# Patient Record
Sex: MALE | Race: WHITE | NOT HISPANIC OR LATINO | Employment: OTHER | URBAN - METROPOLITAN AREA
[De-identification: names, ages, dates, MRNs, and addresses within clinical notes are randomized per-mention and may not be internally consistent; named-entity substitution may affect disease eponyms.]

---

## 2023-08-31 DIAGNOSIS — I25.10 CORONARY ARTERY DISEASE INVOLVING NATIVE CORONARY ARTERY OF NATIVE HEART WITHOUT ANGINA PECTORIS: Primary | ICD-10-CM

## 2023-08-31 PROBLEM — M10.9 GOUT: Status: ACTIVE | Noted: 2023-08-31

## 2023-08-31 PROBLEM — I10 HYPERTENSION: Status: ACTIVE | Noted: 2023-08-31

## 2023-08-31 PROBLEM — E78.00 PURE HYPERCHOLESTEROLEMIA: Status: ACTIVE | Noted: 2023-08-31

## 2023-08-31 RX ORDER — METOPROLOL TARTRATE 50 MG/1
50 TABLET ORAL EVERY 12 HOURS
Qty: 90 TABLET | Refills: 3 | Status: SHIPPED | OUTPATIENT
Start: 2023-08-31

## 2023-08-31 RX ORDER — SIMVASTATIN 20 MG/1
20 TABLET, FILM COATED ORAL DAILY
Qty: 90 TABLET | Refills: 3 | Status: SHIPPED | OUTPATIENT
Start: 2023-08-31

## 2023-08-31 RX ORDER — SIMVASTATIN 20 MG/1
1 TABLET, FILM COATED ORAL DAILY
COMMUNITY
Start: 2014-03-31 | End: 2023-08-31 | Stop reason: SDUPTHER

## 2023-08-31 RX ORDER — METOPROLOL TARTRATE 50 MG/1
1 TABLET ORAL EVERY 12 HOURS
COMMUNITY
Start: 2016-09-07 | End: 2023-08-31 | Stop reason: SDUPTHER

## 2023-08-31 NOTE — TELEPHONE ENCOUNTER
Pt unable to travel to [place] he had appt with you in October, he is going to move to the [facility] where his wife goes, itll be close to him. He is asking for 90 supply to get him until he is seen by the next doctor.

## 2024-01-01 ENCOUNTER — POST MORTEM DOCUMENTATION (OUTPATIENT)
Dept: RADIATION ONCOLOGY | Facility: CLINIC | Age: 74
End: 2024-01-01

## 2024-08-26 ENCOUNTER — APPOINTMENT (OUTPATIENT)
Dept: CARDIOLOGY | Facility: HOSPITAL | Age: 74
End: 2024-08-26
Payer: MEDICARE

## 2024-08-26 ENCOUNTER — APPOINTMENT (OUTPATIENT)
Dept: RADIOLOGY | Facility: HOSPITAL | Age: 74
End: 2024-08-26
Payer: MEDICARE

## 2024-08-26 ENCOUNTER — HOSPITAL ENCOUNTER (EMERGENCY)
Facility: HOSPITAL | Age: 74
Discharge: OTHER INSTITUTION | End: 2024-08-27
Attending: EMERGENCY MEDICINE
Payer: MEDICARE

## 2024-08-26 DIAGNOSIS — I48.91 TRANSIENT ATRIAL FIBRILLATION (MULTI): ICD-10-CM

## 2024-08-26 DIAGNOSIS — R06.02 SHORTNESS OF BREATH: Primary | ICD-10-CM

## 2024-08-26 DIAGNOSIS — R09.02 HYPOXEMIA: ICD-10-CM

## 2024-08-26 DIAGNOSIS — R91.8 MASS OF RIGHT LUNG: ICD-10-CM

## 2024-08-26 DIAGNOSIS — J44.1 COPD EXACERBATION (MULTI): ICD-10-CM

## 2024-08-26 LAB
ALBUMIN SERPL BCP-MCNC: 3.2 G/DL (ref 3.4–5)
ALP SERPL-CCNC: 68 U/L (ref 33–136)
ALT SERPL W P-5'-P-CCNC: 6 U/L (ref 10–52)
ANION GAP BLDA CALCULATED.4IONS-SCNC: 8 MMO/L (ref 10–25)
ANION GAP SERPL CALC-SCNC: 10 MMOL/L (ref 10–20)
APPARATUS: ABNORMAL
ARTERIAL PATENCY WRIST A: POSITIVE
AST SERPL W P-5'-P-CCNC: 13 U/L (ref 9–39)
BASE EXCESS BLDA CALC-SCNC: 6.6 MMOL/L (ref -2–3)
BASOPHILS # BLD AUTO: 0.03 X10*3/UL (ref 0–0.1)
BASOPHILS NFR BLD AUTO: 0.3 %
BILIRUB SERPL-MCNC: 0.4 MG/DL (ref 0–1.2)
BNP SERPL-MCNC: 122 PG/ML (ref 0–99)
BODY TEMPERATURE: ABNORMAL
BUN SERPL-MCNC: 7 MG/DL (ref 6–23)
CA-I BLDA-SCNC: 1.5 MMOL/L (ref 1.1–1.33)
CALCIUM SERPL-MCNC: 11.2 MG/DL (ref 8.6–10.3)
CARDIAC TROPONIN I PNL SERPL HS: 11 NG/L (ref 0–20)
CARDIAC TROPONIN I PNL SERPL HS: 13 NG/L (ref 0–20)
CHLORIDE BLDA-SCNC: 94 MMOL/L (ref 98–107)
CHLORIDE SERPL-SCNC: 92 MMOL/L (ref 98–107)
CO2 SERPL-SCNC: 29 MMOL/L (ref 21–32)
CREAT SERPL-MCNC: 0.65 MG/DL (ref 0.5–1.3)
D DIMER PPP FEU-MCNC: 901 NG/ML FEU
EGFRCR SERPLBLD CKD-EPI 2021: >90 ML/MIN/1.73M*2
EOSINOPHIL # BLD AUTO: 0.1 X10*3/UL (ref 0–0.4)
EOSINOPHIL NFR BLD AUTO: 1.1 %
ERYTHROCYTE [DISTWIDTH] IN BLOOD BY AUTOMATED COUNT: 11.5 % (ref 11.5–14.5)
GLUCOSE BLDA-MCNC: 109 MG/DL (ref 74–99)
GLUCOSE SERPL-MCNC: 101 MG/DL (ref 74–99)
HCO3 BLDA-SCNC: 30.5 MMOL/L (ref 22–26)
HCT VFR BLD AUTO: 37.9 % (ref 41–52)
HCT VFR BLD EST: 42 % (ref 41–52)
HGB BLD-MCNC: 12.8 G/DL (ref 13.5–17.5)
HGB BLDA-MCNC: 14 G/DL (ref 13.5–17.5)
IMM GRANULOCYTES # BLD AUTO: 0.05 X10*3/UL (ref 0–0.5)
IMM GRANULOCYTES NFR BLD AUTO: 0.6 % (ref 0–0.9)
INHALED O2 CONCENTRATION: 28 %
INR PPP: 1 (ref 0.9–1.1)
LACTATE BLDA-SCNC: 1.4 MMOL/L (ref 0.4–2)
LACTATE SERPL-SCNC: 1.6 MMOL/L (ref 0.4–2)
LIPASE SERPL-CCNC: <3 U/L (ref 9–82)
LYMPHOCYTES # BLD AUTO: 1.33 X10*3/UL (ref 0.8–3)
LYMPHOCYTES NFR BLD AUTO: 15.1 %
MAGNESIUM SERPL-MCNC: 1.62 MG/DL (ref 1.6–2.4)
MCH RBC QN AUTO: 31.3 PG (ref 26–34)
MCHC RBC AUTO-ENTMCNC: 33.8 G/DL (ref 32–36)
MCV RBC AUTO: 93 FL (ref 80–100)
MONOCYTES # BLD AUTO: 0.94 X10*3/UL (ref 0.05–0.8)
MONOCYTES NFR BLD AUTO: 10.7 %
NEUTROPHILS # BLD AUTO: 6.34 X10*3/UL (ref 1.6–5.5)
NEUTROPHILS NFR BLD AUTO: 72.2 %
NRBC BLD-RTO: 0 /100 WBCS (ref 0–0)
OXYHGB MFR BLDA: 92.8 % (ref 94–98)
PCO2 BLDA: 40 MM HG (ref 38–42)
PH BLDA: 7.49 PH (ref 7.38–7.42)
PLATELET # BLD AUTO: 224 X10*3/UL (ref 150–450)
PO2 BLDA: 72 MM HG (ref 85–95)
POTASSIUM BLDA-SCNC: 3.4 MMOL/L (ref 3.5–5.3)
POTASSIUM SERPL-SCNC: 3.1 MMOL/L (ref 3.5–5.3)
PROT SERPL-MCNC: 7.7 G/DL (ref 6.4–8.2)
PROTHROMBIN TIME: 11.6 SECONDS (ref 9.8–12.8)
RBC # BLD AUTO: 4.09 X10*6/UL (ref 4.5–5.9)
SAO2 % BLDA: 96 % (ref 94–100)
SARS-COV-2 RNA RESP QL NAA+PROBE: NOT DETECTED
SODIUM BLDA-SCNC: 129 MMOL/L (ref 136–145)
SODIUM SERPL-SCNC: 128 MMOL/L (ref 136–145)
SPECIMEN DRAWN FROM PATIENT: ABNORMAL
TSH SERPL-ACNC: 1.24 MIU/L (ref 0.44–3.98)
WBC # BLD AUTO: 8.8 X10*3/UL (ref 4.4–11.3)

## 2024-08-26 PROCEDURE — 83690 ASSAY OF LIPASE: CPT | Performed by: EMERGENCY MEDICINE

## 2024-08-26 PROCEDURE — 99291 CRITICAL CARE FIRST HOUR: CPT | Performed by: EMERGENCY MEDICINE

## 2024-08-26 PROCEDURE — 2500000005 HC RX 250 GENERAL PHARMACY W/O HCPCS: Performed by: EMERGENCY MEDICINE

## 2024-08-26 PROCEDURE — 83735 ASSAY OF MAGNESIUM: CPT | Performed by: EMERGENCY MEDICINE

## 2024-08-26 PROCEDURE — 71275 CT ANGIOGRAPHY CHEST: CPT | Mod: FOREIGN READ | Performed by: RADIOLOGY

## 2024-08-26 PROCEDURE — 83605 ASSAY OF LACTIC ACID: CPT | Performed by: EMERGENCY MEDICINE

## 2024-08-26 PROCEDURE — 87635 SARS-COV-2 COVID-19 AMP PRB: CPT | Performed by: EMERGENCY MEDICINE

## 2024-08-26 PROCEDURE — 36415 COLL VENOUS BLD VENIPUNCTURE: CPT | Performed by: EMERGENCY MEDICINE

## 2024-08-26 PROCEDURE — 71045 X-RAY EXAM CHEST 1 VIEW: CPT | Mod: FOREIGN READ | Performed by: RADIOLOGY

## 2024-08-26 PROCEDURE — 84484 ASSAY OF TROPONIN QUANT: CPT | Performed by: EMERGENCY MEDICINE

## 2024-08-26 PROCEDURE — 82435 ASSAY OF BLOOD CHLORIDE: CPT | Performed by: EMERGENCY MEDICINE

## 2024-08-26 PROCEDURE — 85025 COMPLETE CBC W/AUTO DIFF WBC: CPT | Performed by: EMERGENCY MEDICINE

## 2024-08-26 PROCEDURE — 93005 ELECTROCARDIOGRAM TRACING: CPT

## 2024-08-26 PROCEDURE — 2550000001 HC RX 255 CONTRASTS: Performed by: EMERGENCY MEDICINE

## 2024-08-26 PROCEDURE — 71045 X-RAY EXAM CHEST 1 VIEW: CPT

## 2024-08-26 PROCEDURE — 83880 ASSAY OF NATRIURETIC PEPTIDE: CPT | Performed by: EMERGENCY MEDICINE

## 2024-08-26 PROCEDURE — 85610 PROTHROMBIN TIME: CPT | Performed by: EMERGENCY MEDICINE

## 2024-08-26 PROCEDURE — 71275 CT ANGIOGRAPHY CHEST: CPT

## 2024-08-26 PROCEDURE — 83036 HEMOGLOBIN GLYCOSYLATED A1C: CPT | Mod: GENLAB

## 2024-08-26 PROCEDURE — 84443 ASSAY THYROID STIM HORMONE: CPT | Performed by: EMERGENCY MEDICINE

## 2024-08-26 PROCEDURE — 94640 AIRWAY INHALATION TREATMENT: CPT

## 2024-08-26 PROCEDURE — 85379 FIBRIN DEGRADATION QUANT: CPT | Performed by: EMERGENCY MEDICINE

## 2024-08-26 PROCEDURE — 2500000002 HC RX 250 W HCPCS SELF ADMINISTERED DRUGS (ALT 637 FOR MEDICARE OP, ALT 636 FOR OP/ED)

## 2024-08-26 PROCEDURE — 82810 BLOOD GASES O2 SAT ONLY: CPT | Mod: CCI | Performed by: EMERGENCY MEDICINE

## 2024-08-26 PROCEDURE — 96374 THER/PROPH/DIAG INJ IV PUSH: CPT | Mod: 59

## 2024-08-26 PROCEDURE — 83930 ASSAY OF BLOOD OSMOLALITY: CPT | Mod: GENLAB

## 2024-08-26 RX ORDER — IPRATROPIUM BROMIDE AND ALBUTEROL SULFATE 2.5; .5 MG/3ML; MG/3ML
SOLUTION RESPIRATORY (INHALATION)
Status: COMPLETED
Start: 2024-08-26 | End: 2024-08-26

## 2024-08-26 RX ORDER — IPRATROPIUM BROMIDE AND ALBUTEROL SULFATE 2.5; .5 MG/3ML; MG/3ML
3 SOLUTION RESPIRATORY (INHALATION)
Status: DISCONTINUED | OUTPATIENT
Start: 2024-08-26 | End: 2024-08-27

## 2024-08-26 RX ORDER — METOPROLOL TARTRATE 1 MG/ML
5 INJECTION, SOLUTION INTRAVENOUS ONCE
Status: COMPLETED | OUTPATIENT
Start: 2024-08-26 | End: 2024-08-26

## 2024-08-26 RX ORDER — POTASSIUM CHLORIDE 20 MEQ/1
40 TABLET, EXTENDED RELEASE ORAL ONCE
Status: COMPLETED | OUTPATIENT
Start: 2024-08-27 | End: 2024-08-27

## 2024-08-26 ASSESSMENT — COLUMBIA-SUICIDE SEVERITY RATING SCALE - C-SSRS
1. IN THE PAST MONTH, HAVE YOU WISHED YOU WERE DEAD OR WISHED YOU COULD GO TO SLEEP AND NOT WAKE UP?: NO
2. HAVE YOU ACTUALLY HAD ANY THOUGHTS OF KILLING YOURSELF?: NO
6. HAVE YOU EVER DONE ANYTHING, STARTED TO DO ANYTHING, OR PREPARED TO DO ANYTHING TO END YOUR LIFE?: NO

## 2024-08-26 ASSESSMENT — PAIN SCALES - GENERAL: PAINLEVEL_OUTOF10: 0 - NO PAIN

## 2024-08-26 ASSESSMENT — PAIN - FUNCTIONAL ASSESSMENT: PAIN_FUNCTIONAL_ASSESSMENT: 0-10

## 2024-08-26 NOTE — ED PROVIDER NOTES
Department of Emergency Medicine   ED  Provider Note  Admit Date/RoomTime: 8/26/2024  4:27 PM  [facility]: [facility]                  History of Present Illness:   Juan Carlos Cr is a 74 y.o. male presenting to the ED for shortness of breath and cough, beginning x 1 month.  The complaint has been persistent, moderate in severity, and worsened by  exertion.  He he admits to cough productive of phlegm. .  He denies any fevers or chills.  He denies any chest pain.  He admits to shortness of breath.  No leg pain swelling or calf tenderness.  No recent travel.  He is a smoker previously smoked about a pack and 1/2 to 2 packs/day says over the last several weeks he is cut back to about 5 cigarettes/day.  He drinks about 4-6 beers a day.  No known ill contacts.  His shortness of breath was getting worse and his son came over and checked his pulse ox which was found to be low on room air so he came to the emergency room by private vehicle.      Review of Systems:   Pertinent positives and review of systems as noted above.  Remaining 10 review of systems is negative or noncontributory to today's episode of care.  Review of Systems   A complete review of systems is otherwise negative except as noted above    --------------------------------------------- PAST HISTORY ---------------------------------------------  Past Medical History:  has a past medical history of Acute myocardial infarction, unspecified (Multi) (05/17/2013), Encounter for screening for malignant neoplasm of prostate (09/02/2015), Hypertension, and Personal history of other diseases of the nervous system and sense organs (09/13/2017).    Past Surgical History:  has a past surgical history that includes Coronary angioplasty with stent (05/17/2013); Cataract extraction (09/14/2018); and Cataract extraction (06/27/2014).    Social History:  reports that he has been smoking cigarettes. He does not have any smokeless tobacco history on file. He reports current  alcohol use.    Family History: family history is not on file. Unless otherwise noted, family history is non contributory    Patient's Medications   New Prescriptions    No medications on file   Previous Medications    METOPROLOL TARTRATE (LOPRESSOR) 50 MG TABLET    Take 1 tablet by mouth every 12 hours.    SIMVASTATIN (ZOCOR) 20 MG TABLET    Take 1 tablet (20 mg) by mouth once daily.   Modified Medications    No medications on file   Discontinued Medications    No medications on file      The patient’s home medications have been reviewed.    Allergies: Patient has no known allergies.    -------------------------------------------------- RESULTS -------------------------------------------------  All laboratory and radiology results have been personally reviewed by myself   LABS:  Labs Reviewed   CBC WITH AUTO DIFFERENTIAL - Abnormal       Result Value    WBC 8.8      nRBC 0.0      RBC 4.09 (*)     Hemoglobin 12.8 (*)     Hematocrit 37.9 (*)     MCV 93      MCH 31.3      MCHC 33.8      RDW 11.5      Platelets 224      Neutrophils % 72.2      Immature Granulocytes %, Automated 0.6      Lymphocytes % 15.1      Monocytes % 10.7      Eosinophils % 1.1      Basophils % 0.3      Neutrophils Absolute 6.34 (*)     Immature Granulocytes Absolute, Automated 0.05      Lymphocytes Absolute 1.33      Monocytes Absolute 0.94 (*)     Eosinophils Absolute 0.10      Basophils Absolute 0.03     COMPREHENSIVE METABOLIC PANEL - Abnormal    Glucose 101 (*)     Sodium 128 (*)     Potassium 3.1 (*)     Chloride 92 (*)     Bicarbonate 29      Anion Gap 10      Urea Nitrogen 7      Creatinine 0.65      eGFR >90      Calcium 11.2 (*)     Albumin 3.2 (*)     Alkaline Phosphatase 68      Total Protein 7.7      AST 13      Bilirubin, Total 0.4      ALT 6 (*)    LIPASE - Abnormal    Lipase <3 (*)     Narrative:     Venipuncture immediately after or during the administration of Metamizole may lead to falsely low results. Testing should be  performed immediately prior to Metamizole dosing.   B-TYPE NATRIURETIC PEPTIDE - Abnormal     (*)     Narrative:        <100 pg/mL - Heart failure unlikely  100-299 pg/mL - Intermediate probability of acute heart                  failure exacerbation. Correlate with clinical                  context and patient history.    >=300 pg/mL - Heart Failure likely. Correlate with clinical                  context and patient history.    BNP testing is performed using different testing methodology at [facility] than at other [facility]. Direct result comparisons should only be made within the same method.      BLOOD GAS ARTERIAL FULL PANEL - Abnormal    POCT pH, Arterial 7.49 (*)     POCT pCO2, Arterial 40      POCT pO2, Arterial 72 (*)     POCT SO2, Arterial 96      POCT Oxy Hemoglobin, Arterial 92.8 (*)     POCT Hematocrit Calculated, Arterial 42.0      POCT Sodium, Arterial 129 (*)     POCT Potassium, Arterial 3.4 (*)     POCT Chloride, Arterial 94 (*)     POCT Ionized Calcium, Arterial 1.50 (*)     POCT Glucose, Arterial 109 (*)     POCT Lactate, Arterial 1.4      POCT Base Excess, Arterial 6.6 (*)     POCT HCO3 Calculated, Arterial 30.5 (*)     POCT Hemoglobin, Arterial 14.0      POCT Anion Gap, Arterial 8 (*)     Patient Temperature        FiO2 28      Apparatus CANNULA      Site of Arterial Puncture Radial Right      Bishnu's Test Positive     D-DIMER, VTE EXCLUSION - Abnormal    D-Dimer, Quantitative VTE Exclusion 901 (*)     Narrative:     The VTE Exclusion D-Dimer assay is reported in ng/mL Fibrinogen Equivalent Units (FEU).    Per 's instructions for use, a value of less than 500 ng/mL (FEU) may help to exclude DVT or PE in outpatients when the assay is used with a clinical pretest probability assessment.([facility] must utilize and document eCalc 'Wells Score Deep Vein Thrombosis Risk' for DVT exclusion only. Emergency Department should utilize  Guidelines for Emergency  Department Use of the VTE Exclusion D-Dimer and Clinical Pretest probability assessment model for DVT or PE exclusion.)   MAGNESIUM - Normal    Magnesium 1.62     LACTATE - Normal    Lactate 1.6      Narrative:     Venipuncture immediately after or during the administration of Metamizole may lead to falsely low results. Testing should be performed immediately  prior to Metamizole dosing.   PROTIME-INR - Normal    Protime 11.6      INR 1.0     SARS-COV-2 PCR - Normal    Coronavirus 2019, PCR Not Detected      Narrative:     This assay has received FDA Emergency Use Authorization (EUA) and is only authorized for the duration of time that circumstances exist to justify the authorization of the emergency use of in vitro diagnostic tests for the detection of SARS-CoV-2 virus and/or diagnosis of COVID-19 infection under section 564(b)(1) of the Act, 21 U.S.C. 360bbb-3(b)(1). This assay is an in vitro diagnostic nucleic acid amplification test for the qualitative detection of SARS-CoV-2 from nasopharyngeal specimens and has been validated for use at [facility]. Negative results do not preclude COVID-19 infections and should not be used as the sole basis for diagnosis, treatment, or other management decisions.     SERIAL TROPONIN-INITIAL - Normal    Troponin I, High Sensitivity 11      Narrative:     Less than 99th percentile of normal range cutoff-  Female and children under 18 years old <14 ng/L; Male <21 ng/L: Negative  Repeat testing should be performed if clinically indicated.     Female and children under 18 years old 14-50 ng/L; Male 21-50 ng/L:  Consistent with possible cardiac damage and possible increased clinical   risk. Serial measurements may help to assess extent of myocardial damage.     >50 ng/L: Consistent with cardiac damage, increased clinical risk and  myocardial infarction. Serial measurements may help assess extent of   myocardial damage.      NOTE: Children less than 1 year old  may have higher baseline troponin   levels and results should be interpreted in conjunction with the overall   clinical context.     NOTE: Troponin I testing is performed using a different   testing methodology at [facility] than at other   [facility]. Direct result comparisons should only   be made within the same method.   SERIAL TROPONIN, 1 HOUR - Normal    Troponin I, High Sensitivity 13      Narrative:     Less than 99th percentile of normal range cutoff-  Female and children under 18 years old <14 ng/L; Male <21 ng/L: Negative  Repeat testing should be performed if clinically indicated.     Female and children under 18 years old 14-50 ng/L; Male 21-50 ng/L:  Consistent with possible cardiac damage and possible increased clinical   risk. Serial measurements may help to assess extent of myocardial damage.     >50 ng/L: Consistent with cardiac damage, increased clinical risk and  myocardial infarction. Serial measurements may help assess extent of   myocardial damage.      NOTE: Children less than 1 year old may have higher baseline troponin   levels and results should be interpreted in conjunction with the overall   clinical context.     NOTE: Troponin I testing is performed using a different   testing methodology at [facility] than at other   [facility]. Direct result comparisons should only   be made within the same method.   TSH WITH REFLEX TO FREE T4 IF ABNORMAL - Normal    Thyroid Stimulating Hormone 1.24      Narrative:     TSH testing is performed using different testing methodology at [facility] than at other [facility]. Direct result comparisons should only be made within the same method.     TROPONIN SERIES- (INITIAL, 1 HR)    Narrative:     The following orders were created for panel order Troponin I Series, High Sensitivity (0, 1 HR).  Procedure                               Abnormality         Status                     ---------             "                   -----------         ------                     Troponin I, High Sensiti...[[id number]]  Normal              Final result               Troponin, High Sensitivi...[[id number]]  Normal              Final result                 Please view results for these tests on the individual orders.         RADIOLOGY:  Interpreted by Radiologist.  CT angio chest for pulmonary embolism   Final Result   6.57 m soft tissue mass encasing the right hilum obstructing portions   of the right main bronchus and segmental bronchi as well as the   anterior right upper lobe pulmonary arteries.  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  Suspected   lymphangitic spread into the right apex.   Changes of background pulmonary fibrosis.   Mild prominence of the collecting systems left greater than right.    Small nonobstructing stone in the central right kidney.  Dedicated   abdominal imaging can be obtained as clinically warranted.   Confluent anterior osteophyte formation in the midthoracic spine.    Findings suggestive of DISH.   Signed by Hamzah Mo MD      XR chest 1 view   Final Result   Mass opacity right upper lobe.  Malignancy is not excluded.  Recommend   CT for further evaluation.  Emphysematous changes.   Signed by Terrence Horne, DO          No results found for this or any previous visit (from the past 4464 hour(s)).  ------------------------- NURSING NOTES AND VITALS REVIEWED ---------------------------   The nursing notes within the ED encounter and vital signs as below have been reviewed.   /51   Pulse 87   Temp 36.6 °C (97.9 °F)   Resp 16   Ht 1.753 m (5' 9\")   Wt 74.7 kg (164 lb 10.9 oz)   SpO2 96%   BMI 24.32 kg/m²   Oxygen Saturation Interpretation: Normal      ---------------------------------------------------PHYSICAL EXAM--------------------------------------  Physical Exam  Vitals and nursing note reviewed.   Constitutional:       " General: He is not in acute distress.     Appearance: He is well-developed. He is not ill-appearing or toxic-appearing.   HENT:      Head: Normocephalic and atraumatic.      Nose: Nose normal.      Mouth/Throat:      Mouth: Mucous membranes are moist.      Pharynx: Oropharynx is clear. No oropharyngeal exudate or posterior oropharyngeal erythema.   Eyes:      General: No scleral icterus.     Extraocular Movements: Extraocular movements intact.      Conjunctiva/sclera: Conjunctivae normal.      Pupils: Pupils are equal, round, and reactive to light.   Cardiovascular:      Rate and Rhythm: Tachycardia present. Rhythm irregular.      Pulses: Normal pulses.      Heart sounds: No murmur heard.     No gallop.   Pulmonary:      Effort: Pulmonary effort is normal. No respiratory distress.      Breath sounds: No stridor. Wheezing, rhonchi and rales present.   Chest:      Chest wall: No tenderness.   Abdominal:      Palpations: Abdomen is soft.      Tenderness: There is no abdominal tenderness. There is no guarding or rebound.   Musculoskeletal:         General: No swelling, tenderness or deformity.      Cervical back: Normal range of motion and neck supple. No rigidity or tenderness.      Right lower leg: No edema.      Left lower leg: No edema.   Lymphadenopathy:      Cervical: No cervical adenopathy.   Skin:     General: Skin is warm and dry.      Capillary Refill: Capillary refill takes less than 2 seconds.      Findings: No rash.   Neurological:      Mental Status: He is alert and oriented to person, place, and time.   Psychiatric:         Mood and Affect: Mood normal.            Critical Care    Performed by: Ted Grimm DO  Authorized by: Ted Grimm DO    Critical care provider statement:     Critical care time (minutes):  50    Critical care time was exclusive of:  Separately billable procedures and treating other patients    Critical care was necessary to treat or prevent imminent or life-threatening  deterioration of the following conditions:  Respiratory failure (atrial fibrillation with rvr)    Critical care was time spent personally by me on the following activities:  Blood draw for specimens, development of treatment plan with patient or surrogate, evaluation of patient's response to treatment, examination of patient, obtaining history from patient or surrogate, ordering and performing treatments and interventions, ordering and review of laboratory studies, ordering and review of radiographic studies, pulse oximetry and re-evaluation of patient's condition  Comments:      Results were discussed with the patient family.  They were informed of need for transfer and further workup and treatment.  The patient is signed out to the oncoming physician Dr. TURNER Ramirez on shift change pending transfer to another hospital.      None  ------------------------------ ED COURSE/MEDICAL DECISION MAKING----------------------    Medical Decision Making:   Patient was seen and examined by me.  An IV is started appropriate labs been ordered.  CT imaging of the chest was ordered.    ED Course as of 08/27/24 0053   Mon Aug 26, 2024   1657 G at 1639 interpreted by me at 1644.  Normal sinus rhythm with frequent PVCs.  Rate 98 bpm.  Axis grossly normal.   ms,  ms,  ms.  No acute ST-T change. [EC]   1658 A second EKG obtained at 1654 interpreted by me at 1657.  Atrial fibrillation with RVR rate 114 bpm.  Axis grossly normal.   ms,  ms.  Nonspecific ST-T change.  No STEMI. [EC]   2050 CBC and Auto Differential(!)  White blood cell count 8.8, hemoglobin 12.8, hematocrit 37.9, platelet count 24,000 [EC]   2051 Comprehensive metabolic panel(!)  Glucose 101, sodium 128, potassium 3.1, chloride 92, bicarb 29    BUN 7, creatinine 0.65, GFR greater than 90  LFTs are normal [EC]   2051 B-Type Natriuretic Peptide(!)    D-dimer is elevated at 901 we will obtain a CT of the chest to rule out PE [EC]   2051  Lactate is normal at 1.6  Magnesium normal 1.62  TSH normal 1.24   [EC]   2052 Chest x-ray  IMPRESSION:  Mass opacity right upper lobe.  Malignancy is not excluded.  Recommend  CT for further evaluation.  Emphysematous changes.  Signed by Terrence Horne DO   [EC]   9727 CT CHEST  IMPRESSION:  6.57 m soft tissue mass encasing the right hilum obstructing portions  of the right main bronchus and segmental bronchi as well as the  anterior right upper lobe pulmonary arteries.  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  Suspected  lymphangitic spread into the right apex.  Changes of background pulmonary fibrosis.  Mild prominence of the collecting systems left greater than right.   Small nonobstructing stone in the central right kidney.  Dedicated  abdominal imaging can be obtained as clinically warranted.  Confluent anterior osteophyte formation in the midthoracic spine.   Findings suggestive of DISH.  Signed by Hamzah Mo MD   [EC]      ED Course User Index  [EC] Ted Grimm DO         Diagnoses as of 08/27/24 0053   Shortness of breath   COPD exacerbation (Multi)   Transient atrial fibrillation (Multi)   Mass of right lung   Hypoxemia      Counseling:   The emergency provider has spoken with the patient and discussed today’s results, in addition to providing specific details for the plan of care and counseling regarding the diagnosis and prognosis.  Questions are answered at this time and they are agreeable with the plan.      --------------------------------- IMPRESSION AND DISPOSITION ---------------------------------        IMPRESSION  1. Shortness of breath    2. COPD exacerbation (Multi)    3. Transient atrial fibrillation (Multi)    4. Mass of right lung    5. Hypoxemia        DISPOSITION  Disposition: patient will require transfer to another facility with more ancillary services            The patient was accepted in transfer to [facility] by   Alexsander Vignes to Telemetry  Patient condition is serious      Billing Provider Critical Care Time: 0 minutes     Ted Grimm,   08/27/24 0002       Ted Grimm,   08/27/24 0053

## 2024-08-27 ENCOUNTER — HOSPITAL ENCOUNTER (INPATIENT)
Facility: HOSPITAL | Age: 74
LOS: 5 days | Discharge: HOME | DRG: 189 | End: 2024-09-01
Attending: INTERNAL MEDICINE | Admitting: INTERNAL MEDICINE
Payer: MEDICARE

## 2024-08-27 ENCOUNTER — HOSPITAL ENCOUNTER (OUTPATIENT)
Dept: CARDIOLOGY | Facility: HOSPITAL | Age: 74
Discharge: HOME | End: 2024-08-27
Payer: MEDICARE

## 2024-08-27 ENCOUNTER — APPOINTMENT (OUTPATIENT)
Dept: CARDIOLOGY | Facility: HOSPITAL | Age: 74
DRG: 189 | End: 2024-08-27
Payer: MEDICARE

## 2024-08-27 VITALS
HEIGHT: 69 IN | OXYGEN SATURATION: 96 % | BODY MASS INDEX: 24.39 KG/M2 | HEART RATE: 89 BPM | TEMPERATURE: 97.9 F | WEIGHT: 164.68 LBS | SYSTOLIC BLOOD PRESSURE: 138 MMHG | DIASTOLIC BLOOD PRESSURE: 74 MMHG | RESPIRATION RATE: 16 BRPM

## 2024-08-27 DIAGNOSIS — R91.8 HILAR MASS: ICD-10-CM

## 2024-08-27 DIAGNOSIS — I25.10 CORONARY ARTERY DISEASE INVOLVING NATIVE HEART WITHOUT ANGINA PECTORIS, UNSPECIFIED VESSEL OR LESION TYPE: ICD-10-CM

## 2024-08-27 DIAGNOSIS — R91.8 PULMONARY NODULES: ICD-10-CM

## 2024-08-27 DIAGNOSIS — I48.0 PAROXYSMAL ATRIAL FIBRILLATION (MULTI): ICD-10-CM

## 2024-08-27 DIAGNOSIS — I48.91 ATRIAL FIBRILLATION, UNSPECIFIED TYPE (MULTI): Primary | ICD-10-CM

## 2024-08-27 DIAGNOSIS — M1A.9XX0 CHRONIC GOUT WITHOUT TOPHUS, UNSPECIFIED CAUSE, UNSPECIFIED SITE: ICD-10-CM

## 2024-08-27 DIAGNOSIS — I10 PRIMARY HYPERTENSION: ICD-10-CM

## 2024-08-27 DIAGNOSIS — F10.20 UNCOMPLICATED ALCOHOL DEPENDENCE (MULTI): ICD-10-CM

## 2024-08-27 DIAGNOSIS — Z95.5 STENTED CORONARY ARTERY: ICD-10-CM

## 2024-08-27 DIAGNOSIS — R94.31 ABNORMAL EKG: ICD-10-CM

## 2024-08-27 DIAGNOSIS — J44.1 COPD EXACERBATION (MULTI): ICD-10-CM

## 2024-08-27 DIAGNOSIS — I10 HYPERTENSION, UNSPECIFIED TYPE: ICD-10-CM

## 2024-08-27 DIAGNOSIS — I48.20 CHRONIC ATRIAL FIBRILLATION (MULTI): ICD-10-CM

## 2024-08-27 DIAGNOSIS — E11.9 TYPE 2 DIABETES MELLITUS WITHOUT COMPLICATION, WITHOUT LONG-TERM CURRENT USE OF INSULIN (MULTI): ICD-10-CM

## 2024-08-27 DIAGNOSIS — E78.00 PURE HYPERCHOLESTEROLEMIA: ICD-10-CM

## 2024-08-27 DIAGNOSIS — I48.11 LONGSTANDING PERSISTENT ATRIAL FIBRILLATION (MULTI): ICD-10-CM

## 2024-08-27 DIAGNOSIS — Z72.0 SMOKING TRYING TO QUIT: ICD-10-CM

## 2024-08-27 LAB
ABO GROUP (TYPE) IN BLOOD: NORMAL
ALBUMIN SERPL BCP-MCNC: 3.3 G/DL (ref 3.4–5)
ALP SERPL-CCNC: 78 U/L (ref 33–136)
ALT SERPL W P-5'-P-CCNC: 6 U/L (ref 10–52)
AMPHETAMINES UR QL SCN: NORMAL
ANION GAP SERPL CALC-SCNC: 14 MMOL/L (ref 10–20)
ANTIBODY SCREEN: NORMAL
APPEARANCE UR: CLEAR
AST SERPL W P-5'-P-CCNC: 10 U/L (ref 9–39)
ATRIAL RATE: 83 BPM
BARBITURATES UR QL SCN: NORMAL
BASOPHILS # BLD AUTO: 0.01 X10*3/UL (ref 0–0.1)
BASOPHILS NFR BLD AUTO: 0.1 %
BENZODIAZ UR QL SCN: NORMAL
BILIRUB DIRECT SERPL-MCNC: 0.1 MG/DL (ref 0–0.3)
BILIRUB SERPL-MCNC: 0.4 MG/DL (ref 0–1.2)
BILIRUB UR STRIP.AUTO-MCNC: NEGATIVE MG/DL
BUN SERPL-MCNC: 13 MG/DL (ref 6–23)
BZE UR QL SCN: NORMAL
CA-I BLD-SCNC: 1.52 MMOL/L (ref 1.1–1.33)
CALCIUM SERPL-MCNC: 11.9 MG/DL (ref 8.6–10.6)
CANNABINOIDS UR QL SCN: NORMAL
CHLORIDE SERPL-SCNC: 95 MMOL/L (ref 98–107)
CHLORIDE UR-SCNC: 22 MMOL/L
CHLORIDE/CREATININE (MMOL/G) IN URINE: 20 MMOL/G CREAT (ref 23–275)
CO2 SERPL-SCNC: 30 MMOL/L (ref 21–32)
COLOR UR: YELLOW
CREAT SERPL-MCNC: 0.7 MG/DL (ref 0.5–1.3)
CREAT UR-MCNC: 111 MG/DL (ref 20–370)
EGFRCR SERPLBLD CKD-EPI 2021: >90 ML/MIN/1.73M*2
EOSINOPHIL # BLD AUTO: 0 X10*3/UL (ref 0–0.4)
EOSINOPHIL NFR BLD AUTO: 0 %
ERYTHROCYTE [DISTWIDTH] IN BLOOD BY AUTOMATED COUNT: 11.5 % (ref 11.5–14.5)
EST. AVERAGE GLUCOSE BLD GHB EST-MCNC: 171 MG/DL
FENTANYL+NORFENTANYL UR QL SCN: NORMAL
GLUCOSE SERPL-MCNC: 205 MG/DL (ref 74–99)
GLUCOSE UR STRIP.AUTO-MCNC: NORMAL MG/DL
HBA1C MFR BLD: 7.6 %
HCT VFR BLD AUTO: 39.5 % (ref 41–52)
HGB BLD-MCNC: 13.3 G/DL (ref 13.5–17.5)
HYALINE CASTS #/AREA URNS AUTO: ABNORMAL /LPF
IMM GRANULOCYTES # BLD AUTO: 0.03 X10*3/UL (ref 0–0.5)
IMM GRANULOCYTES NFR BLD AUTO: 0.4 % (ref 0–0.9)
INR PPP: 1.1 (ref 0.9–1.1)
KETONES UR STRIP.AUTO-MCNC: NEGATIVE MG/DL
LEUKOCYTE ESTERASE UR QL STRIP.AUTO: NEGATIVE
LYMPHOCYTES # BLD AUTO: 0.46 X10*3/UL (ref 0.8–3)
LYMPHOCYTES NFR BLD AUTO: 5.9 %
MAGNESIUM SERPL-MCNC: 1.74 MG/DL (ref 1.6–2.4)
MCH RBC QN AUTO: 30.6 PG (ref 26–34)
MCHC RBC AUTO-ENTMCNC: 33.7 G/DL (ref 32–36)
MCV RBC AUTO: 91 FL (ref 80–100)
METHADONE UR QL SCN: NORMAL
MONOCYTES # BLD AUTO: 0.35 X10*3/UL (ref 0.05–0.8)
MONOCYTES NFR BLD AUTO: 4.5 %
MRSA DNA SPEC QL NAA+PROBE: NOT DETECTED
MUCOUS THREADS #/AREA URNS AUTO: ABNORMAL /LPF
NEUTROPHILS # BLD AUTO: 6.91 X10*3/UL (ref 1.6–5.5)
NEUTROPHILS NFR BLD AUTO: 89.1 %
NITRITE UR QL STRIP.AUTO: NEGATIVE
NRBC BLD-RTO: 0 /100 WBCS (ref 0–0)
OPIATES UR QL SCN: NORMAL
OSMOLALITY SERPL: 283 MOSM/KG (ref 280–300)
OSMOLALITY UR: 403 MOSM/KG (ref 200–1200)
OXYCODONE+OXYMORPHONE UR QL SCN: NORMAL
P AXIS: 58 DEGREES
P OFFSET: 187 MS
P ONSET: 126 MS
PCP UR QL SCN: NORMAL
PH UR STRIP.AUTO: 6 [PH]
PHOSPHATE SERPL-MCNC: 3.3 MG/DL (ref 2.5–4.9)
PLATELET # BLD AUTO: 271 X10*3/UL (ref 150–450)
POTASSIUM SERPL-SCNC: 4 MMOL/L (ref 3.5–5.3)
POTASSIUM UR-SCNC: 45 MMOL/L
POTASSIUM/CREAT UR-RTO: 41 MMOL/G CREAT
PR INTERVAL: 184 MS
PROCALCITONIN SERPL-MCNC: 0.04 NG/ML
PROT SERPL-MCNC: 7.6 G/DL (ref 6.4–8.2)
PROT UR STRIP.AUTO-MCNC: ABNORMAL MG/DL
PROTHROMBIN TIME: 11.9 SECONDS (ref 9.8–12.8)
Q ONSET: 218 MS
QRS COUNT: 14 BEATS
QRS DURATION: 100 MS
QT INTERVAL: 388 MS
QTC CALCULATION(BAZETT): 455 MS
QTC FREDERICIA: 432 MS
R AXIS: -7 DEGREES
RBC # BLD AUTO: 4.34 X10*6/UL (ref 4.5–5.9)
RBC # UR STRIP.AUTO: NEGATIVE /UL
RBC #/AREA URNS AUTO: ABNORMAL /HPF
RH FACTOR (ANTIGEN D): NORMAL
SODIUM SERPL-SCNC: 135 MMOL/L (ref 136–145)
SODIUM UR-SCNC: 17 MMOL/L
SODIUM/CREAT UR-RTO: 15 MMOL/G CREAT
SP GR UR STRIP.AUTO: 1.02
T AXIS: 6 DEGREES
T OFFSET: 412 MS
UROBILINOGEN UR STRIP.AUTO-MCNC: NORMAL MG/DL
VENTRICULAR RATE: 83 BPM
WBC # BLD AUTO: 7.8 X10*3/UL (ref 4.4–11.3)
WBC #/AREA URNS AUTO: ABNORMAL /HPF

## 2024-08-27 PROCEDURE — 93005 ELECTROCARDIOGRAM TRACING: CPT

## 2024-08-27 PROCEDURE — 87899 AGENT NOS ASSAY W/OPTIC: CPT

## 2024-08-27 PROCEDURE — 96375 TX/PRO/DX INJ NEW DRUG ADDON: CPT

## 2024-08-27 PROCEDURE — 82570 ASSAY OF URINE CREATININE: CPT

## 2024-08-27 PROCEDURE — 82436 ASSAY OF URINE CHLORIDE: CPT

## 2024-08-27 PROCEDURE — 94760 N-INVAS EAR/PLS OXIMETRY 1: CPT

## 2024-08-27 PROCEDURE — 80048 BASIC METABOLIC PNL TOTAL CA: CPT

## 2024-08-27 PROCEDURE — 83935 ASSAY OF URINE OSMOLALITY: CPT

## 2024-08-27 PROCEDURE — 2500000005 HC RX 250 GENERAL PHARMACY W/O HCPCS: Performed by: EMERGENCY MEDICINE

## 2024-08-27 PROCEDURE — 99223 1ST HOSP IP/OBS HIGH 75: CPT | Performed by: INTERNAL MEDICINE

## 2024-08-27 PROCEDURE — 87641 MR-STAPH DNA AMP PROBE: CPT

## 2024-08-27 PROCEDURE — 84100 ASSAY OF PHOSPHORUS: CPT

## 2024-08-27 PROCEDURE — 99222 1ST HOSP IP/OBS MODERATE 55: CPT | Performed by: STUDENT IN AN ORGANIZED HEALTH CARE EDUCATION/TRAINING PROGRAM

## 2024-08-27 PROCEDURE — 83735 ASSAY OF MAGNESIUM: CPT

## 2024-08-27 PROCEDURE — 2500000001 HC RX 250 WO HCPCS SELF ADMINISTERED DRUGS (ALT 637 FOR MEDICARE OP)

## 2024-08-27 PROCEDURE — 82607 VITAMIN B-12: CPT

## 2024-08-27 PROCEDURE — 84145 PROCALCITONIN (PCT): CPT

## 2024-08-27 PROCEDURE — 94640 AIRWAY INHALATION TREATMENT: CPT

## 2024-08-27 PROCEDURE — 1200000002 HC GENERAL ROOM WITH TELEMETRY DAILY

## 2024-08-27 PROCEDURE — 82746 ASSAY OF FOLIC ACID SERUM: CPT

## 2024-08-27 PROCEDURE — 36415 COLL VENOUS BLD VENIPUNCTURE: CPT

## 2024-08-27 PROCEDURE — 82330 ASSAY OF CALCIUM: CPT

## 2024-08-27 PROCEDURE — 93010 ELECTROCARDIOGRAM REPORT: CPT | Performed by: INTERNAL MEDICINE

## 2024-08-27 PROCEDURE — 81001 URINALYSIS AUTO W/SCOPE: CPT

## 2024-08-27 PROCEDURE — 85025 COMPLETE CBC W/AUTO DIFF WBC: CPT

## 2024-08-27 PROCEDURE — 85610 PROTHROMBIN TIME: CPT

## 2024-08-27 PROCEDURE — 2500000004 HC RX 250 GENERAL PHARMACY W/ HCPCS (ALT 636 FOR OP/ED): Performed by: EMERGENCY MEDICINE

## 2024-08-27 PROCEDURE — 2500000002 HC RX 250 W HCPCS SELF ADMINISTERED DRUGS (ALT 637 FOR MEDICARE OP, ALT 636 FOR OP/ED): Performed by: EMERGENCY MEDICINE

## 2024-08-27 PROCEDURE — 2500000002 HC RX 250 W HCPCS SELF ADMINISTERED DRUGS (ALT 637 FOR MEDICARE OP, ALT 636 FOR OP/ED)

## 2024-08-27 PROCEDURE — 80307 DRUG TEST PRSMV CHEM ANLYZR: CPT

## 2024-08-27 PROCEDURE — 82248 BILIRUBIN DIRECT: CPT

## 2024-08-27 PROCEDURE — 87632 RESP VIRUS 6-11 TARGETS: CPT

## 2024-08-27 PROCEDURE — 86901 BLOOD TYPING SEROLOGIC RH(D): CPT

## 2024-08-27 PROCEDURE — 2500000004 HC RX 250 GENERAL PHARMACY W/ HCPCS (ALT 636 FOR OP/ED)

## 2024-08-27 PROCEDURE — S4991 NICOTINE PATCH NONLEGEND: HCPCS

## 2024-08-27 PROCEDURE — 86780 TREPONEMA PALLIDUM: CPT

## 2024-08-27 RX ORDER — IBUPROFEN 200 MG
1 TABLET ORAL DAILY
Status: DISCONTINUED | OUTPATIENT
Start: 2024-08-27 | End: 2024-09-01 | Stop reason: HOSPADM

## 2024-08-27 RX ORDER — IPRATROPIUM BROMIDE AND ALBUTEROL SULFATE 2.5; .5 MG/3ML; MG/3ML
3 SOLUTION RESPIRATORY (INHALATION)
Status: CANCELLED | OUTPATIENT
Start: 2024-08-27

## 2024-08-27 RX ORDER — AZITHROMYCIN 500 MG/1
500 TABLET, FILM COATED ORAL DAILY
Status: COMPLETED | OUTPATIENT
Start: 2024-08-27 | End: 2024-08-29

## 2024-08-27 RX ORDER — CEFTRIAXONE 1 G/50ML
1 INJECTION, SOLUTION INTRAVENOUS EVERY 24 HOURS
Status: DISCONTINUED | OUTPATIENT
Start: 2024-08-27 | End: 2024-09-01

## 2024-08-27 RX ORDER — PREDNISONE 20 MG/1
40 TABLET ORAL DAILY
Status: DISCONTINUED | OUTPATIENT
Start: 2024-08-27 | End: 2024-08-27

## 2024-08-27 RX ORDER — NAPROXEN SODIUM 220 MG/1
81 TABLET, FILM COATED ORAL DAILY
Status: DISCONTINUED | OUTPATIENT
Start: 2024-08-27 | End: 2024-09-01 | Stop reason: HOSPADM

## 2024-08-27 RX ORDER — ASPIRIN 81 MG/1
81 TABLET ORAL DAILY
COMMUNITY

## 2024-08-27 RX ORDER — IPRATROPIUM BROMIDE AND ALBUTEROL SULFATE 2.5; .5 MG/3ML; MG/3ML
3 SOLUTION RESPIRATORY (INHALATION) EVERY 4 HOURS PRN
Status: DISCONTINUED | OUTPATIENT
Start: 2024-08-27 | End: 2024-09-01 | Stop reason: HOSPADM

## 2024-08-27 RX ORDER — LANOLIN ALCOHOL/MO/W.PET/CERES
100 CREAM (GRAM) TOPICAL DAILY
Status: DISCONTINUED | OUTPATIENT
Start: 2024-08-30 | End: 2024-08-30 | Stop reason: SDUPTHER

## 2024-08-27 RX ORDER — FOLIC ACID 1 MG/1
1 TABLET ORAL DAILY
Status: DISCONTINUED | OUTPATIENT
Start: 2024-08-27 | End: 2024-09-01 | Stop reason: HOSPADM

## 2024-08-27 RX ORDER — DIAZEPAM 5 MG/1
10 TABLET ORAL EVERY 2 HOUR PRN
Status: DISCONTINUED | OUTPATIENT
Start: 2024-08-27 | End: 2024-09-01 | Stop reason: HOSPADM

## 2024-08-27 RX ORDER — POLYETHYLENE GLYCOL 3350 17 G/17G
17 POWDER, FOR SOLUTION ORAL DAILY
Status: CANCELLED | OUTPATIENT
Start: 2024-08-27

## 2024-08-27 RX ORDER — FLUTICASONE FUROATE AND VILANTEROL 200; 25 UG/1; UG/1
1 POWDER RESPIRATORY (INHALATION)
Status: DISCONTINUED | OUTPATIENT
Start: 2024-08-27 | End: 2024-09-01 | Stop reason: HOSPADM

## 2024-08-27 RX ORDER — MULTIVIT-MIN/IRON FUM/FOLIC AC 7.5 MG-4
1 TABLET ORAL DAILY
Status: CANCELLED | OUTPATIENT
Start: 2024-08-27

## 2024-08-27 RX ORDER — ENOXAPARIN SODIUM 100 MG/ML
40 INJECTION SUBCUTANEOUS EVERY 24 HOURS
Status: CANCELLED | OUTPATIENT
Start: 2024-08-27

## 2024-08-27 RX ORDER — IPRATROPIUM BROMIDE AND ALBUTEROL SULFATE 2.5; .5 MG/3ML; MG/3ML
3 SOLUTION RESPIRATORY (INHALATION) EVERY 4 HOURS PRN
Status: DISCONTINUED | OUTPATIENT
Start: 2024-08-27 | End: 2024-08-27 | Stop reason: HOSPADM

## 2024-08-27 RX ORDER — AZITHROMYCIN 500 MG/1
500 TABLET, FILM COATED ORAL DAILY
Status: CANCELLED | OUTPATIENT
Start: 2024-08-27 | End: 2024-08-30

## 2024-08-27 RX ORDER — IBUPROFEN 200 MG
1 TABLET ORAL DAILY
Status: DISCONTINUED | OUTPATIENT
Start: 2024-10-08 | End: 2024-09-01 | Stop reason: HOSPADM

## 2024-08-27 RX ORDER — ACETAMINOPHEN 325 MG/1
975 TABLET ORAL EVERY 6 HOURS PRN
Status: DISCONTINUED | OUTPATIENT
Start: 2024-08-27 | End: 2024-09-01 | Stop reason: HOSPADM

## 2024-08-27 RX ORDER — POLYETHYLENE GLYCOL 3350 17 G/17G
17 POWDER, FOR SOLUTION ORAL DAILY
Status: DISCONTINUED | OUTPATIENT
Start: 2024-08-27 | End: 2024-09-01 | Stop reason: HOSPADM

## 2024-08-27 RX ORDER — PREDNISONE 20 MG/1
40 TABLET ORAL DAILY
Status: CANCELLED | OUTPATIENT
Start: 2024-08-27

## 2024-08-27 RX ORDER — FLUTICASONE FUROATE AND VILANTEROL 200; 25 UG/1; UG/1
1 POWDER RESPIRATORY (INHALATION)
Status: CANCELLED | OUTPATIENT
Start: 2024-08-27

## 2024-08-27 RX ORDER — NICOTINE 7MG/24HR
1 PATCH, TRANSDERMAL 24 HOURS TRANSDERMAL DAILY
Status: DISCONTINUED | OUTPATIENT
Start: 2024-10-22 | End: 2024-09-01 | Stop reason: HOSPADM

## 2024-08-27 RX ORDER — THIAMINE HYDROCHLORIDE 100 MG/ML
100 INJECTION, SOLUTION INTRAMUSCULAR; INTRAVENOUS DAILY
Status: COMPLETED | OUTPATIENT
Start: 2024-08-27 | End: 2024-08-29

## 2024-08-27 RX ORDER — IPRATROPIUM BROMIDE AND ALBUTEROL SULFATE 2.5; .5 MG/3ML; MG/3ML
3 SOLUTION RESPIRATORY (INHALATION)
Status: DISCONTINUED | OUTPATIENT
Start: 2024-08-27 | End: 2024-08-27

## 2024-08-27 RX ORDER — ENOXAPARIN SODIUM 100 MG/ML
40 INJECTION SUBCUTANEOUS EVERY 24 HOURS
Status: DISCONTINUED | OUTPATIENT
Start: 2024-08-27 | End: 2024-08-29

## 2024-08-27 RX ORDER — MULTIVIT-MIN/IRON FUM/FOLIC AC 7.5 MG-4
1 TABLET ORAL DAILY
Status: DISCONTINUED | OUTPATIENT
Start: 2024-08-27 | End: 2024-09-01 | Stop reason: HOSPADM

## 2024-08-27 RX ORDER — FOLIC ACID 1 MG/1
1 TABLET ORAL DAILY
Status: CANCELLED | OUTPATIENT
Start: 2024-08-27

## 2024-08-27 RX ADMIN — FOLIC ACID 1 MG: 1 TABLET ORAL at 12:43

## 2024-08-27 RX ADMIN — PREDNISONE 40 MG: 20 TABLET ORAL at 12:44

## 2024-08-27 RX ADMIN — THIAMINE HYDROCHLORIDE 100 MG: 100 INJECTION, SOLUTION INTRAMUSCULAR; INTRAVENOUS at 12:44

## 2024-08-27 RX ADMIN — ASPIRIN 81 MG 81 MG: 81 TABLET ORAL at 12:43

## 2024-08-27 RX ADMIN — Medication 1 TABLET: at 12:43

## 2024-08-27 RX ADMIN — NICOTINE 1 PATCH: 21 PATCH, EXTENDED RELEASE TRANSDERMAL at 12:45

## 2024-08-27 RX ADMIN — POLYETHYLENE GLYCOL 3350 17 G: 17 POWDER, FOR SOLUTION ORAL at 12:45

## 2024-08-27 RX ADMIN — AZITHROMYCIN DIHYDRATE 500 MG: 500 TABLET ORAL at 12:43

## 2024-08-27 RX ADMIN — CEFTRIAXONE SODIUM 1 G: 1 INJECTION, SOLUTION INTRAVENOUS at 14:51

## 2024-08-27 RX ADMIN — ENOXAPARIN SODIUM 40 MG: 100 INJECTION SUBCUTANEOUS at 12:43

## 2024-08-27 SDOH — SOCIAL STABILITY: SOCIAL INSECURITY: HAVE YOU HAD THOUGHTS OF HARMING ANYONE ELSE?: NO

## 2024-08-27 SDOH — SOCIAL STABILITY: SOCIAL INSECURITY: DO YOU FEEL ANYONE HAS EXPLOITED OR TAKEN ADVANTAGE OF YOU FINANCIALLY OR OF YOUR PERSONAL PROPERTY?: NO

## 2024-08-27 SDOH — SOCIAL STABILITY: SOCIAL INSECURITY: HAS ANYONE EVER THREATENED TO HURT YOUR FAMILY OR YOUR PETS?: NO

## 2024-08-27 SDOH — SOCIAL STABILITY: SOCIAL INSECURITY: DOES ANYONE TRY TO KEEP YOU FROM HAVING/CONTACTING OTHER FRIENDS OR DOING THINGS OUTSIDE YOUR HOME?: NO

## 2024-08-27 SDOH — SOCIAL STABILITY: SOCIAL INSECURITY: ARE THERE ANY APPARENT SIGNS OF INJURIES/BEHAVIORS THAT COULD BE RELATED TO ABUSE/NEGLECT?: NO

## 2024-08-27 SDOH — SOCIAL STABILITY: SOCIAL INSECURITY: WERE YOU ABLE TO COMPLETE ALL THE BEHAVIORAL HEALTH SCREENINGS?: YES

## 2024-08-27 SDOH — SOCIAL STABILITY: SOCIAL INSECURITY: ABUSE: ADULT

## 2024-08-27 SDOH — SOCIAL STABILITY: SOCIAL INSECURITY: DO YOU FEEL UNSAFE GOING BACK TO THE PLACE WHERE YOU ARE LIVING?: NO

## 2024-08-27 SDOH — SOCIAL STABILITY: SOCIAL INSECURITY: ARE YOU OR HAVE YOU BEEN THREATENED OR ABUSED PHYSICALLY, EMOTIONALLY, OR SEXUALLY BY ANYONE?: NO

## 2024-08-27 ASSESSMENT — LIFESTYLE VARIABLES
ANXIETY: NO ANXIETY, AT EASE
AGITATION: NORMAL ACTIVITY
PAROXYSMAL SWEATS: NO SWEAT VISIBLE
VISUAL DISTURBANCES: NOT PRESENT
TREMOR: NO TREMOR
AUDIT TOTAL SCORE: 10
HAS A RELATIVE, FRIEND, DOCTOR, OR ANOTHER HEALTH PROFESSIONAL EXPRESSED CONCERN ABOUT YOUR DRINKING OR SUGGESTED YOU CUT DOWN: NO
NAUSEA AND VOMITING: NO NAUSEA AND NO VOMITING
HOW OFTEN DURING THE LAST YEAR HAVE YOU FAILED TO DO WHAT WAS NORMALLY EXPECTED FROM YOU BECAUSE OF DRINKING: NEVER
ANXIETY: NO ANXIETY, AT EASE
AUDIT TOTAL SCORE: 0
TREMOR: NO TREMOR
HEADACHE, FULLNESS IN HEAD: NOT PRESENT
HOW OFTEN DO YOU HAVE 6 OR MORE DRINKS ON ONE OCCASION: WEEKLY
PAROXYSMAL SWEATS: NO SWEAT VISIBLE
AUDITORY DISTURBANCES: NOT PRESENT
PAROXYSMAL SWEATS: NO SWEAT VISIBLE
AUDITORY DISTURBANCES: NOT PRESENT
TREMOR: NO TREMOR
AGITATION: NORMAL ACTIVITY
ANXIETY: NO ANXIETY, AT EASE
PAROXYSMAL SWEATS: NO SWEAT VISIBLE
HOW MANY STANDARD DRINKS CONTAINING ALCOHOL DO YOU HAVE ON A TYPICAL DAY: 7 TO 9
AGITATION: NORMAL ACTIVITY
TOTAL SCORE: 0
TREMOR: NO TREMOR
HOW OFTEN DURING THE LAST YEAR HAVE YOU NEEDED AN ALCOHOLIC DRINK FIRST THING IN THE MORNING TO GET YOURSELF GOING AFTER A NIGHT OF HEAVY DRINKING: NEVER
HOW OFTEN DO YOU HAVE A DRINK CONTAINING ALCOHOL: 4 OR MORE TIMES A WEEK
AUDITORY DISTURBANCES: NOT PRESENT
NAUSEA AND VOMITING: NO NAUSEA AND NO VOMITING
ORIENTATION AND CLOUDING OF SENSORIUM: ORIENTED AND CAN DO SERIAL ADDITIONS
VISUAL DISTURBANCES: NOT PRESENT
ANXIETY: NO ANXIETY, AT EASE
AUDIT-C TOTAL SCORE: 10
PAROXYSMAL SWEATS: NO SWEAT VISIBLE
HOW OFTEN DURING THE LAST YEAR HAVE YOU HAD A FEELING OF GUILT OR REMORSE AFTER DRINKING: NEVER
SKIP TO QUESTIONS 9-10: 0
NAUSEA AND VOMITING: NO NAUSEA AND NO VOMITING
TOTAL SCORE: 0
VISUAL DISTURBANCES: NOT PRESENT
HOW OFTEN DURING THE LAST YEAR HAVE YOU FOUND THAT YOU WERE NOT ABLE TO STOP DRINKING ONCE YOU HAD STARTED: NEVER
AUDIT-C TOTAL SCORE: 10
ORIENTATION AND CLOUDING OF SENSORIUM: ORIENTED AND CAN DO SERIAL ADDITIONS
VISUAL DISTURBANCES: NOT PRESENT
ORIENTATION AND CLOUDING OF SENSORIUM: ORIENTED AND CAN DO SERIAL ADDITIONS
AGITATION: NORMAL ACTIVITY
TOTAL SCORE: 0
HEADACHE, FULLNESS IN HEAD: NOT PRESENT
AUDITORY DISTURBANCES: NOT PRESENT
HAVE YOU OR SOMEONE ELSE BEEN INJURED AS A RESULT OF YOUR DRINKING: NO
ORIENTATION AND CLOUDING OF SENSORIUM: ORIENTED AND CAN DO SERIAL ADDITIONS
AGITATION: NORMAL ACTIVITY
TREMOR: NO TREMOR
HEADACHE, FULLNESS IN HEAD: NOT PRESENT
NAUSEA AND VOMITING: NO NAUSEA AND NO VOMITING
ORIENTATION AND CLOUDING OF SENSORIUM: ORIENTED AND CAN DO SERIAL ADDITIONS
ANXIETY: NO ANXIETY, AT EASE
AUDITORY DISTURBANCES: NOT PRESENT
HEADACHE, FULLNESS IN HEAD: NOT PRESENT
HEADACHE, FULLNESS IN HEAD: NOT PRESENT
TOTAL SCORE: 0
HOW OFTEN DURING THE LAST YEAR HAVE YOU BEEN UNABLE TO REMEMBER WHAT HAPPENED THE NIGHT BEFORE BECAUSE YOU HAD BEEN DRINKING: NEVER
VISUAL DISTURBANCES: NOT PRESENT
NAUSEA AND VOMITING: NO NAUSEA AND NO VOMITING
TOTAL SCORE: 0

## 2024-08-27 ASSESSMENT — ACTIVITIES OF DAILY LIVING (ADL)
TOILETING: INDEPENDENT
BATHING: INDEPENDENT
JUDGMENT_ADEQUATE_SAFELY_COMPLETE_DAILY_ACTIVITIES: YES
FEEDING YOURSELF: INDEPENDENT
ADEQUATE_TO_COMPLETE_ADL: YES
HEARING - LEFT EAR: FUNCTIONAL
HEARING - RIGHT EAR: FUNCTIONAL
PATIENT'S MEMORY ADEQUATE TO SAFELY COMPLETE DAILY ACTIVITIES?: YES
GROOMING: INDEPENDENT
DRESSING YOURSELF: INDEPENDENT
WALKS IN HOME: INDEPENDENT
LACK_OF_TRANSPORTATION: NO

## 2024-08-27 ASSESSMENT — COGNITIVE AND FUNCTIONAL STATUS - GENERAL
MOBILITY SCORE: 24
PATIENT BASELINE BEDBOUND: NO
DAILY ACTIVITIY SCORE: 24

## 2024-08-27 ASSESSMENT — ENCOUNTER SYMPTOMS
COUGH: 1
FATIGUE: 1
CONSTIPATION: 1
FEVER: 0
CHILLS: 0
UNEXPECTED WEIGHT CHANGE: 1
WHEEZING: 1
PSYCHIATRIC NEGATIVE: 1
WEAKNESS: 0
APPETITE CHANGE: 1
HEADACHES: 0
HEMATOLOGIC/LYMPHATIC NEGATIVE: 1
PALPITATIONS: 0
CHEST TIGHTNESS: 0
NAUSEA: 0
VOMITING: 0
DIARRHEA: 0
EYES NEGATIVE: 1
MUSCULOSKELETAL NEGATIVE: 1
NUMBNESS: 0
SHORTNESS OF BREATH: 1

## 2024-08-27 ASSESSMENT — PATIENT HEALTH QUESTIONNAIRE - PHQ9
2. FEELING DOWN, DEPRESSED OR HOPELESS: MORE THAN HALF THE DAYS
SUM OF ALL RESPONSES TO PHQ9 QUESTIONS 1 & 2: 3
1. LITTLE INTEREST OR PLEASURE IN DOING THINGS: SEVERAL DAYS

## 2024-08-27 ASSESSMENT — COLUMBIA-SUICIDE SEVERITY RATING SCALE - C-SSRS
6. HAVE YOU EVER DONE ANYTHING, STARTED TO DO ANYTHING, OR PREPARED TO DO ANYTHING TO END YOUR LIFE?: NO
2. HAVE YOU ACTUALLY HAD ANY THOUGHTS OF KILLING YOURSELF?: NO
1. IN THE PAST MONTH, HAVE YOU WISHED YOU WERE DEAD OR WISHED YOU COULD GO TO SLEEP AND NOT WAKE UP?: NO

## 2024-08-27 ASSESSMENT — PAIN SCALES - GENERAL: PAINLEVEL_OUTOF10: 0 - NO PAIN

## 2024-08-27 NOTE — ED PROVIDER NOTES
Emergency Medicine Transition of Care Note.    I received Juan Carlos Cr in signout from Dr. Grimm.  Please see the previous ED provider note for all HPI, PE and MDM up to the time of signout at 2300. This is in addition to the primary record.    In brief Juan Carlos Cr is an 74 y.o. male presenting for   Chief Complaint   Patient presents with    Shortness of Breath     At the time of signout we were awaiting: Awaiting final bed approval and arrangements for transfer for admission to [facility].    ED Course as of 08/27/24 0546   Mon Aug 26, 2024   1657 G at 1639 interpreted by me at 1644.  Normal sinus rhythm with frequent PVCs.  Rate 98 bpm.  Axis grossly normal.   ms,  ms,  ms.  No acute ST-T change. [EC]   1658 A second EKG obtained at 1654 interpreted by me at 1657.  Atrial fibrillation with RVR rate 114 bpm.  Axis grossly normal.   ms,  ms.  Nonspecific ST-T change.  No STEMI. [EC]   2050 CBC and Auto Differential(!)  White blood cell count 8.8, hemoglobin 12.8, hematocrit 37.9, platelet count 24,000 [EC]   2051 Comprehensive metabolic panel(!)  Glucose 101, sodium 128, potassium 3.1, chloride 92, bicarb 29    BUN 7, creatinine 0.65, GFR greater than 90  LFTs are normal [EC]   2051 B-Type Natriuretic Peptide(!)    D-dimer is elevated at 901 we will obtain a CT of the chest to rule out PE [EC]   2051 Lactate is normal at 1.6  Magnesium normal 1.62  TSH normal 1.24   [EC]   2052 Chest x-ray  IMPRESSION:  Mass opacity right upper lobe.  Malignancy is not excluded.  Recommend  CT for further evaluation.  Emphysematous changes.  Signed by Terrence Horne DO   [EC]   0782 CT CHEST  IMPRESSION:  6.57 m soft tissue mass encasing the right hilum obstructing portions  of the right main bronchus and segmental bronchi as well as the  anterior right upper lobe pulmonary arteries.  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   Suspected  lymphangitic spread into the right apex.  Changes of background pulmonary fibrosis.  Mild prominence of the collecting systems left greater than right.   Small nonobstructing stone in the central right kidney.  Dedicated  abdominal imaging can be obtained as clinically warranted.  Confluent anterior osteophyte formation in the midthoracic spine.   Findings suggestive of DISH.  Signed by Hamzah Mo MD   [EC]      ED Course User Index  [EC] Ted Grimm DO         Diagnoses as of 08/27/24 0546   Shortness of breath   COPD exacerbation (Multi)   Transient atrial fibrillation (Multi)   Mass of right lung   Hypoxemia     CT angio chest for pulmonary embolism   Final Result   6.57 m soft tissue mass encasing the right hilum obstructing portions   of the right main bronchus and segmental bronchi as well as the   anterior right upper lobe pulmonary arteries.  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  Suspected   lymphangitic spread into the right apex.   Changes of background pulmonary fibrosis.   Mild prominence of the collecting systems left greater than right.    Small nonobstructing stone in the central right kidney.  Dedicated   abdominal imaging can be obtained as clinically warranted.   Confluent anterior osteophyte formation in the midthoracic spine.    Findings suggestive of DISH.   Signed by Hamzah Mo MD      XR chest 1 view   Final Result   Mass opacity right upper lobe.  Malignancy is not excluded.  Recommend   CT for further evaluation.  Emphysematous changes.   Signed by Terrence Horne DO        Labs Reviewed   CBC WITH AUTO DIFFERENTIAL - Abnormal       Result Value    WBC 8.8      nRBC 0.0      RBC 4.09 (*)     Hemoglobin 12.8 (*)     Hematocrit 37.9 (*)     MCV 93      MCH 31.3      MCHC 33.8      RDW 11.5      Platelets 224      Neutrophils % 72.2      Immature Granulocytes %, Automated 0.6      Lymphocytes % 15.1       Monocytes % 10.7      Eosinophils % 1.1      Basophils % 0.3      Neutrophils Absolute 6.34 (*)     Immature Granulocytes Absolute, Automated 0.05      Lymphocytes Absolute 1.33      Monocytes Absolute 0.94 (*)     Eosinophils Absolute 0.10      Basophils Absolute 0.03     COMPREHENSIVE METABOLIC PANEL - Abnormal    Glucose 101 (*)     Sodium 128 (*)     Potassium 3.1 (*)     Chloride 92 (*)     Bicarbonate 29      Anion Gap 10      Urea Nitrogen 7      Creatinine 0.65      eGFR >90      Calcium 11.2 (*)     Albumin 3.2 (*)     Alkaline Phosphatase 68      Total Protein 7.7      AST 13      Bilirubin, Total 0.4      ALT 6 (*)    LIPASE - Abnormal    Lipase <3 (*)     Narrative:     Venipuncture immediately after or during the administration of Metamizole may lead to falsely low results. Testing should be performed immediately prior to Metamizole dosing.   B-TYPE NATRIURETIC PEPTIDE - Abnormal     (*)     Narrative:        <100 pg/mL - Heart failure unlikely  100-299 pg/mL - Intermediate probability of acute heart                  failure exacerbation. Correlate with clinical                  context and patient history.    >=300 pg/mL - Heart Failure likely. Correlate with clinical                  context and patient history.    BNP testing is performed using different testing methodology at [facility] than at other [facility]. Direct result comparisons should only be made within the same method.      BLOOD GAS ARTERIAL FULL PANEL - Abnormal    POCT pH, Arterial 7.49 (*)     POCT pCO2, Arterial 40      POCT pO2, Arterial 72 (*)     POCT SO2, Arterial 96      POCT Oxy Hemoglobin, Arterial 92.8 (*)     POCT Hematocrit Calculated, Arterial 42.0      POCT Sodium, Arterial 129 (*)     POCT Potassium, Arterial 3.4 (*)     POCT Chloride, Arterial 94 (*)     POCT Ionized Calcium, Arterial 1.50 (*)     POCT Glucose, Arterial 109 (*)     POCT Lactate, Arterial 1.4      POCT Base Excess, Arterial  6.6 (*)     POCT HCO3 Calculated, Arterial 30.5 (*)     POCT Hemoglobin, Arterial 14.0      POCT Anion Gap, Arterial 8 (*)     Patient Temperature        FiO2 28      Apparatus CANNULA      Site of Arterial Puncture Radial Right      Bishnu's Test Positive     D-DIMER, VTE EXCLUSION - Abnormal    D-Dimer, Quantitative VTE Exclusion 901 (*)     Narrative:     The VTE Exclusion D-Dimer assay is reported in ng/mL Fibrinogen Equivalent Units (FEU).    Per 's instructions for use, a value of less than 500 ng/mL (FEU) may help to exclude DVT or PE in outpatients when the assay is used with a clinical pretest probability assessment.([facility] must utilize and document eCalc 'Wells Score Deep Vein Thrombosis Risk' for DVT exclusion only. Emergency Department should utilize  Guidelines for Emergency Department Use of the VTE Exclusion D-Dimer and Clinical Pretest probability assessment model for DVT or PE exclusion.)   MAGNESIUM - Normal    Magnesium 1.62     LACTATE - Normal    Lactate 1.6      Narrative:     Venipuncture immediately after or during the administration of Metamizole may lead to falsely low results. Testing should be performed immediately  prior to Metamizole dosing.   PROTIME-INR - Normal    Protime 11.6      INR 1.0     SARS-COV-2 PCR - Normal    Coronavirus 2019, PCR Not Detected      Narrative:     This assay has received FDA Emergency Use Authorization (EUA) and is only authorized for the duration of time that circumstances exist to justify the authorization of the emergency use of in vitro diagnostic tests for the detection of SARS-CoV-2 virus and/or diagnosis of COVID-19 infection under section 564(b)(1) of the Act, 21 U.S.C. 360bbb-3(b)(1). This assay is an in vitro diagnostic nucleic acid amplification test for the qualitative detection of SARS-CoV-2 from nasopharyngeal specimens and has been validated for use at [facility]. Negative results do not preclude COVID-19  infections and should not be used as the sole basis for diagnosis, treatment, or other management decisions.     SERIAL TROPONIN-INITIAL - Normal    Troponin I, High Sensitivity 11      Narrative:     Less than 99th percentile of normal range cutoff-  Female and children under 18 years old <14 ng/L; Male <21 ng/L: Negative  Repeat testing should be performed if clinically indicated.     Female and children under 18 years old 14-50 ng/L; Male 21-50 ng/L:  Consistent with possible cardiac damage and possible increased clinical   risk. Serial measurements may help to assess extent of myocardial damage.     >50 ng/L: Consistent with cardiac damage, increased clinical risk and  myocardial infarction. Serial measurements may help assess extent of   myocardial damage.      NOTE: Children less than 1 year old may have higher baseline troponin   levels and results should be interpreted in conjunction with the overall   clinical context.     NOTE: Troponin I testing is performed using a different   testing methodology at [facility] [place] than at other   [facility]. Direct result comparisons should only   be made within the same method.   SERIAL TROPONIN, 1 HOUR - Normal    Troponin I, High Sensitivity 13      Narrative:     Less than 99th percentile of normal range cutoff-  Female and children under 18 years old <14 ng/L; Male <21 ng/L: Negative  Repeat testing should be performed if clinically indicated.     Female and children under 18 years old 14-50 ng/L; Male 21-50 ng/L:  Consistent with possible cardiac damage and possible increased clinical   risk. Serial measurements may help to assess extent of myocardial damage.     >50 ng/L: Consistent with cardiac damage, increased clinical risk and  myocardial infarction. Serial measurements may help assess extent of   myocardial damage.      NOTE: Children less than 1 year old may have higher baseline troponin   levels and results should be interpreted in  conjunction with the overall   clinical context.     NOTE: Troponin I testing is performed using a different   testing methodology at [facility]] than at other   [facility]. Direct result comparisons should only   be made within the same method.   TSH WITH REFLEX TO FREE T4 IF ABNORMAL - Normal    Thyroid Stimulating Hormone 1.24      Narrative:     TSH testing is performed using different testing methodology at [facility]] than at other [facility]. Direct result comparisons should only be made within the same method.     TROPONIN SERIES- (INITIAL, 1 HR)    Narrative:     The following orders were created for panel order Troponin I Series, High Sensitivity (0, 1 HR).  Procedure                               Abnormality         Status                     ---------                               -----------         ------                     Troponin I, High Sensiti...[[id number]]  Normal              Final result               Troponin, High Sensitivi...[[id number]]  Normal              Final result                 Please view results for these tests on the individual orders.       Medical Decision Making    After assuming care patient was reevaluated continues to be doing well after treatment by previous provider.  Patient's vital signs are stable patient continues await for final bed approval to allow for transfer.  Patient was accepted at [facility] but is waitlisted until bed is opened up for them.  [facility]     Final diagnoses:   [R06.02] Shortness of breath   [J44.1] COPD exacerbation (Multi)   [I48.91] Transient atrial fibrillation (Multi)   [R91.8] Mass of right lung   [R09.02] Hypoxemia           Procedure  Procedures    MD Lee Monahan MD  08/27/24 3222

## 2024-08-27 NOTE — H&P
History Of Present Illness  Juan Carlos Cr is a 74 y.o. male with a past medical history of coronary artery disease s/p PCI 27 yrs ago, HTN, HLD, AUD, and history of tobacco use transferred to [facility] with acute hypoxic respiratory failure and lung mass diagnosed on Chest CT.     Mr. Cr states that he had a cough for few months prior to admission, with production of milky white phlegm, and more apparent with exertion. Denies hemoptysis. Over the past 3.5 weeks, he began to experience SOB on exertion along with fatigue. Yesterday he states that he took a pulse oximetry reading which was <80%, which prompted him to go to the emergency room.     He additionally endorses poor appetite for the past week. Endorses occasional constipation with bowel movements every 4 days. His wife has noticed patient's weight loss, unable to quantify how many pounds.     Denies fevers, chills, URI symptoms, and hemoptysis. Denies chest pain, orthopnea, peripheral edema, nausea/vomiting, diarrhea. Denies headaches, balance issues, vision disturbances, weakness or numbness in extremities.     The patient endorses significant alcohol use, >12 drinks/day. He states that he can finish a 12 pack and 1 glass of a wine a day. Endorses a smoking history, started smoking at 14 years old. Recently went from 2 packs a day to 7 cigarettes. Denies any other recreational drug use.     No known exposure to asbestos. 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     Emergency Room Course  He was treated with methylprednisolone 125 mg and 2 Duonebs to treat a suspected COPD exacerbation.     The patient also had an EKG on 8/26 at 1658 which demonstrated atrial fibrillation with RVR. , D-Dimer 901, CT PE was normal. TSH normal.  He was given 5 mg of metoprolol tartrate.     CT chest demonstrated a 6.57 mm soft tissue mass encasing the right hilum, obstructing portions of the right main bronchus and  segmental bronchi and the anterior right upper lobe pulmonary arteries.     Potassium replete with KCl 40 mEq.    Past Medical History  He has a past medical history of Acute myocardial infarction, unspecified (Multi) (05/17/2013), Encounter for screening for malignant neoplasm of prostate (09/02/2015), Hypertension, and Personal history of other diseases of the nervous system and sense organs (09/13/2017).    Surgical History  He has a past surgical history that includes Coronary angioplasty with stent (05/17/2013); Cataract extraction (09/14/2018); and Cataract extraction (06/27/2014).     Social History  He reports that he has been smoking cigarettes. He does not have any smokeless tobacco history on file. He reports that he does not currently use alcohol. He reports that he does not use drugs.    Family History  Family History   Problem Relation Name Age of Onset    Heart attack Mother      Lung cancer Brother          Allergies  Patient has no known allergies.    Review of Systems   Constitutional:  Positive for appetite change, fatigue and unexpected weight change. Negative for chills and fever.   HENT: Negative.     Eyes: Negative.    Respiratory:  Positive for cough, shortness of breath and wheezing. Negative for chest tightness.    Cardiovascular:  Negative for chest pain, palpitations and leg swelling.   Gastrointestinal:  Positive for constipation. Negative for diarrhea, nausea and vomiting.   Genitourinary: Negative.    Musculoskeletal: Negative.    Skin: Negative.    Neurological:  Negative for weakness, numbness and headaches.   Hematological: Negative.    Psychiatric/Behavioral: Negative.          Physical Exam  Constitutional:       Appearance: Normal appearance.   HENT:      Head: Normocephalic.      Right Ear: External ear normal.      Left Ear: External ear normal.      Nose: Nose normal.      Mouth/Throat:      Mouth: Mucous membranes are moist.   Cardiovascular:      Rate and Rhythm: Rhythm  irregular.      Heart sounds: No murmur heard.  Pulmonary:      Effort: Pulmonary effort is normal.      Breath sounds: Normal breath sounds. No wheezing.   Abdominal:      General: Abdomen is flat.      Palpations: Abdomen is soft.   Musculoskeletal:         General: No swelling.      Cervical back: Normal range of motion. No tenderness.      Right lower leg: No edema.      Left lower leg: No edema.   Lymphadenopathy:      Cervical: No cervical adenopathy.   Skin:     Capillary Refill: Capillary refill takes less than 2 seconds.      Comments: No evidence of clubbing. Scaly patches along upper left forehead.   Neurological:      Mental Status: He is alert. Mental status is at baseline.   Psychiatric:         Mood and Affect: Mood normal.         Behavior: Behavior normal.          Last Recorded Vitals  /80   Pulse 93   Temp 36.6 °C (97.9 °F)   SpO2 96%     Relevant Results        Current Facility-Administered Medications on File Prior to Encounter   Medication Dose Route Frequency Provider Last Rate Last Admin    [COMPLETED] iohexol (OMNIPaque) 350 mg iodine/mL solution 75 mL  75 mL intravenous Once in imaging Ted J Csernyik, DO   75 mL at 08/26/24 2121    [COMPLETED] methylPREDNISolone sod succinate (SOLU-Medrol) injection 125 mg  125 mg intravenous Once Ted J Csernyik, DO   125 mg at 08/27/24 0010    [COMPLETED] metoprolol tartrate (Lopressor) injection 5 mg  5 mg intravenous Once Ted J Csernyik, DO   5 mg at 08/26/24 1757    [COMPLETED] potassium chloride CR (Klor-Con M20) ER tablet 40 mEq  40 mEq oral Once Ted J Csernyik, DO   40 mEq at 08/27/24 0010    [DISCONTINUED] ipratropium-albuteroL (Duo-Neb) 0.5-2.5 mg/3 mL nebulizer solution 3 mL  3 mL nebulization q6h Ted J Csernyik, DO   3 mL at 08/27/24 0756    [DISCONTINUED] ipratropium-albuteroL (Duo-Neb) 0.5-2.5 mg/3 mL nebulizer solution 3 mL  3 mL nebulization q4h PRN Rey Padilla, DO        [DISCONTINUED] oxygen (O2) therapy   inhalation PRN  Ted Grimm, DO   2.5 L/min at 08/27/24 0758     Current Outpatient Medications on File Prior to Encounter   Medication Sig Dispense Refill    metoprolol tartrate (Lopressor) 50 mg tablet Take 1 tablet by mouth every 12 hours. 90 tablet 3    simvastatin (Zocor) 20 mg tablet Take 1 tablet (20 mg) by mouth once daily. 90 tablet 3     Results for orders placed or performed during the hospital encounter of 08/27/24 (from the past 24 hour(s))   CBC and Auto Differential   Result Value Ref Range    WBC 7.8 4.4 - 11.3 x10*3/uL    nRBC 0.0 0.0 - 0.0 /100 WBCs    RBC 4.34 (L) 4.50 - 5.90 x10*6/uL    Hemoglobin 13.3 (L) 13.5 - 17.5 g/dL    Hematocrit 39.5 (L) 41.0 - 52.0 %    MCV 91 80 - 100 fL    MCH 30.6 26.0 - 34.0 pg    MCHC 33.7 32.0 - 36.0 g/dL    RDW 11.5 11.5 - 14.5 %    Platelets 271 150 - 450 x10*3/uL    Neutrophils % 89.1 40.0 - 80.0 %    Immature Granulocytes %, Automated 0.4 0.0 - 0.9 %    Lymphocytes % 5.9 13.0 - 44.0 %    Monocytes % 4.5 2.0 - 10.0 %    Eosinophils % 0.0 0.0 - 6.0 %    Basophils % 0.1 0.0 - 2.0 %    Neutrophils Absolute 6.91 (H) 1.60 - 5.50 x10*3/uL    Immature Granulocytes Absolute, Automated 0.03 0.00 - 0.50 x10*3/uL    Lymphocytes Absolute 0.46 (L) 0.80 - 3.00 x10*3/uL    Monocytes Absolute 0.35 0.05 - 0.80 x10*3/uL    Eosinophils Absolute 0.00 0.00 - 0.40 x10*3/uL    Basophils Absolute 0.01 0.00 - 0.10 x10*3/uL   Hepatic Function Panel   Result Value Ref Range    Albumin 3.3 (L) 3.4 - 5.0 g/dL    Bilirubin, Total 0.4 0.0 - 1.2 mg/dL    Bilirubin, Direct 0.1 0.0 - 0.3 mg/dL    Alkaline Phosphatase 78 33 - 136 U/L    ALT 6 (L) 10 - 52 U/L    AST 10 9 - 39 U/L    Total Protein 7.6 6.4 - 8.2 g/dL   Magnesium   Result Value Ref Range    Magnesium 1.74 1.60 - 2.40 mg/dL   CALCIUM, IONIZED   Result Value Ref Range    POCT Calcium, Ionized 1.52 (H) 1.1 - 1.33 mmol/L   Phosphorus   Result Value Ref Range    Phosphorus 3.3 2.5 - 4.9 mg/dL   Basic Metabolic Panel   Result Value Ref Range     Glucose 205 (H) 74 - 99 mg/dL    Sodium 135 (L) 136 - 145 mmol/L    Potassium 4.0 3.5 - 5.3 mmol/L    Chloride 95 (L) 98 - 107 mmol/L    Bicarbonate 30 21 - 32 mmol/L    Anion Gap 14 10 - 20 mmol/L    Urea Nitrogen 13 6 - 23 mg/dL    Creatinine 0.70 0.50 - 1.30 mg/dL    eGFR >90 >60 mL/min/1.73m*2    Calcium 11.9 (H) 8.6 - 10.6 mg/dL   MRSA Surveillance for Vancomycin De-escalation, PCR    Specimen: Anterior Nares; Swab   Result Value Ref Range    MRSA PCR Not Detected Not Detected   Protime-INR   Result Value Ref Range    Protime 11.9 9.8 - 12.8 seconds    INR 1.1 0.9 - 1.1   ECG 12 Lead   Result Value Ref Range    Ventricular Rate 83 BPM    Atrial Rate 83 BPM    VT Interval 184 ms    QRS Duration 100 ms    QT Interval 388 ms    QTC Calculation(Bazett) 455 ms    P Axis 58 degrees    R Axis -7 degrees    T Axis 6 degrees    QRS Count 14 beats    Q Onset 218 ms    P Onset 126 ms    P Offset 187 ms    T Offset 412 ms    QTC Fredericia 432 ms     ECG 12 Lead    Result Date: 8/27/2024  Sinus rhythm with occasional Premature ventricular complexes Otherwise normal ECG When compared with ECG of 26-AUG-2024 16:54, Sinus rhythm has replaced Atrial fibrillation    ECG 12 lead    Result Date: 8/27/2024  Atrial fibrillation with rapid ventricular response with premature ventricular or aberrantly conducted complexes Abnormal ECG When compared with ECG of 26-AUG-2024 16:39, (unconfirmed) Atrial fibrillation has replaced Sinus rhythm    ECG 12 lead    Result Date: 8/27/2024  Sinus rhythm with frequent Premature ventricular complexes Possible Left atrial enlargement Borderline ECG When compared with ECG of 22-JUN-1998 06:26, Premature ventricular complexes are now Present Vent. rate has increased BY  32 BPM QT has lengthened    CT angio chest for pulmonary embolism    Result Date: 8/26/2024  STUDY: CT Angiogram of the Chest; 8/26/2024 at 9:24 p.m. INDICATION: Shortness of breath. COMPARISON: One-view CXR 8/26/2024. ACCESSION  NUMBER(S): [id number] ORDERING CLINICIAN: NICOLE SALES TECHNIQUE:  CTA of the chest was performed with intravenous contrast. Images are reviewed and processed at a workstation according to the CT angiogram protocol with 3-D and/or MIP post processing imaging generated.  Omnipaque 350 75 mL was administered intravenously. Automated mA/kV exposure control was utilized and patient examination was performed in strict accordance with principles of ALARA. FINDINGS: Pulmonary arteries are adequately opacified without acute or chronic filling defects.  The thoracic aorta is normal in course and caliber without dissection or aneurysm. The heart is normal in size without pericardial effusion.  Thoracic lymph nodes are not enlarged. There is no pleural effusion, pleural thickening, or pneumothorax. The airways are patent. 6.5 x 6.4 cm soft tissue mass encasing and surrounding the right hilum there is near complete obstruction of pulmonary arterial segments obstruction of portions of the right main bronchus.  Interlobular septal thickening extending to the right apex.  Changes of diffuse pulmonary fibrosis. There are no acute fractures.  No suspicious bony lesions.    6.57 m soft tissue mass encasing the right hilum obstructing portions of the right main bronchus and segmental bronchi as well as the anterior right upper lobe pulmonary arteries.  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  Suspected lymphangitic spread into the right apex. Changes of background pulmonary fibrosis. Mild prominence of the collecting systems left greater than right. Small nonobstructing stone in the central right kidney.  Dedicated abdominal imaging can be obtained as clinically warranted. Confluent anterior osteophyte formation in the midthoracic spine. Findings suggestive of DISH. Signed by Hamzah Mo MD    XR chest 1 view    Result Date: 8/26/2024  STUDY: Chest Radiograph;  08/26/2024  INDICATION: Shortness of breath. COMPARISON: No priors available ACCESSION NUMBER(S): [id number] ORDERING CLINICIAN: NICOLE SALES TECHNIQUE:  Frontal chest was obtained at 18:34 hours. FINDINGS: CARDIOMEDIASTINAL SILHOUETTE: Cardiomediastinal silhouette is normal in size and configuration. There is mild elevation right hemidiaphragm.  LUNGS: There is large mass opacity in the right upper lobe.  Lungs demonstrate chronic changes.  There is no pneumothorax or pleural effusion.  ABDOMEN: No remarkable upper abdominal findings.  BONES: No acute osseous changes.  Chronic right-sided rib fractures are demonstrated.    Mass opacity right upper lobe.  Malignancy is not excluded.  Recommend CT for further evaluation.  Emphysematous changes. Signed by Terrence Horne, DO       Assessment/Plan   Assessment & Plan  COPD exacerbation (Multi)    Mr. Juan Carlos Cr is a 74 year old male with a past medical history of coronary artery disease s/p PCI 27 yrs ago, HTN, HLD, AUD, and history of tobacco use transferred to [facility] with acute hypoxic respiratory failure and lung mass diagnosed on Chest CT. Initially suspected to have a COPD exacerbation, but patient denies prior history of COPD. CT of chest in the emergency room revealed a large soft tissue mass causing compression of surrounding vasculature. While his symptoms of SOB on exertion, cough, and fatigue could be due to an acute COPD exacerbation, other causes high on the differential are post obstructive atelectasis due to mass, or possible CAP.     The lung mass demonstrated on CT is concerning for cancer given the size, possible lymphangitic spread and significant smoking history. EBUS will be necessary to confirm diagnosis.     The patient additionally developed paroxysmal A fib with RVR in the emergency room. No noted prior history of A fib, although given his prior cardiac history could have had masked A fib for several years. EKG demonstrated left atrial  enlargement, lending evidence to chronicity. Patient has since self converted to sinus rhythm. FLASH-VASC score is high, will consider starting on anticoagulation after patient undergoes procedures for lung mass.     #Acute Hypoxic Respiratory Failure  Duoneb x3 and Methylpred  given in ED   Currently on 2.5L NC  Differential could be acute COPD exacerbation, post obstructive atelectasis due to right lung mass, possible pneumonia   Suspected emphysema based on CT, as the patient has a significant smoking history. Additionally could represent COPD, but no spirometry to confirm.  Plan  Brio Ellipta and Spiriva Respimat  Duonebs PRN  Stop steriods given low suspicion for acute exacerbation  Empiric treatment for CAP: ceftriaxone 5 days, azithrymycin 500mg for 3 days   - Most recent QTC (Bazzet) was 455 ms  Pneumonia Work-Up (Procalcitonin, Respiratory Culture/Smear, MRSA Surveillance, Legionella Antigen, Streptococcus pneumoniae)  Viral Respiratory Panel    #Rt lung mass infiltrating the mediastinum  CT Chest from 8/26/24 demonstrates a 6.57 mm soft tissue mass encasing the right hilum, obstructing portions of the right main bronchus and segmental bronchi and the anterior right upper lobe pulmonary arteries. Suspected lymphangitic spread into the right apex  Plan  Pulmonology consulted. NPO at midnight for possible EBUS tomorrow.   MRI Brain  Plan for oncology consult once biopsy results are available   Plan for outpatient PET scan to complete staging     #Paroxsymal Afib with RVR   #Coronary Artery Disease  #Hypertension  #Hyperlipidemia  EKG in emergency room demonstrated atrial fibrillation with RVR. Also demonstrated numerous PACs and PVCs. Repeat EKG has demonstrated conversion back to sinus rhythm with occasional PVCs.   Had PCI at 47 y.o   Home metoprolol 50mg BID for hx of CAD  Home ASA  Home simvastatin 20 mg  FLASH-VASC Score: 3  Plan  Continue home metoprolol 50 mg BID and simvastatin 20 mg  Hold ASA  until biopsy is completed  Optimize electrolytes. K > 4, Mg > 2  HbA1c  Continue to keep patient on telemonitor  Cardiology has been consulted, appreciate their recs on potential cardioversion and anticoagulation.     #Hyponatremia, resolved  Level prior to admission: 128. Repeat level on admission: 135  Possibly of paraneoplastic origin, but can also be due to chronic alcohol intake. Other differential is cerebral salt wasting secondary to potential brain metastasis.   Plan:  Urinalysis, urine sodium, serum osmolality, urine osmolality were ordered prior to repeat level. Monitor results.     #Mild Hypercalcemia  Level prior to admission: 11.2  Ionized calcium: 1.52 --> mild hypercalcemia  Possibly paraneoplastic in origin, asymptomatic   Plan:  Continue to monitor. If increasing hypercalcemic can pursue hypercalcemia     #Metabolic Alkalosis  Possibly poster hypercapnic state, will continue to monitor. No GI losses.     #Nephrolithiasis  Noted on CT, asymptomatic     #Smoking History  #AUD  Previously smoking 2 PPD, has now cut down to 7 cigarettes.   Drinks > 12 day  Plan  CIWA protocol with diazepam  Thiamine, folate, vitamin replacement  Nicotine Taper protocol     Pt's wife Cindy Cr, cell: [phone]    F: PRN  E: Monitor and replace PRN  N: Regular Diet  GI: None indicated  DVT prophylaxis: SubQ lovenox. Hold tomorrow's dose for procedure.  Code status: Full (confirmed on admission)    Monica Blair, MS4

## 2024-08-27 NOTE — PROGRESS NOTES
Pharmacy Medication History Review    Juan Carlos Cr is a 74 y.o. male admitted for COPD exacerbation (Multi). Pharmacy reviewed the patient's [id number] medications and allergies for accuracy.    The list below reflects the updated PTA list. Comments regarding how patient may be taking medications differently can be found in the Admit Orders Activity  Prior to Admission Medications   Prescriptions Last Dose Informant   aspirin 81 mg EC tablet  Self   Sig: Take 1 tablet (81 mg) by mouth once daily.   metoprolol tartrate (Lopressor) 50 mg tablet 8/27/2024 Self   Sig: Take 1 tablet by mouth every 12 hours.   simvastatin (Zocor) 20 mg tablet 8/27/2024 Self   Sig: Take 1 tablet (20 mg) by mouth once daily.      Facility-Administered Medications: None        The list below reflects the updated allergy list. Please review each documented allergy for additional clarification and justification.  Allergies  Reviewed by Meng Harkins RN on 8/27/2024   No Known Allergies         Patient declines M2B at discharge. Pharmacy has been updated to [facility].    Sources used to confirm home medication list include:   Patient interview (good historian, wife at bedside)  [facility]  Care Everywhere  Medication fill history  [facility] Progress Note 10/5/23    Medications ADDED: Aspirin     Medications MODIFIED: None    Medications REMOVED: None    Below are additional concerns with the patient's PTA list.  None to note.    Alana Campuzano [facility]   Transitions of Care Pharmacist  [facility]  Please reach out via Secure Chat for questions, or if no response call [employer] or [employer]

## 2024-08-27 NOTE — CONSULTS
Consults  History Of Present Illness:      Juan Carlos Cr is a 74 y.o. male with a past medical history of coronary artery disease s/p PCI 27 yrs ago, HTN, HLD, AUD, and history of tobacco use transferred to [facility] with acute hypoxic respiratory failure and lung mass diagnosed on Chest CT. Cardiology consulted for new Afib    Patient here with progressive SOB, sputum production and loss of appetite. He was seen at bedside, feeling overall well. No CP, palpitations or syncope. No prior history of cardiac arrhythmias.       ECG reviewed, initially Afib w RVR, subsequent ECG showed NSR.          Past Medical History:  He has a past medical history of Acute myocardial infarction, unspecified (Multi) (05/17/2013), Encounter for screening for malignant neoplasm of prostate (09/02/2015), Hypertension, and Personal history of other diseases of the nervous system and sense organs (09/13/2017).    Past Surgical History:  He has a past surgical history that includes Coronary angioplasty with stent (05/17/2013); Cataract extraction (09/14/2018); and Cataract extraction (06/27/2014).      Social History:  He reports that he has been smoking cigarettes. He does not have any smokeless tobacco history on file. He reports that he does not currently use alcohol. He reports that he does not use drugs.    Family History:  Family History   Problem Relation Name Age of Onset    Heart attack Mother      Lung cancer Brother          Allergies:  Patient has no known allergies.    Inpatient Medications:  Scheduled medications   Medication Dose Route Frequency    [Held by provider] aspirin  81 mg oral Daily    azithromycin  500 mg oral Daily    cefTRIAXone  1 g intravenous q24h    [Held by provider] enoxaparin  40 mg subcutaneous q24h    fluticasone furoate-vilanteroL  1 puff inhalation Daily    folic acid  1 mg oral Daily    multivitamin with minerals  1 tablet oral Daily    nicotine  1 patch transdermal Daily    Followed by    [START ON  10/8/2024] nicotine  1 patch transdermal Daily    Followed by    [START ON 10/22/2024] nicotine  1 patch transdermal Daily    polyethylene glycol  17 g oral Daily    [START ON 8/30/2024] thiamine  100 mg oral Daily    thiamine  100 mg intravenous Daily    tiotropium  2 puff inhalation Daily     PRN medications   Medication    acetaminophen    diazePAM    ipratropium-albuteroL    oxygen     Continuous Medications   Medication Dose Last Rate     Outpatient Medications:  Current Outpatient Medications   Medication Instructions    aspirin 81 mg, oral, Daily    metoprolol tartrate (LOPRESSOR) 50 mg, oral, Every 12 hours    simvastatin (ZOCOR) 20 mg, oral, Daily       Physical Exam:        Last Recorded Vitals:  Vitals:    08/27/24 1144 08/27/24 1248   BP: 142/80    Pulse: 93    Temp: 36.6 °C (97.9 °F)    SpO2: 90% 96%     Constitutional: alert and in no acute distress. Frail   Pulmonary: no increased work of breathing or signs of respiratory distress  and lungs clear to auscultation.    Cardiovascular: RRR, normal S1 and S2, no murmurs , pedal pulses 2+ bilaterally  and no edema .   Abdomen: abdomen non-tender, no masses  and no hepatomegaly .   Skin: skin warm and dry, normal skin turgor .   Psychiatric judgment and insight is normal  and oriented to person, place and time .       Last Labs:  CBC - 8/27/2024: 12:27 PM  7.8 13.3 271    39.5      CMP - 8/27/2024: 12:27 PM  11.9 7.6 10 --- 0.4   3.3 3.3 6 78      PTT - No results in last year.  1.1   11.9 _     Troponin I, High Sensitivity   Date/Time Value Ref Range Status   08/26/2024 05:53 PM 13 0 - 20 ng/L Final   08/26/2024 04:50 PM 11 0 - 20 ng/L Final     BNP   Date/Time Value Ref Range Status   08/26/2024 04:50  (H) 0 - 99 pg/mL Final     Hemoglobin A1C   Date/Time Value Ref Range Status   09/25/2020 10:18 AM 5.7 % Final     Comment:          Diagnosis of Diabetes-Adults   Non-Diabetic: < or = 5.6%   Increased risk for developing diabetes: 5.7-6.4%    Diagnostic of diabetes: > or = 6.5%  .       Monitoring of Diabetes                Age (y)     Therapeutic Goal (%)   Adults:          >18           <7.0   Pediatrics:    13-18           <7.5                   7-12           <8.0                   0- 6            7.5-8.5   American Diabetes Association. Diabetes Care 33(S1), Jan 2010.       VLDL   Date/Time Value Ref Range Status   09/30/2022 12:02 PM 16 0 - 40 mg/dL Final   09/24/2021 11:16 AM 21 0 - 40 mg/dL Final             Assessment/Plan       Juan Carlos Cr is a 74 y.o. male with a past medical history of coronary artery disease s/p PCI 27 yrs ago, HTN, HLD, AUD, and history of tobacco use transferred to [facility] with acute hypoxic respiratory failure and lung mass diagnosed on Chest CT.       Cardiology consulted for new Afib. Afib likely iso acute illness/hx of HTN. Patient is currently in NSR. Resume home BB and start therapeutic anticoagulation once deemed medically safe by pulmonary team.    #pAfib  #Rt lung mass infiltrating the mediastinum  #CAD s/p remote PCI  #HTN  #DLD     Recommendations  -Resume home metoprolol 50mg daily  -Start eliquis 5mg BID (following Bronchoscopy)  -Obtain TTE and TSH  -Workup/management of Rt lung mass per primary service  -Outpatient follow up with cardiology in 4 weeks following discharge       Cardiology will sign off, please reach us for any additional questions    Thank you for involving us in this patient care. Recommendations not final until signed by attending.     General Cardiology Consult Pager: [phone] (weekday 7AM-6PM and weekend 7AM-2PM) and other: [phone]  EP Consult Pager: [phone] (weekday 7AM-6PM and weekend 7AM-2PM) and other: [phone]  CICU Fellow Pager: [phone] anytime  EP Device Nurse Pager: [phone] (weekday 7AM-4PM)  Advanced Heart Failure Consult Pager: [phone] anytime              Gavin Morrell MD  Cardiology Fellow PGY-6

## 2024-08-27 NOTE — ED NOTES
Patient's son would like called with any updates- Maynor [phone].     Sushila Leung RN  08/27/24 0003

## 2024-08-28 ENCOUNTER — APPOINTMENT (OUTPATIENT)
Dept: RADIOLOGY | Facility: HOSPITAL | Age: 74
DRG: 189 | End: 2024-08-28
Payer: MEDICARE

## 2024-08-28 ENCOUNTER — ANESTHESIA EVENT (OUTPATIENT)
Dept: GASTROENTEROLOGY | Facility: HOSPITAL | Age: 74
End: 2024-08-28
Payer: MEDICARE

## 2024-08-28 ENCOUNTER — ANESTHESIA (OUTPATIENT)
Dept: GASTROENTEROLOGY | Facility: HOSPITAL | Age: 74
End: 2024-08-28
Payer: MEDICARE

## 2024-08-28 PROBLEM — I49.9 DYSRHYTHMIAS: Status: ACTIVE | Noted: 2024-08-28

## 2024-08-28 PROBLEM — R94.31 ABNORMAL EKG: Status: ACTIVE | Noted: 2024-08-28

## 2024-08-28 PROBLEM — E11.9 DIABETES MELLITUS, TYPE 2 (MULTI): Status: ACTIVE | Noted: 2024-08-28

## 2024-08-28 LAB
ADENOVIRUS RVP, VIRC: NOT DETECTED
ALBUMIN SERPL BCP-MCNC: 3.4 G/DL (ref 3.4–5)
ANION GAP SERPL CALC-SCNC: 13 MMOL/L (ref 10–20)
ATRIAL RATE: 129 BPM
ATRIAL RATE: 98 BPM
BUN SERPL-MCNC: 18 MG/DL (ref 6–23)
CALCIUM SERPL-MCNC: 12.6 MG/DL (ref 8.6–10.6)
CHLORIDE SERPL-SCNC: 96 MMOL/L (ref 98–107)
CHOLEST SERPL-MCNC: 152 MG/DL (ref 0–199)
CHOLESTEROL/HDL RATIO: 2.2
CO2 SERPL-SCNC: 31 MMOL/L (ref 21–32)
CREAT SERPL-MCNC: 0.73 MG/DL (ref 0.5–1.3)
CREAT UR-MCNC: 114.1 MG/DL (ref 20–370)
EGFRCR SERPLBLD CKD-EPI 2021: >90 ML/MIN/1.73M*2
ENTEROVIRUS/RHINOVIRUS RVP, VIRC: NOT DETECTED
ERYTHROCYTE [DISTWIDTH] IN BLOOD BY AUTOMATED COUNT: 11.7 % (ref 11.5–14.5)
GLUCOSE BLD MANUAL STRIP-MCNC: 113 MG/DL (ref 74–99)
GLUCOSE BLD MANUAL STRIP-MCNC: 141 MG/DL (ref 74–99)
GLUCOSE SERPL-MCNC: 109 MG/DL (ref 74–99)
HCT VFR BLD AUTO: 43.2 % (ref 41–52)
HDLC SERPL-MCNC: 70.2 MG/DL
HGB BLD-MCNC: 13.9 G/DL (ref 13.5–17.5)
HUMAN BOCAVIRUS RVP, VIRC: NOT DETECTED
HUMAN CORONAVIRUS RVP, VIRC: NOT DETECTED
INFLUENZA A , VIRC: NOT DETECTED
INFLUENZA A H1N1-09 , VIRC: NOT DETECTED
INFLUENZA B PCR, VIRC: NOT DETECTED
LDLC SERPL CALC-MCNC: 63 MG/DL
MAGNESIUM SERPL-MCNC: 1.93 MG/DL (ref 1.6–2.4)
MCH RBC QN AUTO: 30.6 PG (ref 26–34)
MCHC RBC AUTO-ENTMCNC: 32.2 G/DL (ref 32–36)
MCV RBC AUTO: 95 FL (ref 80–100)
METAPNEUMOVIRUS , VIRC: NOT DETECTED
MICROALBUMIN UR-MCNC: 160 MG/L
MICROALBUMIN/CREAT UR: 140.2 UG/MG CREAT
NON HDL CHOLESTEROL: 82 MG/DL (ref 0–149)
NRBC BLD-RTO: 0 /100 WBCS (ref 0–0)
P AXIS: 60 DEGREES
P OFFSET: 187 MS
P ONSET: 125 MS
PARAINFLUENZA PCR, VIRC: NOT DETECTED
PHOSPHATE SERPL-MCNC: 2.8 MG/DL (ref 2.5–4.9)
PLATELET # BLD AUTO: 308 X10*3/UL (ref 150–450)
POTASSIUM SERPL-SCNC: 3.6 MMOL/L (ref 3.5–5.3)
PR INTERVAL: 188 MS
Q ONSET: 219 MS
Q ONSET: 219 MS
QRS COUNT: 17 BEATS
QRS COUNT: 19 BEATS
QRS DURATION: 100 MS
QRS DURATION: 106 MS
QT INTERVAL: 344 MS
QT INTERVAL: 370 MS
QTC CALCULATION(BAZETT): 472 MS
QTC CALCULATION(BAZETT): 474 MS
QTC FREDERICIA: 425 MS
QTC FREDERICIA: 435 MS
R AXIS: -1 DEGREES
R AXIS: -9 DEGREES
RBC # BLD AUTO: 4.54 X10*6/UL (ref 4.5–5.9)
RSV PCR, RVP, VIRC: NOT DETECTED
SODIUM SERPL-SCNC: 136 MMOL/L (ref 136–145)
T AXIS: 20 DEGREES
T AXIS: 40 DEGREES
T OFFSET: 391 MS
T OFFSET: 404 MS
TRIGL SERPL-MCNC: 93 MG/DL (ref 0–149)
VENTRICULAR RATE: 114 BPM
VENTRICULAR RATE: 98 BPM
VLDL: 19 MG/DL (ref 0–40)
WBC # BLD AUTO: 15.3 X10*3/UL (ref 4.4–11.3)

## 2024-08-28 PROCEDURE — 2500000001 HC RX 250 WO HCPCS SELF ADMINISTERED DRUGS (ALT 637 FOR MEDICARE OP)

## 2024-08-28 PROCEDURE — 83718 ASSAY OF LIPOPROTEIN: CPT

## 2024-08-28 PROCEDURE — 94640 AIRWAY INHALATION TREATMENT: CPT

## 2024-08-28 PROCEDURE — 2500000002 HC RX 250 W HCPCS SELF ADMINISTERED DRUGS (ALT 637 FOR MEDICARE OP, ALT 636 FOR OP/ED)

## 2024-08-28 PROCEDURE — 2550000001 HC RX 255 CONTRASTS: Performed by: INTERNAL MEDICINE

## 2024-08-28 PROCEDURE — A9575 INJ GADOTERATE MEGLUMI 0.1ML: HCPCS | Performed by: INTERNAL MEDICINE

## 2024-08-28 PROCEDURE — 2500000004 HC RX 250 GENERAL PHARMACY W/ HCPCS (ALT 636 FOR OP/ED)

## 2024-08-28 PROCEDURE — 99233 SBSQ HOSP IP/OBS HIGH 50: CPT | Performed by: INTERNAL MEDICINE

## 2024-08-28 PROCEDURE — 70553 MRI BRAIN STEM W/O & W/DYE: CPT

## 2024-08-28 PROCEDURE — 2500000005 HC RX 250 GENERAL PHARMACY W/O HCPCS

## 2024-08-28 PROCEDURE — 80069 RENAL FUNCTION PANEL: CPT

## 2024-08-28 PROCEDURE — 82947 ASSAY GLUCOSE BLOOD QUANT: CPT

## 2024-08-28 PROCEDURE — 1200000002 HC GENERAL ROOM WITH TELEMETRY DAILY

## 2024-08-28 PROCEDURE — 83735 ASSAY OF MAGNESIUM: CPT

## 2024-08-28 PROCEDURE — 85027 COMPLETE CBC AUTOMATED: CPT

## 2024-08-28 PROCEDURE — S4991 NICOTINE PATCH NONLEGEND: HCPCS

## 2024-08-28 PROCEDURE — 70553 MRI BRAIN STEM W/O & W/DYE: CPT | Performed by: RADIOLOGY

## 2024-08-28 PROCEDURE — 36415 COLL VENOUS BLD VENIPUNCTURE: CPT

## 2024-08-28 RX ORDER — TALC
3 POWDER (GRAM) TOPICAL DAILY
Status: DISCONTINUED | OUTPATIENT
Start: 2024-08-28 | End: 2024-09-01 | Stop reason: HOSPADM

## 2024-08-28 RX ORDER — SIMVASTATIN 20 MG/1
20 TABLET, FILM COATED ORAL DAILY
Status: DISCONTINUED | OUTPATIENT
Start: 2024-08-28 | End: 2024-09-01 | Stop reason: HOSPADM

## 2024-08-28 RX ORDER — INSULIN LISPRO 100 [IU]/ML
0-5 INJECTION, SOLUTION INTRAVENOUS; SUBCUTANEOUS
Status: DISCONTINUED | OUTPATIENT
Start: 2024-08-28 | End: 2024-09-01 | Stop reason: HOSPADM

## 2024-08-28 RX ORDER — DEXTROSE 50 % IN WATER (D50W) INTRAVENOUS SYRINGE
25
Status: DISCONTINUED | OUTPATIENT
Start: 2024-08-28 | End: 2024-09-01 | Stop reason: HOSPADM

## 2024-08-28 RX ORDER — SODIUM CHLORIDE FOR INHALATION 3 %
3 VIAL, NEBULIZER (ML) INHALATION EVERY 6 HOURS SCHEDULED
Status: DISCONTINUED | OUTPATIENT
Start: 2024-08-28 | End: 2024-08-29

## 2024-08-28 RX ORDER — DEXTROSE 50 % IN WATER (D50W) INTRAVENOUS SYRINGE
12.5
Status: DISCONTINUED | OUTPATIENT
Start: 2024-08-28 | End: 2024-09-01 | Stop reason: HOSPADM

## 2024-08-28 RX ORDER — GADOTERATE MEGLUMINE 376.9 MG/ML
15 INJECTION INTRAVENOUS
Status: COMPLETED | OUTPATIENT
Start: 2024-08-28 | End: 2024-08-28

## 2024-08-28 RX ORDER — METOPROLOL TARTRATE 50 MG/1
50 TABLET ORAL EVERY 12 HOURS
Status: DISCONTINUED | OUTPATIENT
Start: 2024-08-28 | End: 2024-09-01 | Stop reason: HOSPADM

## 2024-08-28 RX ADMIN — GADOTERATE MEGLUMINE 15 ML: 376.9 INJECTION INTRAVENOUS at 12:43

## 2024-08-28 RX ADMIN — TIOTROPIUM BROMIDE INHALATION SPRAY 2 PUFF: 3.12 SPRAY, METERED RESPIRATORY (INHALATION) at 09:27

## 2024-08-28 RX ADMIN — NICOTINE 1 PATCH: 21 PATCH, EXTENDED RELEASE TRANSDERMAL at 08:55

## 2024-08-28 RX ADMIN — Medication 3 MG: at 23:29

## 2024-08-28 RX ADMIN — METOPROLOL TARTRATE 50 MG: 50 TABLET, FILM COATED ORAL at 23:29

## 2024-08-28 RX ADMIN — FOLIC ACID 1 MG: 1 TABLET ORAL at 08:55

## 2024-08-28 RX ADMIN — CEFTRIAXONE SODIUM 1 G: 1 INJECTION, SOLUTION INTRAVENOUS at 14:36

## 2024-08-28 RX ADMIN — SIMVASTATIN 20 MG: 20 TABLET, FILM COATED ORAL at 23:29

## 2024-08-28 RX ADMIN — THIAMINE HYDROCHLORIDE 100 MG: 100 INJECTION, SOLUTION INTRAMUSCULAR; INTRAVENOUS at 08:56

## 2024-08-28 RX ADMIN — Medication 1 TABLET: at 08:55

## 2024-08-28 RX ADMIN — METOPROLOL TARTRATE 50 MG: 50 TABLET, FILM COATED ORAL at 08:55

## 2024-08-28 RX ADMIN — FLUTICASONE FUROATE AND VILANTEROL TRIFENATATE 1 PUFF: 200; 25 POWDER RESPIRATORY (INHALATION) at 09:25

## 2024-08-28 RX ADMIN — AZITHROMYCIN DIHYDRATE 500 MG: 500 TABLET ORAL at 08:55

## 2024-08-28 ASSESSMENT — LIFESTYLE VARIABLES
TOTAL SCORE: 3
TOTAL SCORE: 0
HEADACHE, FULLNESS IN HEAD: NOT PRESENT
ORIENTATION AND CLOUDING OF SENSORIUM: ORIENTED AND CAN DO SERIAL ADDITIONS
TREMOR: NO TREMOR
TOTAL SCORE: 0
TREMOR: NO TREMOR
TOTAL SCORE: 2
ANXIETY: NO ANXIETY, AT EASE
VISUAL DISTURBANCES: NOT PRESENT
AUDITORY DISTURBANCES: NOT PRESENT
AGITATION: NORMAL ACTIVITY
TOTAL SCORE: 0
PAROXYSMAL SWEATS: NO SWEAT VISIBLE
TOTAL SCORE: 4
AGITATION: NORMAL ACTIVITY
TREMOR: NO TREMOR
AGITATION: NORMAL ACTIVITY
VISUAL DISTURBANCES: NOT PRESENT
NAUSEA AND VOMITING: NO NAUSEA AND NO VOMITING
TREMOR: NO TREMOR
NAUSEA AND VOMITING: NO NAUSEA AND NO VOMITING
NAUSEA AND VOMITING: NO NAUSEA AND NO VOMITING
ORIENTATION AND CLOUDING OF SENSORIUM: DISORIENTED FOR DATE BY NO MORE THAN 2 CALENDAR DAYS
AGITATION: NORMAL ACTIVITY
ANXIETY: NO ANXIETY, AT EASE
PAROXYSMAL SWEATS: NO SWEAT VISIBLE
VISUAL DISTURBANCES: NOT PRESENT
AGITATION: NORMAL ACTIVITY
AUDITORY DISTURBANCES: NOT PRESENT
AGITATION: NORMAL ACTIVITY
TOTAL SCORE: 0
TOTAL SCORE: 2
VISUAL DISTURBANCES: NOT PRESENT
VISUAL DISTURBANCES: NOT PRESENT
NAUSEA AND VOMITING: NO NAUSEA AND NO VOMITING
ANXIETY: NO ANXIETY, AT EASE
TREMOR: NO TREMOR
HEADACHE, FULLNESS IN HEAD: NOT PRESENT
PAROXYSMAL SWEATS: NO SWEAT VISIBLE
ORIENTATION AND CLOUDING OF SENSORIUM: ORIENTED AND CAN DO SERIAL ADDITIONS
VISUAL DISTURBANCES: NOT PRESENT
AUDITORY DISTURBANCES: NOT PRESENT
VISUAL DISTURBANCES: NOT PRESENT
AGITATION: NORMAL ACTIVITY
ORIENTATION AND CLOUDING OF SENSORIUM: ORIENTED AND CAN DO SERIAL ADDITIONS
ORIENTATION AND CLOUDING OF SENSORIUM: DISORIENTED FOR DATE BY NO MORE THAN 2 CALENDAR DAYS
ANXIETY: NO ANXIETY, AT EASE
PAROXYSMAL SWEATS: NO SWEAT VISIBLE
TREMOR: NO TREMOR
NAUSEA AND VOMITING: NO NAUSEA AND NO VOMITING
ANXIETY: NO ANXIETY, AT EASE
HEADACHE, FULLNESS IN HEAD: NOT PRESENT
NAUSEA AND VOMITING: NO NAUSEA AND NO VOMITING
PAROXYSMAL SWEATS: NO SWEAT VISIBLE
ORIENTATION AND CLOUDING OF SENSORIUM: DISORIENTED FOR DATE BY MORE THAN 2 CALENDAR DAYS
AGITATION: NORMAL ACTIVITY
AUDITORY DISTURBANCES: NOT PRESENT
PAROXYSMAL SWEATS: NO SWEAT VISIBLE
VISUAL DISTURBANCES: NOT PRESENT
ORIENTATION AND CLOUDING OF SENSORIUM: DISORIENTED FOR PLACE OR PERSON
PAROXYSMAL SWEATS: NO SWEAT VISIBLE
ANXIETY: NO ANXIETY, AT EASE
AUDITORY DISTURBANCES: NOT PRESENT
ORIENTATION AND CLOUDING OF SENSORIUM: ORIENTED AND CAN DO SERIAL ADDITIONS
HEADACHE, FULLNESS IN HEAD: NOT PRESENT
NAUSEA AND VOMITING: NO NAUSEA AND NO VOMITING
HEADACHE, FULLNESS IN HEAD: NOT PRESENT
PAROXYSMAL SWEATS: NO SWEAT VISIBLE
HEADACHE, FULLNESS IN HEAD: NOT PRESENT
TREMOR: NO TREMOR
AUDITORY DISTURBANCES: NOT PRESENT
HEADACHE, FULLNESS IN HEAD: NOT PRESENT
HEADACHE, FULLNESS IN HEAD: NOT PRESENT
ANXIETY: NO ANXIETY, AT EASE
AUDITORY DISTURBANCES: NOT PRESENT
NAUSEA AND VOMITING: NO NAUSEA AND NO VOMITING
AUDITORY DISTURBANCES: NOT PRESENT
TREMOR: NO TREMOR
ANXIETY: NO ANXIETY, AT EASE

## 2024-08-28 ASSESSMENT — COGNITIVE AND FUNCTIONAL STATUS - GENERAL
MOBILITY SCORE: 24
DAILY ACTIVITIY SCORE: 24

## 2024-08-28 ASSESSMENT — PAIN SCALES - GENERAL: PAINLEVEL_OUTOF10: 0 - NO PAIN

## 2024-08-28 NOTE — CARE PLAN
Problem: Skin  Goal: Decreased wound size/increased tissue granulation at next dressing change  Outcome: Progressing  Goal: Participates in plan/prevention/treatment measures  Outcome: Progressing  Goal: Prevent/manage excess moisture  Outcome: Progressing  Goal: Prevent/minimize sheer/friction injuries  Outcome: Progressing  Goal: Promote/optimize nutrition  Outcome: Progressing  Goal: Promote skin healing  Outcome: Progressing      The clinical goals for the shift include Pt will remain free from falls/injury for duration of shift ending 8/27 at 1930

## 2024-08-28 NOTE — PROGRESS NOTES
Pharmacy Admission Order Reconciliation Review    Juan Carlos Cr is a 74 y.o. male admitted for COPD exacerbation (Multi). Pharmacy reviewed the patient's unreconciled admission medications.    Prior to admission medications that were reviewed and acted on by the pharmacist include:  aspirin  These medications have been reconciled.     Any other unreconcilied medications have been addressed and will be ordered or held by the patient's medical team. Medications addressed by the pharmacist may be added or changed by the patient's medical team at any time.    Nyla Arroyo, PharmD  Transitions of Care Pharmacist  [facility]  Please reach out via Secure Chat for questions

## 2024-08-28 NOTE — CARE PLAN
Mr. Juan Carlos Cr is a 74 year old male with a past medical history of coronary artery disease s/p PCI 27 yrs ago, HTN, HLD, AUD, and history of tobacco use transferred to [facility] [address] with acute hypoxic respiratory failure, CT imaging findings concerning for malignancy. The imaging revealed a hilar mass causing compression of surrounding vasculature and airways. Patient with dyspnea and respiratory failure, as well as a significant smoking history.     CT IMAGING 8/26/24  IMPRESSION:  6.5x6.4cm soft tissue mass encasing the right hilum obstructing portions of the right main bronchus and segmental bronchi as well as the anterior right upper lobe pulmonary arteries.  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  Suspected lymphangitic spread into the right apex. Changes of background pulmonary fibrosis.    Patient will be planned to be added on for bronchoscopy with EBUS. We discussed this with him and his family.  - Please keep patient NPO at midnight  - Please hold ppx anticoagulation (patient is not on other AC)    Please reach out with any questions.    Bisi Roa  Pulmonary&Critical Care PGY5

## 2024-08-28 NOTE — PROGRESS NOTES
Attempted to meet with pt this afternoon but he was not in his room. Plan to attempt to meet with pt another time. Care coordinator will continue to follow for discharge planning needs.    Natali Randall RN  Transitional Care Coordinator (TCC)  [phone] or [phone]

## 2024-08-28 NOTE — CONSULTS
COPD Education    Patient Characteristics:   Comorbidities:    COPD, HTN, CAD, Lung Mass                                        Exacerbation last year:  None noted                   Moderate: >= 2 exacerbations or >= 1 exacerbation leading to hospitalization    Spirometry: No  Date:            FVC:    (   %) FEV1:   (  %)  FEV1/FVC:    (   %)    Uses of Oxygen/CPAP/BIPAP:   None noted                                                             Smoking status:  Smoker:   Yes   PPY:  Quit date:  Smoking cessation counseling: Yes    Booklet given:  Yes         Pulmonary physician:   None noted                   Name:                  Date last seen:                                     Pharmacotherapy: None noted use at home  Maintenance medications   LABA, LAMA, LABA/LAMA, ICS/LABA, ICS/LAMA, ICS/LABA/LAMA,   Name of the medication:      Prednisone: No Theophylline: No  Roflumilast: No  Macrolides: No     If not on maintenance meds:  Consider LAMA or LAMA/LABA   Consider ICS (if peripheral eosinophilia >300cells/microl, COPD exacerbation requiring hospitalization, >= 2 moderated COPD exacerbation, History of or concomitant asthma)    If low inspiratory force or challenges with inhalers   Consider Nebulizers   Consider RESPIMAT   Spacer Given     COPD Education   COPD patient education book: Yes     Inhaled medication teach-back: Yes     KATELYN, other videos:    COPD, Smoking  Patient demonstrated understanding/teach-back method: Yes         Home Oxygen Evaluation:     No                                         Pulmonary Rehabilitation referral:   Already attended:  No     When:                                Info in COPD patient education book              Vaccines   None noted                Influenza:          Pneumococcal:      COVID 19:      Pertussis:                  It is recommended that the patient follow up with a pulmonary team within two weeks after discharge. The patient may benefit from  a pulmonary rehabilitation program as well as a smoking cessation program. Additionally, it is recommended that the patient undergo Pulmonary Function Tests (PFTs) since there are currently no records of any such tests in their chart.

## 2024-08-28 NOTE — ANESTHESIA PREPROCEDURE EVALUATION
Patient: Juan Carlos Cr    Procedure Information       Date/Time: 08/28/24 1500    Scheduled providers: Rojelio Khan MD; Leslie Trujillo MD; Skye Gomes RN    Procedure: BRONCHOSCOPY    Location: [facility]            Relevant Problems   Anesthesia (within normal limits)  No family hx of MH      Cardiac   (+) Abnormal EKG (Atrial fibrillation with rapid ventricular response with premature ventricular or aberrantly conducted complexes  Abnormal ECG  When compared with ECG of 26-AUG-2024 16:39, (unconfirmed)  Atrial fibrillation has replaced Sinus rhythm    Patient has since self converted to sinus rhythm.   )   (+) Atrial fibrillation (Multi) (New onset)   (+) CAD (coronary artery disease)   (+) Hypertension   (+) Pure hypercholesterolemia   (+) Stented coronary artery (Age 47)      Pulmonary   (+) COPD exacerbation (Multi)   (+) Pulmonary nodules      Neuro (within normal limits)      GI (within normal limits)      /Renal (within normal limits)      Liver (within normal limits)      Endocrine (within normal limits)      Hematology (within normal limits)      Musculoskeletal (within normal limits)      HEENT (within normal limits)      ID (within normal limits)      Skin (within normal limits)      GYN (within normal limits)     74 year old male with a past medical history of coronary artery disease s/p PCI 27 yrs ago, HTN, HLD, AUD, and history of tobacco use transferred to [facility] with acute hypoxic respiratory failure and lung mass diagnosed on Chest CT.   Clinical information reviewed:                 ECHO today  CONCLUSIONS:   1. Poorly visualized anatomical structures due to suboptimal image quality.   2. Left ventricular ejection fraction is moderately decreased, by visual estimate at 35-40%.   3. There is global hypokinesis of the left ventricle with minor regional variations.   4. Left ventricular diastolic filling was indeterminate.   5. There is normal right ventricular global  systolic function.   6. The left atrium is mildly dilated.   7. Mild aortic valve regurgitation.     NPO Detail:  No data recorded     Physical Exam    Airway  Mallampati: II  TM distance: >3 FB     Cardiovascular - normal exam     Dental   Comments: edentulous   Pulmonary - normal exam     Abdominal        There were no vitals filed for this visit.    Past Surgical History:   Procedure Laterality Date    CATARACT EXTRACTION  09/14/2018    Cataract Surgery    CATARACT EXTRACTION  06/27/2014    Cataract Surgery    CORONARY ANGIOPLASTY WITH STENT PLACEMENT  05/17/2013    Cath Stent Placement     Past Medical History:   Diagnosis Date    Acute myocardial infarction, unspecified (Multi) 05/17/2013    Acute myocardial infarction    Encounter for screening for malignant neoplasm of prostate 09/02/2015    Encounter for prostate cancer screening    Hypertension     Personal history of other diseases of the nervous system and sense organs 09/13/2017    History of cataract     No current facility-administered medications for this visit.  No current outpatient medications on file.    Facility-Administered Medications Ordered in Other Visits:     acetaminophen (Tylenol) tablet 975 mg, 975 mg, oral, q6h PRN, Porfirio Sheldon MD    [Held by provider] aspirin chewable tablet 81 mg, 81 mg, oral, Daily, Porfirio Sheldon MD, 81 mg at 08/27/24 1243    cefTRIAXone (Rocephin) 1 g in dextrose (iso) IV 50 mL, 1 g, intravenous, q24h, Porfirio Sheldon MD, Stopped at 08/28/24 1544    dextrose 50 % injection 12.5 g, 12.5 g, intravenous, q15 min PRN, Rosa rBown MD    dextrose 50 % injection 25 g, 25 g, intravenous, q15 min PRN, Rosa Brown MD    diazePAM (Valium) tablet 10 mg, 10 mg, oral, q2h PRN, Porfirio Sheldon MD    [Held by provider] enoxaparin (Lovenox) syringe 40 mg, 40 mg, subcutaneous, q24h, Porfirio Sheldon MD, 40 mg at 08/27/24 1243    fluticasone furoate-vilanteroL (Breo Ellipta) 200-25 mcg/dose inhaler 1 puff, 1 puff, inhalation,  Daily, Porfirio Sheldon MD, 1 puff at 08/29/24 0829    folic acid (Folvite) tablet 1 mg, 1 mg, oral, Daily, Porfirio Sheldon MD, 1 mg at 08/29/24 0847    glucagon (Glucagen) injection 1 mg, 1 mg, intramuscular, q15 min PRN, Rosa Brown MD    glucagon (Glucagen) injection 1 mg, 1 mg, intramuscular, q15 min PRN, Rosa Brown MD    insulin lispro (HumaLOG) injection 0-5 Units, 0-5 Units, subcutaneous, TID, Rosa Brown MD    ipratropium-albuteroL (Duo-Neb) 0.5-2.5 mg/3 mL nebulizer solution 3 mL, 3 mL, nebulization, q4h PRN, Porfirio Sheldon MD    melatonin tablet 3 mg, 3 mg, oral, Daily, Bisi Hicks MD, 3 mg at 08/28/24 2329    metoprolol tartrate (Lopressor) tablet 50 mg, 50 mg, oral, q12h, Rosa Brown MD, 50 mg at 08/29/24 0847    multivitamin with minerals 1 tablet, 1 tablet, oral, Daily, Porfirio Sheldon MD, 1 tablet at 08/28/24 0855    nicotine (Nicoderm CQ) 21 mg/24 hr patch 1 patch, 1 patch, transdermal, Daily, 1 patch at 08/29/24 0847 **FOLLOWED BY** [START ON 10/8/2024] nicotine (Nicoderm CQ) 14 mg/24 hr patch 1 patch, 1 patch, transdermal, Daily **FOLLOWED BY** [START ON 10/22/2024] nicotine (Nicoderm CQ) 7 mg/24 hr patch 1 patch, 1 patch, transdermal, Daily, Porfirio Sheldon MD    oxygen (O2) therapy, , inhalation, Continuous PRN - O2/gases, Porfirio Sheldon MD    polyethylene glycol (Glycolax, Miralax) packet 17 g, 17 g, oral, Daily, Porfirio Sheldon MD, 17 g at 08/27/24 1245    simvastatin (Zocor) tablet 20 mg, 20 mg, oral, Daily, Rosa Brown MD, 20 mg at 08/28/24 2329    sodium chloride 3 % nebulizer solution 3 mL, 3 mL, nebulization, q6h [facility], Rosa Brown MD    [START ON 8/30/2024] thiamine (Vitamin B-1) tablet 100 mg, 100 mg, oral, Daily, Porfirio Sheldon MD    tiotropium (Spiriva Respimat) 2.5 mcg/actuation inhaler 2 puff, 2 puff, inhalation, Daily, Porfirio Sheldon MD, 2 puff at 08/29/24 0830  Prior to Admission medications    Medication Sig Start Date End Date Taking? Authorizing Provider    aspirin 81 mg EC tablet Take 1 tablet (81 mg) by mouth once daily.    Historical Provider, MD   metoprolol tartrate (Lopressor) 50 mg tablet Take 1 tablet by mouth every 12 hours. 8/31/23   Thanh Cuba MD   simvastatin (Zocor) 20 mg tablet Take 1 tablet (20 mg) by mouth once daily. 8/31/23   Thanh Cuba MD     No Known Allergies  Social History     Tobacco Use    Smoking status: Every Day     Current packs/day: 0.50     Types: Cigarettes    Smokeless tobacco: Not on file   Substance Use Topics    Alcohol use: Not Currently     Comment: 4-5 beers and a glass a wine a day         Chemistry    Lab Results   Component Value Date/Time     (L) 08/29/2024 0705    K 3.7 08/29/2024 0705    CL 97 (L) 08/29/2024 0705    CO2 31 08/29/2024 0705    BUN 18 08/29/2024 0705    CREATININE 0.70 08/29/2024 0705    Lab Results   Component Value Date/Time    CALCIUM 11.9 (H) 08/29/2024 0705    ALKPHOS 78 08/27/2024 1227    AST 10 08/27/2024 1227    ALT 6 (L) 08/27/2024 1227    BILITOT 0.4 08/27/2024 1227          Lab Results   Component Value Date/Time    WBC 10.6 08/29/2024 0705    HGB 13.0 (L) 08/29/2024 0705    HCT 40.7 (L) 08/29/2024 0705     08/29/2024 0705     Lab Results   Component Value Date/Time    PROTIME 11.9 08/27/2024 1255    INR 1.1 08/27/2024 1255     Encounter Date: 08/27/24   ECG 12 Lead   Result Value    Ventricular Rate 83    Atrial Rate 83    LA Interval 184    QRS Duration 100    QT Interval 388    QTC Calculation(Bazett) 455    P Axis 58    R Axis -7    T Axis 6    QRS Count 14    Q Onset 218    P Onset 126    P Offset 187    T Offset 412    QTC Fredericia 432    Narrative    Sinus rhythm with occasional Premature ventricular complexes  Otherwise normal ECG  When compared with ECG of 26-AUG-2024 16:54,  Sinus rhythm has replaced Atrial fibrillation  Confirmed by Kodi Burt ([id number]) on 8/27/2024 5:17:58 PM     No results found for this or any previous visit from the past 1095 days.        Anesthesia Plan    History of general anesthesia?: yes  History of complications of general anesthesia?: no    ASA 3     general     intravenous induction   Trial extubation is planned.  Anesthetic plan and risks discussed with patient.  Use of blood products discussed with patient who consented to blood products.    Plan discussed with CAA.

## 2024-08-28 NOTE — PROGRESS NOTES
"Juan Carlos Cr is a 74 y.o. male on day 1 of admission presenting with COPD exacerbation (Multi).      Subjective   Concern for AxO 1 this morning as reported by bedside RN. Patient was able to state name, that he was in a \"lung hospital\", why he was brought to the hospital, that it was the end of August. Able to state that Alvin Padilla was the current president when given options. Reassuring neurological exam, no FND.     Prior to concern for AMS, patient stated that he had a great night of sleep with no complaints this morning. Admits to taking sips of water this morning. Denies fevers, chills, chest pain, or SOB. States that he is urinating normally and had a bowel movement yesterday. Denies buring urination        Objective     Last Recorded Vitals  /82   Pulse 68   Temp 36.6 °C (97.9 °F)   Resp 23   SpO2 93%   Intake/Output last 3 Shifts:    Intake/Output Summary (Last 24 hours) at 8/28/2024 1317  Last data filed at 8/27/2024 2200  Gross per 24 hour   Intake 0 ml   Output 150 ml   Net -150 ml       Admission Weight       Daily Weight  08/26/24 : 74.7 kg (164 lb 10.9 oz)    Image Results  ECG 12 Lead  Sinus rhythm with occasional Premature ventricular complexes  Otherwise normal ECG  When compared with ECG of 26-AUG-2024 16:54,  Sinus rhythm has replaced Atrial fibrillation  Confirmed by Kodi Burt ([id number]) on 8/27/2024 5:17:58 PM  ECG 12 lead  Sinus rhythm with frequent Premature ventricular complexes  Possible Left atrial enlargement  Borderline ECG  When compared with ECG of 22-JUN-1998 06:26,  Premature ventricular complexes are now Present  Vent. rate has increased BY  32 BPM  QT has lengthened  ECG 12 lead  Atrial fibrillation with rapid ventricular response with premature ventricular or aberrantly conducted complexes  Abnormal ECG  When compared with ECG of 26-AUG-2024 16:39, (unconfirmed)  Atrial fibrillation has replaced Sinus rhythm      Physical Exam  Constitutional:       Appearance: Normal " appearance.   HENT:      Head: Normocephalic and atraumatic.      Nose: Nose normal.      Mouth/Throat:      Mouth: Mucous membranes are moist.   Cardiovascular:      Rate and Rhythm: Normal rate and regular rhythm.      Heart sounds: No murmur heard.  Pulmonary:      Effort: Pulmonary effort is normal.      Comments: Diminished breath sounds in RLL when auscultating posteriorly. End expiratory wheeze heard when auscultating anterior L chest. Other breath sounds normal.    Work of breathing is restricted on R side. Assymetric lower costal margin, R side is shorter than the L  Abdominal:      General: Abdomen is flat.   Musculoskeletal:         General: Normal range of motion.   Skin:     General: Skin is warm and dry.      Capillary Refill: Capillary refill takes less than 2 seconds.   Neurological:      Mental Status: He is alert and oriented to person, place, and time.      Cranial Nerves: No cranial nerve deficit.      Sensory: No sensory deficit.      Motor: No weakness.   Psychiatric:         Mood and Affect: Mood normal.         Relevant Results      Results for orders placed or performed during the hospital encounter of 08/27/24 (from the past 24 hour(s))   ECG 12 Lead   Result Value Ref Range    Ventricular Rate 83 BPM    Atrial Rate 83 BPM    SD Interval 184 ms    QRS Duration 100 ms    QT Interval 388 ms    QTC Calculation(Bazett) 455 ms    P Axis 58 degrees    R Axis -7 degrees    T Axis 6 degrees    QRS Count 14 beats    Q Onset 218 ms    P Onset 126 ms    P Offset 187 ms    T Offset 412 ms    QTC Fredericia 432 ms   Urinalysis with Reflex Microscopic   Result Value Ref Range    Color, Urine Yellow Light-Yellow, Yellow, Dark-Yellow    Appearance, Urine Clear Clear    Specific Gravity, Urine 1.023 1.005 - 1.035    pH, Urine 6.0 5.0, 5.5, 6.0, 6.5, 7.0, 7.5, 8.0    Protein, Urine 30 (1+) (A) NEGATIVE, 10 (TRACE), 20 (TRACE) mg/dL    Glucose, Urine Normal Normal mg/dL    Blood, Urine NEGATIVE NEGATIVE     Ketones, Urine NEGATIVE NEGATIVE mg/dL    Bilirubin, Urine NEGATIVE NEGATIVE    Urobilinogen, Urine Normal Normal mg/dL    Nitrite, Urine NEGATIVE NEGATIVE    Leukocyte Esterase, Urine NEGATIVE NEGATIVE   Urine electrolytes   Result Value Ref Range    Sodium, Urine Random 17 mmol/L    Sodium/Creatinine Ratio 15 Not established. mmol/g Creat    Potassium, Urine Random 45 mmol/L    Potassium/Creatinine Ratio 41 Not established mmol/g Creat    Chloride, Urine Random 22 mmol/L    Chloride/Creatinine Ratio 20 (L) 23 - 275 mmol/g creat    Creatinine, Urine Random 111.0 20.0 - 370.0 mg/dL   Osmolality, urine   Result Value Ref Range    Osmolality, Urine Random 403 200 - 1,200 mOsm/kg   Drug Screen, Urine   Result Value Ref Range    Amphetamine Screen, Urine Presumptive Negative Presumptive Negative    Barbiturate Screen, Urine Presumptive Negative Presumptive Negative    Benzodiazepines Screen, Urine Presumptive Negative Presumptive Negative    Cannabinoid Screen, Urine Presumptive Negative Presumptive Negative    Cocaine Metabolite Screen, Urine Presumptive Negative Presumptive Negative    Fentanyl Screen, Urine Presumptive Negative Presumptive Negative    Opiate Screen, Urine Presumptive Negative Presumptive Negative    Oxycodone Screen, Urine Presumptive Negative Presumptive Negative    PCP Screen, Urine Presumptive Negative Presumptive Negative    Methadone Screen, Urine Presumptive Negative Presumptive Negative   Microscopic Only, Urine   Result Value Ref Range    WBC, Urine 1-5 1-5, NONE /HPF    RBC, Urine 1-2 NONE, 1-2, 3-5 /HPF    Mucus, Urine FEW Reference range not established. /LPF    Hyaline Casts, Urine 1+ (A) NONE /LPF   CBC   Result Value Ref Range    WBC 15.3 (H) 4.4 - 11.3 x10*3/uL    nRBC 0.0 0.0 - 0.0 /100 WBCs    RBC 4.54 4.50 - 5.90 x10*6/uL    Hemoglobin 13.9 13.5 - 17.5 g/dL    Hematocrit 43.2 41.0 - 52.0 %    MCV 95 80 - 100 fL    MCH 30.6 26.0 - 34.0 pg    MCHC 32.2 32.0 - 36.0 g/dL    RDW 11.7  11.5 - 14.5 %    Platelets 308 150 - 450 x10*3/uL   Renal Function Panel   Result Value Ref Range    Glucose 109 (H) 74 - 99 mg/dL    Sodium 136 136 - 145 mmol/L    Potassium 3.6 3.5 - 5.3 mmol/L    Chloride 96 (L) 98 - 107 mmol/L    Bicarbonate 31 21 - 32 mmol/L    Anion Gap 13 10 - 20 mmol/L    Urea Nitrogen 18 6 - 23 mg/dL    Creatinine 0.73 0.50 - 1.30 mg/dL    eGFR >90 >60 mL/min/1.73m*2    Calcium 12.6 (H) 8.6 - 10.6 mg/dL    Phosphorus 2.8 2.5 - 4.9 mg/dL    Albumin 3.4 3.4 - 5.0 g/dL   Magnesium   Result Value Ref Range    Magnesium 1.93 1.60 - 2.40 mg/dL   Lipid panel   Result Value Ref Range    Cholesterol 152 0 - 199 mg/dL    HDL-Cholesterol 70.2 mg/dL    Cholesterol/HDL Ratio 2.2     LDL Calculated 63 <=99 mg/dL    VLDL 19 0 - 40 mg/dL    Triglycerides 93 0 - 149 mg/dL    Non HDL Cholesterol 82 0 - 149 mg/dL   POCT GLUCOSE   Result Value Ref Range    POCT Glucose 113 (H) 74 - 99 mg/dL               Assessment/Plan      Assessment & Plan  COPD exacerbation (Multi)    Mr. Juan Carlos Cr is a 74 year old male with a past medical history of coronary artery disease s/p PCI 27 yrs ago, HTN, HLD, AUD, and history of tobacco use transferred to [facility] with acute hypoxic respiratory failure and lung mass diagnosed on Chest CT. Initially suspected to have a COPD exacerbation, but patient denies prior history of COPD. CT of chest in the emergency room revealed a large soft tissue mass causing compression of surrounding vasculature. While his symptoms of SOB on exertion, cough, and fatigue could be due to an acute COPD exacerbation, other causes high on the differential are post obstructive atelectasis due to mass, or possible CAP.      The lung mass demonstrated on CT is concerning for cancer given the size, possible lymphangitic spread and significant smoking history. EBUS will be necessary to confirm diagnosis.      The patient additionally developed paroxysmal A fib with RVR in the emergency room. No noted prior  history of A fib, although given his prior cardiac history could have had masked A fib for several years. EKG demonstrated left atrial enlargement, lending evidence to chronicity. Patient has since self converted to sinus rhythm. FLASH-VASC score is high, will consider starting on anticoagulation after patient undergoes procedures for lung mass.      #Acute Hypoxic Respiratory Failure, Resolved  Duoneb x3 and Methylpred  given in ED   Currently on RA, O2 sats between 90-96. No longer has an oxygen requirement.   Differential could be acute COPD exacerbation, post obstructive atelectasis due to right lung mass, possible pneumonia   Suspected emphysema based on CT, as the patient has a significant smoking history. Additionally could represent COPD, but no spirometry to confirm.  Procalcitonin on 8/27 0.04   Updates 8/27: likely resolved due to lack of oxygen requirement.   Plan  Brio Ellipta and Spiriva Respimat  Duonebs PRN  Stop steriods given low suspicion for acute exacerbation  Empiric treatment for CAP: ceftriaxone 5 days, azithrymycin 500mg for 3 days              - Most recent QTC (Bazzet) was 455 ms  Pneumonia Work-Up (Procalcitonin, Respiratory Culture/Smear, MRSA Surveillance, Legionella Antigen, Streptococcus pneumoniae)  Viral Respiratory Panel     #Rt lung mass infiltrating the mediastinum  CT Chest from 8/26/24 demonstrates a 6.57 mm soft tissue mass encasing the right hilum, obstructing portions of the right main bronchus and segmental bronchi and the anterior right upper lobe pulmonary arteries. Suspected lymphangitic spread into the right apex  Plan  Pulmonology consulted. NPO until EBUS today.  MRI Brain  Plan for oncology consult once biopsy results are available   Plan for outpatient PET scan to complete staging      #Paroxsymal Afib with RVR   #Coronary Artery Disease  #Hypertension  #Hyperlipidemia  EKG in emergency room demonstrated atrial fibrillation with RVR. Also demonstrated numerous  PACs and PVCs. Repeat EKG has demonstrated conversion back to sinus rhythm with occasional PVCs.   Had PCI at 47 y.o   Home metoprolol 50mg BID for hx of CAD  Home ASA  Home simvastatin 20 mg  FLASH-VASC Score: 3  Lipid Panel: WNL  Plan  Continue home metoprolol 50 mg BID and simvastatin 20 mg  Hold ASA until biopsy is completed  Start patient on Eliquis 5mg BID following bronchoscopy per cardiology  Optimize electrolytes. K > 4, Mg > 2  Continue to keep patient on telemonitor  Cardiology has been consulted, appreciate their recs on potential cardioversion and anticoagulation.     #Newly Diagnosed Diabetes  HbA1c: 7.6, Glucose from 8/27: 205  UA from 8/27: 1+ protein  Plan  Glucose check q6h  Will start on mild insulin sliding scale after biopsy  Albumin-Creatinine Ratio      #Hyponatremia, resolved  Level prior to admission: 128. Repeat level on admission: 135  Possibly of paraneoplastic origin, but can also be due to chronic alcohol intake. Other differential is cerebral salt wasting secondary to potential brain metastasis.   Plan:  Urinalysis, urine sodium, serum osmolality, urine osmolality were ordered prior to repeat level. Monitor results.      #Mild Hypercalcemia  Level prior to admission: 11.2  Ionized calcium: 1.52 --> mild hypercalcemia  Possibly paraneoplastic in origin, asymptomatic   Plan:  Continue to monitor. If increasing hypercalcemic can pursue hypercalcemia      #Metabolic Alkalosis  Possibly poster hypercapnic state, will continue to monitor. No GI losses.      #Nephrolithiasis  Noted on CT, asymptomatic     #Smoking History  #AUD  Previously smoking 2 PPD, has now cut down to 7 cigarettes.   Drinks > 12 day  Plan  CIWA protocol with diazepam  Thiamine, folate, vitamin replacement  Nicotine Taper protocol     Pt's wife Cindy Cr, cell: [phone]     F: PRN  E: Monitor and replace PRN  N: Regular Diet  GI: None indicated  DVT prophylaxis: SubQ lovenox. Will restart after bronch  Code  status: Full (confirmed on admission)     Monica Blair, MS4

## 2024-08-29 ENCOUNTER — APPOINTMENT (OUTPATIENT)
Dept: GASTROENTEROLOGY | Facility: HOSPITAL | Age: 74
DRG: 189 | End: 2024-08-29
Payer: MEDICARE

## 2024-08-29 ENCOUNTER — APPOINTMENT (OUTPATIENT)
Dept: CARDIOLOGY | Facility: HOSPITAL | Age: 74
End: 2024-08-29
Payer: MEDICARE

## 2024-08-29 ENCOUNTER — APPOINTMENT (OUTPATIENT)
Dept: RADIOLOGY | Facility: HOSPITAL | Age: 74
DRG: 189 | End: 2024-08-29
Payer: MEDICARE

## 2024-08-29 DIAGNOSIS — J98.09 BRONCHIAL STENOSIS: ICD-10-CM

## 2024-08-29 DIAGNOSIS — Z01.818 PRE-OP TESTING: ICD-10-CM

## 2024-08-29 PROBLEM — I48.91 ATRIAL FIBRILLATION (MULTI): Status: ACTIVE | Noted: 2024-08-29

## 2024-08-29 PROBLEM — R91.8 PULMONARY NODULES: Status: ACTIVE | Noted: 2024-08-29

## 2024-08-29 PROBLEM — Z95.5 STENTED CORONARY ARTERY: Status: ACTIVE | Noted: 2024-08-29

## 2024-08-29 LAB
ALBUMIN SERPL BCP-MCNC: 3.2 G/DL (ref 3.4–5)
ANION GAP BLDV CALCULATED.4IONS-SCNC: 4 MMOL/L (ref 10–25)
ANION GAP SERPL CALC-SCNC: 11 MMOL/L (ref 10–20)
AORTIC VALVE MEAN GRADIENT: 2.9 MMHG
AORTIC VALVE PEAK VELOCITY: 1.29 M/S
AV PEAK GRADIENT: 6.7 MMHG
AVA (PEAK VEL): 1.97 CM2
AVA (VTI): 2.44 CM2
BASE EXCESS BLDV CALC-SCNC: 9.2 MMOL/L (ref -2–3)
BODY TEMPERATURE: 37 DEGREES CELSIUS
BUN SERPL-MCNC: 18 MG/DL (ref 6–23)
CA-I BLDV-SCNC: 1.47 MMOL/L (ref 1.1–1.33)
CALCIUM SERPL-MCNC: 11.9 MG/DL (ref 8.6–10.6)
CHLORIDE BLDV-SCNC: 98 MMOL/L (ref 98–107)
CHLORIDE SERPL-SCNC: 97 MMOL/L (ref 98–107)
CO2 SERPL-SCNC: 31 MMOL/L (ref 21–32)
CREAT SERPL-MCNC: 0.7 MG/DL (ref 0.5–1.3)
EGFRCR SERPLBLD CKD-EPI 2021: >90 ML/MIN/1.73M*2
EJECTION FRACTION APICAL 4 CHAMBER: 36.4
EJECTION FRACTION: 38 %
ERYTHROCYTE [DISTWIDTH] IN BLOOD BY AUTOMATED COUNT: 11.8 % (ref 11.5–14.5)
GLUCOSE BLD MANUAL STRIP-MCNC: 111 MG/DL (ref 74–99)
GLUCOSE BLD MANUAL STRIP-MCNC: 120 MG/DL (ref 74–99)
GLUCOSE BLD MANUAL STRIP-MCNC: 123 MG/DL (ref 74–99)
GLUCOSE BLD MANUAL STRIP-MCNC: 125 MG/DL (ref 74–99)
GLUCOSE BLD MANUAL STRIP-MCNC: 141 MG/DL (ref 74–99)
GLUCOSE BLDV-MCNC: 163 MG/DL (ref 74–99)
GLUCOSE SERPL-MCNC: 106 MG/DL (ref 74–99)
HCO3 BLDV-SCNC: 34.8 MMOL/L (ref 22–26)
HCT VFR BLD AUTO: 40.7 % (ref 41–52)
HCT VFR BLD EST: 46 % (ref 41–52)
HGB BLD-MCNC: 13 G/DL (ref 13.5–17.5)
HGB BLDV-MCNC: 15.4 G/DL (ref 13.5–17.5)
INHALED O2 CONCENTRATION: 21 %
LACTATE BLDV-SCNC: 1.7 MMOL/L (ref 0.4–2)
LEFT ATRIUM VOLUME AREA LENGTH INDEX BSA: 36.1 ML/M2
LEFT VENTRICLE INTERNAL DIMENSION DIASTOLE: 5.17 CM (ref 3.5–6)
LEFT VENTRICULAR OUTFLOW TRACT DIAMETER: 1.98 CM
MAGNESIUM SERPL-MCNC: 1.89 MG/DL (ref 1.6–2.4)
MCH RBC QN AUTO: 30.6 PG (ref 26–34)
MCHC RBC AUTO-ENTMCNC: 31.9 G/DL (ref 32–36)
MCV RBC AUTO: 96 FL (ref 80–100)
MITRAL VALVE E/A RATIO: 0.56
NRBC BLD-RTO: 0 /100 WBCS (ref 0–0)
OXYHGB MFR BLDV: 51.2 % (ref 45–75)
PCO2 BLDV: 49 MM HG (ref 41–51)
PH BLDV: 7.46 PH (ref 7.33–7.43)
PHOSPHATE SERPL-MCNC: 2.9 MG/DL (ref 2.5–4.9)
PLATELET # BLD AUTO: 225 X10*3/UL (ref 150–450)
PO2 BLDV: 35 MM HG (ref 35–45)
POTASSIUM BLDV-SCNC: 3.6 MMOL/L (ref 3.5–5.3)
POTASSIUM SERPL-SCNC: 3.7 MMOL/L (ref 3.5–5.3)
RBC # BLD AUTO: 4.25 X10*6/UL (ref 4.5–5.9)
RIGHT VENTRICLE FREE WALL PEAK S': 13 CM/S
RIGHT VENTRICLE PEAK SYSTOLIC PRESSURE: 5.7 MMHG
SAO2 % BLDV: 52 % (ref 45–75)
SODIUM BLDV-SCNC: 133 MMOL/L (ref 136–145)
SODIUM SERPL-SCNC: 135 MMOL/L (ref 136–145)
TRICUSPID ANNULAR PLANE SYSTOLIC EXCURSION: 1.7 CM
WBC # BLD AUTO: 10.6 X10*3/UL (ref 4.4–11.3)

## 2024-08-29 PROCEDURE — 7100000010 HC PHASE TWO TIME - EACH INCREMENTAL 1 MINUTE

## 2024-08-29 PROCEDURE — 36415 COLL VENOUS BLD VENIPUNCTURE: CPT

## 2024-08-29 PROCEDURE — 81458 SO GSAP DNA CPY NMBR&MCRSTL: CPT | Performed by: STUDENT IN AN ORGANIZED HEALTH CARE EDUCATION/TRAINING PROGRAM

## 2024-08-29 PROCEDURE — 2500000005 HC RX 250 GENERAL PHARMACY W/O HCPCS

## 2024-08-29 PROCEDURE — 84100 ASSAY OF PHOSPHORUS: CPT

## 2024-08-29 PROCEDURE — 31636 BRONCHOSCOPY BRONCH STENTS: CPT | Performed by: STUDENT IN AN ORGANIZED HEALTH CARE EDUCATION/TRAINING PROGRAM

## 2024-08-29 PROCEDURE — 31625 BRONCHOSCOPY W/BIOPSY(S): CPT | Performed by: STUDENT IN AN ORGANIZED HEALTH CARE EDUCATION/TRAINING PROGRAM

## 2024-08-29 PROCEDURE — 2500000004 HC RX 250 GENERAL PHARMACY W/ HCPCS (ALT 636 FOR OP/ED)

## 2024-08-29 PROCEDURE — 0BDC8ZX EXTRACTION OF RIGHT UPPER LUNG LOBE, VIA NATURAL OR ARTIFICIAL OPENING ENDOSCOPIC, DIAGNOSTIC: ICD-10-PCS | Performed by: STUDENT IN AN ORGANIZED HEALTH CARE EDUCATION/TRAINING PROGRAM

## 2024-08-29 PROCEDURE — 71045 X-RAY EXAM CHEST 1 VIEW: CPT | Performed by: RADIOLOGY

## 2024-08-29 PROCEDURE — 2500000002 HC RX 250 W HCPCS SELF ADMINISTERED DRUGS (ALT 637 FOR MEDICARE OP, ALT 636 FOR OP/ED)

## 2024-08-29 PROCEDURE — 7100000002 HC RECOVERY ROOM TIME - EACH INCREMENTAL 1 MINUTE

## 2024-08-29 PROCEDURE — 99233 SBSQ HOSP IP/OBS HIGH 50: CPT | Performed by: INTERNAL MEDICINE

## 2024-08-29 PROCEDURE — 2500000001 HC RX 250 WO HCPCS SELF ADMINISTERED DRUGS (ALT 637 FOR MEDICARE OP)

## 2024-08-29 PROCEDURE — 83735 ASSAY OF MAGNESIUM: CPT

## 2024-08-29 PROCEDURE — 88363 XM ARCHIVE TISSUE MOLEC ANAL: CPT | Performed by: STUDENT IN AN ORGANIZED HEALTH CARE EDUCATION/TRAINING PROGRAM

## 2024-08-29 PROCEDURE — 31641 BRONCHOSCOPY TREAT BLOCKAGE: CPT | Performed by: STUDENT IN AN ORGANIZED HEALTH CARE EDUCATION/TRAINING PROGRAM

## 2024-08-29 PROCEDURE — 7100000009 HC PHASE TWO TIME - INITIAL BASE CHARGE

## 2024-08-29 PROCEDURE — 93306 TTE W/DOPPLER COMPLETE: CPT

## 2024-08-29 PROCEDURE — 0BD38ZX EXTRACTION OF RIGHT MAIN BRONCHUS, VIA NATURAL OR ARTIFICIAL OPENING ENDOSCOPIC, DIAGNOSTIC: ICD-10-PCS | Performed by: STUDENT IN AN ORGANIZED HEALTH CARE EDUCATION/TRAINING PROGRAM

## 2024-08-29 PROCEDURE — 3700000002 HC GENERAL ANESTHESIA TIME - EACH INCREMENTAL 1 MINUTE

## 2024-08-29 PROCEDURE — 85027 COMPLETE CBC AUTOMATED: CPT

## 2024-08-29 PROCEDURE — 88172 CYTP DX EVAL FNA 1ST EA SITE: CPT | Performed by: STUDENT IN AN ORGANIZED HEALTH CARE EDUCATION/TRAINING PROGRAM

## 2024-08-29 PROCEDURE — C1874 STENT, COATED/COV W/DEL SYS: HCPCS

## 2024-08-29 PROCEDURE — 88112 CYTOPATH CELL ENHANCE TECH: CPT | Performed by: STUDENT IN AN ORGANIZED HEALTH CARE EDUCATION/TRAINING PROGRAM

## 2024-08-29 PROCEDURE — 2720000007 HC OR 272 NO HCPCS

## 2024-08-29 PROCEDURE — 88305 TISSUE EXAM BY PATHOLOGIST: CPT | Mod: TC,SUR | Performed by: STUDENT IN AN ORGANIZED HEALTH CARE EDUCATION/TRAINING PROGRAM

## 2024-08-29 PROCEDURE — 3700000001 HC GENERAL ANESTHESIA TIME - INITIAL BASE CHARGE

## 2024-08-29 PROCEDURE — 94640 AIRWAY INHALATION TREATMENT: CPT

## 2024-08-29 PROCEDURE — 1200000002 HC GENERAL ROOM WITH TELEMETRY DAILY

## 2024-08-29 PROCEDURE — 0B748DZ DILATION OF RIGHT UPPER LOBE BRONCHUS WITH INTRALUMINAL DEVICE, VIA NATURAL OR ARTIFICIAL OPENING ENDOSCOPIC: ICD-10-PCS | Performed by: STUDENT IN AN ORGANIZED HEALTH CARE EDUCATION/TRAINING PROGRAM

## 2024-08-29 PROCEDURE — 88305 TISSUE EXAM BY PATHOLOGIST: CPT | Mod: TC,MCY | Performed by: STUDENT IN AN ORGANIZED HEALTH CARE EDUCATION/TRAINING PROGRAM

## 2024-08-29 PROCEDURE — 88305 TISSUE EXAM BY PATHOLOGIST: CPT | Performed by: STUDENT IN AN ORGANIZED HEALTH CARE EDUCATION/TRAINING PROGRAM

## 2024-08-29 PROCEDURE — 7100000001 HC RECOVERY ROOM TIME - INITIAL BASE CHARGE

## 2024-08-29 PROCEDURE — 07D78ZX EXTRACTION OF THORAX LYMPHATIC, VIA NATURAL OR ARTIFICIAL OPENING ENDOSCOPIC, DIAGNOSTIC: ICD-10-PCS | Performed by: STUDENT IN AN ORGANIZED HEALTH CARE EDUCATION/TRAINING PROGRAM

## 2024-08-29 PROCEDURE — 84132 ASSAY OF SERUM POTASSIUM: CPT

## 2024-08-29 PROCEDURE — S4991 NICOTINE PATCH NONLEGEND: HCPCS

## 2024-08-29 PROCEDURE — 71045 X-RAY EXAM CHEST 1 VIEW: CPT

## 2024-08-29 PROCEDURE — 82947 ASSAY GLUCOSE BLOOD QUANT: CPT

## 2024-08-29 PROCEDURE — C1726 CATH, BAL DIL, NON-VASCULAR: HCPCS

## 2024-08-29 PROCEDURE — 31652 BRONCH EBUS SAMPLNG 1/2 NODE: CPT | Performed by: STUDENT IN AN ORGANIZED HEALTH CARE EDUCATION/TRAINING PROGRAM

## 2024-08-29 PROCEDURE — G0452 MOLECULAR PATHOLOGY INTERPR: HCPCS | Performed by: STUDENT IN AN ORGANIZED HEALTH CARE EDUCATION/TRAINING PROGRAM

## 2024-08-29 PROCEDURE — 88360 TUMOR IMMUNOHISTOCHEM/MANUAL: CPT | Performed by: STUDENT IN AN ORGANIZED HEALTH CARE EDUCATION/TRAINING PROGRAM

## 2024-08-29 PROCEDURE — 2780000003 HC OR 278 NO HCPCS

## 2024-08-29 PROCEDURE — 88173 CYTOPATH EVAL FNA REPORT: CPT | Performed by: STUDENT IN AN ORGANIZED HEALTH CARE EDUCATION/TRAINING PROGRAM

## 2024-08-29 RX ORDER — NAPROXEN SODIUM 220 MG/1
81 TABLET, FILM COATED ORAL DAILY
Status: CANCELLED | OUTPATIENT
Start: 2024-08-30

## 2024-08-29 RX ORDER — POTASSIUM CHLORIDE 1.5 G/1.58G
20 POWDER, FOR SOLUTION ORAL 2 TIMES DAILY
Status: CANCELLED | OUTPATIENT
Start: 2024-08-29

## 2024-08-29 RX ORDER — ROCURONIUM BROMIDE 10 MG/ML
INJECTION, SOLUTION INTRAVENOUS AS NEEDED
Status: DISCONTINUED | OUTPATIENT
Start: 2024-08-29 | End: 2024-08-29

## 2024-08-29 RX ORDER — ENOXAPARIN SODIUM 100 MG/ML
40 INJECTION SUBCUTANEOUS EVERY 24 HOURS
Status: CANCELLED | OUTPATIENT
Start: 2024-08-30

## 2024-08-29 RX ORDER — SODIUM CHLORIDE, SODIUM LACTATE, POTASSIUM CHLORIDE, CALCIUM CHLORIDE 600; 310; 30; 20 MG/100ML; MG/100ML; MG/100ML; MG/100ML
INJECTION, SOLUTION INTRAVENOUS CONTINUOUS PRN
Status: DISCONTINUED | OUTPATIENT
Start: 2024-08-29 | End: 2024-08-29

## 2024-08-29 RX ORDER — PROPOFOL 10 MG/ML
INJECTION, EMULSION INTRAVENOUS CONTINUOUS PRN
Status: DISCONTINUED | OUTPATIENT
Start: 2024-08-29 | End: 2024-08-29

## 2024-08-29 RX ORDER — AMLODIPINE BESYLATE 5 MG/1
5 TABLET ORAL DAILY
Status: DISCONTINUED | OUTPATIENT
Start: 2024-08-29 | End: 2024-08-31

## 2024-08-29 RX ORDER — LIDOCAINE HYDROCHLORIDE 20 MG/ML
INJECTION, SOLUTION INFILTRATION; PERINEURAL AS NEEDED
Status: DISCONTINUED | OUTPATIENT
Start: 2024-08-29 | End: 2024-08-29

## 2024-08-29 RX ORDER — POTASSIUM CHLORIDE 20 MEQ/1
20 TABLET, EXTENDED RELEASE ORAL ONCE
Status: COMPLETED | OUTPATIENT
Start: 2024-08-29 | End: 2024-08-29

## 2024-08-29 RX ORDER — FENTANYL CITRATE 50 UG/ML
INJECTION, SOLUTION INTRAMUSCULAR; INTRAVENOUS CONTINUOUS PRN
Status: DISCONTINUED | OUTPATIENT
Start: 2024-08-29 | End: 2024-08-29

## 2024-08-29 RX ORDER — AMLODIPINE BESYLATE 5 MG/1
5 TABLET ORAL DAILY
Status: CANCELLED | OUTPATIENT
Start: 2024-08-29

## 2024-08-29 RX ADMIN — NICOTINE 1 PATCH: 21 PATCH, EXTENDED RELEASE TRANSDERMAL at 08:47

## 2024-08-29 RX ADMIN — AZITHROMYCIN DIHYDRATE 500 MG: 500 TABLET ORAL at 08:47

## 2024-08-29 RX ADMIN — TIOTROPIUM BROMIDE INHALATION SPRAY 2 PUFF: 3.12 SPRAY, METERED RESPIRATORY (INHALATION) at 08:30

## 2024-08-29 RX ADMIN — METOPROLOL TARTRATE 50 MG: 50 TABLET, FILM COATED ORAL at 08:47

## 2024-08-29 RX ADMIN — THIAMINE HYDROCHLORIDE 100 MG: 100 INJECTION, SOLUTION INTRAMUSCULAR; INTRAVENOUS at 08:47

## 2024-08-29 RX ADMIN — POTASSIUM CHLORIDE 20 MEQ: 1500 TABLET, EXTENDED RELEASE ORAL at 16:34

## 2024-08-29 RX ADMIN — AMLODIPINE BESYLATE 5 MG: 5 TABLET ORAL at 16:34

## 2024-08-29 RX ADMIN — CEFTRIAXONE SODIUM 1 G: 1 INJECTION, SOLUTION INTRAVENOUS at 16:34

## 2024-08-29 RX ADMIN — FOLIC ACID 1 MG: 1 TABLET ORAL at 08:47

## 2024-08-29 RX ADMIN — METOPROLOL TARTRATE 50 MG: 50 TABLET, FILM COATED ORAL at 20:42

## 2024-08-29 RX ADMIN — PERFLUTREN 2 ML OF DILUTION: 6.52 INJECTION, SUSPENSION INTRAVENOUS at 10:44

## 2024-08-29 RX ADMIN — Medication 3 MG: at 20:43

## 2024-08-29 RX ADMIN — FLUTICASONE FUROATE AND VILANTEROL TRIFENATATE 1 PUFF: 200; 25 POWDER RESPIRATORY (INHALATION) at 08:29

## 2024-08-29 RX ADMIN — SIMVASTATIN 20 MG: 20 TABLET, FILM COATED ORAL at 20:43

## 2024-08-29 ASSESSMENT — LIFESTYLE VARIABLES
HEADACHE, FULLNESS IN HEAD: NOT PRESENT
NAUSEA AND VOMITING: NO NAUSEA AND NO VOMITING
PAROXYSMAL SWEATS: NO SWEAT VISIBLE
HEADACHE, FULLNESS IN HEAD: NOT PRESENT
ANXIETY: NO ANXIETY, AT EASE
ANXIETY: NO ANXIETY, AT EASE
AUDITORY DISTURBANCES: NOT PRESENT
TREMOR: NO TREMOR
ANXIETY: NO ANXIETY, AT EASE
TOTAL SCORE: 0
ORIENTATION AND CLOUDING OF SENSORIUM: ORIENTED AND CAN DO SERIAL ADDITIONS
NAUSEA AND VOMITING: NO NAUSEA AND NO VOMITING
ORIENTATION AND CLOUDING OF SENSORIUM: ORIENTED AND CAN DO SERIAL ADDITIONS
VISUAL DISTURBANCES: NOT PRESENT
TREMOR: NO TREMOR
VISUAL DISTURBANCES: NOT PRESENT
NAUSEA AND VOMITING: NO NAUSEA AND NO VOMITING
TREMOR: NO TREMOR
AUDITORY DISTURBANCES: NOT PRESENT
ORIENTATION AND CLOUDING OF SENSORIUM: ORIENTED AND CAN DO SERIAL ADDITIONS
TOTAL SCORE: 0
AUDITORY DISTURBANCES: NOT PRESENT
PAROXYSMAL SWEATS: NO SWEAT VISIBLE
VISUAL DISTURBANCES: NOT PRESENT
HEADACHE, FULLNESS IN HEAD: NOT PRESENT
AGITATION: NORMAL ACTIVITY
AGITATION: NORMAL ACTIVITY
PAROXYSMAL SWEATS: NO SWEAT VISIBLE
AGITATION: NORMAL ACTIVITY
TOTAL SCORE: 0

## 2024-08-29 ASSESSMENT — COGNITIVE AND FUNCTIONAL STATUS - GENERAL
MOBILITY SCORE: 23
DAILY ACTIVITIY SCORE: 24
CLIMB 3 TO 5 STEPS WITH RAILING: A LITTLE

## 2024-08-29 ASSESSMENT — PAIN - FUNCTIONAL ASSESSMENT
PAIN_FUNCTIONAL_ASSESSMENT: 0-10

## 2024-08-29 ASSESSMENT — PAIN SCALES - GENERAL
PAINLEVEL_OUTOF10: 0 - NO PAIN

## 2024-08-29 NOTE — CONSULTS
"Inpatient Diabetes Education Consult    Reason for Visit:  Juan Carlos Cr is a 74 y.o. male who presents for COPD exacerbation; lung mass and new dx DM    Consulting Service/Provider: Inpatient team    Visit Type: Initial visit    Visit Modality: In-person    Discharge Equipment/Supply Needs:       Patient has supplies at home:  n/a this is a new diagnosis    Patient History and Assessment:  New diagnosis: Type 2  Previous diagnosis:  unknown; no diabetes 1 year ago per wife  Patient known to Diabetes Education department: No  Treatment prior to hospital admission:  n/a  Complications: cardiovascular disease  PTA Medications:    Current Outpatient Medications   Medication Instructions    aspirin 81 mg, oral, Daily    metoprolol tartrate (LOPRESSOR) 50 mg, oral, Every 12 hours    simvastatin (ZOCOR) 20 mg, oral, Daily       Glucose   Date/Time Value Ref Range Status   08/29/2024 07:05  (H) 74 - 99 mg/dL Final   08/28/2024 07:28  (H) 74 - 99 mg/dL Final   08/27/2024 12:27  (H) 74 - 99 mg/dL Final   08/26/2024 04:50  (H) 74 - 99 mg/dL Final   09/30/2022 12:02 PM 93 74 - 99 mg/dL Final   09/24/2021 11:16 AM 95 74 - 99 mg/dL Final   09/25/2020 10:18  (H) 74 - 99 mg/dL Final   09/20/2019 11:04 AM 97 74 - 99 mg/dL Final   09/14/2018 09:51  (H) 74 - 99 mg/dL Final     No results found for: \"CPEPTIDE\"  Hemoglobin A1C   Date Value Ref Range Status   08/26/2024 7.6 (H) see below % Final   09/25/2020 5.7 % Final     Comment:          Diagnosis of Diabetes-Adults   Non-Diabetic: < or = 5.6%   Increased risk for developing diabetes: 5.7-6.4%   Diagnostic of diabetes: > or = 6.5%  .       Monitoring of Diabetes                Age (y)     Therapeutic Goal (%)   Adults:          >18           <7.0   Pediatrics:    13-18           <7.5                   7-12           <8.0                   0- 6            7.5-8.5   American Diabetes Association. Diabetes Care 33(S1), Jan 2010.         Patient " Learning/Readiness Assessment:  Mr. Cr is not fully oriented -- forgetful.  Visit with wife and son present    Interventions/Topics Covered:  See After Visit Summary for handouts/information sheets provided to patient.  Education Documentation  Non-Insulin Medications, taught by Kristy Joyce RN at 8/29/2024  6:00 PM.  Learner: Significant Other, Family  Readiness: Acceptance  Method: Explanation, Handout  Response: Verbalizes Understanding  Comment: meds for home TBD    Monitoring Blood Glucose, taught by Kristy Joyce RN at 8/29/2024  5:59 PM.  Learner: Significant Other, Family  Readiness: Acceptance  Method: Explanation, Handout  Response: Verbalizes Understanding  Comment: discussion about using BG meter at home 1-2x day if taking oral anti-dm meds; son has DM and knows how to use meter -- can be a esource for him    Signs and Symptoms of Diabetes, taught by Kristy Joyce RN at 8/29/2024  5:58 PM.  Learner: Significant Other, Family, Patient  Readiness: Acceptance  Method: Explanation, Handout  Response: Verbalizes Understanding  Comment: Mrs. Cr here for education visit along with son and Mr. Cr.  Diabetes handbook provided as resource/reference    How is Diabetes Diagnosed, taught by Kristy Joyce RN at 8/29/2024  5:58 PM.  Learner: Significant Other, Family, Patient  Readiness: Acceptance  Method: Explanation, Handout  Response: Verbalizes Understanding  Comment: Mrs. Cr here for education visit along with son and Mr. Cr.  Diabetes handbook provided as resource/reference    What is Diabetes, taught by Kristy Joyce RN at 8/29/2024  5:58 PM.  Learner: Significant Other, Family, Patient  Readiness: Acceptance  Method: Explanation, Handout  Response: Verbalizes Understanding  Comment: Mrs. Cr here for education visit along with son and Mr. Cr.  Diabetes handbook provided as resource/reference          Additional topics covered: General review of T2DM.  Handbook provided as resource.  Discuss  impact of A1c on the BG levels.  Discussion re harmful effects of ETOH  and cigarettes with DM.  States he is wearing a nicotine patch and wants to continue this at home.  States he is cutting down on number of beers/day.  Discussion re probability of oral meds for DM and BG monitoring.     Additional materials provided: Diabetes handbook.    CGM:  n/a    Education Outcome/Recommendations:        Recommendations for bedside nursing:  n/a    Recommendations for Providers: Follow-up w/ PCP and/or Endocrinology    Additional Comments: Diabetes ed consult with wife and son present.  Mr. Cr is pleasant and answers most questions correctly but is forgetful.  Had lung bx today and is aware he may have lung cancer.  Will continue to follow while inpatient.  Mr. Cr's son has T2DM and is able to be a resource to him at home.  Time spent:  40 mins.

## 2024-08-29 NOTE — ANESTHESIA PROCEDURE NOTES
Airway  Date/Time: 8/29/2024 1:51 PM  Urgency: elective    Airway not difficult    Staffing  Performed: LUIS   Authorized by: Leslie Trujillo MD    Performed by: HERIBERTO Jacobson  Patient location during procedure: OR    Indications and Patient Condition  Indications for airway management: anesthesia  Spontaneous Ventilation: absent  Sedation level: deep  Preoxygenated: yes  Patient position: sniffing  Mask difficulty assessment: 1 - vent by mask    Final Airway Details  Final airway type: endotracheal airway      Successful airway: ETT  Cuffed: yes   Successful intubation technique: direct laryngoscopy  Facilitating devices/methods: intubating stylet  Endotracheal tube insertion site: oral  Blade: Remberto  Blade size: #3  ETT size (mm): 7.5  Cormack-Lehane Classification: grade I - full view of glottis  Placement verified by: chest auscultation and capnometry   Measured from: lips  ETT to lips (cm): 21  Number of attempts at approach: 1

## 2024-08-29 NOTE — CARE PLAN
The patient's goals for the shift include      The clinical goals for the shift include pt. will remain safe.    Over the shift, the patient did not make progress toward the following goals. Barriers to progression include . Recommendations to address these barriers include .

## 2024-08-29 NOTE — PROGRESS NOTES
"Juan Carlos Cr is a 74 y.o. male on day 2 of admission presenting with COPD exacerbation (Multi).      Subjective   Patient is AxO x1 this morning. Able to tell me full name. Knew the month but stated that the year was 2027. Thought that he was in [place] when interviewer asked where we were.     Denies chest pain, says that his coughing has reduced significantly. Denies any complaints.     Spoke with the patient's son yesterday who mentioned that his father has been stating odd things including a \"fire truck\" coming into the room.        Objective     Last Recorded Vitals  /88 (BP Location: Left arm, Patient Position: Lying)   Pulse 80   Temp 36.2 °C (97.2 °F) (Temporal)   Resp 18   SpO2 93%   Intake/Output last 3 Shifts:    Intake/Output Summary (Last 24 hours) at 8/29/2024 0716  Last data filed at 8/28/2024 1544  Gross per 24 hour   Intake 50 ml   Output --   Net 50 ml       Admission Weight       Daily Weight  08/26/24 : 74.7 kg (164 lb 10.9 oz)    Image Results  ECG 12 lead  Sinus rhythm with frequent Premature ventricular complexes  Possible Left atrial enlargement  Borderline ECG  When compared with ECG of 22-JUN-1998 06:26,  Premature ventricular complexes are now Present  Vent. rate has increased BY  32 BPM  QT has lengthened  See ED provider note for full interpretation and clinical correlation  Confirmed by Leila Luke ([id number]) on 8/28/2024 3:41:49 PM  ECG 12 lead  Atrial fibrillation with rapid ventricular response with premature ventricular or aberrantly conducted complexes  Abnormal ECG  When compared with ECG of 26-AUG-2024 16:39, (unconfirmed)  Atrial fibrillation has replaced Sinus rhythm  See ED provider note for full interpretation and clinical correlation  Confirmed by Leila Luke ([id number]) on 8/28/2024 3:17:18 PM  MR brain w and wo IV contrast  Narrative: Interpreted By:  Natalia De Leon and Stephens Katherine   STUDY:  MR BRAIN W AND WO IV CONTRAST;  8/28/2024 1:03 pm    "   INDICATION:  Signs/Symptoms:Possible metastatic cancer.          COMPARISON:  None.      ACCESSION NUMBER(S):  [id number]      ORDERING CLINICIAN:  BRITTNY PULLIAM      TECHNIQUE:  Axial T2, FLAIR, DWI, gradient echo T2 and sagittal and coronal T1  weighted images of brain were acquired. Post contrast T1 weighted  images were acquired after administration of 15 ML Dotarem gadolinium  based intravenous contrast.      FINDINGS:  CSF Spaces: There is prominence of ventricles and sulci compatible  with diffuse parenchymal volume loss      Parenchyma: There is no diffusion restriction abnormality to suggest  acute infarct.  Nonspecific patchy and confluent T2/FLAIR  hyperintense signal within the periventricular and subcortical white  matter, likely sequela of chronic microangiopathy. There are remote  lacunar infarcts and prominent perivascular spaces within the basal  ganglia, thalami, and external capsules. There is no mass effect or  midline shift. No abnormal parenchymal or leptomeningeal enhancement.      Paranasal Sinuses and Mastoids: There is mild mucosal thickening  within scattered paranasal sinuses opacification of a few mastoid air  cells.      Impression: 1. No evidence of brain metastasis.  2. Nonspecific white matter changes most likely represent  small-vessel ischemic disease in a patient of this age.  3. Diffuse parenchymal volume loss.      I personally reviewed the images/study and I agree with Tamra Miller DO's (radiology resident) findings as stated.      MACRO:  None      Signed by: Natalia De Leon 8/28/2024 1:52 PM  Dictation workstation:   [id number]      Physical Exam  Constitutional:       Appearance: He is normal weight.   HENT:      Head: Normocephalic.      Right Ear: External ear normal.      Left Ear: External ear normal.      Nose: Nose normal.      Mouth/Throat:      Mouth: Mucous membranes are moist.   Eyes:      Extraocular Movements: Extraocular movements intact.       Pupils: Pupils are equal, round, and reactive to light.   Cardiovascular:      Rate and Rhythm: Normal rate and regular rhythm.   Pulmonary:      Breath sounds: No wheezing.      Comments: Diminished right sided breath sounds. Normal breath sounds on the left side. Respiratory effort is normal.   Abdominal:      General: Abdomen is flat.      Palpations: Abdomen is soft.   Musculoskeletal:         General: Normal range of motion.   Skin:     General: Skin is warm.      Capillary Refill: Capillary refill takes less than 2 seconds.   Neurological:      Mental Status: He is alert.      Comments: AxO x1 only oriented to self   Psychiatric:         Mood and Affect: Mood normal.         Behavior: Behavior normal.         Relevant Results      Results for orders placed or performed during the hospital encounter of 08/27/24 (from the past 24 hour(s))   CBC   Result Value Ref Range    WBC 15.3 (H) 4.4 - 11.3 x10*3/uL    nRBC 0.0 0.0 - 0.0 /100 WBCs    RBC 4.54 4.50 - 5.90 x10*6/uL    Hemoglobin 13.9 13.5 - 17.5 g/dL    Hematocrit 43.2 41.0 - 52.0 %    MCV 95 80 - 100 fL    MCH 30.6 26.0 - 34.0 pg    MCHC 32.2 32.0 - 36.0 g/dL    RDW 11.7 11.5 - 14.5 %    Platelets 308 150 - 450 x10*3/uL   Renal Function Panel   Result Value Ref Range    Glucose 109 (H) 74 - 99 mg/dL    Sodium 136 136 - 145 mmol/L    Potassium 3.6 3.5 - 5.3 mmol/L    Chloride 96 (L) 98 - 107 mmol/L    Bicarbonate 31 21 - 32 mmol/L    Anion Gap 13 10 - 20 mmol/L    Urea Nitrogen 18 6 - 23 mg/dL    Creatinine 0.73 0.50 - 1.30 mg/dL    eGFR >90 >60 mL/min/1.73m*2    Calcium 12.6 (H) 8.6 - 10.6 mg/dL    Phosphorus 2.8 2.5 - 4.9 mg/dL    Albumin 3.4 3.4 - 5.0 g/dL   Magnesium   Result Value Ref Range    Magnesium 1.93 1.60 - 2.40 mg/dL   Lipid panel   Result Value Ref Range    Cholesterol 152 0 - 199 mg/dL    HDL-Cholesterol 70.2 mg/dL    Cholesterol/HDL Ratio 2.2     LDL Calculated 63 <=99 mg/dL    VLDL 19 0 - 40 mg/dL    Triglycerides 93 0 - 149 mg/dL    Non HDL  "Cholesterol 82 0 - 149 mg/dL   POCT GLUCOSE   Result Value Ref Range    POCT Glucose 113 (H) 74 - 99 mg/dL   POCT GLUCOSE   Result Value Ref Range    POCT Glucose 111 (H) 74 - 99 mg/dL   POCT GLUCOSE   Result Value Ref Range    POCT Glucose 141 (H) 74 - 99 mg/dL   POCT GLUCOSE   Result Value Ref Range    POCT Glucose 125 (H) 74 - 99 mg/dL               Assessment/Plan      Assessment & Plan  COPD exacerbation (Multi)    Mr. Juan Carlos Cr is a 74 year old male with a past medical history of coronary artery disease s/p PCI 27 yrs ago, HTN, HLD, AUD, and history of tobacco use transferred to [facility] with acute hypoxic respiratory failure and lung mass diagnosed on Chest CT. Initially suspected to have a COPD exacerbation, but patient denies prior history of COPD. CT of chest in the emergency room revealed a large right sided soft tissue mass causing compression of surrounding vasculature. While his symptoms of SOB on exertion, cough, and fatigue could be due to an acute COPD exacerbation, other causes high on the differential are post obstructive atelectasis due to mass, or possible CAP.      The lung mass demonstrated on CT is concerning for cancer given the size, possible lymphangitic spread and significant smoking history. EBUS will be necessary to confirm diagnosis.      The patient additionally developed paroxysmal A fib with RVR in the emergency room. No noted prior history of A fib, although given his prior cardiac history could have had masked A fib for several years. EKG demonstrated left atrial enlargement, lending evidence to chronicity. Patient has since self converted to sinus rhythm. FLASH-VASC score is high, will consider starting on anticoagulation after patient undergoes procedures for lung mass.     On 8/28 there was concern for the patient stating odd things including \"people working on the roof\" as the reason he couldn't get his procedure. MRI of the brain conducted on 8/28 did not demonstrate any " evidence of brain mets, only demonstrated nonspecific white matter changes representative of ischemic disease along with diffuse parenchymal loss. Ruled out metastatic disease as cause for AMS. No current evidence of metabolic derangements. A possible differential includes Wernicke's encephalopathy given the prior history of AUD.      #Rt lung mass infiltrating the mediastinum  CT Chest from 8/26/24 demonstrates a 6.57 mm soft tissue mass encasing the right hilum, obstructing portions of the right main bronchus and segmental bronchi and the anterior right upper lobe pulmonary arteries. Suspected lymphangitic spread into the right apex  EBUS on 8/28 canceled, anticipated to go 8/29  MRI Brain (8/28): No evidence of brain metastases; nonspecific white matter changes present, along with diffuse parenchymal loss  Plan  Pulmonology consulted. NPO until EBUS today (8/29).  Plan for oncology consult once biopsy results are available   Plan for outpatient PET scan to complete staging     #Concern for AMS, possible Wernicke's Encephalopathy  Brain MRI 8/28 demonstrated no metastatic lesions, only nonspecific white matter changes and diffuse parenchymal loss  Initial hyponatremia of 128 on admission (8/26) resolved by 8/27  Isolated elevation in WBC on 8/29 of 15.3, downtrended to 10.6 on 8/29.   History of AUD, but repleted with thiamine and folate on admission  Plan  Patient placed on delirium precautions, should be provided with frequent reorientation.   Melatonin 3mg at bedtime to help with sleep      #Acute Hypoxic Respiratory Failure, Resolved  Duoneb x3 and Methylpred  given in ED   Currently on RA, O2 sats between 90-96. No longer has an oxygen requirement.   Differential could be acute COPD exacerbation, post obstructive atelectasis due to right lung mass, possible pneumonia   Suspected emphysema based on CT, as the patient has a significant smoking history. Additionally could represent COPD, but no spirometry  to confirm.  Procalcitonin on 8/27 0.04   Updates 8/27: likely resolved due to lack of oxygen requirement.   Respiratory Viral Panel: Negative  Plan  Brio Ellipta and Spiriva Respimat  Duonebs PRN  Stop steriods given low suspicion for acute exacerbation  Empiric treatment for CAP: ceftriaxone 5 days, azithrymycin 500mg for 3 days              - Most recent QTC (Bazzet) was 455 ms  Pneumonia Work-Up (Procalcitonin, Respiratory Culture/Smear, MRSA Surveillance, Legionella Antigen, Streptococcus pneumoniae)    #Paroxsymal Afib with RVR   #Coronary Artery Disease  #Hypertension  #Hyperlipidemia  EKG in emergency room demonstrated atrial fibrillation with RVR. Also demonstrated numerous PACs and PVCs. Repeat EKG has demonstrated conversion back to sinus rhythm with occasional PVCs.   Had PCI at 47 y.o   Home metoprolol 50mg BID for hx of CAD  Home ASA  Home simvastatin 20 mg  FLASH-VASC Score: 3  Lipid Panel: WNL  Plan  Continue home metoprolol 50 mg BID and simvastatin 20 mg  Hold ASA until biopsy is completed  Start patient on Eliquis 5mg BID following bronchoscopy per cardiology  Optimize electrolytes. K > 4, Mg > 2  Continue to keep patient on telemonitor  Cardiology has been consulted, appreciate their recs on potential cardioversion and anticoagulation.     #Newly Diagnosed Diabetes  HbA1c: 7.6, Glucose from 8/27: 205  UA from 8/27: 1+ protein  Albumin-Creatinine Ratio from 8/27: 140.2  Started on mild SSI  Plan  Glucose check q6h  Mild sliding scale insulin      #Hyponatremia, resolved  Level prior to admission: 128. Repeat level on admission: 135  Possibly of paraneoplastic origin, but can also be due to chronic alcohol intake. Other differential is cerebral salt wasting secondary to potential brain metastasis.   Plan:  Urinalysis, urine sodium, serum osmolality, urine osmolality were ordered prior to repeat level. Monitor results.      #Mild Hypercalcemia  Level prior to admission: 11.2  Ionized calcium: 1.52  --> mild hypercalcemia  Possibly paraneoplastic in origin, asymptomatic   Plan:  Continue to monitor. If increasing hypercalcemic can pursue hypercalcemia      #Metabolic Alkalosis  Possibly poster hypercapnic state, will continue to monitor. No GI losses.      #Nephrolithiasis  Noted on CT, asymptomatic     #Smoking History  #AUD  Previously smoking 2 PPD, has now cut down to 7 cigarettes.   Drinks > 12 day  Plan  CIWA protocol with diazepam  Thiamine, folate, vitamin replacement  Nicotine Taper protocol: Nicotine patch     Pt's wife Cindy Cr, cell: [phone]     F: PRN  E: Monitor and replace PRN  N: Regular Diet  GI: None indicated  DVT prophylaxis: SubQ lovenox.   Code status: Full (confirmed on admission)     Monica Blair, MS4

## 2024-08-29 NOTE — ANESTHESIA POSTPROCEDURE EVALUATION
Patient: Juan Carlos Cr    Procedure Summary       Date: 08/29/24 Room / Location: [facility]    Anesthesia Start: 1224 Anesthesia Stop: 1417    Procedure: BRONCHOSCOPY Diagnosis: Hilar mass    Scheduled Providers: Rojelio Khan MD; Leslie Trujillo MD Responsible Provider: Leslie Trujillo MD    Anesthesia Type: general ASA Status: 3            Anesthesia Type: general    Vitals Value Taken Time   /76 08/29/24 1442   Temp 36.1 °C (96.9 °F) 08/29/24 1412   Pulse 56 08/29/24 1442   Resp 22 08/29/24 1442   SpO2 95 % 08/29/24 1442       Anesthesia Post Evaluation    Patient location during evaluation: PACU  Patient participation: complete - patient participated  Level of consciousness: awake  Pain management: adequate  Airway patency: patent  Cardiovascular status: acceptable  Respiratory status: acceptable  Hydration status: acceptable  Postoperative Nausea and Vomiting: none        There were no known notable events for this encounter.

## 2024-08-29 NOTE — CARE PLAN
Problem: Skin  Goal: Participates in plan/prevention/treatment measures  8/29/2024 0727 by Telma Bolton RN  Outcome: Progressing  Flowsheets (Taken 8/29/2024 0727)  Participates in plan/prevention/treatment measures:   Elevate heels   Increase activity/out of bed for meals  8/29/2024 0727 by Telma Bolton RN  Outcome: Progressing  Flowsheets (Taken 8/29/2024 0727)  Participates in plan/prevention/treatment measures:   Elevate heels   Increase activity/out of bed for meals     Problem: Fall/Injury  Goal: Not fall by end of shift  8/29/2024 0727 by Telma Bolton RN  Outcome: Progressing  8/29/2024 0727 by Telma Bolton RN  Outcome: Progressing     Problem: Fall/Injury  Goal: Be free from injury by end of the shift  8/29/2024 0727 by Telma Bolton RN  Outcome: Progressing  8/29/2024 0727 by Telma Bolton RN  Outcome: Progressing     Problem: Diabetes  Goal: Achieve decreasing blood glucose levels by end of shift  8/29/2024 0727 by Telma Bolton RN  Outcome: Progressing  8/29/2024 0727 by Telma Bolton RN  Outcome: Progressing     Problem: Discharge Planning  Goal: Discharge to home or other facility with appropriate resources  8/29/2024 0727 by Telma Bolton RN  Outcome: Progressing  8/29/2024 0727 by Telma Bolton RN  Outcome: Progressing     Problem: Chronic Conditions and Co-morbidities  Goal: Patient's chronic conditions and co-morbidity symptoms are monitored and maintained or improved  8/29/2024 0727 by Telma Bolton RN  Outcome: Progressing  8/29/2024 0727 by Telma Bolton RN  Outcome: Progressing   The patient's goals for the shift include      The clinical goals for the shift include Patient will get biopsy done by the end of this shift

## 2024-08-29 NOTE — PROGRESS NOTES
Bronchoscopy Scheduling Request    Pre-bronchoscopy visit: New patient visit with Bronchoscopy group provider  Please schedule procedure: In 3 month(s)    Cytology on-site:  No  Location:  [facility]  Performing physician:  Interventional bronchoscopist  Referring physician:  Tommy Khan MD, Luna Dsouza MD  Indication:  ARSLAN to BI stent placed 8/29 - stent eval  Sedation / Anesthesia:  GA  Procedure:  Therapeutic - Stent eval  Time:  Tier 1  Fluorscopy:   Yes  Imaging needed:  None  Labs:  CBC, BMP  Meds:   Patient planned to start AC for new afib prior to discharge  Special Considerations:  None  Reviewed by:  Rojelio Khan MD

## 2024-08-29 NOTE — PROGRESS NOTES
Transitional Care Coordination Progress Note:  Patient discussed during interdisciplinary rounds.  Team members present: MD, ENDER  Plan per Medical/Surgical team: Pt transferred from OSH with acute hypoxic RT failure and lung mass found on imaging. Per medical team plan for bronchoscopy and lung biopsy today, MRI of brain ordered for AMS which was negative.  Payer: [employer]  Status: Inpatient  Discharge disposition: Home  Potential Barriers: none  ADOD: 9/1  Attempted to meet with pt this afternoon but he was off the unit for bronchoscopy procedure. Plan to attempt to meet with pt another time. Care coordinator will continue to follow for discharge planning needs.     Natali Randall RN  Transitional Care Coordinator (TCC)  [phone] or [phone]

## 2024-08-29 NOTE — CONSULTS
COPD Follow-up Education    Review:  Medication with spacer use: Yes  Triggers and ways to avoid triggers: Yes    Recognize having trouble and need to call the doctor: Yes    View the KATELYN's and book: Encourage use           It is recommended that the patient follow up with a pulmonary team within two weeks after discharge. The patient may benefit from a pulmonary rehabilitation program as well as a smoking cessation program. Additionally, it is recommended that the patient undergo Pulmonary Function Tests (PFTs) since there are currently no records of any such tests in their chart.

## 2024-08-30 LAB
ALBUMIN SERPL BCP-MCNC: 3.3 G/DL (ref 3.4–5)
AMMONIA PLAS-SCNC: 30 UMOL/L (ref 16–53)
ANION GAP SERPL CALC-SCNC: 10 MMOL/L (ref 10–20)
BUN SERPL-MCNC: 24 MG/DL (ref 6–23)
CALCIUM SERPL-MCNC: 12.5 MG/DL (ref 8.6–10.6)
CHLORIDE SERPL-SCNC: 98 MMOL/L (ref 98–107)
CO2 SERPL-SCNC: 34 MMOL/L (ref 21–32)
CREAT SERPL-MCNC: 0.84 MG/DL (ref 0.5–1.3)
EGFRCR SERPLBLD CKD-EPI 2021: >90 ML/MIN/1.73M*2
ERYTHROCYTE [DISTWIDTH] IN BLOOD BY AUTOMATED COUNT: 11.7 % (ref 11.5–14.5)
FOLATE SERPL-MCNC: 16.2 NG/ML
GLUCOSE BLD MANUAL STRIP-MCNC: 120 MG/DL (ref 74–99)
GLUCOSE BLD MANUAL STRIP-MCNC: 128 MG/DL (ref 74–99)
GLUCOSE BLD MANUAL STRIP-MCNC: 130 MG/DL (ref 74–99)
GLUCOSE BLD MANUAL STRIP-MCNC: 133 MG/DL (ref 74–99)
GLUCOSE SERPL-MCNC: 100 MG/DL (ref 74–99)
HCT VFR BLD AUTO: 42.3 % (ref 41–52)
HGB BLD-MCNC: 13.7 G/DL (ref 13.5–17.5)
MAGNESIUM SERPL-MCNC: 1.87 MG/DL (ref 1.6–2.4)
MCH RBC QN AUTO: 30.6 PG (ref 26–34)
MCHC RBC AUTO-ENTMCNC: 32.4 G/DL (ref 32–36)
MCV RBC AUTO: 95 FL (ref 80–100)
NRBC BLD-RTO: 0 /100 WBCS (ref 0–0)
PHOSPHATE SERPL-MCNC: 3 MG/DL (ref 2.5–4.9)
PLATELET # BLD AUTO: 246 X10*3/UL (ref 150–450)
POTASSIUM SERPL-SCNC: 3.5 MMOL/L (ref 3.5–5.3)
RBC # BLD AUTO: 4.47 X10*6/UL (ref 4.5–5.9)
SODIUM SERPL-SCNC: 138 MMOL/L (ref 136–145)
VIT B12 SERPL-MCNC: 483 PG/ML (ref 211–911)
WBC # BLD AUTO: 11.4 X10*3/UL (ref 4.4–11.3)

## 2024-08-30 PROCEDURE — 2500000001 HC RX 250 WO HCPCS SELF ADMINISTERED DRUGS (ALT 637 FOR MEDICARE OP)

## 2024-08-30 PROCEDURE — 82947 ASSAY GLUCOSE BLOOD QUANT: CPT

## 2024-08-30 PROCEDURE — 2500000002 HC RX 250 W HCPCS SELF ADMINISTERED DRUGS (ALT 637 FOR MEDICARE OP, ALT 636 FOR OP/ED)

## 2024-08-30 PROCEDURE — 2500000005 HC RX 250 GENERAL PHARMACY W/O HCPCS: Performed by: STUDENT IN AN ORGANIZED HEALTH CARE EDUCATION/TRAINING PROGRAM

## 2024-08-30 PROCEDURE — 2500000004 HC RX 250 GENERAL PHARMACY W/ HCPCS (ALT 636 FOR OP/ED)

## 2024-08-30 PROCEDURE — 94640 AIRWAY INHALATION TREATMENT: CPT

## 2024-08-30 PROCEDURE — 36415 COLL VENOUS BLD VENIPUNCTURE: CPT

## 2024-08-30 PROCEDURE — 85027 COMPLETE CBC AUTOMATED: CPT

## 2024-08-30 PROCEDURE — 99233 SBSQ HOSP IP/OBS HIGH 50: CPT | Performed by: INTERNAL MEDICINE

## 2024-08-30 PROCEDURE — S4991 NICOTINE PATCH NONLEGEND: HCPCS

## 2024-08-30 PROCEDURE — 84100 ASSAY OF PHOSPHORUS: CPT

## 2024-08-30 PROCEDURE — 1200000002 HC GENERAL ROOM WITH TELEMETRY DAILY

## 2024-08-30 PROCEDURE — 83735 ASSAY OF MAGNESIUM: CPT

## 2024-08-30 PROCEDURE — 82140 ASSAY OF AMMONIA: CPT

## 2024-08-30 RX ORDER — SODIUM CHLORIDE FOR INHALATION 3 %
3 VIAL, NEBULIZER (ML) INHALATION EVERY 6 HOURS PRN
Status: DISCONTINUED | OUTPATIENT
Start: 2024-08-30 | End: 2024-09-01

## 2024-08-30 RX ORDER — LANOLIN ALCOHOL/MO/W.PET/CERES
100 CREAM (GRAM) TOPICAL DAILY
Status: DISCONTINUED | OUTPATIENT
Start: 2024-09-02 | End: 2024-09-01 | Stop reason: HOSPADM

## 2024-08-30 RX ORDER — SENNOSIDES 8.6 MG/1
1 TABLET ORAL NIGHTLY
Status: DISCONTINUED | OUTPATIENT
Start: 2024-08-30 | End: 2024-09-01 | Stop reason: HOSPADM

## 2024-08-30 RX ORDER — SODIUM CHLORIDE FOR INHALATION 3 %
3 VIAL, NEBULIZER (ML) INHALATION
Status: DISCONTINUED | OUTPATIENT
Start: 2024-08-30 | End: 2024-08-30

## 2024-08-30 RX ADMIN — AMLODIPINE BESYLATE 5 MG: 5 TABLET ORAL at 09:09

## 2024-08-30 RX ADMIN — FOLIC ACID 1 MG: 1 TABLET ORAL at 09:09

## 2024-08-30 RX ADMIN — SENNOSIDES 8.6 MG: 8.6 TABLET, FILM COATED ORAL at 20:23

## 2024-08-30 RX ADMIN — APIXABAN 5 MG: 5 TABLET, FILM COATED ORAL at 15:51

## 2024-08-30 RX ADMIN — THIAMINE HCL TAB 100 MG 100 MG: 100 TAB at 09:09

## 2024-08-30 RX ADMIN — THIAMINE HYDROCHLORIDE 500 MG: 100 INJECTION, SOLUTION INTRAMUSCULAR; INTRAVENOUS at 15:50

## 2024-08-30 RX ADMIN — SODIUM CHLORIDE SOLN NEBU 3% 3 ML: 3 NEBU SOLN at 09:16

## 2024-08-30 RX ADMIN — TIOTROPIUM BROMIDE INHALATION SPRAY 2 PUFF: 3.12 SPRAY, METERED RESPIRATORY (INHALATION) at 09:16

## 2024-08-30 RX ADMIN — SIMVASTATIN 20 MG: 20 TABLET, FILM COATED ORAL at 20:24

## 2024-08-30 RX ADMIN — THIAMINE HYDROCHLORIDE 500 MG: 100 INJECTION, SOLUTION INTRAMUSCULAR; INTRAVENOUS at 21:32

## 2024-08-30 RX ADMIN — Medication 1 TABLET: at 09:09

## 2024-08-30 RX ADMIN — NICOTINE 1 PATCH: 21 PATCH, EXTENDED RELEASE TRANSDERMAL at 09:09

## 2024-08-30 RX ADMIN — CEFTRIAXONE SODIUM 1 G: 1 INJECTION, SOLUTION INTRAVENOUS at 14:42

## 2024-08-30 RX ADMIN — POLYETHYLENE GLYCOL 3350 17 G: 17 POWDER, FOR SOLUTION ORAL at 09:09

## 2024-08-30 RX ADMIN — METOPROLOL TARTRATE 50 MG: 50 TABLET, FILM COATED ORAL at 20:23

## 2024-08-30 RX ADMIN — Medication 3 MG: at 20:40

## 2024-08-30 RX ADMIN — FLUTICASONE FUROATE AND VILANTEROL TRIFENATATE 1 PUFF: 200; 25 POWDER RESPIRATORY (INHALATION) at 09:16

## 2024-08-30 RX ADMIN — METOPROLOL TARTRATE 50 MG: 50 TABLET, FILM COATED ORAL at 09:09

## 2024-08-30 ASSESSMENT — COGNITIVE AND FUNCTIONAL STATUS - GENERAL
DAILY ACTIVITIY SCORE: 24
CLIMB 3 TO 5 STEPS WITH RAILING: A LITTLE
MOBILITY SCORE: 23

## 2024-08-30 ASSESSMENT — LIFESTYLE VARIABLES
VISUAL DISTURBANCES: NOT PRESENT
AGITATION: NORMAL ACTIVITY
HEADACHE, FULLNESS IN HEAD: NOT PRESENT
NAUSEA AND VOMITING: NO NAUSEA AND NO VOMITING
ORIENTATION AND CLOUDING OF SENSORIUM: ORIENTED AND CAN DO SERIAL ADDITIONS
HEADACHE, FULLNESS IN HEAD: NOT PRESENT
AGITATION: NORMAL ACTIVITY
VISUAL DISTURBANCES: NOT PRESENT
ORIENTATION AND CLOUDING OF SENSORIUM: ORIENTED AND CAN DO SERIAL ADDITIONS
NAUSEA AND VOMITING: NO NAUSEA AND NO VOMITING
PAROXYSMAL SWEATS: NO SWEAT VISIBLE
AUDITORY DISTURBANCES: NOT PRESENT
AUDITORY DISTURBANCES: NOT PRESENT
ANXIETY: NO ANXIETY, AT EASE
TREMOR: NO TREMOR
ANXIETY: NO ANXIETY, AT EASE
TOTAL SCORE: 0
TOTAL SCORE: 0
TREMOR: NO TREMOR
PAROXYSMAL SWEATS: NO SWEAT VISIBLE

## 2024-08-30 ASSESSMENT — PAIN SCALES - GENERAL: PAINLEVEL_OUTOF10: 0 - NO PAIN

## 2024-08-30 ASSESSMENT — PAIN - FUNCTIONAL ASSESSMENT: PAIN_FUNCTIONAL_ASSESSMENT: 0-10

## 2024-08-30 NOTE — PROGRESS NOTES
Juan Carlos Cr is a 74 y.o. male on day 3 of admission presenting with COPD exacerbation (Multi).      Subjective   Patient is alert and oriented x2 (to self and location). Unable to name the correct year (states that its '78). Able to pick Alvin Padilla out of 3 presidents. Endorses occasional nonproductive cough. States that he had some soreness in his throat after EBUS procedure yesterday.     Spoke with the patient's wife yesterday who mentioned that he had an episode of being altered on Saturday night at around 9pm where he woke up thinking it was day time along and got ready for the day.     Last BM was raound 2 days ago.       Objective     Last Recorded Vitals  /81   Pulse 80   Temp 36.8 °C (98.2 °F)   Resp 16   SpO2 92%   Intake/Output last 3 Shifts:    Intake/Output Summary (Last 24 hours) at 8/30/2024 0655  Last data filed at 8/29/2024 1417  Gross per 24 hour   Intake 200 ml   Output --   Net 200 ml       Admission Weight       Daily Weight  08/26/24 : 74.7 kg (164 lb 10.9 oz)    Image Results  XR chest 1 view  Narrative: Interpreted By:  Tesha Pena,   STUDY:  XR CHEST 1 VIEW;  8/29/2024 3:03 pm      INDICATION:  Signs/Symptoms:Post bronchoscopy with intervention and biopsy on the  right.          COMPARISON:  08/26/2024      ACCESSION NUMBER(S):  [id number]      ORDERING CLINICIAN:  BRITTNY PULLIAM      FINDINGS:                  CARDIOMEDIASTINAL SILHOUETTE:  Cardiomediastinal silhouette is stable in size and configuration.      LUNGS:  Large right upper lobe mass again identified. There is elevation of  the right hemidiaphragm. Diffuse interstitial markings are present  bilaterally. Costophrenic angles are clear. No evidence of a  pneumothorax.      ABDOMEN:  No remarkable upper abdominal findings.      BONES:  No acute osseous changes.      Impression: 1.  Mass again noted occupying the right upper lobe. No evidence of a  pneumothorax.  2. Interstitial markings are prominent, possibly  chronic in nature.              MACRO:  None      Signed by: Tesha Pena 8/29/2024 3:15 PM  Dictation workstation:   [id number]  Bronchoscopy Diagnostic, w EBUS  Narrative: Bronchoscopy Report  [facility]  Location: [facility] procedure room 8    INDICATION:   Lung mass - RUL    BRONCHOSCOPIST:   Rojelio Khan MD  First Assistant: Clementine Ochoa MD  Second Assistant:     REFERRING:    Luna Dsouza MD    FINDINGS:  After obtaining informed consent and performing a time out, the patient   was anesthetized by anestesia. The patient was then positioned with 1 head   support towel. A wet gauze was placed to protect the lower gumline. The   patient was then intubated with the black 10mm rigid bronchoscope in the   usual atraumatic fashion. Following this the larynx was packed with wet   gauze, the bronchoscope connected to the ventilator, good chest rise and   adequate return tidal volumes were obtained.    The therapeutic flexable bronchoscope was then advanced through the rigid   bronchoscope. The larynx and vocal cords were normal in appearance There   is endobronchial exophytic lesion with >99% obstruction of the right   mainstem bronchus. The bronchoscope was able to pass medially around the   lesion. There is complete obstruction of the right upper lobe bronchus due   to mass. This extends from the right upper and involves the anterior and   lateral wall of the right mainstem and the bronchus intermedius. The rigid   bronchoscope was then used to debulk lesion from the right mainstem. All   samples sent for surgical pathology. Contact cryoprobe was used to debulk   and ablate further endobronchial lesion from the bronchus intermedius.   Following debulking there remained moderate to severe stenosis of the   right mainstem bronchus and bronchus intermedius due primarily to   extrinsic compression. Due this and already complete occlusion of the   right upper lobe the  decision was made to place a stent from the proximal   ARSLAN to the distal BI. A 10x40 bonastent was then palced under direct   visualization through the rigid bronchoscope. A 3cm 8,9,10 CRE balloon was   then used to fully deploy the stent which did not fully self expand.   Following this the stent was found to be in good position with complete   patency of the RML/RLL/and RLL superior segments distally. There was   complete resolution in stenosis following this.     Limited staging was completed of 4L and 7. There is confluent involvement   of station 4R and the endobronchial lesion.    After the airway examination was completed, the scope was then replaced by   EBUS scope. Lymph node sizing was performed via endobronchial ultrasound.   Sampling by transbronchial needle   aspiration was also performed using an Olympus Fair valueiShot 22 gauge needle   and sent for routine cytology.     Rapid On-Site Evaluation (AZIZA) was available   - The 4L (lower paratracheal) node was 6.7 mm in size. Sampling was done,   2 samples with the needle were obtained.   -  The 7 (subcarinal) node was  19.4 mm in size. Sampling was done, 4   samples with the needle were obtained.        COMPLICATIONS:  None    ESTIMATED BLOOD LOSS:    Minimal, < 5 mL    The physical status of the patient was re-assessed after the procedure.    The patient was transported to the recovery area / PACU in stable   condition.  Impression:    Rigid bronchoscopy 10mm black bronchoscope  Tumor debulking using rigid bronchoscope and contact cryoprobe  Endobronchial biopsy of right mainstem tumor  Severe stenosis of right mainstem bronchus to the distal bronchus   intermedius  Complete stenosis/obstruction of the right upper lobe bronchus due to   lesion  Stent placement [id number] Bonastent ARSLAN to BI    RECOMMENDATION:   Follow-up with referring physician.  Start 3% sodium chloride nebulizer three times daily  Please ensure patient is discharged with a nebulizer and 3%  sodium   chloride nebulizer solution to use 3 times a day  Plan for follow up bronchoscopy in 2-3 months following initiation of   systemic therapy and/or radiation. (Request placed)  Transthoracic Echo (TTE) Complete     [facility], [address] [place] [address]                 Tel [phone] and Fax [phone]    TRANSTHORACIC ECHOCARDIOGRAM REPORT       Patient Name:      RONALD DAVILA     Reading Physician:    [id number] Ishaan Cristina MD  Study Date:        8/29/2024            Ordering Provider:    [id number] BRITTNY PULLIAM  MRN/PID:           [id number]             Fellow:  Accession#:        [id number]         Nurse:                Nixon Leigh RN  Date of Birth/Age: 1950 / 74 years Sonographer:          SAMRA Campuzano RDCS  Gender:            M                    Additional Staff:  Height:            175.26 cm            Admit Date:           8/27/2024  Weight:            74.39 kg             Admission Status:     Inpatient -                                                                Routine  BSA / BMI:         1.90 m2 / 24.22      Encounter#:           [id number]                     kg/m2  Blood Pressure:    130/77 mmHg          Department Location:  [facility] Non                                                                Invasive    Study Type:    TRANSTHORACIC ECHO (TTE) COMPLETE  Diagnosis/ICD: Unspecified atrial fibrillation-I48.91  Indication:    A-fib  CPT Code:      Echo Complete w Full Doppler-[id number]    Patient History:  Diabetes:          Yes  Pertinent History: COPD, CAD and HTN. abnormal EKG.    Study Detail: The following Echo studies were performed: 2D, M-Mode, Doppler and                color flow. Image quality for this study is poor. Definity used as                 a contrast agent for endocardial border definition. Total contrast                used for this procedure was 2 mL via IV push.       PHYSICIAN INTERPRETATION:  Left Ventricle: Left ventricular ejection fraction is moderately decreased, by visual estimate at 35-40%. There is global hypokinesis of the left ventricle with minor regional variations. The left ventricular cavity size is normal. The left ventricular septal wall thickness is normal. There is normal left ventricular posterior wall thickness. Left ventricular diastolic filling was indeterminate.  Left Atrium: The left atrium is mildly dilated.  Right Ventricle: The right ventricle is normal in size. There is normal right ventricular global systolic function.  Right Atrium: The right atrium was not assessed.  Aortic Valve: The aortic valve is probably trileaflet. The aortic valve appears degenerative. There is mild aortic valve cusp calcification. The aortic valve dimensionless index is 0.79. There is mild aortic valve regurgitation. The peak instantaneous gradient of the aortic valve is 6.7 mmHg. The mean gradient of the aortic valve is 2.9 mmHg.  Mitral Valve: The mitral valve is mildly thickened. There is mild mitral valve regurgitation.  Tricuspid Valve: The tricuspid valve was not well visualized. There is trace tricuspid regurgitation. The right ventricular systolic pressure is unable to be estimated.  Pulmonic Valve: The pulmonic valve is structurally normal. There is trace pulmonic valve regurgitation.  Pericardium: There is no pericardial effusion noted.  Aorta: The aortic root is normal. The ascending aorta was not well visualized.  In comparison to the previous echocardiogram(s): There are no prior studies on this patient for comparison purposes.       CONCLUSIONS:   1. Poorly visualized anatomical structures due to suboptimal image quality.   2. Left ventricular ejection fraction is moderately decreased, by visual estimate at 35-40%.    3. There is global hypokinesis of the left ventricle with minor regional variations.   4. Left ventricular diastolic filling was indeterminate.   5. There is normal right ventricular global systolic function.   6. The left atrium is mildly dilated.   7. Mild aortic valve regurgitation.    QUANTITATIVE DATA SUMMARY:  2D MEASUREMENTS:                          Normal Ranges:  Ao Root d:     3.50 cm  (2.0-3.7cm)  LAs:           3.80 cm  (2.7-4.0cm)  RVIDd:         3.35 cm  (0.9-3.6cm)  IVSd:          1.01 cm  (0.6-1.1cm)  LVPWd:         0.90 cm  (0.6-1.1cm)  LVIDd:         5.17 cm  (3.9-5.9cm)  LVIDs:         3.35 cm  LV Mass Index: 95 g/m2  LVEDV Index:   73 ml/m2  LV % FS        35.3 %    LA VOLUME:                                Normal Ranges:  LA Vol A4C:        80.5 ml    (22+/-6mL/m2)  LA Vol A2C:        55.5 ml  LA Vol BP:         68.5 ml  LA Vol Index A4C:  42.4ml/m2  LA Vol Index A2C:  29.3 ml/m2  LA Vol Index BP:   36.1 ml/m2  LA Area A4C:       24.4 cm2  LA Area A2C:       19.8 cm2  LA Major Axis A4C: 6.3 cm  LA Major Axis A2C: 6.0 cm  LA Volume Index:   36.1 ml/m2    LV SYSTOLIC FUNCTION BY 2D PLANIMETRY (MOD):                       Normal Ranges:  EF-A4C View:    36 % (>=55%)  EF-A2C View:    38 %  EF-Biplane:     41 %  EF-Visual:      38 %  LV EF Reported: 38 %    LV DIASTOLIC FUNCTION:                                Normal Ranges:  MV Peak E:        0.43 m/s    (0.7-1.2 m/s)  MV Peak A:        0.76 m/s    (0.42-0.7 m/s)  E/A Ratio:        0.56        (1.0-2.2)  MV e'             0.050 m/s   (>8.0)  MV lateral e'     0.06 m/s  MV medial e'      0.04 m/s  MV A Dur:         124.57 msec  E/e' Ratio:       8.59        (<8.0)  MV DT:            198 msec    (150-240 msec)  PulmV Sys Jean Marie:    29.69 cm/s  PulmV Simons Jean Marie:   19.38 cm/s  PulmV S/D Jean Marie:    1.53  PulmV A Revs Jean Marie: 29.56 cm/s  PulmV A Revs Dur: 124.57 msec    MITRAL VALVE:                  Normal Ranges:  MV DT: 198 msec (150-240msec)    AORTIC VALVE:                                     Normal Ranges:  AoV Vmax:                1.29 m/s (<=1.7m/s)  AoV Peak P.7 mmHg (<20mmHg)  AoV Mean P.9 mmHg (1.7-11.5mmHg)  LVOT Max Jean Marie:            0.83 m/s (<=1.1m/s)  AoV VTI:                 24.84 cm (18-25cm)  LVOT VTI:                19.68 cm  LVOT Diameter:           1.98 cm  (1.8-2.4cm)  AoV Area, VTI:           2.44 cm2 (2.5-5.5cm2)  AoV Area,Vmax:           1.97 cm2 (2.5-4.5cm2)  AoV Dimensionless Index: 0.79       RIGHT VENTRICLE:  RV Basal 3.30 cm  RV Mid   3.40 cm  RV Major 7.2 cm  TAPSE:   17.0 mm  RV s'    0.13 m/s    TRICUSPID VALVE/RVSP:                             Normal Ranges:  Peak TR Velocity: 0.83 m/s  RV Syst Pressure: 5.7 mmHg (< 30mmHg)  IVC Diam:         1.60 cm    PULMONIC VALVE:                          Normal Ranges:  PV Accel Time: 76 msec  (>120ms)  PV Max Jean Marie:    0.9 m/s  (0.6-0.9m/s)  PV Max P.9 mmHg    Pulmonary Veins:  PulmV A Revs Dur: 124.57 msec  PulmV A Revs Jean Marie: 29.56 cm/s  PulmV Simons Jean Marie:   19.38 cm/s  PulmV S/D Jean Marie:    1.53  PulmV Sys Jean Marie:    29.69 cm/s       [id number] Ishaan Cristina MD  Electronically signed on 2024 at 11:55:30 AM       ** Final **      Physical Exam  Constitutional:       Appearance: He is normal weight.   HENT:      Head: Normocephalic.      Right Ear: External ear normal.      Left Ear: External ear normal.      Nose: Nose normal.      Mouth/Throat:      Mouth: Mucous membranes are moist.   Eyes:      Extraocular Movements: Extraocular movements intact.      Pupils: Pupils are equal, round, and reactive to light.   Cardiovascular:      Rate and Rhythm: Normal rate and regular rhythm.   Pulmonary:      Breath sounds: No wheezing.      Comments: Breath sounds audible in all lung fields. Respiratory effort is normal. No rhonchi or rales.  Abdominal:      General: Abdomen is flat.      Palpations: Abdomen is soft.   Musculoskeletal:         General: Normal range of motion.    Skin:     General: Skin is warm.      Capillary Refill: Capillary refill takes less than 2 seconds.   Neurological:      Mental Status: He is alert.      Comments: AxO x2 oriented to self and location   Psychiatric:         Mood and Affect: Mood normal.         Behavior: Behavior normal.         Relevant Results      Results for orders placed or performed during the hospital encounter of 08/27/24 (from the past 24 hour(s))   CBC   Result Value Ref Range    WBC 10.6 4.4 - 11.3 x10*3/uL    nRBC 0.0 0.0 - 0.0 /100 WBCs    RBC 4.25 (L) 4.50 - 5.90 x10*6/uL    Hemoglobin 13.0 (L) 13.5 - 17.5 g/dL    Hematocrit 40.7 (L) 41.0 - 52.0 %    MCV 96 80 - 100 fL    MCH 30.6 26.0 - 34.0 pg    MCHC 31.9 (L) 32.0 - 36.0 g/dL    RDW 11.8 11.5 - 14.5 %    Platelets 225 150 - 450 x10*3/uL   Renal Function Panel   Result Value Ref Range    Glucose 106 (H) 74 - 99 mg/dL    Sodium 135 (L) 136 - 145 mmol/L    Potassium 3.7 3.5 - 5.3 mmol/L    Chloride 97 (L) 98 - 107 mmol/L    Bicarbonate 31 21 - 32 mmol/L    Anion Gap 11 10 - 20 mmol/L    Urea Nitrogen 18 6 - 23 mg/dL    Creatinine 0.70 0.50 - 1.30 mg/dL    eGFR >90 >60 mL/min/1.73m*2    Calcium 11.9 (H) 8.6 - 10.6 mg/dL    Phosphorus 2.9 2.5 - 4.9 mg/dL    Albumin 3.2 (L) 3.4 - 5.0 g/dL   Magnesium   Result Value Ref Range    Magnesium 1.89 1.60 - 2.40 mg/dL   POCT GLUCOSE   Result Value Ref Range    POCT Glucose 123 (H) 74 - 99 mg/dL   Transthoracic Echo (TTE) Complete   Result Value Ref Range    AV pk cj 1.29 m/s    AV mn grad 2.9 mmHg    LVOT diam 1.98 cm    MV E/A ratio 0.56     LA vol index A/L 36.1 ml/m2    Tricuspid annular plane systolic excursion 1.7 cm    LV EF 38 %    RV free wall pk S' 13.00 cm/s    LVIDd 5.17 cm    RVSP 5.7 mmHg    Aortic Valve Area by Continuity of VTI 2.44 cm2    Aortic Valve Area by Continuity of Peak Velocity 1.97 cm2    AV pk grad 6.7 mmHg    LV A4C EF 36.4    POCT GLUCOSE   Result Value Ref Range    POCT Glucose 120 (H) 74 - 99 mg/dL   Blood Gas  Venous Full Panel   Result Value Ref Range    POCT pH, Venous 7.46 (H) 7.33 - 7.43 pH    POCT pCO2, Venous 49 41 - 51 mm Hg    POCT pO2, Venous 35 35 - 45 mm Hg    POCT SO2, Venous 52 45 - 75 %    POCT Oxy Hemoglobin, Venous 51.2 45.0 - 75.0 %    POCT Hematocrit Calculated, Venous 46.0 41.0 - 52.0 %    POCT Sodium, Venous 133 (L) 136 - 145 mmol/L    POCT Potassium, Venous 3.6 3.5 - 5.3 mmol/L    POCT Chloride, Venous 98 98 - 107 mmol/L    POCT Ionized Calicum, Venous 1.47 (H) 1.10 - 1.33 mmol/L    POCT Glucose, Venous 163 (H) 74 - 99 mg/dL    POCT Lactate, Venous 1.7 0.4 - 2.0 mmol/L    POCT Base Excess, Venous 9.2 (H) -2.0 - 3.0 mmol/L    POCT HCO3 Calculated, Venous 34.8 (H) 22.0 - 26.0 mmol/L    POCT Hemoglobin, Venous 15.4 13.5 - 17.5 g/dL    POCT Anion Gap, Venous 4.0 (L) 10.0 - 25.0 mmol/L    Patient Temperature 37.0 degrees Celsius    FiO2 21 %   POCT GLUCOSE   Result Value Ref Range    POCT Glucose 141 (H) 74 - 99 mg/dL               Assessment/Plan      Assessment & Plan  COPD exacerbation (Multi)    Mr. Juan Carlos Cr is a 74 year old male with a past medical history of coronary artery disease s/p PCI 27 yrs ago, HTN, HLD, AUD, and history of tobacco use transferred to [facility] with acute hypoxic respiratory failure and lung mass diagnosed on Chest CT. Initially suspected to have a COPD exacerbation, but patient denies prior history of COPD. CT of chest in the emergency room revealed a large right sided soft tissue mass causing compression of surrounding vasculature. While his symptoms of SOB on exertion, cough, and fatigue could be due to an acute COPD exacerbation, other causes high on the differential are post obstructive atelectasis due to mass, or possible CAP.      The lung mass demonstrated on CT is concerning for cancer given the size, possible lymphangitic spread and significant smoking history. EBUS will be necessary to confirm diagnosis.      The patient additionally developed paroxysmal A fib  "with RVR in the emergency room. No noted prior history of A fib, although given his prior cardiac history could have had masked A fib for several years. EKG demonstrated left atrial enlargement, lending evidence to chronicity. Patient has since self converted to sinus rhythm. FLASH-VASC score is high, will consider starting on anticoagulation after patient undergoes procedures for lung mass.     On 8/28 there was concern for the patient stating odd things including \"people working on the roof\" as the reason he couldn't get his procedure. His wife states an episode of confusion on 8/24 while at home. MRI of the brain conducted on 8/28 did not demonstrate any evidence of brain mets, only demonstrated nonspecific white matter changes representative of ischemic disease along with diffuse parenchymal loss. No evidence of stroke. Ruled out metastatic disease as cause for AMS. No current evidence of metabolic derangements other than metabolic alkalosis with respiratory acidosis. A possible differential includes Wernicke's encephalopathy given the prior history of AUD.      #Rt lung mass infiltrating the mediastinum  CT Chest from 8/26/24 demonstrates a 6.57 mm soft tissue mass encasing the right hilum, obstructing portions of the right main bronchus and segmental bronchi and the anterior right upper lobe pulmonary arteries. Suspected lymphangitic spread into the right apex  EBUS on 8/28 canceled, anticipated to go 8/29  MRI Brain (8/28): No evidence of brain metastases; nonspecific white matter changes present, along with diffuse parenchymal loss  EBUS conducted 8/29, noted to have extensive endobronchial and extrinsic disease. R mainstem bronchus and BI were debulked. RUL completely occluded by mass. Residual stenosis from the extrinsic compression, so a stent was placed during the procedure.   Plan  Pulmonology recommendations:    start 3% sodium chloride nebulizer three times a day   Send patient home with nebulized " solution along with nebulizer machine  Plan to have patient undergo a repeat bronch in 2-3 months  Plan for oncology consult once biopsy results are available   Plan for outpatient PET scan to complete staging     #Concern for AMS, possible Wernicke's Encephalopathy  Brain MRI 8/28 demonstrated no metastatic lesions, only nonspecific white matter changes and diffuse parenchymal loss  Initial hyponatremia of 128 on admission (8/26) resolved by 8/27  Isolated elevation in WBC on 8/29 of 15.3, downtrended to 10.6 on 8/29.   History of AUD, but repleted with thiamine and folate on admission  Plan  Patient placed on delirium precautions, should be provided with frequent reorientation.   Melatonin 3mg at bedtime to help with sleep      #Acute Hypoxic Respiratory Failure, Resolved  Duoneb x3 and Methylpred  given in ED   Currently on RA, O2 sats between 90-96. No longer has an oxygen requirement.   Differential could be acute COPD exacerbation, post obstructive atelectasis due to right lung mass, possible pneumonia   Suspected emphysema based on CT, as the patient has a significant smoking history. Additionally could represent COPD, but no spirometry to confirm.  Procalcitonin on 8/27 0.04   Updates 8/27: likely resolved due to lack of oxygen requirement.   Respiratory Viral Panel: Negative  Plan  Brio Ellipta and Spiriva Respimat  Duonebs PRN  Stop steriods given low suspicion for acute exacerbation  Empiric treatment for CAP: ceftriaxone 5 days, azithrymycin 500mg for 3 days (completed 8/29)              - Most recent QTC (Bazzet) was 455 ms  Pneumonia Work-Up (Procalcitonin, Respiratory Culture/Smear, MRSA Surveillance, Legionella Antigen, Streptococcus pneumoniae)    #Paroxsymal Afib with RVR   #Coronary Artery Disease  #Hypertension  #Hyperlipidemia  EKG in emergency room demonstrated atrial fibrillation with RVR. Also demonstrated numerous PACs and PVCs. Repeat EKG has demonstrated conversion back to sinus  rhythm with occasional PVCs.   Had PCI at 47 y.o   Home metoprolol 50mg BID for hx of CAD  Home ASA  Home simvastatin 20 mg  FLASH-VASC Score: 3  Lipid Panel: WNL  Systolic blood pressure ranges on 8/29 ranging from 160s to high 170s  Plan  Continue home metoprolol 50 mg BID and simvastatin 20 mg  ASA 80mg  Amlodipine 5mg to aid in blood pressure control   Start patient on Eliquis 5mg BID after conversation on rounds  Optimize electrolytes. K > 4, Mg > 2  Continue to keep patient on telemonitor  Cardiology has been consulted, appreciate their recs on potential cardioversion and anticoagulation.     #Metabolic Alkalosis with Respiratory Acidosis  Possibly poster hypercapnic state, will continue to monitor. No GI losses.   VBG from 8/29 demonstrates continued metabolic alkalosis with respiratory acidosis    #Newly Diagnosed Diabetes  HbA1c: 7.6, Glucose from 8/27: 205  UA from 8/27: 1+ protein  Albumin-Creatinine Ratio from 8/27: 140.2  Started on mild SSI  Plan  Glucose check q6h  Mild sliding scale insulin      #Hyponatremia, resolved  Level prior to admission: 128. Repeat level on admission: 135  Possibly of paraneoplastic origin, but can also be due to chronic alcohol intake. Other differential is cerebral salt wasting secondary to potential brain metastasis.   Plan:  Urinalysis, urine sodium, serum osmolality, urine osmolality were ordered prior to repeat level. Monitor results.      #Mild Hypercalcemia  Level prior to admission: 11.2  Ionized calcium: 1.52 --> mild hypercalcemia  Possibly paraneoplastic in origin, asymptomatic   Plan:  Continue to monitor. If increasing hypercalcemic can pursue hypercalcemia        #Nephrolithiasis  Noted on CT, asymptomatic     #Smoking History  #AUD  Previously smoking 2 PPD, has now cut down to 7 cigarettes.   Drinks > 12 day  No elevated CIWA scores per nursing.   Plan  CIWA protocol with diazepam.  Thiamine, folate, vitamin replacement  Nicotine Taper protocol: Nicotine  patch     Pt's wife Cindy Cr, cell: [phone]     F: PRN  E: Monitor and replace PRN  N: Regular Diet  GI: None indicated  DVT prophylaxis: None. Will start Eliquis for anticoagulation bc of A fib.   Code status: Full (confirmed on admission)     Monica Blair, MS4

## 2024-08-30 NOTE — NURSING NOTE
Mr. Cr is awake and alert - less foggy today.  States he is hoping to be discharged in the next day or two.  Overall feeling well.  Review of BG results 120's -140.  Reminded him he may be starting some new meds at time of discharge including medication for DM.  His son is able to assist at home.  Will sign off at this time.  I met with family last evening.  Diabetes handbook provided as resource.  His son is able to assist with  BG meter at home.  Time spent:  15 mins.

## 2024-08-30 NOTE — CARE PLAN
Mr. Juan Carlos Cr is a 74 year old male with a past medical history of coronary artery disease s/p PCI 27 yrs ago, HTN, HLD, AUD, and history of tobacco use transferred to [facility] with acute hypoxic respiratory failure, CT imaging findings concerning for malignancy. The imaging revealed a hilar mass causing compression of surrounding vasculature and airways. Patient with dyspnea and respiratory failure, as well as a significant smoking history.     Patient is now s/p bronchoscopy procedure 8/29, and was noted to have extensive endobronchial and extrinsic disease. During the procedure, R mainstem bronchus and BI were debulked. The RUL is completely occluded by mass. There was residual stenosis from the extrinsic compression, so a stent was placed during the procedure.     - In this setting, please start 3% sodium chloride nebulizer three times a day  - Please make sure patient goes home with the nebulized solution, as well as nebulizer machine  - He will be brought back for repeat bronch in 2-3 months (this order has been put in by our team)    Please reach out with any questions at any time    Bisi Roa  Pulmonary&CriticalCare

## 2024-08-31 LAB
ALBUMIN SERPL BCP-MCNC: 3.2 G/DL (ref 3.4–5)
ANION GAP SERPL CALC-SCNC: 12 MMOL/L (ref 10–20)
BUN SERPL-MCNC: 20 MG/DL (ref 6–23)
CALCIUM SERPL-MCNC: 12 MG/DL (ref 8.6–10.6)
CHLORIDE SERPL-SCNC: 98 MMOL/L (ref 98–107)
CO2 SERPL-SCNC: 30 MMOL/L (ref 21–32)
CREAT SERPL-MCNC: 0.77 MG/DL (ref 0.5–1.3)
EGFRCR SERPLBLD CKD-EPI 2021: >90 ML/MIN/1.73M*2
ERYTHROCYTE [DISTWIDTH] IN BLOOD BY AUTOMATED COUNT: 11.5 % (ref 11.5–14.5)
GLUCOSE BLD MANUAL STRIP-MCNC: 124 MG/DL (ref 74–99)
GLUCOSE BLD MANUAL STRIP-MCNC: 145 MG/DL (ref 74–99)
GLUCOSE BLD MANUAL STRIP-MCNC: 150 MG/DL (ref 74–99)
GLUCOSE BLD MANUAL STRIP-MCNC: 98 MG/DL (ref 74–99)
GLUCOSE SERPL-MCNC: 104 MG/DL (ref 74–99)
HCT VFR BLD AUTO: 41.3 % (ref 41–52)
HGB BLD-MCNC: 13.5 G/DL (ref 13.5–17.5)
MAGNESIUM SERPL-MCNC: 1.91 MG/DL (ref 1.6–2.4)
MCH RBC QN AUTO: 30.3 PG (ref 26–34)
MCHC RBC AUTO-ENTMCNC: 32.7 G/DL (ref 32–36)
MCV RBC AUTO: 93 FL (ref 80–100)
NRBC BLD-RTO: 0 /100 WBCS (ref 0–0)
PHOSPHATE SERPL-MCNC: 2.7 MG/DL (ref 2.5–4.9)
PLATELET # BLD AUTO: 214 X10*3/UL (ref 150–450)
POTASSIUM SERPL-SCNC: 3.2 MMOL/L (ref 3.5–5.3)
RBC # BLD AUTO: 4.45 X10*6/UL (ref 4.5–5.9)
SODIUM SERPL-SCNC: 137 MMOL/L (ref 136–145)
WBC # BLD AUTO: 9.7 X10*3/UL (ref 4.4–11.3)

## 2024-08-31 PROCEDURE — 2500000002 HC RX 250 W HCPCS SELF ADMINISTERED DRUGS (ALT 637 FOR MEDICARE OP, ALT 636 FOR OP/ED)

## 2024-08-31 PROCEDURE — 82947 ASSAY GLUCOSE BLOOD QUANT: CPT

## 2024-08-31 PROCEDURE — 2500000004 HC RX 250 GENERAL PHARMACY W/ HCPCS (ALT 636 FOR OP/ED)

## 2024-08-31 PROCEDURE — 94640 AIRWAY INHALATION TREATMENT: CPT

## 2024-08-31 PROCEDURE — 99222 1ST HOSP IP/OBS MODERATE 55: CPT | Performed by: STUDENT IN AN ORGANIZED HEALTH CARE EDUCATION/TRAINING PROGRAM

## 2024-08-31 PROCEDURE — 2500000001 HC RX 250 WO HCPCS SELF ADMINISTERED DRUGS (ALT 637 FOR MEDICARE OP)

## 2024-08-31 PROCEDURE — 85027 COMPLETE CBC AUTOMATED: CPT

## 2024-08-31 PROCEDURE — 36415 COLL VENOUS BLD VENIPUNCTURE: CPT

## 2024-08-31 PROCEDURE — RXMED WILLOW AMBULATORY MEDICATION CHARGE

## 2024-08-31 PROCEDURE — 84132 ASSAY OF SERUM POTASSIUM: CPT

## 2024-08-31 PROCEDURE — S4991 NICOTINE PATCH NONLEGEND: HCPCS

## 2024-08-31 PROCEDURE — 83735 ASSAY OF MAGNESIUM: CPT

## 2024-08-31 PROCEDURE — 99233 SBSQ HOSP IP/OBS HIGH 50: CPT | Performed by: INTERNAL MEDICINE

## 2024-08-31 PROCEDURE — 1200000002 HC GENERAL ROOM WITH TELEMETRY DAILY

## 2024-08-31 PROCEDURE — 87389 HIV-1 AG W/HIV-1&-2 AB AG IA: CPT

## 2024-08-31 RX ORDER — FOLIC ACID 1 MG/1
1 TABLET ORAL DAILY
Qty: 60 TABLET | Refills: 0 | Status: SHIPPED | OUTPATIENT
Start: 2024-09-01 | End: 2024-10-31

## 2024-08-31 RX ORDER — MULTIVIT-MIN/IRON FUM/FOLIC AC 7.5 MG-4
1 TABLET ORAL DAILY
Qty: 30 TABLET | Refills: 1 | Status: SHIPPED | OUTPATIENT
Start: 2024-09-01 | End: 2024-10-31

## 2024-08-31 RX ORDER — TALC
3 POWDER (GRAM) TOPICAL DAILY
Qty: 60 TABLET | Refills: 0 | Status: SHIPPED | OUTPATIENT
Start: 2024-08-31 | End: 2024-10-31

## 2024-08-31 RX ORDER — LANOLIN ALCOHOL/MO/W.PET/CERES
100 CREAM (GRAM) TOPICAL DAILY
Qty: 30 TABLET | Refills: 1 | Status: SHIPPED | OUTPATIENT
Start: 2024-09-02 | End: 2024-11-01

## 2024-08-31 RX ORDER — NICOTINE 7MG/24HR
1 PATCH, TRANSDERMAL 24 HOURS TRANSDERMAL DAILY
Qty: 14 PATCH | Refills: 0 | Status: SHIPPED | OUTPATIENT
Start: 2024-10-22 | End: 2024-11-05

## 2024-08-31 RX ORDER — IBUPROFEN 200 MG
1 TABLET ORAL DAILY
Qty: 42 PATCH | Refills: 1 | Status: SHIPPED | OUTPATIENT
Start: 2024-09-01 | End: 2024-10-08

## 2024-08-31 RX ORDER — AMLODIPINE BESYLATE 5 MG/1
10 TABLET ORAL DAILY
Qty: 120 TABLET | Refills: 0 | Status: SHIPPED | OUTPATIENT
Start: 2024-09-01 | End: 2024-10-31

## 2024-08-31 RX ORDER — AMLODIPINE BESYLATE 5 MG/1
5 TABLET ORAL ONCE
Status: COMPLETED | OUTPATIENT
Start: 2024-08-31 | End: 2024-08-31

## 2024-08-31 RX ORDER — IBUPROFEN 200 MG
1 TABLET ORAL DAILY
Qty: 14 PATCH | Refills: 0 | Status: SHIPPED | OUTPATIENT
Start: 2024-08-31 | End: 2024-10-02

## 2024-08-31 RX ORDER — POTASSIUM CHLORIDE 20 MEQ/1
40 TABLET, EXTENDED RELEASE ORAL ONCE
Status: COMPLETED | OUTPATIENT
Start: 2024-08-31 | End: 2024-08-31

## 2024-08-31 RX ORDER — FLUTICASONE FUROATE AND VILANTEROL 200; 25 UG/1; UG/1
1 POWDER RESPIRATORY (INHALATION)
Qty: 60 EACH | Refills: 1 | Status: SHIPPED | OUTPATIENT
Start: 2024-09-01 | End: 2024-10-31

## 2024-08-31 RX ORDER — SODIUM CHLORIDE FOR INHALATION 3 %
3 VIAL, NEBULIZER (ML) INHALATION EVERY 6 HOURS PRN
Qty: 480 ML | Refills: 0 | Status: SHIPPED | OUTPATIENT
Start: 2024-08-31 | End: 2024-10-01

## 2024-08-31 RX ORDER — AMLODIPINE BESYLATE 10 MG/1
10 TABLET ORAL DAILY
Status: DISCONTINUED | OUTPATIENT
Start: 2024-09-01 | End: 2024-09-01 | Stop reason: HOSPADM

## 2024-08-31 RX ORDER — METFORMIN HYDROCHLORIDE 500 MG/1
500 TABLET ORAL
Qty: 120 TABLET | Refills: 0 | Status: SHIPPED | OUTPATIENT
Start: 2024-08-31 | End: 2024-10-31

## 2024-08-31 RX ADMIN — SENNOSIDES 8.6 MG: 8.6 TABLET, FILM COATED ORAL at 20:53

## 2024-08-31 RX ADMIN — SIMVASTATIN 20 MG: 20 TABLET, FILM COATED ORAL at 20:54

## 2024-08-31 RX ADMIN — METOPROLOL TARTRATE 50 MG: 50 TABLET, FILM COATED ORAL at 20:53

## 2024-08-31 RX ADMIN — Medication 3 MG: at 20:54

## 2024-08-31 RX ADMIN — AMLODIPINE BESYLATE 5 MG: 5 TABLET ORAL at 10:10

## 2024-08-31 RX ADMIN — ASPIRIN 81 MG 81 MG: 81 TABLET ORAL at 10:09

## 2024-08-31 RX ADMIN — THIAMINE HYDROCHLORIDE 500 MG: 100 INJECTION, SOLUTION INTRAMUSCULAR; INTRAVENOUS at 21:54

## 2024-08-31 RX ADMIN — NICOTINE 1 PATCH: 21 PATCH, EXTENDED RELEASE TRANSDERMAL at 10:08

## 2024-08-31 RX ADMIN — Medication 1 TABLET: at 10:09

## 2024-08-31 RX ADMIN — APIXABAN 5 MG: 5 TABLET, FILM COATED ORAL at 17:22

## 2024-08-31 RX ADMIN — TIOTROPIUM BROMIDE INHALATION SPRAY 2 PUFF: 3.12 SPRAY, METERED RESPIRATORY (INHALATION) at 12:06

## 2024-08-31 RX ADMIN — FOLIC ACID 1 MG: 1 TABLET ORAL at 10:10

## 2024-08-31 RX ADMIN — POTASSIUM CHLORIDE 40 MEQ: 1500 TABLET, EXTENDED RELEASE ORAL at 17:14

## 2024-08-31 RX ADMIN — AMLODIPINE BESYLATE 5 MG: 5 TABLET ORAL at 17:16

## 2024-08-31 RX ADMIN — APIXABAN 5 MG: 5 TABLET, FILM COATED ORAL at 06:14

## 2024-08-31 RX ADMIN — CEFTRIAXONE SODIUM 1 G: 1 INJECTION, SOLUTION INTRAVENOUS at 16:01

## 2024-08-31 RX ADMIN — THIAMINE HYDROCHLORIDE 500 MG: 100 INJECTION, SOLUTION INTRAMUSCULAR; INTRAVENOUS at 15:39

## 2024-08-31 RX ADMIN — THIAMINE HYDROCHLORIDE 500 MG: 100 INJECTION, SOLUTION INTRAMUSCULAR; INTRAVENOUS at 06:15

## 2024-08-31 RX ADMIN — METOPROLOL TARTRATE 50 MG: 50 TABLET, FILM COATED ORAL at 10:09

## 2024-08-31 RX ADMIN — FLUTICASONE FUROATE AND VILANTEROL TRIFENATATE 1 PUFF: 200; 25 POWDER RESPIRATORY (INHALATION) at 12:06

## 2024-08-31 ASSESSMENT — LIFESTYLE VARIABLES
ANXIETY: NO ANXIETY, AT EASE
AUDITORY DISTURBANCES: NOT PRESENT
PAROXYSMAL SWEATS: NO SWEAT VISIBLE
NAUSEA AND VOMITING: NO NAUSEA AND NO VOMITING
TREMOR: NO TREMOR
TOTAL SCORE: 0
AGITATION: NORMAL ACTIVITY
VISUAL DISTURBANCES: NOT PRESENT
ORIENTATION AND CLOUDING OF SENSORIUM: ORIENTED AND CAN DO SERIAL ADDITIONS
ANXIETY: NO ANXIETY, AT EASE
VISUAL DISTURBANCES: NOT PRESENT
AUDITORY DISTURBANCES: NOT PRESENT
ORIENTATION AND CLOUDING OF SENSORIUM: DISORIENTED FOR DATE BY MORE THAN 2 CALENDAR DAYS
NAUSEA AND VOMITING: NO NAUSEA AND NO VOMITING
AGITATION: NORMAL ACTIVITY
TREMOR: NO TREMOR
PAROXYSMAL SWEATS: NO SWEAT VISIBLE
HEADACHE, FULLNESS IN HEAD: NOT PRESENT
TOTAL SCORE: 3
HEADACHE, FULLNESS IN HEAD: NOT PRESENT

## 2024-08-31 ASSESSMENT — PAIN SCALES - GENERAL: PAINLEVEL_OUTOF10: 0 - NO PAIN

## 2024-08-31 ASSESSMENT — ACTIVITIES OF DAILY LIVING (ADL): LACK_OF_TRANSPORTATION: NO

## 2024-08-31 NOTE — PROGRESS NOTES
Juan Carlos Cr is a 74 y.o. male on day 4 of admission presenting with COPD exacerbation (Multi).      Subjective   Patient is alert and oriented x4 today, markedly improved in terms of cognition. No complaints. Occasional nonproductive cough. Denies chest pain or SOB. States that he is sometimes wobbly when getting out of bed to ambulate to the restroom         Objective     Last Recorded Vitals  /72   Pulse 67   Temp 36.3 °C (97.4 °F)   Resp 17   SpO2 95%   Intake/Output last 3 Shifts:    Intake/Output Summary (Last 24 hours) at 8/31/2024 1549  Last data filed at 8/31/2024 1000  Gross per 24 hour   Intake 120 ml   Output --   Net 120 ml       Admission Weight       Daily Weight  08/26/24 : 74.7 kg (164 lb 10.9 oz)    Image Results  XR chest 1 view  Narrative: Interpreted By:  Tesha Pena,   STUDY:  XR CHEST 1 VIEW;  8/29/2024 3:03 pm      INDICATION:  Signs/Symptoms:Post bronchoscopy with intervention and biopsy on the  right.          COMPARISON:  08/26/2024      ACCESSION NUMBER(S):  [id number]      ORDERING CLINICIAN:  BRITTNY PULLIAM      FINDINGS:                  CARDIOMEDIASTINAL SILHOUETTE:  Cardiomediastinal silhouette is stable in size and configuration.      LUNGS:  Large right upper lobe mass again identified. There is elevation of  the right hemidiaphragm. Diffuse interstitial markings are present  bilaterally. Costophrenic angles are clear. No evidence of a  pneumothorax.      ABDOMEN:  No remarkable upper abdominal findings.      BONES:  No acute osseous changes.      Impression: 1.  Mass again noted occupying the right upper lobe. No evidence of a  pneumothorax.  2. Interstitial markings are prominent, possibly chronic in nature.              MACRO:  None      Signed by: Tesha Pena 8/29/2024 3:15 PM  Dictation workstation:   [id number]  Bronchoscopy Diagnostic, w EBUS  Narrative: Bronchoscopy Report  [facility]  Location: [facility]  procedure room 8    INDICATION:   Lung mass - RUL    BRONCHOSCOPIST:   Rojelio Khan MD  First Assistant: Clementine Ochoa MD  Second Assistant:     REFERRING:    Luna Dsouza MD    FINDINGS:  After obtaining informed consent and performing a time out, the patient   was anesthetized by anestesia. The patient was then positioned with 1 head   support towel. A wet gauze was placed to protect the lower gumline. The   patient was then intubated with the black 10mm rigid bronchoscope in the   usual atraumatic fashion. Following this the larynx was packed with wet   gauze, the bronchoscope connected to the ventilator, good chest rise and   adequate return tidal volumes were obtained.    The therapeutic flexable bronchoscope was then advanced through the rigid   bronchoscope. The larynx and vocal cords were normal in appearance There   is endobronchial exophytic lesion with >99% obstruction of the right   mainstem bronchus. The bronchoscope was able to pass medially around the   lesion. There is complete obstruction of the right upper lobe bronchus due   to mass. This extends from the right upper and involves the anterior and   lateral wall of the right mainstem and the bronchus intermedius. The rigid   bronchoscope was then used to debulk lesion from the right mainstem. All   samples sent for surgical pathology. Contact cryoprobe was used to debulk   and ablate further endobronchial lesion from the bronchus intermedius.   Following debulking there remained moderate to severe stenosis of the   right mainstem bronchus and bronchus intermedius due primarily to   extrinsic compression. Due this and already complete occlusion of the   right upper lobe the decision was made to place a stent from the proximal   ARSLAN to the distal BI. A 10x40 bonastent was then palced under direct   visualization through the rigid bronchoscope. A 3cm 8,9,10 CRE balloon was   then used to fully deploy the stent which did not fully self  expand.   Following this the stent was found to be in good position with complete   patency of the RML/RLL/and RLL superior segments distally. There was   complete resolution in stenosis following this.     Limited staging was completed of 4L and 7. There is confluent involvement   of station 4R and the endobronchial lesion.    After the airway examination was completed, the scope was then replaced by   EBUS scope. Lymph node sizing was performed via endobronchial ultrasound.   Sampling by transbronchial needle   aspiration was also performed using an Olympus ViziShot 22 gauge needle   and sent for routine cytology.     Rapid On-Site Evaluation (AZIZA) was available   - The 4L (lower paratracheal) node was 6.7 mm in size. Sampling was done,   2 samples with the needle were obtained.   -  The 7 (subcarinal) node was  19.4 mm in size. Sampling was done, 4   samples with the needle were obtained.        COMPLICATIONS:  None    ESTIMATED BLOOD LOSS:    Minimal, < 5 mL    The physical status of the patient was re-assessed after the procedure.    The patient was transported to the recovery area / PACU in stable   condition.  Impression:    Rigid bronchoscopy 10mm black bronchoscope  Tumor debulking using rigid bronchoscope and contact cryoprobe  Endobronchial biopsy of right mainstem tumor  Severe stenosis of right mainstem bronchus to the distal bronchus   intermedius  Complete stenosis/obstruction of the right upper lobe bronchus due to   lesion  Stent placement [id number] Bonastent ARSLAN to BI    RECOMMENDATION:   Follow-up with referring physician.  Start 3% sodium chloride nebulizer three times daily  Please ensure patient is discharged with a nebulizer and 3% sodium   chloride nebulizer solution to use 3 times a day  Plan for follow up bronchoscopy in 2-3 months following initiation of   systemic therapy and/or radiation. (Request placed)  Transthoracic Echo (TTE) Complete     [facility], [address]  [place] [address]                 Tel [phone] and Fax [phone]    TRANSTHORACIC ECHOCARDIOGRAM REPORT       Patient Name:      RONALD Corado Physician:    [id number] Ishaan Cristina MD  Study Date:        8/29/2024            Ordering Provider:    [id number] BRITTNY PULLIAM  MRN/PID:           [id number]             Fellow:  Accession#:        [id number]         Nurse:                Nixon Leigh RN  Date of Birth/Age: 1950 / 74 years Sonographer:          SAMRA Campuzano RDCS  Gender:            M                    Additional Staff:  Height:            175.26 cm            Admit Date:           8/27/2024  Weight:            74.39 kg             Admission Status:     Inpatient -                                                                Routine  BSA / BMI:         1.90 m2 / 24.22      Encounter#:           [id number]                     kg/m2  Blood Pressure:    130/77 mmHg          Department Location:  [facility] Non                                                                Invasive    Study Type:    TRANSTHORACIC ECHO (TTE) COMPLETE  Diagnosis/ICD: Unspecified atrial fibrillation-I48.91  Indication:    A-fib  CPT Code:      Echo Complete w Full Doppler-[id number]    Patient History:  Diabetes:          Yes  Pertinent History: COPD, CAD and HTN. abnormal EKG.    Study Detail: The following Echo studies were performed: 2D, M-Mode, Doppler and                color flow. Image quality for this study is poor. Definity used as                a contrast agent for endocardial border definition. Total contrast                used for this procedure was 2 mL via IV push.       PHYSICIAN INTERPRETATION:  Left Ventricle: Left ventricular ejection fraction is moderately decreased, by visual  estimate at 35-40%. There is global hypokinesis of the left ventricle with minor regional variations. The left ventricular cavity size is normal. The left ventricular septal wall thickness is normal. There is normal left ventricular posterior wall thickness. Left ventricular diastolic filling was indeterminate.  Left Atrium: The left atrium is mildly dilated.  Right Ventricle: The right ventricle is normal in size. There is normal right ventricular global systolic function.  Right Atrium: The right atrium was not assessed.  Aortic Valve: The aortic valve is probably trileaflet. The aortic valve appears degenerative. There is mild aortic valve cusp calcification. The aortic valve dimensionless index is 0.79. There is mild aortic valve regurgitation. The peak instantaneous gradient of the aortic valve is 6.7 mmHg. The mean gradient of the aortic valve is 2.9 mmHg.  Mitral Valve: The mitral valve is mildly thickened. There is mild mitral valve regurgitation.  Tricuspid Valve: The tricuspid valve was not well visualized. There is trace tricuspid regurgitation. The right ventricular systolic pressure is unable to be estimated.  Pulmonic Valve: The pulmonic valve is structurally normal. There is trace pulmonic valve regurgitation.  Pericardium: There is no pericardial effusion noted.  Aorta: The aortic root is normal. The ascending aorta was not well visualized.  In comparison to the previous echocardiogram(s): There are no prior studies on this patient for comparison purposes.       CONCLUSIONS:   1. Poorly visualized anatomical structures due to suboptimal image quality.   2. Left ventricular ejection fraction is moderately decreased, by visual estimate at 35-40%.   3. There is global hypokinesis of the left ventricle with minor regional variations.   4. Left ventricular diastolic filling was indeterminate.   5. There is normal right ventricular global systolic function.   6. The left atrium is mildly dilated.   7. Mild  aortic valve regurgitation.    QUANTITATIVE DATA SUMMARY:  2D MEASUREMENTS:                          Normal Ranges:  Ao Root d:     3.50 cm  (2.0-3.7cm)  LAs:           3.80 cm  (2.7-4.0cm)  RVIDd:         3.35 cm  (0.9-3.6cm)  IVSd:          1.01 cm  (0.6-1.1cm)  LVPWd:         0.90 cm  (0.6-1.1cm)  LVIDd:         5.17 cm  (3.9-5.9cm)  LVIDs:         3.35 cm  LV Mass Index: 95 g/m2  LVEDV Index:   73 ml/m2  LV % FS        35.3 %    LA VOLUME:                                Normal Ranges:  LA Vol A4C:        80.5 ml    (22+/-6mL/m2)  LA Vol A2C:        55.5 ml  LA Vol BP:         68.5 ml  LA Vol Index A4C:  42.4ml/m2  LA Vol Index A2C:  29.3 ml/m2  LA Vol Index BP:   36.1 ml/m2  LA Area A4C:       24.4 cm2  LA Area A2C:       19.8 cm2  LA Major Axis A4C: 6.3 cm  LA Major Axis A2C: 6.0 cm  LA Volume Index:   36.1 ml/m2    LV SYSTOLIC FUNCTION BY 2D PLANIMETRY (MOD):                       Normal Ranges:  EF-A4C View:    36 % (>=55%)  EF-A2C View:    38 %  EF-Biplane:     41 %  EF-Visual:      38 %  LV EF Reported: 38 %    LV DIASTOLIC FUNCTION:                                Normal Ranges:  MV Peak E:        0.43 m/s    (0.7-1.2 m/s)  MV Peak A:        0.76 m/s    (0.42-0.7 m/s)  E/A Ratio:        0.56        (1.0-2.2)  MV e'             0.050 m/s   (>8.0)  MV lateral e'     0.06 m/s  MV medial e'      0.04 m/s  MV A Dur:         124.57 msec  E/e' Ratio:       8.59        (<8.0)  MV DT:            198 msec    (150-240 msec)  PulmV Sys Jean Marie:    29.69 cm/s  PulmV Simons Jean Marie:   19.38 cm/s  PulmV S/D Jean Marie:    1.53  PulmV A Revs Jean Marie: 29.56 cm/s  PulmV A Revs Dur: 124.57 msec    MITRAL VALVE:                  Normal Ranges:  MV DT: 198 msec (150-240msec)    AORTIC VALVE:                                    Normal Ranges:  AoV Vmax:                1.29 m/s (<=1.7m/s)  AoV Peak P.7 mmHg (<20mmHg)  AoV Mean P.9 mmHg (1.7-11.5mmHg)  LVOT Max Jean Marie:            0.83 m/s (<=1.1m/s)  AoV VTI:                  24.84 cm (18-25cm)  LVOT VTI:                19.68 cm  LVOT Diameter:           1.98 cm  (1.8-2.4cm)  AoV Area, VTI:           2.44 cm2 (2.5-5.5cm2)  AoV Area,Vmax:           1.97 cm2 (2.5-4.5cm2)  AoV Dimensionless Index: 0.79       RIGHT VENTRICLE:  RV Basal 3.30 cm  RV Mid   3.40 cm  RV Major 7.2 cm  TAPSE:   17.0 mm  RV s'    0.13 m/s    TRICUSPID VALVE/RVSP:                             Normal Ranges:  Peak TR Velocity: 0.83 m/s  RV Syst Pressure: 5.7 mmHg (< 30mmHg)  IVC Diam:         1.60 cm    PULMONIC VALVE:                          Normal Ranges:  PV Accel Time: 76 msec  (>120ms)  PV Max Jean Marie:    0.9 m/s  (0.6-0.9m/s)  PV Max P.9 mmHg    Pulmonary Veins:  PulmV A Revs Dur: 124.57 msec  PulmV A Revs Jean Marie: 29.56 cm/s  PulmV Simons Jean Marie:   19.38 cm/s  PulmV S/D Jean Marie:    1.53  PulmV Sys Jean Marie:    29.69 cm/s       [id number] Ishaan Cristina MD  Electronically signed on 2024 at 11:55:30 AM       ** Final **      Physical Exam  Constitutional:       Appearance: He is normal weight.   HENT:      Head: Normocephalic.      Right Ear: External ear normal.      Left Ear: External ear normal.      Nose: Nose normal.      Mouth/Throat:      Mouth: Mucous membranes are moist.   Eyes:      Extraocular Movements: Extraocular movements intact.      Pupils: Pupils are equal, round, and reactive to light.   Cardiovascular:      Rate and Rhythm: Normal rate and regular rhythm.   Pulmonary:      Breath sounds: No wheezing.      Comments: Breath sounds audible in all lung fields. Respiratory effort is normal. No rhonchi or rales.  Abdominal:      General: Abdomen is flat.      Palpations: Abdomen is soft.   Musculoskeletal:         General: Normal range of motion.   Skin:     General: Skin is warm.      Capillary Refill: Capillary refill takes less than 2 seconds.   Neurological:      Mental Status: He is alert.      Comments: AxO x4   Psychiatric:         Mood and Affect: Mood normal.         Behavior: Behavior normal.          Relevant Results      Results for orders placed or performed during the hospital encounter of 08/27/24 (from the past 24 hour(s))   POCT GLUCOSE   Result Value Ref Range    POCT Glucose 133 (H) 74 - 99 mg/dL   POCT GLUCOSE   Result Value Ref Range    POCT Glucose 128 (H) 74 - 99 mg/dL   POCT GLUCOSE   Result Value Ref Range    POCT Glucose 124 (H) 74 - 99 mg/dL   CBC   Result Value Ref Range    WBC 9.7 4.4 - 11.3 x10*3/uL    nRBC 0.0 0.0 - 0.0 /100 WBCs    RBC 4.45 (L) 4.50 - 5.90 x10*6/uL    Hemoglobin 13.5 13.5 - 17.5 g/dL    Hematocrit 41.3 41.0 - 52.0 %    MCV 93 80 - 100 fL    MCH 30.3 26.0 - 34.0 pg    MCHC 32.7 32.0 - 36.0 g/dL    RDW 11.5 11.5 - 14.5 %    Platelets 214 150 - 450 x10*3/uL   Renal Function Panel   Result Value Ref Range    Glucose 104 (H) 74 - 99 mg/dL    Sodium 137 136 - 145 mmol/L    Potassium 3.2 (L) 3.5 - 5.3 mmol/L    Chloride 98 98 - 107 mmol/L    Bicarbonate 30 21 - 32 mmol/L    Anion Gap 12 10 - 20 mmol/L    Urea Nitrogen 20 6 - 23 mg/dL    Creatinine 0.77 0.50 - 1.30 mg/dL    eGFR >90 >60 mL/min/1.73m*2    Calcium 12.0 (H) 8.6 - 10.6 mg/dL    Phosphorus 2.7 2.5 - 4.9 mg/dL    Albumin 3.2 (L) 3.4 - 5.0 g/dL   Magnesium   Result Value Ref Range    Magnesium 1.91 1.60 - 2.40 mg/dL   POCT GLUCOSE   Result Value Ref Range    POCT Glucose 98 74 - 99 mg/dL   POCT GLUCOSE   Result Value Ref Range    POCT Glucose 150 (H) 74 - 99 mg/dL               Assessment/Plan      Assessment & Plan  COPD exacerbation (Multi)    Mr. Juan Carlos Cr is a 74 year old male with a past medical history of coronary artery disease s/p PCI 27 yrs ago, HTN, HLD, AUD, and history of tobacco use transferred to [facility] with acute hypoxic respiratory failure and lung mass diagnosed on Chest CT. Initially suspected to have a COPD exacerbation, but patient denies prior history of COPD. CT of chest in the emergency room revealed a large 6.57 mm right sided soft tissue mass causing compression of surrounding  "vasculature. Initial symptoms of SOB on exertion, cough, and fatigue caused concern for an acute COPD exacerbation, but no prior spirometry testing to confirm diagnosis. Differential diagnoses included post obstructive atelectasis due to mass, or possible CAP. The lung mass demonstrated on CT was concerning for cancer given the size, possible lymphangitic spread and significant smoking history. EBUS conducted 08/29 by Pulmonology, with debulking of the R mainstem bronchus and Bl. Stent was placed for residual stenosis from extrinsic compression.      The patient additionally developed paroxysmal A fib with RVR in the emergency room. No noted prior history of A fib, although his prior cardiac history could have had masked A fib for several years. EKG demonstrated left atrial enlargement, lending evidence to chronicity. Patient has since self converted to sinus rhythm. FLASH-VASC score is high, and Cardiology was consulted, recommended starting him on anticoagulation. Patient started on Eliquis on 8/30.     The patient was diagnosed with new onset diabetes based an HbA1c level of 7.6 on 8/26. While admitted, he was managed with an insulin sliding scale inpatient. Diabetes education was conducted.    On 8/28 there was concern for the patient stating odd things including \"people working on the roof\" as the reason he couldn't get his procedure. His wife states an episode of confusion on 8/24 while at home. MRI of the brain conducted on 8/28 did not demonstrate any evidence of brain mets, only demonstrated nonspecific white matter changes representative of ischemic disease along with diffuse parenchymal loss. No evidence of stroke. Ruled out metastatic disease as cause for AMS. No current evidence of metabolic derangements other than metabolic alkalosis with respiratory acidosis. A possible differential includes Wernicke's encephalopathy given the prior history of AUD.      #Rt lung mass infiltrating the mediastinum  CT " Chest from 8/26/24 demonstrates a 6.57 mm soft tissue mass encasing the right hilum, obstructing portions of the right main bronchus and segmental bronchi and the anterior right upper lobe pulmonary arteries. Suspected lymphangitic spread into the right apex  EBUS on 8/28 canceled, anticipated to go 8/29  MRI Brain (8/28): No evidence of brain metastases; nonspecific white matter changes present, along with diffuse parenchymal loss  EBUS conducted 8/29, noted to have extensive endobronchial and extrinsic disease. R mainstem bronchus and BI were debulked. RUL completely occluded by mass. Residual stenosis from the extrinsic compression, so a stent was placed during the procedure.   Plan  Pulmonology recommendations:    start 3% sodium chloride nebulizer three times a day   Send patient home with nebulized solution along with nebulizer machine  Plan to have patient undergo a repeat bronch in 2-3 months  Plan for oncology consult once biopsy results are available   Plan for outpatient PET scan to complete staging     #Concern for AMS, possible Wernicke's Encephalopathy complicated by hospital induced delirium  Brain MRI 8/28 demonstrated no metastatic lesions, only nonspecific white matter changes and diffuse parenchymal loss  Initial hyponatremia of 128 on admission (8/26) resolved by 8/27  Isolated elevation in WBC on 8/29 of 15.3, downtrended to 10.6 on 8/29.   History of AUD, but repleted with thiamine and folate on admission  Plan  Patient placed on delirium precautions, should be provided with frequent reorientation.   Melatonin 3mg at bedtime to help with sleep      #Acute Hypoxic Respiratory Failure, Resolved  Duoneb x3 and Methylpred  given in ED   Currently on RA, O2 sats between 90-96. No longer has an oxygen requirement.   Differential could be acute COPD exacerbation, post obstructive atelectasis due to right lung mass, possible pneumonia   Suspected emphysema based on CT, as the patient has a  significant smoking history. Additionally could represent COPD, but no spirometry to confirm.  Procalcitonin on 8/27 0.04   Updates 8/27: likely resolved due to lack of oxygen requirement.   Respiratory Viral Panel: Negative  Pneumonia Work-Up (Procalcitonin, MRSA Surveillance, Legionella Antigen, Streptococcus pneumoniae): Negative  Never collected respiratory smear/culture due to lack of productive cough  Plan  Brio Ellipta and Spiriva Respimat  Duonebs PRN  Stop steriods given low suspicion for acute exacerbation  Empiric treatment for CAP: ceftriaxone 5 days, azithrymycin 500mg for 3 days (completed 8/29)              - Most recent QTC ([facility]) was 455 ms    #Paroxsymal Afib with RVR   #Coronary Artery Disease  #Hypertension  #Hyperlipidemia  EKG in emergency room demonstrated atrial fibrillation with RVR. Also demonstrated numerous PACs and PVCs. Repeat EKG has demonstrated conversion back to sinus rhythm with occasional PVCs.   Had PCI at 47 y.o   Home metoprolol 50mg BID for hx of CAD  Home ASA  Home simvastatin 20 mg  FLASH-VASC Score: 3  Lipid Panel: WNL  Systolic blood pressure ranges on 8/29 ranging from 160s to high 170s  Plan  Continue home metoprolol 50 mg BID and simvastatin 20 mg  ASA 80mg  Increase amlodipine 10mg to aid in blood pressure control   Eliquis 5mg BID   Optimize electrolytes. K > 4, Mg > 2. Repleted potassium this AM  Continue to keep patient on telemonitor  Cardiology has been consulted, appreciate their recs on potential cardioversion and anticoagulation.     #Metabolic Alkalosis with Respiratory Acidosis  Possibly poster hypercapnic state, will continue to monitor. No GI losses.   VBG from 8/29 demonstrates continued metabolic alkalosis with respiratory acidosis    #Newly Diagnosed Diabetes  HbA1c: 7.6, Glucose from 8/27: 205  UA from 8/27: 1+ protein  Albumin-Creatinine Ratio from 8/27: 140.2  Started on mild SSI  Plan  Glucose check q6h  Mild sliding scale insulin       #Hyponatremia, resolved  Level prior to admission: 128. Repeat level on admission: 135  Possibly of paraneoplastic origin, but can also be due to chronic alcohol intake. Other differential is cerebral salt wasting secondary to potential brain metastasis.   Plan:  Urinalysis, urine sodium, serum osmolality, urine osmolality were ordered prior to repeat level. Monitor results.      #Mild Hypercalcemia  Level prior to admission: 11.2  Ionized calcium: 1.52 --> mild hypercalcemia  Possibly paraneoplastic in origin, asymptomatic   Plan:  Continue to monitor. If increasing hypercalcemic can pursue hypercalcemia        #Nephrolithiasis  Noted on CT, asymptomatic     #Smoking History  #AUD  Previously smoking 2 PPD, has now cut down to 7 cigarettes.   Drinks > 12 day  No elevated CIWA scores per nursing.   Plan  CIWA protocol with diazepam.  Thiamine, folate, vitamin replacement  Nicotine Taper protocol: Nicotine patch     Pt's wife Cindy Cr, cell: [phone]     F: PRN  E: Monitor and replace PRN  N: Regular Diet  GI: None indicated  DVT prophylaxis: on Eliquis for anticoagulation bc of A fib.   Code status: Full (confirmed on admission)     Monica Blair, MS4

## 2024-08-31 NOTE — DISCHARGE INSTRUCTIONS
Dear Mr. Juan Carlos Cr,     You were admitted to the hospital because of your cough, shortness of breath, and because your oxygen saturation was low at home. You were thought to be having an episode of worsening COPD. It is currently unclear whether or not you have COPD. However, while you were here, you got a scan of your lungs which showed a large right sided mass concerning for cancer. A procedure was performed in which a piece of the mass was sampled and sent for testing. During the procedure, a stent was placed in your lungs to help keep your airway open. You will need to do treatments 3 times a day to help keep your airway open. To do these treatments, you will need a special device - this device has been arranged for you.     Once your pathology reports have resulted you will see a doctor that specializes in cancer (an oncologist)    You were found to have diabetes while you were in the hospital. As talked about in the hospital, this is a treatable condition. You will be sent home with diabetes medication (metformin) to help control your blood sugar levels. It is important to follow up with your primary care provider about your diabetes.     While you were in the emergency room, you experienced an irregular heart beat (atrial fibrillation). You were started on a blood thinner during this admission. It is extremely important to take your blood thinner (Eliquis) twice a day, without missing any doses. Please take care to avoid any falls. It is important to visit your PCP or your nearest emergency department if there is concern for any internal bleeding. You will follow up with a heart doctor (cardiologist) 4 weeks after you leave the hospital to discuss if you should continue to take this medication or switch to a different treatment.     Medications  Start these Medications  - Eliquis 5mg two times a day (blood thinner)  - Metformin 500mg  - Amlodipine 10mg  - Inhalation Treatments (Nebulized Hypertonic  Saline) 3x a day  - Inhaler Medications (Breo Elipta, Spiriva)  - Multivitamin, Vitamin B9, Thiamine Supplements  - Melatonin as needed for sleep  - Nicotine patches as needed for tobacco cravings  Continue these Medications  - Aspirin 81 mg  - Metoprolol Tartrate 50mg  - Simvastatin 20mg    Appointments  - PCP: A visit with your primary care doctor is important after you leave the hospital. They will also help manage your diabetes  - Oncology: Once the results of your biopsy report are available, a visit with the cancer specialists should be made  - Cardiology: An appointment with the heart doctors should be made 4 weeks after you leave the hospital. They will discuss your use of blood thinners with you.   - Pulmonology: The lung doctors would like you to follow up with them in about 2 to 3 months for a repeat procedure to at your lungs    Other services:   - Home Health Care is a service that will aid in the following two things:   - Managing your potential diagnosis of COPD.   - Physical therapy and Occupational Therapy has been arranged to help with your recovery at home  - [facility] Home: This service will call you directly in your home and help you with managing these many new changes you have experienced after this hospital admission. They will call almost everyday to help provide you help to coordinate all of your medical issues.     If you experience worsening shortness of breath or low oxygen levels, please go to your nearest emergency department. Thank you for allowing us to participate in your care.    Best,   Your  Care Team

## 2024-08-31 NOTE — HOSPITAL COURSE
Mr. Juan Carlos Cr is a 74 year old male with a past medical history of coronary artery disease s/p PCI 27 yrs ago, HTN, HLD, AUD, and history of tobacco use transferred to [facility] with acute hypoxic respiratory failure and lung mass diagnosed on Chest CT. Initially suspected to have a COPD exacerbation, but patient denies prior history of COPD. CT of chest in the emergency room revealed a large 6.57 mm right sided soft tissue mass causing compression of surrounding vasculature. Initial symptoms of SOB on exertion, cough, and fatigue caused concern for an acute COPD exacerbation, but no prior spirometry testing to confirm diagnosis. Differential diagnoses included post obstructive atelectasis due to mass, or possible CAP. The lung mass demonstrated on CT was concerning for cancer given the size, possible lymphangitic spread and significant smoking history. EBUS conducted 08/29 by Pulmonology, with debulking of the R mainstem bronchus and Bl. Stent was placed for residual stenosis from extrinsic compression.      The patient additionally developed paroxysmal A fib with RVR in the emergency room. No noted prior history of A fib, although his prior cardiac history could have had masked A fib for several years. EKG demonstrated left atrial enlargement, lending evidence to chronicity. Patient has since self converted to sinus rhythm. FLASH-VASC score is high, and Cardiology was consulted, recommended starting him on anticoagulation. Patient started on Eliquis on 8/30. Outpatient cardiology follow-up as cost is a prohibitive barrier to future Eliquis use, additionally the patient is a high risk for falls with prior AUD.     The patient was diagnosed with new onset diabetes based an HbA1c level of 7.6 on 8/26. While admitted, he was managed with an insulin sliding scale inpatient. Diabetes education was conducted. The patient will be discharged on metformin.     On 8/28 there was concern for waxing and waning concentration.  Collateral from his wife revealed an episode of confusion on 8/24 (prior to admission). MRI of the brain conducted on 8/28 did not demonstrate any evidence of brain mets, only demonstrated nonspecific white matter changes representative of ischemic disease along with diffuse parenchymal loss. No evidence of stroke. Ruled out metastatic disease as cause for AMS. No current evidence of metabolic derangements other than metabolic alkalosis with respiratory acidosis. TSH, Vitamin B9, Vitamin B12 were WNL. HIV and Syphilis Total Ab were both negative. Psych was consulted, who also endorsed a likely acute change in his mental status with waxing and waning concentration of multifactorial etiology. A possible differential includes Wernicke's encephalopathy given the prior history of AUD, complicated by potential hospital induced delirium.     PT and OT were consulted during this patient's stay, who recommended low intensity rehabilitation and no skilled OT needs.     On 9/1/24, the patient was deemed suitable for discharge, with improving mentation (AxO 3) and stable vital signs and physical exam.     The following medication changes were made to this patient's regimen:  Start these Medications  - Eliquis 5mg two times a day (blood thinner)  - Metformin 500mg  - Amlodipine 10mg  - Inhalation Treatments (Nebulized Hypertonic Saline) 3x a day  - Inhaler Medications (Breo Elipta, Spiriva)  - Multivitamin, Vitamin B9, Thiamine Supplements  - Melatonin as needed for sleep  - Nicotine patches as needed for tobacco cravings  Continue these Medications  - Aspirin 81 mg  - Metoprolol Tartrate 50mg  - Simvastatin 20mg

## 2024-08-31 NOTE — PROGRESS NOTES
08/31/24 1617   Discharge Planning   Living Arrangements Spouse/significant other   Support Systems Spouse/significant other   Assistance Needed none   Type of Residence Private residence   Number of Stairs to Enter Residence 3   Number of Stairs Within Residence 14  (recently moved pt's bedroom to first floor of home so he doesn't have to climb stairs)   Do you have animals or pets at home? No   Who is requesting discharge planning? Provider   Home or Post Acute Services In home services   Type of Home Care Services DME or oxygen;Home nursing visits;Home health aide;Home OT;Home PT   Expected Discharge Disposition HH Services   Does the patient need discharge transport arranged? No  (family to provide)   Financial Resource Strain   How hard is it for you to pay for the very basics like food, housing, medical care, and heating? Not hard   Housing Stability   In the last 12 months, was there a time when you were not able to pay the mortgage or rent on time? N   In the past 12 months, how many times have you moved where you were living? 0   At any time in the past 12 months, were you homeless or living in a shelter (including now)? N   Transportation Needs   In the past 12 months, has lack of transportation kept you from medical appointments or from getting medications? no   In the past 12 months, has lack of transportation kept you from meetings, work, or from getting things needed for daily living? No     Met with pt and introduced myself as care coordinator and member of the Care Transitions team for discharge planning. Assessment questions deferred to pt's wife Cindy/son Royce. Spoke with son Royce on wife's phone [phone], he provided answers to discharge planning assessment questions as he feel wife is overwhelmed with pt's medical condition.  Royce stated pt lives at home with wife, prior to 2 weeks ago pt was independent with ADL's/iADL's even driving short distances. Royce reports lately pt has been  "intermittently confused he believes 2/2 ETOH withdrawal. Pt is usually able to ambulate without an assistive device. Pt had a fall in the shower 2 weeks ago, no injury occurred. Other than pt not being able to drink or smoke cigarettes anymore, Royce feels pt is safe at home. Pt's address, phone number, and contact information was verified. Royce would like SW assistance with chem dep resources for ETOH abuse, SW notified of consult and discharge plan via secure chat. Royce stated pt cut down cigarette smoking from 2PPD to 4 cigarettes/day, the nicotine patch is currently helping him with cravings. Pt and wife both were every day drinkers/smokers, Royce referred to them as \"two peas in a pod,\" because there was nothing at home for them to do. Royce stated due to the effect ETOH/smoking is having on pt's health, the goal is for both of them to cut drinking/smoking completely. 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    Home care: none.  DME: BP cuff, pulse oximetry   : none.  PCP: (former) FARRAH Butt at [facility] Last appt: 10/5/2023  Transport to appts: Pt or son Royce drives.  Pharm: [facility] - [address] in [place] (denies issues affording/obtaining medications)    Discharge Planning: Per medical team plan is for discharge home today vs tomorrow pending PT eval, pt will need a new nebulizer machine. Rehab columns were updated with request for therapy evals asap 9/1 for discharge. Order for nebulizer machine sent to [place] via [employer] and they were asked to provide nearest DDM location available for . Royce aware we will contact him tomorrow to discuss therapy recs, and provide DDM location for  of nebulizer machine. Royce is in agreement pt will benefit from a visiting nurse for assistance with COPD carepath and  Healthy at Home virtual monitoring. Team aware pt most likely will need home care referral/ Healthy at " Home referral for RN/LPN (COPD carepath) and PT/OT services. Royce voiced no additional questions or concerns related to discharge planning. Care coordinator will continue to follow for discharge planning needs.    Natali Randall RN  Transitional Care Coordinator (TCC)  [phone] or [phone]

## 2024-08-31 NOTE — CONSULTS
"Inpatient consult to Psychiatry  Consult performed by: Farnaz Rogers MD  Consult ordered by: Ann-Marie Mcgrath MD         HISTORY OF PRESENT ILLNESS  Mr. Juan Carlos Cr is a 74 year old male with a past medical history of coronary artery disease s/p PCI 27 yrs ago, HTN, HLD, AUD, and history of tobacco use transferred to [facility] with acute hypoxic respiratory failure and lung mass diagnosed on Chest CT, likely malignancy based on size of mass. On presentation he was hypoxic, had had elevated WBC 15, with nonproductive cough, and was treated empirically for CAP with IV Rocephin. He also had metabolic and electrolyte derangements c/w metabolic alkalosis with respiratory acidosis. Hyponatremic with Na 129 which has since issac corrected. He had brain MRI which showed only nonspecific white matter changes and diffuse parenchymal loss but no evidence of metastatic lesions. He also received new onset diabetes type 2 diagnosis during current stay; sugars have since been stable on mild SSI.     Per primary team, there was concern for altered mentation upon arrival to [facility]. Given history of AUD and concern for possible Wernicke's Encephalopathy, he was started on high dose IV thiamine on day 3 of admission (8/30 - current). He has also been on delirium precautions for duration of his stay. On exam this morning, patient was A&O x4. Family members who visited also felt that patient's mentation had improved significantly over the past few days. They first noted mental status changes about 1 week ago (so prior to admission). Primary team had initially planned to discharge today. However bedside nurse reportedly raised concerns that patient was still having some degree of waxing/waning mentation. Psychiatry consulted for discharge/home-going recommendations given concern that patient may not yet be completely back to baseline.     On interview:   Patient reports he is doing \"OK\" but mentions several times that he wants to go " "home. He states his team told him he would discharge yesterday and then they changed it to today. He states he wants to go home so he can sit on his couch and spend time with his family. He denies any physical complaints and states his appetite has been good. He denies any AH/VH. States he is sleeping \"fine.\" Denies any unusual experiences or thoughts that others are out to get him. Does though admit that some of the people in the hospital can be a little loud which aggravates him. Admits that he is \"sort of in the dumps\" with all these recent medical issues but \"just a little.\" He denies SI/HI.     Collateral:  Per bedside nurse, patient's mentation has fluctuated throughout day; orientation ranging from x2-x4. This is her first day working with him, does not know baseline. Earlier today, he looked very confused when he needed to use bathroom. Ended up getting tangled up in his cords and urinated on himself. He also seemed very confused about how to use the telephone despite her showing pt multiple times. He has not been eating well.     Per collateral from wife, pt was previously very \"with it and active.\" He was very OCD about his yard and gardening. He mowed the lawn as recently as 8/21. She first noticed something was wrong on 8/25. She came downstairs at 9p at night to find him fully dressed and making coffee. He thought it was morning. His appetite has been poor lately. Has not been feeling well for about a week now. She describes him as a \"homebody\" and reports that he likes to stay at home on the couch.     PSYCHIATRIC ROS  As per HPI    MEDICAL ROS  General: DENIES fever, chills  Eyes: DENIES change in vision  ENMT: DENIES sore throat, congestion  Cardiovascular: DENIES chest pain, palpitations  Respiratory: DENIES cough, SOB  Gastrointestinal: DENIES nausea, vomiting, diarrhea, constipation  Genitourinary: DENIES dysuria, discharge  Musculoskeletal: DENIES pain, stiffness  Neurologic: DENIES headache, " weakness, numbness      PAST PSYCHIATRIC HISTORY  Prior Diagnoses: none   Prior Hospitalizations: none  Suicide Attempts/self harm: denies   Outpatient treatment: no  Current psychiatric medications: no      FAMILY HISTORY  Family History   Problem Relation Name Age of Onset    Heart attack Mother      Lung cancer Brother          SOCIAL HISTORY  Currently lives: wife   Work/Finances: retired   Marital history/children: yes   Social support: family   Hx of violence: no     Social History     Socioeconomic History    Marital status:    Tobacco Use    Smoking status: Every Day     Current packs/day: 0.50     Types: Cigarettes   Vaping Use    Vaping status: Never Used   Substance and Sexual Activity    Alcohol use: Not Currently     Comment: 4-5 beers and a glass a wine a day    Drug use: Never     Social Determinants of Health     Financial Resource Strain: Low Risk  (8/31/2024)    Overall Financial Resource Strain (CARDIA)     Difficulty of Paying Living Expenses: Not hard at all   Transportation Needs: No Transportation Needs (8/31/2024)    PRAPARE - Transportation     Lack of Transportation (Medical): No     Lack of Transportation (Non-Medical): No   Housing Stability: Low Risk  (8/31/2024)    Housing Stability Vital Sign     Unable to Pay for Housing in the Last Year: No     Number of Times Moved in the Last Year: 0     Homeless in the Last Year: No        SUBSTANCE HISTORY  Alcohol: yes; per H&P drinks at least 12 beers and 1 glass of wine daily. Wife told writer only 3-4 beers and 1 glass of wine. Per wife, wine was added after MI 27 years ago for cardioprotection. She reports he has drank heavier in past and used to sneak whiskey. No history of LB treatment. Has etoh withdrawal 27 years ago when hospitalized following MI that required medical intervention but no hx seizure/DT.   Tobacco: yes; ppd unknown         He  reports that he has been smoking cigarettes. He does not have any smokeless tobacco  history on file. He reports that he does not currently use alcohol. He reports that he does not use drugs.    PAST MEDICAL HISTORY  Past Medical History:   Diagnosis Date    Acute myocardial infarction, unspecified (Multi) 2013    Acute myocardial infarction    Encounter for screening for malignant neoplasm of prostate 2015    Encounter for prostate cancer screening    Hypertension     Personal history of other diseases of the nervous system and sense organs 2017    History of cataract      PAST SURGICAL HISTORY  Past Surgical History:   Procedure Laterality Date    CATARACT EXTRACTION  2018    Cataract Surgery    CATARACT EXTRACTION  2014    Cataract Surgery    CORONARY ANGIOPLASTY WITH STENT PLACEMENT  2013    Cath Stent Placement        HOME MEDICATIONS  Medication Documentation Review Audit       Reviewed by Leslei Trujillo MD (Anesthesiologist) on 24 at 1107      Medication Order Taking? Sig Documenting Provider Last Dose Status   acetaminophen (Tylenol) tablet 975 mg [id number]   Dev THU Sheldon MD  Active   aspirin 81 mg EC tablet [id number]  Take 1 tablet (81 mg) by mouth once daily. Historical Provider, MD  Active   aspirin chewable tablet 81 mg [id number]   Porfirio Sheldon MD  Active   azithromycin (Zithromax) tablet 500 mg [id number]   Porfirio Sheldon MD   24 0847   cefTRIAXone (Rocephin) 1 g in dextrose (iso) IV 50 mL [id number]   Porfirio Sheldon MD  Active   dextrose 50 % injection 12.5 g [id number]   Rosa Brown MD  Active   dextrose 50 % injection 25 g [id number]   Rosa Brown MD  Active   diazePAM (Valium) tablet 10 mg [id number]   Porfirio Sheldon MD  Active   enoxaparin (Lovenox) syringe 40 mg [id number]   Porfirio Sheldon MD  Active   fluticasone furoate-vilanteroL (Breo Ellipta) 200-25 mcg/dose inhaler 1 puff [id number]   Porfirio Sheldon MD  Active   folic acid (Folvite) tablet 1 mg [id number]   Porfirio Sheldon MD  Active   gadoterate meglumine (Dotarem) 0.5 mmol/mL  contrast injection 15 mL [id number]   Ann-Marie Mcgrath MD   24 1243   glucagon (Glucagen) injection 1 mg [id number]   Rosa Brown MD  Active   glucagon (Glucagen) injection 1 mg [id number]   Rosa Brown MD  Active   insulin lispro (HumaLOG) injection 0-5 Units [id number]   Rosa Brown MD  Active   ipratropium-albuteroL (Duo-Neb) 0.5-2.5 mg/3 mL nebulizer solution 3 mL [id number]   Porfirio Sheldon MD  Active   melatonin tablet 3 mg [id number]   Bisi Hicks MD  Active   metoprolol tartrate (Lopressor) 50 mg tablet [id number] No Take 1 tablet by mouth every 12 hours. Thanh Cuba MD 2024 Active   metoprolol tartrate (Lopressor) tablet 50 mg [id number]   Rosa Brown MD  Active   multivitamin with minerals 1 tablet [id number]   Porfirio Sheldon MD  Active   nicotine (Nicoderm CQ) 14 mg/24 hr patch 1 patch [id number]   Porfirio Sheldon MD  Active   nicotine (Nicoderm CQ) 21 mg/24 hr patch 1 patch [id number]   Porfirio Sheldon MD  Active   nicotine (Nicoderm CQ) 7 mg/24 hr patch 1 patch [id number]   Porfirio Sheldon MD  Active   oxygen (O2) therapy [id number]   Porfirio Sheldon MD  Active   perflutren lipid microspheres (Definity) injection 0.5-10 mL of dilution [id number]   Porfirio Sheldon MD   24 1044   polyethylene glycol (Glycolax, Miralax) packet 17 g [id number]   Porfirio Sheldon MD  Active   simvastatin (Zocor) 20 mg tablet [id number] No Take 1 tablet (20 mg) by mouth once daily. Thanh Cuba MD 2024 Active   simvastatin (Zocor) tablet 20 mg [id number]   Rosa Brown MD  Active   sodium chloride 3 % nebulizer solution 3 mL [id number]   Rosa Brown MD  Active   thiamine (Vitamin B-1) tablet 100 mg [id number]   Porfirio Sheldon MD  Active   thiamine (Vitamin B1) injection 100 mg [id number]   Porfirio Sheldon MD   24 0847   tiotropium (Spiriva Respimat) 2.5 mcg/actuation inhaler 2 puff [id number]   Porfirio Sheldon MD  Active                     CURRENT MEDICATIONS  Scheduled  "medications  [START ON 9/1/2024] amLODIPine, 10 mg, oral, Daily  apixaban, 5 mg, oral, q12h  aspirin, 81 mg, oral, Daily  cefTRIAXone, 1 g, intravenous, q24h  fluticasone furoate-vilanteroL, 1 puff, inhalation, Daily  folic acid, 1 mg, oral, Daily  insulin lispro, 0-5 Units, subcutaneous, TID  melatonin, 3 mg, oral, Daily  metoprolol tartrate, 50 mg, oral, q12h  multivitamin with minerals, 1 tablet, oral, Daily  nicotine, 1 patch, transdermal, Daily   Followed by  [START ON 10/8/2024] nicotine, 1 patch, transdermal, Daily   Followed by  [START ON 10/22/2024] nicotine, 1 patch, transdermal, Daily  polyethylene glycol, 17 g, oral, Daily  sennosides, 1 tablet, oral, Nightly  simvastatin, 20 mg, oral, Daily  [START ON 9/2/2024] thiamine, 100 mg, oral, Daily  thiamine, 500 mg, intravenous, q8h MASSIEL  tiotropium, 2 puff, inhalation, Daily        Continuous medications       PRN medications  PRN medications: acetaminophen, dextrose, dextrose, diazePAM, glucagon, glucagon, ipratropium-albuteroL, oxygen, sodium chloride     ALLERGIES  Patient has no known allergies.      OBJECTIVE  VITALS  /84   Pulse 83   Temp 36.5 °C (97.7 °F)   Resp 18   SpO2 94%   There is no height or weight on file to calculate BMI.  No height and weight on file for this encounter.  Wt Readings from Last 4 Encounters:   08/26/24 74.7 kg (164 lb 10.9 oz)   09/30/22 80.2 kg (176 lb 12.8 oz)   09/24/21 80.5 kg (177 lb 6.4 oz)   09/25/20 80.8 kg (178 lb 3.2 oz)       MENTAL STATUS EXAM  General: NAD  Appearance: thin male, lying in bed wearing hospital gown   Attitude: calm, cooperative  Behavior: appropriate eye contact  Motor Activity: overall psychomotor slowing, shuffling gait   Speech: responds to questions but rarely initiates conversation, otherwise WNL   Mood: \"a little down in the dumps\"   Affect: slightly restricted but stable, congruent with mood  Thought Content: Denies SI/HI. No delusions elicited.  Thought Perception: Denies AH/VH. " "Does not appear to be responding to internal stimuli.  Thought Process: organized, linear, goal-directed, associations were logical  Cognition: oriented to person, place and superficially to situation. States \"4\" when asked what day it is today. States current president is \"Obama\" and unable to discuss any recent current events. Seems confused at times especially when given multi step instructions eg, finger to nose test. Unsteady gait but otherwise no focal neuro deficits. CN 2-12 intact.   Insight: fair  Judgement: fair    LABS  Results for orders placed or performed during the hospital encounter of 08/27/24 (from the past 24 hour(s))   POCT GLUCOSE   Result Value Ref Range    POCT Glucose 124 (H) 74 - 99 mg/dL   CBC   Result Value Ref Range    WBC 9.7 4.4 - 11.3 x10*3/uL    nRBC 0.0 0.0 - 0.0 /100 WBCs    RBC 4.45 (L) 4.50 - 5.90 x10*6/uL    Hemoglobin 13.5 13.5 - 17.5 g/dL    Hematocrit 41.3 41.0 - 52.0 %    MCV 93 80 - 100 fL    MCH 30.3 26.0 - 34.0 pg    MCHC 32.7 32.0 - 36.0 g/dL    RDW 11.5 11.5 - 14.5 %    Platelets 214 150 - 450 x10*3/uL   Renal Function Panel   Result Value Ref Range    Glucose 104 (H) 74 - 99 mg/dL    Sodium 137 136 - 145 mmol/L    Potassium 3.2 (L) 3.5 - 5.3 mmol/L    Chloride 98 98 - 107 mmol/L    Bicarbonate 30 21 - 32 mmol/L    Anion Gap 12 10 - 20 mmol/L    Urea Nitrogen 20 6 - 23 mg/dL    Creatinine 0.77 0.50 - 1.30 mg/dL    eGFR >90 >60 mL/min/1.73m*2    Calcium 12.0 (H) 8.6 - 10.6 mg/dL    Phosphorus 2.7 2.5 - 4.9 mg/dL    Albumin 3.2 (L) 3.4 - 5.0 g/dL   Magnesium   Result Value Ref Range    Magnesium 1.91 1.60 - 2.40 mg/dL   POCT GLUCOSE   Result Value Ref Range    POCT Glucose 98 74 - 99 mg/dL   POCT GLUCOSE   Result Value Ref Range    POCT Glucose 150 (H) 74 - 99 mg/dL   POCT GLUCOSE   Result Value Ref Range    POCT Glucose 145 (H) 74 - 99 mg/dL        IMAGING  No results found.       PSYCHIATRIC RISK ASSESSMENT  Violence Risk Factors:  male and substance abuse   Acute Risk " of Harm to Others is Considered: Low  Suicide Risk Factors: male, age > 65 years old , chronic medical illness, and substance abuse   Protective Factors: social support/connectedness, positive family relationships, and hopefulness/future-orientation  Acute Risk of Harm to Self is Considered: Low    ASSESSMENT AND PLAN  Mr. Juan Carlos Cr is a 74 year old male with a past medical history of coronary artery disease s/p PCI 27 yrs ago, HTN, HLD, AUD, and history of tobacco use transferred to [facility] with acute hypoxic respiratory failure and lung mass diagnosed on Chest CT, likely malignancy based on size of mass. Hospitalization has also been complicated by electrolyte abnormalities, newly diagnosed diabetes, pneumonia, and waxing/waning mentation. Psychiatry was consulted for input regarding his mentation with regards to disposition planning.     On initial evaluation, patient is calm and cooperative. Affect is slightly restricted but stable. He is oriented to person, place, and superficially to superficially to situation but not time. He seems confused when given more complex instructions. He has shuffling and somewhat ataxic gait but otherwise no neurological deficits on exam. Collateral from family suggests that patient is still not close to prior baseline before he got ill 1 week ago. Certainly possible this is related to resolving delirium (given numerous risk factors - hospitalization, older age, metabolic/electrolyte derangements, lung infection) where patient will continue to move in direction of his baseline. However, can not rule out Wernicke's Encephalopathy with thiamine deficiency that was previously being masked by chronic daily heavy alcohol consumption especially given ataxic gait and observation that patient often seems very confused. Paraneoplastic syndrome also remains on differential; further workup may be warranted especially if mentation does not continue to improve with time. No evidence to  "suggest current alcohol withdrawal.     With that being said, no acute safety concerns from psychiatric standpoint. No evidence to suggest snehal or psychosis. He has no history of SI or prior suicide attempt. Given report of acute change in mental status and function 2 weeks ago, it is unclear if current presentation is part of delirium from current admission, baseline cognition may be clouded by daily etoh, or if there is more going on such as paraneoplastic process related to suspected malignancy.     IMPRESSION  Delirium, multifactorial;   r/o Wernicke's Encephalopathy  Alcohol use disorder     RECOMMENDATIONS  -Patient does not currently meet criteria for inpatient psychiatric admission.  - To evaluate decision-making capacity, recommend use of the Capacity Evaluation Tool. Search “[facility] Capacity Evaluation\" under SmartText   - Defer to primary team decision for 1:1 sitter.  - As with all hospitalized patients, would recommend delirium precautions, as below.  -Defer to primary team decision for 1:1 sitter for observation, elopement, other indication.    Medications:  - Recommend continuing high dose IV Thiamine repletion while inpatient.  - START multivitamin and continue PO folate supplement daily.  - Agree with melatonin 3 mg PO at 1800 to help with sleep.   - Minimize use of deliriogenic medications such as benzodiazepines, anticholinergic medications, and opiates (while ensuring adequate treatment of pain).    Other:  -Recommend PT/OT assessment while inpatient to assess home going needs.  - Agree with delirium precautions   - If patient's mentation does not improve, would consider workup for possible paraneoplastic syndrome including EEG and/or LP  - Additional collateral from son may be helpful to get better idea of patient's baseline functioning.    Delirium Guidelines  Non-Pharmacologic:  -Assess visual and hearing impairments and provide aids and communication boards.  -Assess immobility and advocate " for early evaluation and intervention by physical therapy, out of bed when medically indicated, and expeditious removal of tethers.  -Promote physiologic sleep and maintenance of sleep/wake cycle by ensuring blinds are open during the day, maintaining dark/quiet room at night with minimal interruptions, and minimizing daytime naps.  -Minimize room and staff changes.  -Engage the patient in cognitively stimulating activities and provide frequent reorientation.     Pharmacologic:  -Assess and treat disruption in bowel and bladder function.   -Assess and treat abnormalities in nutrition and hydration status.     --Minimize use of restraints to situations where necessary to keep patient and staff safe and to prevent patient from removing lines, tubes, medical devices, dressings, etc. Please check ROM every TWO hours whenever patient is actively restrained. Continue to regularly re-evaluate continued need for any ordered restraints.     --reviewed CBC with diff, CMP, Vitamin B12, Vitamin D, Folate,  HIV,  wnl consider TSH, VDRL   --consider U/A with worsening in mental status    Ancillary Services: patient may benefit from , pet/music/art therapy consult      -Discussed recommendations with primary team.  -Psychiatry will continue to follow.    Thank you for allowing us to participate in the care of this patient. Please page [phone] with any questions or concerns.    Patient staffed/discussed with attending, Dr. Tripp, who agrees with above plan.    Farnaz Rogers MD  Post Pediatric Portal Fellow, PGY6    Medication Consent  Medication Consent: n/a; consult service

## 2024-08-31 NOTE — CARE PLAN
The patient's goals for the shift include      The clinical goals for the shift include pt .will remain safe.    Over the shift, the patient did not make progress toward the following goals. Barriers to progression include . Recommendations to address these barriers include .

## 2024-09-01 ENCOUNTER — APPOINTMENT (OUTPATIENT)
Dept: RADIOLOGY | Facility: HOSPITAL | Age: 74
DRG: 189 | End: 2024-09-01
Payer: MEDICARE

## 2024-09-01 ENCOUNTER — PHARMACY VISIT (OUTPATIENT)
Dept: PHARMACY | Facility: CLINIC | Age: 74
End: 2024-09-01
Payer: COMMERCIAL

## 2024-09-01 VITALS
OXYGEN SATURATION: 97 % | TEMPERATURE: 98.1 F | RESPIRATION RATE: 16 BRPM | HEART RATE: 74 BPM | SYSTOLIC BLOOD PRESSURE: 143 MMHG | DIASTOLIC BLOOD PRESSURE: 83 MMHG

## 2024-09-01 LAB
ALBUMIN SERPL BCP-MCNC: 3.2 G/DL (ref 3.4–5)
ANION GAP SERPL CALC-SCNC: 11 MMOL/L (ref 10–20)
BUN SERPL-MCNC: 19 MG/DL (ref 6–23)
CALCIUM SERPL-MCNC: 12 MG/DL (ref 8.6–10.6)
CHLORIDE SERPL-SCNC: 100 MMOL/L (ref 98–107)
CO2 SERPL-SCNC: 30 MMOL/L (ref 21–32)
CREAT SERPL-MCNC: 0.84 MG/DL (ref 0.5–1.3)
EGFRCR SERPLBLD CKD-EPI 2021: >90 ML/MIN/1.73M*2
ERYTHROCYTE [DISTWIDTH] IN BLOOD BY AUTOMATED COUNT: 11.6 % (ref 11.5–14.5)
GLUCOSE BLD MANUAL STRIP-MCNC: 110 MG/DL (ref 74–99)
GLUCOSE BLD MANUAL STRIP-MCNC: 119 MG/DL (ref 74–99)
GLUCOSE BLD MANUAL STRIP-MCNC: 199 MG/DL (ref 74–99)
GLUCOSE SERPL-MCNC: 103 MG/DL (ref 74–99)
HCT VFR BLD AUTO: 44.7 % (ref 41–52)
HGB BLD-MCNC: 14 G/DL (ref 13.5–17.5)
HIV 1+2 AB+HIV1 P24 AG SERPL QL IA: NONREACTIVE
MAGNESIUM SERPL-MCNC: 2.09 MG/DL (ref 1.6–2.4)
MCH RBC QN AUTO: 30.8 PG (ref 26–34)
MCHC RBC AUTO-ENTMCNC: 31.3 G/DL (ref 32–36)
MCV RBC AUTO: 98 FL (ref 80–100)
NRBC BLD-RTO: 0 /100 WBCS (ref 0–0)
PHOSPHATE SERPL-MCNC: 2.6 MG/DL (ref 2.5–4.9)
PLATELET # BLD AUTO: 185 X10*3/UL (ref 150–450)
POTASSIUM SERPL-SCNC: 3.5 MMOL/L (ref 3.5–5.3)
RBC # BLD AUTO: 4.55 X10*6/UL (ref 4.5–5.9)
SODIUM SERPL-SCNC: 137 MMOL/L (ref 136–145)
TREPONEMA PALLIDUM IGG+IGM AB [PRESENCE] IN SERUM OR PLASMA BY IMMUNOASSAY: NONREACTIVE
WBC # BLD AUTO: 9.2 X10*3/UL (ref 4.4–11.3)

## 2024-09-01 PROCEDURE — 2500000004 HC RX 250 GENERAL PHARMACY W/ HCPCS (ALT 636 FOR OP/ED)

## 2024-09-01 PROCEDURE — 94640 AIRWAY INHALATION TREATMENT: CPT

## 2024-09-01 PROCEDURE — 76705 ECHO EXAM OF ABDOMEN: CPT

## 2024-09-01 PROCEDURE — 97161 PT EVAL LOW COMPLEX 20 MIN: CPT | Mod: GP

## 2024-09-01 PROCEDURE — 76705 ECHO EXAM OF ABDOMEN: CPT | Performed by: STUDENT IN AN ORGANIZED HEALTH CARE EDUCATION/TRAINING PROGRAM

## 2024-09-01 PROCEDURE — S4991 NICOTINE PATCH NONLEGEND: HCPCS

## 2024-09-01 PROCEDURE — 2500000001 HC RX 250 WO HCPCS SELF ADMINISTERED DRUGS (ALT 637 FOR MEDICARE OP)

## 2024-09-01 PROCEDURE — 36415 COLL VENOUS BLD VENIPUNCTURE: CPT

## 2024-09-01 PROCEDURE — 2500000002 HC RX 250 W HCPCS SELF ADMINISTERED DRUGS (ALT 637 FOR MEDICARE OP, ALT 636 FOR OP/ED)

## 2024-09-01 PROCEDURE — 82947 ASSAY GLUCOSE BLOOD QUANT: CPT

## 2024-09-01 PROCEDURE — 99239 HOSP IP/OBS DSCHRG MGMT >30: CPT | Performed by: INTERNAL MEDICINE

## 2024-09-01 PROCEDURE — 97165 OT EVAL LOW COMPLEX 30 MIN: CPT | Mod: GO

## 2024-09-01 PROCEDURE — 80069 RENAL FUNCTION PANEL: CPT

## 2024-09-01 PROCEDURE — 85027 COMPLETE CBC AUTOMATED: CPT

## 2024-09-01 PROCEDURE — 83735 ASSAY OF MAGNESIUM: CPT

## 2024-09-01 PROCEDURE — RXMED WILLOW AMBULATORY MEDICATION CHARGE

## 2024-09-01 RX ORDER — SODIUM CHLORIDE FOR INHALATION 3 %
3 VIAL, NEBULIZER (ML) INHALATION EVERY 6 HOURS
Status: DISCONTINUED | OUTPATIENT
Start: 2024-09-01 | End: 2024-09-01 | Stop reason: HOSPADM

## 2024-09-01 RX ADMIN — NICOTINE 1 PATCH: 21 PATCH, EXTENDED RELEASE TRANSDERMAL at 10:21

## 2024-09-01 RX ADMIN — APIXABAN 5 MG: 5 TABLET, FILM COATED ORAL at 17:16

## 2024-09-01 RX ADMIN — SIMVASTATIN 20 MG: 20 TABLET, FILM COATED ORAL at 17:15

## 2024-09-01 RX ADMIN — AMLODIPINE BESYLATE 10 MG: 10 TABLET ORAL at 10:19

## 2024-09-01 RX ADMIN — FLUTICASONE FUROATE AND VILANTEROL TRIFENATATE 1 PUFF: 200; 25 POWDER RESPIRATORY (INHALATION) at 10:49

## 2024-09-01 RX ADMIN — ASPIRIN 81 MG 81 MG: 81 TABLET ORAL at 10:18

## 2024-09-01 RX ADMIN — Medication 1 TABLET: at 10:18

## 2024-09-01 RX ADMIN — POLYETHYLENE GLYCOL 3350 17 G: 17 POWDER, FOR SOLUTION ORAL at 10:18

## 2024-09-01 RX ADMIN — METOPROLOL TARTRATE 50 MG: 50 TABLET, FILM COATED ORAL at 10:55

## 2024-09-01 RX ADMIN — THIAMINE HYDROCHLORIDE 500 MG: 100 INJECTION, SOLUTION INTRAMUSCULAR; INTRAVENOUS at 05:48

## 2024-09-01 RX ADMIN — FOLIC ACID 1 MG: 1 TABLET ORAL at 10:19

## 2024-09-01 RX ADMIN — METOPROLOL TARTRATE 50 MG: 50 TABLET, FILM COATED ORAL at 17:16

## 2024-09-01 RX ADMIN — TIOTROPIUM BROMIDE INHALATION SPRAY 2 PUFF: 3.12 SPRAY, METERED RESPIRATORY (INHALATION) at 10:50

## 2024-09-01 RX ADMIN — APIXABAN 5 MG: 5 TABLET, FILM COATED ORAL at 05:48

## 2024-09-01 ASSESSMENT — LIFESTYLE VARIABLES
HEADACHE, FULLNESS IN HEAD: NOT PRESENT
NAUSEA AND VOMITING: NO NAUSEA AND NO VOMITING
AGITATION: NORMAL ACTIVITY
ANXIETY: NO ANXIETY, AT EASE
PAROXYSMAL SWEATS: NO SWEAT VISIBLE
ORIENTATION AND CLOUDING OF SENSORIUM: ORIENTED AND CAN DO SERIAL ADDITIONS
TOTAL SCORE: 0
AUDITORY DISTURBANCES: NOT PRESENT
TREMOR: NO TREMOR
VISUAL DISTURBANCES: NOT PRESENT

## 2024-09-01 ASSESSMENT — COGNITIVE AND FUNCTIONAL STATUS - GENERAL
WALKING IN HOSPITAL ROOM: A LITTLE
DAILY ACTIVITIY SCORE: 24
CLIMB 3 TO 5 STEPS WITH RAILING: A LOT
MOVING TO AND FROM BED TO CHAIR: A LITTLE
MOBILITY SCORE: 20

## 2024-09-01 ASSESSMENT — PAIN - FUNCTIONAL ASSESSMENT
PAIN_FUNCTIONAL_ASSESSMENT: 0-10
PAIN_FUNCTIONAL_ASSESSMENT: 0-10

## 2024-09-01 ASSESSMENT — ACTIVITIES OF DAILY LIVING (ADL)
ADL_ASSISTANCE: INDEPENDENT
BATHING_ASSISTANCE: INDEPENDENT
ADL_ASSISTANCE: INDEPENDENT

## 2024-09-01 ASSESSMENT — PAIN SCALES - GENERAL
PAINLEVEL_OUTOF10: 0 - NO PAIN

## 2024-09-01 NOTE — PROGRESS NOTES
Occupational Therapy    Evaluation    Patient Name: Juan Carlos Cr  MRN: [id number]  Today's Date: 9/1/2024  Room: [address] [place] [address]  Time Calculation  Start Time: 0916  Stop Time: 0929  Time Calculation (min): 13 min    Assessment  IP OT Assessment  OT Assessment: Pt presents near reported baseline demonstrating IND-CGA for functional mobility, transfers, ADLs, simulated ADLs, balance and endurance. Pt does present with some cognitive deficits but this is likely near baseline as well although formal report of cognition unknown. Pt is safe to return home with current assistance available from his wife. No skilled OT needs at this time.  Prognosis: Good  Barriers to Discharge: None  Evaluation/Treatment Tolerance: Patient tolerated treatment well  Medical Staff Made Aware: Yes  End of Session Communication: Bedside nurse  End of Session Patient Position: Bed, 3 rail up, Alarm off, not on at start of session  Plan:  Inpatient Plan  No Skilled OT: At baseline function  OT Frequency: OT eval only  OT Discharge Recommendations: No further acute OT, No OT needed after discharge  OT Recommended Transfer Status: Assist of 1  OT - OK to Discharge: Yes  OT Assessment  OT Assessment Results: Decreased safe judgment during ADL, Decreased cognition  Prognosis: Good  Barriers to Discharge: None  Evaluation/Treatment Tolerance: Patient tolerated treatment well  Medical Staff Made Aware: Yes  Strengths: Capable of completing ADLs semi/independent, Premorbid level of function  Barriers to Participation: Other (Comment) (Cognition)    Subjective   Current Problem:  1. Atrial fibrillation, unspecified type (Multi)  Transthoracic Echo (TTE) Complete    Transthoracic Echo (TTE) Complete    Surgical Pathology Exam    Surgical Pathology Exam    Cytology Consultation (Non-Gynecologic)    Cytology Consultation (Non-Gynecologic)      2. COPD exacerbation (Multi)  Surgical Pathology Exam    Surgical Pathology Exam    Cytology Consultation  (Non-Gynecologic)    Cytology Consultation (Non-Gynecologic)    fluticasone furoate-vilanteroL (Breo Ellipta) 200-25 mcg/dose inhaler    tiotropium (Spiriva Respimat) 2.5 mcg/actuation inhaler    Referral to Healthy at Home Program    Referral to Home Care      3. Coronary artery disease involving native heart without angina pectoris, unspecified vessel or lesion type  Transthoracic Echo (TTE) Complete    Transthoracic Echo (TTE) Complete    Surgical Pathology Exam    Surgical Pathology Exam    Cytology Consultation (Non-Gynecologic)    Cytology Consultation (Non-Gynecologic)      4. Hilar mass  Bronchoscopy Diagnostic, w EBUS    Bronchoscopy Diagnostic, w EBUS    Surgical Pathology Exam    Surgical Pathology Exam    Cytology Consultation (Non-Gynecologic)    Cytology Consultation (Non-Gynecologic)    Home Nebulizer    Referral to Home Care    CANCELED: Home Nebulizer      5. Longstanding persistent atrial fibrillation (Multi)  apixaban (Eliquis) 5 mg tablet      6. Pulmonary nodules  sodium chloride 3 % nebulizer solution    Referral to Healthy at Home Program    Referral to Pulmonology    Referral to Hematology and Oncology    Referral to Home Care      7. Smoking trying to quit  nicotine (Nicoderm CQ) 21 mg/24 hr patch    nicotine (Nicoderm CQ) 14 mg/24 hr patch    nicotine (Nicoderm CQ) 7 mg/24 hr patch    Referral to Healthy at Home Program    Referral to Home Care      8. Type 2 diabetes mellitus without complication, without long-term current use of insulin (Multi)  metFORMIN (Glucophage) 500 mg tablet    Referral to Healthy at Home Program    Referral to Home Care      9. Uncomplicated alcohol dependence (Multi)  folic acid (Folvite) 1 mg tablet    melatonin 3 mg tablet    multivitamin with minerals tablet    thiamine (Vitamin B-1) 100 mg tablet    Referral to Healthy at Home Program      10. Hypertension, unspecified type  amLODIPine (Norvasc) 5 mg tablet      11. Paroxysmal atrial fibrillation (Multi)   Referral to Healthy at Home Program    Referral to Cardiology    Referral to Home Care        General:  Reason for Referral: transferred to [facility] with acute hypoxic respiratory failure and lung mass diagnosed on Chest CT  Past Medical History Relevant to Rehab: coronary artery disease s/p PCI 27 yrs ago, HTN, HLD, AUD, and history of tobacco use  Co-Treatment: PT  Co-Treatment Reason: Expedite discharge planning  Prior to Session Communication: Bedside nurse  Patient Position Received: Bed, 3 rail up, Alarm off, not on at start of session  Family/Caregiver Present: No  General Comment: Pt supine in bed upon arrival. Pleasant and agreeable to OT eval. Pt expressing desire to return home.   Precautions:  Medical Precautions: Fall precautions    Pain:  Pain Assessment  Pain Assessment: 0-10  0-10 (Numeric) Pain Score: 0 - No pain    Objective   Cognition:  Overall Cognitive Status: Impaired  Arousal/Alertness: Delayed responses to stimuli  Orientation Level: Disoriented to time, Disoriented to situation (stated he came in for his heart, reported 1998)  Following Commands: Follows one step commands with repetition  Safety Judgment: Decreased awareness of need for assistance  Problem Solving: Assistance required to identify errors made  Insight: Mild  Impulsive: Within functional limits  Processing Speed: Delayed    Home Living:  Type of Home: House  Lives With: Spouse  Home Adaptive Equipment: Walker rolling or standard, Cane  Home Layout: Two level, Laundry in basement, Stairs to alternate level with rails  Alternate Level Stairs-Rails:  (1)  Alternate Level Stairs-Number of Steps: 6  Home Access: Stairs to enter without rails  Entrance Stairs-Rails: None  Entrance Stairs-Number of Steps: 2  Bathroom Shower/Tub: Tub/shower unit  Bathroom Toilet: Standard  Bathroom Equipment: None   Prior Function:  Level of [place]: Independent with ADLs and functional transfers, Independent with homemaking with ambulation  ADL  "Assistance: Independent  Homemaking Assistance: Independent  Ambulatory Assistance: Independent  Hand Dominance: Left  IADL History:  Homemaking Responsibilities: Yes  Homemaking Comments: All homemaking tasks shared between pt and his spouse per report  Current License: Yes  Mode of Transportation: Car  Occupation: Retired  Type of Occupation: \"You name it\"  Leisure and Hobbies: Gardening  ADL:  Eating Assistance: Independent (Anticipated)  Grooming Assistance: Independent (Anticipated)  Bathing Assistance: Independent (Anticipated)  UE Dressing Assistance: Independent (Anticipated)  LE Dressing Assistance: Independent (Anticipated)  Toileting Assistance with Device: Independent (Anticipated)  Activity Tolerance:  Endurance: Tolerates 10 - 20 min exercise with multiple rests  Balance:  Dynamic Sitting Balance  Dynamic Sitting-Comments: SBA  Dynamic Standing Balance  Dynamic Standing-Comments: CGA  Static Sitting Balance  Static Sitting-Comment/Number of Minutes: IND  Static Standing Balance  Static Standing-Comment/Number of Minutes: SBA-CGA  Bed Mobility/Transfers: Bed Mobility  Bed Mobility: Yes  Bed Mobility 1  Bed Mobility 1: Supine to sitting, Sitting to supine  Level of Assistance 1: Distant supervision  Functional Mobility  Functional Mobility Performed: Yes  Functional Mobility 1  Comments 1: Pt ambulated MIN household distance without AD and with CGA   and Transfers  Transfer: Yes  Transfer 1  Transfer From 1: Bed to, Stand to  Transfer to 1: Stand, Bed  Technique 1: Sit to stand, Stand to sit  Transfer Level of Assistance 1: Close supervision, Minimal verbal cues  Trials/Comments 1: VCs for positioning  IADL's:   Homemaking Responsibilities: Yes  Homemaking Comments: All homemaking tasks shared between pt and his spouse per report  Current License: Yes  Mode of Transportation: Car  Occupation: Retired  Type of Occupation: \"You name it\"  Leisure and Hobbies: Gardening  Vision: Vision - Basic " Assessment  Current Vision: No visual deficits   and Vision - Complex Assessment  Ocular Range of Motion: Within Functional Limits  Sensation:  Light Touch: No apparent deficits (Denies N/T BUE)  Strength:  Strength Comments: BUE WFL- grossly 4/5  Perception:  Inattention/Neglect: Appears intact  Coordination:  Movements are Fluid and Coordinated: Yes   Hand Function:  Hand Function  Gross Grasp: Functional  Coordination: Functional  Extremities: RUE   RUE : Within Functional Limits, LUE   LUE: Within Functional Limits    Outcome Measures: [facility] Daily Activity  Putting on and taking off regular lower body clothing: None  Bathing (including washing, rinsing, drying): None  Putting on and taking off regular upper body clothing: None  Toileting, which includes using toilet, bedpan or urinal: None  Taking care of personal grooming such as brushing teeth: None  Eating Meals: None  Daily Activity - Total Score: 24         ,     OT Adult Other Outcome Measures  4AT: +    Education Documentation  Body Mechanics, taught by Oliver Thakur OT at 9/1/2024  2:20 PM.  Learner: Patient  Readiness: Acceptance  Method: Explanation, Demonstration  Response: Verbalizes Understanding, Demonstrated Understanding    Precautions, taught by Oliver Thakur OT at 9/1/2024  2:20 PM.  Learner: Patient  Readiness: Acceptance  Method: Explanation, Demonstration  Response: Verbalizes Understanding, Demonstrated Understanding    Education Comments  No comments found.      09/01/24 at 2:33 PM   Oliver Thakur OT   Rehab Office: [phone]

## 2024-09-01 NOTE — PROGRESS NOTES
09/01/24 1750   Discharge Planning   Home or Post Acute Services In home services  (Healthy at Home Program)   Expected Discharge Disposition  Services  ([facility])   Does the patient need discharge transport arranged? No       Patient will discharge to home. Team to place a Healthy at Home Referral. Team will place consult for Dr. Stockton to follow for home care orders due to patient having a [facility] Provider. Final home care orders sent to [facility]. SOC confirmed for 9/4.     9/3/24 Updates: [employer] will deliver the patient nebulizer to the home today.

## 2024-09-01 NOTE — PROGRESS NOTES
Physical Therapy    Physical Therapy Evaluation    Patient Name: Juan Carlos Cr  MRN: [id number]  Today's Date: 9/1/2024   Time Calculation  Start Time: 0916  Stop Time: 0928  Time Calculation (min): 12 min    Assessment/Plan   PT Assessment  PT Assessment Results: Decreased endurance, Impaired balance, Decreased mobility, Decreased safety awareness  Rehab Prognosis: Good  Evaluation/Treatment Tolerance: Patient tolerated treatment well  Medical Staff Made Aware: Yes  Strengths: Ability to acquire knowledge, Attitude of self  End of Session Communication: Bedside nurse  Assessment Comment: Pt with god tolerance to activity. Pt was able to ambulate 50ft x 2 with no AD and CGA. Pt required cueing for safety during mobility 2/2 decreased insight. Patient would benefit from skilled physical therapy to maximize functional mobility and safety. Pt is appropriate for low intensity therapy when medically appropriate for DC.  End of Session Patient Position: Bed, 3 rail up, Alarm off, not on at start of session  IP OR SWING BED PT PLAN  Inpatient or Swing Bed: Inpatient  PT Plan  Treatment/Interventions: Bed mobility, Transfer training, Gait training, Stair training, Balance training, Strengthening, Endurance training, Therapeutic exercise, Therapeutic activity  PT Plan: Ongoing PT  PT Frequency: 3 times per week  PT Discharge Recommendations: Low intensity level of continued care  Equipment Recommended upon Discharge:  (none)  PT Recommended Transfer Status: Assist x1, Stand by assist  PT - OK to Discharge: Yes (meaning pt seen and dc rec made)  RN cleared prior to session    Subjective   General Visit Information:  General  Reason for Referral: transferred to [facility] with acute hypoxic respiratory failure and lung mass diagnosed on Chest CT  Past Medical History Relevant to Rehab: coronary artery disease s/p PCI 27 yrs ago, HTN, HLD, AUD, and history of tobacco use  Family/Caregiver Present: No  Co-Treatment:  OT  Co-Treatment Reason: Expedite discharge planning  Prior to Session Communication: Bedside nurse  Patient Position Received: Bed, 3 rail up, Alarm off, not on at start of session  Preferred Learning Style: auditory, verbal  General Comment: Pt pleasant and agreeable to therapy  Home Living:  Home Living  Type of Home: House  Lives With: Spouse  Home Adaptive Equipment: Walker rolling or standard, Cane  Home Layout: Two level, Laundry in basement, Stairs to alternate level with rails  Alternate Level Stairs-Rails: Right  Alternate Level Stairs-Number of Steps: 6  Home Access: Stairs to enter without rails  Entrance Stairs-Rails: None  Entrance Stairs-Number of Steps: 2  Bathroom Shower/Tub: Tub/shower unit  Bathroom Toilet: Standard  Bathroom Equipment: None  Home Living Comments: 6 steps to basement for laundry  Prior Level of Function:  Prior Function Per Pt/Caregiver Report  Level of [place]: Independent with ADLs and functional transfers, Independent with homemaking with ambulation  ADL Assistance: Independent  Homemaking Assistance: Independent  Ambulatory Assistance: Independent  Hand Dominance: Left  Prior Function Comments: Pt was IND in all ADLs and IADLs prior to admit and states he used no AD for ambulation  Precautions:  Precautions  Medical Precautions: Fall precautions    Objective   Pain:  Pain Assessment  Pain Assessment: 0-10  0-10 (Numeric) Pain Score: 0 - No pain  Cognition:  Cognition  Overall Cognitive Status: Impaired  Arousal/Alertness: Delayed responses to stimuli  Orientation Level: Disoriented to time, Disoriented to situation  Following Commands: Follows one step commands with repetition (and cueing)    General Assessments:  Activity Tolerance  Endurance: Tolerates 10 - 20 min exercise with multiple rests    Sensation  Light Touch: No apparent deficits    Strength  Strength Comments: BLE WFL  Coordination  Movements are Fluid and Coordinated: Yes    Postural Control  Postural  Control: Within Functional Limits  Posture Comment: founded shoulders    Static Sitting Balance  Static Sitting-Balance Support: Bilateral upper extremity supported, Feet supported  Static Sitting-Level of Assistance: Distant supervision    Static Standing Balance  Static Standing-Balance Support: No upper extremity supported  Static Standing-Level of Assistance: Close supervision  Functional Assessments:  Bed Mobility  Bed Mobility: Yes  Bed Mobility 1  Bed Mobility 1: Supine to sitting, Sitting to supine  Level of Assistance 1: Distant supervision  Bed Mobility Comments 1: min vc for sequencing    Transfers  Transfer: Yes  Transfer 1  Transfer From 1: Sit to, Stand to  Transfer to 1: Stand, Sit  Technique 1: Sit to stand, Stand to sit  Transfer Device 1:  (none)  Transfer Level of Assistance 1: Close supervision, Minimal verbal cues  Trials/Comments 1: min vc for safety    Ambulation/Gait Training  Ambulation/Gait Training Performed: Yes  Ambulation/Gait Training 1  Surface 1: Level tile  Device 1: No device  Assistance 1: Contact guard  Quality of Gait 1: Inconsistent stride length, Decreased step length, Forward flexed posture, Diminished heel strike (decreased foot clearanc eand flor)  Comments/Distance (ft) 1: 50' x 2; min vc for safety and balance    Pt states he has no concerns for stairs     Extremity/Trunk Assessments:  RLE   RLE : Within Functional Limits  LLE   LLE : Within Functional Limits  Outcome Measures:  [facility] Basic Mobility  Turning from your back to your side while in a flat bed without using bedrails: None  Moving from lying on your back to sitting on the side of a flat bed without using bedrails: None  Moving to and from bed to chair (including a wheelchair): A little  Standing up from a chair using your arms (e.g. wheelchair or bedside chair): None  To walk in hospital room: A little  Climbing 3-5 steps with railing: A lot  Basic Mobility - Total Score: 20    Encounter Problems        Encounter Problems (Active)       PT Problem       Patient will ambulate >150' with LRAD and Modified Independent        Start:  09/01/24    Expected End:  09/16/24            Patient will perform sit<>stand transfer with LRAD, and Modified Independent        Start:  09/01/24    Expected End:  09/16/24            Patient will ascend/descend 6 steps with Right Rail Ascending and supervision        Start:  09/01/24    Expected End:  09/16/24            Patient will complete supine to sit and sit to supine Independent        Start:  09/01/24    Expected End:  09/16/24               Pain - Adult              Education Documentation  Precautions, taught by Nikki Noe, PT at 9/1/2024  3:31 PM.  Learner: Patient  Readiness: Acceptance  Method: Explanation, Demonstration  Response: Verbalizes Understanding, Needs Reinforcement    Body Mechanics, taught by Nikki Noe, PT at 9/1/2024  3:31 PM.  Learner: Patient  Readiness: Acceptance  Method: Explanation, Demonstration  Response: Verbalizes Understanding, Needs Reinforcement    Mobility Training, taught by Nikki Noe PT at 9/1/2024  3:31 PM.  Learner: Patient  Readiness: Acceptance  Method: Explanation, Demonstration  Response: Verbalizes Understanding, Needs Reinforcement    Education Comments  No comments found.

## 2024-09-01 NOTE — PROGRESS NOTES
SW consulted due to high cost meds, and alcohol abuse history.  SW met with patient at bedside to discuss needs.  Patient declined treatment resources.  Then asked to be billed for meds.  The final cost of meds including discount pricing, and free trail totals $206 with discount     Юлия HALEY

## 2024-09-01 NOTE — DISCHARGE SUMMARY
Discharge Diagnosis  COPD exacerbation (Multi)    Issues Requiring Follow-Up  - PCP: Hospital Discharge Visit. The patient was diagnosed with several new conditions during this visit, including a large lung mass s/p EBUS and biopsy; diabetes mellitus (HbA1c 7.6%); paroxysmal A fib with RVR (on home metoprolol and Eliquis). He was started on metformin for diabetic management. Will need to coordinate care with oncology once the results of the biopsy from the EBUS are available.     - Oncology: Lung Mass Biopsy results are pending at the time of discharge.     - Cardiology: Diagnosed with paroxysmal A fib with RVR. Due to FLASH-VASC score, inpatient cardiology recommended starting patient on anticoagulation - started on Eliquis 5mg BID. Patient has a significant cost barrier - was able to get a discounted 90 day supply. Reassess for alternative anticoagulation options versus Watchman procedure at outpatient visit.     - Pulmonology: EBUS conducted on 8/29 with debulking of the R mainstem bronchus and Bl. Stent was placed for residual stenosis from extrinsic compression. 3% Nebulized Sodium Chloride TID. Will need a repeat bronch in 2 to 3 months. Unclear diagnosis with COPD, no spirometry to confirm. Could benefit from PFTs     Other services:   - Home Health Care  - COPD [facility]  - Physical therapy and Occupational Therapy: PT recommended low intensity therapy, no skilled OT interventions  - Healthy at Home: to help with coordination of multiple newly diagnosed conditions, along with potential cost barriers (Eliquis cost, etc).     Discharge Meds     Your medication list        START taking these medications        Instructions Last Dose Given Next Dose Due   amLODIPine 5 mg tablet  Commonly known as: Norvasc      Take 2 tablets (10 mg) by mouth once daily.       Breo Ellipta 200-25 mcg/dose inhaler  Generic drug: fluticasone furoate-vilanteroL      Inhale 1 puff once daily.       Certavite-Antioxidant   mg-mcg tablet  Generic drug: multivitamin with minerals      Take 1 tablet by mouth once daily.       Eliquis 5 mg tablet  Generic drug: apixaban      Take 1 tablet (5 mg) by mouth 2 times a day.       folic acid 1 mg tablet  Commonly known as: Folvite      Take 1 tablet (1 mg) by mouth once daily.       melatonin 3 mg tablet      Take 1 tablet (3 mg) by mouth once daily.       metFORMIN 500 mg tablet  Commonly known as: Glucophage      Take 1 tablet (500 mg) by mouth 2 times daily (morning and late afternoon).       NebuSal 3 % nebulizer solution  Generic drug: sodium chloride      Take 3 mL by nebulization every 6 hours if needed for cough.       nicotine 14 mg/24 hr patch  Commonly known as: Nicoderm CQ      Apply 1 patch topically every day.  START AFTER finishing the 21mg patches.       nicotine 21 mg/24 hr patch  Commonly known as: Nicoderm CQ      Place 1 patch over 24 hours on the skin once daily for 42 doses.       nicotine 7 mg/24 hr patch  Commonly known as: Nicoderm CQ  Start taking on: October 22, 2024      Place 1 patch over 24 hours on the skin once daily for 14 doses. START AFTER finishing the 14mg patches.       Spiriva Respimat 2.5 mcg/actuation inhaler  Generic drug: tiotropium      Inhale 2 puffs once daily.       thiamine 100 mg tablet  Commonly known as: Vitamin B-1  Start taking on: September 2, 2024      Take 1 tablet (100 mg) by mouth once daily. Do not start  before September 2, 2024.              CONTINUE taking these medications        Instructions Last Dose Given Next Dose Due   aspirin 81 mg EC tablet           metoprolol tartrate 50 mg tablet  Commonly known as: Lopressor      Take 1 tablet by mouth every 12 hours.       simvastatin 20 mg tablet  Commonly known as: Zocor      Take 1 tablet (20 mg) by mouth once daily.                 Where to Get Your Medications        These medications were sent to [facility]  [address], [address], [facility] [address]       Hours: 8AM to 6PM Mon-Fri, 8AM to 4PM Sat, 9AM to 1PM Sun Phone: [phone]   amLODIPine 5 mg tablet  Breo Ellipta 200-25 mcg/dose inhaler  Certavite-Antioxidant  mg-mcg tablet  Eliquis 5 mg tablet  folic acid 1 mg tablet  melatonin 3 mg tablet  metFORMIN 500 mg tablet  NebuSal 3 % nebulizer solution  nicotine 14 mg/24 hr patch  nicotine 21 mg/24 hr patch  nicotine 7 mg/24 hr patch  Spiriva Respimat 2.5 mcg/actuation inhaler  thiamine 100 mg tablet         Test Results Pending At Discharge  Pending Labs       Order Current Status    Cytology Consultation (Non-Gynecologic) In process    Surgical Pathology Exam In process            Hospital Course  Mr. Juan Carlos Cr is a 74 year old male with a past medical history of coronary artery disease s/p PCI 27 yrs ago, HTN, HLD, AUD, and history of tobacco use transferred to [facility] with acute hypoxic respiratory failure and lung mass diagnosed on Chest CT. Initially suspected to have a COPD exacerbation, but patient denies prior history of COPD. CT of chest in the emergency room revealed a large 6.57 mm right sided soft tissue mass causing compression of surrounding vasculature. Initial symptoms of SOB on exertion, cough, and fatigue caused concern for an acute COPD exacerbation, but no prior spirometry testing to confirm diagnosis. Differential diagnoses included post obstructive atelectasis due to mass, or possible CAP. The lung mass demonstrated on CT was concerning for cancer given the size, possible lymphangitic spread and significant smoking history. EBUS conducted 08/29 by Pulmonology, with debulking of the R mainstem bronchus and Bl. Stent was placed for residual stenosis from extrinsic compression.      The patient additionally developed paroxysmal A fib with RVR in the emergency room. No noted prior history of A fib, although his prior cardiac history could have had masked A fib for several years. EKG demonstrated left atrial enlargement, lending evidence  to chronicity. Patient has since self converted to sinus rhythm. FLASH-VASC score is high, and Cardiology was consulted, recommended starting him on anticoagulation. Patient started on Eliquis on 8/30. Outpatient cardiology follow-up as cost is a prohibitive barrier to future Eliquis use, additionally the patient is a high risk for falls with prior AUD.     The patient was diagnosed with new onset diabetes based an HbA1c level of 7.6 on 8/26. While admitted, he was managed with an insulin sliding scale inpatient. Diabetes education was conducted. The patient will be discharged on metformin.     On 8/28 there was concern for waxing and waning concentration. Collateral from his wife revealed an episode of confusion on 8/24 (prior to admission). MRI of the brain conducted on 8/28 did not demonstrate any evidence of brain mets, only demonstrated nonspecific white matter changes representative of ischemic disease along with diffuse parenchymal loss. No evidence of stroke. Ruled out metastatic disease as cause for AMS. No current evidence of metabolic derangements other than metabolic alkalosis with respiratory acidosis. TSH, Vitamin B9, Vitamin B12 were WNL. HIV and Syphilis Total Ab were both negative. Psych was consulted, who also endorsed a likely acute change in his mental status with waxing and waning concentration of multifactorial etiology. A possible differential includes Wernicke's encephalopathy given the prior history of AUD, complicated by potential hospital induced delirium.     PT and OT were consulted during this patient's stay, who recommended low intensity rehabilitation and no skilled OT needs.     On 9/1/24, the patient was deemed suitable for discharge, with improving mentation (AxO 3) and stable vital signs and physical exam.     The following medication changes were made to this patient's regimen:  Start these Medications  - Eliquis 5mg two times a day (blood thinner)  - Metformin 500mg  - Amlodipine  10mg  - Inhalation Treatments (Nebulized Hypertonic Saline) 3x a day  - Inhaler Medications (Breo Elipta, Spiriva)  - Multivitamin, Vitamin B9, Thiamine Supplements  - Melatonin as needed for sleep  - Nicotine patches as needed for tobacco cravings  Continue these Medications  - Aspirin 81 mg  - Metoprolol Tartrate 50mg  - Simvastatin 20mg          Pertinent Physical Exam At Time of Discharge  Physical Exam  Constitutional:       Appearance: He is normal weight.   HENT:      Head: Normocephalic.      Right Ear: External ear normal.      Left Ear: External ear normal.      Nose: Nose normal.      Mouth/Throat:      Mouth: Mucous membranes are moist.      Pharynx: Oropharynx is clear.   Eyes:      Extraocular Movements: Extraocular movements intact.   Cardiovascular:      Rate and Rhythm: Normal rate and regular rhythm.   Pulmonary:      Effort: Pulmonary effort is normal.      Comments: Normal breath sounds appreciable in all lung fields  Abdominal:      General: Abdomen is flat.      Palpations: Abdomen is soft.   Musculoskeletal:         General: No swelling.      Cervical back: Normal range of motion.   Lymphadenopathy:      Cervical: No cervical adenopathy.   Skin:     General: Skin is warm.      Capillary Refill: Capillary refill takes less than 2 seconds.   Neurological:      Mental Status: He is alert and oriented to person, place, and time.   Psychiatric:         Mood and Affect: Mood normal.         Outpatient Follow-Up  No future appointments.      PATRICIA BUENROSTRO

## 2024-09-02 ENCOUNTER — HOME HEALTH ADMISSION (OUTPATIENT)
Dept: HOME HEALTH SERVICES | Facility: HOME HEALTH | Age: 74
End: 2024-09-02
Payer: MEDICARE

## 2024-09-02 ENCOUNTER — DOCUMENTATION (OUTPATIENT)
Dept: HOME HEALTH SERVICES | Facility: HOME HEALTH | Age: 74
End: 2024-09-02
Payer: MEDICARE

## 2024-09-02 NOTE — HH CARE COORDINATION
Home Care received a Referral for Nursing, Physical Therapy, and Occupational Therapy. We have processed the referral for a Start of Care on 9.4.2024.     If you have any questions or concerns, please feel free to contact us at [phone]. Follow the prompts, enter your five digit zip code, and you will be directed to your care team on E[facility] 2.

## 2024-09-03 ENCOUNTER — PATIENT OUTREACH (OUTPATIENT)
Dept: HOME HEALTH SERVICES | Age: 74
End: 2024-09-03
Payer: MEDICARE

## 2024-09-03 DIAGNOSIS — E11.22 TYPE 2 DIABETES MELLITUS WITH STAGE 3 CHRONIC KIDNEY DISEASE, WITHOUT LONG-TERM CURRENT USE OF INSULIN, UNSPECIFIED WHETHER STAGE 3A OR 3B CKD (MULTI): ICD-10-CM

## 2024-09-03 DIAGNOSIS — N18.30 TYPE 2 DIABETES MELLITUS WITH STAGE 3 CHRONIC KIDNEY DISEASE, WITHOUT LONG-TERM CURRENT USE OF INSULIN, UNSPECIFIED WHETHER STAGE 3A OR 3B CKD (MULTI): ICD-10-CM

## 2024-09-03 RX ORDER — BLOOD-GLUCOSE METER
1 KIT MISCELLANEOUS 2 TIMES DAILY
Qty: 200 EACH | Refills: 3 | Status: SHIPPED | OUTPATIENT
Start: 2024-09-03

## 2024-09-03 RX ORDER — BLOOD-GLUCOSE METER
KIT MISCELLANEOUS
Qty: 1 EACH | Refills: 0 | Status: SHIPPED | OUTPATIENT
Start: 2024-09-03 | End: 2025-09-03

## 2024-09-03 RX ORDER — LANCETS
EACH MISCELLANEOUS
Qty: 100 EACH | Refills: 0 | Status: SHIPPED | OUTPATIENT
Start: 2024-09-03

## 2024-09-04 ENCOUNTER — PATIENT OUTREACH (OUTPATIENT)
Dept: CARE COORDINATION | Facility: CLINIC | Age: 74
End: 2024-09-04
Payer: MEDICARE

## 2024-09-04 DIAGNOSIS — E11.9 TYPE 2 DIABETES MELLITUS WITHOUT COMPLICATION, WITHOUT LONG-TERM CURRENT USE OF INSULIN (MULTI): ICD-10-CM

## 2024-09-04 LAB
LABORATORY COMMENT REPORT: NORMAL
LABORATORY COMMENT REPORT: NORMAL
PATH REPORT.FINAL DX SPEC: NORMAL
PATH REPORT.GROSS SPEC: NORMAL
PATH REPORT.INTRAOP OBS SPEC DOC: NORMAL
PATH REPORT.TOTAL CANCER: NORMAL

## 2024-09-04 SDOH — ECONOMIC STABILITY: GENERAL: WOULD YOU LIKE HELP WITH ANY OF THE FOLLOWING NEEDS?: I DONT NEED HELP WITH ANY OF THESE

## 2024-09-04 SDOH — ECONOMIC STABILITY: FOOD INSECURITY
ARE ANY OF YOUR NEEDS URGENT? FOR EXAMPLE, UNCERTAINTY OF WHERE YOU WILL GET YOUR NEXT MEAL OR NOT HAVING THE MEDICATIONS YOU NEED TO TAKE TOMORROW.: NO

## 2024-09-04 NOTE — SIGNIFICANT EVENT
09/04/24 1240   Social Determinants of Health- Help Requested   Would you like help with any of the following needs? I dont need help with any of these   Are any of your needs urgent? For example, uncertainty of where you will get your next meal or not having the medications you need to take tomorrow. N

## 2024-09-04 NOTE — PROGRESS NOTES
[facility]  Admitted 8/27/2024  Discharged 9/1/2024  Dx: Acute Hypoxic Respiratory Failure, COPD Exacerbation, Afib    Outreach call to patient to support a smooth transition of care from recent admission. Patient states, he is doing a lot better. Patient denies shortness of breath. Denies any palpitations. Patient states, he did receive his nebulizer but only had to use it once. Patient denies any side effects from his medications. Discussed  Home Care and Healthy @ Home.  Reviewed discharge medications, discharge instructions, assessed social needs, and provided education on importance of follow-up appointment with provider.   Engagement  Call Start Time: 1236 (9/4/2024 12:36 PM)    Medications  Medications reviewed with patient/caregiver?: Yes (9/4/2024 12:36 PM)  Is the patient having any side effects they believe may be caused by any medication additions or changes?: No (9/4/2024 12:36 PM)  Does the patient have all medications ordered at discharge?: Yes (9/4/2024 12:36 PM)  Care Management Interventions: Provided patient education (9/4/2024 12:36 PM)  Prescription Comments: Discussed Eliquis and signs and symptoms to monitor for bleeding (9/4/2024 12:36 PM)  Is the patient taking all medications as directed (includes completed medication regime)?: Yes (9/4/2024 12:36 PM)    Appointments  Does the patient have a primary care provider?: Yes (no follow up scheduled at this time) (9/4/2024 12:36 PM)    Self Management  What is the home health agency?: [facility]: Nursing, PT,OT (9/4/2024 12:36 PM)  What Durable Medical Equipment (DME) was ordered?: nebulizer (9/4/2024 12:36 PM)  Has all Durable Medical Equipment (DME) been delivered?: Yes (9/4/2024 12:36 PM)    Patient Teaching  Does the patient have access to their discharge instructions?: Yes (reviewed with patient and son) (9/4/2024 12:36 PM)  Care Management Interventions: Reviewed instructions with patient (9/4/2024 12:36  PM)  What is the patient's perception of their health status since discharge?: Improving (9/4/2024 12:36 PM)  Is the patient/caregiver able to teach back the hierarchy of who to call/visit for symptoms/problems? PCP, Specialist, Home Health nurse, Urgent Care, ED, 911: Yes (9/4/2024 12:36 PM)  If the patient is a current smoker, are they able to teach back resources for cessation?: Smoking cessation medications (9/4/2024 12:36 PM)  Patient/Caregiver Education Comments: nicotine patch (9/4/2024 12:36 PM)    Will continue to monitor through transition period.    Kath Owens RN/CM  [facility]  [phone]

## 2024-09-05 LAB
LAB AP ASR DISCLAIMER: NORMAL
LABORATORY COMMENT REPORT: NORMAL
PATH REPORT.ADDENDUM SPEC: NORMAL
PATH REPORT.COMMENTS IMP SPEC: NORMAL
PATH REPORT.FINAL DX SPEC: NORMAL
PATH REPORT.GROSS SPEC: NORMAL
PATH REPORT.TOTAL CANCER: NORMAL

## 2024-09-05 RX ORDER — BLOOD SUGAR DIAGNOSTIC
1 STRIP MISCELLANEOUS 2 TIMES DAILY
Qty: 200 EACH | Refills: 3 | Status: SHIPPED | OUTPATIENT
Start: 2024-09-05

## 2024-09-05 RX ORDER — LANCETS
EACH MISCELLANEOUS
Qty: 200 EACH | Refills: 0 | Status: SHIPPED | OUTPATIENT
Start: 2024-09-05

## 2024-09-05 RX ORDER — DEXTROSE 4 G
TABLET,CHEWABLE ORAL
Qty: 1 EACH | Refills: 0 | Status: SHIPPED | OUTPATIENT
Start: 2024-09-05

## 2024-09-06 ENCOUNTER — HOME CARE VISIT (OUTPATIENT)
Dept: HOME HEALTH SERVICES | Facility: HOME HEALTH | Age: 74
End: 2024-09-06

## 2024-09-06 NOTE — CASE COMMUNICATION
Social Work: Assessment with Discharge Plan    Patient Name:  Lynda Delgadillo  :  1947  Age:  70 year old  MRN:  [id number]  Risk/Complexity Score:     Completed assessment with:  Pt and chart review    Presenting Information   Reason for Referral:  Discharge plan and Potential need for community services upon discharge  Date of Intake:  2018  Referral Source:  Nurse  Decision Maker:  Pt  Alternate Decision Maker:  NOK would be daughter in [place], doesn't know number  Health Care Directive:  DNI/DNR  Living Situation:  Apartment - independent living apartment; chart review also lists as senior living  Previous Functional Status:  Assistance with Other:  pt has ILS worker, homemaking, could have PCA, JEANETTE Lundy [phone]  Patient and family understanding of hospitalization:  Pt states she has fallen   Cultural/Language/Spiritual Considerations:  Raised Adventism, worshipped in Confucianism and Jewish contexts, not currently affiliated with a specific Jainism. Maintains her spirituality through bible study.  Adjustment to Illness:  Pt is alert and oriented, takes an extra second to gather her thoughts before responding. Engaged well with  services.    Physical Health  Reason for Admission:    1. Weakness    2. Fall, initial encounter      Services Needed/Recommended:  TCU vs Home with services    Mental Health/Chemical Dependency  Diagnosis:  Per chart review, schizoaffective, bipolar type. Indentifies as having bipolar.  Support/Services in Place:  ILS workerJEANETTE, homemaking services    Psychiatrist  Dr Kwbaena Chery: Psychiatrist @ [facility]. [address], [place] [address]  Phone: [phone]  :  Jeanette CM: Luis Lundy @ [facility]  [phone]     Therapist:  Ethan Dasilva  [address], [place] [address]  Phone: [phone]    States she only sees her therapist every two months and thinks she needs to see him more  Update -     Patient/wife/family never got back to me before the planned start of care at 900 am.   I went to see other patients, then attempted to call one additional time to see if I could try to come early afternoon, but wasnt able to reach again.    Talked with office  and requested patient be rescheduled to his , who is also working this weekend. Will be rescheduled to either Saturday or Sunday.    Additionally , the scheduling note said that the wife agreed to Wednesday, does not appear that Friday was ever discussed so it may not have been good for them/not available.       see previous case communication for further information/detail. because recently she's been very depressed    Services Needed/Recommended: Med review and increased follow up with therapy    Support System  Significant relationship at present time:  Pt has a boyfriend that she talks to often but identifies that she moved further away from him because it was too much work for her to care for him  Family of origin is available for support:  Pt has 12 siblings, they are all living. She has an identical sister who also has bipolar disorder. There is + history of alcoholism, drug abuse, and gambling in siblings. Pt believes her mother was depressed as she tried to raise 13 children. Pt identifies her sister Linda Talavera and her  Amrik as supporters but state Linda had brain surgery last year and cannot handle much. Pt has a daughter who lives in [place] and [place] in the winter with her three kids.  Other support available:  Professional MH supports  Gaps in support system:  Pt may need TCU level of care while she recovers from being sick  Patient is caregiver to:  None     Provider Information   Primary Care Physician:  Owen Jurado   [phone]   Clinic:  [facility] [address]   / [facility]*      :  GAEL Lundy [phone]    Financial   Income Source:  Disability  Financial Concerns:  None identified  Insurance:    Payor/Plan Subscriber Name Rel Member # Group #   MEDICA - MEDICA DUAL * DANIEL WYMAN  [id number]       [address] [id number]       Discharge Plan   Patient and family discharge goal:  Agreeable to TCU  Provided education on discharge plan:  YES  Patient agreeable to discharge plan:  YES  A list of Medicare Certified Facilities was provided to the patient and/or family to encourage patient choice. Patient's choices for facility are:      1. [facility]  Address: [address] [place] [address]  Phone: [phone]    2. [facility]  Address: [address] [place] [address]  Phone: [phone]    Will NH provide Skilled rehabilitation or complex medical:  A facility would, her ind apt at home would not  General information regarding anticipated insurance coverage and possible out of pocket cost was discussed. Patient and patient's family are aware patient may incur the cost of transportation to the facility, pending insurance payment: YES  Barriers to discharge:  Pending medical clearance    Discharge Recommendations   Anticipated Disposition:  TBD TCU vs home with services.  Transportation Needs:  Medical:  Other:  [employer] Prov[employer] a Ride  Name of Transportation Company and Phone:  [phone]  Per chart review: They cannot schedule same day rather request we call once patient is full discharged and ready for .    Additional comments   PtLynda is a 70 year old female that presents to the ED after falling in her home. She was recently hospitalized here for pneumonia and generalized weakness and discharged on 1/17/17. She states since she has fallen many times, and at times has not been able to get herself back up. Today she fell and was on the floor for hours before calling EMS. EMS arrived and pt was naked on the floor. Pt attributes some of her weakness to her depression. She has not been successful at home despite being connected with [facility], psychiatry, MH therapy, home care, ILS services, and homemaking services. Pt had PT and OT eval done last hospitalization visit, and would not need a 3 night hospital stay to qualify for TCU. Per ED MD, pt needed to be admitted and this could not be avoided with ED to TCU placement. Referrals sent to first two identified locations via discharge on the double.     Sharita Kyle [facility], [facility]  ED   ED Pager: [phone]  On-call/After hours Pager: [phone]

## 2024-09-06 NOTE — Clinical Note
Thank you      ----- Message -----  From: Norma Hobson RN  Sent: 9/6/2024   1:52 PM EDT  To: Analilia Mccloud RN; Josh Adkins, PT; *      Update -     Patient/wife/family never got back to me before the planned start of care at 900 am.   I went to see other patients, then attempted to call one additional time to see if I could try to come early afternoon, but wasnt able to reach again.    Talked with office  and requested patient be rescheduled to his , who is also working this weekend. Will be rescheduled to either Saturday or Sunday.    Additionally, the scheduling note said that the wife agreed to Wednesday, does not appear that Friday was ever discussed so it may not have been good for them/not available.       see previous case communication for further information/detail.

## 2024-09-06 NOTE — CASE COMMUNICATION
Patient is scheduled for start of care with this nurse on 9.6 (today/Friday).   Could not reach the patient or wife/family the evening before. I called twice on Thursday evening to attempt to schedule for Friday.  Noting that Friday was the first day that he was scheduled with me.  Reviewing the chart, I see a scheduling note saying that wife agreed to start of care on Wednesday, two days ago. It is unknown why the patient wasnt seen We dnesday or Thursday or if another nurse atttempted. I dont see any other notes.    I was planning on seeing him around 900 am this morning, but cannot reach. The phone did not have voicemail, and went to a fast busy signal when I attempted to call twice last night.  I followed up with a text message  on the same phone number, but also havent heard back.    If I cannot reach today, the case will be rescheduled to Saturday or Sunday/over  the weekend, as his  is working this coming weekend. I will update later today. But seeing as I have him scheduled for 9 am/planned and have not heard back, I am more than likely not seeing him at 900 am.

## 2024-09-08 ENCOUNTER — HOME CARE VISIT (OUTPATIENT)
Dept: HOME HEALTH SERVICES | Facility: HOME HEALTH | Age: 74
End: 2024-09-08
Payer: MEDICARE

## 2024-09-08 VITALS
TEMPERATURE: 97.4 F | DIASTOLIC BLOOD PRESSURE: 64 MMHG | OXYGEN SATURATION: 97 % | SYSTOLIC BLOOD PRESSURE: 102 MMHG | HEART RATE: 90 BPM | RESPIRATION RATE: 20 BRPM

## 2024-09-08 PROCEDURE — 169592 NO-PAY CLAIM PROCEDURE

## 2024-09-08 PROCEDURE — G0299 HHS/HOSPICE OF RN EA 15 MIN: HCPCS | Mod: HHH

## 2024-09-08 ASSESSMENT — ACTIVITIES OF DAILY LIVING (ADL)
AMBULATION ASSISTANCE: STAND BY ASSIST
OASIS_M1830: 03
CURRENT_FUNCTION: STAND BY ASSIST
ENTERING_EXITING_HOME: SUPERVISION

## 2024-09-08 ASSESSMENT — ENCOUNTER SYMPTOMS
APPETITE LEVEL: FAIR
COUGH CHARACTERISTICS: DRY
LAST BOWEL MOVEMENT: 67090
DENIES PAIN: 1
PERSON REPORTING PAIN: PATIENT
CHANGE IN APPETITE: UNCHANGED
COUGH: 1

## 2024-09-09 ENCOUNTER — HOME CARE VISIT (OUTPATIENT)
Dept: HOME HEALTH SERVICES | Facility: HOME HEALTH | Age: 74
End: 2024-09-09
Payer: MEDICARE

## 2024-09-09 VITALS
DIASTOLIC BLOOD PRESSURE: 65 MMHG | RESPIRATION RATE: 18 BRPM | SYSTOLIC BLOOD PRESSURE: 120 MMHG | OXYGEN SATURATION: 97 % | TEMPERATURE: 97.1 F | HEART RATE: 69 BPM

## 2024-09-09 PROCEDURE — G0151 HHCP-SERV OF PT,EA 15 MIN: HCPCS | Mod: HHH

## 2024-09-09 ASSESSMENT — ENCOUNTER SYMPTOMS
DENIES PAIN: 1
PERSON REPORTING PAIN: PATIENT
OCCASIONAL FEELINGS OF UNSTEADINESS: 1

## 2024-09-09 ASSESSMENT — ACTIVITIES OF DAILY LIVING (ADL)
AMBULATION_DISTANCE/DURATION_TOLERATED: 45
AMBULATION ASSISTANCE ON FLAT SURFACES: 1

## 2024-09-09 NOTE — CASE COMMUNICATION
Patient seen for PT evaluation and refused any further PT services and is refusing OT evaluation all together. Patient would benefit from PT services but is refusing any services going forward.

## 2024-09-09 NOTE — Clinical Note
Thank you      ----- Message -----  From: Allison Ahn, PT  Sent: 9/9/2024   9:14 AM EDT  To: Mark Stockton MD; Юлия Ahn RN; *      Patient seen for PT evaluation and refused any further PT services and is refusing OT evaluation all together. Patient would benefit from PT services but is refusing any services going forward.

## 2024-09-10 ENCOUNTER — APPOINTMENT (OUTPATIENT)
Dept: PULMONOLOGY | Facility: CLINIC | Age: 74
End: 2024-09-10
Payer: MEDICARE

## 2024-09-10 VITALS — HEART RATE: 70 BPM | DIASTOLIC BLOOD PRESSURE: 68 MMHG | OXYGEN SATURATION: 94 % | SYSTOLIC BLOOD PRESSURE: 104 MMHG

## 2024-09-10 DIAGNOSIS — J44.9 CHRONIC OBSTRUCTIVE PULMONARY DISEASE, UNSPECIFIED COPD TYPE (MULTI): Primary | ICD-10-CM

## 2024-09-10 DIAGNOSIS — C34.91 SQUAMOUS CELL CARCINOMA OF RIGHT LUNG (MULTI): ICD-10-CM

## 2024-09-10 DIAGNOSIS — E74.39 GLUCOSE INTOLERANCE: ICD-10-CM

## 2024-09-10 PROCEDURE — 99215 OFFICE O/P EST HI 40 MIN: CPT | Performed by: PEDIATRICS

## 2024-09-10 PROCEDURE — 1159F MED LIST DOCD IN RCRD: CPT | Performed by: PEDIATRICS

## 2024-09-10 PROCEDURE — 1111F DSCHRG MED/CURRENT MED MERGE: CPT | Performed by: PEDIATRICS

## 2024-09-10 PROCEDURE — 1160F RVW MEDS BY RX/DR IN RCRD: CPT | Performed by: PEDIATRICS

## 2024-09-10 PROCEDURE — 3074F SYST BP LT 130 MM HG: CPT | Performed by: PEDIATRICS

## 2024-09-10 PROCEDURE — 3078F DIAST BP <80 MM HG: CPT | Performed by: PEDIATRICS

## 2024-09-10 RX ORDER — LANCETS
EACH MISCELLANEOUS
Qty: 200 EACH | Refills: 2 | Status: SHIPPED | OUTPATIENT
Start: 2024-09-10

## 2024-09-10 ASSESSMENT — PATIENT HEALTH QUESTIONNAIRE - PHQ9
2. FEELING DOWN, DEPRESSED OR HOPELESS: NOT AT ALL
SUM OF ALL RESPONSES TO PHQ9 QUESTIONS 1 AND 2: 0
1. LITTLE INTEREST OR PLEASURE IN DOING THINGS: NOT AT ALL

## 2024-09-10 NOTE — PROGRESS NOTES
Subjective   Patient ID: Juan Carlos Cr is a 74 y.o. male who presents for lung cancer, airway obstruction    HPI    Mr Cr is a 74yr old that was admitted to [facility] for acute hypoxic respiratory failure.  He was found to have a large mass encasing the right main airways and vasculature.  He underwent bronchoscopy with debulking of the airway and stent placement.  Pathology shows squamous cell carcinoma.  He was discharged with 3% saline nebulized to help keep secretions easily mobilized.  Since coming home he has used the 3% saline 2 or 3 times, just when he felt the mucous was thick and hard to bring up. Overall he feels much better and his breathing is back to his baseline.  He has COPD and has been on breo and spiriva which work well for him but spiriva is very expensive.  He has follow up appointment with oncology tomorrow    He is former smoker 1/2ppd since age 15, he quit last month    Review of Systems    See scanned documents attached to this note for review of systems, and appropriate scales/scores for this visit.     Objective   Physical Exam  Constitutional:       Appearance: Normal appearance.   HENT:      Head: Normocephalic and atraumatic.      Mouth/Throat:      Pharynx: Oropharynx is clear.   Cardiovascular:      Rate and Rhythm: Normal rate and regular rhythm.      Pulses: Normal pulses.      Heart sounds: Normal heart sounds.   Pulmonary:      Effort: Pulmonary effort is normal.      Breath sounds: Normal breath sounds. No wheezing, rhonchi or rales.   Abdominal:      General: Bowel sounds are normal.      Palpations: Abdomen is soft.   Musculoskeletal:         General: Normal range of motion.   Skin:     General: Skin is warm and dry.   Neurological:      General: No focal deficit present.      Mental Status: He is alert and oriented to person, place, and time.   Psychiatric:         Mood and Affect: Mood normal.       Assessment/Plan     74yr old with COPD, and lung cancer with  endobronchial invasion s/p debulking and stent    COPD: controlled  - trial of trelegy to replace breo/spiriva  - albuterol as needed    2. Lung cancer:  - has appointment with oncology tomorrow  - continue 3% saline nebulized as needed to help mobilize secretions  - IP will plan for repeat bronchoscopy to evaluate stent and airway patency      Follow up 3 months         Missael Holt MD 09/10/24 9:42 AM

## 2024-09-11 ENCOUNTER — OFFICE VISIT (OUTPATIENT)
Dept: HEMATOLOGY/ONCOLOGY | Facility: HOSPITAL | Age: 74
End: 2024-09-11
Payer: MEDICARE

## 2024-09-11 VITALS
WEIGHT: 149.19 LBS | BODY MASS INDEX: 22.1 KG/M2 | RESPIRATION RATE: 16 BRPM | OXYGEN SATURATION: 97 % | SYSTOLIC BLOOD PRESSURE: 103 MMHG | HEART RATE: 70 BPM | DIASTOLIC BLOOD PRESSURE: 64 MMHG | HEIGHT: 69 IN | TEMPERATURE: 96.3 F

## 2024-09-11 DIAGNOSIS — R91.8 PULMONARY NODULES: ICD-10-CM

## 2024-09-11 DIAGNOSIS — C34.11 MALIGNANT NEOPLASM OF UPPER LOBE OF RIGHT LUNG (MULTI): Primary | ICD-10-CM

## 2024-09-11 LAB
ELECTRONICALLY SIGNED BY: NORMAL
FOCUSED SOLID TUMOR DNA/RNA RESULTS: NORMAL

## 2024-09-11 PROCEDURE — 3008F BODY MASS INDEX DOCD: CPT | Performed by: INTERNAL MEDICINE

## 2024-09-11 PROCEDURE — 3074F SYST BP LT 130 MM HG: CPT | Performed by: INTERNAL MEDICINE

## 2024-09-11 PROCEDURE — 1159F MED LIST DOCD IN RCRD: CPT | Performed by: INTERNAL MEDICINE

## 2024-09-11 PROCEDURE — 99215 OFFICE O/P EST HI 40 MIN: CPT | Performed by: INTERNAL MEDICINE

## 2024-09-11 PROCEDURE — 99205 OFFICE O/P NEW HI 60 MIN: CPT | Performed by: INTERNAL MEDICINE

## 2024-09-11 PROCEDURE — 1126F AMNT PAIN NOTED NONE PRSNT: CPT | Performed by: INTERNAL MEDICINE

## 2024-09-11 PROCEDURE — 3078F DIAST BP <80 MM HG: CPT | Performed by: INTERNAL MEDICINE

## 2024-09-11 PROCEDURE — 1111F DSCHRG MED/CURRENT MED MERGE: CPT | Performed by: INTERNAL MEDICINE

## 2024-09-11 ASSESSMENT — ENCOUNTER SYMPTOMS
FATIGUE: 1
RESPIRATORY NEGATIVE: 1
CARDIOVASCULAR NEGATIVE: 1
LOSS OF SENSATION IN FEET: 0
FEVER: 0
OCCASIONAL FEELINGS OF UNSTEADINESS: 0
DEPRESSION: 0
GASTROINTESTINAL NEGATIVE: 1
UNEXPECTED WEIGHT CHANGE: 0

## 2024-09-11 ASSESSMENT — PAIN SCALES - GENERAL: PAINLEVEL: 0-NO PAIN

## 2024-09-11 ASSESSMENT — COLUMBIA-SUICIDE SEVERITY RATING SCALE - C-SSRS
2. HAVE YOU ACTUALLY HAD ANY THOUGHTS OF KILLING YOURSELF?: NO
6. HAVE YOU EVER DONE ANYTHING, STARTED TO DO ANYTHING, OR PREPARED TO DO ANYTHING TO END YOUR LIFE?: NO
1. IN THE PAST MONTH, HAVE YOU WISHED YOU WERE DEAD OR WISHED YOU COULD GO TO SLEEP AND NOT WAKE UP?: NO

## 2024-09-11 ASSESSMENT — PATIENT HEALTH QUESTIONNAIRE - PHQ9
2. FEELING DOWN, DEPRESSED OR HOPELESS: NOT AT ALL
1. LITTLE INTEREST OR PLEASURE IN DOING THINGS: NOT AT ALL
SUM OF ALL RESPONSES TO PHQ9 QUESTIONS 1 AND 2: 0

## 2024-09-11 NOTE — PROGRESS NOTES
"Patient ID: Juan Carlos Cr is a 74 y.o. male.  Referring Physician: Ann-Marie Mcgrath MD  [address]  [address]  [place] [id number]  Primary Care Provider: No primary care provider on file.  Referral Reason: Lung cancer    Subjective:  Referred for newly diagnosed lung cancer. Patient was not aware of the diagnosis. He had presented to ER 2 weeks ago with dyspnea, admitted to hospital. CT showed large R lung mass. Bronch bx showed SCC.   He feeels OK. Denies fatigue. Dyspnea improved. Denies weight loss or fever. Quit smoking 2 weeks ago.   No past history of cancer. No major surgeries    Heme/Onc History:  - p/w dyspne ain Aug 2024. CT Chest: 6 cm R upper lung central mass. MR Brain: No mets  - Bronch: SCC. PD-L1 5%. Cytology from [facility] and [facility] Lns are neg for malignancy    Assessment/Plan:  ? NSCLC: We need PET/CT to complete staging and decide on management. LN cytology results are negative but I am not sure about nas staging being negative. Will decide based on PET/CT.   I talked to him about the diagnosis. Also shortly about chemotherapy and radiation. He has never received influenza and COVID vaccines. I urged him to take those before we start any treatment.   We will get a PET/CT stat and make a management plan afterwards.     Review Of Systems:  Review of Systems   Constitutional:  Positive for fatigue. Negative for fever and unexpected weight change.   HENT:  Negative.     Respiratory: Negative.     Cardiovascular: Negative.    Gastrointestinal: Negative.    Genitourinary: Negative.         Physical Exam:  /64 (BP Location: Left arm, Patient Position: Sitting, BP Cuff Size: Adult)   Pulse 70   Temp 35.7 °C (96.3 °F) (Temporal)   Resp 16   Ht 1.753 m (5' 9\")   Wt 67.7 kg (149 lb 3 oz)   SpO2 97%   BMI 22.03 kg/m²   BSA: 1.82 meters squared  Performance Status: Symptomatic; fully ambulatory  Physical Exam  Constitutional:       General: He is not in acute distress.     Appearance: He is not " ill-appearing.   HENT:      Head: Normocephalic and atraumatic.   Eyes:      General: No scleral icterus.     Pupils: Pupils are equal, round, and reactive to light.   Cardiovascular:      Rate and Rhythm: Normal rate and regular rhythm.   Pulmonary:      Effort: Pulmonary effort is normal.   Abdominal:      General: Abdomen is flat.      Palpations: Abdomen is soft. There is no mass.   Musculoskeletal:         General: Normal range of motion.   Lymphadenopathy:      Cervical: No cervical adenopathy.   Skin:     Coloration: Skin is not jaundiced.   Neurological:      General: No focal deficit present.      Mental Status: He is alert and oriented to person, place, and time.         Results:  Diagnostic Results   Lab Results   Component Value Date    WBC 9.2 09/01/2024    HGB 14.0 09/01/2024    HCT 44.7 09/01/2024    MCV 98 09/01/2024     09/01/2024     Lab Results   Component Value Date    CALCIUM 12.0 (H) 09/01/2024     09/01/2024    K 3.5 09/01/2024    CO2 30 09/01/2024     09/01/2024    BUN 19 09/01/2024    CREATININE 0.84 09/01/2024    ALT 6 (L) 08/27/2024    AST 10 08/27/2024       Current Outpatient Medications:     amLODIPine (Norvasc) 5 mg tablet, Take 2 tablets (10 mg) by mouth once daily., Disp: 120 tablet, Rfl: 0    apixaban (Eliquis) 5 mg tablet, Take 1 tablet (5 mg) by mouth 2 times a day., Disp: 180 tablet, Rfl: 0    ascorbic acid (Vitamin C) 500 mg tablet, Take 1 tablet (500 mg) by mouth once daily., Disp: , Rfl:     aspirin 81 mg EC tablet, Take 1 tablet (81 mg) by mouth once daily., Disp: , Rfl:     blood sugar diagnostic (Accu-Chek Guide test strips) strip, 1 strip 2 times a day., Disp: 200 each, Rfl: 3    blood sugar diagnostic (FreeStyle Lite Strips) strip, 1 strip 2 times a day., Disp: 200 each, Rfl: 3    blood-glucose meter (Accu-Chek Guide Glucose Meter) misc, Check blood glucose 2x a day, Disp: 1 each, Rfl: 0    fluticasone furoate-vilanteroL (Breo Ellipta) 200-25 mcg/dose  inhaler, Inhale 1 puff once daily., Disp: 60 each, Rfl: 1    fluticasone-umeclidin-vilanter (TRELEGY-ELLIPTA) 200-62.5-25 mcg blister with device, Inhale 1 puff early in the morning.. Rinse mouth after taking dose, Disp: 60 each, Rfl: 11    folic acid (Folvite) 1 mg tablet, Take 1 tablet (1 mg) by mouth once daily., Disp: 60 tablet, Rfl: 0    FreeStyle Lite Meter kit, TEST BLOOD SUGAR LEVELS 2X A DAY, Disp: 1 each, Rfl: 0    lancets (Accu-Chek Fastclix Lancet Drum) misc, Check blood glucose 2x day, Disp: 200 each, Rfl: 0    lancets (Accu-Chek Fastclix Lancet Drum) misc, Check blood glucose level 2x a day, Disp: 200 each, Rfl: 0    lancets misc, TEST BLOOD SUGAR LEVELS 2X A DAY, Disp: 100 each, Rfl: 0    lancets misc, Check blood sugar twice daily, Disp: 200 each, Rfl: 2    melatonin 3 mg tablet, Take 1 tablet (3 mg) by mouth once daily., Disp: 60 tablet, Rfl: 0    metFORMIN (Glucophage) 500 mg tablet, Take 1 tablet (500 mg) by mouth 2 times daily (morning and late afternoon)., Disp: 120 tablet, Rfl: 0    metoprolol tartrate (Lopressor) 50 mg tablet, Take 1 tablet by mouth every 12 hours., Disp: 90 tablet, Rfl: 3    multivitamin with minerals tablet, Take 1 tablet by mouth once daily., Disp: 30 tablet, Rfl: 1    nicotine (Nicoderm CQ) 14 mg/24 hr patch, Apply 1 patch topically every day.  START AFTER finishing the 21mg patches., Disp: 14 patch, Rfl: 0    nicotine (Nicoderm CQ) 21 mg/24 hr patch, Place 1 patch over 24 hours on the skin once daily for 42 doses., Disp: 42 patch, Rfl: 1    [START ON 10/22/2024] nicotine (Nicoderm CQ) 7 mg/24 hr patch, Place 1 patch over 24 hours on the skin once daily for 14 doses. START AFTER finishing the 14mg patches., Disp: 14 patch, Rfl: 0    simvastatin (Zocor) 20 mg tablet, Take 1 tablet (20 mg) by mouth once daily., Disp: 90 tablet, Rfl: 3    sodium chloride 3 % nebulizer solution, Take 3 mL by nebulization every 6 hours if needed for cough., Disp: 480 mL, Rfl: 0    thiamine  (Vitamin B-1) 100 mg tablet, Take 1 tablet (100 mg) by mouth once daily. Do not start  before September 2, 2024., Disp: 30 tablet, Rfl: 1    tiotropium (Spiriva Respimat) 2.5 mcg/actuation inhaler, Inhale 2 puffs once daily., Disp: 8 g, Rfl: 1     Past Surgical History:   Procedure Laterality Date    CATARACT EXTRACTION  09/14/2018    Cataract Surgery    CATARACT EXTRACTION  06/27/2014    Cataract Surgery    CORONARY ANGIOPLASTY WITH STENT PLACEMENT  05/17/2013    Cath Stent Placement     Family History   Problem Relation Name Age of Onset    Heart attack Mother      Lung cancer Brother        reports that he has been smoking cigarettes. He does not have any smokeless tobacco history on file.  Social History     Socioeconomic History    Marital status:      Spouse name: Not on file    Number of children: Not on file    Years of education: Not on file    Highest education level: Not on file   Occupational History    Not on file   Tobacco Use    Smoking status: Every Day     Current packs/day: 0.50     Types: Cigarettes    Smokeless tobacco: Not on file   Vaping Use    Vaping status: Never Used   Substance and Sexual Activity    Alcohol use: Not Currently     Comment: 4-5 beers and a glass a wine a day    Drug use: Never    Sexual activity: Not on file   Other Topics Concern    Not on file   Social History Narrative    Not on file     Social Determinants of Health     Financial Resource Strain: Low Risk  (8/31/2024)    Overall Financial Resource Strain (CARDIA)     Difficulty of Paying Living Expenses: Not hard at all   Food Insecurity: Not on file   Transportation Needs: No Transportation Needs (9/8/2024)    OASIS : Transportation     Lack of Transportation (Medical): No     Lack of Transportation (Non-Medical): No     Patient Unable or Declines to Respond: No   Physical Activity: Not on file   Stress: Not on file   Social Connections: Feeling Socially Integrated (9/8/2024)    OASIS : Social  Isolation     Frequency of experiencing loneliness or isolation: Never   Intimate Partner Violence: Not on file   Housing Stability: Low Risk  (8/31/2024)    Housing Stability Vital Sign     Unable to Pay for Housing in the Last Year: No     Number of Times Moved in the Last Year: 0     Homeless in the Last Year: No       Diagnoses and all orders for this visit:  Malignant neoplasm of upper lobe of right lung (Multi)  -     NM PET CT bone skull base to mid thigh; Future  -     Clinic Appointment Request; Future  Pulmonary nodules  -     Referral to Hematology and Oncology       I spent more than 60 minutes for the patient today, including face-to-face conversation, pre-visit preparation, post-visit orders, and others.   Janiya Landis MD

## 2024-09-12 ENCOUNTER — HOME CARE VISIT (OUTPATIENT)
Dept: HOME HEALTH SERVICES | Facility: HOME HEALTH | Age: 74
End: 2024-09-12
Payer: MEDICARE

## 2024-09-12 ENCOUNTER — APPOINTMENT (OUTPATIENT)
Dept: HEMATOLOGY/ONCOLOGY | Facility: CLINIC | Age: 74
End: 2024-09-12
Payer: MEDICARE

## 2024-09-12 VITALS
DIASTOLIC BLOOD PRESSURE: 60 MMHG | SYSTOLIC BLOOD PRESSURE: 120 MMHG | RESPIRATION RATE: 20 BRPM | HEART RATE: 80 BPM | TEMPERATURE: 98.7 F

## 2024-09-12 PROCEDURE — G0299 HHS/HOSPICE OF RN EA 15 MIN: HCPCS | Mod: HHH

## 2024-09-12 ASSESSMENT — ENCOUNTER SYMPTOMS
CHANGE IN APPETITE: UNCHANGED
APPETITE LEVEL: GOOD
DENIES PAIN: 1
PERSON REPORTING PAIN: PATIENT

## 2024-09-18 ENCOUNTER — HOSPITAL ENCOUNTER (OUTPATIENT)
Dept: RADIOLOGY | Facility: HOSPITAL | Age: 74
Discharge: HOME | End: 2024-09-18
Payer: MEDICARE

## 2024-09-18 DIAGNOSIS — C34.11 MALIGNANT NEOPLASM OF UPPER LOBE OF RIGHT LUNG (MULTI): ICD-10-CM

## 2024-09-18 PROCEDURE — A9552 F18 FDG: HCPCS | Performed by: INTERNAL MEDICINE

## 2024-09-18 PROCEDURE — 3430000001 HC RX 343 DIAGNOSTIC RADIOPHARMACEUTICALS: Performed by: INTERNAL MEDICINE

## 2024-09-18 PROCEDURE — 78815 PET IMAGE W/CT SKULL-THIGH: CPT | Mod: PI

## 2024-09-18 PROCEDURE — 78815 PET IMAGE W/CT SKULL-THIGH: CPT | Mod: PET TUMOR INIT TX STRAT | Performed by: RADIOLOGY

## 2024-09-18 RX ORDER — FLUDEOXYGLUCOSE F 18 200 MCI/ML
12.4 INJECTION, SOLUTION INTRAVENOUS
Status: COMPLETED | OUTPATIENT
Start: 2024-09-18 | End: 2024-09-18

## 2024-09-19 ENCOUNTER — OFFICE VISIT (OUTPATIENT)
Dept: HEMATOLOGY/ONCOLOGY | Facility: HOSPITAL | Age: 74
End: 2024-09-19
Payer: MEDICARE

## 2024-09-19 ENCOUNTER — PATIENT OUTREACH (OUTPATIENT)
Dept: CARE COORDINATION | Facility: CLINIC | Age: 74
End: 2024-09-19
Payer: MEDICARE

## 2024-09-19 ENCOUNTER — LAB (OUTPATIENT)
Dept: LAB | Facility: LAB | Age: 74
End: 2024-09-19
Payer: MEDICARE

## 2024-09-19 VITALS
RESPIRATION RATE: 16 BRPM | HEART RATE: 88 BPM | SYSTOLIC BLOOD PRESSURE: 105 MMHG | WEIGHT: 147.05 LBS | OXYGEN SATURATION: 88 % | TEMPERATURE: 97 F | BODY MASS INDEX: 21.78 KG/M2 | HEIGHT: 69 IN | DIASTOLIC BLOOD PRESSURE: 65 MMHG

## 2024-09-19 DIAGNOSIS — R53.83 OTHER FATIGUE: ICD-10-CM

## 2024-09-19 DIAGNOSIS — Z01.818 PRE-OP TESTING: ICD-10-CM

## 2024-09-19 DIAGNOSIS — J98.09 BRONCHIAL STENOSIS: ICD-10-CM

## 2024-09-19 DIAGNOSIS — C34.11 MALIGNANT NEOPLASM OF UPPER LOBE OF RIGHT LUNG (MULTI): Primary | ICD-10-CM

## 2024-09-19 DIAGNOSIS — J44.1 COPD EXACERBATION (MULTI): ICD-10-CM

## 2024-09-19 DIAGNOSIS — C34.11 MALIGNANT NEOPLASM OF UPPER LOBE OF RIGHT LUNG (MULTI): ICD-10-CM

## 2024-09-19 DIAGNOSIS — E83.52 HYPERCALCEMIA: ICD-10-CM

## 2024-09-19 LAB
ALBUMIN SERPL BCP-MCNC: 3.3 G/DL (ref 3.4–5)
ALP SERPL-CCNC: 69 U/L (ref 33–136)
ALT SERPL W P-5'-P-CCNC: 9 U/L (ref 10–52)
ANION GAP SERPL CALC-SCNC: 13 MMOL/L (ref 10–20)
AST SERPL W P-5'-P-CCNC: 17 U/L (ref 9–39)
BASOPHILS # BLD AUTO: 0.03 X10*3/UL (ref 0–0.1)
BASOPHILS NFR BLD AUTO: 0.3 %
BILIRUB SERPL-MCNC: 0.5 MG/DL (ref 0–1.2)
BUN SERPL-MCNC: 10 MG/DL (ref 6–23)
CALCIUM SERPL-MCNC: 11.7 MG/DL (ref 8.6–10.3)
CHLORIDE SERPL-SCNC: 95 MMOL/L (ref 98–107)
CO2 SERPL-SCNC: 31 MMOL/L (ref 21–32)
CREAT SERPL-MCNC: 0.76 MG/DL (ref 0.5–1.3)
EGFRCR SERPLBLD CKD-EPI 2021: >90 ML/MIN/1.73M*2
EOSINOPHIL # BLD AUTO: 0.14 X10*3/UL (ref 0–0.4)
EOSINOPHIL NFR BLD AUTO: 1.4 %
ERYTHROCYTE [DISTWIDTH] IN BLOOD BY AUTOMATED COUNT: 11.6 % (ref 11.5–14.5)
FERRITIN SERPL-MCNC: 866 NG/ML (ref 20–300)
GLUCOSE SERPL-MCNC: 121 MG/DL (ref 74–99)
HCT VFR BLD AUTO: 41.4 % (ref 41–52)
HGB BLD-MCNC: 13.5 G/DL (ref 13.5–17.5)
IMM GRANULOCYTES # BLD AUTO: 0.04 X10*3/UL (ref 0–0.5)
IMM GRANULOCYTES NFR BLD AUTO: 0.4 % (ref 0–0.9)
IRON SATN MFR SERPL: 11 % (ref 25–45)
IRON SERPL-MCNC: 25 UG/DL (ref 35–150)
LDH SERPL L TO P-CCNC: 138 U/L (ref 84–246)
LYMPHOCYTES # BLD AUTO: 1.81 X10*3/UL (ref 0.8–3)
LYMPHOCYTES NFR BLD AUTO: 17.7 %
MAGNESIUM SERPL-MCNC: 1.68 MG/DL (ref 1.6–2.4)
MCH RBC QN AUTO: 31 PG (ref 26–34)
MCHC RBC AUTO-ENTMCNC: 32.6 G/DL (ref 32–36)
MCV RBC AUTO: 95 FL (ref 80–100)
MONOCYTES # BLD AUTO: 0.8 X10*3/UL (ref 0.05–0.8)
MONOCYTES NFR BLD AUTO: 7.8 %
NEUTROPHILS # BLD AUTO: 7.42 X10*3/UL (ref 1.6–5.5)
NEUTROPHILS NFR BLD AUTO: 72.4 %
NRBC BLD-RTO: 0 /100 WBCS (ref 0–0)
PHOSPHATE SERPL-MCNC: 2.5 MG/DL (ref 2.5–4.9)
PLATELET # BLD AUTO: 210 X10*3/UL (ref 150–450)
POTASSIUM SERPL-SCNC: 3.5 MMOL/L (ref 3.5–5.3)
PROT SERPL-MCNC: 7.7 G/DL (ref 6.4–8.2)
RBC # BLD AUTO: 4.36 X10*6/UL (ref 4.5–5.9)
SODIUM SERPL-SCNC: 135 MMOL/L (ref 136–145)
TIBC SERPL-MCNC: 218 UG/DL (ref 240–445)
TSH SERPL-ACNC: 2.47 MIU/L (ref 0.44–3.98)
UIBC SERPL-MCNC: 193 UG/DL (ref 110–370)
WBC # BLD AUTO: 10.2 X10*3/UL (ref 4.4–11.3)

## 2024-09-19 PROCEDURE — 99215 OFFICE O/P EST HI 40 MIN: CPT | Performed by: INTERNAL MEDICINE

## 2024-09-19 PROCEDURE — 83735 ASSAY OF MAGNESIUM: CPT

## 2024-09-19 PROCEDURE — 83550 IRON BINDING TEST: CPT

## 2024-09-19 PROCEDURE — 87340 HEPATITIS B SURFACE AG IA: CPT

## 2024-09-19 PROCEDURE — 83615 LACTATE (LD) (LDH) ENZYME: CPT

## 2024-09-19 PROCEDURE — 1126F AMNT PAIN NOTED NONE PRSNT: CPT | Performed by: INTERNAL MEDICINE

## 2024-09-19 PROCEDURE — 3074F SYST BP LT 130 MM HG: CPT | Performed by: INTERNAL MEDICINE

## 2024-09-19 PROCEDURE — 1111F DSCHRG MED/CURRENT MED MERGE: CPT | Performed by: INTERNAL MEDICINE

## 2024-09-19 PROCEDURE — 82728 ASSAY OF FERRITIN: CPT

## 2024-09-19 PROCEDURE — 86704 HEP B CORE ANTIBODY TOTAL: CPT

## 2024-09-19 PROCEDURE — 3078F DIAST BP <80 MM HG: CPT | Performed by: INTERNAL MEDICINE

## 2024-09-19 PROCEDURE — 36415 COLL VENOUS BLD VENIPUNCTURE: CPT

## 2024-09-19 PROCEDURE — 1159F MED LIST DOCD IN RCRD: CPT | Performed by: INTERNAL MEDICINE

## 2024-09-19 PROCEDURE — 85025 COMPLETE CBC W/AUTO DIFF WBC: CPT

## 2024-09-19 PROCEDURE — 84100 ASSAY OF PHOSPHORUS: CPT

## 2024-09-19 PROCEDURE — G2211 COMPLEX E/M VISIT ADD ON: HCPCS | Performed by: INTERNAL MEDICINE

## 2024-09-19 PROCEDURE — 3008F BODY MASS INDEX DOCD: CPT | Performed by: INTERNAL MEDICINE

## 2024-09-19 PROCEDURE — 80053 COMPREHEN METABOLIC PANEL: CPT

## 2024-09-19 PROCEDURE — 86706 HEP B SURFACE ANTIBODY: CPT

## 2024-09-19 PROCEDURE — 83540 ASSAY OF IRON: CPT

## 2024-09-19 PROCEDURE — 84443 ASSAY THYROID STIM HORMONE: CPT

## 2024-09-19 RX ORDER — DIPHENHYDRAMINE HCL 50 MG
50 CAPSULE ORAL ONCE
OUTPATIENT
Start: 2024-10-02

## 2024-09-19 RX ORDER — ALBUTEROL SULFATE 0.83 MG/ML
3 SOLUTION RESPIRATORY (INHALATION) AS NEEDED
OUTPATIENT
Start: 2024-09-25

## 2024-09-19 RX ORDER — DIPHENHYDRAMINE HYDROCHLORIDE 50 MG/ML
50 INJECTION INTRAMUSCULAR; INTRAVENOUS AS NEEDED
OUTPATIENT
Start: 2024-10-02

## 2024-09-19 RX ORDER — ALBUTEROL SULFATE 0.83 MG/ML
3 SOLUTION RESPIRATORY (INHALATION) AS NEEDED
Status: CANCELLED | OUTPATIENT
Start: 2024-09-20

## 2024-09-19 RX ORDER — DIPHENHYDRAMINE HCL 50 MG
50 CAPSULE ORAL ONCE
OUTPATIENT
Start: 2024-09-25

## 2024-09-19 RX ORDER — PALONOSETRON 0.05 MG/ML
0.25 INJECTION, SOLUTION INTRAVENOUS ONCE
OUTPATIENT
Start: 2024-10-02

## 2024-09-19 RX ORDER — DIPHENHYDRAMINE HYDROCHLORIDE 50 MG/ML
50 INJECTION INTRAMUSCULAR; INTRAVENOUS AS NEEDED
OUTPATIENT
Start: 2024-09-25

## 2024-09-19 RX ORDER — PROCHLORPERAZINE EDISYLATE 5 MG/ML
10 INJECTION INTRAMUSCULAR; INTRAVENOUS EVERY 6 HOURS PRN
OUTPATIENT
Start: 2024-09-25

## 2024-09-19 RX ORDER — EPINEPHRINE 0.3 MG/.3ML
0.3 INJECTION SUBCUTANEOUS EVERY 5 MIN PRN
Status: CANCELLED | OUTPATIENT
Start: 2024-09-20

## 2024-09-19 RX ORDER — FAMOTIDINE 10 MG/ML
20 INJECTION INTRAVENOUS ONCE AS NEEDED
OUTPATIENT
Start: 2024-09-25

## 2024-09-19 RX ORDER — FAMOTIDINE 10 MG/ML
20 INJECTION INTRAVENOUS ONCE AS NEEDED
OUTPATIENT
Start: 2024-10-02

## 2024-09-19 RX ORDER — EPINEPHRINE 0.3 MG/.3ML
0.3 INJECTION SUBCUTANEOUS EVERY 5 MIN PRN
OUTPATIENT
Start: 2024-10-02

## 2024-09-19 RX ORDER — FAMOTIDINE 10 MG/ML
20 INJECTION INTRAVENOUS ONCE AS NEEDED
Status: CANCELLED | OUTPATIENT
Start: 2024-09-20

## 2024-09-19 RX ORDER — PALONOSETRON 0.05 MG/ML
0.25 INJECTION, SOLUTION INTRAVENOUS ONCE
OUTPATIENT
Start: 2024-09-25

## 2024-09-19 RX ORDER — ONDANSETRON HYDROCHLORIDE 8 MG/1
8 TABLET, FILM COATED ORAL EVERY 8 HOURS PRN
Qty: 30 TABLET | Refills: 5 | Status: SHIPPED | OUTPATIENT
Start: 2024-09-19

## 2024-09-19 RX ORDER — ZOLEDRONIC ACID 0.04 MG/ML
4 INJECTION, SOLUTION INTRAVENOUS ONCE
Status: CANCELLED | OUTPATIENT
Start: 2024-09-20

## 2024-09-19 RX ORDER — PROCHLORPERAZINE EDISYLATE 5 MG/ML
10 INJECTION INTRAMUSCULAR; INTRAVENOUS EVERY 6 HOURS PRN
OUTPATIENT
Start: 2024-10-02

## 2024-09-19 RX ORDER — PROCHLORPERAZINE MALEATE 5 MG
10 TABLET ORAL EVERY 6 HOURS PRN
OUTPATIENT
Start: 2024-10-02

## 2024-09-19 RX ORDER — PROCHLORPERAZINE MALEATE 10 MG
10 TABLET ORAL EVERY 6 HOURS PRN
Qty: 30 TABLET | Refills: 5 | Status: SHIPPED | OUTPATIENT
Start: 2024-09-19

## 2024-09-19 RX ORDER — FAMOTIDINE 10 MG/ML
20 INJECTION INTRAVENOUS ONCE
OUTPATIENT
Start: 2024-10-02

## 2024-09-19 RX ORDER — PROCHLORPERAZINE MALEATE 5 MG
10 TABLET ORAL EVERY 6 HOURS PRN
OUTPATIENT
Start: 2024-09-25

## 2024-09-19 RX ORDER — DIPHENHYDRAMINE HYDROCHLORIDE 50 MG/ML
50 INJECTION INTRAMUSCULAR; INTRAVENOUS AS NEEDED
Status: CANCELLED | OUTPATIENT
Start: 2024-09-20

## 2024-09-19 RX ORDER — EPINEPHRINE 0.3 MG/.3ML
0.3 INJECTION SUBCUTANEOUS EVERY 5 MIN PRN
OUTPATIENT
Start: 2024-09-25

## 2024-09-19 RX ORDER — PANTOPRAZOLE SODIUM 40 MG/1
40 TABLET, DELAYED RELEASE ORAL DAILY
Qty: 60 TABLET | Refills: 2 | Status: SHIPPED | OUTPATIENT
Start: 2024-09-19 | End: 2025-03-18

## 2024-09-19 RX ORDER — ALBUTEROL SULFATE 0.83 MG/ML
3 SOLUTION RESPIRATORY (INHALATION) AS NEEDED
OUTPATIENT
Start: 2024-10-02

## 2024-09-19 RX ORDER — FAMOTIDINE 10 MG/ML
20 INJECTION INTRAVENOUS ONCE
OUTPATIENT
Start: 2024-09-25

## 2024-09-19 SDOH — ECONOMIC STABILITY: FOOD INSECURITY: WITHIN THE PAST 12 MONTHS, THE FOOD YOU BOUGHT JUST DIDN'T LAST AND YOU DIDN'T HAVE MONEY TO GET MORE.: NEVER TRUE

## 2024-09-19 SDOH — ECONOMIC STABILITY: GENERAL: WHICH OF THE FOLLOWING DO YOU KNOW HOW TO USE AND HAVE ACCESS TO EVERY DAY? (CHOOSE ALL THAT APPLY): NONE OF THESE

## 2024-09-19 SDOH — ECONOMIC STABILITY: FOOD INSECURITY: WITHIN THE PAST 12 MONTHS, YOU WORRIED THAT YOUR FOOD WOULD RUN OUT BEFORE YOU GOT MONEY TO BUY MORE.: NEVER TRUE

## 2024-09-19 SDOH — HEALTH STABILITY: PHYSICAL HEALTH: ON AVERAGE, HOW MANY MINUTES DO YOU ENGAGE IN EXERCISE AT THIS LEVEL?: 0 MIN

## 2024-09-19 SDOH — HEALTH STABILITY: PHYSICAL HEALTH: ON AVERAGE, HOW MANY DAYS PER WEEK DO YOU ENGAGE IN MODERATE TO STRENUOUS EXERCISE (LIKE A BRISK WALK)?: 0 DAYS

## 2024-09-19 SDOH — ECONOMIC STABILITY: GENERAL
WHICH OF THE FOLLOWING WOULD YOU LIKE TO GET CONNECTED TO IN ORDER TO RECEIVE A DISCOUNT OR FOR FREE? (CHOOSE ALL THAT APPLY): NONE OF THESE

## 2024-09-19 ASSESSMENT — COLUMBIA-SUICIDE SEVERITY RATING SCALE - C-SSRS
6. HAVE YOU EVER DONE ANYTHING, STARTED TO DO ANYTHING, OR PREPARED TO DO ANYTHING TO END YOUR LIFE?: NO
1. IN THE PAST MONTH, HAVE YOU WISHED YOU WERE DEAD OR WISHED YOU COULD GO TO SLEEP AND NOT WAKE UP?: NO
2. HAVE YOU ACTUALLY HAD ANY THOUGHTS OF KILLING YOURSELF?: NO

## 2024-09-19 ASSESSMENT — PAIN SCALES - GENERAL: PAINLEVEL: 0-NO PAIN

## 2024-09-19 ASSESSMENT — SOCIAL DETERMINANTS OF HEALTH (SDOH)
WITHIN THE LAST YEAR, HAVE YOU BEEN HUMILIATED OR EMOTIONALLY ABUSED IN OTHER WAYS BY YOUR PARTNER OR EX-PARTNER?: NO
IN THE PAST 12 MONTHS, HAS THE ELECTRIC, GAS, OIL, OR WATER COMPANY THREATENED TO SHUT OFF SERVICE IN YOUR HOME?: NO
WITHIN THE LAST YEAR, HAVE TO BEEN RAPED OR FORCED TO HAVE ANY KIND OF SEXUAL ACTIVITY BY YOUR PARTNER OR EX-PARTNER?: NO
WITHIN THE LAST YEAR, HAVE YOU BEEN AFRAID OF YOUR PARTNER OR EX-PARTNER?: NO
WITHIN THE LAST YEAR, HAVE YOU BEEN KICKED, HIT, SLAPPED, OR OTHERWISE PHYSICALLY HURT BY YOUR PARTNER OR EX-PARTNER?: NO

## 2024-09-20 ENCOUNTER — HOME CARE VISIT (OUTPATIENT)
Dept: HOME HEALTH SERVICES | Facility: HOME HEALTH | Age: 74
End: 2024-09-20
Payer: MEDICARE

## 2024-09-20 ENCOUNTER — INFUSION (OUTPATIENT)
Dept: HEMATOLOGY/ONCOLOGY | Facility: HOSPITAL | Age: 74
End: 2024-09-20
Payer: MEDICARE

## 2024-09-20 VITALS
SYSTOLIC BLOOD PRESSURE: 106 MMHG | TEMPERATURE: 97.6 F | OXYGEN SATURATION: 90 % | HEART RATE: 82 BPM | RESPIRATION RATE: 18 BRPM | DIASTOLIC BLOOD PRESSURE: 66 MMHG

## 2024-09-20 VITALS
RESPIRATION RATE: 16 BRPM | BODY MASS INDEX: 21.62 KG/M2 | WEIGHT: 146.39 LBS | OXYGEN SATURATION: 96 % | DIASTOLIC BLOOD PRESSURE: 71 MMHG | HEART RATE: 80 BPM | TEMPERATURE: 97.2 F | SYSTOLIC BLOOD PRESSURE: 118 MMHG

## 2024-09-20 DIAGNOSIS — E83.52 HYPERCALCEMIA: ICD-10-CM

## 2024-09-20 DIAGNOSIS — C34.11 MALIGNANT NEOPLASM OF UPPER LOBE OF RIGHT LUNG (MULTI): ICD-10-CM

## 2024-09-20 LAB
HBV CORE AB SER QL: NONREACTIVE
HBV SURFACE AB SER-ACNC: <3.1 MIU/ML
HBV SURFACE AG SERPL QL IA: NONREACTIVE

## 2024-09-20 PROCEDURE — 2500000004 HC RX 250 GENERAL PHARMACY W/ HCPCS (ALT 636 FOR OP/ED): Performed by: INTERNAL MEDICINE

## 2024-09-20 PROCEDURE — 96367 TX/PROPH/DG ADDL SEQ IV INF: CPT | Mod: INF,INF2

## 2024-09-20 PROCEDURE — G0300 HHS/HOSPICE OF LPN EA 15 MIN: HCPCS | Mod: HHH

## 2024-09-20 PROCEDURE — 96365 THER/PROPH/DIAG IV INF INIT: CPT | Mod: INF

## 2024-09-20 RX ORDER — FAMOTIDINE 10 MG/ML
20 INJECTION INTRAVENOUS ONCE AS NEEDED
Status: DISCONTINUED | OUTPATIENT
Start: 2024-09-20 | End: 2024-09-20 | Stop reason: HOSPADM

## 2024-09-20 RX ORDER — DIPHENHYDRAMINE HYDROCHLORIDE 50 MG/ML
50 INJECTION INTRAMUSCULAR; INTRAVENOUS AS NEEDED
Status: DISCONTINUED | OUTPATIENT
Start: 2024-09-20 | End: 2024-09-20 | Stop reason: HOSPADM

## 2024-09-20 RX ORDER — EPINEPHRINE 0.3 MG/.3ML
0.3 INJECTION SUBCUTANEOUS EVERY 5 MIN PRN
Status: DISCONTINUED | OUTPATIENT
Start: 2024-09-20 | End: 2024-09-20 | Stop reason: HOSPADM

## 2024-09-20 RX ORDER — EPINEPHRINE 0.3 MG/.3ML
0.3 INJECTION SUBCUTANEOUS EVERY 5 MIN PRN
OUTPATIENT
Start: 2024-09-20

## 2024-09-20 RX ORDER — DIPHENHYDRAMINE HYDROCHLORIDE 50 MG/ML
50 INJECTION INTRAMUSCULAR; INTRAVENOUS AS NEEDED
OUTPATIENT
Start: 2024-09-20

## 2024-09-20 RX ORDER — ALBUTEROL SULFATE 0.83 MG/ML
3 SOLUTION RESPIRATORY (INHALATION) AS NEEDED
Status: DISCONTINUED | OUTPATIENT
Start: 2024-09-20 | End: 2024-09-20 | Stop reason: HOSPADM

## 2024-09-20 RX ORDER — FAMOTIDINE 10 MG/ML
20 INJECTION INTRAVENOUS ONCE AS NEEDED
OUTPATIENT
Start: 2024-09-20

## 2024-09-20 RX ORDER — ALBUTEROL SULFATE 0.83 MG/ML
3 SOLUTION RESPIRATORY (INHALATION) AS NEEDED
OUTPATIENT
Start: 2024-09-20

## 2024-09-20 ASSESSMENT — PATIENT HEALTH QUESTIONNAIRE - PHQ9
SUM OF ALL RESPONSES TO PHQ9 QUESTIONS 1 & 2: 0
2. FEELING DOWN, DEPRESSED OR HOPELESS: NOT AT ALL
1. LITTLE INTEREST OR PLEASURE IN DOING THINGS: NOT AT ALL

## 2024-09-20 ASSESSMENT — PAIN SCALES - GENERAL: PAINLEVEL: 0-NO PAIN

## 2024-09-20 NOTE — PROGRESS NOTES
"Patient ID: Juan Carlos Cr is a 74 y.o. male.    Subjective:  Follows up for lung cancer. Feeling more tired today than last week. Mild dyspnea. Quit smoking.   Feels weak in legs. No nausea.     Heme/Onc History:  - p/w dyspne ain Aug 2024. CT Chest: 6 cm R upper lung central mass. MR Brain: No mets  - Bronch: SCC. PD-L1 5%. Cytology from [facility] 4 and 7 Lns are neg for malignancy  - PET/CT (Sep 2024): Large central R lung mass with mediastinal LAPs. Satellite RLL peripheral nodule.    Assessment/Plan:  ? NSCLC: T4N2M0 as per PET/CT findings although cytology in bronch from Lns were negative. Needs chemoRT. Will give him weekly carbo-taxol. We will plan to start chemo next week as his condition is worsening. Will make a stat referral to Radiation Oncology.   I talked to him and his family about side effects, risks, and benefits of chemotherapy in detail. I answered all his and family's questions. He agreed to the treatment.   Will start him on PPIs for expected dyspepsia with chemoRT.    We will plan to give him durvalumab after chemoRT    Hypercalcemia: His calcium is 11/7 with Albumin of 3.3 => Due to lung cancer. Will plan to give him IV fluids tomorrow with zometa - if approved.     ADDENDUM: We will have a port placed for treatment because his veins are not good enough.    I provide longitudinal care for Mr. Cr for his lung cancer    Review Of Systems:  Review of Systems   Constitutional:  Positive for fatigue. Negative for fever and unexpected weight change.   HENT:  Negative.     Respiratory: Negative.     Cardiovascular: Negative.    Gastrointestinal: Negative.    Genitourinary: Negative.         Physical Exam:  /65 (BP Location: Left arm, Patient Position: Sitting, BP Cuff Size: Adult)   Pulse 88   Temp 36.1 °C (97 °F) (Temporal)   Resp 16   Ht 1.753 m (5' 9\")   Wt 66.7 kg (147 lb 0.8 oz)   SpO2 (!) 88% Comment: AT REST ON ROOM AIR  BMI 21.72 kg/m²   BSA: 1.8 meters squared  Performance " Status: Symptomatic; fully ambulatory  Physical Exam  Constitutional:       General: He is not in acute distress.     Appearance: He is not ill-appearing.   HENT:      Head: Normocephalic and atraumatic.   Eyes:      General: No scleral icterus.     Pupils: Pupils are equal, round, and reactive to light.   Cardiovascular:      Rate and Rhythm: Normal rate and regular rhythm.   Pulmonary:      Effort: Pulmonary effort is normal.   Abdominal:      General: Abdomen is flat.      Palpations: Abdomen is soft. There is no mass.   Musculoskeletal:         General: Normal range of motion.   Lymphadenopathy:      Cervical: No cervical adenopathy.   Skin:     Coloration: Skin is not jaundiced.   Neurological:      General: No focal deficit present.      Mental Status: He is alert and oriented to person, place, and time.       Results:  Diagnostic Results   Lab Results   Component Value Date    WBC 10.2 09/19/2024    HGB 13.5 09/19/2024    HCT 41.4 09/19/2024    MCV 95 09/19/2024     09/19/2024     Lab Results   Component Value Date    CALCIUM 11.7 (H) 09/19/2024     (L) 09/19/2024    K 3.5 09/19/2024    CO2 31 09/19/2024    CL 95 (L) 09/19/2024    BUN 10 09/19/2024    CREATININE 0.76 09/19/2024    ALT 9 (L) 09/19/2024    AST 17 09/19/2024       Current Outpatient Medications:   •  amLODIPine (Norvasc) 5 mg tablet, Take 2 tablets (10 mg) by mouth once daily., Disp: 120 tablet, Rfl: 0  •  apixaban (Eliquis) 5 mg tablet, Take 1 tablet (5 mg) by mouth 2 times a day., Disp: 180 tablet, Rfl: 0  •  ascorbic acid (Vitamin C) 500 mg tablet, Take 1 tablet (500 mg) by mouth once daily., Disp: , Rfl:   •  aspirin 81 mg EC tablet, Take 1 tablet (81 mg) by mouth once daily., Disp: , Rfl:   •  blood sugar diagnostic (Accu-Chek Guide test strips) strip, 1 strip 2 times a day., Disp: 200 each, Rfl: 3  •  blood sugar diagnostic (FreeStyle Lite Strips) strip, 1 strip 2 times a day., Disp: 200 each, Rfl: 3  •  blood-glucose meter  (Accu-Chek Guide Glucose Meter) misc, Check blood glucose 2x a day, Disp: 1 each, Rfl: 0  •  fluticasone furoate-vilanteroL (Breo Ellipta) 200-25 mcg/dose inhaler, Inhale 1 puff once daily., Disp: 60 each, Rfl: 1  •  fluticasone-umeclidin-vilanter (TRELEGY-ELLIPTA) 200-62.5-25 mcg blister with device, Inhale 1 puff early in the morning.. Rinse mouth after taking dose, Disp: 60 each, Rfl: 11  •  folic acid (Folvite) 1 mg tablet, Take 1 tablet (1 mg) by mouth once daily., Disp: 60 tablet, Rfl: 0  •  FreeStyle Lite Meter kit, TEST BLOOD SUGAR LEVELS 2X A DAY, Disp: 1 each, Rfl: 0  •  lancets (Accu-Chek Fastclix Lancet Drum) misc, Check blood glucose 2x day, Disp: 200 each, Rfl: 0  •  lancets (Accu-Chek Fastclix Lancet Drum) misc, Check blood glucose level 2x a day, Disp: 200 each, Rfl: 0  •  lancets misc, TEST BLOOD SUGAR LEVELS 2X A DAY, Disp: 100 each, Rfl: 0  •  lancets misc, Check blood sugar twice daily, Disp: 200 each, Rfl: 2  •  melatonin 3 mg tablet, Take 1 tablet (3 mg) by mouth once daily., Disp: 60 tablet, Rfl: 0  •  metFORMIN (Glucophage) 500 mg tablet, Take 1 tablet (500 mg) by mouth 2 times daily (morning and late afternoon)., Disp: 120 tablet, Rfl: 0  •  metoprolol tartrate (Lopressor) 50 mg tablet, Take 1 tablet by mouth every 12 hours., Disp: 90 tablet, Rfl: 3  •  multivitamin with minerals tablet, Take 1 tablet by mouth once daily., Disp: 30 tablet, Rfl: 1  •  nicotine (Nicoderm CQ) 21 mg/24 hr patch, Place 1 patch over 24 hours on the skin once daily for 42 doses., Disp: 42 patch, Rfl: 1  •  simvastatin (Zocor) 20 mg tablet, Take 1 tablet (20 mg) by mouth once daily., Disp: 90 tablet, Rfl: 3  •  sodium chloride 3 % nebulizer solution, Take 3 mL by nebulization every 6 hours if needed for cough., Disp: 480 mL, Rfl: 0  •  thiamine (Vitamin B-1) 100 mg tablet, Take 1 tablet (100 mg) by mouth once daily. Do not start  before September 2, 2024., Disp: 30 tablet, Rfl: 1  •  tiotropium (Spiriva Respimat)  2.5 mcg/actuation inhaler, Inhale 2 puffs once daily., Disp: 8 g, Rfl: 1  •  nicotine (Nicoderm CQ) 14 mg/24 hr patch, Apply 1 patch topically every day.  START AFTER finishing the 21mg patches., Disp: 14 patch, Rfl: 0  •  [START ON 10/22/2024] nicotine (Nicoderm CQ) 7 mg/24 hr patch, Place 1 patch over 24 hours on the skin once daily for 14 doses. START AFTER finishing the 14mg patches. (Patient not taking: Reported on 9/19/2024 Do not fill before October 22, 2024.), Disp: 14 patch, Rfl: 0  •  ondansetron (Zofran) 8 mg tablet, Take 1 tablet (8 mg) by mouth every 8 hours if needed for nausea or vomiting., Disp: 30 tablet, Rfl: 5  •  pantoprazole (ProtoNix) 40 mg EC tablet, Take 1 tablet (40 mg) by mouth once daily. Do not crush, chew, or split., Disp: 60 tablet, Rfl: 2  •  prochlorperazine (Compazine) 10 mg tablet, Take 1 tablet (10 mg) by mouth every 6 hours if needed for nausea or vomiting., Disp: 30 tablet, Rfl: 5     Past Surgical History:   Procedure Laterality Date   • CATARACT EXTRACTION  09/14/2018    Cataract Surgery   • CATARACT EXTRACTION  06/27/2014    Cataract Surgery   • CORONARY ANGIOPLASTY WITH STENT PLACEMENT  05/17/2013    Cath Stent Placement     Family History   Problem Relation Name Age of Onset   • Heart attack Mother     • Lung cancer Brother        reports that he has been smoking cigarettes. He does not have any smokeless tobacco history on file.  Social History     Socioeconomic History   • Marital status:      Spouse name: Not on file   • Number of children: Not on file   • Years of education: Not on file   • Highest education level: Not on file   Occupational History   • Not on file   Tobacco Use   • Smoking status: Every Day     Current packs/day: 0.50     Types: Cigarettes   • Smokeless tobacco: Not on file   Vaping Use   • Vaping status: Never Used   Substance and Sexual Activity   • Alcohol use: Not Currently     Comment: 4-5 beers and a glass a wine a day   • Drug use: Never    • Sexual activity: Not on file   Other Topics Concern   • Not on file   Social History Narrative   • Not on file     Social Determinants of Health     Financial Resource Strain: Low Risk  (8/31/2024)    Overall Financial Resource Strain (CARDIA)    • Difficulty of Paying Living Expenses: Not hard at all   Food Insecurity: No Food Insecurity (9/19/2024)    Hunger Vital Sign    • Worried About Running Out of Food in the Last Year: Never true    • Ran Out of Food in the Last Year: Never true   Transportation Needs: No Transportation Needs (9/8/2024)    OASIS : Transportation    • Lack of Transportation (Medical): No    • Lack of Transportation (Non-Medical): No    • Patient Unable or Declines to Respond: No   Physical Activity: Inactive (9/19/2024)    Exercise Vital Sign    • Days of Exercise per Week: 0 days    • Minutes of Exercise per Session: 0 min   Stress: No Stress Concern Present (9/19/2024)    [employer] - Occupational Stress Questionnaire    • Feeling of Stress : Not at all   Social Connections: Feeling Socially Integrated (9/8/2024)    OASIS : Social Isolation    • Frequency of experiencing loneliness or isolation: Never   Intimate Partner Violence: Not At Risk (9/19/2024)    Humiliation, Afraid, Rape, and Kick questionnaire    • Fear of Current or Ex-Partner: No    • Emotionally Abused: No    • Physically Abused: No    • Sexually Abused: No   Housing Stability: Low Risk  (8/31/2024)    Housing Stability Vital Sign    • Unable to Pay for Housing in the Last Year: No    • Number of Times Moved in the Last Year: 0    • Homeless in the Last Year: No       Diagnoses and all orders for this visit:  Malignant neoplasm of upper lobe of right lung (Multi)  -     Clinic Appointment Request  -     CBC and Auto Differential; Future  -     Comprehensive Metabolic Panel; Future  -     Lactate Dehydrogenase; Future  -     Magnesium; Future  -     Thyroid Stimulating Hormone;  Future  -     Ferritin; Future  -     Iron and TIBC; Future  -     Clinic Appointment Request; Future  -     CBC and Auto Differential; Future  -     Comprehensive Metabolic Panel; Future  -     Phosphorus; Future  -     ondansetron (Zofran) 8 mg tablet; Take 1 tablet (8 mg) by mouth every 8 hours if needed for nausea or vomiting.  -     prochlorperazine (Compazine) 10 mg tablet; Take 1 tablet (10 mg) by mouth every 6 hours if needed for nausea or vomiting.  -     pantoprazole (ProtoNix) 40 mg EC tablet; Take 1 tablet (40 mg) by mouth once daily. Do not crush, chew, or split.  -     Hepatitis B surface antigen; Future  -     Hepatitis B Core Antibody, Total; Future  -     Hepatitis B surface antibody; Future  -     Clinic Appointment Request; Future  -     Infusion Appointment Request; Future  -     Clinic Appointment Request; Future  -     Infusion Appointment Request; Future  -     CBC and Auto Differential; Future  -     Comprehensive metabolic panel; Future  -     Referral to Radiation Oncology; Future  -     Disability Placard  -     Oxygen therapy for home  Other fatigue  -     Thyroid Stimulating Hormone; Future  Hypercalcemia  COPD exacerbation (Multi)  -     Oxygen therapy for home  Other orders  -     sodium chloride 0.9 % bolus 500 mL  -     zoledronic acid (Zometa) 4 mg  mL  -     sodium chloride 0.9 % bolus 500 mL  -     dextrose 5 % in water (D5W) bolus  -     diphenhydrAMINE (BENADryl) injection 50 mg  -     methylPREDNISolone sod succinate (SOLU-Medrol) 40 mg/mL injection 40 mg  -     famotidine PF (Pepcid) injection 20 mg  -     EPINEPHrine (Epipen) injection syringe 0.3 mg  -     albuterol 2.5 mg /3 mL (0.083 %) nebulizer solution 3 mL  -     dexAMETHasone (Decadron) 12 mg in dextrose 5% 50 mL IV  -     diphenhydrAMINE (BENADryl) capsule 50 mg  -     famotidine PF (Pepcid) injection 20 mg  -     palonosetron (Aloxi) injection 250 mcg  -     prochlorperazine (Compazine) tablet 10 mg  -      prochlorperazine (Compazine) injection 10 mg  -     PACLitaxeL (Taxol) 81 mg in dextrose 5% 513.5 mL IV  -     CARBOplatin (Paraplatin) 52 mg in sodium chloride 0.9% 105.2 mL IV  -     sodium chloride 0.9 % bolus 500 mL  -     dextrose 5 % in water (D5W) bolus  -     diphenhydrAMINE (BENADryl) injection 50 mg  -     methylPREDNISolone sod succinate (SOLU-Medrol) 40 mg/mL injection 40 mg  -     famotidine PF (Pepcid) injection 20 mg  -     EPINEPHrine (Epipen) injection syringe 0.3 mg  -     albuterol 2.5 mg /3 mL (0.083 %) nebulizer solution 3 mL  -     dexAMETHasone (Decadron) 12 mg in dextrose 5% 50 mL IV  -     diphenhydrAMINE (BENADryl) capsule 50 mg  -     famotidine PF (Pepcid) injection 20 mg  -     palonosetron (Aloxi) injection 250 mcg  -     prochlorperazine (Compazine) tablet 10 mg  -     prochlorperazine (Compazine) injection 10 mg  -     PACLitaxeL (Taxol) 81 mg in dextrose 5% 513.5 mL IV  -     CARBOplatin (Paraplatin) 52 mg in sodium chloride 0.9% 105.2 mL IV  -     sodium chloride 0.9 % bolus 500 mL  -     dextrose 5 % in water (D5W) bolus  -     diphenhydrAMINE (BENADryl) injection 50 mg  -     methylPREDNISolone sod succinate (SOLU-Medrol) 40 mg/mL injection 40 mg  -     famotidine PF (Pepcid) injection 20 mg  -     EPINEPHrine (Epipen) injection syringe 0.3 mg  -     albuterol 2.5 mg /3 mL (0.083 %) nebulizer solution 3 mL       I spent more than 60 minutes for the patient today, including face-to-face conversation, pre-visit preparation, post-visit orders, and others.   Janiya Landis MD

## 2024-09-21 SDOH — ECONOMIC STABILITY: GENERAL

## 2024-09-21 SDOH — ECONOMIC STABILITY: HOUSING INSECURITY: EVIDENCE OF SMOKING MATERIAL: 0

## 2024-09-21 SDOH — HEALTH STABILITY: MENTAL HEALTH: SMOKING IN HOME: 0

## 2024-09-21 ASSESSMENT — PAIN SCALES - PAIN ASSESSMENT IN ADVANCED DEMENTIA (PAINAD)
BODYLANGUAGE: 0 - RELAXED.
BODYLANGUAGE: 0
CONSOLABILITY: 0 - NO NEED TO CONSOLE.
NEGVOCALIZATION: 0
BREATHING: 0
NEGVOCALIZATION: 0 - NONE.
TOTALSCORE: 0
CONSOLABILITY: 0
FACIALEXPRESSION: 0 - SMILING OR INEXPRESSIVE.
FACIALEXPRESSION: 0

## 2024-09-21 ASSESSMENT — ENCOUNTER SYMPTOMS
LAST BOWEL MOVEMENT: 67103
BOWEL PATTERN NORMAL: 1
FEVER: 0
GASTROINTESTINAL NEGATIVE: 1
DENIES PAIN: 1
UNEXPECTED WEIGHT CHANGE: 0
CHANGE IN APPETITE: UNCHANGED
CARDIOVASCULAR NEGATIVE: 1
RESPIRATORY NEGATIVE: 1
FATIGUE: 1
APPETITE LEVEL: GOOD
PERSON REPORTING PAIN: PATIENT
STOOL FREQUENCY: DAILY

## 2024-09-21 ASSESSMENT — ACTIVITIES OF DAILY LIVING (ADL): MONEY MANAGEMENT (EXPENSES/BILLS): INDEPENDENT

## 2024-09-21 NOTE — HOME HEALTH
Client alert and cooperative with assessment related appetite and fluid intake were fair to adequate no abnormalities concerning bowel or bladder habits.  educated on DM diet,  checking blood sugars daily and journaling results. Client has been doing so for about the last week levels have been within normal range.client going out for infusions as directed following up with with physicians next week denies distress at this time instructed on  handbook calling  Homecare or MD for issues that may arise and to call 911 client and wife related understanding of all instruction and denied distress at this time.

## 2024-09-23 ENCOUNTER — OFFICE VISIT (OUTPATIENT)
Dept: SURGICAL ONCOLOGY | Facility: HOSPITAL | Age: 74
DRG: 180 | End: 2024-09-23
Payer: MEDICARE

## 2024-09-23 ENCOUNTER — HOME CARE VISIT (OUTPATIENT)
Dept: HOME HEALTH SERVICES | Facility: HOME HEALTH | Age: 74
End: 2024-09-23
Payer: MEDICARE

## 2024-09-23 ENCOUNTER — APPOINTMENT (OUTPATIENT)
Dept: RADIOLOGY | Facility: HOSPITAL | Age: 74
DRG: 180 | End: 2024-09-23
Payer: MEDICARE

## 2024-09-23 ENCOUNTER — HOSPITAL ENCOUNTER (EMERGENCY)
Facility: HOSPITAL | Age: 74
Discharge: OTHER INSTITUTION | End: 2024-09-23
Attending: FAMILY MEDICINE
Payer: MEDICARE

## 2024-09-23 ENCOUNTER — APPOINTMENT (OUTPATIENT)
Dept: RADIOLOGY | Facility: HOSPITAL | Age: 74
End: 2024-09-23
Payer: MEDICARE

## 2024-09-23 ENCOUNTER — APPOINTMENT (OUTPATIENT)
Dept: RADIATION ONCOLOGY | Facility: CLINIC | Age: 74
End: 2024-09-23
Payer: MEDICARE

## 2024-09-23 ENCOUNTER — HOSPITAL ENCOUNTER (INPATIENT)
Facility: HOSPITAL | Age: 74
LOS: 4 days | Discharge: HOME | End: 2024-09-27
Attending: INTERNAL MEDICINE | Admitting: INTERNAL MEDICINE
Payer: MEDICARE

## 2024-09-23 VITALS
DIASTOLIC BLOOD PRESSURE: 62 MMHG | HEART RATE: 68 BPM | TEMPERATURE: 97.6 F | OXYGEN SATURATION: 96 % | RESPIRATION RATE: 20 BRPM | SYSTOLIC BLOOD PRESSURE: 128 MMHG

## 2024-09-23 DIAGNOSIS — Z85.46 HISTORY OF PROSTATE CANCER: ICD-10-CM

## 2024-09-23 DIAGNOSIS — R79.89 ELEVATED TROPONIN: ICD-10-CM

## 2024-09-23 DIAGNOSIS — J96.11 CHRONIC RESPIRATORY FAILURE WITH HYPOXIA, ON HOME O2 THERAPY (MULTI): ICD-10-CM

## 2024-09-23 DIAGNOSIS — C34.90: Primary | ICD-10-CM

## 2024-09-23 DIAGNOSIS — Z99.81 CHRONIC RESPIRATORY FAILURE WITH HYPOXIA, ON HOME O2 THERAPY (MULTI): ICD-10-CM

## 2024-09-23 DIAGNOSIS — E87.6 HYPOKALEMIA: ICD-10-CM

## 2024-09-23 DIAGNOSIS — C34.90 PRIMARY MALIGNANT NEOPLASM OF LUNG METASTATIC TO OTHER SITE, UNSPECIFIED LATERALITY (MULTI): Primary | ICD-10-CM

## 2024-09-23 DIAGNOSIS — C34.11 MALIGNANT NEOPLASM OF UPPER LOBE OF RIGHT LUNG (MULTI): ICD-10-CM

## 2024-09-23 LAB
ABO GROUP (TYPE) IN BLOOD: NORMAL
ALBUMIN SERPL BCP-MCNC: 3.1 G/DL (ref 3.4–5)
ALBUMIN SERPL BCP-MCNC: 3.1 G/DL (ref 3.4–5)
ALP SERPL-CCNC: 55 U/L (ref 33–136)
ALP SERPL-CCNC: 56 U/L (ref 33–136)
ALT SERPL W P-5'-P-CCNC: 7 U/L (ref 10–52)
ALT SERPL W P-5'-P-CCNC: 8 U/L (ref 10–52)
ANION GAP BLDV CALCULATED.4IONS-SCNC: 9 MMOL/L (ref 10–25)
ANION GAP SERPL CALC-SCNC: 12 MMOL/L (ref 10–20)
ANION GAP SERPL CALC-SCNC: 14 MMOL/L (ref 10–20)
ANTIBODY SCREEN: NORMAL
APPEARANCE UR: CLEAR
AST SERPL W P-5'-P-CCNC: 10 U/L (ref 9–39)
AST SERPL W P-5'-P-CCNC: 16 U/L (ref 9–39)
BACTERIA #/AREA URNS AUTO: ABNORMAL /HPF
BASE EXCESS BLDV CALC-SCNC: 4.9 MMOL/L (ref -2–3)
BASOPHILS # BLD AUTO: 0 X10*3/UL (ref 0–0.1)
BASOPHILS # BLD AUTO: 0.03 X10*3/UL (ref 0–0.1)
BASOPHILS NFR BLD AUTO: 0 %
BASOPHILS NFR BLD AUTO: 0.3 %
BILIRUB SERPL-MCNC: 0.5 MG/DL (ref 0–1.2)
BILIRUB SERPL-MCNC: 0.5 MG/DL (ref 0–1.2)
BILIRUB UR STRIP.AUTO-MCNC: NEGATIVE MG/DL
BNP SERPL-MCNC: 210 PG/ML (ref 0–99)
BODY TEMPERATURE: ABNORMAL
BUN SERPL-MCNC: 14 MG/DL (ref 6–23)
BUN SERPL-MCNC: 14 MG/DL (ref 6–23)
CA-I BLDV-SCNC: 1.25 MMOL/L (ref 1.1–1.33)
CALCIUM SERPL-MCNC: 9.3 MG/DL (ref 8.6–10.6)
CALCIUM SERPL-MCNC: 9.8 MG/DL (ref 8.6–10.3)
CARDIAC TROPONIN I PNL SERPL HS: 24 NG/L (ref 0–20)
CARDIAC TROPONIN I PNL SERPL HS: 26 NG/L (ref 0–20)
CHLORIDE BLDV-SCNC: 96 MMOL/L (ref 98–107)
CHLORIDE SERPL-SCNC: 95 MMOL/L (ref 98–107)
CHLORIDE SERPL-SCNC: 97 MMOL/L (ref 98–107)
CO2 SERPL-SCNC: 26 MMOL/L (ref 21–32)
CO2 SERPL-SCNC: 30 MMOL/L (ref 21–32)
COLOR UR: YELLOW
CREAT SERPL-MCNC: 0.66 MG/DL (ref 0.5–1.3)
CREAT SERPL-MCNC: 0.83 MG/DL (ref 0.5–1.3)
EGFRCR SERPLBLD CKD-EPI 2021: >90 ML/MIN/1.73M*2
EGFRCR SERPLBLD CKD-EPI 2021: >90 ML/MIN/1.73M*2
EOSINOPHIL # BLD AUTO: 0 X10*3/UL (ref 0–0.4)
EOSINOPHIL # BLD AUTO: 0.18 X10*3/UL (ref 0–0.4)
EOSINOPHIL NFR BLD AUTO: 0 %
EOSINOPHIL NFR BLD AUTO: 1.6 %
ERYTHROCYTE [DISTWIDTH] IN BLOOD BY AUTOMATED COUNT: 11.6 % (ref 11.5–14.5)
ERYTHROCYTE [DISTWIDTH] IN BLOOD BY AUTOMATED COUNT: 11.7 % (ref 11.5–14.5)
GLUCOSE BLD MANUAL STRIP-MCNC: 189 MG/DL (ref 74–99)
GLUCOSE BLD MANUAL STRIP-MCNC: 192 MG/DL (ref 74–99)
GLUCOSE BLDV-MCNC: 184 MG/DL (ref 74–99)
GLUCOSE SERPL-MCNC: 145 MG/DL (ref 74–99)
GLUCOSE SERPL-MCNC: 191 MG/DL (ref 74–99)
GLUCOSE UR STRIP.AUTO-MCNC: NORMAL MG/DL
HCO3 BLDV-SCNC: 29.9 MMOL/L (ref 22–26)
HCT VFR BLD AUTO: 37.2 % (ref 41–52)
HCT VFR BLD AUTO: 40.4 % (ref 41–52)
HCT VFR BLD EST: 46 % (ref 41–52)
HGB BLD-MCNC: 12.4 G/DL (ref 13.5–17.5)
HGB BLD-MCNC: 13.3 G/DL (ref 13.5–17.5)
HGB BLDV-MCNC: 15.4 G/DL (ref 13.5–17.5)
IMM GRANULOCYTES # BLD AUTO: 0.04 X10*3/UL (ref 0–0.5)
IMM GRANULOCYTES # BLD AUTO: 0.04 X10*3/UL (ref 0–0.5)
IMM GRANULOCYTES NFR BLD AUTO: 0.3 % (ref 0–0.9)
IMM GRANULOCYTES NFR BLD AUTO: 0.5 % (ref 0–0.9)
INHALED O2 CONCENTRATION: 28 %
INR PPP: 1.5 (ref 0.9–1.1)
KETONES UR STRIP.AUTO-MCNC: ABNORMAL MG/DL
LACTATE BLDV-SCNC: 2 MMOL/L (ref 0.4–2)
LACTATE SERPL-SCNC: 1.4 MMOL/L (ref 0.4–2)
LEUKOCYTE ESTERASE UR QL STRIP.AUTO: NEGATIVE
LYMPHOCYTES # BLD AUTO: 0.76 X10*3/UL (ref 0.8–3)
LYMPHOCYTES # BLD AUTO: 2.19 X10*3/UL (ref 0.8–3)
LYMPHOCYTES NFR BLD AUTO: 10.1 %
LYMPHOCYTES NFR BLD AUTO: 19.1 %
MAGNESIUM SERPL-MCNC: 1.95 MG/DL (ref 1.6–2.4)
MCH RBC QN AUTO: 30.5 PG (ref 26–34)
MCH RBC QN AUTO: 30.6 PG (ref 26–34)
MCHC RBC AUTO-ENTMCNC: 32.9 G/DL (ref 32–36)
MCHC RBC AUTO-ENTMCNC: 33.3 G/DL (ref 32–36)
MCV RBC AUTO: 92 FL (ref 80–100)
MCV RBC AUTO: 93 FL (ref 80–100)
MONOCYTES # BLD AUTO: 0.33 X10*3/UL (ref 0.05–0.8)
MONOCYTES # BLD AUTO: 0.88 X10*3/UL (ref 0.05–0.8)
MONOCYTES NFR BLD AUTO: 4.4 %
MONOCYTES NFR BLD AUTO: 7.7 %
MRSA DNA SPEC QL NAA+PROBE: NOT DETECTED
NEUTROPHILS # BLD AUTO: 6.39 X10*3/UL (ref 1.6–5.5)
NEUTROPHILS # BLD AUTO: 8.16 X10*3/UL (ref 1.6–5.5)
NEUTROPHILS NFR BLD AUTO: 71 %
NEUTROPHILS NFR BLD AUTO: 85 %
NITRITE UR QL STRIP.AUTO: NEGATIVE
NRBC BLD-RTO: 0 /100 WBCS (ref 0–0)
NRBC BLD-RTO: 0 /100 WBCS (ref 0–0)
OXYHGB MFR BLDV: 63.2 % (ref 45–75)
PCO2 BLDV: 44 MM HG (ref 41–51)
PH BLDV: 7.44 PH (ref 7.33–7.43)
PH UR STRIP.AUTO: 6.5 [PH]
PHOSPHATE SERPL-MCNC: 1.9 MG/DL (ref 2.5–4.9)
PLATELET # BLD AUTO: 199 X10*3/UL (ref 150–450)
PLATELET # BLD AUTO: 228 X10*3/UL (ref 150–450)
PO2 BLDV: 39 MM HG (ref 35–45)
POTASSIUM BLDV-SCNC: 3.1 MMOL/L (ref 3.5–5.3)
POTASSIUM SERPL-SCNC: 2.6 MMOL/L (ref 3.5–5.3)
POTASSIUM SERPL-SCNC: 3 MMOL/L (ref 3.5–5.3)
PROT SERPL-MCNC: 7.1 G/DL (ref 6.4–8.2)
PROT SERPL-MCNC: 7.3 G/DL (ref 6.4–8.2)
PROT UR STRIP.AUTO-MCNC: ABNORMAL MG/DL
PROTHROMBIN TIME: 17.5 SECONDS (ref 9.8–12.8)
RBC # BLD AUTO: 4.06 X10*6/UL (ref 4.5–5.9)
RBC # BLD AUTO: 4.35 X10*6/UL (ref 4.5–5.9)
RBC # UR STRIP.AUTO: ABNORMAL /UL
RBC #/AREA URNS AUTO: >20 /HPF
RH FACTOR (ANTIGEN D): NORMAL
SAO2 % BLDV: 64 % (ref 45–75)
SARS-COV-2 RNA RESP QL NAA+PROBE: NOT DETECTED
SODIUM BLDV-SCNC: 132 MMOL/L (ref 136–145)
SODIUM SERPL-SCNC: 134 MMOL/L (ref 136–145)
SODIUM SERPL-SCNC: 134 MMOL/L (ref 136–145)
SP GR UR STRIP.AUTO: 1.01
UROBILINOGEN UR STRIP.AUTO-MCNC: NORMAL MG/DL
WBC # BLD AUTO: 11.5 X10*3/UL (ref 4.4–11.3)
WBC # BLD AUTO: 7.5 X10*3/UL (ref 4.4–11.3)
WBC #/AREA URNS AUTO: ABNORMAL /HPF

## 2024-09-23 PROCEDURE — 81003 URINALYSIS AUTO W/O SCOPE: CPT | Performed by: FAMILY MEDICINE

## 2024-09-23 PROCEDURE — 99285 EMERGENCY DEPT VISIT HI MDM: CPT | Mod: 25

## 2024-09-23 PROCEDURE — 84484 ASSAY OF TROPONIN QUANT: CPT | Performed by: FAMILY MEDICINE

## 2024-09-23 PROCEDURE — 96366 THER/PROPH/DIAG IV INF ADDON: CPT

## 2024-09-23 PROCEDURE — 71045 X-RAY EXAM CHEST 1 VIEW: CPT | Performed by: RADIOLOGY

## 2024-09-23 PROCEDURE — 71045 X-RAY EXAM CHEST 1 VIEW: CPT

## 2024-09-23 PROCEDURE — 80053 COMPREHEN METABOLIC PANEL: CPT | Performed by: FAMILY MEDICINE

## 2024-09-23 PROCEDURE — 83880 ASSAY OF NATRIURETIC PEPTIDE: CPT | Performed by: FAMILY MEDICINE

## 2024-09-23 PROCEDURE — 96375 TX/PRO/DX INJ NEW DRUG ADDON: CPT

## 2024-09-23 PROCEDURE — 85025 COMPLETE CBC W/AUTO DIFF WBC: CPT | Performed by: FAMILY MEDICINE

## 2024-09-23 PROCEDURE — 71250 CT THORAX DX C-: CPT

## 2024-09-23 PROCEDURE — 85025 COMPLETE CBC W/AUTO DIFF WBC: CPT

## 2024-09-23 PROCEDURE — 83605 ASSAY OF LACTIC ACID: CPT | Performed by: FAMILY MEDICINE

## 2024-09-23 PROCEDURE — 2500000001 HC RX 250 WO HCPCS SELF ADMINISTERED DRUGS (ALT 637 FOR MEDICARE OP)

## 2024-09-23 PROCEDURE — 36415 COLL VENOUS BLD VENIPUNCTURE: CPT | Performed by: FAMILY MEDICINE

## 2024-09-23 PROCEDURE — 2500000005 HC RX 250 GENERAL PHARMACY W/O HCPCS: Performed by: FAMILY MEDICINE

## 2024-09-23 PROCEDURE — 2500000004 HC RX 250 GENERAL PHARMACY W/ HCPCS (ALT 636 FOR OP/ED): Performed by: FAMILY MEDICINE

## 2024-09-23 PROCEDURE — 87641 MR-STAPH DNA AMP PROBE: CPT

## 2024-09-23 PROCEDURE — S4991 NICOTINE PATCH NONLEGEND: HCPCS

## 2024-09-23 PROCEDURE — 94640 AIRWAY INHALATION TREATMENT: CPT

## 2024-09-23 PROCEDURE — 87040 BLOOD CULTURE FOR BACTERIA: CPT | Mod: GENLAB | Performed by: FAMILY MEDICINE

## 2024-09-23 PROCEDURE — 96361 HYDRATE IV INFUSION ADD-ON: CPT

## 2024-09-23 PROCEDURE — 86901 BLOOD TYPING SEROLOGIC RH(D): CPT

## 2024-09-23 PROCEDURE — 96367 TX/PROPH/DG ADDL SEQ IV INF: CPT

## 2024-09-23 PROCEDURE — 96365 THER/PROPH/DIAG IV INF INIT: CPT

## 2024-09-23 PROCEDURE — 84132 ASSAY OF SERUM POTASSIUM: CPT

## 2024-09-23 PROCEDURE — 2500000005 HC RX 250 GENERAL PHARMACY W/O HCPCS

## 2024-09-23 PROCEDURE — 96376 TX/PRO/DX INJ SAME DRUG ADON: CPT

## 2024-09-23 PROCEDURE — 2500000002 HC RX 250 W HCPCS SELF ADMINISTERED DRUGS (ALT 637 FOR MEDICARE OP, ALT 636 FOR OP/ED): Performed by: FAMILY MEDICINE

## 2024-09-23 PROCEDURE — 87899 AGENT NOS ASSAY W/OPTIC: CPT | Mod: GENLAB

## 2024-09-23 PROCEDURE — 84132 ASSAY OF SERUM POTASSIUM: CPT | Performed by: FAMILY MEDICINE

## 2024-09-23 PROCEDURE — 84100 ASSAY OF PHOSPHORUS: CPT

## 2024-09-23 PROCEDURE — 94664 DEMO&/EVAL PT USE INHALER: CPT | Mod: 59

## 2024-09-23 PROCEDURE — 1200000003 HC ONCOLOGY  ROOM WITH TELEMETRY DAILY

## 2024-09-23 PROCEDURE — 2500000002 HC RX 250 W HCPCS SELF ADMINISTERED DRUGS (ALT 637 FOR MEDICARE OP, ALT 636 FOR OP/ED)

## 2024-09-23 PROCEDURE — 85610 PROTHROMBIN TIME: CPT | Performed by: FAMILY MEDICINE

## 2024-09-23 PROCEDURE — 94760 N-INVAS EAR/PLS OXIMETRY 1: CPT | Mod: CCI

## 2024-09-23 PROCEDURE — 87449 NOS EACH ORGANISM AG IA: CPT

## 2024-09-23 PROCEDURE — 87635 SARS-COV-2 COVID-19 AMP PRB: CPT

## 2024-09-23 PROCEDURE — 83735 ASSAY OF MAGNESIUM: CPT

## 2024-09-23 PROCEDURE — 82947 ASSAY GLUCOSE BLOOD QUANT: CPT

## 2024-09-23 PROCEDURE — 93005 ELECTROCARDIOGRAM TRACING: CPT

## 2024-09-23 PROCEDURE — 2500000004 HC RX 250 GENERAL PHARMACY W/ HCPCS (ALT 636 FOR OP/ED)

## 2024-09-23 RX ORDER — ALBUTEROL SULFATE 0.83 MG/ML
2.5 SOLUTION RESPIRATORY (INHALATION) EVERY 6 HOURS PRN
Status: DISCONTINUED | OUTPATIENT
Start: 2024-09-23 | End: 2024-09-27 | Stop reason: HOSPADM

## 2024-09-23 RX ORDER — DEXTROSE 50 % IN WATER (D50W) INTRAVENOUS SYRINGE
25
Status: DISCONTINUED | OUTPATIENT
Start: 2024-09-23 | End: 2024-09-27 | Stop reason: HOSPADM

## 2024-09-23 RX ORDER — ASPIRIN 81 MG/1
81 TABLET ORAL DAILY
Status: DISCONTINUED | OUTPATIENT
Start: 2024-09-23 | End: 2024-09-27 | Stop reason: HOSPADM

## 2024-09-23 RX ORDER — METOPROLOL TARTRATE 50 MG/1
50 TABLET ORAL EVERY 12 HOURS
Status: DISCONTINUED | OUTPATIENT
Start: 2024-09-23 | End: 2024-09-27 | Stop reason: HOSPADM

## 2024-09-23 RX ORDER — ONDANSETRON HYDROCHLORIDE 8 MG/1
8 TABLET, FILM COATED ORAL EVERY 8 HOURS PRN
Status: DISCONTINUED | OUTPATIENT
Start: 2024-09-23 | End: 2024-09-27 | Stop reason: HOSPADM

## 2024-09-23 RX ORDER — INSULIN LISPRO 100 [IU]/ML
0-5 INJECTION, SOLUTION INTRAVENOUS; SUBCUTANEOUS
Status: DISCONTINUED | OUTPATIENT
Start: 2024-09-23 | End: 2024-09-27 | Stop reason: HOSPADM

## 2024-09-23 RX ORDER — VANCOMYCIN HYDROCHLORIDE 1 G/200ML
1000 INJECTION, SOLUTION INTRAVENOUS EVERY 12 HOURS
Status: DISCONTINUED | OUTPATIENT
Start: 2024-09-23 | End: 2024-09-23

## 2024-09-23 RX ORDER — SODIUM CHLORIDE 9 MG/ML
125 INJECTION, SOLUTION INTRAVENOUS CONTINUOUS
Status: DISCONTINUED | OUTPATIENT
Start: 2024-09-23 | End: 2024-09-23 | Stop reason: HOSPADM

## 2024-09-23 RX ORDER — VANCOMYCIN HYDROCHLORIDE 1 G/20ML
INJECTION, POWDER, LYOPHILIZED, FOR SOLUTION INTRAVENOUS DAILY PRN
Status: DISCONTINUED | OUTPATIENT
Start: 2024-09-23 | End: 2024-09-23

## 2024-09-23 RX ORDER — SODIUM CHLORIDE FOR INHALATION 3 %
3 VIAL, NEBULIZER (ML) INHALATION EVERY 6 HOURS PRN
Status: DISCONTINUED | OUTPATIENT
Start: 2024-09-23 | End: 2024-09-24

## 2024-09-23 RX ORDER — POTASSIUM CHLORIDE 20 MEQ/1
40 TABLET, EXTENDED RELEASE ORAL ONCE
Status: COMPLETED | OUTPATIENT
Start: 2024-09-23 | End: 2024-09-23

## 2024-09-23 RX ORDER — IBUPROFEN 200 MG
1 TABLET ORAL EVERY 24 HOURS
Status: DISCONTINUED | OUTPATIENT
Start: 2024-09-23 | End: 2024-09-27 | Stop reason: HOSPADM

## 2024-09-23 RX ORDER — AMLODIPINE BESYLATE 10 MG/1
10 TABLET ORAL DAILY
Status: DISCONTINUED | OUTPATIENT
Start: 2024-09-23 | End: 2024-09-27 | Stop reason: HOSPADM

## 2024-09-23 RX ORDER — INSULIN LISPRO 100 [IU]/ML
0-5 INJECTION, SOLUTION INTRAVENOUS; SUBCUTANEOUS
Status: CANCELLED | OUTPATIENT
Start: 2024-09-23

## 2024-09-23 RX ORDER — DEXTROSE 50 % IN WATER (D50W) INTRAVENOUS SYRINGE
12.5
Status: DISCONTINUED | OUTPATIENT
Start: 2024-09-23 | End: 2024-09-27 | Stop reason: HOSPADM

## 2024-09-23 RX ORDER — TALC
3 POWDER (GRAM) TOPICAL NIGHTLY PRN
Status: DISCONTINUED | OUTPATIENT
Start: 2024-09-23 | End: 2024-09-27 | Stop reason: HOSPADM

## 2024-09-23 RX ORDER — PROCHLORPERAZINE MALEATE 10 MG
10 TABLET ORAL EVERY 6 HOURS PRN
Status: DISCONTINUED | OUTPATIENT
Start: 2024-09-23 | End: 2024-09-27 | Stop reason: HOSPADM

## 2024-09-23 RX ORDER — FLUTICASONE FUROATE AND VILANTEROL 200; 25 UG/1; UG/1
1 POWDER RESPIRATORY (INHALATION)
Status: DISCONTINUED | OUTPATIENT
Start: 2024-09-23 | End: 2024-09-27 | Stop reason: HOSPADM

## 2024-09-23 RX ORDER — PANTOPRAZOLE SODIUM 40 MG/1
40 TABLET, DELAYED RELEASE ORAL
Status: DISCONTINUED | OUTPATIENT
Start: 2024-09-23 | End: 2024-09-27 | Stop reason: HOSPADM

## 2024-09-23 RX ORDER — SIMVASTATIN 20 MG/1
20 TABLET, FILM COATED ORAL DAILY
Status: DISCONTINUED | OUTPATIENT
Start: 2024-09-23 | End: 2024-09-27 | Stop reason: HOSPADM

## 2024-09-23 RX ORDER — IPRATROPIUM BROMIDE AND ALBUTEROL SULFATE 2.5; .5 MG/3ML; MG/3ML
3 SOLUTION RESPIRATORY (INHALATION) ONCE
Status: COMPLETED | OUTPATIENT
Start: 2024-09-23 | End: 2024-09-23

## 2024-09-23 RX ORDER — POTASSIUM CHLORIDE 14.9 MG/ML
20 INJECTION INTRAVENOUS
Status: COMPLETED | OUTPATIENT
Start: 2024-09-23 | End: 2024-09-23

## 2024-09-23 RX ORDER — POLYETHYLENE GLYCOL 3350 17 G/17G
17 POWDER, FOR SOLUTION ORAL DAILY PRN
Status: DISCONTINUED | OUTPATIENT
Start: 2024-09-23 | End: 2024-09-27 | Stop reason: HOSPADM

## 2024-09-23 RX ORDER — FOLIC ACID 1 MG/1
1 TABLET ORAL DAILY
Status: DISCONTINUED | OUTPATIENT
Start: 2024-09-23 | End: 2024-09-27 | Stop reason: HOSPADM

## 2024-09-23 SDOH — SOCIAL STABILITY: SOCIAL INSECURITY: DO YOU FEEL UNSAFE GOING BACK TO THE PLACE WHERE YOU ARE LIVING?: NO

## 2024-09-23 SDOH — SOCIAL STABILITY: SOCIAL INSECURITY: DOES ANYONE TRY TO KEEP YOU FROM HAVING/CONTACTING OTHER FRIENDS OR DOING THINGS OUTSIDE YOUR HOME?: NO

## 2024-09-23 SDOH — SOCIAL STABILITY: SOCIAL INSECURITY: HAVE YOU HAD THOUGHTS OF HARMING ANYONE ELSE?: NO

## 2024-09-23 SDOH — SOCIAL STABILITY: SOCIAL INSECURITY: ABUSE: ADULT

## 2024-09-23 SDOH — SOCIAL STABILITY: SOCIAL INSECURITY: ARE THERE ANY APPARENT SIGNS OF INJURIES/BEHAVIORS THAT COULD BE RELATED TO ABUSE/NEGLECT?: NO

## 2024-09-23 SDOH — SOCIAL STABILITY: SOCIAL INSECURITY: HAS ANYONE EVER THREATENED TO HURT YOUR FAMILY OR YOUR PETS?: NO

## 2024-09-23 SDOH — SOCIAL STABILITY: SOCIAL INSECURITY: WERE YOU ABLE TO COMPLETE ALL THE BEHAVIORAL HEALTH SCREENINGS?: YES

## 2024-09-23 SDOH — SOCIAL STABILITY: SOCIAL INSECURITY: DO YOU FEEL ANYONE HAS EXPLOITED OR TAKEN ADVANTAGE OF YOU FINANCIALLY OR OF YOUR PERSONAL PROPERTY?: NO

## 2024-09-23 SDOH — SOCIAL STABILITY: SOCIAL INSECURITY: HAVE YOU HAD ANY THOUGHTS OF HARMING ANYONE ELSE?: NO

## 2024-09-23 SDOH — SOCIAL STABILITY: SOCIAL INSECURITY: ARE YOU OR HAVE YOU BEEN THREATENED OR ABUSED PHYSICALLY, EMOTIONALLY, OR SEXUALLY BY ANYONE?: NO

## 2024-09-23 ASSESSMENT — COLUMBIA-SUICIDE SEVERITY RATING SCALE - C-SSRS
2. HAVE YOU ACTUALLY HAD ANY THOUGHTS OF KILLING YOURSELF?: NO
6. HAVE YOU EVER DONE ANYTHING, STARTED TO DO ANYTHING, OR PREPARED TO DO ANYTHING TO END YOUR LIFE?: NO
6. HAVE YOU EVER DONE ANYTHING, STARTED TO DO ANYTHING, OR PREPARED TO DO ANYTHING TO END YOUR LIFE?: NO
1. IN THE PAST MONTH, HAVE YOU WISHED YOU WERE DEAD OR WISHED YOU COULD GO TO SLEEP AND NOT WAKE UP?: NO
1. IN THE PAST MONTH, HAVE YOU WISHED YOU WERE DEAD OR WISHED YOU COULD GO TO SLEEP AND NOT WAKE UP?: NO
2. HAVE YOU ACTUALLY HAD ANY THOUGHTS OF KILLING YOURSELF?: NO

## 2024-09-23 ASSESSMENT — COGNITIVE AND FUNCTIONAL STATUS - GENERAL
MOBILITY SCORE: 22
DAILY ACTIVITIY SCORE: 22
MOBILITY SCORE: 22
PERSONAL GROOMING: A LITTLE
DAILY ACTIVITIY SCORE: 24
WALKING IN HOSPITAL ROOM: A LITTLE
DAILY ACTIVITIY SCORE: 24
WALKING IN HOSPITAL ROOM: A LITTLE
PATIENT BASELINE BEDBOUND: NO
TOILETING: A LITTLE
CLIMB 3 TO 5 STEPS WITH RAILING: A LITTLE
CLIMB 3 TO 5 STEPS WITH RAILING: A LITTLE
MOBILITY SCORE: 23
CLIMB 3 TO 5 STEPS WITH RAILING: A LITTLE

## 2024-09-23 ASSESSMENT — PATIENT HEALTH QUESTIONNAIRE - PHQ9
2. FEELING DOWN, DEPRESSED OR HOPELESS: NOT AT ALL
SUM OF ALL RESPONSES TO PHQ9 QUESTIONS 1 & 2: 0
1. LITTLE INTEREST OR PLEASURE IN DOING THINGS: NOT AT ALL

## 2024-09-23 ASSESSMENT — LIFESTYLE VARIABLES
PRESCIPTION_ABUSE_PAST_12_MONTHS: NO
AUDIT-C TOTAL SCORE: 0
HOW OFTEN DO YOU HAVE A DRINK CONTAINING ALCOHOL: NEVER
AUDIT-C TOTAL SCORE: 0
SKIP TO QUESTIONS 9-10: 1
HOW MANY STANDARD DRINKS CONTAINING ALCOHOL DO YOU HAVE ON A TYPICAL DAY: PATIENT DOES NOT DRINK
SUBSTANCE_ABUSE_PAST_12_MONTHS: NO
HOW OFTEN DO YOU HAVE 6 OR MORE DRINKS ON ONE OCCASION: NEVER

## 2024-09-23 ASSESSMENT — ACTIVITIES OF DAILY LIVING (ADL)
ADEQUATE_TO_COMPLETE_ADL: YES
BATHING: INDEPENDENT
PATIENT'S MEMORY ADEQUATE TO SAFELY COMPLETE DAILY ACTIVITIES?: YES
HEARING - LEFT EAR: FUNCTIONAL
WALKS IN HOME: INDEPENDENT
FEEDING YOURSELF: INDEPENDENT
TOILETING: INDEPENDENT
GROOMING: INDEPENDENT
LACK_OF_TRANSPORTATION: PATIENT DECLINED
JUDGMENT_ADEQUATE_SAFELY_COMPLETE_DAILY_ACTIVITIES: YES
DRESSING YOURSELF: INDEPENDENT
HEARING - RIGHT EAR: FUNCTIONAL
LACK_OF_TRANSPORTATION: PATIENT DECLINED

## 2024-09-23 ASSESSMENT — PAIN - FUNCTIONAL ASSESSMENT
PAIN_FUNCTIONAL_ASSESSMENT: 0-10

## 2024-09-23 ASSESSMENT — PAIN SCALES - GENERAL
PAINLEVEL_OUTOF10: 0 - NO PAIN

## 2024-09-23 NOTE — PROGRESS NOTES
09/23/24 1400   Discharge Planning   Living Arrangements Spouse/significant other   Support Systems Spouse/significant other   Type of Residence Private residence   Number of Stairs to Enter Residence 3   Number of Stairs Within Residence 14   Do you have animals or pets at home? Yes   Type of Animals or Pets 1 cat   Home or Post Acute Services Other (Comment)  (SNF vs Home)   Financial Resource Strain   How hard is it for you to pay for the very basics like food, housing, medical care, and heating? Pt Declined   Housing Stability   In the last 12 months, was there a time when you were not able to pay the mortgage or rent on time? Pt Declined   In the past 12 months, how many times have you moved where you were living? 1   At any time in the past 12 months, were you homeless or living in a shelter (including now)? Pt Declined   Transportation Needs   In the past 12 months, has lack of transportation kept you from medical appointments or from getting medications? Pt Declined   In the past 12 months, has lack of transportation kept you from meetings, work, or from getting things needed for daily living? Pt Declined     SW met with pt to complete assessment.  Pt is alert and oriented x2-3.  He states he was living with spouse prior to admission.  Pt states he and his spouse live on the first floor of [address] home.  Pt reports he has become increasingly short of breath and not able to walk.  He reports he was independent in ADLS & IADLs.  Pt states he has home O2 - 2L.  He does not recall name of company providing O2.  Pt does not use a device to assist with ambulation.  Pt identifies his spouse as primary support.  He has 3 sons that are back up support.  Pt is not able to recall the name of his PCP.  He uses [facility] in [place] for prescriptions.  Pt states he was going to start chemo, but not sure when or where.  SW will follow.  LULU Taylor

## 2024-09-23 NOTE — CONSULTS
Vancomycin Dosing by Pharmacy- INITIAL    Juan Carlos Cr is a 74 y.o. year old male who Pharmacy has been consulted for vancomycin dosing for pneumonia. Based on the patient's indication and renal status this patient will be dosed based on a goal AUC of 400-600.     Renal function is currently stable.    Visit Vitals  /75   Pulse 85   Temp 36.6 °C (97.9 °F) (Temporal)   Resp 20        Lab Results   Component Value Date    CREATININE 0.83 2024    CREATININE 0.76 2024    CREATININE 0.84 2024    CREATININE 0.77 2024        Patient weight is as follows: There were no vitals filed for this visit.    Cultures:  No results found for the encounter in last 14 days.        No intake/output data recorded.  I/O during current shift:  I/O this shift:  In: -   Out: 200 [Urine:200]    Temp (24hrs), Av.4 °C (97.5 °F), Min:36.3 °C (97.3 °F), Max:36.6 °C (97.9 °F)         Assessment/Plan     Patient will not be given a loading dose.  Will initiate vancomycin maintenance,  1000 mg every 12 hours.    This dosing regimen is predicted by [employer] to result in the following pharmacokinetic parameters:  Regimen: 1000 mg IV every 12 hours.  Start time: 15:56 on 2024  Exposure target: AUC24 (range)400-600 mg/L.hr   [id number]: 467 mg/L.hr  AUC24,ss: 568 mg/L.hr  Probability of AUC24 > 400: 93 %  Ctrough,ss: 15.8 mg/L  Probability of Ctrough,ss > 20: 23 %    Follow-up level will be ordered on  at 1000 (MID-AM) draw unless clinically indicated sooner.  Will continue to monitor renal function daily while on vancomycin and order serum creatinine at least every 48 hours if not already ordered.  Follow for continued vancomycin needs, clinical response, and signs/symptoms of toxicity.       Kal Quan, PharmD

## 2024-09-23 NOTE — ED PROVIDER NOTES
HPI   Chief Complaint   Patient presents with    Shortness of Breath       HPI  This 74-year-old gentleman who has history of lung cancer diagnosed recently he quit smoking after diagnosis of lung cancer he is undergoing chemo and radiation therapy he also has COPD he has been using oxygen which he think is using more regularly than as needed and shortness of breath and decided come to ER by ambulance tonight after midnight after Sunday night.  He denies hemoptysis or pain or swelling does he complain of pain in the right leg when he walks but denies any thigh pain calf ulcer pain pleuritic chest pain hemoptysis.  Admits some abdominal pain this morning but denies any abdominal pain nausea has been having normal bowel movement denies any vomiting fever chills.  He has history of coronary artery disease coronary artery stent placement he has been on Plavix and multiple other medication including diabetes mellitus.  Patient has history of MI in 2013.  He also has history of malignant neoplasm of the prostate..  Patient has a history of alcohol and tobacco use    Family history: Reviewed  Social history: Reviewed, ex-smoker and alcohol abuse disorder.  Review of system: 10 review of system obtained review of system is as in HPI otherwise negative.        Patient History   Past Medical History:   Diagnosis Date    Acute myocardial infarction, unspecified (Multi) 05/17/2013    Acute myocardial infarction    Encounter for screening for malignant neoplasm of prostate 09/02/2015    Encounter for prostate cancer screening    Hypertension     Personal history of other diseases of the nervous system and sense organs 09/13/2017    History of cataract     Past Surgical History:   Procedure Laterality Date    CATARACT EXTRACTION  09/14/2018    Cataract Surgery    CATARACT EXTRACTION  06/27/2014    Cataract Surgery    CORONARY ANGIOPLASTY WITH STENT PLACEMENT  05/17/2013    Cath Stent Placement     Family History   Problem  Relation Name Age of Onset    Heart attack Mother      Lung cancer Brother       Social History     Tobacco Use    Smoking status: Every Day     Current packs/day: 0.50     Types: Cigarettes    Smokeless tobacco: Not on file   Vaping Use    Vaping status: Never Used   Substance Use Topics    Alcohol use: Not Currently     Comment: 4-5 beers and a glass a wine a day    Drug use: Never   EKG obtained at 0103 hrs. shows normal sinus rhythm rate of 83.  Left atrial enlargement.  Incomplete left bundle willi block.  Artifact limiting interpretation.  No STEMI.  Borderline EKG NJ at this EKG.  Physical Exam   ED Triage Vitals [09/23/24 0108]   Temperature Heart Rate Respirations BP   36.4 °C (97.6 °F) 82 (!) 22 107/56      Pulse Ox Temp Source Heart Rate Source Patient Position   (!) 81 % Temporal Monitor Sitting      BP Location FiO2 (%)     Right arm --       Physical Exam  Constitutional:       Appearance: Normal appearance.      Comments: Chronically sick looking male patient came in laying flat O2 with no tachypnea hypoxemia respite distress noted no cyanosis.  Noted a wet cough.  On auscultation coarse rhonchi wheezing and crackles bilaterally as well as diminished lung sounds bibasilarly.  No chest wall retraction no nasal flaring he was using O2 through nasal cannula maintaining pulse ox in the high 90s.  Heart regular rate and rhythm lungs auscultated.  Abdomen soft nondistended positive bowel sound nontender no guarding rebound or rigidity.  Patient has good motion arms leg calf is nontender Homans' sign negative intact distal pulse intact sensation.   HENT:      Head: Normocephalic and atraumatic.      Right Ear: External ear normal.      Left Ear: External ear normal.      Nose: Nose normal. No congestion or rhinorrhea.   Eyes:      Extraocular Movements: Extraocular movements intact.      Conjunctiva/sclera: Conjunctivae normal.      Pupils: Pupils are equal, round, and reactive to light.   Cardiovascular:       Rate and Rhythm: Normal rate and regular rhythm.      Pulses: Normal pulses.      Heart sounds: Normal heart sounds.   Pulmonary:      Effort: Pulmonary effort is normal.      Breath sounds: Normal breath sounds.   Abdominal:      General: Abdomen is flat. Bowel sounds are normal.      Palpations: Abdomen is soft.   Musculoskeletal:      Cervical back: Normal range of motion and neck supple.   Skin:     General: Skin is warm.      Capillary Refill: Capillary refill takes less than 2 seconds.   Neurological:      General: No focal deficit present.      Mental Status: He is alert and oriented to person, place, and time.   Psychiatric:         Mood and Affect: Mood normal.         Behavior: Behavior normal.           ED Course & MDM   Diagnoses as of 09/23/24 0309   Primary malignant neoplasm of lung metastatic to other site, unspecified laterality (Multi)   Chronic respiratory failure with hypoxia, on home O2 therapy (Multi)   Hypokalemia   Elevated troponin   History of prostate cancer   This 74-year-old gentleman who has history of lung cancer diagnosed recently he quit smoking after diagnosis of lung cancer he is undergoing chemo and radiation therapy he also has COPD he has been using oxygen which he think is using more regularly than as needed and shortness of breath and decided come to ER by ambulance tonight after midnight after Sunday night.  He denies hemoptysis or pain or swelling does he complain of pain in the right leg when he walks but denies any thigh pain calf ulcer pain pleuritic chest pain hemoptysis.  Admits some abdominal pain this morning but denies any abdominal pain nausea has been having normal bowel movement denies any vomiting fever chills.  He has history of coronary artery disease coronary artery stent placement he has been on Plavix and multiple other medication including diabetes mellitus.  Patient has history of MI in 2013.  He also has history of malignant neoplasm of the  prostate..  Patient has a history of alcohol and tobacco use       Chronically sick looking male patient came in laying flat O2 with no tachypnea hypoxemia respite distress noted no cyanosis.  Noted a wet cough.  On auscultation coarse rhonchi wheezing and crackles bilaterally as well as diminished lung sounds bibasilarly.  No chest wall retraction no nasal flaring he was using O2 through nasal cannula maintaining pulse ox in the high 90s.  Heart regular rate and rhythm lungs auscultated.  Abdomen soft nondistended positive bowel sound nontender no guarding rebound or rigidity.  Patient has good motion arms leg calf is nontender Homans' sign negative intact distal pulse intact sensation.         No data recorded                                 Medical Decision Making  Patient reported emergency room with complaint of progressive short of breath he has history of lung cancer undergoing radiation chemotherapy.  Chest x-ray showed increased consolidation he clinically have coarse rhonchi wheezing and short of breath and increased oxygen demand continuous O2 as she was using only.  On O2.  Patient workup showed BUN of 13, potassium of 2.6 sodium 134 chloride of 145 troponin is 26.  INR PT was 1.5 17.5 white count 11,000 H&H 13 and 40 differential normal.  I also pulled patient's most recent.  PET scan done few days ago which showed extension of metastatic lung cancer to the mediastinum lymph node.  Culture obtained and patient was started on antibiotic, Zosyn, for possible infection pneumonia however his problem is more cancer with spread to mediastinum he was possible compromising his breathing.  He is breathing and maintaining his pulse ox on O2 2 L nasal cannula continuously.  Case discussed with Dr. Burkitt, oncologist on-call at [facility] she accepted patient transfer however there are no beds available patient is waiting for the bed assignment.      Procedure  Procedures     Marleny Gaxiola MD  09/23/24  0235       Marleny Gaxiola MD  09/23/24 2010

## 2024-09-23 NOTE — DISCHARGE INSTRUCTIONS
Patient transfer has been accepted to [facility] but with Dr. Asif, patient and family agreed no beds available waiting for bed assignment.  Antibiotics Zosyn started for possible obstructive pneumonia

## 2024-09-23 NOTE — PROGRESS NOTES
Pharmacy Admission Order Reconciliation Review    Juan Carlos Cr is a 74 y.o. male admitted for Squamous cell lung cancer, unspecified laterality (Multi). Pharmacy reviewed the patient's unreconciled admission medications.    Prior to admission medications that were reviewed and acted on by the pharmacist include:  Vitamin C  Melatonin  Nicotine 14 and 7mg patches  Thiamine  These medications have been reconciled.     Any other unreconcilied medications have been addressed and will be ordered or held by the patient's medical team. Medications addressed by the pharmacist may be added or changed by the patient's medical team at any time.    Davis Zheng, PharmD  Transitions of Care Pharmacist  [facility]  Please reach out via Secure Chat for questions

## 2024-09-23 NOTE — PROGRESS NOTES
Pharmacy Medication History Review    Juan Carlos Cr is a 74 y.o. male admitted for Squamous cell lung cancer, unspecified laterality (Multi). Pharmacy reviewed the patient's [id number] medications and allergies for accuracy.    Medications ADDED:  None  Medications CHANGED:  Melatonin  Medications REMOVED:   None     The list below reflects the updated PTA list.   Prior to Admission Medications   Prescriptions Last Dose Informant   FreeStyle Lite Meter kit  Spouse/Significant Other   Sig: TEST BLOOD SUGAR LEVELS 2X A DAY   amLODIPine (Norvasc) 5 mg tablet 2024 Spouse/Significant Other   Sig: Take 2 tablets (10 mg) by mouth once daily.   apixaban (Eliquis) 5 mg tablet 2024 Spouse/Significant Other   Sig: Take 1 tablet (5 mg) by mouth 2 times a day.   ascorbic acid (Vitamin C) 500 mg tablet 2024 Spouse/Significant Other   Sig: Take 1 tablet (500 mg) by mouth once daily.   aspirin 81 mg EC tablet 2024 Spouse/Significant Other   Sig: Take 1 tablet (81 mg) by mouth once daily.   blood sugar diagnostic (Accu-Chek Guide test strips) strip  Spouse/Significant Other   Si strip 2 times a day.   blood sugar diagnostic (FreeStyle Lite Strips) strip  Spouse/Significant Other   Si strip 2 times a day.   blood-glucose meter (Accu-Chek Guide Glucose Meter) misc  Spouse/Significant Other   Sig: Check blood glucose 2x a day   fluticasone furoate-vilanteroL (Breo Ellipta) 200-25 mcg/dose inhaler 2024 Spouse/Significant Other   Sig: Inhale 1 puff once daily.   fluticasone-umeclidin-vilanter (TRELEGY-ELLIPTA) 200-62.5-25 mcg blister with device  Spouse/Significant Other   Sig: Inhale 1 puff early in the morning.. Rinse mouth after taking dose   folic acid (Folvite) 1 mg tablet 2024 Spouse/Significant Other   Sig: Take 1 tablet (1 mg) by mouth once daily.   lancets (Accu-Chek Fastclix Lancet Drum) misc  Spouse/Significant Other   Sig: Check blood glucose 2x day   lancets (Accu-Chek  Fastclix Lancet Drum) misc  Spouse/Significant Other   Sig: Check blood glucose level 2x a day   lancets misc  Spouse/Significant Other   Sig: TEST BLOOD SUGAR LEVELS 2X A DAY   lancets misc  Spouse/Significant Other   Sig: Check blood sugar twice daily   melatonin 3 mg tablet  Spouse/Significant Other   Sig: Take 1 tablet (3 mg) by mouth once daily.   Patient taking differently: Take 1 tablet (3 mg) by mouth as needed at bedtime for sleep.   metFORMIN (Glucophage) 500 mg tablet 9/22/2024 Spouse/Significant Other   Sig: Take 1 tablet (500 mg) by mouth 2 times daily (morning and late afternoon).   metoprolol tartrate (Lopressor) 50 mg tablet 9/22/2024 Self, Spouse/Significant Other   Sig: Take 1 tablet by mouth every 12 hours.   multivitamin with minerals tablet 9/22/2024 Spouse/Significant Other   Sig: Take 1 tablet by mouth once daily.   nicotine (Nicoderm CQ) 14 mg/24 hr patch     Sig: Apply 1 patch topically every day.  START AFTER finishing the 21mg patches.   nicotine (Nicoderm CQ) 21 mg/24 hr patch 9/22/2024 Spouse/Significant Other   Sig: Place 1 patch over 24 hours on the skin once daily for 42 doses.   nicotine (Nicoderm CQ) 7 mg/24 hr patch  Spouse/Significant Other   Sig: Place 1 patch over 24 hours on the skin once daily for 14 doses. START AFTER finishing the 14mg patches.   ondansetron (Zofran) 8 mg tablet  Spouse/Significant Other   Sig: Take 1 tablet (8 mg) by mouth every 8 hours if needed for nausea or vomiting.   pantoprazole (ProtoNix) 40 mg EC tablet 9/22/2024 Spouse/Significant Other   Sig: Take 1 tablet (40 mg) by mouth once daily. Do not crush, chew, or split.   prochlorperazine (Compazine) 10 mg tablet  Spouse/Significant Other   Sig: Take 1 tablet (10 mg) by mouth every 6 hours if needed for nausea or vomiting.   simvastatin (Zocor) 20 mg tablet 9/22/2024 Self, Spouse/Significant Other   Sig: Take 1 tablet (20 mg) by mouth once daily.   sodium chloride 3 % nebulizer solution   "Spouse/Significant Other   Sig: Take 3 mL by nebulization every 6 hours if needed for cough.   thiamine (Vitamin B-1) 100 mg tablet 9/22/2024 Spouse/Significant Other   Sig: Take 1 tablet (100 mg) by mouth once daily. Do not start  before September 2, 2024.   tiotropium (Spiriva Respimat) 2.5 mcg/actuation inhaler 9/22/2024 Spouse/Significant Other   Sig: Inhale 2 puffs once daily.      Facility-Administered Medications: None        The list below reflects the updated allergy list. Please review each documented allergy for additional clarification and justification.  Allergies  Reviewed by Jarod WinklerD on 9/23/2024   No Known Allergies         Patient declines M2B at discharge.     Sources:   [facility]  Pharmacy dispense history  Patient reported that he does not know his home medications and stated that his wife has a medication list. Spoke with patient's wife, Cindy Cr #[phone], who provided current medication list.     Additional Comments:  Per patient's wife, Cindy Cr, pulmonary instructed to start trial of trelegy after Breo and Spiriva had ran out. Mrs. Cr informed that Mr. Cr had just started a new Breo inhaler and Spriva respimat and would start Trelegy once the new inhaler and respimat were finished. Mrs. Cr informed that the patient is currently on nicotine 21 mg patch.      Davis Zheng, Serjio  Transitions of Care Pharmacist  09/23/24     Secure Chat preferred   If no response call [phone] or Vocera \"Med Rec\"    "

## 2024-09-23 NOTE — SIGNIFICANT EVENT
"Senior Staffing Note    75yo male with NSCLC diagnosed Aug 2024, CAD (s/p PCI in '97), HTN, HLD presenting with acute onset of worsening dyspnea. Since admission for cancer diagnosis last month, he has been on home oxygen at 2L. On interview, patient reports he became acutely dyspneic at midnight last night. His son checked his pulsox which was low, prompting them to call EMS. He has been having progressive weakness and dyspnea since his discharge from the hospital on 9/1/2024.     He denies increased sputum production, fevers, chills, vomiting, worsening cough.     Oncologic history  Diagnosis: NSCLC: T4N2M0 as per PET/CT findings although cytology in bronch from Lns were negative.   Tissue: Bronch August 2024: SCC. PD-L1 5%. Cytology from Leel 4 and 7 Lns are neg for malignancy   Treatment plan: Plan for carboplatin/paclitaxel starting 9/25, stat referral to radiation oncology placed, pending evaluation  Plan for durvalumab after chemoRT  Treatment history:  Zoledronic acid infusion on 9/20/2024  Oncologist: Dr. Landis, last visit 9/19/2024    In the ED/Upon arrival to the floor/unit:  - Vitals:  /75   Pulse 85   Temp 36.6 °C (97.9 °F) (Temporal)   Resp 20   Ht 1.753 m (5' 9\")   Wt 66.4 kg (146 lb 6.2 oz)   SpO2 95%   BMI 21.62 kg/m²     - Labs:  Results for orders placed or performed during the hospital encounter of 09/23/24 (from the past 24 hour(s))   Magnesium   Result Value Ref Range    Magnesium 1.95 1.60 - 2.40 mg/dL   Type And Screen   Result Value Ref Range    ABO TYPE A     Rh TYPE POS     ANTIBODY SCREEN NEG    Sars-CoV-2 PCR   Result Value Ref Range    Coronavirus 2019, PCR Not Detected Not Detected   MRSA Surveillance for Vancomycin De-escalation, PCR    Specimen: Anterior Nares; Swab   Result Value Ref Range    MRSA PCR Not Detected Not Detected   CBC and Auto Differential   Result Value Ref Range    WBC 7.5 4.4 - 11.3 x10*3/uL    nRBC 0.0 0.0 - 0.0 /100 WBCs    RBC 4.06 (L) 4.50 - " 5.90 x10*6/uL    Hemoglobin 12.4 (L) 13.5 - 17.5 g/dL    Hematocrit 37.2 (L) 41.0 - 52.0 %    MCV 92 80 - 100 fL    MCH 30.5 26.0 - 34.0 pg    MCHC 33.3 32.0 - 36.0 g/dL    RDW 11.6 11.5 - 14.5 %    Platelets 199 150 - 450 x10*3/uL    Neutrophils % 85.0 40.0 - 80.0 %    Immature Granulocytes %, Automated 0.5 0.0 - 0.9 %    Lymphocytes % 10.1 13.0 - 44.0 %    Monocytes % 4.4 2.0 - 10.0 %    Eosinophils % 0.0 0.0 - 6.0 %    Basophils % 0.0 0.0 - 2.0 %    Neutrophils Absolute 6.39 (H) 1.60 - 5.50 x10*3/uL    Immature Granulocytes Absolute, Automated 0.04 0.00 - 0.50 x10*3/uL    Lymphocytes Absolute 0.76 (L) 0.80 - 3.00 x10*3/uL    Monocytes Absolute 0.33 0.05 - 0.80 x10*3/uL    Eosinophils Absolute 0.00 0.00 - 0.40 x10*3/uL    Basophils Absolute 0.00 0.00 - 0.10 x10*3/uL   Comprehensive metabolic panel   Result Value Ref Range    Glucose 191 (H) 74 - 99 mg/dL    Sodium 134 (L) 136 - 145 mmol/L    Potassium 3.0 (L) 3.5 - 5.3 mmol/L    Chloride 97 (L) 98 - 107 mmol/L    Bicarbonate 26 21 - 32 mmol/L    Anion Gap 14 10 - 20 mmol/L    Urea Nitrogen 14 6 - 23 mg/dL    Creatinine 0.66 0.50 - 1.30 mg/dL    eGFR >90 >60 mL/min/1.73m*2    Calcium 9.3 8.6 - 10.6 mg/dL    Albumin 3.1 (L) 3.4 - 5.0 g/dL    Alkaline Phosphatase 56 33 - 136 U/L    Total Protein 7.1 6.4 - 8.2 g/dL    AST 10 9 - 39 U/L    Bilirubin, Total 0.5 0.0 - 1.2 mg/dL    ALT 7 (L) 10 - 52 U/L   Phosphorus   Result Value Ref Range    Phosphorus 1.9 (L) 2.5 - 4.9 mg/dL   POCT GLUCOSE   Result Value Ref Range    POCT Glucose 189 (H) 74 - 99 mg/dL       - Imaging:  === 09/23/24 ===    CT CHEST WO IV CONTRAST ([facility])  This result has not been signed. Information might be incomplete.    - Impression -  1. Poor delineation of a large right hilar and suprahilar mass with  associated complete obstruction of the distal right mainstem bronchus  and distal branches, new compared to prior, with collapse of much of  the right. Development of a large right pleural  effusion, presumably  malignant. Minimal persistent aeration of the anterosuperior right  lung, with micronodularity and interlobular septal thickening.  2. Multiple enlarged mediastinal lymph nodes are incompletely  characterized on this exam.  3. Moderate volume of fluid in the distal esophagus raising concern  for aspiration.  4. The left lung is overall clear with peripheral emphysematous  changes.    I personally reviewed the image(s)/study and resident interpretation  as stated by Dr. Giana Lewis MD. I agree with the findings as  stated. This study was interpreted at [facility], [place], [place].    MACRO:  Giana Lewis discussed the significance and urgency of this  critical finding by secure chat with  Telma Ryder on 9/23/2024 at  6:30 pm.  (**-RCF-**) Findings:  See findings.      Dictation workstation:   [id number]      PMH: NSCLC diagnosed Aug 2024, CAD (s/p PCI in '97), HTN, HLD  SurgHx: EBUS Aug 2024  Allergies: NKDA  SocHx: Lives at home with wife, who helps with medications and medical care. His sons are living in the area and supportive as well.    Home Medications @ date:  Amlodipine  Eliquis  Vit C  ASA  Breo + Spiriva  (Trelegy to start when runs out of Breo)  Folic acid  Melatonin  Metformin  Metoprolol tartrate BID  Nicotine patch  Zofran PRN  PPI  Compazine PRN  Simvastatin  NaCl nebulizer      Constitutional: in NAD, awake, alert, speaking in full sentences  HEENT: sclerae anicteric, EOM grossly intact  CV: RRR, no murmurs noted  Pulm: Markedly decreased lung sounds on R lung, at bases, mid lung, and apex. L lung clear to auscultation.  GI: abd soft, NT, ND  Skin: warm and dry  Ext: bilateral LE without pitting edema   Neuro: alert and conversant  Psych: affect appropriate, patient has limited knowledge of medical conditions    Assessment & Plan  73yo male with NSCLC diagnosed Aug 2024, CAD (s/p PCI in '97), HTN, HLD, T2DM, paroxysmal A fib,  presenting with acute onset of worsening dyspnea, found to have new opacification of R hemithorax on chest xray. CT chest obtained and concerning for pleural effusion (possibly malignant) and obstruction of R distal mainstem bronchus by pulmonary mass, which is causing AHRF. Resolution of these findings will require pulmonary intervention and plan to treat for hospital acquired pneumonia.    #acute on chronic hypoxemic respiratory failure  #SCLC of R lung  #Pleural effusion, obstruction of bronchus, lung mass, possible pneumonia  ::xray with opacification of R hemithorax  ::CT chest with worsening effusion and concern for bronchus collapse  -Pulm consult, thoracentesis vs bronchoscopy  -HOLD eliquis in case of procedure  -NC O2 4L, continuous pulsox  -Start vanc/zosyn/azithromycin for HAP given recent admission  -MRSA nares, urine strep/legionella, respiratory culture for pneumonia workup  -Notify primary oncologist Dr. Landis  -Consult radiation oncologist    #COPD  -Cont home Breo/Spiriva  -Albuterol PRN    #paroxysmal A fib  #CAD s/p PCI >20 years ago  #HTN  -HOLD home eliquis in case of possible procedure  -Cont ASA  -Cont metoprolol, amlodipine  -Cont simvastatin    #T2DM  -Hold metformin  -SSI      Disposition: admit to oncology  Follow-ups: PCP, Dr. Landis, rad/onc    N: Regular diet  A: pIV  DVT ppx: HOLD I/s/o possible thora, will resume home eliquis  GI ppx: cont home pantoprazole    Code Status: Full Code (confirmed on admission 9/23  Surrogate Medical Decision-maker: Wife, Cindy Cr    Patient was examined with the Acting Intern and plan was reviewed and discussed with them. Please see their note for further details.    Telma Ryder, PGY3  Internal Medicine/Pediatrics

## 2024-09-23 NOTE — PROGRESS NOTES
Vancomycin Dosing by Pharmacy- Cessation of Therapy    Consult to pharmacy for vancomycin dosing has been discontinued by the prescriber Telma Ryder , pharmacy will sign off at this time.    Please call pharmacy if there are further questions or re-enter a consult if vancomycin is resumed.     Kal Quan, PharmD

## 2024-09-23 NOTE — CARE PLAN
The patient's goals for the shift include  to remain HDS, free of falls/injury, and decrease SOB    The clinical goals for the shift include to remain HDS, free of falls/injury, and decrease SOB      Problem: Fall/Injury  Goal: Not fall by end of shift  Outcome: Progressing  Goal: Be free from injury by end of the shift  Outcome: Progressing  Goal: Verbalize understanding of personal risk factors for fall in the hospital  Outcome: Progressing  Goal: Verbalize understanding of risk factor reduction measures to prevent injury from fall in the home  Outcome: Progressing  Goal: Use assistive devices by end of the shift  Outcome: Progressing  Goal: Pace activities to prevent fatigue by end of the shift  Outcome: Progressing     Problem: Respiratory  Goal: Clear secretions with interventions this shift  Outcome: Progressing  Goal: Minimize anxiety/maximize coping throughout shift  Outcome: Progressing  Goal: Minimal/no exertional discomfort or dyspnea this shift  Outcome: Progressing  Goal: No signs of respiratory distress (eg. Use of accessory muscles. Peds grunting)  Outcome: Progressing  Goal: Patent airway maintained this shift  Outcome: Progressing  Goal: Tolerate mechanical ventilation evidenced by VS/agitation level this shift  Outcome: Progressing  Goal: Tolerate pulmonary toileting this shift  Outcome: Progressing  Goal: Verbalize decreased shortness of breath this shift  Outcome: Progressing  Goal: Wean oxygen to maintain O2 saturation per order/standard this shift  Outcome: Progressing  Goal: Increase self care and/or family involvement in next 24 hours  Outcome: Progressing     Problem: Pain - Adult  Goal: Verbalizes/displays adequate comfort level or baseline comfort level  Outcome: Progressing     Problem: Safety - Adult  Goal: Free from fall injury  Outcome: Progressing     Problem: Discharge Planning  Goal: Discharge to home or other facility with appropriate resources  Outcome: Progressing     Problem:  Chronic Conditions and Co-morbidities  Goal: Patient's chronic conditions and co-morbidity symptoms are monitored and maintained or improved  Outcome: Progressing

## 2024-09-23 NOTE — H&P
Medicine Admission Note    Chief concern: SOB    History Of Present Illness  Juan Carlos Cr is a 74 y.o. 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 Landis, COPD on 2L NC at home, and Afib that is a transfer from [facility] with SOB onset 9/23/2024 at 1AM . States that his SOB was sudden onset present at both rest and exertion with burning abdominal pain at that time which has resolved. On onset he states that his son took his pulse ox which was low. Due to this reading they had become concerned and called EMS who brought him to [facility]. He reports that he has weakness of the RLE when attempting to walk, but only complains of this when he does become short of breath. Denies fever/chills, NV, headache/lightheadedness, chest pain/tightness, abdominal pain, diarrhea, cough, hemoptysis, sputum production, peripheral edema, numbness/tingling and confusion. PET CT 9/18/2024 showed hypermetabolic right hilar/suprahilar mass with invasion of the mediastinum and central photopenia consistent necrosis, a hypermetabolic nodule is seen in the right lower lobe, compatible with a satellite lesion, and a hypermetabolic right paratracheal lymph nodes, compatible with nas metastasis.    Relevant last admission discharge details from summary:  Date: 8/27-9/1/2024  Of note recent admission from 8/27-9/1 where he was transferred from [facility]. Presented for concerns of SOB for the past couple of months. Chest CT at that time revealed a large 6.5 x 6.4 cm right hilar mass. He was admitted and EBUS was performed on 8/29 with debulking of the R mainstem bronchus and bronchus intermedius w/ stent placement for residual stenosis. Additionally while in the ED he had developed new onset Afib with RVR, cardiology recommended anticoagulation with Eliquis, but he is to follow-up out patient given prohibitive cost. New onset diabetes was also  "discovered during this admission where he was managed with LDSSI and discharged home on metformin.  there were concerns for waxing and waning concentration. MRI of the brain did not demonstrate any brain mets, but there were possible concerns for Wernicke's encephalopathy.  he was noted to have had improved mentation and discharged.    Heme/Onc History:  - Bronch results from previous admission demonstrated SCC of the right hilum. PD-L1 5%. Cytology from [facility] and 7 Lns are negative for malignancy.  - PET/CT 2024: Large central R lung mass with mediastinal LAPs. Satellite RLL Peripheral nodule.  - NSCLC: T4N2M0 was to begin weekly carbo-taxol  given worsening condition. Stat radiation oncology referral was placed.  - PPI for expected dyspepsia with chemoRT.    In the ED:  On arrival in the ED he was originally hypoxic on 2L of O2 NC at 81% and tachypneic. He was placed on 4L of O2 with improvement in his saturations to the 90s along with marked improvement.     Vital signs:  /75   Pulse 85   Temp 36.6 °C (97.9 °F) (Temporal)   Resp 20   Ht 1.753 m (5' 9\")   SpO2 97%   BMI 21.62 kg/m²     Labs:   CBC: WBC: 11.5 Hgb: 13.3 Plt: 228  CHEM: Na: 134   K: 2.6  Cl: 95  HCO3: 30   Cr: 0.83   BUN: 14  Ca: 9.8   Glu: 145  LFT and Liver Enzymes: AST:16   ALT:8   ALP: 55  Tbili: 0.5   Alb: 3.1  INR: 1.5 (On Eliquis)  Troponins:  26 ->24 BNP: 206  VB.44/44/39/64%  BCx Drawn: Pending   UA: Color: Yellow, LE: negative, Nit: negative , RBC: >20    Imaging:  CXR  IMPRESSION:  Worsening opacification right hemithorax with known underlying  malignancy/mass    Interventions:  DuoNeb   125 mg of Methylprednisolone  4L of supplemental O2  3.375g of Zosyn  40meq of Potassium    Review of Systems  As stated in HPI    Past Medical History  CAD  T2DM  AUD  HTN  HLD  Right hilar SCC  COPD on 2L of O2 at baseline  AFib  Past Medical History:   Diagnosis Date    Acute myocardial infarction, unspecified (Multi) " 05/17/2013    Acute myocardial infarction    Encounter for screening for malignant neoplasm of prostate 09/02/2015    Encounter for prostate cancer screening    Hypertension     Personal history of other diseases of the nervous system and sense organs 09/13/2017    History of cataract     Surgical History  Past Surgical History:   Procedure Laterality Date    CATARACT EXTRACTION  09/14/2018    Cataract Surgery    CATARACT EXTRACTION  06/27/2014    Cataract Surgery    CORONARY ANGIOPLASTY WITH STENT PLACEMENT  05/17/2013    Cath Stent Placement     Family History  Family History   Problem Relation Name Age of Onset    Heart attack Mother      Lung cancer Brother       Social History  Living Situation: Lives at home with wife and cat. Reports 3 steps leading up into the home, but does not have to use the stairs otherwise as their bedroom now is on the first floor.  Alcohol: Reports no current alcohol use.  Tobacco: Reports no current smoking or tobacco use  Illicit Drugs: Denies any illicit drug use.     Allergies  Denies any allergies, no known allergies.    Home Medications  Amlodipine 5mg, 2QD for HTN  Eliquis 5mg, BID for Afib  Vitamin C 500mg 1QD  ASA 81mg 1QD for CAD s/p PCI 1997  Breo 1puff every day for COPD  Spiriva 2puff every day for COPD  Melatonin 3mg 1HSPRN  Metoprolol tartrate 50mg 1Q12H for Afib  Multivitamin w/ minerals 1QD  Metformin 500mg 1BID for T2DM  Simvastatin 20mg 1QD for HLD  Nicotine 21mg/24h for smoking cessation  Zofran 8mg [id number] following chemoRT  Compazine 10mg [id number] following chemoRT  Pantoprozole 40mg 1QD for prophylactic dyspepsia following chemoRT  Sodium chloride 3% nebulizer solution PRN cough    Objective   Vitals: I/O:   Vitals:    09/23/24 1205   BP: 118/75   Pulse: 85   Resp: 20   Temp: 36.6 °C (97.9 °F)   SpO2: 97%        24hr Min/Max:  Temp  Min: 36.3 °C (97.3 °F)  Max: 36.6 °C (97.9 °F)  Pulse  Min: 68  Max: 87  BP  Min: 100/60  Max: 128/62  Resp  Min: 19  Max:  27  SpO2  Min: 81 %  Max: 100 %   Intake/Output Summary (Last 24 hours) at 9/23/2024 1305  Last data filed at 9/23/2024 0950  Gross per 24 hour   Intake --   Output 0 ml   Net 0 ml        Net IO Since Admission: 0 mL [09/23/24 1305]      Physical Exam:  General: Awake, alert, conversant, appears stated age  HEENT: Pupils equal and round, no scleral icterus or conjunctivitis  Skin: No suspect lesions or rashes noted on visible skin  Chest: Normal respiratory effort. Inspiratory and expiratory wheezing on the right throughout. Diminished breath sounds on the right. On 4L of O2 NC.  Cardiac: Regular rate and rhythm, normal s1, s2, no M/R/G, no JVD  Abdomen: Soft, ND, NT, no involuntary guarding  : No flank pain or indwelling urinary catheter  EXT: No peripheral edema, no asymmetry noted  MSK: No focal joint swelling noted  Neuro: AOx4, moving all limbs spontaneously, follows commands. 5/5 strength with flexion and extension of the hip, flexion and extension of the legs, dorsiflexion and plantar flexion of the LE.  Psych: Coherent thought process, appropriate mood and affect    LABS:  CBC RFP   Lab Results   Component Value Date    WBC 11.5 (H) 09/23/2024    HGB 13.3 (L) 09/23/2024    HCT 40.4 (L) 09/23/2024    MCV 93 09/23/2024     09/23/2024    NEUTROABS 8.16 (H) 09/23/2024    Lab Results   Component Value Date     (L) 09/23/2024    K 2.6 (LL) 09/23/2024    CL 95 (L) 09/23/2024    CO2 30 09/23/2024    BUN 14 09/23/2024    CREATININE 0.83 09/23/2024    CREATININE 0.76 09/19/2024     Lab Results   Component Value Date    MG 1.68 09/19/2024    PHOS 2.5 09/19/2024    CALCIUM 9.8 09/23/2024         Hepatic Function ABG/VBG   Lab Results   Component Value Date    ALT 8 (L) 09/23/2024    AST 16 09/23/2024    ALKPHOS 55 09/23/2024     Lab Results   Component Value Date    BILIDIR 0.1 08/27/2024      Lab Results   Component Value Date    PROTIME 17.5 (H) 09/23/2024    INR 1.5 (H) 09/23/2024    Lab Results    Component Value Date    LACTATE 1.4 09/23/2024        Micro/culture data:  No results found for the last 90 days.    IMAGING:  XR chest 1 view    Result Date: 9/23/2024  Interpreted By:  William Toscano, STUDY: XR CHEST 1 VIEW;  9/23/2024 1:43 am   INDICATION: Signs/Symptoms:Cough wheezing shortness of breath history of lung cancer recent PET scan.   COMPARISON: 08/29/2024   ACCESSION NUMBER(S): [id number]   ORDERING CLINICIAN: LINUS BERNAL   FINDINGS: Large mass redemonstrated right upper lobe with progressive increase opacification of the right hemithorax/lung which may relate to developing consolidation/pneumonia and/or effusion.Left lung remains clear.       Worsening opacification right hemithorax with known underlying malignancy/mass.   MACRO: None   Signed by: William Toscano 9/23/2024 1:49 AM Dictation workstation:   [id number]      - EKG:   Encounter Date: 08/27/24   ECG 12 Lead   Result Value    Ventricular Rate 83    Atrial Rate 83    TX Interval 184    QRS Duration 100    QT Interval 388    QTC Calculation(Bazett) 455    P Axis 58    R Axis -7    T Axis 6    QRS Count 14    Q Onset 218    P Onset 126    P Offset 187    T Offset 412    QTC Fredericia 432    Narrative    Sinus rhythm with occasional Premature ventricular complexes  Otherwise normal ECG  When compared with ECG of 26-AUG-2024 16:54,  Sinus rhythm has replaced Atrial fibrillation  Confirmed by Kodi Burt ([id number]) on 8/27/2024 5:17:58 PM     Medications:  Scheduled Medications:  PRN Medications:    amLODIPine, 10 mg, oral, Daily  apixaban, 5 mg, oral, BID  aspirin, 81 mg, oral, Daily  fluticasone furoate-vilanteroL, 1 puff, inhalation, Daily  folic acid, 1 mg, oral, Daily  insulin lispro, 0-5 Units, subcutaneous, Before meals & nightly  metoprolol tartrate, 50 mg, oral, q12h  nicotine, 1 patch, transdermal, q24h  oxygen, , inhalation, Continuous - Inhalation  pantoprazole, 40 mg, oral, Daily before breakfast  simvastatin, 20 mg, oral,  Daily  tiotropium, 2 puff, inhalation, Daily     PRN medications: dextrose, dextrose, glucagon, glucagon, melatonin, ondansetron, polyethylene glycol, prochlorperazine, sodium chloride          Assessment/Plan:   Juan Carlos Cr is a 74 y.o. male w/a relevant PMH of right hilar SCC of the lung T4N2M0, CAD s/p PCI (1997), Afib, and COPD, that presented with SOB at 1AM on 9/23 and is currently admitted for acute on chronic respiratory failure in the setting of right hilar SCC. Xray shows new near complete opacification of the right hemithorax. CT scan of the chest ordered read is currently pending. Pulmonology consult placed for possible need of thora vs. Endobronchial intervention. Opacification likely 2/2 PNA vs. Pleural effusion vs. Worsening tumor burden/lymphangitic spread vs. Bronchial obstruction/stent failure.    #Acute on chronic hypoxic respiratory failure  #Right hilar SCC of the lungs T4N2M0  #COPD on 2L NC at baseline  Given patient's acute on chronic hypoxic respiratory failure at this time and worsening imaging findings in comparison to prior are concerning for PNA vs. Pleural effusion vs. Worsening tumor burden/lymphangitic spread vs. Bronchial obstruction/stent closure. We will obtain a CT of the chest to clarify diagnosis. Did not feel that this was COPD exacerbation at this time given lack of cough, and sputum production. Will contact Dr. Landis given plans for initiation of chemotherapy 9/25.   - Continue home Breo and Spiriva  - Continue supplemental O2 on 4L at this time, wean as tolerated  - Albuterol Q6H PRN  - RT consulted  - Discontinue methylprednisolone  - Empiric PNA treatment, Vanc, Zosyn and Azithro.  - PNA workup, resp culture, viral respiratory panel, MRSA nares, legionella antigen, strep pneumoniae and procal  - Contact Dr. Landis  - CT scan of the chest, pleural effusion vs. PNA vs. worsening tumor burden/lymphangitic spread  - Consult pulmonology    #Hypokalemia  Patient  remains asymptomatic at this time and is not complaining of muscle cramps/pain. We will continue to monitor with daily RFP and Mg2+. Replete PRN.   - Daily RFP and Mg2+  - Repeat CMP this afternoon  - Replete K+ >4, Mg2+ >2  - Maintain telemetry    #CAD s/p PCI 1997  #Paroxysmal AFib  #HTN  #HLD  Will continue home medications.   - Continue metroprolol 50mg BID and Eliquis 5mg BID for AFib  - Continue simvastatin 20mg every day  - Continue ASA, CAD s/p PCI (1997)  - Continue Amlodipine 5mg    #T2DM  Last A1C 7.6% 8/26/2024.  - Hold home antihyperglycemics, metformin 500mg BID.   - LDSSI    F: Replete PRN  E: Replete PRN  N: Regular Diet  Access/Lines: PIV left AC 9/23/24    DVT Ppx: Eliquis 5mg BID  GI Ppx: Pantoprazole     Abx: Vanc/Zosyn/Azithro  O2: 4LNC    Pain regimen: None  GI Laxative: Miralax     Code status: Full Code  NOK/Surrogate Decision Maker: Wife    The Assessment and Plan will be discussed w/ Attending on Service, Dr. Laura THRASHER  MS4  Department of Internal Medicine

## 2024-09-23 NOTE — ED TRIAGE NOTES
Patient had surgery to remove an area of the chest and now has appointment to have chemo and radiation starting today. Increased sob tonight.

## 2024-09-24 ENCOUNTER — ANESTHESIA EVENT (OUTPATIENT)
Dept: GASTROENTEROLOGY | Facility: HOSPITAL | Age: 74
End: 2024-09-24
Payer: MEDICARE

## 2024-09-24 LAB
ALBUMIN SERPL BCP-MCNC: 2.9 G/DL (ref 3.4–5)
ALBUMIN SERPL BCP-MCNC: 3 G/DL (ref 3.4–5)
ANION GAP SERPL CALC-SCNC: 12 MMOL/L (ref 10–20)
ANION GAP SERPL CALC-SCNC: 13 MMOL/L (ref 10–20)
BUN SERPL-MCNC: 10 MG/DL (ref 6–23)
BUN SERPL-MCNC: 11 MG/DL (ref 6–23)
CALCIUM SERPL-MCNC: 8.6 MG/DL (ref 8.6–10.6)
CALCIUM SERPL-MCNC: 8.9 MG/DL (ref 8.6–10.6)
CHLORIDE SERPL-SCNC: 101 MMOL/L (ref 98–107)
CHLORIDE SERPL-SCNC: 102 MMOL/L (ref 98–107)
CO2 SERPL-SCNC: 27 MMOL/L (ref 21–32)
CO2 SERPL-SCNC: 28 MMOL/L (ref 21–32)
CREAT SERPL-MCNC: 0.63 MG/DL (ref 0.5–1.3)
CREAT SERPL-MCNC: 0.79 MG/DL (ref 0.5–1.3)
EGFRCR SERPLBLD CKD-EPI 2021: >90 ML/MIN/1.73M*2
EGFRCR SERPLBLD CKD-EPI 2021: >90 ML/MIN/1.73M*2
ERYTHROCYTE [DISTWIDTH] IN BLOOD BY AUTOMATED COUNT: 11.6 % (ref 11.5–14.5)
GLUCOSE BLD MANUAL STRIP-MCNC: 118 MG/DL (ref 74–99)
GLUCOSE BLD MANUAL STRIP-MCNC: 141 MG/DL (ref 74–99)
GLUCOSE BLD MANUAL STRIP-MCNC: 155 MG/DL (ref 74–99)
GLUCOSE BLD MANUAL STRIP-MCNC: 87 MG/DL (ref 74–99)
GLUCOSE SERPL-MCNC: 114 MG/DL (ref 74–99)
GLUCOSE SERPL-MCNC: 146 MG/DL (ref 74–99)
HCT VFR BLD AUTO: 37.2 % (ref 41–52)
HGB BLD-MCNC: 12.5 G/DL (ref 13.5–17.5)
LEGIONELLA AG UR QL: NEGATIVE
MAGNESIUM SERPL-MCNC: 1.94 MG/DL (ref 1.6–2.4)
MAGNESIUM SERPL-MCNC: 1.96 MG/DL (ref 1.6–2.4)
MCH RBC QN AUTO: 30.7 PG (ref 26–34)
MCHC RBC AUTO-ENTMCNC: 33.6 G/DL (ref 32–36)
MCV RBC AUTO: 91 FL (ref 80–100)
NRBC BLD-RTO: 0 /100 WBCS (ref 0–0)
PHOSPHATE SERPL-MCNC: 1.9 MG/DL (ref 2.5–4.9)
PHOSPHATE SERPL-MCNC: 2 MG/DL (ref 2.5–4.9)
PLATELET # BLD AUTO: 184 X10*3/UL (ref 150–450)
POTASSIUM SERPL-SCNC: 3.1 MMOL/L (ref 3.5–5.3)
POTASSIUM SERPL-SCNC: 3.2 MMOL/L (ref 3.5–5.3)
RBC # BLD AUTO: 4.07 X10*6/UL (ref 4.5–5.9)
S PNEUM AG UR QL: NEGATIVE
SODIUM SERPL-SCNC: 137 MMOL/L (ref 136–145)
SODIUM SERPL-SCNC: 140 MMOL/L (ref 136–145)
WBC # BLD AUTO: 10.5 X10*3/UL (ref 4.4–11.3)

## 2024-09-24 PROCEDURE — 36415 COLL VENOUS BLD VENIPUNCTURE: CPT

## 2024-09-24 PROCEDURE — S4991 NICOTINE PATCH NONLEGEND: HCPCS

## 2024-09-24 PROCEDURE — 1200000003 HC ONCOLOGY  ROOM WITH TELEMETRY DAILY

## 2024-09-24 PROCEDURE — 85027 COMPLETE CBC AUTOMATED: CPT

## 2024-09-24 PROCEDURE — 2500000001 HC RX 250 WO HCPCS SELF ADMINISTERED DRUGS (ALT 637 FOR MEDICARE OP)

## 2024-09-24 PROCEDURE — 80069 RENAL FUNCTION PANEL: CPT

## 2024-09-24 PROCEDURE — 83735 ASSAY OF MAGNESIUM: CPT

## 2024-09-24 PROCEDURE — 2500000004 HC RX 250 GENERAL PHARMACY W/ HCPCS (ALT 636 FOR OP/ED)

## 2024-09-24 PROCEDURE — 2500000002 HC RX 250 W HCPCS SELF ADMINISTERED DRUGS (ALT 637 FOR MEDICARE OP, ALT 636 FOR OP/ED)

## 2024-09-24 PROCEDURE — 99222 1ST HOSP IP/OBS MODERATE 55: CPT | Performed by: PHYSICIAN ASSISTANT

## 2024-09-24 PROCEDURE — 2500000005 HC RX 250 GENERAL PHARMACY W/O HCPCS

## 2024-09-24 PROCEDURE — 99223 1ST HOSP IP/OBS HIGH 75: CPT | Performed by: INTERNAL MEDICINE

## 2024-09-24 PROCEDURE — 94640 AIRWAY INHALATION TREATMENT: CPT

## 2024-09-24 PROCEDURE — 82947 ASSAY GLUCOSE BLOOD QUANT: CPT

## 2024-09-24 RX ORDER — POTASSIUM CHLORIDE 20 MEQ/1
40 TABLET, EXTENDED RELEASE ORAL ONCE
Status: COMPLETED | OUTPATIENT
Start: 2024-09-24 | End: 2024-09-24

## 2024-09-24 RX ORDER — SODIUM CHLORIDE FOR INHALATION 3 %
3 VIAL, NEBULIZER (ML) INHALATION EVERY 8 HOURS
Status: DISCONTINUED | OUTPATIENT
Start: 2024-09-24 | End: 2024-09-25

## 2024-09-24 ASSESSMENT — COGNITIVE AND FUNCTIONAL STATUS - GENERAL
DAILY ACTIVITIY SCORE: 22
DRESSING REGULAR LOWER BODY CLOTHING: A LITTLE
CLIMB 3 TO 5 STEPS WITH RAILING: A LITTLE
PERSONAL GROOMING: A LITTLE
MOBILITY SCORE: 23
CLIMB 3 TO 5 STEPS WITH RAILING: A LITTLE
DAILY ACTIVITIY SCORE: 21
HELP NEEDED FOR BATHING: A LITTLE
DRESSING REGULAR UPPER BODY CLOTHING: A LITTLE
TOILETING: A LITTLE
MOBILITY SCORE: 22
WALKING IN HOSPITAL ROOM: A LITTLE

## 2024-09-24 ASSESSMENT — PAIN SCALES - GENERAL
PAINLEVEL_OUTOF10: 0 - NO PAIN

## 2024-09-24 ASSESSMENT — PAIN - FUNCTIONAL ASSESSMENT
PAIN_FUNCTIONAL_ASSESSMENT: 0-10

## 2024-09-24 NOTE — CARE PLAN
Problem: Fall/Injury  Goal: Not fall by end of shift  Outcome: Progressing  Goal: Be free from injury by end of the shift  Outcome: Progressing  Goal: Verbalize understanding of personal risk factors for fall in the hospital  Outcome: Progressing  Goal: Verbalize understanding of risk factor reduction measures to prevent injury from fall in the home  Outcome: Progressing  Goal: Use assistive devices by end of the shift  Outcome: Progressing  Goal: Pace activities to prevent fatigue by end of the shift  Outcome: Progressing     Problem: Respiratory  Goal: Clear secretions with interventions this shift  Outcome: Progressing  Goal: Minimize anxiety/maximize coping throughout shift  Outcome: Progressing  Goal: Minimal/no exertional discomfort or dyspnea this shift  Outcome: Progressing  Goal: No signs of respiratory distress (eg. Use of accessory muscles. Peds grunting)  Outcome: Progressing  Goal: Patent airway maintained this shift  Outcome: Progressing  Goal: Tolerate mechanical ventilation evidenced by VS/agitation level this shift  Outcome: Progressing  Goal: Tolerate pulmonary toileting this shift  Outcome: Progressing  Goal: Verbalize decreased shortness of breath this shift  Outcome: Progressing  Goal: Wean oxygen to maintain O2 saturation per order/standard this shift  Outcome: Progressing  Goal: Increase self care and/or family involvement in next 24 hours  Outcome: Progressing     Problem: Pain - Adult  Goal: Verbalizes/displays adequate comfort level or baseline comfort level  Outcome: Progressing     Problem: Safety - Adult  Goal: Free from fall injury  Outcome: Progressing     Problem: Discharge Planning  Goal: Discharge to home or other facility with appropriate resources  Outcome: Progressing     Problem: Chronic Conditions and Co-morbidities  Goal: Patient's chronic conditions and co-morbidity symptoms are monitored and maintained or improved  Outcome: Progressing

## 2024-09-24 NOTE — PROGRESS NOTES
Daily Progress Note    Juan Carlos Cr is a 74 y.o. 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 CT scan showed complete obstruction of the distal right mainstem bronchus and large right pleural effusion. Pulmonology is to bronch tomorrow.     Overnight:  No acute events reported    Subjective   Pt was seen at the bedside. He is sitting comfortably and is on 3L of O2NC. States that he is doing well this morning. He states that he is not SOB at this time. Denies any fever, chills, headache/dizziness, NV, chest pain/tightness, abdominal pain, peripheral edema and dysuria.     Objective   Vitals: I/O:   Vitals:    09/24/24 0829   BP:    Pulse:    Resp:    Temp:    SpO2: 95%        24hr Min/Max:  Temp  Min: 35.9 °C (96.6 °F)  Max: 36.6 °C (97.9 °F)  Pulse  Min: 61  Max: 85  BP  Min: 118/75  Max: 124/82  Resp  Min: 16  Max: 20  SpO2  Min: 95 %  Max: 98 %   Intake/Output Summary (Last 24 hours) at 9/24/2024 1130  Last data filed at 9/24/2024 1038  Gross per 24 hour   Intake 960 ml   Output 1900 ml   Net -940 ml        Net IO Since Admission: -940 mL [09/24/24 1130]      PE:  General: Awake, alert, conversant, appears stated age  HEENT: Pupils equal and round, no scleral icterus or conjunctivitis  Skin: No suspect lesions or rashes noted on visible skin  Chest: Normal respiratory effort. On 3L of O2 NC, markedly diminished breath sounds on the right. Wheezing throughout the right lung field  Cardiac: Regular rate and rhythm, normal s1, s2, no M/R/G, no JVD  Abdomen: Soft, ND, NT, no involuntary guarding  : No flank pain or indwelling urinary catheter  EXT: No peripheral edema, no asymmetry noted  MSK: No focal joint swelling noted  Neuro: AOx4, moving all limbs spontaneously, follows  commands  Psych: Coherent thought process, appropriate mood and affect    Labs:  CBC RFP   Lab Results   Component Value Date    WBC 10.5 09/24/2024    HGB 12.5 (L) 09/24/2024    HCT 37.2 (L) 09/24/2024    MCV 91 09/24/2024     09/24/2024    NEUTROABS 6.39 (H) 09/23/2024    Lab Results   Component Value Date     09/24/2024    K 3.2 (L) 09/24/2024     09/24/2024    CO2 28 09/24/2024    BUN 11 09/24/2024    CREATININE 0.63 09/24/2024    CREATININE 0.66 09/23/2024     Lab Results   Component Value Date    MG 1.96 09/24/2024    PHOS 1.9 (L) 09/24/2024    CALCIUM 8.6 09/24/2024         Hepatic Function ABG/VBG   Lab Results   Component Value Date    ALT 7 (L) 09/23/2024    AST 10 09/23/2024    ALKPHOS 56 09/23/2024     Lab Results   Component Value Date    BILIDIR 0.1 08/27/2024      Lab Results   Component Value Date    PROTIME 17.5 (H) 09/23/2024    INR 1.5 (H) 09/23/2024    Lab Results   Component Value Date    LACTATE 1.4 09/23/2024        Results from last 7 days   Lab Units 09/24/24  0809 09/24/24  0544 09/23/24 2013 09/23/24  1655 09/23/24  1637 09/23/24  0142 09/19/24  1624   POCT GLUCOSE mg/dL 118*  --  192* 189*  --   --   --    GLUCOSE mg/dL  --  114*  --   --  191* 145* 121*       Imaging:  CT chest wo IV contrast    Result Date: 9/24/2024  Interpreted By:  Finesse Gómez  and Debbie Faria STUDY: CT CHEST WO IV CONTRAST;  9/23/2024 4:21 pm   INDICATION: Signs/Symptoms:Hx of right hilar SCC, worsening SOB concerns for PNA vs. pleural effusion vs. lymphangitic spread. .   COMPARISON: CTA chest 08/26/2024   ACCESSION NUMBER(S): [id number]   ORDERING CLINICIAN: RHEA AMAYA   TECHNIQUE: Helical data acquisition of the chest was obtained. Images were reformatted in axial, coronal, and sagittal planes.   No IV or oral contrast material was administered.   FINDINGS: LUNGS AND AIRWAYS: There is similar near-complete obstruction of the distal right mainstem bronchus with a small amount of  debris in the more proximal right mainstem bronchus. Distal right branches are completely occluded. Stenting material in the right mainstem bronchus appears patent.   The left mainstem bronchus and distal branches are patent. No left-sided endobronchial lesion.   There is a new large right pleural effusion with associated right pulmonary collapse. Minimal aeration of the right lung, primarily of the anterior aspect of the right upper pulmonary lobe. Interlobular septal thickening and micro nodularity throughout the persistently expanded right upper lobe.   There is poor delineation of the patient's known large right pulmonary mass, however right suprahilar mass measures 6.0 x 7.6 x 6.6 cm as visualized.   Moderate paraseptal emphysematous changes of the peripheral left lung and right pulmonary apex. No left-sided lesion is visualized. No airspace opacity or effusion involving the left lung. No pneumothorax is identified.   MEDIASTINUM AND SUDHAKAR, LOWER NECK AND AXILLA: The visualized thyroid gland is within normal limits.   A slight shift of the mediastinum and heart to the right side   There is an enlarged lower paratracheal lymph node measuring 2.0 cm in short axis and several prominent aortopulmonary window lymph nodes. There is poor visualization of the right hilum.   Moderate volume of fluid in the distal esophagus.   HEART AND VESSELS: The thoracic aorta is of normal course and caliber without vascular calcifications.   Main pulmonary artery and its branches are normal in caliber.   Moderate coronary arterial calcifications. The study is not optimized for evaluation of coronary arteries.   The cardiac chambers are not enlarged.   Trace pericardial fluid.   UPPER ABDOMEN: The visualized subdiaphragmatic structures demonstrate no remarkable findings.   CHEST WALL AND OSSEOUS STRUCTURES: Mild chronic deformity and fusion of several right ribs. No acute osseous abnormality. No suspicious osseous lesion is  definitively visualized. The chest wall is otherwise unremarkable.       1. Poor delineation of a large right hilar and suprahilar mass with associated complete obstruction of the distal right mainstem bronchus and distal branches, new compared to prior, with collapse of much of the right. Development of a large right pleural effusion, presumably malignant. Minimal persistent aeration of the anterosuperior right lung, with micronodularity and interlobular septal thickening. 2. A slight shift of the mediastinum and heart to the right side. 3. Multiple enlarged mediastinal lymph nodes are incompletely characterized on this exam. 4. Moderate volume of fluid in the distal esophagus raising concern for aspiration. 5. The left lung is overall clear with peripheral emphysematous changes.   I personally reviewed the image(s)/study and resident interpretation as stated by Dr. Giana Lewis MD. I agree with the findings as stated. This study was interpreted at [facility], [place], [place].   MACRO: Giana Lewis discussed the significance and urgency of this critical finding by secure chat with  Telma Ryder on 9/23/2024 at 6:30 pm.  (**-RCF-**) Findings:  See findings.   Signed by: Finesse Gómez 9/24/2024 9:01 AM Dictation workstation:   [id number]    XR chest 1 view    Result Date: 9/23/2024  Interpreted By:  William Toscano, STUDY: XR CHEST 1 VIEW;  9/23/2024 1:43 am   INDICATION: Signs/Symptoms:Cough wheezing shortness of breath history of lung cancer recent PET scan.   COMPARISON: 08/29/2024   ACCESSION NUMBER(S): [id number]   ORDERING CLINICIAN: LINUS BERNAL   FINDINGS: Large mass redemonstrated right upper lobe with progressive increase opacification of the right hemithorax/lung which may relate to developing consolidation/pneumonia and/or effusion.Left lung remains clear.       Worsening opacification right hemithorax with known underlying malignancy/mass.   MACRO: None    Signed by: William Toscano 9/23/2024 1:49 AM Dictation workstation:   [id number]      Medications:  Scheduled Medications:  PRN Medications:    amLODIPine, 10 mg, oral, Daily  [Held by provider] apixaban, 5 mg, oral, BID  aspirin, 81 mg, oral, Daily  azithromycin, 500 mg, intravenous, q24h  fluticasone furoate-vilanteroL, 1 puff, inhalation, Daily  folic acid, 1 mg, oral, Daily  insulin lispro, 0-5 Units, subcutaneous, Before meals & nightly  metoprolol tartrate, 50 mg, oral, q12h  nicotine, 1 patch, transdermal, q24h  oxygen, , inhalation, Continuous - Inhalation  pantoprazole, 40 mg, oral, Daily before breakfast  piperacillin-tazobactam, 4.5 g, intravenous, q6h  potassium phosphate (monobasic), 500 mg, oral, 4x daily  simvastatin, 20 mg, oral, Daily  tiotropium, 2 puff, inhalation, Daily     PRN medications: albuterol, dextrose, dextrose, glucagon, glucagon, melatonin, ondansetron, polyethylene glycol, prochlorperazine, sodium chloride     Assessment/Plan:   Juan Carlos Cr is a 74 y.o. 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 Xray showed new complete opacification of the right hemithorax. CT scan yesterday demonstrated complete obstruction of the distal right mainstem bronchus and large right pleural effusion. Pulmonology is to bronch tomorrow. Planning for inpatient chemotherapy, currently assessing need for emergent RT.    Updates:  09/24  - Pulmonology recommended, bronch tomorrow afternoon.  - Radiation oncology awaiting bronch to assess need for emergent RT  - MRSA nares, strep pneumo, legionella negative. Vancomycin and azithromycin discontinued  - Dr. Landis would like inpatient carbo/taxol to be initiated. Tentatively Thursday  - PICC line on d/c    #Acute on chronic  hypoxic respiratory failure  #Right hilar SCC of the lungs T4N2M0  #COPD on 2L NC at baseline  Given patient's acute on chronic hypoxic respiratory failure at this time and worsening imaging findings in comparison to prior are concerning for PNA vs. Pleural effusion vs. Worsening tumor burden/lymphangitic spread vs. Bronchial obstruction/stent closure. We will obtain a CT of the chest to clarify diagnosis. Did not feel that this was COPD exacerbation at this time given lack of cough, and sputum production. Will contact Dr. Landis given plans for initiation of chemotherapy 9/25.   - Pulmonology following  - RT following  - CT scan showed complete obstruction of the distal right mainstem bronchus and large right pleural effusion  Plan:  - Continue home Breo and Spiriva  - Continue supplemental O2 on 3L at this time, wean as tolerated  - Albuterol Q6H PRN  - Empiric PNA treatment, Zosyn. MRSA nares negative and legionella negative. Discontinue azithro and vanc  - PNA workup, resp culture needs to be collected  - Dr. Landis outpatient heme/onc recommended initiation of carbo/taxol in patient  - Consulted rad/onc, awaiting bronch to see need for emergent RT inpatient.  - Bronch planned for tomorrow as per pulm  - PICC on d/c     #Hypokalemia  Patient remains asymptomatic at this time and is not complaining of muscle cramps/pain. We will continue to monitor with daily RFP and Mg2+. Replete PRN.   - Received 40meq of K+ in the ED potassium was 2.6, repeat potassium 3.0 and phos 1.9 received 40meq K+ 9/23, and potassium phosphate  - Potassium 3.2 and phos 1.9 9/24 received 40meq of K+ and potassium phosphate 9/24  Plan:  - Daily RFP and Mg2+  - Repeat CMP this afternoon  - Replete K+ >4, Mg2+ >2  - Maintain telemetry     #CAD s/p PCI 1997  #Paroxysmal AFib  #HTN  #HLD  Will continue home medications.   - Continue metroprolol 50mg BID and Eliquis 5mg BID for AFib  - Continue simvastatin 20mg every day  - Continue ASA,  CAD s/p PCI (1997)  - Continue Amlodipine 5mg     #T2DM  Last A1C 7.6% 8/26/2024.  - Hold home antihyperglycemics, metformin 500mg BID.   - LDSSI    F: Replete PRN  E: Replete PRN  N: Regular Diet, NPO at midnight  Access/Lines: PIV left AC 9/23/24    DVT Ppx: Eliquis, held for bronch tomorrow  GI Ppx: Pantoprazole     Abx: Zosyn (9/23/24-), Vanc (9/23/24 dc'ed same day), Azithro (9/23-9/24/2024 dc'ed)  O2: 3L NC    Pain regimen: None  GI Laxative: Miralax     Code status: Full Code  NOK/Surrogate Decision Maker:     Patient assessment and plan staffed with the attending physician on service, Dr. Rosy THRASHER  MS4  Department of Internal Medicine

## 2024-09-24 NOTE — CARE PLAN
The patient's goals for the shift include      The clinical goals for the shift include Patient will remain safe and HDS this shift      Problem: Fall/Injury  Goal: Not fall by end of shift  Outcome: Progressing  Goal: Be free from injury by end of the shift  Outcome: Progressing  Goal: Verbalize understanding of personal risk factors for fall in the hospital  Outcome: Progressing  Goal: Verbalize understanding of risk factor reduction measures to prevent injury from fall in the home  Outcome: Progressing  Goal: Use assistive devices by end of the shift  Outcome: Progressing  Goal: Pace activities to prevent fatigue by end of the shift  Outcome: Progressing     Problem: Respiratory  Goal: Clear secretions with interventions this shift  Outcome: Progressing  Goal: Minimize anxiety/maximize coping throughout shift  Outcome: Progressing  Goal: Minimal/no exertional discomfort or dyspnea this shift  Outcome: Progressing  Goal: No signs of respiratory distress (eg. Use of accessory muscles. Peds grunting)  Outcome: Progressing  Goal: Patent airway maintained this shift  Outcome: Progressing  Goal: Tolerate mechanical ventilation evidenced by VS/agitation level this shift  Outcome: Progressing  Goal: Tolerate pulmonary toileting this shift  Outcome: Progressing  Goal: Verbalize decreased shortness of breath this shift  Outcome: Progressing  Goal: Wean oxygen to maintain O2 saturation per order/standard this shift  Outcome: Progressing  Goal: Increase self care and/or family involvement in next 24 hours  Outcome: Progressing     Problem: Pain - Adult  Goal: Verbalizes/displays adequate comfort level or baseline comfort level  Outcome: Progressing     Problem: Safety - Adult  Goal: Free from fall injury  Outcome: Progressing     Problem: Discharge Planning  Goal: Discharge to home or other facility with appropriate resources  Outcome: Progressing     Problem: Chronic Conditions and Co-morbidities  Goal: Patient's chronic  conditions and co-morbidity symptoms are monitored and maintained or improved  Outcome: Progressing

## 2024-09-24 NOTE — CONSULTS
Department of Medicine  Division of Pulmonary, Critical Care, and Sleep Medicine  Consult reason: Acute Hypoxic Respiratory Failure    History Of Present Illness  Juan Carlos Cr is a 74 y.o. male with PMHx of right hilar SCC lung cancer, 120 pack-year smoking history, alcohol use disorder, CAD s/p PCI (1997), Afib, HTN, HLD, and COPD on 2L NC at home, who was transferred from [facility] 9/23/2024 (1am) with acute worsening dyspnea.     Mr. Cr was admitted from 8/27-9/1 as a transfer from the ED for suspected COPD exacerbation with sx of SOB on exertion, productive cough, and fatigue. ED was unable to confirm COPD diagnosis as there is no record of prior PFTs. CT chest revealed a large 6.57mm right sided soft tissue mass causing compression of surrounding vasculature. EBUS (8/29) with debulking of R mainstem bronchus and bronchus intermedius. A stent was placed for residual stenosis due to extrinsic compression, spanning from proximal ARSLAN to distal BI. Also received new diagnoses of paroxysmal Afib w/ RVR (started on Eliquis) and T2DM. PET CT on 9/18 revealed large R hilar lung mass with R paratracheal LAP and RLL nodule suspicious for nas mets and satellite lesion, respectively.     Chest CT w/o contrast on 9/23 revealed that the right hilar/suprahilar mass is completely obstructing the distal R mainstem bronchus and distal branches with collapse of majority of the right lung (new findings compared to prior CT 8/26).      On interview, pt states he was awoken from sleep at midnight with severe difficulty breathing and a warm pain in the lower abdomen. He asked his wife to call 911, moved to the couch to lay at an incline, and had some improvement in breathing. Pulse ox at home was also reported low. He now reports he is back to his baseline breathing on oxygen, only experiencing SOB and weakness with exertion. Denies cough, chest pain, acid reflux, abdominal pain, swelling in extremities.      Past Medical  "History:   Diagnosis Date    Acute myocardial infarction, unspecified (Multi) 05/17/2013    Acute myocardial infarction    Encounter for screening for malignant neoplasm of prostate 09/02/2015    Encounter for prostate cancer screening    Hypertension     Personal history of other diseases of the nervous system and sense organs 09/13/2017    History of cataract       Past Surgical History:   Procedure Laterality Date    CATARACT EXTRACTION  09/14/2018    Cataract Surgery    CATARACT EXTRACTION  06/27/2014    Cataract Surgery    CORONARY ANGIOPLASTY WITH STENT PLACEMENT  05/17/2013    Cath Stent Placement        Social History     Tobacco Use    Smoking status: Every Day     Current packs/day: 0.50     Types: Cigarettes   Vaping Use    Vaping status: Never Used   Substance Use Topics    Alcohol use: Not Currently     Comment: 4-5 beers and a glass a wine a day    Drug use: Never       Family History   Problem Relation Name Age of Onset    Heart attack Mother      Lung cancer Brother             Allergies  Patient has no known allergies.    Review of Systems  Described in HPI as above.     Physical exam  Constitutional: Normal appearance.  HEENT: Normocephalic and atraumatic.  Cardiovascular: Normal rate and regular rhythm.  Pulmonary: Markedly decreased breath sounds across all right lung fields with crackles/wheezes.   Musculoskeletal: No edema, no cyanosis.  Neurological: Awake, alert and oriented x3.  Psychiatric: Normal behavior, mood and affect.     Vital Signs  Visit Vitals  /69   Pulse 66   Temp 36.5 °C (97.7 °F) (Temporal)   Resp 16   Ht 1.753 m (5' 9\")   Wt 66.4 kg (146 lb 6.2 oz)   SpO2 95%   BMI 21.62 kg/m²   Smoking Status Every Day   BSA 1.8 m²      Lab Results   Component Value Date    WBC 10.5 09/24/2024    HGB 12.5 (L) 09/24/2024    HCT 37.2 (L) 09/24/2024    MCV 91 09/24/2024     09/24/2024      Lab Results   Component Value Date    GLUCOSE 114 (H) 09/24/2024    CALCIUM 8.6 09/24/2024    "  09/24/2024    K 3.2 (L) 09/24/2024    CO2 28 09/24/2024     09/24/2024    BUN 11 09/24/2024    CREATININE 0.63 09/24/2024      Lab Results   Component Value Date    ALT 7 (L) 09/23/2024    AST 10 09/23/2024    ALKPHOS 56 09/23/2024    BILITOT 0.5 09/23/2024        Oxygen Therapy  SpO2: 95 %  Medical Gas Therapy: Supplemental oxygen  Medical Gas Delivery Method: Nasal cannula (2L)       Medications   Scheduled medications  amLODIPine, 10 mg, oral, Daily  [Held by provider] apixaban, 5 mg, oral, BID  aspirin, 81 mg, oral, Daily  azithromycin, 500 mg, intravenous, q24h  fluticasone furoate-vilanteroL, 1 puff, inhalation, Daily  folic acid, 1 mg, oral, Daily  insulin lispro, 0-5 Units, subcutaneous, Before meals & nightly  metoprolol tartrate, 50 mg, oral, q12h  nicotine, 1 patch, transdermal, q24h  oxygen, , inhalation, Continuous - Inhalation  pantoprazole, 40 mg, oral, Daily before breakfast  piperacillin-tazobactam, 4.5 g, intravenous, q6h  potassium chloride CR, 40 mEq, oral, Once  potassium phosphate (monobasic), 500 mg, oral, 4x daily  simvastatin, 20 mg, oral, Daily  tiotropium, 2 puff, inhalation, Daily      Continuous medications     PRN medications  PRN medications: albuterol, dextrose, dextrose, glucagon, glucagon, melatonin, ondansetron, polyethylene glycol, prochlorperazine, sodium chloride     Chest Radiograph   CT chest wo IV contrast 09/23/2024    Narrative  Interpreted By:  Finesse Gómez,  and Debbie Faria  STUDY:  CT CHEST WO IV CONTRAST;  9/23/2024 4:21 pm    INDICATION:  Signs/Symptoms:Hx of right hilar SCC, worsening SOB concerns for PNA  vs. pleural effusion vs. lymphangitic spread. .    COMPARISON:  CTA chest 08/26/2024    ACCESSION NUMBER(S):  [id number]    ORDERING CLINICIAN:  RHEA AMAYA    TECHNIQUE:  Helical data acquisition of the chest was obtained. Images were  reformatted in axial, coronal, and sagittal planes.    No IV or oral contrast material was  administered.    FINDINGS:  LUNGS AND AIRWAYS:  There is similar near-complete obstruction of the distal right  mainstem bronchus with a small amount of debris in the more proximal  right mainstem bronchus. Distal right branches are completely  occluded. Stenting material in the right mainstem bronchus appears  patent.    The left mainstem bronchus and distal branches are patent. No  left-sided endobronchial lesion.    There is a new large right pleural effusion with associated right  pulmonary collapse. Minimal aeration of the right lung, primarily of  the anterior aspect of the right upper pulmonary lobe. Interlobular  septal thickening and micro nodularity throughout the persistently  expanded right upper lobe.    There is poor delineation of the patient's known large right  pulmonary mass, however right suprahilar mass measures 6.0 x 7.6 x  6.6 cm as visualized.    Moderate paraseptal emphysematous changes of the peripheral left lung  and right pulmonary apex. No left-sided lesion is visualized. No  airspace opacity or effusion involving the left lung. No pneumothorax  is identified.    MEDIASTINUM AND SUDHAKAR, LOWER NECK AND AXILLA:  The visualized thyroid gland is within normal limits.    A slight shift of the mediastinum and heart to the right side    There is an enlarged lower paratracheal lymph node measuring 2.0 cm  in short axis and several prominent aortopulmonary window lymph  nodes. There is poor visualization of the right hilum.    Moderate volume of fluid in the distal esophagus.    HEART AND VESSELS:  The thoracic aorta is of normal course and caliber without vascular  calcifications.    Main pulmonary artery and its branches are normal in caliber.    Moderate coronary arterial calcifications. The study is not optimized  for evaluation of coronary arteries.    The cardiac chambers are not enlarged.    Trace pericardial fluid.    UPPER ABDOMEN:  The visualized subdiaphragmatic structures demonstrate no  remarkable  findings.    CHEST WALL AND OSSEOUS STRUCTURES:  Mild chronic deformity and fusion of several right ribs. No acute  osseous abnormality. No suspicious osseous lesion is definitively  visualized. The chest wall is otherwise unremarkable.    Impression  1. Poor delineation of a large right hilar and suprahilar mass with  associated complete obstruction of the distal right mainstem bronchus  and distal branches, new compared to prior, with collapse of much of  the right. Development of a large right pleural effusion, presumably  malignant. Minimal persistent aeration of the anterosuperior right  lung, with micronodularity and interlobular septal thickening.  2. A slight shift of the mediastinum and heart to the right side.  3. Multiple enlarged mediastinal lymph nodes are incompletely  characterized on this exam.  4. Moderate volume of fluid in the distal esophagus raising concern  for aspiration.  5. The left lung is overall clear with peripheral emphysematous  changes.    I personally reviewed the image(s)/study and resident interpretation  as stated by Dr. Giana Lewis MD. I agree with the findings as  stated. This study was interpreted at [facility], [place], [place].    MACRO:  Giana Lewis discussed the significance and urgency of this  critical finding by secure chat with  Telma Ryder on 9/23/2024 at  6:30 pm.  (**-RCF-**) Findings:  See findings.    Signed by: Finesse óGmez 9/24/2024 9:01 AM  Dictation workstation:   [id number]      XR chest 1 view 09/23/2024    Narrative  Interpreted By:  William Toscano,  STUDY:  XR CHEST 1 VIEW;  9/23/2024 1:43 am    INDICATION:  Signs/Symptoms:Cough wheezing shortness of breath history of lung  cancer recent PET scan.    COMPARISON:  08/29/2024    ACCESSION NUMBER(S):  [id number]    ORDERING CLINICIAN:  LINUS BERNAL    FINDINGS:  Large mass redemonstrated right upper lobe with progressive increase  opacification of  "the right hemithorax/lung which may relate to  developing consolidation/pneumonia and/or effusion.Left lung remains  clear.    Impression  Worsening opacification right hemithorax with known underlying  malignancy/mass.    MACRO:  None    Signed by: William Toscano 9/23/2024 1:49 AM  Dictation workstation:   [id number]         Pulmonary Function Tests   Pulmonary Functions Testing Results:  No results found for: \"FEV1\", \"FVC\", \"GXB0NZH\", \"TLC\", \"DLCO\"       Autoimmune Testing     No results found for: \"ANATITER\", \"ANAPATTRN\", \"ANACO\", \"RO\", \"LA\", \"ARNP\", \"EMPSMRNP\", \"JIB\", \"ASCL\", \"EMPINTERP\", \"NONUHFIRE\", \"CITAB\", \"ANCA\", \"ANCPA\", \"ANCTI\"     Assessment and Plan / Recommendations   Assessment/Plan   Juan Carlos Cr is a 75 y/o male with PMHx SCC of the lung, AUD, CAD s/p PCI '97, Afib, HTN, HLD, chronic respiratory failure 2/2 suspected emphysema (2L NC at home), who was transferred from [facility] with acute hypoxic respiratory failure in the setting of right hilar lung mass. Pulmonary is consulted for right hilar mass and guidance of treatment. On review of current CT chest, the right mainstem stent placed 8/26 appears patent with evidence of distal mucus plugging vs secretions vs aspiration contents resulting in majority lung collapse. Small pleural effusion unlikely contributing to respiratory distress. Pt now saturating 95% at baseline in NAD.     #Acute hypoxic respiratory failure 2/2 right lung collapse likely from mucus plugging around stent  - Plan for bronch tomorrow afternoon 9/25 s/p 24h hold on Eliquis   - Nebulized hypertonic saline (3%) treatments q8h for stent clearance/maintenance   - Consult RT - pt will need aggressive bronchial hygiene for airway clearance   - Agree with abx tx per primary team for CAP       Assessment and plan was discussed with Dr. Khadar Hartmann, M4  Pulmonary Medicine  "

## 2024-09-24 NOTE — ANESTHESIA PREPROCEDURE EVALUATION
Patient: Juan Carlos Cr    Procedure Information       Date/Time: 09/25/24 0700    Scheduled providers: Leslie Trujillo MD    Procedure: BRONCHOSCOPY    Location: [facility]            Relevant Problems   Anesthesia (within normal limits)  No family hx MH      Cardiac   (+) Abnormal EKG   (+) Atrial fibrillation (Multi)   (+) CAD (coronary artery disease)   (+) Hypertension   (+) Pure hypercholesterolemia   (+) Stented coronary artery      Pulmonary   (+) COPD exacerbation (Multi)   (+) Malignant neoplasm of upper lobe of right lung (Multi)   (+) Primary malignant neoplasm of lung metastatic to other site (Multi)   (+) Pulmonary nodules   (+) Squamous cell lung cancer, unspecified laterality (Multi)      Neuro (within normal limits)      GI (within normal limits)      /Renal (within normal limits)      Liver (within normal limits)      Endocrine (within normal limits)      Hematology   (+) Anticoagulant long-term use (off)      Musculoskeletal (within normal limits)      HEENT (within normal limits)      ID (within normal limits)      Skin (within normal limits)      GYN (within normal limits)    74 y.o. 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 CT scan showed complete obstruction of the distal right mainstem bronchus and large right pleural effusion.     Clinical information reviewed:                   NPO Detail:  No data recorded     Physical Exam    Airway  Mallampati: II  TM distance: >3 FB     Cardiovascular - normal exam     Dental   Comments: edentulous   Pulmonary - normal exam     Abdominal          Vitals:    09/25/24 0815   BP: 128/73   Pulse: 67   Resp: 20   Temp: 36.2 °C (97.2 °F)   SpO2: 97%       Past Surgical History:   Procedure Laterality Date     CATARACT EXTRACTION  09/14/2018    Cataract Surgery    CATARACT EXTRACTION  06/27/2014    Cataract Surgery    CORONARY ANGIOPLASTY WITH STENT PLACEMENT  05/17/2013    Cath Stent Placement     Past Medical History:   Diagnosis Date    Acute myocardial infarction, unspecified (Multi) 05/17/2013    Acute myocardial infarction    Encounter for screening for malignant neoplasm of prostate 09/02/2015    Encounter for prostate cancer screening    Hypertension     Personal history of other diseases of the nervous system and sense organs 09/13/2017    History of cataract       Current Facility-Administered Medications:     albuterol 2.5 mg /3 mL (0.083 %) nebulizer solution 2.5 mg, 2.5 mg, nebulization, q6h PRN, Telma Ryder MD    amLODIPine (Norvasc) tablet 10 mg, 10 mg, oral, Daily, Telma Ryder MD, 10 mg at 09/24/24 0915    [Held by provider] apixaban (Eliquis) tablet 5 mg, 5 mg, oral, BID, Telma Ryder MD, 5 mg at 09/23/24 1301    aspirin EC tablet 81 mg, 81 mg, oral, Daily, Telma Ryder MD, 81 mg at 09/24/24 0915    dextrose 50 % injection 12.5 g, 12.5 g, intravenous, q15 min PRN, Telma Ryder MD    dextrose 50 % injection 25 g, 25 g, intravenous, q15 min PRN, Telma Ryder MD    fluticasone furoate-vilanteroL (Breo Ellipta) 200-25 mcg/dose inhaler 1 puff, 1 puff, inhalation, Daily, Telma Ryder MD, 1 puff at 09/24/24 0823    folic acid (Folvite) tablet 1 mg, 1 mg, oral, Daily, Telma Ryder MD, 1 mg at 09/24/24 0915    glucagon (Glucagen) injection 1 mg, 1 mg, intramuscular, q15 min PRN, Telma Ryder MD    glucagon (Glucagen) injection 1 mg, 1 mg, intramuscular, q15 min PRN, Telma Ryder MD    insulin lispro (HumaLOG) injection 0-5 Units, 0-5 Units, subcutaneous, Before meals & nightly, Telma Ryder MD, 1 Units at 09/24/24 1234    melatonin tablet 3 mg, 3 mg, oral, Nightly PRN, Telma Ryder MD    metoprolol tartrate (Lopressor) tablet 50 mg, 50 mg, oral, q12h, Telma LEE  MD Aleksey, 50 mg at 09/25/24 0055    nicotine (Nicoderm CQ) 21 mg/24 hr patch 1 patch, 1 patch, transdermal, q24h, Telma Ryder MD, 1 patch at 09/24/24 1235    ondansetron (Zofran) tablet 8 mg, 8 mg, oral, q8h PRN, Telma Ryder MD    oxygen (O2) therapy, , inhalation, Continuous - Inhalation, Telma Ryder MD, 2 L/min at 09/24/24 2058    pantoprazole (ProtoNix) EC tablet 40 mg, 40 mg, oral, Daily before breakfast, Telma Ryder MD, 40 mg at 09/24/24 0915    piperacillin-tazobactam (Zosyn) 4.5 g in dextrose (iso)  mL, 4.5 g, intravenous, q6h, Telma Ryder MD, Stopped at 09/25/24 0337    polyethylene glycol (Glycolax, Miralax) packet 17 g, 17 g, oral, Daily PRN, Telma Ryder MD    potassium chloride 20 mEq in sterile water for injection 100 mL, 20 mEq, intravenous, q2h, Donaldo Mcelroy MD, Last Rate: 50 mL/hr at 09/25/24 0633, 20 mEq at 09/25/24 0633    potassium phosphate (monobasic) (K-Phos) tablet 500 mg, 500 mg, oral, 4x daily, Telma Ryder MD, 500 mg at 09/24/24 2055    potassium phosphate (monobasic) (K-Phos) tablet 500 mg, 500 mg, oral, 4x daily, Donaldo Mcelroy MD    prochlorperazine (Compazine) tablet 10 mg, 10 mg, oral, q6h PRN, Telma Ryder MD    simvastatin (Zocor) tablet 20 mg, 20 mg, oral, Daily, Telma Ryder MD, 20 mg at 09/24/24 0915    sodium chloride 3 % nebulizer solution 3 mL, 3 mL, nebulization, q8h, Telma Ryder MD    tiotropium (Spiriva Respimat) 2.5 mcg/actuation inhaler 2 puff, 2 puff, inhalation, Daily, Telma Ryder MD, 2 puff at 09/24/24 0823  Prior to Admission medications    Medication Sig Start Date End Date Taking? Authorizing Provider   amLODIPine (Norvasc) 5 mg tablet Take 2 tablets (10 mg) by mouth once daily. 9/1/24 10/31/24 Yes Rosa Brown MD   apixaban (Eliquis) 5 mg tablet Take 1 tablet (5 mg) by mouth 2 times a day. 8/30/24  Yes Isis Bridges MD   ascorbic acid (Vitamin C) 500 mg tablet Take 1 tablet (500 mg) by mouth once  daily.   Yes Historical Provider, MD   aspirin 81 mg EC tablet Take 1 tablet (81 mg) by mouth once daily.   Yes Historical Provider, MD   fluticasone furoate-vilanteroL (Breo Ellipta) 200-25 mcg/dose inhaler Inhale 1 puff once daily. 9/1/24 10/31/24 Yes Rosa Brown MD   folic acid (Folvite) 1 mg tablet Take 1 tablet (1 mg) by mouth once daily. 9/1/24 10/31/24 Yes Rosa Brown MD   metFORMIN (Glucophage) 500 mg tablet Take 1 tablet (500 mg) by mouth 2 times daily (morning and late afternoon). 8/31/24 10/31/24 Yes Rosa Brown MD   metoprolol tartrate (Lopressor) 50 mg tablet Take 1 tablet by mouth every 12 hours. 8/31/23  Yes Thanh Cuba MD   multivitamin with minerals tablet Take 1 tablet by mouth once daily. 9/1/24 10/31/24 Yes Rosa Brown MD   nicotine (Nicoderm CQ) 21 mg/24 hr patch Place 1 patch over 24 hours on the skin once daily for 42 doses. 9/1/24 10/8/24 Yes Rosa Brown MD   pantoprazole (ProtoNix) 40 mg EC tablet Take 1 tablet (40 mg) by mouth once daily. Do not crush, chew, or split. 9/19/24 3/18/25 Yes Janiya Landis MD   simvastatin (Zocor) 20 mg tablet Take 1 tablet (20 mg) by mouth once daily. 8/31/23  Yes Thanh Cuba MD   thiamine (Vitamin B-1) 100 mg tablet Take 1 tablet (100 mg) by mouth once daily. Do not start  before September 2, 2024. 9/2/24 11/1/24 Yes Rosa Brown MD   tiotropium (Spiriva Respimat) 2.5 mcg/actuation inhaler Inhale 2 puffs once daily. 9/1/24 10/31/24 Yes Rosa Brown MD   blood sugar diagnostic (Accu-Chek Guide test strips) strip 1 strip 2 times a day. 9/5/24   Thanh Cuba MD   blood sugar diagnostic (FreeStyle Lite Strips) strip 1 strip 2 times a day. 9/3/24   Thanh Cuba MD   blood-glucose meter (Accu-Chek Guide Glucose Meter) misc Check blood glucose 2x a day 9/5/24   Thanh Cuba MD   fluticasone-umeclidin-vilanter (TRELEGY-ELLIPTA) 200-62.5-25 mcg blister with device Inhale 1 puff  early in the morning.. Rinse mouth after taking dose 9/10/24   Missael Holt MD   FreeStyle Lite Meter kit TEST BLOOD SUGAR LEVELS 2X A DAY 9/3/24 9/3/25  Thanh Cuba MD   lancets (Accu-Chek Fastclix Lancet Drum) misc Check blood glucose 2x day 9/5/24   Thanh Cuba MD   lancets (Accu-Chek Fastclix Lancet Drum) misc Check blood glucose level 2x a day 9/5/24   Thanh Cuba MD   lancets misc TEST BLOOD SUGAR LEVELS 2X A DAY 9/3/24   Thanh Cuba MD   lancets misc Check blood sugar twice daily 9/10/24   Thanh Cuba MD   melatonin 3 mg tablet Take 1 tablet (3 mg) by mouth once daily.  Patient taking differently: Take 1 tablet (3 mg) by mouth as needed at bedtime for sleep. 8/31/24 10/31/24  Rosa Brown MD   nicotine (Nicoderm CQ) 14 mg/24 hr patch Apply 1 patch topically every day.  START AFTER finishing the 21mg patches. 8/31/24 9/14/24  Rosa Brown MD   nicotine (Nicoderm CQ) 7 mg/24 hr patch Place 1 patch over 24 hours on the skin once daily for 14 doses. START AFTER finishing the 14mg patches. 10/22/24 11/5/24  Rosa Brown MD   ondansetron (Zofran) 8 mg tablet Take 1 tablet (8 mg) by mouth every 8 hours if needed for nausea or vomiting. 9/19/24   Janiya Landis MD   prochlorperazine (Compazine) 10 mg tablet Take 1 tablet (10 mg) by mouth every 6 hours if needed for nausea or vomiting. 9/19/24   Janiya Landis MD   sodium chloride 3 % nebulizer solution Take 3 mL by nebulization every 6 hours if needed for cough. 8/31/24 10/1/24  Rosa Brown MD     No Known Allergies  Social History     Tobacco Use    Smoking status: Every Day     Current packs/day: 0.50     Types: Cigarettes    Smokeless tobacco: Not on file   Substance Use Topics    Alcohol use: Not Currently     Comment: 4-5 beers and a glass a wine a day         Chemistry    Lab Results   Component Value Date/Time     09/25/2024 0551    K 3.0 (L) 09/25/2024 0551      09/25/2024 0551    CO2 29 09/25/2024 0551    BUN 9 09/25/2024 0551    CREATININE 0.71 09/25/2024 0551    Lab Results   Component Value Date/Time    CALCIUM 8.6 09/25/2024 0551    ALKPHOS 56 09/23/2024 1637    AST 10 09/23/2024 1637    ALT 7 (L) 09/23/2024 1637    BILITOT 0.5 09/23/2024 1637          Lab Results   Component Value Date/Time    WBC 8.7 09/25/2024 0551    HGB 13.1 (L) 09/25/2024 0551    HCT 39.7 (L) 09/25/2024 0551     09/25/2024 0551     Lab Results   Component Value Date/Time    PROTIME 17.5 (H) 09/23/2024 0142    INR 1.5 (H) 09/23/2024 0142     Encounter Date: 08/27/24   ECG 12 Lead   Result Value    Ventricular Rate 83    Atrial Rate 83    LA Interval 184    QRS Duration 100    QT Interval 388    QTC Calculation(Bazett) 455    P Axis 58    R Axis -7    T Axis 6    QRS Count 14    Q Onset 218    P Onset 126    P Offset 187    T Offset 412    QTC Fredericia 432    Narrative    Sinus rhythm with occasional Premature ventricular complexes  Otherwise normal ECG  When compared with ECG of 26-AUG-2024 16:54,  Sinus rhythm has replaced Atrial fibrillation  Confirmed by Kodi Burt ([id number]) on 8/27/2024 5:17:58 PM     No results found for this or any previous visit from the past 1095 days.       Anesthesia Plan    History of general anesthesia?: yes  History of complications of general anesthesia?: no    ASA 3     general     intravenous induction   Trial extubation is planned.  Anesthetic plan and risks discussed with patient.  Use of blood products discussed with patient who consented to blood products.    Plan discussed with CAA.

## 2024-09-24 NOTE — CONSULTS
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  The patient has since established care with DR. Landis.   Follow-up MRI brain was unremarkable for metastatic disease.  PET CT on  showed T4 N2 M0 disease.  Patient was being considered for definitive CRT, with pending plans to see Radiation Oncology at [facility].  The patient presents to [facility] on  with a sudden onset of shortness of breath.  CT chest shows collapse of the right lung.  Patient has been transferred to [facility] for further management.  Radiation oncology has been asked to assist with care coordination.    Patient currently is found to be sitting comfortably with supplemental oxygen.  He again endorses a sudden onset of shortness of breath while sleeping on .  Denies hemoptysis.  He has been assessed by pulmonary medicine, and is scheduled for bronchoscopy on .    Prior Radiotherapy:  no    Current Systemic Treatment:  no     Presence of Pacemaker or ICD:  no    Past Medical History:    Past Medical History:   Diagnosis Date    Acute myocardial infarction, unspecified (Multi) 2013    Acute myocardial infarction    Encounter for screening for malignant neoplasm of prostate 2015    Encounter for prostate cancer screening    Hypertension     Personal history of other  diseases of the nervous system and sense organs 09/13/2017    History of cataract        Past Surgical History:    Past Surgical History:   Procedure Laterality Date    CATARACT EXTRACTION  09/14/2018    Cataract Surgery    CATARACT EXTRACTION  06/27/2014    Cataract Surgery    CORONARY ANGIOPLASTY WITH STENT PLACEMENT  05/17/2013    Cath Stent Placement        Family History:  Cancer-related family history includes Lung cancer in his brother.    Social History:    Social History     Tobacco Use    Smoking status: Every Day     Current packs/day: 0.50     Types: Cigarettes   Vaping Use    Vaping status: Never Used   Substance Use Topics    Alcohol use: Not Currently     Comment: 4-5 beers and a glass a wine a day    Drug use: Never     Longtime smoker, averaging 2 packs/day, having quit in recent weeks.  He worked in multiple industrial jobs, currently retired.  He is  with 3 sons.    Allergies:  No Known Allergies     Medications:    Current Facility-Administered Medications:     albuterol 2.5 mg /3 mL (0.083 %) nebulizer solution 2.5 mg, 2.5 mg, nebulization, q6h PRN, Telma Ryder MD    amLODIPine (Norvasc) tablet 10 mg, 10 mg, oral, Daily, Telma Ryder MD, 10 mg at 09/24/24 0915    [Held by provider] apixaban (Eliquis) tablet 5 mg, 5 mg, oral, BID, Telma Ryder MD, 5 mg at 09/23/24 1301    aspirin EC tablet 81 mg, 81 mg, oral, Daily, Telma Ryder MD, 81 mg at 09/24/24 0915    dextrose 50 % injection 12.5 g, 12.5 g, intravenous, q15 min PRN, Telma Ryder MD    dextrose 50 % injection 25 g, 25 g, intravenous, q15 min PRN, Telma Ryder MD    fluticasone furoate-vilanteroL (Breo Ellipta) 200-25 mcg/dose inhaler 1 puff, 1 puff, inhalation, Daily, Telma Ryder MD, 1 puff at 09/24/24 0823    folic acid (Folvite) tablet 1 mg, 1 mg, oral, Daily, Telma Ryder MD, 1 mg at 09/24/24 0915    glucagon (Glucagen) injection 1 mg, 1 mg, intramuscular, q15 min PRN, Telma Ryder MD     glucagon (Glucagen) injection 1 mg, 1 mg, intramuscular, q15 min PRN, Telma Ryder MD    insulin lispro (HumaLOG) injection 0-5 Units, 0-5 Units, subcutaneous, Before meals & nightly, Telma Ryder MD, 1 Units at 09/24/24 1234    melatonin tablet 3 mg, 3 mg, oral, Nightly PRN, Telma Ryder MD    metoprolol tartrate (Lopressor) tablet 50 mg, 50 mg, oral, q12h, Telma Ryder MD, 50 mg at 09/24/24 1235    nicotine (Nicoderm CQ) 21 mg/24 hr patch 1 patch, 1 patch, transdermal, q24h, Telma Ryder MD, 1 patch at 09/24/24 1235    ondansetron (Zofran) tablet 8 mg, 8 mg, oral, q8h PRN, Telma Ryder MD    oxygen (O2) therapy, , inhalation, Continuous - Inhalation, Telma Ryder MD, 2 L/min at 09/24/24 0829    pantoprazole (ProtoNix) EC tablet 40 mg, 40 mg, oral, Daily before breakfast, Telma Ryder MD, 40 mg at 09/24/24 0915    piperacillin-tazobactam (Zosyn) 4.5 g in dextrose (iso)  mL, 4.5 g, intravenous, q6h, Telma Ryder MD, Stopped at 09/24/24 1513    polyethylene glycol (Glycolax, Miralax) packet 17 g, 17 g, oral, Daily PRN, Telma Ryder MD    potassium phosphate (monobasic) (K-Phos) tablet 500 mg, 500 mg, oral, 4x daily, Telma Ryder MD, 500 mg at 09/24/24 1235    prochlorperazine (Compazine) tablet 10 mg, 10 mg, oral, q6h PRN, Telma Ryder MD    simvastatin (Zocor) tablet 20 mg, 20 mg, oral, Daily, Telma Ryder MD, 20 mg at 09/24/24 0915    sodium chloride 3 % nebulizer solution 3 mL, 3 mL, nebulization, q8h, Telma Ryder MD    tiotropium (Spiriva Respimat) 2.5 mcg/actuation inhaler 2 puff, 2 puff, inhalation, Daily, Telma Ryder MD, 2 puff at 09/24/24 0823      Review of Systems:    No fevers or chills or recent illnesses, no abdominal pain, no backaches, no perceived neurologic changes or headaches or vision changes, no recent trauma, no syncope.    Performance Status:  The Karnofsky performance scale today is 70, Cares for self; unable to carry on  "normal activity or to do active work (ECOG equivalent 1).        OBJECTIVE  Physical Exam:  /71   Pulse 62   Temp 36.5 °C (97.7 °F) (Temporal)   Resp 16   Ht 1.753 m (5' 9\")   Wt 67.4 kg (148 lb 8 oz)   SpO2 97%   BMI 21.93 kg/m²    General: awake, alert, resting comfortably, well developed and well nourished in appearance  HEENT: pupils equal and round, no scleral icterus  Pulmonary: Breathing comfortably at rest with supplemental oxygen  Cardiac: regular rate  Abdomen: soft, nondistended, non tender to palpation   EXT: no peripheral edema, no asymmetry noted  MSK: no focal joint swelling noted  Neuro: A&O x 3, cranial nerves II through XII grossly intact, sensation and strength intact  Psych: Normal affect     Laboratory Review:    9/24/24 0739  Renal function panel  Collected: 09/24/24 0544  Final result  Specimen: Blood, Venous    Glucose 114 High  mg/dL Urea Nitrogen 11 mg/dL   Sodium 140 mmol/L Creatinine 0.63 mg/dL   Potassium 3.2 Low  mmol/L eGFR >90 mL/min/1.73m*2    Chloride 102 mmol/L Calcium 8.6 mg/dL   Bicarbonate 28 mmol/L Phosphorus 1.9 Low  mg/dL    Anion Gap 13 mmol/L Albumin 2.9 Low  g/dL        9/24/24 0733  CBC  Collected: 09/24/24 0544  Final result  Specimen: Blood, Venous    WBC 10.5 x10*3/uL MCV 91 fL   nRBC 0.0 /100 WBCs MCH 30.7 pg   RBC 4.07 Low  x10*6/uL MCHC 33.6 g/dL   Hemoglobin 12.5 Low  g/dL RDW 11.6 %   Hematocrit 37.2 Low  % Platelets 184 x10*3/uL          Pathology Review:   Surgical Pathology Exam: [id number]  Order: [id number]   Collected 8/29/2024 12:56       Status: Edited Result - FINAL       Visible to patient: Yes (seen)       Dx: Atrial fibrillation, unspecified type...    0 Result Notes      Component    FINAL DIAGNOSIS   A. LUNG, RIGHT MAINSTEM; BIOPSY:  -- Invasive squamous cell carcinoma, keratinizing. See comment       Electronically signed by Hunter Ladd MD on 9/3/2024 at 1438        By the signature on this report, the individual or group listed as " making the Final Interpretation/Diagnosis certifies that they have reviewed this case.    Comment    PDL1 immunostain and NGS molecular testing are pending and results will be reported separately.    Addendum   PD-L1 22C3  by Immunohistochemistry with Interpretation, pembrolizumab  (KEYTRUDA)     Block used:  A1     Interpretation: Positive     Tumor Proportion Score (TPS): 5%          Imaging:   PET 9/11  IMPRESSION:  1. Hypermetabolic right hilar/suprahilar mass with invasion of the  mediastinum and central photopenia consistent necrosis. Findings are  consistent with patient's known squamous cell carcinoma.  2. Hypermetabolic nodule is seen in the right lower lobe, compatible  with a satellite lesion.  3. Hypermetabolic right paratracheal lymph nodes, compatible with  nas metastasis.    CT chest 9/23  IMPRESSION:  1. Poor delineation of a large right hilar and suprahilar mass with  associated complete obstruction of the distal right mainstem bronchus  and distal branches, new compared to prior, with collapse of much of  the right. Development of a large right pleural effusion, presumably  malignant. Minimal persistent aeration of the anterosuperior right  lung, with micronodularity and interlobular septal thickening.  2. A slight shift of the mediastinum and heart to the right side.  3. Multiple enlarged mediastinal lymph nodes are incompletely  characterized on this exam.  4. Moderate volume of fluid in the distal esophagus raising concern  for aspiration.  5. The left lung is overall clear with peripheral emphysematous  changes.               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  The patient has since established care with DR. Landis.   Follow-up MRI brain was unremarkable for metastatic disease.  PET CT on 9/18 showed T4 N2 M0 disease.  Patient was being  considered for definitive CRT, with pending plans to see Radiation Oncology at [facility].  The patient presents to [facility] on 9/23 with a sudden onset of shortness of breath.  CT chest shows collapse of the right lung.  Patient has been transferred to [facility] for further management.  Radiation oncology has been asked to assist with care coordination.    PLAN:   The patient will to be discussed with my attending physicians, including Dr. Estrella from [facility], who had plan to see the patient in consultation for definitive CRT.    Imaging results, and indications for radiotherapy in the treatment of lung cancer were discussed with the patient.  In broad strokes,, we discussed the logistics, risks, benefits of treatment.  Plan will be to await bronchoscopy findings to determine if there is a role for urgent inpatient radiotherapy versus expedited planning for definitive CRT.          I spent 60 minutes in the professional and overall care of this patient.

## 2024-09-24 NOTE — NURSING NOTE
Four eyes skin check completed by bedside RN and Risa Hyde.     No new findings.    Ronald Watts RN

## 2024-09-25 ENCOUNTER — APPOINTMENT (OUTPATIENT)
Dept: HEMATOLOGY/ONCOLOGY | Facility: HOSPITAL | Age: 74
End: 2024-09-25
Payer: MEDICARE

## 2024-09-25 ENCOUNTER — ANESTHESIA (OUTPATIENT)
Dept: GASTROENTEROLOGY | Facility: HOSPITAL | Age: 74
End: 2024-09-25
Payer: MEDICARE

## 2024-09-25 ENCOUNTER — APPOINTMENT (OUTPATIENT)
Dept: GASTROENTEROLOGY | Facility: HOSPITAL | Age: 74
End: 2024-09-25
Payer: MEDICARE

## 2024-09-25 DIAGNOSIS — C34.90: Primary | ICD-10-CM

## 2024-09-25 PROBLEM — Z79.01 ANTICOAGULANT LONG-TERM USE: Status: ACTIVE | Noted: 2024-09-25

## 2024-09-25 LAB
ALBUMIN SERPL BCP-MCNC: 3 G/DL (ref 3.4–5)
ALBUMIN SERPL BCP-MCNC: 3.1 G/DL (ref 3.4–5)
ANION GAP SERPL CALC-SCNC: 10 MMOL/L (ref 10–20)
ANION GAP SERPL CALC-SCNC: 11 MMOL/L (ref 10–20)
ATRIAL RATE: 83 BPM
BUN SERPL-MCNC: 11 MG/DL (ref 6–23)
BUN SERPL-MCNC: 9 MG/DL (ref 6–23)
CALCIUM SERPL-MCNC: 8.6 MG/DL (ref 8.6–10.6)
CALCIUM SERPL-MCNC: 8.6 MG/DL (ref 8.6–10.6)
CHLORIDE SERPL-SCNC: 102 MMOL/L (ref 98–107)
CHLORIDE SERPL-SCNC: 102 MMOL/L (ref 98–107)
CO2 SERPL-SCNC: 27 MMOL/L (ref 21–32)
CO2 SERPL-SCNC: 29 MMOL/L (ref 21–32)
CREAT SERPL-MCNC: 0.71 MG/DL (ref 0.5–1.3)
CREAT SERPL-MCNC: 0.76 MG/DL (ref 0.5–1.3)
EGFRCR SERPLBLD CKD-EPI 2021: >90 ML/MIN/1.73M*2
EGFRCR SERPLBLD CKD-EPI 2021: >90 ML/MIN/1.73M*2
ERYTHROCYTE [DISTWIDTH] IN BLOOD BY AUTOMATED COUNT: 11.7 % (ref 11.5–14.5)
GLUCOSE BLD MANUAL STRIP-MCNC: 109 MG/DL (ref 74–99)
GLUCOSE BLD MANUAL STRIP-MCNC: 112 MG/DL (ref 74–99)
GLUCOSE BLD MANUAL STRIP-MCNC: 149 MG/DL (ref 74–99)
GLUCOSE BLD MANUAL STRIP-MCNC: 173 MG/DL (ref 74–99)
GLUCOSE SERPL-MCNC: 135 MG/DL (ref 74–99)
GLUCOSE SERPL-MCNC: 98 MG/DL (ref 74–99)
HCT VFR BLD AUTO: 39.7 % (ref 41–52)
HGB BLD-MCNC: 13.1 G/DL (ref 13.5–17.5)
MAGNESIUM SERPL-MCNC: 1.99 MG/DL (ref 1.6–2.4)
MAGNESIUM SERPL-MCNC: 2.13 MG/DL (ref 1.6–2.4)
MCH RBC QN AUTO: 30.7 PG (ref 26–34)
MCHC RBC AUTO-ENTMCNC: 33 G/DL (ref 32–36)
MCV RBC AUTO: 93 FL (ref 80–100)
NRBC BLD-RTO: 0 /100 WBCS (ref 0–0)
P AXIS: 51 DEGREES
P OFFSET: 179 MS
P ONSET: 132 MS
PHOSPHATE SERPL-MCNC: 1.9 MG/DL (ref 2.5–4.9)
PHOSPHATE SERPL-MCNC: 2.6 MG/DL (ref 2.5–4.9)
PLATELET # BLD AUTO: 183 X10*3/UL (ref 150–450)
POTASSIUM SERPL-SCNC: 3 MMOL/L (ref 3.5–5.3)
POTASSIUM SERPL-SCNC: 4.3 MMOL/L (ref 3.5–5.3)
PR INTERVAL: 174 MS
Q ONSET: 219 MS
QRS COUNT: 13 BEATS
QRS DURATION: 108 MS
QT INTERVAL: 378 MS
QTC CALCULATION(BAZETT): 444 MS
QTC FREDERICIA: 421 MS
R AXIS: -20 DEGREES
RBC # BLD AUTO: 4.27 X10*6/UL (ref 4.5–5.9)
SODIUM SERPL-SCNC: 136 MMOL/L (ref 136–145)
SODIUM SERPL-SCNC: 138 MMOL/L (ref 136–145)
T AXIS: 28 DEGREES
T OFFSET: 408 MS
VENTRICULAR RATE: 83 BPM
WBC # BLD AUTO: 8.7 X10*3/UL (ref 4.4–11.3)

## 2024-09-25 PROCEDURE — 2500000001 HC RX 250 WO HCPCS SELF ADMINISTERED DRUGS (ALT 637 FOR MEDICARE OP)

## 2024-09-25 PROCEDURE — 2720000007 HC OR 272 NO HCPCS

## 2024-09-25 PROCEDURE — 99231 SBSQ HOSP IP/OBS SF/LOW 25: CPT | Performed by: PHYSICIAN ASSISTANT

## 2024-09-25 PROCEDURE — 2500000004 HC RX 250 GENERAL PHARMACY W/ HCPCS (ALT 636 FOR OP/ED): Performed by: ANESTHESIOLOGIST ASSISTANT

## 2024-09-25 PROCEDURE — 31635 BRONCHOSCOPY W/FB REMOVAL: CPT | Performed by: INTERNAL MEDICINE

## 2024-09-25 PROCEDURE — 84520 ASSAY OF UREA NITROGEN: CPT

## 2024-09-25 PROCEDURE — 97116 GAIT TRAINING THERAPY: CPT | Mod: GP

## 2024-09-25 PROCEDURE — 3700000001 HC GENERAL ANESTHESIA TIME - INITIAL BASE CHARGE

## 2024-09-25 PROCEDURE — 88304 TISSUE EXAM BY PATHOLOGIST: CPT | Mod: TC,SUR | Performed by: INTERNAL MEDICINE

## 2024-09-25 PROCEDURE — 7100000001 HC RECOVERY ROOM TIME - INITIAL BASE CHARGE

## 2024-09-25 PROCEDURE — 3700000002 HC GENERAL ANESTHESIA TIME - EACH INCREMENTAL 1 MINUTE

## 2024-09-25 PROCEDURE — 36415 COLL VENOUS BLD VENIPUNCTURE: CPT

## 2024-09-25 PROCEDURE — 2500000004 HC RX 250 GENERAL PHARMACY W/ HCPCS (ALT 636 FOR OP/ED)

## 2024-09-25 PROCEDURE — 97162 PT EVAL MOD COMPLEX 30 MIN: CPT | Mod: GP

## 2024-09-25 PROCEDURE — 0BP08DZ REMOVAL OF INTRALUMINAL DEVICE FROM TRACHEOBRONCHIAL TREE, VIA NATURAL OR ARTIFICIAL OPENING ENDOSCOPIC: ICD-10-PCS | Performed by: INTERNAL MEDICINE

## 2024-09-25 PROCEDURE — 1200000003 HC ONCOLOGY  ROOM WITH TELEMETRY DAILY

## 2024-09-25 PROCEDURE — 82947 ASSAY GLUCOSE BLOOD QUANT: CPT

## 2024-09-25 PROCEDURE — 99233 SBSQ HOSP IP/OBS HIGH 50: CPT | Performed by: STUDENT IN AN ORGANIZED HEALTH CARE EDUCATION/TRAINING PROGRAM

## 2024-09-25 PROCEDURE — 83735 ASSAY OF MAGNESIUM: CPT

## 2024-09-25 PROCEDURE — 2500000004 HC RX 250 GENERAL PHARMACY W/ HCPCS (ALT 636 FOR OP/ED): Performed by: ANESTHESIOLOGY

## 2024-09-25 PROCEDURE — S4991 NICOTINE PATCH NONLEGEND: HCPCS

## 2024-09-25 PROCEDURE — 7100000002 HC RECOVERY ROOM TIME - EACH INCREMENTAL 1 MINUTE

## 2024-09-25 PROCEDURE — 80069 RENAL FUNCTION PANEL: CPT

## 2024-09-25 PROCEDURE — 2500000002 HC RX 250 W HCPCS SELF ADMINISTERED DRUGS (ALT 637 FOR MEDICARE OP, ALT 636 FOR OP/ED)

## 2024-09-25 PROCEDURE — 85027 COMPLETE CBC AUTOMATED: CPT

## 2024-09-25 PROCEDURE — 88304 TISSUE EXAM BY PATHOLOGIST: CPT | Performed by: STUDENT IN AN ORGANIZED HEALTH CARE EDUCATION/TRAINING PROGRAM

## 2024-09-25 PROCEDURE — 2500000005 HC RX 250 GENERAL PHARMACY W/O HCPCS: Performed by: ANESTHESIOLOGIST ASSISTANT

## 2024-09-25 PROCEDURE — 2500000005 HC RX 250 GENERAL PHARMACY W/O HCPCS

## 2024-09-25 PROCEDURE — 31622 DX BRONCHOSCOPE/WASH: CPT | Performed by: INTERNAL MEDICINE

## 2024-09-25 RX ORDER — POTASSIUM CHLORIDE 14.9 MG/ML
20 INJECTION INTRAVENOUS
Status: DISCONTINUED | OUTPATIENT
Start: 2024-09-25 | End: 2024-09-25

## 2024-09-25 RX ORDER — ACETAMINOPHEN 325 MG/1
650 TABLET ORAL EVERY 4 HOURS PRN
Status: DISCONTINUED | OUTPATIENT
Start: 2024-09-25 | End: 2024-09-27 | Stop reason: HOSPADM

## 2024-09-25 RX ORDER — LIDOCAINE HYDROCHLORIDE 10 MG/ML
0.1 INJECTION, SOLUTION INFILTRATION; PERINEURAL ONCE
Status: DISCONTINUED | OUTPATIENT
Start: 2024-09-25 | End: 2024-09-26

## 2024-09-25 RX ORDER — LIDOCAINE HYDROCHLORIDE 20 MG/ML
INJECTION, SOLUTION INFILTRATION; PERINEURAL AS NEEDED
Status: DISCONTINUED | OUTPATIENT
Start: 2024-09-25 | End: 2024-09-25

## 2024-09-25 RX ORDER — SODIUM CHLORIDE, SODIUM LACTATE, POTASSIUM CHLORIDE, CALCIUM CHLORIDE 600; 310; 30; 20 MG/100ML; MG/100ML; MG/100ML; MG/100ML
100 INJECTION, SOLUTION INTRAVENOUS CONTINUOUS
Status: DISCONTINUED | OUTPATIENT
Start: 2024-09-25 | End: 2024-09-26

## 2024-09-25 RX ORDER — POTASSIUM CHLORIDE 20 MEQ/1
40 TABLET, EXTENDED RELEASE ORAL ONCE
Status: CANCELLED | OUTPATIENT
Start: 2024-09-25 | End: 2024-09-25

## 2024-09-25 RX ORDER — PHENYLEPHRINE HCL IN 0.9% NACL 0.4MG/10ML
SYRINGE (ML) INTRAVENOUS AS NEEDED
Status: DISCONTINUED | OUTPATIENT
Start: 2024-09-25 | End: 2024-09-25

## 2024-09-25 RX ORDER — PROPOFOL 10 MG/ML
INJECTION, EMULSION INTRAVENOUS CONTINUOUS PRN
Status: DISCONTINUED | OUTPATIENT
Start: 2024-09-25 | End: 2024-09-25

## 2024-09-25 RX ORDER — ROCURONIUM BROMIDE 10 MG/ML
INJECTION, SOLUTION INTRAVENOUS AS NEEDED
Status: DISCONTINUED | OUTPATIENT
Start: 2024-09-25 | End: 2024-09-25

## 2024-09-25 RX ORDER — ONDANSETRON HYDROCHLORIDE 2 MG/ML
INJECTION, SOLUTION INTRAVENOUS AS NEEDED
Status: DISCONTINUED | OUTPATIENT
Start: 2024-09-25 | End: 2024-09-25

## 2024-09-25 RX ORDER — ONDANSETRON HYDROCHLORIDE 2 MG/ML
4 INJECTION, SOLUTION INTRAVENOUS ONCE AS NEEDED
Status: DISCONTINUED | OUTPATIENT
Start: 2024-09-25 | End: 2024-09-26

## 2024-09-25 RX ORDER — ALBUTEROL SULFATE 0.83 MG/ML
2.5 SOLUTION RESPIRATORY (INHALATION) ONCE AS NEEDED
Status: DISCONTINUED | OUTPATIENT
Start: 2024-09-25 | End: 2024-09-26

## 2024-09-25 ASSESSMENT — PAIN SCALES - GENERAL
PAINLEVEL_OUTOF10: 0 - NO PAIN

## 2024-09-25 ASSESSMENT — ENCOUNTER SYMPTOMS
SHORTNESS OF BREATH: 1
BRUISES/BLEEDS EASILY: 0
HEADACHES: 0
CONFUSION: 0
EYE DISCHARGE: 0
ABDOMINAL DISTENTION: 0
ACTIVITY CHANGE: 0
DIFFICULTY URINATING: 0
ARTHRALGIAS: 0
COLOR CHANGE: 0

## 2024-09-25 ASSESSMENT — COGNITIVE AND FUNCTIONAL STATUS - GENERAL
CLIMB 3 TO 5 STEPS WITH RAILING: A LITTLE
MOBILITY SCORE: 23
DRESSING REGULAR UPPER BODY CLOTHING: A LITTLE
DRESSING REGULAR LOWER BODY CLOTHING: A LITTLE
DAILY ACTIVITIY SCORE: 21
MOBILITY SCORE: 23
HELP NEEDED FOR BATHING: A LITTLE
CLIMB 3 TO 5 STEPS WITH RAILING: A LITTLE

## 2024-09-25 ASSESSMENT — PAIN - FUNCTIONAL ASSESSMENT
PAIN_FUNCTIONAL_ASSESSMENT: 0-10

## 2024-09-25 NOTE — PROGRESS NOTES
Occupational Therapy                 Therapy Communication Note    Patient Name: Juan Carlos Cr  MRN: [id number]  Department:   Room: 6023/6023-A  Today's Date: 9/25/2024     Discipline: Occupational Therapy    Missed Visit Reason: Missed Visit Reason: Patient in a medical procedure    Missed Time: Priscila Medrano OTR/L

## 2024-09-25 NOTE — CARE PLAN
Problem: Fall/Injury  Goal: Not fall by end of shift  Outcome: Progressing  Goal: Be free from injury by end of the shift  Outcome: Progressing  Goal: Verbalize understanding of personal risk factors for fall in the hospital  Outcome: Progressing  Goal: Verbalize understanding of risk factor reduction measures to prevent injury from fall in the home  Outcome: Progressing  Goal: Use assistive devices by end of the shift  Outcome: Progressing  Goal: Pace activities to prevent fatigue by end of the shift  Outcome: Progressing     Problem: Respiratory  Goal: Clear secretions with interventions this shift  Outcome: Progressing  Goal: Minimize anxiety/maximize coping throughout shift  Outcome: Progressing  Goal: Minimal/no exertional discomfort or dyspnea this shift  Outcome: Progressing  Goal: No signs of respiratory distress (eg. Use of accessory muscles. Peds grunting)  Outcome: Progressing  Goal: Patent airway maintained this shift  Outcome: Progressing  Goal: Tolerate mechanical ventilation evidenced by VS/agitation level this shift  Outcome: Progressing  Goal: Tolerate pulmonary toileting this shift  Outcome: Progressing  Goal: Verbalize decreased shortness of breath this shift  Outcome: Progressing  Goal: Wean oxygen to maintain O2 saturation per order/standard this shift  Outcome: Progressing  Goal: Increase self care and/or family involvement in next 24 hours  Outcome: Progressing     Problem: Pain - Adult  Goal: Verbalizes/displays adequate comfort level or baseline comfort level  Outcome: Progressing     Problem: Safety - Adult  Goal: Free from fall injury  Outcome: Progressing     Problem: Discharge Planning  Goal: Discharge to home or other facility with appropriate resources  Outcome: Progressing     Problem: Chronic Conditions and Co-morbidities  Goal: Patient's chronic conditions and co-morbidity symptoms are monitored and maintained or improved  Outcome: Progressing   The patient's goals for the shift  include      The clinical goals for the shift include Patient will remain HDS

## 2024-09-25 NOTE — ANESTHESIA PROCEDURE NOTES
Airway  Date/Time: 9/25/2024 8:51 AM  Urgency: elective    Airway not difficult    Staffing  Performed: LUIS   Authorized by: Leslie Trujillo MD    Performed by: HERIBERTO Harrison  Patient location during procedure: OR    Indications and Patient Condition  Indications for airway management: anesthesia  Spontaneous Ventilation: absent  Sedation level: deep  Preoxygenated: yes  Patient position: sniffing  MILS not maintained throughout  Mask difficulty assessment: 2 - vent by mask + OA or adjuvant +/- NMBA  Planned trial extubation    Final Airway Details  Final airway type: endotracheal airway      Successful airway: ETT  Cuffed: yes   Successful intubation technique: direct laryngoscopy  Facilitating devices/methods: intubating stylet  Endotracheal tube insertion site: oral  Blade: Remberto  Blade size: #3  ETT size (mm): 8.5  Cormack-Lehane Classification: grade I - full view of glottis  Placement verified by: chest auscultation and capnometry   Cuff volume (mL): 6  Measured from: lips  ETT to lips (cm): 21  Number of attempts at approach: 1

## 2024-09-25 NOTE — PROGRESS NOTES
Juan Carlos Cr is a 74 y.o. male on day 2 of admission presenting with Squamous cell lung cancer, unspecified laterality (Multi).      Subjective   Believes his breathing has improved following removal of right ARSLAN stent.       Objective     Last Recorded Vitals  /72 (BP Location: Right arm, Patient Position: Lying)   Pulse 77   Temp 36 °C (96.8 °F) (Temporal)   Resp 18   Wt 67.4 kg (148 lb 8 oz)   SpO2 97%     Image Results  ECG 12 lead  Normal sinus rhythm  Possible Left atrial enlargement  Incomplete left bundle branch block  Borderline ECG  When compared with ECG of 01-SEP-2024 12:01, (unconfirmed)  Premature ventricular complexes are no longer Present  Incomplete left bundle branch block is now Present  See ED provider note for full interpretation and clinical correlation  Confirmed by Tamra Marti ([id number]) on 9/25/2024 2:15:16 PM  Bronchoscopy Diagnostic  Addendum: Bronchoscopy Operative Report   [facility]     Date of procedure: 09/25/24   Patient: Juan Carlos Cr   MRN: [id number]    YOB: 1950   Gender: male   Referring provider/physician: Dr. Rubalcava (inpatient consult procedure)     PRE-PROCEDURE DIAGNOSIS:   Lung cancer with 10 mm (D) x 40 mm (L) Bonastent placed on 08/29/2024,  concerns for an occluded stent, known occluded RUL        PRE-PROCEDURE EVALUATION: A history and physical has been performed,  and patient medication allergies have been reviewed. The patient's  tolerance of previous anesthesia has been reviewed. The risks and benefits  of the procedure and the sedation options and risks were discussed with  the patient or their designee. All questions were answered and informed  consent obtained.      INDICATION: Therapeutic     BRONCHOSCOPIST:                 Ruben Yeager MD   First Assistant: KARSON Ochoa MD (fellow)   Second Assistant: None     PROCEDURE SUMMARY:   Event Event Time    ENDO SCOPE IN TIME 9/25/2024  8:55 AM     ENDO SCOPE OUT TIME 9/25/2024  9:29 AM                     Fluoroscopy time: Not applicable     POST-PROCEDURE DIAGNOSIS:   Airway stenosis, known occluded RUL    Same as pre-operative diagnoses     ANESTHESIA:   TIVA. See separate anesthesia provider documentation. This procedure was  performed using standard monitoring procedures in [facility]  [facility].     FINDINGS:   After adequate local and intravenous anesthesia, bronchoscopy was  performed via an endotracheal tube placed by anesthesia. The distal  trachea appeared normal. Inspection of RIGHT bronchial tree to the  segmental level appeared abnormal: completely occluded ARSLAN Bonastent.  Inspection of LEFT bronchial tree to the segmental level appeared normal.  Scant thin secretions were present in the bronchus intermedius, once the  stent was removed. These were suctioned out with ease.     PROCEDURES:   As the stent was fully occluded, I decided it had to be removed.     After the patient was properly anesthetized, the patient was positioned  without a shoulder roll and with head support. Next, lip and teeth / gum  protection was assured.  The patient was then atraumatically intubated  with a 12 mm OD rigid bronchial scope.  The airway was secured with ease.   The mouth was then packed with wet Kerlix to provide a good seal for   adequate positive pressure ventilation.     The stent was grasped with a forceps and removed with ease, in toto.     Airway examination after stent removal revealed a completely occluded RUL  (no change) and only mild stenosis of the ARSLAN and BI (25% luminal  compromise). Because the airway stenosis was mild, I did not place another  stent.     After diagnostic/therapeutic maneuvers, the airway was examined for  evidence of bleeding. None was noted. The patient was atraumatically  extubated with the rigid bronchoscope. The bronchoscope was removed from  the patient's airway and the airway was handed back over  to my colleagues  from anesthesiology.      SPECIMENS:    Specimens         ID Type Source Tests Collected by Time     1 : right mainstem stent Tissue FOREIGN BODY(S) -SURGICAL PATHOLOGY EXAM  Ruben Yeager MD 9/25/2024 0914           Priority: Routine                         COMPLICATIONS: None.        EBL: Minimal, <5 ml        POST PROCEDURE CHEST RADIOGRAPH: Not needed        SUMMARY:    - Inspection of RIGHT bronchial tree to the segmental level appeared  abnormal: completely occluded ARSLAN Bonastent. Inspection of LEFT bronchial  tree to the segmental level appeared normal.    - Scant thin secretions were present in the bronchus intermedius, once the  stent was removed. These were suctioned out with ease.   - Rigid bronchoscopy was performed. The stent was removed (foreign body  removal).   - Airway examination after stent removal revealed a completely occluded  RUL (no change) and only mild stenosis of the ARSLAN and BI (25% luminal  compromise). Because the airway stenosis was mild, I did not place another  stent.        RECOMMENDATIONS:   Follow up with referring physician    - From a bronchoscopy standpoint, can resume Eliquis tomorrow  (09/26/2024)   - Follow up bronchoscopy in 2-4 weeks   - Recommend finish 5-7 day course of antibiotics for post-obstructive  pneumonia   - As there is no stent in place at this time, oral guaifenesin and  nebulized hypertonic saline is not needed for stent maintenance       The patient was transported to the recovery area/PACU in stable  condition.     I, Ruben Yeager MD, was personally present throughout this  procedure, including all key and non-key portions.     Date: 09/25/24 Time: 9:44 AM     (Images obtained during this procedure, including ultrasonographic images  if performed, can be found in within the electronic medical record and/or  PACS.)  Narrative: Table formatting from the original result was not included.  Images from the original result were not  included.    Bronchoscopy Operative Report  [facility]    Date of procedure: 09/25/24  Patient: Juan Carlos Cr  MRN: [id number]   YOB: 1950  Gender: male  Referring provider/physician: Dr. Rubalcava (inpatient consult procedure)    PRE-PROCEDURE DIAGNOSIS:  Lung cancer with 10 mm (D) x 40 mm (L) Bonastent placed on 08/29/2024,   concerns for an occluded stent , known occluded RUL       PRE-PROCEDURE EVALUATION: A history and physical has been performed,   and patient medication allergies have been reviewed. The patient's   tolerance of previous anesthesia has been reviewed. The risks and benefits   of the procedure and the sedation options and risks were discussed with   the patient or their designee. All questions were answered and informed   consent obtained.     INDICATION: Therapeutic    BRONCHOSCOPIST:                Ruben Yeager MD  First Assistant:  KARSON Ochoa MD (fellow)  Second Assistant: None    PROCEDURE SUMMARY:  Event Event Time   ENDO SCOPE IN TIME 9/25/2024  8:55 AM   ENDO SCOPE OUT TIME 9/25/2024  9:29 AM                   Fluoroscopy time: Not applicable    POST-PROCEDURE DIAGNOSIS:  Airway stenosis, known occluded RUL   Same as pre-operative diagnoses    ANESTHESIA:  TIVA. See separate anesthesia provider documentation. This procedure was   performed using standard monitoring procedures in [facility]'s   [facility].    FINDINGS:  After adequate local and intravenous anesthesia, bronchoscopy was   performed via an endotracheal tube placed by anesthesia. The distal   trachea appeared normal. Inspection of RIGHT bronchial tree to the   segmental level appeared abnormal: completely occluded ARSLAN Bonastent .   Inspection of LEFT bronchial tree to the segmental level appeared normal.   Scant thin secretions were present in the bronchus intermedius, once the   stent was removed. These were suctioned out with ease.    PROCEDURES:  As the  stent was fully occluded, I decided it had to be removed.    After the patient was properly anesthetized, the patient was positioned   without a shoulder roll and with head support. Next, lip and teeth / gum   protection was assured.  The patient was then atraumatically intubated   with a 12 mm OD rigid bronchial scope.  The airway was secured with ease.    The mouth was then packed with wet Kerlix to provide a good seal for    adequate positive pressure ventilation.    The stent was grasped with a forceps and removed with ease, in toto.    Airway examination after stent removal revealed a completely occluded RUL   (no change) and only mild stenosis of the ARSLAN and BI (25% luminal   compromise). Because the airway stenosis was mild, I did not place another   stent.    After diagnostic/therapeutic maneuvers, the airway was examined for   evidence of bleeding. None was noted. The patient was atraumatically   extubated with the rigid bronchoscope. The bronchoscope was removed from   the patient's airway and the airway was handed back over to my colleagues   from anesthesiology.     SPECIMENS:   Specimens        ID Type Source Tests Collected by Time    1 : right mainstem stent Tissue FOREIGN BODY(S) -SURGICAL PATHOLOGY EXAM   Ruben Yeager MD 9/25/2024 0914         Priority: Routine                      COMPLICATIONS: None.       EBL: Minimal, <5 ml       POST PROCEDURE CHEST RADIOGRAPH: Not needed       SUMMARY:   - Inspection of RIGHT bronchial tree to the segmental level appeared   abnormal: completely occluded ARSLAN Bonastent. Inspection of LEFT bronchial   tree to the segmental level appeared normal.   - Scant thin secretions were present in the bronchus intermedius, once the   stent was removed. These were suctioned out with ease.  - Rigid bronchoscopy was performed. The stent was removed (foreign body   removal).  - Airway examination after stent removal revealed a completely occluded   RUL (no change) and  only mild stenosis of the ARSLAN and BI (25% luminal   compromise). Because the airway stenosis was mild, I did not place another   stent.       RECOMMENDATIONS:  Follow up with referring physician   - From a bronchoscopy standpoint, can resume Eliquis tomorrow (09/26/2024)  - Follow up bronchoscopy in 2-4 weeks  - Recommend finish 5-7 day course of antibiotics for post-obstructive   pneumonia     The patient was transported to the recovery area/PACU in stable condition.    I, Ruben Yeager MD, was personally present throughout this   procedure, including all key and non-key portions.    Date: 09/25/24 Time: 9:44 AM    (Images obtained during this procedure, including ultrasonographic images   if performed, can be found in within the electronic medical record and/or   PACS.)      Physical Exam  General: awake, alert, resting comfortably, well developed and well nourished in appearance  HEENT: pupils equal and round, no scleral icterus  Pulmonary: Breathing comfortably at rest on supplemental O2  Neuro: A&O x 3, NFD  Psych: Normal affect       Relevant Results  Lab Results   Component Value Date    WBC 8.7 09/25/2024    HGB 13.1 (L) 09/25/2024    HCT 39.7 (L) 09/25/2024    MCV 93 09/25/2024     09/25/2024     Lab Results   Component Value Date    GLUCOSE 98 09/25/2024    CALCIUM 8.6 09/25/2024     09/25/2024    K 3.0 (L) 09/25/2024    CO2 29 09/25/2024     09/25/2024    BUN 9 09/25/2024    CREATININE 0.71 09/25/2024                Assessment/Plan   This is a 74-year-old male with chronic respiratory failure, who was recently diagnosed with a right hilar SCC lung cancer s/p EBUS with tumor debulking and stenting of right mainstem bronchus on 8/29.  The patient has since established care with Dr. Landis.   Follow-up MRI brain was unremarkable for metastatic disease.  PET CT on 9/18 showed T4 N2 M0 disease.  Patient was being considered for definitive CRT, with pending plans to see Radiation  Oncology at  [place].  The patient presents to [facility] on 9/23 with a sudden onset of shortness of breath.  CT chest shows collapse of the right lung.  Patient has been transferred to [facility] for further management.  Radiation oncology has been asked to assist with care coordination.     The patient underwent bronchoscopy this morning, with removal of occluded ARSLAN stent.  The patient is currently breathing comfortably on 2 L by nasal cannula, which is considered to be his baseline.  Denies chest pain, no hemoptysis.    PLAN:   The patient was discussed with my attending physician, Dr. Estrella.      PET/CT was further reviewed, and there is concern for malignant effusion, which would affect palliative versus definitive treatment planning.        If the patient is starting inpatient chemo, we could consider interval imaging after a cycle, and reassess.  The patient will be referred for thoracic tumor board review.       I spent 25 minutes in the professional and overall care of this patient.         Lee Tolbert PA-C

## 2024-09-25 NOTE — PROGRESS NOTES
Physical Therapy                 Therapy Communication Note    Patient Name: Juan Carlos Cr  MRN: [id number]  Department:   Room: 6023/6023-A  Today's Date: 9/25/2024     Discipline: Physical Therapy    Missed Visit Reason: Missed Visit Reason: Patient in a medical procedure (PT will re-attempt as schedule allows and pt is medically appropriate)    Missed Time: Attempt 0858

## 2024-09-25 NOTE — PROGRESS NOTES
Physical Therapy    Physical Therapy Evaluation & Treatment    Patient Name: Juan Carlos Cr  MRN: [id number]  Department: [facility]  Room: [id number]  Today's Date: 9/25/2024   Time Calculation  Start Time: 1418  Stop Time: 1450  Time Calculation (min): 32 min    Assessment/Plan   PT Assessment  End of Session Communication: Bedside nurse  Assessment Comment: Pt is a 74 y.o male who presents to PT with no strength, balance, or mobility deficits.  Pt ambulates independently without AD, a/descends stairs to get into the house with close supervision, and scores as a low fall risk on the Tinetti Balance Test.  At this time, pt does not have any acute PT needs nor does he need PT upon discharge.  PT will sign off but will remain available for reconsult should there be any changes.  End of Session Patient Position: Up in chair, Alarm off, not on at start of session (alarm off okay per RN with family present in room)   IP OR SWING BED PT PLAN  Inpatient or Swing Bed: Inpatient  PT Plan  PT Plan: PT Eval only  PT Eval Only Reason: At baseline function (safe to return home, no acute PT needs identified)  PT Frequency: PT eval only  PT Discharge Recommendations: No further acute PT, No PT needed after discharge  Equipment Recommended upon Discharge:  (none)  PT Recommended Transfer Status: Independent  PT - OK to Discharge: Yes (eval complete and discharge recommendations made)      Subjective     General Visit Information:  General  Reason for Referral: Worsening dyspnea, found to have new opacification of R hemithorax on chest x-ray, s/p bronchial stent removal on 9/25  Past Medical History Relevant to Rehab: Per chart, PMH includes NSCLC diagnosed Aug 2024, CAD (s/p PCI in '97), HTN, HLD, T2DM, paroxysmal A fib  Family/Caregiver Present: Yes (supportive wife and son remained in room for entirety of session)  Prior to Session Communication: Bedside nurse  Patient Position Received: Bed, 3 rail up, Alarm off, not on at start of  session  General Comment: Pt cleared for PT by RN.  Pt alert and agreeable to PT. +PIVx2, tele, pulse ox, 2L O2 via NC  Home Living:  Home Living  Type of Home: House  Lives With: Spouse  Home Adaptive Equipment: Cane (does not use AD at baseline)  Home Layout: Two level, Able to live on main level with bedroom/bathroom  Home Access: Stairs to enter without rails  Entrance Stairs-Rails: None  Entrance Stairs-Number of Steps: 2 small steps  Prior Level of Function:  Prior Function Per Pt/Caregiver Report  Level of [place]: Independent with ADLs and functional transfers, Independent with homemaking with ambulation  Prior Function Comments: +driving, -falls  Precautions:  Precautions  Medical Precautions: No known precautions/limitation    Vital Signs (Past 2hrs)        Date/Time Vitals Session Patient Position Pulse Resp SpO2 BP MAP (mmHg)    09/25/24 1418 Pre PT  Lying  68  --  98 %  --  --     09/25/24 1445 During PT  Sitting  73  --  95 %  --  --            Objective   Pain:  Pain Assessment  Pain Assessment: 0-10  0-10 (Numeric) Pain Score: 0 - No pain  Cognition:  Cognition  Overall Cognitive Status: Within Functional Limits  Orientation Level: Oriented X4    General Assessments:  Activity Tolerance  Endurance: Endurance does not limit participation in activity    Sensation  Light Touch: No apparent deficits    Strength  Strength Comments: B LE strength grossly 5/5 except B hip flexion 4+/5  Coordination  Movements are Fluid and Coordinated: Yes    Postural Control  Postural Control: Within Functional Limits    Static Sitting Balance  Static Sitting-Balance Support: No upper extremity supported, Feet supported  Static Sitting-Level of Assistance: Independent  Dynamic Sitting Balance  Dynamic Sitting-Balance Support: Bilateral upper extremity supported, Feet supported  Dynamic Sitting-Level of Assistance: Independent  Dynamic Sitting-Balance: Forward lean    Static Standing Balance  Static Standing-Balance  Support: No upper extremity supported  Static Standing-Level of Assistance: Independent  Dynamic Standing Balance  Dynamic Standing-Balance Support: No upper extremity supported  Dynamic Standing-Level of Assistance: Independent  Dynamic Standing-Balance: Turning  Functional Assessments:  Bed Mobility  Bed Mobility: Yes  Bed Mobility 1  Bed Mobility 1: Supine to sitting  Level of Assistance 1: Independent  Bed Mobility Comments 1: HOB slightly elevated to mimic home set up    Transfers  Transfer: Yes  Transfer 1  Transfer From 1: Bed to  Transfer to 1: Stand  Technique 1: Sit to stand  Transfer Device 1:  (no device)  Transfer Level of Assistance 1: Independent    Ambulation/Gait Training  Ambulation/Gait Training Performed: Yes  Ambulation/Gait Training 1  Surface 1: Level tile  Device 1: No device  Assistance 1: Independent  Quality of Gait 1: Decreased step length (intermittent decreased step length)  Comments/Distance (ft) 1: 300ft  Treatments:  Therapeutic Activity  Therapeutic Activity Performed: Yes  Therapeutic Activity 1: increased time for vital sign monitoring and skilled line management    Ambulation/Gait Training  Ambulation/Gait Training Performed: Yes  Ambulation/Gait Training 1  Surface 1: Level tile  Device 1: No device  Assistance 1: Independent  Quality of Gait 1: Decreased step length (intermittent decreased step length)  Comments/Distance (ft) 1: 300ft  Stairs  Stairs: Yes  Stairs  Rails 1: None (Comment)  Device 1: No device  Assistance 1: Close supervision, Contact guard  Comment/Number of Steps 1: close supervision ascending 2 steps, Min LOB descending the first step with CGA to regain balance and use of railing, no fall  Stairs 2  Rails 2: None (Comment)  Device 2: No device  Assistance 2: Close supervision  Comment/Number of Steps 2: 3 steps a/descending with no LOB  Outcome Measures:  [facility] Basic Mobility  Turning from your back to your side while in a flat bed without using bedrails:  None  Moving from lying on your back to sitting on the side of a flat bed without using bedrails: None  Moving to and from bed to chair (including a wheelchair): None  Standing up from a chair using your arms (e.g. wheelchair or bedside chair): None  To walk in hospital room: None  Climbing 3-5 steps with railing: A little  Basic Mobility - Total Score: 23    Tinetti  Sitting Balance: Steady, safe  Arises: Able, uses arms to help  Attempts to Arise: Able to arise, one attempt  Immediate Standing Balance (First 5 Seconds): Steady without walker or other support  Standing Balance: Narrow stance without support  Nudged: Steady without walker or other support  Eyes Closed: Steady  Turned 360 Degrees: Steadiness: Steady  Turned 360 Degrees: Continuity of Steps: Continuous  Sitting Down: Uses arms or not a smooth motion  Balance Score: 14  Initiation of Gait: No hesitancy  Step Height: R Swing Foot: Right foot complete clears floor  Step Length: R Swing Foot: Passes left stance foot  Step Height: L Swing Foot: Left foot complete clears floor  Step Length: L Swing Foot: Passes right stance foot  Step Symmetry: Right and left step appear equal  Step Continuity: Steps appear continuous  Path: Straight without walking aid  Trunk: No sway, no flexion, no use of arms, no walking aid  Walking Time: Heels almost touching while walking  Gait Score: 12  Total Score: 26        Education Documentation  Mobility Training, taught by Opal Davis V PT at 9/25/2024  3:11 PM.  Learner: Patient  Readiness: Acceptance  Method: Explanation  Response: Verbalizes Understanding    Education Comments  No comments found.

## 2024-09-25 NOTE — ANESTHESIA POSTPROCEDURE EVALUATION
Patient: Juan Carlos Cr    Procedure Summary       Date: 09/25/24 Room / Location: [facility]    Anesthesia Start: 0840 Anesthesia Stop: 0948    Procedure: BRONCHOSCOPY Diagnosis: Malignant neoplasm of upper lobe of right lung (Multi)    Scheduled Providers: Ruben Yeager MD; Leslie Trujillo MD Responsible Provider: Leslie Trujillo MD    Anesthesia Type: general ASA Status: 3            Anesthesia Type: general    Vitals Value Taken Time   BP 99/64 09/25/24 0948   Temp 36 °C (96.8 °F) 09/25/24 0948   Pulse 52 09/25/24 0948   Resp 20 09/25/24 0948   SpO2 100 % 09/25/24 0948       Anesthesia Post Evaluation    Patient location during evaluation: PACU  Patient participation: complete - patient cannot participate  Level of consciousness: awake  Pain management: adequate  Airway patency: patent  Cardiovascular status: acceptable  Respiratory status: acceptable  Hydration status: acceptable  Postoperative Nausea and Vomiting: none        There were no known notable events for this encounter.     73 year old female with medical history of iliac vein thrombosis and CVA  in past was sent to ED because patient ws not eating for 2 days . Patient being admitted  for Failure to thrive. 74 year old female with medical history of iliac vein thrombosis and CVA  in past was sent to ED because patient ws not eating for 2 days . Patient being admitted  for Failure to thrive.

## 2024-09-25 NOTE — H&P
History Of Present Illness  Mr. Cr is a 74-year-old male presenting to the bronchoscopy suite for a therapeutic bronchoscopy given stent occlusion seen on imaging.   His past medical history is significant for scc of the right hilum (diagnosed 08/2024, no treatment at this time).     Pulmonary history: Right main stenosis with complete occlusion of the RUL noted, tumor debulking of the right main done with placement of a Bonastent [id number] in the right main to bronchus intermedius. He was given instructions for a re look in 2 to 3 months, continued bronchopulmonary hygiene with hypertonic saline TID and plan to follow up with oncology.     Admitted for shortness of breath with CT showing complete collapse of most of the right lung with some mediastinal shifting and distal occlusion of the stent.      Past Medical History  Past Medical History:   Diagnosis Date    Acute myocardial infarction, unspecified (Multi) 05/17/2013    Acute myocardial infarction    Encounter for screening for malignant neoplasm of prostate 09/02/2015    Encounter for prostate cancer screening    Hypertension     Personal history of other diseases of the nervous system and sense organs 09/13/2017    History of cataract       Surgical History  Past Surgical History:   Procedure Laterality Date    CATARACT EXTRACTION  09/14/2018    Cataract Surgery    CATARACT EXTRACTION  06/27/2014    Cataract Surgery    CORONARY ANGIOPLASTY WITH STENT PLACEMENT  05/17/2013    Cath Stent Placement        Social History  He reports that he has been smoking cigarettes. He does not have any smokeless tobacco history on file. He reports that he does not currently use alcohol. He reports that he does not use drugs.    Family History  Family History   Problem Relation Name Age of Onset    Heart attack Mother      Lung cancer Brother          Allergies  Patient has no known allergies.    Review of Systems   Constitutional:  Negative for activity change.   HENT:   "Positive for congestion.    Eyes:  Negative for discharge.   Respiratory:  Positive for shortness of breath.    Cardiovascular:  Negative for chest pain.   Gastrointestinal:  Negative for abdominal distention.   Endocrine: Negative for cold intolerance.   Genitourinary:  Negative for difficulty urinating.   Musculoskeletal:  Negative for arthralgias.   Skin:  Negative for color change.   Allergic/Immunologic: Negative for food allergies.   Neurological:  Negative for headaches.   Hematological:  Does not bruise/bleed easily.   Psychiatric/Behavioral:  Negative for confusion.         Physical Exam  General: Awake and alert. In no acute distress.   Eyes: PERRL. Clear sclera.  ENT: Neck supple. Mucus membranes moist.  Neck: Trachea midline. No JVD.   Respiratory: No distress. On 2L NC.   Cardiovascular: No tachycardia.   Gastrointestinal: Non distended.   Neurological: Awake and alert.   Skin: Warm and dry. No rash.  Extremities: No pedal edema.       Last Recorded Vitals  Blood pressure 128/73, pulse 67, temperature 36.2 °C (97.2 °F), temperature source Temporal, resp. rate 20, height 1.753 m (5' 9\"), weight 67.4 kg (148 lb 8 oz), SpO2 97%.    Relevant Results  CT 09/23/2024:  IMPRESSION:  1. Poor delineation of a large right hilar and suprahilar mass with  associated complete obstruction of the distal right mainstem bronchus  and distal branches, new compared to prior, with collapse of much of  the right. Development of a large right pleural effusion, presumably  malignant. Minimal persistent aeration of the anterosuperior right  lung, with micronodularity and interlobular septal thickening.  2. A slight shift of the mediastinum and heart to the right side.  3. Multiple enlarged mediastinal lymph nodes are incompletely  characterized on this exam.  4. Moderate volume of fluid in the distal esophagus raising concern  for aspiration.  5. The left lung is overall clear with peripheral emphysematous  changes.   "   Assessment/Plan   Mr. Cr is a 74-year-old male presenting to the bronchoscopy suite for a therapeutic bronchoscopy given stent occlusion with right sided collapse seen on imaging.     #SCC of the right hilum, untreated  #ARSLAN stent placed 08/29 (31u99rr ARSLAN to BI)    Near complete collapse of the right on imaging with patient history of non adherence to bronchopulmonary hygiene for stent patency. Stent appears to be occluded with mucus/debris on review.     - Plan to proceed with a bronchoscopy with airway exam, therapeutic mucus clearance, possible rigid with stent removal and/or replacement  - Rest of care per primary team    Patient seen and discussed with Dr. Yeager.       Clementine Ochoa MD  Fellow  Pulmonary and Critical Care

## 2024-09-25 NOTE — PROGRESS NOTES
Bronchoscopy Scheduling Request    Pre-bronchoscopy visit: Follow-up visit with Bronchoscopy group provider  Please schedule procedure: After 2-4 weeks  from 09/25/24      Cytology on-site:  No  Location:  [facility]  Performing physician:  Interventional bronchoscopist  Referring physician:  Dov Holt MD, GENERIC EXTERNAL DATA PROVIDER  Indication:  Squamous cell lung cancer with occluded RUL and RMI+BI stenosis, stented on 08/29/2024 by DD; removed by [facility] on 09/25/2024 - fully occluded stent (not using home nebulizer or maintenance meds?), not re-stented as ARSLAN and BI were only mildly stenosed after stent was removed  Sedation / Anesthesia:  GA  Procedure:  Therapeutic - possible rigid + stent placement if airway stenosis worsened  Time:  Tier 2  Fluorscopy:  Yes  Imaging needed:  None  Labs:  None  Meds:  Eliquis (needs to be re-confirmed on pre-procedure visit)   Special Considerations:  None  Reviewed by:  Ruben Yeager MD on 09/25/24

## 2024-09-25 NOTE — PROGRESS NOTES
Daily Progress Note    Juan Carlos Cr is a 74 y.o. 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 CT scan showed complete obstruction of the distal right mainstem bronchus and large right pleural effusion. S/p bronch today, stent was obstructed with mucus and debris, stent removed. Continuing treatment for post obstructive PNA. Currently assessing need for inpatient RT, will set up inpatient chemo tentatively tomorrow.    Overnight:  No acute events reported     Subjective   Pt was seen at the bedside. He is sitting comfortably and is on 2L of O2NC. States that he is doing well this morning. He states that he is not SOB. Denies any fever, chills, cough, sputum production, headache/dizziness, NV, chest pain/tightness, abdominal pain, peripheral edema and dysuria.     Objective   Vitals: I/O:   Vitals:    09/25/24 0432   BP: 114/72   Pulse: 63   Resp: 19   Temp: 36.6 °C (97.9 °F)   SpO2: 98%        24hr Min/Max:  Temp  Min: 36 °C (96.8 °F)  Max: 36.6 °C (97.9 °F)  Pulse  Min: 62  Max: 86  BP  Min: 101/66  Max: 119/69  Resp  Min: 16  Max: 19  SpO2  Min: 94 %  Max: 98 %   Intake/Output Summary (Last 24 hours) at 9/25/2024 0647  Last data filed at 9/25/2024 0633  Gross per 24 hour   Intake 800 ml   Output 2230 ml   Net -1430 ml        Net IO Since Admission: -2,090 mL [09/25/24 0647]      PE:  General: Awake, alert, conversant, appears stated age  HEENT: Pupils equal and round, no scleral icterus or conjunctivitis  Skin: No suspect lesions or rashes noted on visible skin  Chest: Normal respiratory effort. On 2L of O2 NC, markedly diminished breath sounds on the right.   Cardiac: Regular rate and rhythm, normal s1, s2, no M/R/G, no JVD  Abdomen: Soft, ND, NT, no involuntary guarding  : No  flank pain or indwelling urinary catheter  EXT: No peripheral edema, no asymmetry noted  MSK: No focal joint swelling noted  Neuro: AOx4, moving all limbs spontaneously, follows commands  Psych: Coherent thought process, appropriate mood and affect    Labs:  CBC RFP   Lab Results   Component Value Date    WBC 10.5 09/24/2024    HGB 12.5 (L) 09/24/2024    HCT 37.2 (L) 09/24/2024    MCV 91 09/24/2024     09/24/2024    NEUTROABS 6.39 (H) 09/23/2024    Lab Results   Component Value Date     09/24/2024    K 3.1 (L) 09/24/2024     09/24/2024    CO2 27 09/24/2024    BUN 10 09/24/2024    CREATININE 0.79 09/24/2024    CREATININE 0.63 09/24/2024     Lab Results   Component Value Date    MG 1.94 09/24/2024    PHOS 2.0 (L) 09/24/2024    CALCIUM 8.9 09/24/2024         Hepatic Function ABG/VBG   Lab Results   Component Value Date    ALT 7 (L) 09/23/2024    AST 10 09/23/2024    ALKPHOS 56 09/23/2024     Lab Results   Component Value Date    BILIDIR 0.1 08/27/2024      Lab Results   Component Value Date    PROTIME 17.5 (H) 09/23/2024    INR 1.5 (H) 09/23/2024    Lab Results   Component Value Date    LACTATE 1.4 09/23/2024        Results from last 7 days   Lab Units 09/24/24 2016 09/24/24  1952 09/24/24  1546 09/24/24  1221 09/24/24  0809 09/24/24  0544 09/23/24  2013 09/23/24  1655 09/23/24  1637 09/23/24  0142 09/19/24  1624   POCT GLUCOSE mg/dL 141*  --  87 155* 118*  --  192*   < >  --   --   --    GLUCOSE mg/dL  --  146*  --   --   --  114*  --   --  191* 145* 121*    < > = values in this interval not displayed.       Imaging:  CT chest wo IV contrast    Result Date: 9/24/2024  Interpreted By:  Finesse Gómez  and Debbie Faria STUDY: CT CHEST WO IV CONTRAST;  9/23/2024 4:21 pm   INDICATION: Signs/Symptoms:Hx of right hilar SCC, worsening SOB concerns for PNA vs. pleural effusion vs. lymphangitic spread. .   COMPARISON: CTA chest 08/26/2024   ACCESSION NUMBER(S): [id number]   ORDERING CLINICIAN: RHEA  BAJOR   TECHNIQUE: Helical data acquisition of the chest was obtained. Images were reformatted in axial, coronal, and sagittal planes.   No IV or oral contrast material was administered.   FINDINGS: LUNGS AND AIRWAYS: There is similar near-complete obstruction of the distal right mainstem bronchus with a small amount of debris in the more proximal right mainstem bronchus. Distal right branches are completely occluded. Stenting material in the right mainstem bronchus appears patent.   The left mainstem bronchus and distal branches are patent. No left-sided endobronchial lesion.   There is a new large right pleural effusion with associated right pulmonary collapse. Minimal aeration of the right lung, primarily of the anterior aspect of the right upper pulmonary lobe. Interlobular septal thickening and micro nodularity throughout the persistently expanded right upper lobe.   There is poor delineation of the patient's known large right pulmonary mass, however right suprahilar mass measures 6.0 x 7.6 x 6.6 cm as visualized.   Moderate paraseptal emphysematous changes of the peripheral left lung and right pulmonary apex. No left-sided lesion is visualized. No airspace opacity or effusion involving the left lung. No pneumothorax is identified.   MEDIASTINUM AND SUDHAKAR, LOWER NECK AND AXILLA: The visualized thyroid gland is within normal limits.   A slight shift of the mediastinum and heart to the right side   There is an enlarged lower paratracheal lymph node measuring 2.0 cm in short axis and several prominent aortopulmonary window lymph nodes. There is poor visualization of the right hilum.   Moderate volume of fluid in the distal esophagus.   HEART AND VESSELS: The thoracic aorta is of normal course and caliber without vascular calcifications.   Main pulmonary artery and its branches are normal in caliber.   Moderate coronary arterial calcifications. The study is not optimized for evaluation of coronary arteries.   The  cardiac chambers are not enlarged.   Trace pericardial fluid.   UPPER ABDOMEN: The visualized subdiaphragmatic structures demonstrate no remarkable findings.   CHEST WALL AND OSSEOUS STRUCTURES: Mild chronic deformity and fusion of several right ribs. No acute osseous abnormality. No suspicious osseous lesion is definitively visualized. The chest wall is otherwise unremarkable.       1. Poor delineation of a large right hilar and suprahilar mass with associated complete obstruction of the distal right mainstem bronchus and distal branches, new compared to prior, with collapse of much of the right. Development of a large right pleural effusion, presumably malignant. Minimal persistent aeration of the anterosuperior right lung, with micronodularity and interlobular septal thickening. 2. A slight shift of the mediastinum and heart to the right side. 3. Multiple enlarged mediastinal lymph nodes are incompletely characterized on this exam. 4. Moderate volume of fluid in the distal esophagus raising concern for aspiration. 5. The left lung is overall clear with peripheral emphysematous changes.   I personally reviewed the image(s)/study and resident interpretation as stated by Dr. Giana Lewis MD. I agree with the findings as stated. This study was interpreted at [facility], [place], [place].   MACRO: Giana Lewis discussed the significance and urgency of this critical finding by secure chat with  Telma Ryder on 9/23/2024 at 6:30 pm.  (**-RCF-**) Findings:  See findings.   Signed by: Finesse Gómez 9/24/2024 9:01 AM Dictation workstation:   [id number]      Medications:  Scheduled Medications:  PRN Medications:    amLODIPine, 10 mg, oral, Daily  [Held by provider] apixaban, 5 mg, oral, BID  aspirin, 81 mg, oral, Daily  fluticasone furoate-vilanteroL, 1 puff, inhalation, Daily  folic acid, 1 mg, oral, Daily  insulin lispro, 0-5 Units, subcutaneous, Before meals &  nightly  metoprolol tartrate, 50 mg, oral, q12h  nicotine, 1 patch, transdermal, q24h  oxygen, , inhalation, Continuous - Inhalation  pantoprazole, 40 mg, oral, Daily before breakfast  piperacillin-tazobactam, 4.5 g, intravenous, q6h  potassium chloride, 20 mEq, intravenous, q2h  potassium phosphate (monobasic), 500 mg, oral, 4x daily  potassium phosphate (monobasic), 500 mg, oral, 4x daily  simvastatin, 20 mg, oral, Daily  sodium chloride, 3 mL, nebulization, q8h  tiotropium, 2 puff, inhalation, Daily     PRN medications: albuterol, dextrose, dextrose, glucagon, glucagon, melatonin, ondansetron, polyethylene glycol, prochlorperazine     Assessment/Plan:   Juan Carlos Cr is a 74 y.o. 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 CT scan demonstrated complete obstruction of the distal right mainstem bronchus and large right pleural effusion. S/p bronch, stent was obstructed with mucus and debris. Continuing treatment for post-obstructive PNA. Planning for inpatient chemotherapy, currently assessing need for emergent RT.    Updates:  09/25  - Bronch showed obstruction of the stent with mucus and debris. Stent was removed and no additional stent placed as the airway was only mildly stenosed.  - Continuing treatment for post-obstructive PNA  - Rad/onc recommended interval imaging following initation of chemo and reasses. Referral for thoracic tumor board review.    09/24  - Pulmonology recommended, bronch tomorrow afternoon. Felt no need for thora given small size of effusion.  - Radiation oncology awaiting bronch to assess need for emergent RT  - MRSA nares, strep pneumo, legionella negative. Vancomycin and azithromycin discontinued  - Dr. Landis would like inpatient carbo/taxol to be  initiated. Tentatively Thursday  - PICC line on d/c    #Acute on chronic hypoxic respiratory failure  #Right hilar SCC of the lungs T4N2M0  #COPD on 2L NC at baseline  Given patient's acute on chronic hypoxic respiratory failure at this time and worsening imaging findings in comparison to prior are concerning for PNA vs. Pleural effusion vs. Worsening tumor burden/lymphangitic spread vs. Bronchial obstruction/stent closure. Did not feel that this was COPD exacerbation at this time given lack of cough, and sputum production.   - Pulmonology following  - RT following  - Rad/onc following  - CT scan showed complete obstruction of the distal right mainstem bronchus and large right pleural effusion  - Bronch showed obstruction of the stent with mucus and debris. Stent was removed and no additional stent placed as the airway was only mildly stenosed. No further plans for thora  Plan:  - Continue home Breo and Spiriva  - Continue supplemental O2 on 2L which is his baseline  - Albuterol Q6H PRN  - Empiric PNA treatment, continue zosyn.   - Resp culture needs to be collected  - Dr. Landis outpatient heme/onc recommended initiation of carbo/taxol inpatient, tentatively tomorrow  - Rad/onc recommended interval imaging following initiation of chemo and reassess. Referral for thoracic tumor board review.  - Bronch showed obstruction of stent, was removed  - PICC on discharge     #Hypokalemia  Patient remains asymptomatic at this time and is not complaining of muscle cramps/pain. We will continue to monitor with daily RFP and Mg2+. Replete PRN.   - Received 40meq of K+ in the ED potassium was 2.6, repeat potassium 3.0 and phos 1.9 received 40meq K+ 9/23, and potassium phosphate  - Potassium 3.2 and phos 1.9 9/24 received 40meq of K+ and potassium phosphate 9/24  - Potassium of 3.1 overnight, night team ordered 40meq of K+. Phos of 2.0 night team ordered potassium phos 9/25. Recheck this afternoon.  Plan:  - Daily RFP and  Mg2+  - Repeat CMP this afternoon  - Replete K+ >4, Mg2+ >2  - Maintain telemetry     #CAD s/p PCI 1997  #Paroxysmal AFib  #HTN  #HLD  Will continue home medications.   - Continue metroprolol 50mg BID and Eliquis 5mg BID for AFib  - Continue simvastatin 20mg every day  - Continue ASA, CAD s/p PCI (1997)  - Continue Amlodipine 5mg     #T2DM  Last A1C 7.6% 8/26/2024.  - Hold home antihyperglycemics, metformin 500mg BID.   - LDSSI    F: Replete PRN  E: Replete PRN  N: Regular Diet  Access/Lines: PIV left AC 9/23/24    DVT Ppx: Eliquis, held for bronch resume 9/26  GI Ppx: Pantoprazole     Abx: Zosyn (9/23/24-), Vanc (9/23/24 dc'ed same day), Azithro (9/23-9/24/2024 dc'ed)  O2: 2L NC    Pain regimen: None  GI Laxative: Miralax     Code status: Full Code  NOK/Surrogate Decision Maker:     Patient assessment and plan staffed with the attending physician on service, Dr. Rosy THRASHER  MS4  Department of Internal Medicine

## 2024-09-26 ENCOUNTER — APPOINTMENT (OUTPATIENT)
Dept: RADIOLOGY | Facility: HOSPITAL | Age: 74
DRG: 180 | End: 2024-09-26
Payer: MEDICARE

## 2024-09-26 LAB
25(OH)D3 SERPL-MCNC: 22 NG/ML (ref 30–100)
ALBUMIN SERPL BCP-MCNC: 2.7 G/DL (ref 3.4–5)
ANION GAP SERPL CALC-SCNC: 9 MMOL/L (ref 10–20)
BUN SERPL-MCNC: 9 MG/DL (ref 6–23)
CALCIUM SERPL-MCNC: 8.1 MG/DL (ref 8.6–10.6)
CHLORIDE SERPL-SCNC: 103 MMOL/L (ref 98–107)
CO2 SERPL-SCNC: 28 MMOL/L (ref 21–32)
CREAT SERPL-MCNC: 0.67 MG/DL (ref 0.5–1.3)
EGFRCR SERPLBLD CKD-EPI 2021: >90 ML/MIN/1.73M*2
ERYTHROCYTE [DISTWIDTH] IN BLOOD BY AUTOMATED COUNT: 11.6 % (ref 11.5–14.5)
GLUCOSE BLD MANUAL STRIP-MCNC: 101 MG/DL (ref 74–99)
GLUCOSE BLD MANUAL STRIP-MCNC: 115 MG/DL (ref 74–99)
GLUCOSE BLD MANUAL STRIP-MCNC: 120 MG/DL (ref 74–99)
GLUCOSE BLD MANUAL STRIP-MCNC: 165 MG/DL (ref 74–99)
GLUCOSE SERPL-MCNC: 102 MG/DL (ref 74–99)
HCT VFR BLD AUTO: 35.5 % (ref 41–52)
HGB BLD-MCNC: 11.6 G/DL (ref 13.5–17.5)
INR PPP: 1.2 (ref 0.9–1.1)
MAGNESIUM SERPL-MCNC: 1.77 MG/DL (ref 1.6–2.4)
MCH RBC QN AUTO: 30.2 PG (ref 26–34)
MCHC RBC AUTO-ENTMCNC: 32.7 G/DL (ref 32–36)
MCV RBC AUTO: 92 FL (ref 80–100)
NRBC BLD-RTO: 0 /100 WBCS (ref 0–0)
PHOSPHATE SERPL-MCNC: 1.9 MG/DL (ref 2.5–4.9)
PLATELET # BLD AUTO: 183 X10*3/UL (ref 150–450)
POTASSIUM SERPL-SCNC: 3.2 MMOL/L (ref 3.5–5.3)
PROTHROMBIN TIME: 13.4 SECONDS (ref 9.8–12.8)
RBC # BLD AUTO: 3.84 X10*6/UL (ref 4.5–5.9)
SODIUM SERPL-SCNC: 137 MMOL/L (ref 136–145)
WBC # BLD AUTO: 8.3 X10*3/UL (ref 4.4–11.3)

## 2024-09-26 PROCEDURE — 83735 ASSAY OF MAGNESIUM: CPT

## 2024-09-26 PROCEDURE — 2500000001 HC RX 250 WO HCPCS SELF ADMINISTERED DRUGS (ALT 637 FOR MEDICARE OP)

## 2024-09-26 PROCEDURE — 2500000005 HC RX 250 GENERAL PHARMACY W/O HCPCS

## 2024-09-26 PROCEDURE — 36573 INSJ PICC RS&I 5 YR+: CPT

## 2024-09-26 PROCEDURE — 99233 SBSQ HOSP IP/OBS HIGH 50: CPT | Performed by: INTERNAL MEDICINE

## 2024-09-26 PROCEDURE — 99221 1ST HOSP IP/OBS SF/LOW 40: CPT | Performed by: INTERNAL MEDICINE

## 2024-09-26 PROCEDURE — 71250 CT THORAX DX C-: CPT | Performed by: RADIOLOGY

## 2024-09-26 PROCEDURE — 80069 RENAL FUNCTION PANEL: CPT

## 2024-09-26 PROCEDURE — 85610 PROTHROMBIN TIME: CPT

## 2024-09-26 PROCEDURE — 2500000004 HC RX 250 GENERAL PHARMACY W/ HCPCS (ALT 636 FOR OP/ED): Performed by: ANESTHESIOLOGY

## 2024-09-26 PROCEDURE — C1751 CATH, INF, PER/CENT/MIDLINE: HCPCS

## 2024-09-26 PROCEDURE — 36415 COLL VENOUS BLD VENIPUNCTURE: CPT

## 2024-09-26 PROCEDURE — 82306 VITAMIN D 25 HYDROXY: CPT

## 2024-09-26 PROCEDURE — 2500000004 HC RX 250 GENERAL PHARMACY W/ HCPCS (ALT 636 FOR OP/ED)

## 2024-09-26 PROCEDURE — 85027 COMPLETE CBC AUTOMATED: CPT

## 2024-09-26 PROCEDURE — S4991 NICOTINE PATCH NONLEGEND: HCPCS

## 2024-09-26 PROCEDURE — 82947 ASSAY GLUCOSE BLOOD QUANT: CPT

## 2024-09-26 PROCEDURE — 2500000002 HC RX 250 W HCPCS SELF ADMINISTERED DRUGS (ALT 637 FOR MEDICARE OP, ALT 636 FOR OP/ED)

## 2024-09-26 PROCEDURE — 94640 AIRWAY INHALATION TREATMENT: CPT

## 2024-09-26 PROCEDURE — 71250 CT THORAX DX C-: CPT

## 2024-09-26 PROCEDURE — 2780000003 HC OR 278 NO HCPCS

## 2024-09-26 PROCEDURE — 1200000003 HC ONCOLOGY  ROOM WITH TELEMETRY DAILY

## 2024-09-26 PROCEDURE — 02HV33Z INSERTION OF INFUSION DEVICE INTO SUPERIOR VENA CAVA, PERCUTANEOUS APPROACH: ICD-10-PCS | Performed by: INTERNAL MEDICINE

## 2024-09-26 PROCEDURE — 87081 CULTURE SCREEN ONLY: CPT

## 2024-09-26 RX ORDER — LIDOCAINE HYDROCHLORIDE 10 MG/ML
5 INJECTION, SOLUTION INFILTRATION; PERINEURAL ONCE
Status: DISCONTINUED | OUTPATIENT
Start: 2024-09-26 | End: 2024-09-27 | Stop reason: HOSPADM

## 2024-09-26 RX ORDER — LEVOFLOXACIN 750 MG/1
750 TABLET ORAL
Status: DISCONTINUED | OUTPATIENT
Start: 2024-09-26 | End: 2024-09-27 | Stop reason: HOSPADM

## 2024-09-26 RX ORDER — POTASSIUM CHLORIDE 20 MEQ/1
40 TABLET, EXTENDED RELEASE ORAL ONCE
Status: COMPLETED | OUTPATIENT
Start: 2024-09-26 | End: 2024-09-26

## 2024-09-26 RX ORDER — MAGNESIUM SULFATE HEPTAHYDRATE 40 MG/ML
2 INJECTION, SOLUTION INTRAVENOUS ONCE
Status: COMPLETED | OUTPATIENT
Start: 2024-09-26 | End: 2024-09-26

## 2024-09-26 SDOH — ECONOMIC STABILITY: HOUSING INSECURITY: AT ANY TIME IN THE PAST 12 MONTHS, WERE YOU HOMELESS OR LIVING IN A SHELTER (INCLUDING NOW)?: PATIENT DECLINED

## 2024-09-26 SDOH — ECONOMIC STABILITY: INCOME INSECURITY: HOW HARD IS IT FOR YOU TO PAY FOR THE VERY BASICS LIKE FOOD, HOUSING, MEDICAL CARE, AND HEATING?: PATIENT DECLINED

## 2024-09-26 SDOH — HEALTH STABILITY: MENTAL HEALTH: HOW OFTEN DO YOU HAVE A DRINK CONTAINING ALCOHOL?: NEVER

## 2024-09-26 SDOH — HEALTH STABILITY: MENTAL HEALTH
STRESS IS WHEN SOMEONE FEELS TENSE, NERVOUS, ANXIOUS, OR CAN'T SLEEP AT NIGHT BECAUSE THEIR MIND IS TROUBLED. HOW STRESSED ARE YOU?: NOT AT ALL

## 2024-09-26 SDOH — SOCIAL STABILITY: SOCIAL NETWORK: HOW OFTEN DO YOU ATTENT MEETINGS OF THE CLUB OR ORGANIZATION YOU BELONG TO?: PATIENT DECLINED

## 2024-09-26 SDOH — ECONOMIC STABILITY: FOOD INSECURITY: WITHIN THE PAST 12 MONTHS, THE FOOD YOU BOUGHT JUST DIDN'T LAST AND YOU DIDN'T HAVE MONEY TO GET MORE.: NEVER TRUE

## 2024-09-26 SDOH — SOCIAL STABILITY: SOCIAL INSECURITY
WITHIN THE LAST YEAR, HAVE YOU BEEN KICKED, HIT, SLAPPED, OR OTHERWISE PHYSICALLY HURT BY YOUR PARTNER OR EX-PARTNER?: NO

## 2024-09-26 SDOH — HEALTH STABILITY: PHYSICAL HEALTH: ON AVERAGE, HOW MANY DAYS PER WEEK DO YOU ENGAGE IN MODERATE TO STRENUOUS EXERCISE (LIKE A BRISK WALK)?: 0 DAYS

## 2024-09-26 SDOH — SOCIAL STABILITY: SOCIAL NETWORK: HOW OFTEN DO YOU GET TOGETHER WITH FRIENDS OR RELATIVES?: PATIENT DECLINED

## 2024-09-26 SDOH — ECONOMIC STABILITY: INCOME INSECURITY: IN THE PAST 12 MONTHS, HAS THE ELECTRIC, GAS, OIL, OR WATER COMPANY THREATENED TO SHUT OFF SERVICE IN YOUR HOME?: NO

## 2024-09-26 SDOH — HEALTH STABILITY: MENTAL HEALTH: HOW OFTEN DO YOU HAVE 6 OR MORE DRINKS ON ONE OCCASION?: NEVER

## 2024-09-26 SDOH — ECONOMIC STABILITY: HOUSING INSECURITY: IN THE PAST 12 MONTHS, HOW MANY TIMES HAVE YOU MOVED WHERE YOU WERE LIVING?: 1

## 2024-09-26 SDOH — HEALTH STABILITY: MENTAL HEALTH
HOW OFTEN DO YOU NEED TO HAVE SOMEONE HELP YOU WHEN YOU READ INSTRUCTIONS, PAMPHLETS, OR OTHER WRITTEN MATERIAL FROM YOUR DOCTOR OR PHARMACY?: NEVER

## 2024-09-26 SDOH — SOCIAL STABILITY: SOCIAL NETWORK
DO YOU BELONG TO ANY CLUBS OR ORGANIZATIONS SUCH AS CHURCH GROUPS UNIONS, FRATERNAL OR ATHLETIC GROUPS, OR SCHOOL GROUPS?: PATIENT DECLINED

## 2024-09-26 SDOH — ECONOMIC STABILITY: FOOD INSECURITY: WITHIN THE PAST 12 MONTHS, YOU WORRIED THAT YOUR FOOD WOULD RUN OUT BEFORE YOU GOT MONEY TO BUY MORE.: NEVER TRUE

## 2024-09-26 SDOH — ECONOMIC STABILITY: TRANSPORTATION INSECURITY
IN THE PAST 12 MONTHS, HAS THE LACK OF TRANSPORTATION KEPT YOU FROM MEDICAL APPOINTMENTS OR FROM GETTING MEDICATIONS?: PATIENT DECLINED

## 2024-09-26 SDOH — ECONOMIC STABILITY: TRANSPORTATION INSECURITY
IN THE PAST 12 MONTHS, HAS LACK OF TRANSPORTATION KEPT YOU FROM MEETINGS, WORK, OR FROM GETTING THINGS NEEDED FOR DAILY LIVING?: PATIENT DECLINED

## 2024-09-26 SDOH — SOCIAL STABILITY: SOCIAL INSECURITY: WITHIN THE LAST YEAR, HAVE YOU BEEN HUMILIATED OR EMOTIONALLY ABUSED IN OTHER WAYS BY YOUR PARTNER OR EX-PARTNER?: NO

## 2024-09-26 SDOH — SOCIAL STABILITY: SOCIAL INSECURITY
WITHIN THE LAST YEAR, HAVE TO BEEN RAPED OR FORCED TO HAVE ANY KIND OF SEXUAL ACTIVITY BY YOUR PARTNER OR EX-PARTNER?: NO

## 2024-09-26 SDOH — SOCIAL STABILITY: SOCIAL NETWORK: ARE YOU MARRIED, WIDOWED, DIVORCED, SEPARATED, NEVER MARRIED, OR LIVING WITH A PARTNER?: PATIENT DECLINED

## 2024-09-26 SDOH — SOCIAL STABILITY: SOCIAL NETWORK: IN A TYPICAL WEEK, HOW MANY TIMES DO YOU TALK ON THE PHONE WITH FAMILY, FRIENDS, OR NEIGHBORS?: PATIENT DECLINED

## 2024-09-26 SDOH — ECONOMIC STABILITY: INCOME INSECURITY: IN THE LAST 12 MONTHS, WAS THERE A TIME WHEN YOU WERE NOT ABLE TO PAY THE MORTGAGE OR RENT ON TIME?: PATIENT DECLINED

## 2024-09-26 SDOH — SOCIAL STABILITY: SOCIAL INSECURITY: WITHIN THE LAST YEAR, HAVE YOU BEEN AFRAID OF YOUR PARTNER OR EX-PARTNER?: NO

## 2024-09-26 SDOH — SOCIAL STABILITY: SOCIAL NETWORK: HOW OFTEN DO YOU ATTEND CHURCH OR RELIGIOUS SERVICES?: PATIENT DECLINED

## 2024-09-26 SDOH — HEALTH STABILITY: MENTAL HEALTH: HOW MANY STANDARD DRINKS CONTAINING ALCOHOL DO YOU HAVE ON A TYPICAL DAY?: PATIENT DOES NOT DRINK

## 2024-09-26 SDOH — HEALTH STABILITY: PHYSICAL HEALTH: ON AVERAGE, HOW MANY MINUTES DO YOU ENGAGE IN EXERCISE AT THIS LEVEL?: 0 MIN

## 2024-09-26 ASSESSMENT — COGNITIVE AND FUNCTIONAL STATUS - GENERAL
HELP NEEDED FOR BATHING: A LITTLE
MOBILITY SCORE: 23
CLIMB 3 TO 5 STEPS WITH RAILING: A LITTLE
DRESSING REGULAR LOWER BODY CLOTHING: A LITTLE
DAILY ACTIVITIY SCORE: 22

## 2024-09-26 ASSESSMENT — PAIN - FUNCTIONAL ASSESSMENT
PAIN_FUNCTIONAL_ASSESSMENT: 0-10

## 2024-09-26 ASSESSMENT — PAIN SCALES - GENERAL
PAINLEVEL_OUTOF10: 0 - NO PAIN

## 2024-09-26 ASSESSMENT — LIFESTYLE VARIABLES
SKIP TO QUESTIONS 9-10: 1
AUDIT-C TOTAL SCORE: 0

## 2024-09-26 NOTE — HOSPITAL COURSE
Juan Carlos Cr is a 74 y.o. 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    On arrival in the ED he was originally hypoxic on 2L of O2 NC at 81% and tachypneic. He was placed on 4L of O2 with improvement in his saturations to the 90s along with marked symptomatic relief. CXR revealed new complete opacification of the right hemithorax. He was started on Vanc/Zosyn and admitted for acute on chronic respiratory failure.     A CT scan of the chest was obtained which showed complete obstruction of the distal right mainstem bronchus and large right pleural effusion. Pulmonology was consulted, bronchoscopy was performed 9/25/24 demonstrating an obstructed stent which was removed following removal his oxygen requirements had returned to his baseline of 2L. He was treated for concurrent post obstructive PNA with Zosyn 9/23-9/26, Vanc 9/23, and Azithro 9/23-9/24, he was later switched to levo 9/26 and is to finish his course 9/29. Rad/onc was consulted due to concerns of worsening tumor burden though given concerns for malignant pleural effusion they recommended interval imaging following first dose of chemo and discussion at tumor boards. A thoracentesis was obtained 9/27 to help clarify staging for radiation oncology recommendations. After discussion with his outpatient heme/onc a PICC line was placed 9/26 pending outpatient chemotherapy. He was discharged in stable condition on 2L of O2 NC with much improvement to his shortness of breath. He is to have close outpatient follow-up with radiation oncology and Dr. Landis.

## 2024-09-26 NOTE — PROGRESS NOTES
Department of Medicine  Division of Pulmonary, Critical Care, and Sleep Medicine    Juan Carlos Cr is a 74 y.o. male on day 3 of admission presenting with Squamous cell lung cancer, unspecified laterality (Multi).    Subjective   Patient seen and examined at bedside. Pt states his breathing is the best it has been in 3 weeks. Breathing comfortably on 2L NC (baseline).          Objective     Vital Signs      9/25/2024     1:14 PM 9/25/2024     2:18 PM 9/25/2024     2:45 PM 9/25/2024     4:37 PM 9/25/2024     7:48 PM 9/26/2024     1:23 AM 9/26/2024     6:17 AM   Vitals   Systolic 130   110 119 123 112   Diastolic 72   69 72 73 72   Heart Rate 77 68 73 66 67 76 60   Temp 36 °C (96.8 °F)   36.2 °C (97.2 °F) 36.4 °C (97.5 °F) 36.9 °C (98.4 °F) 36.4 °C (97.5 °F)   Resp 18   18 18 18         Physical exam  Constitutional: Normal appearance.  HEENT: Normocephalic and atraumatic.  Cardiovascular: Normal rate and regular rhythm.  Pulmonary: Markedly decreased breath sounds across all right lung fields. Reduced crackles/wheezes s/p occluded stent removal.   Musculoskeletal: No edema, no cyanosis.  Neurological: Awake, alert and oriented x3.  Psychiatric: Normal behavior, mood and affect.    Labs:  Lab Results   Component Value Date    WBC 8.3 09/26/2024    HGB 11.6 (L) 09/26/2024    HCT 35.5 (L) 09/26/2024    MCV 92 09/26/2024     09/26/2024      Lab Results   Component Value Date    GLUCOSE 135 (H) 09/25/2024    CALCIUM 8.6 09/25/2024     09/25/2024    K 4.3 09/25/2024    CO2 27 09/25/2024     09/25/2024    BUN 11 09/25/2024    CREATININE 0.76 09/25/2024      Lab Results   Component Value Date    ALT 7 (L) 09/23/2024    AST 10 09/23/2024    ALKPHOS 56 09/23/2024    BILITOT 0.5 09/23/2024        Oxygen Therapy  SpO2: 94 %  Medical Gas Therapy: Supplemental oxygen  Medical Gas Delivery Method: Nasal cannula       Intake/Output last 3 Shifts:  I/O last 3 completed shifts:  In: 760 (11.3 mL/kg) [P.O.:60; IV  Piggyback:700]  Out: 3030 (45 mL/kg) [Urine:3030 (1.2 mL/kg/hr)]  Weight: 67.4 kg       Medications   Scheduled medications  amLODIPine, 10 mg, oral, Daily  [Held by provider] apixaban, 5 mg, oral, BID  aspirin, 81 mg, oral, Daily  fluticasone furoate-vilanteroL, 1 puff, inhalation, Daily  folic acid, 1 mg, oral, Daily  insulin lispro, 0-5 Units, subcutaneous, Before meals & nightly  lidocaine, 0.1 mL, subcutaneous, Once  lidocaine, 5 mL, infiltration, Once  metoprolol tartrate, 50 mg, oral, q12h  nicotine, 1 patch, transdermal, q24h  oxygen, , inhalation, Continuous - Inhalation  pantoprazole, 40 mg, oral, Daily before breakfast  piperacillin-tazobactam, 4.5 g, intravenous, q6h  simvastatin, 20 mg, oral, Daily  tiotropium, 2 puff, inhalation, Daily      Continuous medications  lactated Ringer's, 100 mL/hr, Last Rate: 100 mL/hr (09/26/24 0619)      PRN medications  PRN medications: acetaminophen, albuterol, alteplase, dextrose, dextrose, glucagon, glucagon, melatonin, ondansetron, ondansetron, oxygen, polyethylene glycol, prochlorperazine, promethazine     Allergies  Patient has no known allergies.    Chest Radiograph   CT chest wo IV contrast 09/23/2024    Narrative  Interpreted By:  Finesse Gómez  and Debbie Faria  STUDY:  CT CHEST WO IV CONTRAST;  9/23/2024 4:21 pm    INDICATION:  Signs/Symptoms:Hx of right hilar SCC, worsening SOB concerns for PNA  vs. pleural effusion vs. lymphangitic spread. .    COMPARISON:  CTA chest 08/26/2024    ACCESSION NUMBER(S):  [id number]    ORDERING CLINICIAN:  RHEA AMAYA    TECHNIQUE:  Helical data acquisition of the chest was obtained. Images were  reformatted in axial, coronal, and sagittal planes.    No IV or oral contrast material was administered.    FINDINGS:  LUNGS AND AIRWAYS:  There is similar near-complete obstruction of the distal right  mainstem bronchus with a small amount of debris in the more proximal  right mainstem bronchus. Distal right branches are  completely  occluded. Stenting material in the right mainstem bronchus appears  patent.    The left mainstem bronchus and distal branches are patent. No  left-sided endobronchial lesion.    There is a new large right pleural effusion with associated right  pulmonary collapse. Minimal aeration of the right lung, primarily of  the anterior aspect of the right upper pulmonary lobe. Interlobular  septal thickening and micro nodularity throughout the persistently  expanded right upper lobe.    There is poor delineation of the patient's known large right  pulmonary mass, however right suprahilar mass measures 6.0 x 7.6 x  6.6 cm as visualized.    Moderate paraseptal emphysematous changes of the peripheral left lung  and right pulmonary apex. No left-sided lesion is visualized. No  airspace opacity or effusion involving the left lung. No pneumothorax  is identified.    MEDIASTINUM AND SUDHAKAR, LOWER NECK AND AXILLA:  The visualized thyroid gland is within normal limits.    A slight shift of the mediastinum and heart to the right side    There is an enlarged lower paratracheal lymph node measuring 2.0 cm  in short axis and several prominent aortopulmonary window lymph  nodes. There is poor visualization of the right hilum.    Moderate volume of fluid in the distal esophagus.    HEART AND VESSELS:  The thoracic aorta is of normal course and caliber without vascular  calcifications.    Main pulmonary artery and its branches are normal in caliber.    Moderate coronary arterial calcifications. The study is not optimized  for evaluation of coronary arteries.    The cardiac chambers are not enlarged.    Trace pericardial fluid.    UPPER ABDOMEN:  The visualized subdiaphragmatic structures demonstrate no remarkable  findings.    CHEST WALL AND OSSEOUS STRUCTURES:  Mild chronic deformity and fusion of several right ribs. No acute  osseous abnormality. No suspicious osseous lesion is definitively  visualized. The chest wall is  otherwise unremarkable.    Impression  1. Poor delineation of a large right hilar and suprahilar mass with  associated complete obstruction of the distal right mainstem bronchus  and distal branches, new compared to prior, with collapse of much of  the right. Development of a large right pleural effusion, presumably  malignant. Minimal persistent aeration of the anterosuperior right  lung, with micronodularity and interlobular septal thickening.  2. A slight shift of the mediastinum and heart to the right side.  3. Multiple enlarged mediastinal lymph nodes are incompletely  characterized on this exam.  4. Moderate volume of fluid in the distal esophagus raising concern  for aspiration.  5. The left lung is overall clear with peripheral emphysematous  changes.    I personally reviewed the image(s)/study and resident interpretation  as stated by Dr. Giana Lewis MD. I agree with the findings as  stated. This study was interpreted at [facility], [place], [place].    MACRO:  Giana Lewis discussed the significance and urgency of this  critical finding by secure chat with  Telma Ryder on 9/23/2024 at  6:30 pm.  (**-RCF-**) Findings:  See findings.    Signed by: Finesse Gómez 9/24/2024 9:01 AM  Dictation workstation:   [id number]       XR chest 1 view 09/23/2024    Narrative  Interpreted By:  William Toscano,  STUDY:  XR CHEST 1 VIEW;  9/23/2024 1:43 am    INDICATION:  Signs/Symptoms:Cough wheezing shortness of breath history of lung  cancer recent PET scan.    COMPARISON:  08/29/2024    ACCESSION NUMBER(S):  [id number]    ORDERING CLINICIAN:  LINUS BERNAL    FINDINGS:  Large mass redemonstrated right upper lobe with progressive increase  opacification of the right hemithorax/lung which may relate to  developing consolidation/pneumonia and/or effusion.Left lung remains  clear.    Impression  Worsening opacification right hemithorax with known  underlying  malignancy/mass.    MACRO:  None    Signed by: William Toscano 9/23/2024 1:49 AM  Dictation workstation:   [id number]         Assessment and Plan / Recommendations   Assessment/Plan   Juan Carlos Cr is a 75 y/o male with PMHx SCC of the lung s/p EBUS with tumor debulking and stenting of right mainstem bronchus on 8/29, AUD, CAD s/p PCI '97, Afib, HTN, HLD, chronic respiratory failure 2/2 suspected emphysema (2L NC at home), who was transferred from [facility] with acute hypoxic respiratory failure in the setting of right hilar lung mass. Underwent bronch yesterday for occluded ARSLAN stent removal with no complications. No new stent was placed as airway stenosis was mild (25% luminal compromise). Pt is now breathing comfortably on 2L NC, his baseline.     #Right hilar lung mass s/p bronch with ARSLAN stent removal  - Resume Eliquis this AM  - Follow-up bronch in 2-4 weeks  - Recommend finishing 5-7 day abx course for post-obstructive PNA    Pulmonary will now sign off at this time. Please reach out with any further questions.    Assessment and Plan discussed with Dr. Khadar Hartmann, MS4  Pulmonary Medicine Consults

## 2024-09-26 NOTE — POST-PROCEDURE NOTE
Pre-Procedure Checklist:  Emergent Line Insertion: No  Type of Line to be Placed: PICC  Consent Obtained: Yes  Emergency Medication Necessary: No  Patient Identified with 2 Independent Identifiers: Yes  Review of Allergies, Anticoagulation, Relevant Labs, ECG/Telemetry: Yes  Risks/Benefits/Alternatives Discussed with Patient/POA/Legal Representative: Yes  Stop Sign on Door: Yes  Time Out Performed: Yes  Catheter Exchange: No    Positioning Checklist:  All People, Including Patient, in the Room with Cap and Mask: Yes  Fluoroscopy Used to Identify Vessel and Guide Insertion: No   Sterile Cover Used: Yes  Full Barrier Precautions Followed (Mask, Cap, Gown, Gloves): Yes  Hands Washed: Yes  Monitors Attached with Sound Alarms On: No  Full Body Sterile Drape (Head-to-Toe) Used to Cover Patient: Yes  Trendelenburg Position (For IJ and Subclavian): No  CHG Skin Prep Used and Allowed to Air Dry to Skin Procedure: Yes    Procedure Checklist:  Blood Aspirated From All Lumens, All Ports Subsequently Flushed: Yes  Catheter Caps Placed on All Lumens; Lumens Clamped: Yes  Maintain Guidewire Control Throughout, Ensuring Guidewire Removal: Yes  Maintain Sterile Field Throughout Insertion: Yes  Catheter Secured: Yes  Confirmatory Test of Venous Placement: Non-Pulsatile Blood    Post Procedure Checklist:  Date and Time Written on Dressing: Yes  Sharp and Wire Count and Safe Disposal of all Sharps/Wires: Yes  Sterile Dressing Applied Per Protocol: Yes  X-ray Ordered or ECG Image: Yes    PICC Insertion Details:  Size (Fr): 4  Lumen Type: single lumen SOLO  Catheter to Vein Ratio Less Than 50%: Yes  Total Length (cm): 41  External Length (cm): 0   Orientation: right upper arm  Location: basilic vein  Site Prep: Chlorohexidine; Usual sterile procedure followed  Local Anesthetic: Injectable/Subcutaneous  Indication: Chemo  Insertion Team Members in the Room: Nurse, LPN  Initial Extremity Circumference (cm): 29   Insertion Attempts:  2  Patient Tolerance: Tolerated Well, Age Appropriate  Comfort Measures: Subcutaneous anesthetic; Verbal  Procedure Location: Bedside  Safety Measures: Patient specific safety measures addressed with RN  Estimated Blood Loss (mL): 1  Vessel Fully Compressible Proximally and Distally to Insertion Site: Yes  Brisk Blood Return Obtained and Line Draws Easily: Yes  Tip Location:SVC  Line Confirmation: ECG  Lot #: [id number]   : Bd  PICC Line Exp Date: 10/31/2025  Securement: Stat Lock  Post Procedure Checklist: Handoff with RN; Obtain all new IV tubing prior to use; Bed at lowest level and wheels locked; Line discharge information at bedside.  Additional Details: Line was inserted using Modified Seldinger's Technique.   Placed by: Jane Quinones RN

## 2024-09-26 NOTE — PROGRESS NOTES
Occupational Therapy                 Therapy Communication Note    Patient Name: Juan Carlos Cr  MRN: [id number]  Department: [facility]  Room: 6023/6023-A  Today's Date: 9/26/2024     Discipline: Occupational Therapy    Missed Visit Reason: Missed Visit Reason: Patient in a medical procedure (PICC line insertion, will re-att as schedule permits)    Missed Time: Attempt    Patrick Medrano, OTR/L

## 2024-09-26 NOTE — PROGRESS NOTES
Daily Progress Note    Juan Carlos Cr is a 74 y.o. 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 CT scan showed complete obstruction of the distal right mainstem bronchus and large right pleural effusion. S/p bronch 9/25/24, stent was obstructed with mucus and debris, stent removed. Continuing treatment for post obstructive PNA. Rad/onc planning interval imaging following first dose of chemo and discussion at tumor boards prior to radiation. PICC line placement and first dose of chemo today tentatively.    Overnight:  No acute events reported     Subjective   Pt was seen at the bedside. He is sitting comfortably and is on 2L of O2NC. States that he is doing well this morning. He states that he is not SOB, and that this is the first time in 3 weeks that he was able to walk without getting SOB. Denies any fever, chills, cough, sputum production, headache/dizziness, NV, chest pain/tightness, abdominal pain, and peripheral edema.    Objective   Vitals: I/O:   Vitals:    09/26/24 0617   BP: 112/72   Pulse: 60   Resp:    Temp: 36.4 °C (97.5 °F)   SpO2: 94%        24hr Min/Max:  Temp  Min: 35.6 °C (96 °F)  Max: 36.9 °C (98.4 °F)  Pulse  Min: 52  Max: 77  BP  Min: 99/64  Max: 130/72  Resp  Min: 18  Max: 22  SpO2  Min: 92 %  Max: 100 %   Intake/Output Summary (Last 24 hours) at 9/26/2024 0651  Last data filed at 9/26/2024 0617  Gross per 24 hour   Intake 760 ml   Output 2050 ml   Net -1290 ml        Net IO Since Admission: -3,380 mL [09/26/24 0651]      PE:  General: Awake, alert, conversant, appears stated age  HEENT: Pupils equal and round, no scleral icterus or conjunctivitis  Skin: No suspect lesions or rashes noted on visible skin  Chest: Normal respiratory effort. On 2L of O2 NC,  diminished breath sounds on the right, but much improved from admission. Expiratory wheezing on the right.  Cardiac: Regular rate and rhythm, normal s1, s2, no M/R/G, no JVD  Abdomen: Soft, ND, NT, no involuntary guarding  : No flank pain or indwelling urinary catheter  EXT: No peripheral edema, no asymmetry noted  MSK: No focal joint swelling noted  Neuro: AOx4, moving all limbs spontaneously, follows commands  Psych: Coherent thought process, appropriate mood and affect    Labs:  CBC RFP   Lab Results   Component Value Date    WBC 8.7 09/25/2024    HGB 13.1 (L) 09/25/2024    HCT 39.7 (L) 09/25/2024    MCV 93 09/25/2024     09/25/2024    NEUTROABS 6.39 (H) 09/23/2024    Lab Results   Component Value Date     09/25/2024    K 4.3 09/25/2024     09/25/2024    CO2 27 09/25/2024    BUN 11 09/25/2024    CREATININE 0.76 09/25/2024    CREATININE 0.71 09/25/2024     Lab Results   Component Value Date    MG 2.13 09/25/2024    PHOS 2.6 09/25/2024    CALCIUM 8.6 09/25/2024         Hepatic Function ABG/VBG   Lab Results   Component Value Date    ALT 7 (L) 09/23/2024    AST 10 09/23/2024    ALKPHOS 56 09/23/2024     Lab Results   Component Value Date    BILIDIR 0.1 08/27/2024      Lab Results   Component Value Date    PROTIME 17.5 (H) 09/23/2024    INR 1.5 (H) 09/23/2024    Lab Results   Component Value Date    LACTATE 1.4 09/23/2024        Results from last 7 days   Lab Units 09/25/24 2014 09/25/24  1656 09/25/24  1633 09/25/24  1043 09/25/24  0739 09/25/24  0551 09/24/24 2016 09/24/24  1952 09/24/24  0809 09/24/24  0544 09/23/24  1655 09/23/24  1637   POCT GLUCOSE mg/dL 149*  --  173* 109* 112*  --  141*  --    < >  --    < >  --    GLUCOSE mg/dL  --  135*  --   --   --  98  --  146*  --  114*  --  191*    < > = values in this interval not displayed.       Imaging:  Bronchoscopy Diagnostic    Addendum Date: 9/25/2024    Bronchoscopy Operative Report [facility] Date of  procedure: 09/25/24 Patient: Juan Carlos Cr MRN: [id number] YOB: 1950 Gender: male Referring provider/physician: Dr. Rubalcava (inpatient consult procedure) PRE-PROCEDURE DIAGNOSIS: Lung cancer with 10 mm (D) x 40 mm (L) Bonastent placed on 08/29/2024, concerns for an occluded stent, known occluded RUL    PRE-PROCEDURE EVALUATION: A history and physical has been performed, and patient medication allergies have been reviewed. The patient's tolerance of previous anesthesia has been reviewed. The risks and benefits of the procedure and the sedation options and risks were discussed with the patient or their designee. All questions were answered and informed consent obtained. INDICATION: Therapeutic BRONCHOSCOPIST:              Ruben Yeager MD First Assistant: KARSON Ochoa MD (fellow) Second Assistant: None PROCEDURE SUMMARY: Event Event Time ENDO SCOPE IN TIME 9/25/2024  8:55 AM ENDO SCOPE OUT TIME 9/25/2024  9:29 AM             Fluoroscopy time: Not applicable POST-PROCEDURE DIAGNOSIS: Airway stenosis, known occluded RUL Same as pre-operative diagnoses ANESTHESIA: TIVA. See separate anesthesia provider documentation. This procedure was performed using standard monitoring procedures in [facility]. FINDINGS: After adequate local and intravenous anesthesia, bronchoscopy was performed via an endotracheal tube placed by anesthesia. The distal trachea appeared normal. Inspection of RIGHT bronchial tree to the segmental level appeared abnormal: completely occluded ARSLAN Bonastent. Inspection of LEFT bronchial tree to the segmental level appeared normal. Scant thin secretions were present in the bronchus intermedius, once the stent was removed. These were suctioned out with ease. PROCEDURES: As the stent was fully occluded, I decided it had to be removed. After the patient was properly anesthetized, the patient was positioned without a shoulder roll and with head support. Next, lip  and teeth / gum protection was assured.  The patient was then atraumatically intubated with a 12 mm OD rigid bronchial scope.  The airway was secured with ease.  The mouth was then packed with wet Kerlix to provide a good seal for  adequate positive pressure ventilation. The stent was grasped with a forceps and removed with ease, in toto. Airway examination after stent removal revealed a completely occluded RUL (no change) and only mild stenosis of the ARSLAN and BI (25% luminal compromise). Because the airway stenosis was mild, I did not place another stent. After diagnostic/therapeutic maneuvers, the airway was examined for evidence of bleeding. None was noted. The patient was atraumatically extubated with the rigid bronchoscope. The bronchoscope was removed from the patient's airway and the airway was handed back over to my colleagues from anesthesiology. SPECIMENS: Specimens    ID Type Source Tests Collected by Time  1 : right mainstem stent Tissue FOREIGN BODY(S) -SURGICAL PATHOLOGY EXAM Ruben Yeager MD 9/25/2024 0914     Priority: Routine              COMPLICATIONS: None.    EBL: Minimal, <5 ml    POST PROCEDURE CHEST RADIOGRAPH: Not needed    SUMMARY:  - Inspection of RIGHT bronchial tree to the segmental level appeared abnormal: completely occluded ARSLAN Bonastent. Inspection of LEFT bronchial tree to the segmental level appeared normal. - Scant thin secretions were present in the bronchus intermedius, once the stent was removed. These were suctioned out with ease. - Rigid bronchoscopy was performed. The stent was removed (foreign body removal). - Airway examination after stent removal revealed a completely occluded RUL (no change) and only mild stenosis of the ARSLAN and BI (25% luminal compromise). Because the airway stenosis was mild, I did not place another stent.    RECOMMENDATIONS: Follow up with referring physician - From a bronchoscopy standpoint, can resume Eliquis tomorrow (09/26/2024) - Follow up  bronchoscopy in 2-4 weeks - Recommend finish 5-7 day course of antibiotics for post-obstructive pneumonia - As there is no stent in place at this time, oral guaifenesin and nebulized hypertonic saline is not needed for stent maintenance  The patient was transported to the recovery area/PACU in stable condition. IRuben MD, was personally present throughout this procedure, including all key and non-key portions. Date: 09/25/24 Time: 9:44 AM (Images obtained during this procedure, including ultrasonographic images if performed, can be found in within the electronic medical record and/or PACS.)     Result Date: 9/25/2024  Table formatting from the original result was not included. Images from the original result were not included. Bronchoscopy Operative Report [facility] Date of procedure: 09/25/24 Patient: Juan Carlos Cr MRN: [id number] YOB: 1950 Gender: male Referring provider/physician: Dr. Rubalcava (inpatient consult procedure) PRE-PROCEDURE DIAGNOSIS: Lung cancer with 10 mm (D) x 40 mm (L) Bonastent placed on 08/29/2024, concerns for an occluded stent , known occluded RUL    PRE-PROCEDURE EVALUATION: A history and physical has been performed, and patient medication allergies have been reviewed. The patient's tolerance of previous anesthesia has been reviewed. The risks and benefits of the procedure and the sedation options and risks were discussed with the patient or their designee. All questions were answered and informed consent obtained. INDICATION: Therapeutic BRONCHOSCOPIST:              Ruben Yeager MD First Assistant:  KARSON Ochoa MD (fellow) Second Assistant: None PROCEDURE SUMMARY: Event Event Time ENDO SCOPE IN TIME 9/25/2024  8:55 AM ENDO SCOPE OUT TIME 9/25/2024  9:29 AM             Fluoroscopy time: Not applicable POST-PROCEDURE DIAGNOSIS: Airway stenosis, known occluded RUL Same as pre-operative diagnoses ANESTHESIA: TIVA. See  separate anesthesia provider documentation. This procedure was performed using standard monitoring procedures in [facility]. FINDINGS: After adequate local and intravenous anesthesia, bronchoscopy was performed via an endotracheal tube placed by anesthesia. The distal trachea appeared normal. Inspection of RIGHT bronchial tree to the segmental level appeared abnormal: completely occluded ARSLAN Bonastent . Inspection of LEFT bronchial tree to the segmental level appeared normal. Scant thin secretions were present in the bronchus intermedius, once the stent was removed. These were suctioned out with ease. PROCEDURES: As the stent was fully occluded, I decided it had to be removed. After the patient was properly anesthetized, the patient was positioned without a shoulder roll and with head support. Next, lip and teeth / gum protection was assured.  The patient was then atraumatically intubated with a 12 mm OD rigid bronchial scope.  The airway was secured with ease.  The mouth was then packed with wet Kerlix to provide a good seal for  adequate positive pressure ventilation. The stent was grasped with a forceps and removed with ease, in toto. Airway examination after stent removal revealed a completely occluded RUL (no change) and only mild stenosis of the ARSLAN and BI (25% luminal compromise). Because the airway stenosis was mild, I did not place another stent. After diagnostic/therapeutic maneuvers, the airway was examined for evidence of bleeding. None was noted. The patient was atraumatically extubated with the rigid bronchoscope. The bronchoscope was removed from the patient's airway and the airway was handed back over to my colleagues from anesthesiology. SPECIMENS: Specimens    ID Type Source Tests Collected by Time  1 : right mainstem stent Tissue FOREIGN BODY(S) -SURGICAL PATHOLOGY EXAM Ruben Yeager MD 9/25/2024 0914     Priority: Routine              COMPLICATIONS: None.     EBL: Minimal, <5 ml    POST PROCEDURE CHEST RADIOGRAPH: Not needed    SUMMARY:  - Inspection of RIGHT bronchial tree to the segmental level appeared abnormal: completely occluded ARSLAN Bonastent. Inspection of LEFT bronchial tree to the segmental level appeared normal. - Scant thin secretions were present in the bronchus intermedius, once the stent was removed. These were suctioned out with ease. - Rigid bronchoscopy was performed. The stent was removed (foreign body removal). - Airway examination after stent removal revealed a completely occluded RUL (no change) and only mild stenosis of the ARSLAN and BI (25% luminal compromise). Because the airway stenosis was mild, I did not place another stent.    RECOMMENDATIONS: Follow up with referring physician - From a bronchoscopy standpoint, can resume Eliquis tomorrow (09/26/2024) - Follow up bronchoscopy in 2-4 weeks - Recommend finish 5-7 day course of antibiotics for post-obstructive pneumonia The patient was transported to the recovery area/PACU in stable condition. I, Ruben Yeager MD, was personally present throughout this procedure, including all key and non-key portions. Date: 09/25/24 Time: 9:44 AM (Images obtained during this procedure, including ultrasonographic images if performed, can be found in within the electronic medical record and/or PACS.)       Medications:  Scheduled Medications:  PRN Medications:    amLODIPine, 10 mg, oral, Daily  [Held by provider] apixaban, 5 mg, oral, BID  aspirin, 81 mg, oral, Daily  fluticasone furoate-vilanteroL, 1 puff, inhalation, Daily  folic acid, 1 mg, oral, Daily  insulin lispro, 0-5 Units, subcutaneous, Before meals & nightly  lidocaine, 0.1 mL, subcutaneous, Once  metoprolol tartrate, 50 mg, oral, q12h  nicotine, 1 patch, transdermal, q24h  oxygen, , inhalation, Continuous - Inhalation  pantoprazole, 40 mg, oral, Daily before breakfast  piperacillin-tazobactam, 4.5 g, intravenous, q6h  potassium phosphate  (monobasic), 500 mg, oral, 4x daily  simvastatin, 20 mg, oral, Daily  tiotropium, 2 puff, inhalation, Daily     PRN medications: acetaminophen, albuterol, albuterol, dextrose, dextrose, glucagon, glucagon, melatonin, ondansetron, ondansetron, oxygen, polyethylene glycol, prochlorperazine, promethazine     Assessment/Plan:   Juan Carlos Cr is a 74 y.o. 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 CT scan demonstrated complete obstruction of the distal right mainstem bronchus and large right pleural effusion. S/p bronch 9/25/24, stent was obstructed with mucus and debris. Continuing treatment for post-obstructive PNA. Rad/onc planning interval imaging following first dose of chemo and discussion at tumor boards. PICC line placement and first dose of chemo today tentatively.    Updates:  09/26  - Zosyn discontinued, switched to levo  - PICC line placed  - Pulm signed off, follow-up bronch 2-4 weeks.  - First dose of chemo today tentatively    09/25  - Bronch showed obstruction of the stent with mucus and debris. Stent was removed and no additional stent placed as the airway was only mildly stenosed.  - Continuing treatment for post-obstructive PNA  - Rad/onc recommended interval imaging following initation of chemo and reasses. Referral for thoracic tumor board review.    09/24  - Pulmonology recommended, bronch tomorrow afternoon. Felt no need for thora given small size of effusion.  - Radiation oncology awaiting bronch to assess need for emergent RT  - MRSA nares, strep pneumo, legionella negative. Vancomycin and azithromycin discontinued  - Dr. Landis would like inpatient carbo/taxol to be initiated. Tentatively Thursday  - PICC line on d/c    #Acute on chronic hypoxic respiratory  failure  #Right hilar SCC of the lungs T4N2M0  #COPD on 2L NC at baseline  Patient is admitted for acute on chronic hypoxic respiratory failure 2/2 stent obstruction. Currently treating for post obstructive pneumonia. Planning for chemo today.   - Pulmonology following  - RT following  - Rad/onc following, planning for interval imaging following first dose of chemo and discussion and tumor boards prior to radiation.  - CT scan showed complete obstruction of the distal right mainstem bronchus and large right pleural effusion  - Bronch showed obstruction of the stent with mucus and debris. Stent was removed and no additional stent placed as the airway was only mildly stenosed. No further plans for thora  Plan:  - Continue home Breo and Spiriva  - Continue supplemental O2 on 2L which is his baseline  - Albuterol Q6H PRN  - Empiric PNA treatment, zosyn discontinued, switched to levo 750mg once daily for 4 more days.   - Resp culture needs to be collected  - Dr. Landis outpatient heme/onc recommended initiation of carbo/taxol inpatient, starting today tentatively  - PICC line today  placed     #Hypokalemia  Patient remains asymptomatic at this time and is not complaining of muscle cramps/pain. We will continue to monitor with daily RFP and Mg2+. Replete PRN.   - Received 40meq of K+ in the ED potassium was 2.6, repeat potassium 3.0 and phos 1.9 received 40meq K+ 9/23, and potassium phosphate  - Potassium 3.2 and phos 1.9 9/24 received 40meq of K+ and potassium phosphate 9/24  - Potassium of 3.1 overnight, night team ordered 40meq of K+. Phos of 2.0 night team ordered potassium phos 9/25. Recheck this afternoon.  - Potassium of 3.2 9/26, received 40meq. Phos 1.9 received potassium phos. Received 2g mag sulfate.  Plan:  - Daily RFP and Mg2+  - Replete K+ >4, Mg2+ >2  - Maintain telemetry     #CAD s/p PCI 1997  #Paroxysmal AFib  #HTN  #HLD  Will continue home medications.   - Continue metroprolol 50mg BID and Eliquis 5mg  BID for AFib  - Continue simvastatin 20mg every day  - Continue ASA, CAD s/p PCI (1997)  - Continue Amlodipine 5mg     #T2DM  Last A1C 7.6% 8/26/2024.  - Hold home antihyperglycemics, metformin 500mg BID.   - LDSSI    F: Replete PRN  E: Replete PRN  N: Regular Diet  Access/Lines: PICC right basilic vein 9/26/24    DVT Ppx: Eliquis, resumed today  GI Ppx: Pantoprazole     Abx: Levo (9/26/24-9/29/24), Zosyn (9/23/24-9/26/2024), Vanc (9/23/24 dc'ed same day), Azithro (9/23-9/24/2024 dc'ed)  O2: 2L NC    Pain regimen: None  GI Laxative: Miralax     Code status: Full Code  NOK/Surrogate Decision Maker: Cindy, spouse ([phone])    Patient assessment and plan staffed with the attending physician on service, Dr. Rosy THRASHER  MS4  Department of Internal Medicine

## 2024-09-26 NOTE — CARE PLAN
The clinical goals for the shift include Pt will remain HDS and free from falls        Problem: Fall/Injury  Goal: Not fall by end of shift  Outcome: Progressing  Goal: Be free from injury by end of the shift  Outcome: Progressing  Goal: Verbalize understanding of personal risk factors for fall in the hospital  Outcome: Progressing  Goal: Verbalize understanding of risk factor reduction measures to prevent injury from fall in the home  Outcome: Progressing  Goal: Use assistive devices by end of the shift  Outcome: Progressing  Goal: Pace activities to prevent fatigue by end of the shift  Outcome: Progressing     Problem: Respiratory  Goal: Clear secretions with interventions this shift  Outcome: Progressing  Goal: Minimize anxiety/maximize coping throughout shift  Outcome: Progressing  Goal: Minimal/no exertional discomfort or dyspnea this shift  Outcome: Progressing  Goal: No signs of respiratory distress (eg. Use of accessory muscles. Peds grunting)  Outcome: Progressing  Goal: Patent airway maintained this shift  Outcome: Progressing  Goal: Tolerate mechanical ventilation evidenced by VS/agitation level this shift  Outcome: Progressing  Goal: Tolerate pulmonary toileting this shift  Outcome: Progressing  Goal: Verbalize decreased shortness of breath this shift  Outcome: Progressing  Goal: Wean oxygen to maintain O2 saturation per order/standard this shift  Outcome: Progressing  Goal: Increase self care and/or family involvement in next 24 hours  Outcome: Progressing     Problem: Pain - Adult  Goal: Verbalizes/displays adequate comfort level or baseline comfort level  Outcome: Progressing     Problem: Safety - Adult  Goal: Free from fall injury  Outcome: Progressing     Problem: Discharge Planning  Goal: Discharge to home or other facility with appropriate resources  Outcome: Progressing     Problem: Chronic Conditions and Co-morbidities  Goal: Patient's chronic conditions and co-morbidity symptoms are monitored  and maintained or improved  Outcome: Progressing

## 2024-09-26 NOTE — PROGRESS NOTES
Occupational Therapy                 Therapy Communication Note    Patient Name: Juan Carlos Cr  MRN: [id number]  Department: [facility]  Room: 6023/6023-A  Today's Date: 9/26/2024     Discipline: Occupational Therapy    Missed Visit Reason: Missed Visit Reason:  (screen and d/c pt I with ADLs and ambulating in hallway, will d/c orders, please place new orders with change in function)    Patrick Medrano, OTR/L

## 2024-09-26 NOTE — PROGRESS NOTES
09/26/24 1300   Discharge Planning   Living Arrangements Spouse/significant other   Support Systems Spouse/significant other   Assistance Needed none   Type of Residence Private residence   Who is requesting discharge planning? Provider   Home or Post Acute Services In home services   Type of Home Care Services Home nursing visits   Expected Discharge Disposition Home     Met with patient bedside. Patient and wife report patient was active with [facility] for skilled nursing visits prior to hospitalization. MD notified to place referral for resumption of services. [facility] notified of desire to resume services on discharge. ADOD 9/27. Tessa Mosquera RN [facility]

## 2024-09-27 ENCOUNTER — TUMOR BOARD CONFERENCE (OUTPATIENT)
Dept: HEMATOLOGY/ONCOLOGY | Facility: HOSPITAL | Age: 74
End: 2024-09-27
Payer: MEDICARE

## 2024-09-27 ENCOUNTER — PHARMACY VISIT (OUTPATIENT)
Dept: PHARMACY | Facility: CLINIC | Age: 74
End: 2024-09-27
Payer: COMMERCIAL

## 2024-09-27 ENCOUNTER — TELEPHONE (OUTPATIENT)
Dept: HEMATOLOGY/ONCOLOGY | Facility: HOSPITAL | Age: 74
End: 2024-09-27

## 2024-09-27 ENCOUNTER — APPOINTMENT (OUTPATIENT)
Dept: RADIOLOGY | Facility: HOSPITAL | Age: 74
DRG: 180 | End: 2024-09-27
Payer: MEDICARE

## 2024-09-27 VITALS
OXYGEN SATURATION: 96 % | SYSTOLIC BLOOD PRESSURE: 132 MMHG | WEIGHT: 148.5 LBS | TEMPERATURE: 97.2 F | HEIGHT: 69 IN | BODY MASS INDEX: 22 KG/M2 | RESPIRATION RATE: 18 BRPM | HEART RATE: 69 BPM | DIASTOLIC BLOOD PRESSURE: 74 MMHG

## 2024-09-27 LAB
ALBUMIN SERPL BCP-MCNC: 2.8 G/DL (ref 3.4–5)
ANION GAP SERPL CALC-SCNC: 13 MMOL/L (ref 10–20)
BACTERIA BLD CULT: NORMAL
BACTERIA BLD CULT: NORMAL
BASOPHILS NFR FLD MANUAL: 0 %
BLASTS NFR FLD MANUAL: 0 %
BUN SERPL-MCNC: 9 MG/DL (ref 6–23)
CALCIUM SERPL-MCNC: 8 MG/DL (ref 8.6–10.6)
CHLORIDE SERPL-SCNC: 102 MMOL/L (ref 98–107)
CLARITY FLD: CLEAR
CO2 SERPL-SCNC: 27 MMOL/L (ref 21–32)
COLOR FLD: YELLOW
CREAT SERPL-MCNC: 0.71 MG/DL (ref 0.5–1.3)
EGFRCR SERPLBLD CKD-EPI 2021: >90 ML/MIN/1.73M*2
EOSINOPHIL NFR FLD MANUAL: 0 %
ERYTHROCYTE [DISTWIDTH] IN BLOOD BY AUTOMATED COUNT: 11.5 % (ref 11.5–14.5)
GLUCOSE BLD MANUAL STRIP-MCNC: 94 MG/DL (ref 74–99)
GLUCOSE BLD MANUAL STRIP-MCNC: 95 MG/DL (ref 74–99)
GLUCOSE SERPL-MCNC: 94 MG/DL (ref 74–99)
HCT VFR BLD AUTO: 33.3 % (ref 41–52)
HGB BLD-MCNC: 11.2 G/DL (ref 13.5–17.5)
IMMATURE GRANULOCYTES IN FLUID: 0 %
LYMPHOCYTES NFR FLD MANUAL: 57 %
MAGNESIUM SERPL-MCNC: 1.99 MG/DL (ref 1.6–2.4)
MCH RBC QN AUTO: 30.3 PG (ref 26–34)
MCHC RBC AUTO-ENTMCNC: 33.6 G/DL (ref 32–36)
MCV RBC AUTO: 90 FL (ref 80–100)
MONOS+MACROS NFR FLD MANUAL: 13 %
NEUTROPHILS NFR FLD MANUAL: 21 %
NRBC BLD-RTO: 0 /100 WBCS (ref 0–0)
OTHER CELLS NFR FLD MANUAL: 5 %
PATH REVIEW-CELL CT,FLUID: NORMAL
PHOSPHATE SERPL-MCNC: 2.1 MG/DL (ref 2.5–4.9)
PLASMA CELLS NFR FLD MANUAL: 4 %
PLATELET # BLD AUTO: 176 X10*3/UL (ref 150–450)
POTASSIUM SERPL-SCNC: 3.6 MMOL/L (ref 3.5–5.3)
RBC # BLD AUTO: 3.7 X10*6/UL (ref 4.5–5.9)
RBC # FLD MANUAL: 1000 /UL
SODIUM SERPL-SCNC: 138 MMOL/L (ref 136–145)
TOTAL CELLS COUNTED FLD: 100
WBC # BLD AUTO: 9.2 X10*3/UL (ref 4.4–11.3)
WBC # FLD MANUAL: 1113 /UL

## 2024-09-27 PROCEDURE — 2500000002 HC RX 250 W HCPCS SELF ADMINISTERED DRUGS (ALT 637 FOR MEDICARE OP, ALT 636 FOR OP/ED)

## 2024-09-27 PROCEDURE — 80069 RENAL FUNCTION PANEL: CPT

## 2024-09-27 PROCEDURE — 2500000005 HC RX 250 GENERAL PHARMACY W/O HCPCS

## 2024-09-27 PROCEDURE — 88342 IMHCHEM/IMCYTCHM 1ST ANTB: CPT | Performed by: PATHOLOGY

## 2024-09-27 PROCEDURE — S4991 NICOTINE PATCH NONLEGEND: HCPCS

## 2024-09-27 PROCEDURE — 94640 AIRWAY INHALATION TREATMENT: CPT

## 2024-09-27 PROCEDURE — 0W993ZZ DRAINAGE OF RIGHT PLEURAL CAVITY, PERCUTANEOUS APPROACH: ICD-10-PCS | Performed by: NURSE PRACTITIONER

## 2024-09-27 PROCEDURE — 88112 CYTOPATH CELL ENHANCE TECH: CPT | Performed by: PATHOLOGY

## 2024-09-27 PROCEDURE — 2500000001 HC RX 250 WO HCPCS SELF ADMINISTERED DRUGS (ALT 637 FOR MEDICARE OP)

## 2024-09-27 PROCEDURE — 71045 X-RAY EXAM CHEST 1 VIEW: CPT

## 2024-09-27 PROCEDURE — 82947 ASSAY GLUCOSE BLOOD QUANT: CPT

## 2024-09-27 PROCEDURE — 99238 HOSP IP/OBS DSCHRG MGMT 30/<: CPT | Performed by: INTERNAL MEDICINE

## 2024-09-27 PROCEDURE — 2500000004 HC RX 250 GENERAL PHARMACY W/ HCPCS (ALT 636 FOR OP/ED)

## 2024-09-27 PROCEDURE — 88341 IMHCHEM/IMCYTCHM EA ADD ANTB: CPT | Performed by: PATHOLOGY

## 2024-09-27 PROCEDURE — 85027 COMPLETE CBC AUTOMATED: CPT

## 2024-09-27 PROCEDURE — 88104 CYTOPATH FL NONGYN SMEARS: CPT | Performed by: PATHOLOGY

## 2024-09-27 PROCEDURE — 32555 ASPIRATE PLEURA W/ IMAGING: CPT

## 2024-09-27 PROCEDURE — 83735 ASSAY OF MAGNESIUM: CPT

## 2024-09-27 PROCEDURE — 89051 BODY FLUID CELL COUNT: CPT

## 2024-09-27 PROCEDURE — RXMED WILLOW AMBULATORY MEDICATION CHARGE

## 2024-09-27 PROCEDURE — 88305 TISSUE EXAM BY PATHOLOGIST: CPT | Performed by: PATHOLOGY

## 2024-09-27 PROCEDURE — 88112 CYTOPATH CELL ENHANCE TECH: CPT | Mod: TC,MCY

## 2024-09-27 RX ORDER — CYANOCOBALAMIN (VITAMIN B-12) 500 MCG
800 TABLET ORAL DAILY
Status: DISCONTINUED | OUTPATIENT
Start: 2024-09-27 | End: 2024-09-27 | Stop reason: HOSPADM

## 2024-09-27 RX ORDER — CYANOCOBALAMIN (VITAMIN B-12) 500 MCG
800 TABLET ORAL DAILY
Qty: 60 TABLET | Refills: 0 | Status: SHIPPED | OUTPATIENT
Start: 2024-09-28

## 2024-09-27 RX ORDER — MAGNESIUM SULFATE 1 G/100ML
1 INJECTION INTRAVENOUS ONCE
Status: COMPLETED | OUTPATIENT
Start: 2024-09-27 | End: 2024-09-27

## 2024-09-27 RX ORDER — LEVOFLOXACIN 750 MG/1
750 TABLET ORAL
Qty: 2 TABLET | Refills: 0 | Status: SHIPPED | OUTPATIENT
Start: 2024-09-27 | End: 2024-09-29

## 2024-09-27 RX ORDER — POTASSIUM CHLORIDE 20 MEQ/1
40 TABLET, EXTENDED RELEASE ORAL DAILY
Qty: 60 TABLET | Refills: 0 | Status: SHIPPED | OUTPATIENT
Start: 2024-09-27

## 2024-09-27 ASSESSMENT — PAIN SCALES - GENERAL: PAINLEVEL_OUTOF10: 0 - NO PAIN

## 2024-09-27 ASSESSMENT — PAIN - FUNCTIONAL ASSESSMENT: PAIN_FUNCTIONAL_ASSESSMENT: 0-10

## 2024-09-27 NOTE — CARE PLAN
Over the shift, the patient made progress toward the following goals.     Problem: Fall/Injury  Goal: Not fall by end of shift  Outcome: Progressing  Goal: Verbalize understanding of risk factor reduction measures to prevent injury from fall in the home  Outcome: Progressing     Problem: Respiratory  Goal: Clear secretions with interventions this shift  Outcome: Progressing  Flowsheets (Taken 9/27/2024 0624)  Clear secretions with interventions this shift: Encourage/provide pulmonary hygiene/secretion clearance  Goal: No signs of respiratory distress (eg. Use of accessory muscles. Peds grunting)  Outcome: Progressing  Goal: Patent airway maintained this shift  Outcome: Progressing     Problem: Pain - Adult  Goal: Verbalizes/displays adequate comfort level or baseline comfort level  Outcome: Progressing  Flowsheets (Taken 9/27/2024 0624)  Verbalizes/displays adequate comfort level or baseline comfort level:   Encourage patient to monitor pain and request assistance   Assess pain using appropriate pain scale     Problem: Safety - Adult  Goal: Free from fall injury  Outcome: Progressing  Flowsheets (Taken 9/27/2024 0624)  Free from fall injury: Instruct family/caregiver on patient safety

## 2024-09-27 NOTE — DISCHARGE SUMMARY
Discharge Diagnosis  Squamous cell lung cancer, unspecified laterality (Multi)    Issues Requiring Follow-Up  Squamous cell lung cancer: Follow up with radiation oncology and Dr. Landis  Bronchial stent obstruction s/p removal: Follow up with pulmonology.    Discharge Meds     Medication List      START taking these medications     levoFLOXacin 750 mg tablet; Commonly known as: Levaquin; Take 1 tablet   (750 mg) by mouth once every 24 hours for 2 days.   potassium chloride CR 20 mEq ER tablet; Commonly known as: Klor-Con M20;   Take 2 tablets (40 mEq) by mouth once daily. Do not crush or chew.   Vitamin D3 10 mcg (400 unit) tablet; Generic drug: cholecalciferol; Take   2 tablets (20 mcg) by mouth once daily.; Start taking on: September 28, 2024     CHANGE how you take these medications     melatonin 3 mg tablet; Take 1 tablet (3 mg) by mouth once daily.; What   changed: when to take this, reasons to take this     CONTINUE taking these medications     amLODIPine 5 mg tablet; Commonly known as: Norvasc; Take 2 tablets (10   mg) by mouth once daily.   apixaban 5 mg tablet; Commonly known as: Eliquis; Take 1 tablet (5 mg)   by mouth 2 times a day.   ascorbic acid 500 mg tablet; Commonly known as: Vitamin C   aspirin 81 mg EC tablet   fluticasone furoate-vilanteroL 200-25 mcg/dose inhaler; Commonly known   as: Breo Ellipta; Inhale 1 puff once daily.   fluticasone-umeclidin-vilanter 200-62.5-25 mcg blister with device;   Commonly known as: TRELEGY-ELLIPTA; Inhale 1 puff early in the morning..   Rinse mouth after taking dose   folic acid 1 mg tablet; Commonly known as: Folvite; Take 1 tablet (1 mg)   by mouth once daily.   * FreeStyle Lite Meter kit; Generic drug: Blood glucose monitoring meter   kit; TEST BLOOD SUGAR LEVELS 2X A DAY   * blood-glucose meter misc; Commonly known as: Accu-Chek Guide Glucose   Meter; Check blood glucose 2x a day   * FreeStyle Lite Strips strip; Generic drug: blood sugar diagnostic; 1    strip 2 times a day.   * Accu-Chek Guide test strips strip; Generic drug: blood sugar   diagnostic; 1 strip 2 times a day.   * lancets misc; TEST BLOOD SUGAR LEVELS 2X A DAY   * lancets misc; Commonly known as: Accu-Chek Fastclix Lancet Drum; Check   blood glucose 2x day   * lancets misc; Commonly known as: Accu-Chek Fastclix Lancet Drum; Check   blood glucose level 2x a day   * lancets misc; Check blood sugar twice daily   metFORMIN 500 mg tablet; Commonly known as: Glucophage; Take 1 tablet   (500 mg) by mouth 2 times daily (morning and late afternoon).   metoprolol tartrate 50 mg tablet; Commonly known as: Lopressor; Take 1   tablet by mouth every 12 hours.   multivitamin with minerals tablet; Take 1 tablet by mouth once daily.   * nicotine 14 mg/24 hr patch; Commonly known as: Nicoderm CQ; Apply 1   patch topically every day.  START AFTER finishing the 21mg patches.   * nicotine 21 mg/24 hr patch; Commonly known as: Nicoderm CQ; Place 1   patch over 24 hours on the skin once daily for 42 doses.   * nicotine 7 mg/24 hr patch; Commonly known as: Nicoderm CQ; Place 1   patch over 24 hours on the skin once daily for 14 doses. START AFTER   finishing the 14mg patches.; Start taking on: October 22, 2024   ondansetron 8 mg tablet; Commonly known as: Zofran; Take 1 tablet (8 mg)   by mouth every 8 hours if needed for nausea or vomiting.   pantoprazole 40 mg EC tablet; Commonly known as: ProtoNix; Take 1 tablet   (40 mg) by mouth once daily. Do not crush, chew, or split.   prochlorperazine 10 mg tablet; Commonly known as: Compazine; Take 1   tablet (10 mg) by mouth every 6 hours if needed for nausea or vomiting.   simvastatin 20 mg tablet; Commonly known as: Zocor; Take 1 tablet (20   mg) by mouth once daily.   sodium chloride 3 % nebulizer solution; Take 3 mL by nebulization every   6 hours if needed for cough.   thiamine 100 mg tablet; Commonly known as: Vitamin B-1; Take 1 tablet   (100 mg) by mouth once daily. Do  not start  before September 2, 2024.   tiotropium 2.5 mcg/actuation inhaler; Commonly known as: Spiriva   Respimat; Inhale 2 puffs once daily.  * This list has 11 medication(s) that are the same as other medications   prescribed for you. Read the directions carefully, and ask your doctor or   other care provider to review them with you.       Test Results Pending At Discharge  Pending Labs       Order Current Status    Cytology (Non-Gynecologic) Collected (09/27/24 1336)    Pathologist Review-Cell Count,Fluid In process    Staphylococcus Aureus/MRSA Colonization, Culture - PICC Only In process    Surgical Pathology Exam In process            Hospital Course  Juan Carlos Cr is a 74 y.o. 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    On arrival in the ED he was originally hypoxic on 2L of O2 NC at 81% and tachypneic. He was placed on 4L of O2 with improvement in his saturations to the 90s along with marked symptomatic relief. CXR revealed new complete opacification of the right hemithorax. He was started on Vanc/Zosyn and admitted for acute on chronic respiratory failure.     A CT scan of the chest was obtained which showed complete obstruction of the distal right mainstem bronchus and large right pleural effusion. Pulmonology was consulted, bronchoscopy was performed 9/25/24 demonstrating an obstructed stent which was removed following removal his oxygen requirements had returned to his baseline of 2L. He was treated for concurrent post obstructive PNA with Zosyn 9/23-9/26, Vanc 9/23, and Azithro 9/23-9/24, he was later switched to levo 9/26 and is to finish his course 9/29. Rad/onc was consulted due to concerns of worsening tumor burden though given concerns for malignant pleural effusion they recommended interval imaging  following first dose of chemo and discussion at tumor boards. A thoracentesis was obtained 9/27 to help clarify staging for radiation oncology recommendations. After discussion with his outpatient heme/onc a PICC line was placed 9/26 pending outpatient chemotherapy. He was discharged in stable condition on 2L of O2 NC with much improvement to his shortness of breath. He is to have close outpatient follow-up with radiation oncology and Dr. Landis.    Pertinent Physical Exam At Time of Discharge  Physical Exam  Constitutional:       General: He is not in acute distress.  HENT:      Head: Normocephalic and atraumatic.   Eyes:      Extraocular Movements: Extraocular movements intact.      Conjunctiva/sclera: Conjunctivae normal.      Pupils: Pupils are equal, round, and reactive to light.   Cardiovascular:      Rate and Rhythm: Normal rate and regular rhythm.      Heart sounds: No murmur heard.     No gallop.   Pulmonary:      Effort: Pulmonary effort is normal. No respiratory distress.      Breath sounds: No stridor. Examination of the right-upper field reveals decreased breath sounds. Examination of the right-middle field reveals decreased breath sounds. Examination of the right-lower field reveals decreased breath sounds. Decreased breath sounds (Improved since admission) and wheezing (Expiratory wheezing right lung fields.) present.   Abdominal:      General: Abdomen is flat. Bowel sounds are normal. There is no distension.      Palpations: Abdomen is soft.      Tenderness: There is no abdominal tenderness.   Musculoskeletal:         General: No swelling or deformity.      Right lower leg: No edema.      Left lower leg: No edema.   Skin:     General: Skin is warm and dry.   Neurological:      General: No focal deficit present.      Mental Status: He is alert and oriented to person, place, and time.       Outpatient Follow-Up  Future Appointments   Date Time Provider Department Center   9/28/2024 10:30 AM Юлия  NATTY Ahn [facility]   10/2/2024 10:30 AM Janiya Landis MD [id number] [facility]   10/2/2024 11:00 AM INF 05 [facility]   10/9/2024 10:30 AM INF 07 [facility]   10/10/2024 10:00 AM Missael Holt MD [id number] [facility]   11/1/2024  8:00 AM Lisa Ko MD [id number] [facility]   11/20/2024 10:20 AM Thanh Cuba MD [id number] [facility]   12/10/2024  9:00 AM Missael Holt MD [id number] [facility] [place]

## 2024-09-27 NOTE — TELEPHONE ENCOUNTER
Patient to be released from the hospital, possibly today. Reminder call for October 2, 2024 visit with Dr. LINDA Landis at 1030 and infusion visit scheduled for 1100.             Contacted patient's son, Royce. Requested he remind his father of upcoming appointments. Son agreeable.

## 2024-09-27 NOTE — DISCHARGE INSTRUCTIONS
Dear Mr. Cr,    You were admitted for hypoxic respiratory failure that was due to a blocked stent and pneumonia, an infection of the lungs. During your stay, you were evaluated by the lung doctors who removed the blocked stent and we placed you on antibiotics to treat your lung infection. Radiation/oncology also saw you for consideration of radiation therapy for your cancer. A thoracentesis was done to help further clarify staging in order for the radiation team to make the best recommendations for your care. You will receive chemotherapy as an outpatient with Dr. Landis.    Please follow up with your Primary Care Provider and your additional Scheduled Appointments as listed below:  Please follow up with Dr. Landis on 10/2/2024 as scheduled.  Please follow-up with radiation oncology you should expect a call from them.  Please follow-up on an outpatient basis with pulmonology, you are to get a repeat bronchoscopy within 2-4 weeks. You have an appointment scheduled 10/10/24 at 10AM at the [facility] location with Dr. Holt for hospital follow-up.   Please Note: These are set appointments that you are scheduled to attend.     Please continue taking all your home medications with the changes listed below:  Levofloxacin, take 1 tablet by mouth once daily for 2 days.  Vitamin D 800U, take 800U by mouth once daily.  Please Note: All other medications are to be taken as they were prescribed before you were in the hospital. Please see the medication section of your discharge instructions for more information, and reach out to a doctor with any questions.    I also strongly urge all of my patients to register for [facility] by going to: [email or web address].    It was our pleasure taking care of you!     Sincerely,  Your  Care Team

## 2024-09-27 NOTE — POST-PROCEDURE NOTE
INTERVENTIONAL RADIOLOGY ADVANCED PRACTICE PROCEDURE  [facility]    A time out was performed and Right Hemithorax was examined with US and appropriate entry point was confirmed and marked.   The patient was prepped and draped in a sterile manner, 1% lidocaine was used to anesthesize the skin and subcutaneous tissue.   A 5F Centesis needle was then introduced through the skin into the pleural space, the centesis catheter was then threaded without difficulty.   250 ml of yellow fluid was removed without difficulty. The catheter was then removed.   No immediate complications were noted during and immediately following the procedure.

## 2024-09-28 ENCOUNTER — HOME CARE VISIT (OUTPATIENT)
Dept: HOME HEALTH SERVICES | Facility: HOME HEALTH | Age: 74
End: 2024-09-28
Payer: MEDICARE

## 2024-09-28 VITALS
TEMPERATURE: 97.9 F | BODY MASS INDEX: 21.93 KG/M2 | RESPIRATION RATE: 16 BRPM | HEIGHT: 69 IN | DIASTOLIC BLOOD PRESSURE: 60 MMHG | OXYGEN SATURATION: 97 % | HEART RATE: 72 BPM | SYSTOLIC BLOOD PRESSURE: 110 MMHG

## 2024-09-28 LAB — STAPHYLOCOCCUS SPEC CULT: NORMAL

## 2024-09-28 PROCEDURE — G0299 HHS/HOSPICE OF RN EA 15 MIN: HCPCS | Mod: HHH

## 2024-09-28 SDOH — ECONOMIC STABILITY: FOOD INSECURITY: MEALS PER DAY: 3

## 2024-09-28 SDOH — ECONOMIC STABILITY: HOUSING INSECURITY: EVIDENCE OF SMOKING MATERIAL: 0

## 2024-09-28 SDOH — HEALTH STABILITY: MENTAL HEALTH: SMOKING IN HOME: 0

## 2024-09-28 ASSESSMENT — PAIN SCALES - PAIN ASSESSMENT IN ADVANCED DEMENTIA (PAINAD)
BREATHING: 0
TOTALSCORE: 0
BODYLANGUAGE: 0
NEGVOCALIZATION: 0 - NONE.
CONSOLABILITY: 0 - NO NEED TO CONSOLE.
FACIALEXPRESSION: 0 - SMILING OR INEXPRESSIVE.
FACIALEXPRESSION: 0
NEGVOCALIZATION: 0
CONSOLABILITY: 0
BODYLANGUAGE: 0 - RELAXED.

## 2024-09-28 ASSESSMENT — ENCOUNTER SYMPTOMS
OCCASIONAL FEELINGS OF UNSTEADINESS: 0
DEPRESSION: 0
DYSPNEA ON EXERTION: 1
LAST BOWEL MOVEMENT: 67110
DENIES PAIN: 1
STOOL FREQUENCY: LESS THAN DAILY
PERSON REPORTING PAIN: PATIENT
LOSS OF SENSATION IN FEET: 0
APPETITE LEVEL: GOOD

## 2024-09-28 ASSESSMENT — ACTIVITIES OF DAILY LIVING (ADL)
SHOPPING: DEPENDENT
ORAL_CARE_CURRENT_FUNCTION: NEEDS ASSISTANCE
ENTERING_EXITING_HOME: MODERATE ASSIST
BATHING ASSESSED: 1
AMBULATION ASSISTANCE: 1
LAUNDRY ASSESSED: 1
GROOMING_CURRENT_FUNCTION: STAND BY ASSIST
USING THE TELPHONE: NEEDS ASSISTANCE
FEEDING ASSESSED: 1
DRESSING_UB_CURRENT_FUNCTION: STAND BY ASSIST
TRANSPORTATION: DEPENDENT
ORAL_CARE_ASSESSED: 1
TOILETING: STAND BY ASSIST
GROOMING ASSESSED: 1
LIGHT HOUSEKEEPING: DEPENDENT
FEEDING: STAND BY ASSIST
BATHING_CURRENT_FUNCTION: STAND BY ASSIST
DRESSING_LB_CURRENT_FUNCTION: STAND BY ASSIST
PREPARING MEALS: DEPENDENT
TOILETING: 1
HOUSEKEEPING ASSESSED: 1
CURRENT_FUNCTION: STAND BY ASSIST
AMBULATION ASSISTANCE: STAND BY ASSIST
TRANSPORTATION ASSESSED: 1
SHOPPING ASSESSED: 1
PHYSICAL TRANSFERS ASSESSED: 1
LAUNDRY: DEPENDENT
TELEPHONE USE ASSESSED: 1
OASIS_M1830: 03

## 2024-09-28 ASSESSMENT — LIFESTYLE VARIABLES
SMOKING_STATUS: 0
PERSONAL HISTORY ALCOHOL: 1

## 2024-09-30 ENCOUNTER — PATIENT OUTREACH (OUTPATIENT)
Dept: CARE COORDINATION | Facility: CLINIC | Age: 74
End: 2024-09-30
Payer: MEDICARE

## 2024-09-30 SDOH — ECONOMIC STABILITY: GENERAL: WOULD YOU LIKE HELP WITH ANY OF THE FOLLOWING NEEDS?: I DONT NEED HELP WITH ANY OF THESE

## 2024-09-30 NOTE — SIGNIFICANT EVENT
09/30/24 1321   Social Determinants of Health- Help Requested   Would you like help with any of the following needs? I dont need help with any of these   Are any of your needs urgent? For example, uncertainty of where you will get your next meal or not having the medications you need to take tomorrow. N

## 2024-09-30 NOTE — PROGRESS NOTES
[facility]  Admitted 9/23/2024  Discharged 9/27/2024  Dx: Pneumonia, Collapsed Right Lung, Squamous Cell Carcinoma  9/25/2024 s/p: Bronchoscopy with stent removal  9/27/2024 s/p: Pleural Paracentesis  Discharged with [facility]    Outreach call to patient to support a smooth transition of care from recent admission. Patient states, things are going pretty good. Patient states, his shortness of breath is much better. Patient states, he is on his baseline home oxygen at 2L. Patient states, he is up and walking. Patient states, he feels much better. Patient states, he starts chemo on 10/2/2024. Reviewed discharge medications, discharge instructions, assessed social needs, and provided education on importance of follow-up appointment with provider.      Engagement  Call Start Time: 1316 (9/30/2024  1:16 PM)    Medications  Medications reviewed with patient/caregiver?: Yes (9/30/2024  1:16 PM)  Is the patient having any side effects they believe may be caused by any medication additions or changes?: No (9/30/2024  1:16 PM)  Does the patient have all medications ordered at discharge?: Yes (9/30/2024  1:16 PM)  Care Management Interventions: No intervention needed (9/30/2024  1:16 PM)  Prescription Comments: Discharged on Levaquin, KCL, Vitamin D3 (9/30/2024  1:16 PM)  Is the patient taking all medications as directed (includes completed medication regime)?: Yes (9/30/2024  1:16 PM)  Medication Comments: not applicable (9/30/2024  1:16 PM)    Appointments  Does the patient have a primary care provider?: Yes (Patient following with Oncology 10/2/2024) (9/30/2024  1:16 PM)  Care Management Interventions: Verified appointment date/time/provider (9/30/2024  1:16 PM)    Self Management  What is the home health agency?: [facility]: Nursing (9/30/2024  1:16 PM)  Has home health visited the patient within 72 hours of discharge?: Yes (9/30/2024  1:16 PM)  What Durable Medical  Equipment (DME) was ordered?: Not applicable (9/30/2024  1:16 PM)    Patient Teaching  Does the patient have access to their discharge instructions?: Yes (9/30/2024  1:16 PM)  Care Management Interventions: Reviewed instructions with patient (9/30/2024  1:16 PM)  What is the patient's perception of their health status since discharge?: Improving (9/30/2024  1:16 PM)  Is the patient/caregiver able to teach back the hierarchy of who to call/visit for symptoms/problems? PCP, Specialist, Home Health nurse, Urgent Care, ED, 911: Yes (9/30/2024  1:16 PM)    Will continue to monitor through transition period.    Kath Owens RN/CM  [facility]  [phone]

## 2024-10-01 NOTE — DOCUMENTATION CLARIFICATION NOTE
"    PATIENT:               RONALD DAVILA  ACCT #:                  [id number]  MRN:                       [id number]  :                       1950  ADMIT DATE:       2024 11:17 AM  DISCH DATE:        2024 6:44 PM  RESPONDING PROVIDER #:        [id number]          PROVIDER RESPONSE TEXT:    I concur with the pathology report findings and they are clinically significant    CDI QUERY TEXT:    Clarification        Instruction:    Based on your assessment of the patient and the clinical information, please provide the requested documentation by clicking on the appropriate radio button and enter any additional information if prompted.    Question: Please document whether you concur or do not concur with the pathology report findings    When answering this query, please exercise your independent professional judgment. The fact that a question is being asked, does not imply that any particular answer is desired or expected.    The patient's clinical indicators include:  Clinical Information:  74 y.o. male with a PMH of CAD s/p PCI, HTN, HLD, AUD, and history of tobacco use transferred to [facility] with acute hypoxic respiratory failure and lung mass diagnosed on Chest CT.    Clinical Indicators and Pathology Findings:  CT chest:  \" mass rt hilum, obstructing rt main bronchus and anterior RUL pulm artery. \"    ? 24  Lung biopsy - Right Main Stem Tumor ?  ? 24 Final diagnosis  Result:  A. Lung, right mainstem; biopsy:  -- Invasive squamous cell carcinoma, keratinizing.    Treatment:  Pulmonology recommendations:  quit smoking, use of nicotine patch  start 3 percent sodium chloride nebulizer three times a day  Send patient home with nebulized solution along with nebulizer machine  Plan to have patient undergo a repeat bronch in 2-3 months  Plan for oncology consult once biopsy results are available  Plan for outpatient PET scan to complete staging  Risk Factors:  tobacco use,  alcohol use disorder  Options " provided:  -- I concur with the pathology report findings and they are clinically significant  -- I do not concur with the pathology report findings  -- Other - I will add my own diagnosis  -- Refer to Clinical Documentation Reviewer    Query created by: Juhi Sal on 9/6/2024 4:01 PM      Electronically signed by:  BRITTNY PULLIAM MD 10/1/2024 10:18 AM

## 2024-10-02 ENCOUNTER — INFUSION (OUTPATIENT)
Dept: HEMATOLOGY/ONCOLOGY | Facility: HOSPITAL | Age: 74
End: 2024-10-02
Payer: MEDICARE

## 2024-10-02 ENCOUNTER — OFFICE VISIT (OUTPATIENT)
Dept: SURGERY | Facility: CLINIC | Age: 74
End: 2024-10-02
Payer: MEDICARE

## 2024-10-02 ENCOUNTER — OFFICE VISIT (OUTPATIENT)
Dept: HEMATOLOGY/ONCOLOGY | Facility: HOSPITAL | Age: 74
End: 2024-10-02
Payer: MEDICARE

## 2024-10-02 ENCOUNTER — LAB (OUTPATIENT)
Dept: LAB | Facility: LAB | Age: 74
End: 2024-10-02
Payer: MEDICARE

## 2024-10-02 VITALS
HEART RATE: 72 BPM | BODY MASS INDEX: 21.36 KG/M2 | WEIGHT: 144.18 LBS | TEMPERATURE: 96.8 F | DIASTOLIC BLOOD PRESSURE: 70 MMHG | OXYGEN SATURATION: 95 % | RESPIRATION RATE: 16 BRPM | SYSTOLIC BLOOD PRESSURE: 114 MMHG | HEIGHT: 69 IN

## 2024-10-02 VITALS
TEMPERATURE: 96.6 F | OXYGEN SATURATION: 94 % | DIASTOLIC BLOOD PRESSURE: 75 MMHG | HEART RATE: 75 BPM | SYSTOLIC BLOOD PRESSURE: 126 MMHG | RESPIRATION RATE: 16 BRPM

## 2024-10-02 VITALS
HEIGHT: 69 IN | WEIGHT: 143 LBS | SYSTOLIC BLOOD PRESSURE: 121 MMHG | BODY MASS INDEX: 21.18 KG/M2 | HEART RATE: 79 BPM | DIASTOLIC BLOOD PRESSURE: 72 MMHG | TEMPERATURE: 97.7 F

## 2024-10-02 DIAGNOSIS — I87.8 VENOFIBROSIS: ICD-10-CM

## 2024-10-02 DIAGNOSIS — C34.11 MALIGNANT NEOPLASM OF UPPER LOBE OF RIGHT LUNG (MULTI): Primary | ICD-10-CM

## 2024-10-02 DIAGNOSIS — C34.11 MALIGNANT NEOPLASM OF UPPER LOBE OF RIGHT LUNG (MULTI): ICD-10-CM

## 2024-10-02 LAB
ALBUMIN SERPL BCP-MCNC: 3.2 G/DL (ref 3.4–5)
ALP SERPL-CCNC: 59 U/L (ref 33–136)
ALT SERPL W P-5'-P-CCNC: 9 U/L (ref 10–52)
ANION GAP SERPL CALC-SCNC: 9 MMOL/L (ref 10–20)
AST SERPL W P-5'-P-CCNC: 12 U/L (ref 9–39)
BASOPHILS # BLD AUTO: 0.03 X10*3/UL (ref 0–0.1)
BASOPHILS NFR BLD AUTO: 0.3 %
BILIRUB SERPL-MCNC: 0.5 MG/DL (ref 0–1.2)
BUN SERPL-MCNC: 7 MG/DL (ref 6–23)
CALCIUM SERPL-MCNC: 9.2 MG/DL (ref 8.6–10.3)
CHLORIDE SERPL-SCNC: 99 MMOL/L (ref 98–107)
CO2 SERPL-SCNC: 28 MMOL/L (ref 21–32)
CREAT SERPL-MCNC: 0.62 MG/DL (ref 0.5–1.3)
EGFRCR SERPLBLD CKD-EPI 2021: >90 ML/MIN/1.73M*2
EOSINOPHIL # BLD AUTO: 0.14 X10*3/UL (ref 0–0.4)
EOSINOPHIL NFR BLD AUTO: 1.3 %
ERYTHROCYTE [DISTWIDTH] IN BLOOD BY AUTOMATED COUNT: 11.9 % (ref 11.5–14.5)
GLUCOSE SERPL-MCNC: 108 MG/DL (ref 74–99)
HCT VFR BLD AUTO: 36.8 % (ref 41–52)
HGB BLD-MCNC: 12 G/DL (ref 13.5–17.5)
IMM GRANULOCYTES # BLD AUTO: 0.06 X10*3/UL (ref 0–0.5)
IMM GRANULOCYTES NFR BLD AUTO: 0.5 % (ref 0–0.9)
LYMPHOCYTES # BLD AUTO: 1.36 X10*3/UL (ref 0.8–3)
LYMPHOCYTES NFR BLD AUTO: 12.3 %
MCH RBC QN AUTO: 30.5 PG (ref 26–34)
MCHC RBC AUTO-ENTMCNC: 32.6 G/DL (ref 32–36)
MCV RBC AUTO: 94 FL (ref 80–100)
MONOCYTES # BLD AUTO: 0.89 X10*3/UL (ref 0.05–0.8)
MONOCYTES NFR BLD AUTO: 8 %
NEUTROPHILS # BLD AUTO: 8.61 X10*3/UL (ref 1.6–5.5)
NEUTROPHILS NFR BLD AUTO: 77.6 %
NRBC BLD-RTO: 0 /100 WBCS (ref 0–0)
PLATELET # BLD AUTO: 217 X10*3/UL (ref 150–450)
POTASSIUM SERPL-SCNC: 4.2 MMOL/L (ref 3.5–5.3)
PROT SERPL-MCNC: 7.2 G/DL (ref 6.4–8.2)
RBC # BLD AUTO: 3.93 X10*6/UL (ref 4.5–5.9)
SODIUM SERPL-SCNC: 132 MMOL/L (ref 136–145)
WBC # BLD AUTO: 11.1 X10*3/UL (ref 4.4–11.3)

## 2024-10-02 PROCEDURE — 1160F RVW MEDS BY RX/DR IN RCRD: CPT | Performed by: SURGERY

## 2024-10-02 PROCEDURE — 3074F SYST BP LT 130 MM HG: CPT | Performed by: SURGERY

## 2024-10-02 PROCEDURE — 1159F MED LIST DOCD IN RCRD: CPT | Performed by: INTERNAL MEDICINE

## 2024-10-02 PROCEDURE — 96417 CHEMO IV INFUS EACH ADDL SEQ: CPT

## 2024-10-02 PROCEDURE — 1126F AMNT PAIN NOTED NONE PRSNT: CPT | Performed by: INTERNAL MEDICINE

## 2024-10-02 PROCEDURE — 3074F SYST BP LT 130 MM HG: CPT | Performed by: INTERNAL MEDICINE

## 2024-10-02 PROCEDURE — 99215 OFFICE O/P EST HI 40 MIN: CPT | Mod: 25 | Performed by: INTERNAL MEDICINE

## 2024-10-02 PROCEDURE — 99203 OFFICE O/P NEW LOW 30 MIN: CPT | Performed by: SURGERY

## 2024-10-02 PROCEDURE — 3078F DIAST BP <80 MM HG: CPT | Performed by: SURGERY

## 2024-10-02 PROCEDURE — 3008F BODY MASS INDEX DOCD: CPT | Performed by: SURGERY

## 2024-10-02 PROCEDURE — 85025 COMPLETE CBC W/AUTO DIFF WBC: CPT

## 2024-10-02 PROCEDURE — 99215 OFFICE O/P EST HI 40 MIN: CPT | Performed by: INTERNAL MEDICINE

## 2024-10-02 PROCEDURE — 1159F MED LIST DOCD IN RCRD: CPT | Performed by: SURGERY

## 2024-10-02 PROCEDURE — 1111F DSCHRG MED/CURRENT MED MERGE: CPT | Performed by: INTERNAL MEDICINE

## 2024-10-02 PROCEDURE — 3008F BODY MASS INDEX DOCD: CPT | Performed by: INTERNAL MEDICINE

## 2024-10-02 PROCEDURE — 96413 CHEMO IV INFUSION 1 HR: CPT

## 2024-10-02 PROCEDURE — 80053 COMPREHEN METABOLIC PANEL: CPT

## 2024-10-02 PROCEDURE — 96375 TX/PRO/DX INJ NEW DRUG ADDON: CPT | Mod: INF

## 2024-10-02 PROCEDURE — 2500000001 HC RX 250 WO HCPCS SELF ADMINISTERED DRUGS (ALT 637 FOR MEDICARE OP): Performed by: INTERNAL MEDICINE

## 2024-10-02 PROCEDURE — 2500000004 HC RX 250 GENERAL PHARMACY W/ HCPCS (ALT 636 FOR OP/ED): Performed by: INTERNAL MEDICINE

## 2024-10-02 PROCEDURE — 1111F DSCHRG MED/CURRENT MED MERGE: CPT | Performed by: SURGERY

## 2024-10-02 PROCEDURE — G2211 COMPLEX E/M VISIT ADD ON: HCPCS | Performed by: INTERNAL MEDICINE

## 2024-10-02 PROCEDURE — 3078F DIAST BP <80 MM HG: CPT | Performed by: INTERNAL MEDICINE

## 2024-10-02 RX ORDER — DIPHENHYDRAMINE HCL 50 MG
50 CAPSULE ORAL ONCE
OUTPATIENT
Start: 2024-11-13

## 2024-10-02 RX ORDER — ALBUTEROL SULFATE 0.83 MG/ML
3 SOLUTION RESPIRATORY (INHALATION) AS NEEDED
OUTPATIENT
Start: 2024-10-16

## 2024-10-02 RX ORDER — FAMOTIDINE 10 MG/ML
20 INJECTION INTRAVENOUS ONCE
OUTPATIENT
Start: 2024-10-16

## 2024-10-02 RX ORDER — FAMOTIDINE 10 MG/ML
20 INJECTION INTRAVENOUS ONCE AS NEEDED
Status: DISCONTINUED | OUTPATIENT
Start: 2024-10-02 | End: 2024-10-02 | Stop reason: HOSPADM

## 2024-10-02 RX ORDER — PROCHLORPERAZINE EDISYLATE 5 MG/ML
10 INJECTION INTRAMUSCULAR; INTRAVENOUS EVERY 6 HOURS PRN
OUTPATIENT
Start: 2024-11-06

## 2024-10-02 RX ORDER — FAMOTIDINE 10 MG/ML
20 INJECTION INTRAVENOUS ONCE
Status: COMPLETED | OUTPATIENT
Start: 2024-10-02 | End: 2024-10-02

## 2024-10-02 RX ORDER — ALBUTEROL SULFATE 0.83 MG/ML
3 SOLUTION RESPIRATORY (INHALATION) AS NEEDED
Status: DISCONTINUED | OUTPATIENT
Start: 2024-10-02 | End: 2024-10-02 | Stop reason: HOSPADM

## 2024-10-02 RX ORDER — DIPHENHYDRAMINE HCL 50 MG
50 CAPSULE ORAL ONCE
OUTPATIENT
Start: 2024-10-16

## 2024-10-02 RX ORDER — PROCHLORPERAZINE MALEATE 5 MG
10 TABLET ORAL EVERY 6 HOURS PRN
OUTPATIENT
Start: 2024-10-23

## 2024-10-02 RX ORDER — FAMOTIDINE 10 MG/ML
20 INJECTION INTRAVENOUS ONCE
OUTPATIENT
Start: 2024-10-30

## 2024-10-02 RX ORDER — DIPHENHYDRAMINE HYDROCHLORIDE 50 MG/ML
50 INJECTION INTRAMUSCULAR; INTRAVENOUS AS NEEDED
OUTPATIENT
Start: 2024-10-16

## 2024-10-02 RX ORDER — PROCHLORPERAZINE EDISYLATE 5 MG/ML
10 INJECTION INTRAMUSCULAR; INTRAVENOUS EVERY 6 HOURS PRN
Status: DISCONTINUED | OUTPATIENT
Start: 2024-10-02 | End: 2024-10-02 | Stop reason: HOSPADM

## 2024-10-02 RX ORDER — PALONOSETRON 0.05 MG/ML
0.25 INJECTION, SOLUTION INTRAVENOUS ONCE
OUTPATIENT
Start: 2024-10-30

## 2024-10-02 RX ORDER — PROCHLORPERAZINE EDISYLATE 5 MG/ML
10 INJECTION INTRAMUSCULAR; INTRAVENOUS EVERY 6 HOURS PRN
OUTPATIENT
Start: 2024-11-13

## 2024-10-02 RX ORDER — FAMOTIDINE 10 MG/ML
20 INJECTION INTRAVENOUS ONCE AS NEEDED
OUTPATIENT
Start: 2024-10-30

## 2024-10-02 RX ORDER — FAMOTIDINE 10 MG/ML
20 INJECTION INTRAVENOUS ONCE AS NEEDED
OUTPATIENT
Start: 2024-10-16

## 2024-10-02 RX ORDER — DIPHENHYDRAMINE HYDROCHLORIDE 50 MG/ML
50 INJECTION INTRAMUSCULAR; INTRAVENOUS AS NEEDED
Status: DISCONTINUED | OUTPATIENT
Start: 2024-10-02 | End: 2024-10-02 | Stop reason: HOSPADM

## 2024-10-02 RX ORDER — ALBUTEROL SULFATE 0.83 MG/ML
3 SOLUTION RESPIRATORY (INHALATION) AS NEEDED
OUTPATIENT
Start: 2024-11-06

## 2024-10-02 RX ORDER — PROCHLORPERAZINE MALEATE 5 MG
10 TABLET ORAL EVERY 6 HOURS PRN
OUTPATIENT
Start: 2024-11-06

## 2024-10-02 RX ORDER — EPINEPHRINE 0.3 MG/.3ML
0.3 INJECTION SUBCUTANEOUS EVERY 5 MIN PRN
OUTPATIENT
Start: 2024-10-16

## 2024-10-02 RX ORDER — DIPHENHYDRAMINE HYDROCHLORIDE 50 MG/ML
50 INJECTION INTRAMUSCULAR; INTRAVENOUS AS NEEDED
OUTPATIENT
Start: 2024-11-13

## 2024-10-02 RX ORDER — PROCHLORPERAZINE EDISYLATE 5 MG/ML
10 INJECTION INTRAMUSCULAR; INTRAVENOUS EVERY 6 HOURS PRN
OUTPATIENT
Start: 2024-10-30

## 2024-10-02 RX ORDER — PROCHLORPERAZINE MALEATE 5 MG
10 TABLET ORAL EVERY 6 HOURS PRN
OUTPATIENT
Start: 2024-10-16

## 2024-10-02 RX ORDER — ALBUTEROL SULFATE 0.83 MG/ML
3 SOLUTION RESPIRATORY (INHALATION) AS NEEDED
OUTPATIENT
Start: 2024-10-23

## 2024-10-02 RX ORDER — EPINEPHRINE 0.3 MG/.3ML
0.3 INJECTION SUBCUTANEOUS EVERY 5 MIN PRN
OUTPATIENT
Start: 2024-11-13

## 2024-10-02 RX ORDER — PALONOSETRON 0.05 MG/ML
0.25 INJECTION, SOLUTION INTRAVENOUS ONCE
OUTPATIENT
Start: 2024-10-23

## 2024-10-02 RX ORDER — DIPHENHYDRAMINE HCL 50 MG
50 CAPSULE ORAL ONCE
OUTPATIENT
Start: 2024-11-06

## 2024-10-02 RX ORDER — ALBUTEROL SULFATE 0.83 MG/ML
3 SOLUTION RESPIRATORY (INHALATION) AS NEEDED
OUTPATIENT
Start: 2024-11-13

## 2024-10-02 RX ORDER — DIPHENHYDRAMINE HYDROCHLORIDE 50 MG/ML
50 INJECTION INTRAMUSCULAR; INTRAVENOUS AS NEEDED
OUTPATIENT
Start: 2024-11-06

## 2024-10-02 RX ORDER — EPINEPHRINE 0.3 MG/.3ML
0.3 INJECTION SUBCUTANEOUS EVERY 5 MIN PRN
Status: DISCONTINUED | OUTPATIENT
Start: 2024-10-02 | End: 2024-10-02 | Stop reason: HOSPADM

## 2024-10-02 RX ORDER — PALONOSETRON 0.05 MG/ML
0.25 INJECTION, SOLUTION INTRAVENOUS ONCE
OUTPATIENT
Start: 2024-11-13

## 2024-10-02 RX ORDER — FAMOTIDINE 10 MG/ML
20 INJECTION INTRAVENOUS ONCE AS NEEDED
OUTPATIENT
Start: 2024-11-13

## 2024-10-02 RX ORDER — PALONOSETRON 0.05 MG/ML
0.25 INJECTION, SOLUTION INTRAVENOUS ONCE
OUTPATIENT
Start: 2024-11-06

## 2024-10-02 RX ORDER — FAMOTIDINE 10 MG/ML
20 INJECTION INTRAVENOUS ONCE
OUTPATIENT
Start: 2024-10-23

## 2024-10-02 RX ORDER — PROCHLORPERAZINE EDISYLATE 5 MG/ML
10 INJECTION INTRAMUSCULAR; INTRAVENOUS EVERY 6 HOURS PRN
OUTPATIENT
Start: 2024-10-16

## 2024-10-02 RX ORDER — PROCHLORPERAZINE MALEATE 10 MG
10 TABLET ORAL EVERY 6 HOURS PRN
Status: DISCONTINUED | OUTPATIENT
Start: 2024-10-02 | End: 2024-10-02 | Stop reason: HOSPADM

## 2024-10-02 RX ORDER — PROCHLORPERAZINE EDISYLATE 5 MG/ML
10 INJECTION INTRAMUSCULAR; INTRAVENOUS EVERY 6 HOURS PRN
OUTPATIENT
Start: 2024-10-23

## 2024-10-02 RX ORDER — DIPHENHYDRAMINE HCL 50 MG
50 CAPSULE ORAL ONCE
OUTPATIENT
Start: 2024-10-23

## 2024-10-02 RX ORDER — DIPHENHYDRAMINE HCL 25 MG
50 CAPSULE ORAL ONCE
Status: COMPLETED | OUTPATIENT
Start: 2024-10-02 | End: 2024-10-02

## 2024-10-02 RX ORDER — DIPHENHYDRAMINE HCL 50 MG
50 CAPSULE ORAL ONCE
OUTPATIENT
Start: 2024-10-30

## 2024-10-02 RX ORDER — EPINEPHRINE 0.3 MG/.3ML
0.3 INJECTION SUBCUTANEOUS EVERY 5 MIN PRN
OUTPATIENT
Start: 2024-11-06

## 2024-10-02 RX ORDER — FAMOTIDINE 10 MG/ML
20 INJECTION INTRAVENOUS ONCE
OUTPATIENT
Start: 2024-11-06

## 2024-10-02 RX ORDER — PALONOSETRON 0.05 MG/ML
0.25 INJECTION, SOLUTION INTRAVENOUS ONCE
Status: COMPLETED | OUTPATIENT
Start: 2024-10-02 | End: 2024-10-02

## 2024-10-02 RX ORDER — PALONOSETRON 0.05 MG/ML
0.25 INJECTION, SOLUTION INTRAVENOUS ONCE
OUTPATIENT
Start: 2024-10-16

## 2024-10-02 RX ORDER — DIPHENHYDRAMINE HYDROCHLORIDE 50 MG/ML
50 INJECTION INTRAMUSCULAR; INTRAVENOUS AS NEEDED
OUTPATIENT
Start: 2024-10-30

## 2024-10-02 RX ORDER — EPINEPHRINE 0.3 MG/.3ML
0.3 INJECTION SUBCUTANEOUS EVERY 5 MIN PRN
OUTPATIENT
Start: 2024-10-30

## 2024-10-02 RX ORDER — DIPHENHYDRAMINE HYDROCHLORIDE 50 MG/ML
50 INJECTION INTRAMUSCULAR; INTRAVENOUS AS NEEDED
OUTPATIENT
Start: 2024-10-23

## 2024-10-02 RX ORDER — EPINEPHRINE 0.3 MG/.3ML
0.3 INJECTION SUBCUTANEOUS EVERY 5 MIN PRN
OUTPATIENT
Start: 2024-10-23

## 2024-10-02 RX ORDER — FAMOTIDINE 10 MG/ML
20 INJECTION INTRAVENOUS ONCE AS NEEDED
OUTPATIENT
Start: 2024-11-06

## 2024-10-02 RX ORDER — FAMOTIDINE 10 MG/ML
20 INJECTION INTRAVENOUS ONCE AS NEEDED
OUTPATIENT
Start: 2024-10-23

## 2024-10-02 RX ORDER — FAMOTIDINE 10 MG/ML
20 INJECTION INTRAVENOUS ONCE
OUTPATIENT
Start: 2024-11-13

## 2024-10-02 RX ORDER — ALBUTEROL SULFATE 0.83 MG/ML
3 SOLUTION RESPIRATORY (INHALATION) AS NEEDED
OUTPATIENT
Start: 2024-10-30

## 2024-10-02 RX ORDER — PROCHLORPERAZINE MALEATE 5 MG
10 TABLET ORAL EVERY 6 HOURS PRN
OUTPATIENT
Start: 2024-10-30

## 2024-10-02 RX ORDER — PROCHLORPERAZINE MALEATE 5 MG
10 TABLET ORAL EVERY 6 HOURS PRN
OUTPATIENT
Start: 2024-11-13

## 2024-10-02 ASSESSMENT — ENCOUNTER SYMPTOMS
CARDIOVASCULAR NEGATIVE: 1
GASTROINTESTINAL NEGATIVE: 1
FATIGUE: 1
UNEXPECTED WEIGHT CHANGE: 0
RESPIRATORY NEGATIVE: 1
FEVER: 0

## 2024-10-02 ASSESSMENT — PATIENT HEALTH QUESTIONNAIRE - PHQ9
1. LITTLE INTEREST OR PLEASURE IN DOING THINGS: NOT AT ALL
SUM OF ALL RESPONSES TO PHQ9 QUESTIONS 1 & 2: 0
2. FEELING DOWN, DEPRESSED OR HOPELESS: NOT AT ALL

## 2024-10-02 ASSESSMENT — PAIN SCALES - GENERAL: PAINLEVEL: 0-NO PAIN

## 2024-10-02 NOTE — SIGNIFICANT EVENT
10/02/24 1034   Prechemo Checklist   Has the patient been in the hospital, ED, or urgent care since last date of service Yes   Chemo/Immuno Consent Completed and Signed Yes   Protocol/Indications Verified Yes   Confirmed to previous date/time of medication Yes   Compared to previous dose Yes   All medications are dated accurately Yes   Pregnancy Test Negative Not applicable   Parameters Met Yes   BSA/Weight-Height Verified Yes   Dose Calculations Verified (current, total, cumulative) Yes

## 2024-10-02 NOTE — PROGRESS NOTES
Subjective   Patient ID: Juan Carlos Cr is a 74 y.o. male who presents for port placement.    HPI   Patient has newly diagnosed right lung cancer and requires venous access.  He does however have a significant cardiac history and is on Eliquis.  Has not been seen by cardiologist in years.    Past Medical History:   Diagnosis Date    Acute myocardial infarction, unspecified 05/17/2013    Acute myocardial infarction    Encounter for screening for malignant neoplasm of prostate 09/02/2015    Encounter for prostate cancer screening    Hypertension     Personal history of other diseases of the nervous system and sense organs 09/13/2017    History of cataract        Current Outpatient Medications on File Prior to Visit   Medication Sig Dispense Refill    amLODIPine (Norvasc) 5 mg tablet Take 2 tablets (10 mg) by mouth once daily. 120 tablet 0    apixaban (Eliquis) 5 mg tablet Take 1 tablet (5 mg) by mouth 2 times a day. 180 tablet 0    ascorbic acid (Vitamin C) 500 mg tablet Take 1 tablet (500 mg) by mouth once daily.      aspirin 81 mg EC tablet Take 1 tablet (81 mg) by mouth once daily.      blood sugar diagnostic (Accu-Chek Guide test strips) strip 1 strip 2 times a day. 200 each 3    blood sugar diagnostic (FreeStyle Lite Strips) strip 1 strip 2 times a day. 200 each 3    blood-glucose meter (Accu-Chek Guide Glucose Meter) misc Check blood glucose 2x a day 1 each 0    cholecalciferol (Vitamin D-3) 10 MCG (400 UNIT) tablet Take 2 tablets (20 mcg) by mouth once daily. 60 tablet 0    fluticasone furoate-vilanteroL (Breo Ellipta) 200-25 mcg/dose inhaler Inhale 1 puff once daily. 60 each 1    folic acid (Folvite) 1 mg tablet Take 1 tablet (1 mg) by mouth once daily. 60 tablet 0    FreeStyle Lite Meter kit TEST BLOOD SUGAR LEVELS 2X A DAY 1 each 0    lancets (Accu-Chek Fastclix Lancet Drum) misc Check blood glucose 2x day 200 each 0    lancets (Accu-Chek Fastclix Lancet Drum) misc Check blood glucose level 2x a day 200  each 0    lancets misc TEST BLOOD SUGAR LEVELS 2X A  each 0    lancets misc Check blood sugar twice daily 200 each 2    melatonin 3 mg tablet Take 1 tablet (3 mg) by mouth once daily. 60 tablet 0    metFORMIN (Glucophage) 500 mg tablet Take 1 tablet (500 mg) by mouth 2 times daily (morning and late afternoon). 120 tablet 0    metoprolol tartrate (Lopressor) 50 mg tablet Take 1 tablet by mouth every 12 hours. 90 tablet 3    multivitamin with minerals tablet Take 1 tablet by mouth once daily. 30 tablet 1    nicotine (Nicoderm CQ) 14 mg/24 hr patch Apply 1 patch topically every day.  START AFTER finishing the 21mg patches. 14 patch 0    nicotine (Nicoderm CQ) 21 mg/24 hr patch Place 1 patch over 24 hours on the skin once daily for 42 doses. 42 patch 1    [START ON 10/22/2024] nicotine (Nicoderm CQ) 7 mg/24 hr patch Place 1 patch over 24 hours on the skin once daily for 14 doses. START AFTER finishing the 14mg patches. 14 patch 0    ondansetron (Zofran) 8 mg tablet Take 1 tablet (8 mg) by mouth every 8 hours if needed for nausea or vomiting. 30 tablet 5    oxygen (O2) gas therapy Inhale 2 L/min continuously. Indications: decreased oxygen in the tissues or blood      pantoprazole (ProtoNix) 40 mg EC tablet Take 1 tablet (40 mg) by mouth once daily. Do not crush, chew, or split. 60 tablet 2    potassium chloride CR (Klor-Con M20) 20 mEq ER tablet Take 2 tablets (40 mEq) by mouth once daily. Do not crush or chew. 60 tablet 0    prochlorperazine (Compazine) 10 mg tablet Take 1 tablet (10 mg) by mouth every 6 hours if needed for nausea or vomiting. 30 tablet 5    simvastatin (Zocor) 20 mg tablet Take 1 tablet (20 mg) by mouth once daily. 90 tablet 3    thiamine (Vitamin B-1) 100 mg tablet Take 1 tablet (100 mg) by mouth once daily. Do not start  before September 2, 2024. 30 tablet 1    tiotropium (Spiriva Respimat) 2.5 mcg/actuation inhaler Inhale 2 puffs once daily. 8 g 1    fluticasone-umeclidin-vilanter  (TRELEGY-ELLIPTA) 200-62.5-25 mcg blister with device Inhale 1 puff early in the morning.. Rinse mouth after taking dose (Patient not taking: Reported on 10/2/2024) 60 each 11    [] levoFLOXacin (Levaquin) 750 mg tablet Take 1 tablet (750 mg) by mouth once every 24 hours for 2 days. 2 tablet 0    [] sodium chloride 3 % nebulizer solution Take 3 mL by nebulization every 6 hours if needed for cough. 480 mL 0     Current Facility-Administered Medications on File Prior to Visit   Medication Dose Route Frequency Provider Last Rate Last Admin    albuterol 2.5 mg /3 mL (0.083 %) nebulizer solution 3 mL  3 mL nebulization PRN Janiya Landis MD        [COMPLETED] CARBOplatin (Paraplatin) 230 mg in sodium chloride 0.9% 133 mL IV  230 mg intravenous Once Janiya Landis MD   Stopped at 10/02/24 1429    [COMPLETED] dexAMETHasone (Decadron) 12 mg in dextrose 5% 50 mL IV  12 mg intravenous Once Janiya Landis MD   Stopped at 10/02/24 1236    dextrose 5 % in water (D5W) bolus  500 mL intravenous PRN Janiya Landis MD        [COMPLETED] diphenhydrAMINE (BENADryl) capsule 50 mg  50 mg oral Once Janiya Landis MD   50 mg at 10/02/24 1220    diphenhydrAMINE (BENADryl) injection 50 mg  50 mg intravenous PRN Janiya Landis MD        EPINEPHrine (Epipen) injection syringe 0.3 mg  0.3 mg intramuscular q5 min PRN Janiya Landis MD        [COMPLETED] famotidine PF (Pepcid) injection 20 mg  20 mg intravenous Once Janiya Landis MD   20 mg at 10/02/24 1248    famotidine PF (Pepcid) injection 20 mg  20 mg intravenous Once PRN Janiya Landis MD        methylPREDNISolone sod succinate (SOLU-Medrol) 40 mg/mL injection 40 mg  40 mg intravenous PRN Janiya Landis MD        [COMPLETED] PACLitaxeL (Taxol) 81 mg in dextrose 5% 124 mL IV  45 mg/m2 (Treatment Plan Recorded) intravenous Once Janiya Landis MD   Stopped at 10/02/24 2525    [COMPLETED] palonosetron (Aloxi) injection 250 mcg  0.25 mg intravenous  Once Janiya Landis MD   250 mcg at 10/02/24 1220    prochlorperazine (Compazine) injection 10 mg  10 mg intravenous q6h PRN Janiya Landis MD        prochlorperazine (Compazine) tablet 10 mg  10 mg oral q6h PRN Janiya Landis MD        sodium chloride 0.9 % bolus 500 mL  500 mL intravenous PRN Janiya Landis MD            Review of Systems   All other systems reviewed and are negative.      Vitals:    10/02/24 1457   BP: 121/72   Pulse: 79   Temp: 36.5 °C (97.7 °F)        Objective     Physical Exam  Constitutional:       Appearance: Normal appearance.   Cardiovascular:      Heart sounds: Normal heart sounds.   Pulmonary:      Breath sounds: Decreased air movement present.   Abdominal:      Palpations: Abdomen is soft. There is no mass.      Tenderness: There is no abdominal tenderness. There is no guarding.         Problem List Items Addressed This Visit       Malignant neoplasm of upper lobe of right lung (Multi) - Primary        Assessment/Plan   Will require IR port placement based on cardiac history as cannot be done in the operating room based on cardiac risk.  Will refer him to  [facility] or  [facility].    Shukri Martinez MD

## 2024-10-02 NOTE — PROGRESS NOTES
October 2, 2024 at 11:19 AM    Patient has no known allergies.    Juan Carlos Cr is a 74 y.o. male   There were no vitals taken for this visit.  There is no height or weight on file to calculate BSA.    Past Medical History:   Diagnosis Date    Acute myocardial infarction, unspecified (Multi) 05/17/2013    Acute myocardial infarction    Encounter for screening for malignant neoplasm of prostate 09/02/2015    Encounter for prostate cancer screening    Hypertension     Personal history of other diseases of the nervous system and sense organs 09/13/2017    History of cataract       Encounter Diagnosis   Name Primary?    Malignant neoplasm of upper lobe of right lung (Multi)        Has the entire regimen been authorized by financial clearance (pre-certification)?  Yes     Prior to Admission medications    Medication Sig Start Date End Date Taking? Authorizing Provider   amLODIPine (Norvasc) 5 mg tablet Take 2 tablets (10 mg) by mouth once daily. 9/1/24 10/31/24  Rosa Brown MD   apixaban (Eliquis) 5 mg tablet Take 1 tablet (5 mg) by mouth 2 times a day. 8/30/24   Isis Bridges MD   ascorbic acid (Vitamin C) 500 mg tablet Take 1 tablet (500 mg) by mouth once daily.    Historical Provider, MD   aspirin 81 mg EC tablet Take 1 tablet (81 mg) by mouth once daily.    Historical Provider, MD   blood sugar diagnostic (Accu-Chek Guide test strips) strip 1 strip 2 times a day. 9/5/24   Thanh Cuba MD   blood sugar diagnostic (FreeStyle Lite Strips) strip 1 strip 2 times a day. 9/3/24   Thanh Cuba MD   blood-glucose meter (Accu-Chek Guide Glucose Meter) misc Check blood glucose 2x a day 9/5/24   Thanh Cuba MD   cholecalciferol (Vitamin D-3) 10 MCG (400 UNIT) tablet Take 2 tablets (20 mcg) by mouth once daily. 9/28/24   Ralph Gandara MD   fluticasone furoate-vilanteroL (Breo Ellipta) 200-25 mcg/dose inhaler Inhale 1 puff once daily. 9/1/24 10/31/24  Rosa Brown MD    fluticasone-umeclidin-vilanter (TRELEGY-ELLIPTA) 200-62.5-25 mcg blister with device Inhale 1 puff early in the morning.. Rinse mouth after taking dose 9/10/24   Missael Holt MD   folic acid (Folvite) 1 mg tablet Take 1 tablet (1 mg) by mouth once daily. 9/1/24 10/31/24  Rosa Brown MD   FreeStyle Lite Meter kit TEST BLOOD SUGAR LEVELS 2X A DAY 9/3/24 9/3/25  Thanh Cuba MD   lancets (Accu-Chek Fastclix Lancet Drum) misc Check blood glucose 2x day 9/5/24   Thanh Cuba MD   lancets (Accu-Chek Fastclix Lancet Drum) misc Check blood glucose level 2x a day 9/5/24   Thanh Cuba MD   lancets misc TEST BLOOD SUGAR LEVELS 2X A DAY 9/3/24   Thanh Cuba MD   lancets misc Check blood sugar twice daily 9/10/24   Thanh Cuba MD   levoFLOXacin (Levaquin) 750 mg tablet Take 1 tablet (750 mg) by mouth once every 24 hours for 2 days. 9/27/24 9/29/24  Ralph Gandara MD   melatonin 3 mg tablet Take 1 tablet (3 mg) by mouth once daily. 8/31/24 10/31/24  Rosa Brown MD   metFORMIN (Glucophage) 500 mg tablet Take 1 tablet (500 mg) by mouth 2 times daily (morning and late afternoon). 8/31/24 10/31/24  Rosa Brown MD   metoprolol tartrate (Lopressor) 50 mg tablet Take 1 tablet by mouth every 12 hours. 8/31/23   Thanh Cuba MD   multivitamin with minerals tablet Take 1 tablet by mouth once daily. 9/1/24 10/31/24  Rosa Brown MD   nicotine (Nicoderm CQ) 14 mg/24 hr patch Apply 1 patch topically every day.  START AFTER finishing the 21mg patches.  Patient not taking: Reported on 10/2/2024 8/31/24 10/2/24  Rosa Brown MD   nicotine (Nicoderm CQ) 21 mg/24 hr patch Place 1 patch over 24 hours on the skin once daily for 42 doses. 9/1/24 10/8/24  Rosa Brown MD   nicotine (Nicoderm CQ) 7 mg/24 hr patch Place 1 patch over 24 hours on the skin once daily for 14 doses. START AFTER finishing the 14mg patches.  Patient not taking: Reported on 10/2/2024 Do  not fill before October 22, 2024. 10/22/24 11/5/24  Rosa Brown MD   ondansetron (Zofran) 8 mg tablet Take 1 tablet (8 mg) by mouth every 8 hours if needed for nausea or vomiting. 9/19/24   Janiya Landis MD   oxygen (O2) gas therapy Inhale 2 L/min continuously. Indications: decreased oxygen in the tissues or blood    Historical Provider, MD   pantoprazole (ProtoNix) 40 mg EC tablet Take 1 tablet (40 mg) by mouth once daily. Do not crush, chew, or split. 9/19/24 3/18/25  Janiya Landis MD   potassium chloride CR (Klor-Con M20) 20 mEq ER tablet Take 2 tablets (40 mEq) by mouth once daily. Do not crush or chew. 9/27/24   Ralph Gandara MD   prochlorperazine (Compazine) 10 mg tablet Take 1 tablet (10 mg) by mouth every 6 hours if needed for nausea or vomiting. 9/19/24   Janiya Landis MD   simvastatin (Zocor) 20 mg tablet Take 1 tablet (20 mg) by mouth once daily. 8/31/23   Thanh Cuba MD   sodium chloride 3 % nebulizer solution Take 3 mL by nebulization every 6 hours if needed for cough. 8/31/24 10/1/24  Rosa Brown MD   thiamine (Vitamin B-1) 100 mg tablet Take 1 tablet (100 mg) by mouth once daily. Do not start  before September 2, 2024. 9/2/24 11/1/24  Rosa Brown MD   tiotropium (Spiriva Respimat) 2.5 mcg/actuation inhaler Inhale 2 puffs once daily. 9/1/24 10/31/24  Rosa rBown MD       Reviewed documented list of home medications.  No significant drug interactions identified between home medications and chemotherapy regimen.    Yes    WBC   Date Value Ref Range Status   10/02/2024 11.1 4.4 - 11.3 x10*3/uL Final   09/27/2024 9.2 4.4 - 11.3 x10*3/uL Final   09/26/2024 8.3 4.4 - 11.3 x10*3/uL Final     Hemoglobin   Date Value Ref Range Status   10/02/2024 12.0 (L) 13.5 - 17.5 g/dL Final   09/27/2024 11.2 (L) 13.5 - 17.5 g/dL Final   09/26/2024 11.6 (L) 13.5 - 17.5 g/dL Final     Hematocrit   Date Value Ref Range Status   10/02/2024 36.8 (L) 41.0 - 52.0 % Final   09/27/2024  33.3 (L) 41.0 - 52.0 % Final   09/26/2024 35.5 (L) 41.0 - 52.0 % Final     Neutrophils Absolute   Date Value Ref Range Status   10/02/2024 8.61 (H) 1.60 - 5.50 x10*3/uL Final     Comment:     Percent differential counts (%) should be interpreted in the context of the absolute cell counts (cells/uL).   09/23/2024 6.39 (H) 1.60 - 5.50 x10*3/uL Final     Comment:     Percent differential counts (%) should be interpreted in the context of the absolute cell counts (cells/uL).   09/23/2024 8.16 (H) 1.60 - 5.50 x10*3/uL Final     Comment:     Percent differential counts (%) should be interpreted in the context of the absolute cell counts (cells/uL).     Platelets   Date Value Ref Range Status   10/02/2024 217 150 - 450 x10*3/uL Final   09/27/2024 176 150 - 450 x10*3/uL Final   09/26/2024 183 150 - 450 x10*3/uL Final     Creatinine   Date Value Ref Range Status   10/02/2024 0.62 0.50 - 1.30 mg/dL Final   09/27/2024 0.71 0.50 - 1.30 mg/dL Final   09/26/2024 0.67 0.50 - 1.30 mg/dL Final     Alkaline Phosphatase   Date Value Ref Range Status   10/02/2024 59 33 - 136 U/L Final   09/23/2024 56 33 - 136 U/L Final   09/23/2024 55 33 - 136 U/L Final     AST   Date Value Ref Range Status   10/02/2024 12 9 - 39 U/L Final   09/23/2024 10 9 - 39 U/L Final   09/23/2024 16 9 - 39 U/L Final     ALT   Date Value Ref Range Status   10/02/2024 9 (L) 10 - 52 U/L Final     Comment:     Patients treated with Sulfasalazine may generate falsely decreased results for ALT.   09/23/2024 7 (L) 10 - 52 U/L Final     Comment:     Patients treated with Sulfasalazine may generate falsely decreased results for ALT.   09/23/2024 8 (L) 10 - 52 U/L Final     Comment:     Patients treated with Sulfasalazine may generate falsely decreased results for ALT.     Bilirubin, Total   Date Value Ref Range Status   10/02/2024 0.5 0.0 - 1.2 mg/dL Final   09/23/2024 0.5 0.0 - 1.2 mg/dL Final   09/23/2024 0.5 0.0 - 1.2 mg/dL Final         Does the patient meet the  treatment conditions?  Yes    ANC >= 1.5  Plt >= 100  Bili <= 1.25 x ULN    Are dosage calculations correct?  Yes    Are doses the same as previous cycle?   Yes    Were any doses modified?  No    If appropriate, was the Creatinine Clearance independently calculated based on [facility] standards?   Yes      Comments:      I Missael Loaiza, PharmD hereby acknowledge that the treatment plan indicated above has been verified for correctness to the best of my ability on 10/2/2024 at 11:19 AM.

## 2024-10-02 NOTE — PROGRESS NOTES
SOLO PICC removed at completion of chemo today per Dr Landis's orders.  Pt to see Dr Martinez for port placement today. Cath removed with tip intact; length 41cm.  Pressure dressing applied, no drainage noted. Pt tolerated without incident.

## 2024-10-02 NOTE — PATIENT INSTRUCTIONS
Because of your cardiac history will require you to have your port scheduled by interventional radiology.  We will arrange that for you either at [facility] or [facility].

## 2024-10-02 NOTE — PROGRESS NOTES
Patient ID: Juan Carlos Cr is a 74 y.o. male.    Subjective:  Follows up for lung cancer. Feeling more tired today than last week. Mild dyspnea. Quit smoking.   Feels weak in legs. No nausea.     Heme/Onc History:  - p/w dyspne ain Aug 2024. CT Chest: 6 cm R upper lung central mass. MR Brain: No mets  - Bronch: Squamous cell carcinoma. PD-L1 5%. Cytology from [facility] and 7 Lns are neg for malignancy  - PET/CT (Sep 2024): Large central R lung mass with mediastinal LAPs. Satellite RLL peripheral nodule  - Admitted to hospital with dyspnea in late Sep with acute dyspnea. R sided large pleural effusion => Cytology pending    Assessment/Plan:  ? NSCLC: [id number] as per PET/CT findings although cytology in bronch from Lns were negative. Cytology from pleural effusion pending.     He was admitted to hospital for acute dyspnea and had effusion tapped. Feels fine today. We will start him on weekly carbo-taxol today before radiation. If cytology is negative, he will get definitive chemoRT. If cytology shows malignant cells, we will add pembrolizumab to his treatment and he would not get radiation.    I again talked to him and his family about side effects, risks, and benefits of chemotherapy in detail. I answered all his and family's questions. He agreed to the treatment.     We will plan to give him durvalumab after chemoRT    Hypercalcemia: Resolved after zometa.     We will have a port placed for treatment because his veins are not good enough.    I provide longitudinal care for Mr. Cr for his lung cancer    Review Of Systems:  Review of Systems   Constitutional:  Positive for fatigue. Negative for fever and unexpected weight change.   HENT:  Negative.     Respiratory: Negative.     Cardiovascular: Negative.    Gastrointestinal: Negative.    Genitourinary: Negative.         Physical Exam:  /70 (BP Location: Left arm, Patient Position: Sitting, BP Cuff Size: Adult)   Pulse 72   Temp 36 °C (96.8 °F) (Temporal)    "Resp 16   Ht 1.753 m (5' 9\")   Wt 65.4 kg (144 lb 2.9 oz)   SpO2 95%   BMI 21.29 kg/m²   BSA: 1.78 meters squared  Performance Status: Symptomatic; fully ambulatory  Physical Exam  Constitutional:       General: He is not in acute distress.     Appearance: He is not ill-appearing.   HENT:      Head: Normocephalic and atraumatic.   Eyes:      General: No scleral icterus.     Pupils: Pupils are equal, round, and reactive to light.   Cardiovascular:      Rate and Rhythm: Normal rate and regular rhythm.   Pulmonary:      Effort: Pulmonary effort is normal.   Abdominal:      General: Abdomen is flat.      Palpations: Abdomen is soft. There is no mass.   Musculoskeletal:         General: Normal range of motion.   Lymphadenopathy:      Cervical: No cervical adenopathy.   Skin:     Coloration: Skin is not jaundiced.   Neurological:      General: No focal deficit present.      Mental Status: He is alert and oriented to person, place, and time.         Results:  Diagnostic Results   Lab Results   Component Value Date    WBC 11.1 10/02/2024    HGB 12.0 (L) 10/02/2024    HCT 36.8 (L) 10/02/2024    MCV 94 10/02/2024     10/02/2024     Lab Results   Component Value Date    CALCIUM 9.2 10/02/2024     (L) 10/02/2024    K 4.2 10/02/2024    CO2 28 10/02/2024    CL 99 10/02/2024    BUN 7 10/02/2024    CREATININE 0.62 10/02/2024    ALT 9 (L) 10/02/2024    AST 12 10/02/2024       Current Outpatient Medications:     amLODIPine (Norvasc) 5 mg tablet, Take 2 tablets (10 mg) by mouth once daily., Disp: 120 tablet, Rfl: 0    apixaban (Eliquis) 5 mg tablet, Take 1 tablet (5 mg) by mouth 2 times a day., Disp: 180 tablet, Rfl: 0    ascorbic acid (Vitamin C) 500 mg tablet, Take 1 tablet (500 mg) by mouth once daily., Disp: , Rfl:     aspirin 81 mg EC tablet, Take 1 tablet (81 mg) by mouth once daily., Disp: , Rfl:     blood sugar diagnostic (Accu-Chek Guide test strips) strip, 1 strip 2 times a day., Disp: 200 each, Rfl: 3    " blood sugar diagnostic (FreeStyle Lite Strips) strip, 1 strip 2 times a day., Disp: 200 each, Rfl: 3    blood-glucose meter (Accu-Chek Guide Glucose Meter) misc, Check blood glucose 2x a day, Disp: 1 each, Rfl: 0    cholecalciferol (Vitamin D-3) 10 MCG (400 UNIT) tablet, Take 2 tablets (20 mcg) by mouth once daily., Disp: 60 tablet, Rfl: 0    fluticasone furoate-vilanteroL (Breo Ellipta) 200-25 mcg/dose inhaler, Inhale 1 puff once daily., Disp: 60 each, Rfl: 1    folic acid (Folvite) 1 mg tablet, Take 1 tablet (1 mg) by mouth once daily., Disp: 60 tablet, Rfl: 0    FreeStyle Lite Meter kit, TEST BLOOD SUGAR LEVELS 2X A DAY, Disp: 1 each, Rfl: 0    lancets (Accu-Chek Fastclix Lancet Drum) misc, Check blood glucose 2x day, Disp: 200 each, Rfl: 0    lancets (Accu-Chek Fastclix Lancet Drum) misc, Check blood glucose level 2x a day, Disp: 200 each, Rfl: 0    lancets misc, TEST BLOOD SUGAR LEVELS 2X A DAY, Disp: 100 each, Rfl: 0    lancets misc, Check blood sugar twice daily, Disp: 200 each, Rfl: 2    melatonin 3 mg tablet, Take 1 tablet (3 mg) by mouth once daily., Disp: 60 tablet, Rfl: 0    metFORMIN (Glucophage) 500 mg tablet, Take 1 tablet (500 mg) by mouth 2 times daily (morning and late afternoon)., Disp: 120 tablet, Rfl: 0    metoprolol tartrate (Lopressor) 50 mg tablet, Take 1 tablet by mouth every 12 hours., Disp: 90 tablet, Rfl: 3    multivitamin with minerals tablet, Take 1 tablet by mouth once daily., Disp: 30 tablet, Rfl: 1    nicotine (Nicoderm CQ) 21 mg/24 hr patch, Place 1 patch over 24 hours on the skin once daily for 42 doses., Disp: 42 patch, Rfl: 1    ondansetron (Zofran) 8 mg tablet, Take 1 tablet (8 mg) by mouth every 8 hours if needed for nausea or vomiting., Disp: 30 tablet, Rfl: 5    oxygen (O2) gas therapy, Inhale 2 L/min continuously. Indications: decreased oxygen in the tissues or blood, Disp: , Rfl:     pantoprazole (ProtoNix) 40 mg EC tablet, Take 1 tablet (40 mg) by mouth once daily. Do not  crush, chew, or split., Disp: 60 tablet, Rfl: 2    potassium chloride CR (Klor-Con M20) 20 mEq ER tablet, Take 2 tablets (40 mEq) by mouth once daily. Do not crush or chew., Disp: 60 tablet, Rfl: 0    prochlorperazine (Compazine) 10 mg tablet, Take 1 tablet (10 mg) by mouth every 6 hours if needed for nausea or vomiting., Disp: 30 tablet, Rfl: 5    simvastatin (Zocor) 20 mg tablet, Take 1 tablet (20 mg) by mouth once daily., Disp: 90 tablet, Rfl: 3    thiamine (Vitamin B-1) 100 mg tablet, Take 1 tablet (100 mg) by mouth once daily. Do not start  before September 2, 2024., Disp: 30 tablet, Rfl: 1    tiotropium (Spiriva Respimat) 2.5 mcg/actuation inhaler, Inhale 2 puffs once daily., Disp: 8 g, Rfl: 1    fluticasone-umeclidin-vilanter (TRELEGY-ELLIPTA) 200-62.5-25 mcg blister with device, Inhale 1 puff early in the morning.. Rinse mouth after taking dose, Disp: 60 each, Rfl: 11    nicotine (Nicoderm CQ) 14 mg/24 hr patch, Apply 1 patch topically every day.  START AFTER finishing the 21mg patches. (Patient not taking: Reported on 10/2/2024), Disp: 14 patch, Rfl: 0    [START ON 10/22/2024] nicotine (Nicoderm CQ) 7 mg/24 hr patch, Place 1 patch over 24 hours on the skin once daily for 14 doses. START AFTER finishing the 14mg patches. (Patient not taking: Reported on 10/2/2024 Do not fill before October 22, 2024.), Disp: 14 patch, Rfl: 0     Past Surgical History:   Procedure Laterality Date    CATARACT EXTRACTION  09/14/2018    Cataract Surgery    CATARACT EXTRACTION  06/27/2014    Cataract Surgery    CORONARY ANGIOPLASTY WITH STENT PLACEMENT  05/17/2013    Cath Stent Placement     Family History   Problem Relation Name Age of Onset    Heart attack Mother      Lung cancer Brother        reports that he has been smoking cigarettes. He does not have any smokeless tobacco history on file.  Social History     Socioeconomic History    Marital status:      Spouse name: Not on file    Number of children: Not on file     Years of education: Not on file    Highest education level: Not on file   Occupational History    Not on file   Tobacco Use    Smoking status: Every Day     Current packs/day: 0.50     Types: Cigarettes    Smokeless tobacco: Not on file   Vaping Use    Vaping status: Never Used   Substance and Sexual Activity    Alcohol use: Not Currently     Comment: 4-5 beers and a glass a wine a day    Drug use: Never    Sexual activity: Not on file   Other Topics Concern    Not on file   Social History Narrative    Not on file     Social Determinants of Health     Financial Resource Strain: Patient Declined (9/26/2024)    Overall Financial Resource Strain (CARDIA)     Difficulty of Paying Living Expenses: Patient declined   Food Insecurity: No Food Insecurity (9/26/2024)    Hunger Vital Sign     Worried About Running Out of Food in the Last Year: Never true     Ran Out of Food in the Last Year: Never true   Transportation Needs: No Transportation Needs (9/28/2024)    OASIS : Transportation     Lack of Transportation (Medical): No     Lack of Transportation (Non-Medical): No     Patient Unable or Declines to Respond: No   Physical Activity: Inactive (9/26/2024)    Exercise Vital Sign     Days of Exercise per Week: 0 days     Minutes of Exercise per Session: 0 min   Stress: No Stress Concern Present (9/26/2024)    [employer] - Occupational Stress Questionnaire     Feeling of Stress : Not at all   Social Connections: Feeling Socially Integrated (9/28/2024)    OASIS : Social Isolation     Frequency of experiencing loneliness or isolation: Never   Intimate Partner Violence: Not At Risk (9/26/2024)    Humiliation, Afraid, Rape, and Kick questionnaire     Fear of Current or Ex-Partner: No     Emotionally Abused: No     Physically Abused: No     Sexually Abused: No   Housing Stability: Patient Declined (9/26/2024)    Housing Stability Vital Sign     Unable to Pay for Housing in the Last Year:  Patient declined     Number of Times Moved in the Last Year: 1     Homeless in the Last Year: Patient declined       Diagnoses and all orders for this visit:  Malignant neoplasm of upper lobe of right lung (Multi)  -     Clinic Appointment Request  -     Clinic Appointment Request; Future  -     Infusion Appointment Request; Future  -     CBC and Auto Differential; Future  -     Comprehensive metabolic panel; Future  -     Clinic Appointment Request; Future  -     Infusion Appointment Request; Future  -     CBC and Auto Differential; Future  -     Comprehensive metabolic panel; Future  -     Clinic Appointment Request; Future  -     Infusion Appointment Request; Future  -     CBC and Auto Differential; Future  -     Comprehensive metabolic panel; Future  -     Clinic Appointment Request; Future  -     Infusion Appointment Request; Future  -     CBC and Auto Differential; Future  -     Comprehensive metabolic panel; Future  -     Clinic Appointment Request; Future  -     Infusion Appointment Request; Future  -     CBC and Auto Differential; Future  -     Comprehensive metabolic panel; Future  Other orders  -     dexAMETHasone (Decadron) 12 mg in dextrose 5% 50 mL IV  -     diphenhydrAMINE (BENADryl) capsule 50 mg  -     famotidine PF (Pepcid) injection 20 mg  -     palonosetron (Aloxi) injection 250 mcg  -     prochlorperazine (Compazine) tablet 10 mg  -     prochlorperazine (Compazine) injection 10 mg  -     PACLitaxeL (Taxol) 81 mg in dextrose 5% 513.5 mL IV  -     CARBOplatin (Paraplatin) 52 mg in sodium chloride 0.9% 105.2 mL IV  -     sodium chloride 0.9 % bolus 500 mL  -     dextrose 5 % in water (D5W) bolus  -     diphenhydrAMINE (BENADryl) injection 50 mg  -     methylPREDNISolone sod succinate (SOLU-Medrol) 40 mg/mL injection 40 mg  -     famotidine PF (Pepcid) injection 20 mg  -     EPINEPHrine (Epipen) injection syringe 0.3 mg  -     albuterol 2.5 mg /3 mL (0.083 %) nebulizer solution 3 mL  -      dexAMETHasone (Decadron) 12 mg in dextrose 5% 50 mL IV  -     diphenhydrAMINE (BENADryl) capsule 50 mg  -     famotidine PF (Pepcid) injection 20 mg  -     palonosetron (Aloxi) injection 250 mcg  -     prochlorperazine (Compazine) tablet 10 mg  -     prochlorperazine (Compazine) injection 10 mg  -     PACLitaxeL (Taxol) 81 mg in dextrose 5% 513.5 mL IV  -     CARBOplatin (Paraplatin) 52 mg in sodium chloride 0.9% 105.2 mL IV  -     sodium chloride 0.9 % bolus 500 mL  -     dextrose 5 % in water (D5W) bolus  -     diphenhydrAMINE (BENADryl) injection 50 mg  -     methylPREDNISolone sod succinate (SOLU-Medrol) 40 mg/mL injection 40 mg  -     famotidine PF (Pepcid) injection 20 mg  -     EPINEPHrine (Epipen) injection syringe 0.3 mg  -     albuterol 2.5 mg /3 mL (0.083 %) nebulizer solution 3 mL  -     dexAMETHasone (Decadron) 12 mg in dextrose 5% 50 mL IV  -     diphenhydrAMINE (BENADryl) capsule 50 mg  -     famotidine PF (Pepcid) injection 20 mg  -     palonosetron (Aloxi) injection 250 mcg  -     prochlorperazine (Compazine) tablet 10 mg  -     prochlorperazine (Compazine) injection 10 mg  -     PACLitaxeL (Taxol) 81 mg in dextrose 5% 513.5 mL IV  -     CARBOplatin (Paraplatin) 52 mg in sodium chloride 0.9% 105.2 mL IV  -     sodium chloride 0.9 % bolus 500 mL  -     dextrose 5 % in water (D5W) bolus  -     diphenhydrAMINE (BENADryl) injection 50 mg  -     methylPREDNISolone sod succinate (SOLU-Medrol) 40 mg/mL injection 40 mg  -     famotidine PF (Pepcid) injection 20 mg  -     EPINEPHrine (Epipen) injection syringe 0.3 mg  -     albuterol 2.5 mg /3 mL (0.083 %) nebulizer solution 3 mL  -     dexAMETHasone (Decadron) 12 mg in dextrose 5% 50 mL IV  -     diphenhydrAMINE (BENADryl) capsule 50 mg  -     famotidine PF (Pepcid) injection 20 mg  -     palonosetron (Aloxi) injection 250 mcg  -     prochlorperazine (Compazine) tablet 10 mg  -     prochlorperazine (Compazine) injection 10 mg  -     PACLitaxeL (Taxol) 81 mg  in dextrose 5% 513.5 mL IV  -     CARBOplatin (Paraplatin) 52 mg in sodium chloride 0.9% 105.2 mL IV  -     sodium chloride 0.9 % bolus 500 mL  -     dextrose 5 % in water (D5W) bolus  -     diphenhydrAMINE (BENADryl) injection 50 mg  -     methylPREDNISolone sod succinate (SOLU-Medrol) 40 mg/mL injection 40 mg  -     famotidine PF (Pepcid) injection 20 mg  -     EPINEPHrine (Epipen) injection syringe 0.3 mg  -     albuterol 2.5 mg /3 mL (0.083 %) nebulizer solution 3 mL  -     dexAMETHasone (Decadron) 12 mg in dextrose 5% 50 mL IV  -     diphenhydrAMINE (BENADryl) capsule 50 mg  -     famotidine PF (Pepcid) injection 20 mg  -     palonosetron (Aloxi) injection 250 mcg  -     prochlorperazine (Compazine) tablet 10 mg  -     prochlorperazine (Compazine) injection 10 mg  -     PACLitaxeL (Taxol) 81 mg in dextrose 5% 513.5 mL IV  -     CARBOplatin (Paraplatin) 52 mg in sodium chloride 0.9% 105.2 mL IV  -     sodium chloride 0.9 % bolus 500 mL  -     dextrose 5 % in water (D5W) bolus  -     diphenhydrAMINE (BENADryl) injection 50 mg  -     methylPREDNISolone sod succinate (SOLU-Medrol) 40 mg/mL injection 40 mg  -     famotidine PF (Pepcid) injection 20 mg  -     EPINEPHrine (Epipen) injection syringe 0.3 mg  -     albuterol 2.5 mg /3 mL (0.083 %) nebulizer solution 3 mL       I spent more than 60 minutes for the patient today, including face-to-face conversation, pre-visit preparation, post-visit orders, and others.   Janiya Landis MD

## 2024-10-02 NOTE — PROGRESS NOTES
Treatment Pre-Review for Date of Service: 10/2/24    Diagnosis:  No matching staging information was found for the patient.      Regimen: Paclitaxel + Carboplatin weekly    Current Cycle/Day: Cycle 1 Day 1    Treatment Date Appropriate: Yes; Last Treatment: NA ,cycle 1   Date change required: none    Dose or Regimen Modifications: Yes:    Paclitaxel starting dose 45 mg/m2  Carboplatin AUC 2    Med Rec/Drug Interactions: None noted during pre-review    Growth Factor: none    Financial Clearance/Authorization: Yes    Echo:  Transthoracic Echo (TTE) Complete 08/29/2024    Lifetime Dose Tracking   No doses have been documented on this patient for the following tracked chemicals: doxorubicin, epirubicin, idarubicin, daunorubicin, mitoxantrone, bleomycin, mitomycin, carmustine, doxorubicin HCl pegylated liposomal, daunorubicin citrate liposomal     Comments: none    I Msisael Loaiza, PharmD hereby acknowledge that the treatment plan indicated above has been verified for correctness to the best of my ability on 10/2/2024 at 11:14 AM.

## 2024-10-03 ENCOUNTER — HOSPITAL ENCOUNTER (OUTPATIENT)
Dept: RADIATION ONCOLOGY | Facility: CLINIC | Age: 74
Setting detail: RADIATION/ONCOLOGY SERIES
Discharge: HOME | End: 2024-10-03
Payer: MEDICARE

## 2024-10-03 ENCOUNTER — HOME CARE VISIT (OUTPATIENT)
Dept: HOME HEALTH SERVICES | Facility: HOME HEALTH | Age: 74
End: 2024-10-03
Payer: MEDICARE

## 2024-10-03 ENCOUNTER — HOSPITAL ENCOUNTER (OUTPATIENT)
Dept: RADIOLOGY | Facility: EXTERNAL LOCATION | Age: 74
Discharge: HOME | End: 2024-10-03

## 2024-10-03 VITALS
BODY MASS INDEX: 21.11 KG/M2 | HEART RATE: 64 BPM | SYSTOLIC BLOOD PRESSURE: 112 MMHG | RESPIRATION RATE: 18 BRPM | TEMPERATURE: 95.7 F | DIASTOLIC BLOOD PRESSURE: 67 MMHG | WEIGHT: 142.97 LBS | OXYGEN SATURATION: 97 %

## 2024-10-03 VITALS
TEMPERATURE: 97.6 F | HEART RATE: 64 BPM | DIASTOLIC BLOOD PRESSURE: 66 MMHG | RESPIRATION RATE: 18 BRPM | OXYGEN SATURATION: 96 % | SYSTOLIC BLOOD PRESSURE: 118 MMHG

## 2024-10-03 DIAGNOSIS — C34.11 MALIGNANT NEOPLASM OF UPPER LOBE OF RIGHT LUNG (MULTI): Primary | ICD-10-CM

## 2024-10-03 DIAGNOSIS — C34.11 MALIGNANT NEOPLASM OF UPPER LOBE OF RIGHT LUNG (MULTI): ICD-10-CM

## 2024-10-03 PROCEDURE — 99205 OFFICE O/P NEW HI 60 MIN: CPT | Performed by: STUDENT IN AN ORGANIZED HEALTH CARE EDUCATION/TRAINING PROGRAM

## 2024-10-03 PROCEDURE — 77470 SPECIAL RADIATION TREATMENT: CPT | Performed by: STUDENT IN AN ORGANIZED HEALTH CARE EDUCATION/TRAINING PROGRAM

## 2024-10-03 PROCEDURE — G0300 HHS/HOSPICE OF LPN EA 15 MIN: HCPCS | Mod: HHH

## 2024-10-03 PROCEDURE — 99215 OFFICE O/P EST HI 40 MIN: CPT | Mod: 25 | Performed by: STUDENT IN AN ORGANIZED HEALTH CARE EDUCATION/TRAINING PROGRAM

## 2024-10-03 RX ORDER — HEPARIN SODIUM,PORCINE/PF 10 UNIT/ML
50 SYRINGE (ML) INTRAVENOUS AS NEEDED
OUTPATIENT
Start: 2024-10-08

## 2024-10-03 RX ORDER — HEPARIN 100 UNIT/ML
500 SYRINGE INTRAVENOUS AS NEEDED
OUTPATIENT
Start: 2024-10-08

## 2024-10-03 ASSESSMENT — ENCOUNTER SYMPTOMS
UNEXPECTED WEIGHT CHANGE: 1
NAUSEA: 0
HEADACHES: 0
FREQUENCY: 0
SHORTNESS OF BREATH: 1
DEPRESSION: 0
VOMITING: 0
CHILLS: 0
COUGH: 0
FATIGUE: 0
ADENOPATHY: 0
TROUBLE SWALLOWING: 0
LOSS OF SENSATION IN FEET: 0
ARTHRALGIAS: 0
OCCASIONAL FEELINGS OF UNSTEADINESS: 0
BACK PAIN: 0
FEVER: 0

## 2024-10-03 ASSESSMENT — PAIN SCALES - GENERAL: PAINLEVEL: 0-NO PAIN

## 2024-10-03 ASSESSMENT — PAIN DESCRIPTION - PAIN TYPE: TYPE: ACUTE PAIN

## 2024-10-03 NOTE — ADDENDUM NOTE
Encounter addended by: Joni Kay RN on: 10/3/2024 10:37 AM   Actions taken: Clinical Note Signed, Patient Education assessment filed, Patient Education title added, Patient Education documented on

## 2024-10-03 NOTE — PROGRESS NOTES
Radiation Oncology Nursing Note    Prior Radiotherapy:  No  No radiation treatments to show. (Treatments may have been administered in another system.)     Current Systemic Treatment:  Yes, describe: receiving weekly taxol carbo     Presence of Pacemaker or ICD:  No    History of Autoimmune or Connective Tissue Disorders:  No    Pain: The patient's current pain level was assessed.  They report currently having a pain of 0 out of 10.  They feel their pain is under control without the use of pain medications.    Review of Systems:  Review of Systems   Respiratory:  Positive for shortness of breath.    All other systems reviewed and are negative.

## 2024-10-03 NOTE — ADDENDUM NOTE
Encounter addended by: Joni Kay RN on: 10/3/2024 12:25 PM   Actions taken: Specialty comments modified

## 2024-10-03 NOTE — PROGRESS NOTES
Radiation Oncology Follow-Up    Patient Name:  Juan Carlos Cr  MRN:  [id number]  :  1950    Referring Provider: Janiya Landis MD  Primary Care Provider: Leeann Olivo PA-C  Care Team: Patient Care Team:  Leeann Olivo PA-C as PCP - General (Family Medicine)  Thanh Cuba MD as PCP - [employer] PCP  Kath Owens RN as Care Manager (Case Management)  Janiya Landis MD as Medical Oncologist (Hematology and Oncology)  Terrence Gallegos MD as Consulting Physician (Hematology and Oncology)    Date of Service: 10/3/2024    SUBJECTIVE  History of Present Illness:  Juan Carlos Cr is a 74 y.o. male who was previously seen at the [facility] Department of Radiation Oncology for right upper lobe squamous cell carcinoma.    #) Right upper lobe, squamous cell carcinoma, cT4 cN2 cM0 vs cM1a, pending cytology of right pleural effusion    Mr. Cr is a male with a recent diagnosis of right lung mass after having shortness of breath, cough since 2024.  On 2024, the patient was found to have on bronchoscopy a endobronchial exophytic lesion with 99% obstruction of the right mainstem bronchus with complete obstruction of the right upper lobe due to mass effect and there was involvement of the right upper to the anterior and lateral walls of the mainstem bronchus and bronchus intermedius.  Debulking of the tumor was done and sampling from station 4L and 7 were completed.  Biopsy returned invasive squamous cell carcinoma, PD-L1 TPS low, TP53 mutation with no evidence of actionable mutations.  Lymph node station 4L and 7 showed no malignant cells.  The patient had stent placed during the procedure.  The patient underwent MRI brain staging on 2024 which showed no evidence of intracranial metastatic disease.  Staging PET/CT on 2024 revealed hypermetabolic right hilar and suprahilar mass with invasion into the mediastinum, satellite right  lower lobe nodule and right paratracheal lymph node.  No evidence of distant metastatic disease.  The patient underwent repeat bronchoscopy on 9/25/2024 which showed obstruction of the right mainstem bronchus stent, continued complete occlusion of the right upper lobe and mild stenosis of the right mainstem bronchus and bronchus intermedius with 25% luminal compromise.    The patient underwent right pleural effusion sampling which is pending with pathologist review showing lymphocytes, plasma cells and neutrophils with rare atypical larger cells.    On O2 most times, but has times off O2, new oxygen requirement since diagnosis.  No significant issues with chest pain, hemoptysis, headaches or bone pains.  The patient has started weekly carboplatin/Taxol.    Pathology:  9/27/2024-right pleural effusion sampling: Cytology pending.  Pathologist review predominance of lymphocytes, plasmacytoid lymphocytes, plasma cells, and neutrophils. Rare atypical larger cells. Correlation with cytopathology evaluation is recommended.     8/29/2024- A. LYMPH NODE 4 L PULMONARY FINE NEEDLE ASPIRATION ., CYTOLOGY AND CELL BLOCK:  -- No malignant cells identified. -- Limited lymphoid sample.  B. LYMPH NODE 7 PULMONARY FINE NEEDLE ASPIRATION, THINPREP AND CELL BLOCK:  -- No malignant cells identified. -- Rare lymphocytes present.    8/29/2024-A. LUNG, RIGHT MAINSTEM; BIOPSY: -- Invasive squamous cell carcinoma, keratinizing.    Disease Associated Genomics:  PD-L1 TPS 5% (low)  MICROSATELLITE STATUS: Microsatellite Instability-High (MSI-H) is NOT DETECTED.     DISEASE ASSOCIATED GENOMIC FINDINGS:   TP53 splice site ([id number] c.559+1G>T)     DISEASE RELEVANT ALTERATIONS NOT DETECTED:   Negative for ALK fusion.  Negative for BRAF V600E.  Negative for EGFR sensitizing mutation.  Negative for ERBB2 activating mutation  Negative for KRAS G12C.  Negative for MET exon 14 skipping mutation.  Negative for NTRK fusion.  Negative for RET  fusion.  Negative for ROS1 fusion.    Disease Tumor Markers:  None    Imagin2024-CT chest without contrast: Removal of right mainstem bronchial stent, upper right lobe/right perihilar mass measuring 8.3 x 6.1 cm with necrosis and invading the right mainstem bronchus causing obstruction of the right upper lobe bronchus with interval improvement in the postobstructive consolidation in the right upper lobe.  Moderate pleural effusion unchanged from prior study with pleural thickening in the right lower lung.  Interlobar septal thickening predominantly in the right upper lobe with areas of groundglass opacity predominantly in the right upper lobe and right middle lobe.  Enlarged precarinal/pretracheal lymph node measuring 2.1 cm.  A 3 mm noted stone in the right kidney.  No evidence of osseous metastatic disease.    2024-PET/CT: No evidence of hypermetabolic lesions in the cervical neck.  Hypermetabolic right hilar and suprahilar mass with invasion of the mediastinum with central photopenia consistent with necrosis max SUV 18.2, hypermetabolic nodule in the right lower lobe with max SUV 15.9 compatible with satellite lesion, right paratracheal lymph node seen with max SUV 12.9.  No hypermetabolic activity in the abdomen or pelvis.  No focal hypermetabolic lesions suspected for osseous metastatic disease.    2024-MRI brain with and without contrast: Hyperintense signal within the periventricular and subcortical white matter likely sequela of chronic microangiopathy with lacunar infarcts and perivascular space prominence.  No evidence of intraparenchymal or leptomeningeal intracranial enhancement.    Labs:  10/2/2024-CBC with differential: Hemoglobin 12.0 (L)  10/2/2024-CMP: Glucose 108 (H), sodium 132 (L), albumin 3.2 (L), creatinine 0.62    Prior Systemic Therapies:  10/2024-current: Carbo/Taxol concurrent with radiation    Prior Surgeries:  2024-bronchoscopy: Distal trachea appeared normal,  right bronchial tree to the segmental level appeared abnormal with complete occlusion of the right mainstem bronchus stent, left bronchial tree appeared normal.  Scant secretions present in bronchus intermedius and underwent suction.  Completely occluded right upper lobe with only mild stenosis of the right mainstem bronchus and bronchus intermedius with 25% luminal compromise.    8/29/2024-bronchoscopy/EBUS: Noted endobronchial exophytic lesion with 99% obstruction of the right mainstem bronchus, complete obstruction of the right upper lobe due to mass effect.  Involvement from the right upper to the anterior and lateral wall of the right mainstem and bronchus intermedius bronchoscope was used to debulk the tumor.  Stent was placed in the right mainstem bronchus.  Sampling of station 4L and 7 were completed.  Confluent involvement of station 4R and the endobronchial lesion.    Prior Radiation Therapy:  None    Treatment Rendered:   No radiation treatments to show. (Treatments may have been administered in another system.)       Review of Systems:   Review of Systems   Constitutional:  Positive for unexpected weight change (25-30 lbs in last 6 months). Negative for chills, fatigue and fever.   HENT:   Negative for trouble swallowing.    Respiratory:  Positive for shortness of breath. Negative for cough.    Cardiovascular:  Negative for chest pain.   Gastrointestinal:  Negative for nausea and vomiting.   Genitourinary:  Negative for frequency.    Musculoskeletal:  Negative for arthralgias and back pain.   Neurological:  Negative for headaches.   Hematological:  Negative for adenopathy.       Performance Status:   The Karnofsky performance scale today is 70, Cares for self; unable to carry on normal activity or to do active work (ECOG equivalent 1).       OBJECTIVE  Vital Signs:  /67 (BP Location: Right arm, Patient Position: Sitting, BP Cuff Size: Adult long)   Pulse 64   Temp 35.4 °C (95.7 °F) (Temporal)    Resp 18 Comment: 2lpm  Wt 64.8 kg (142 lb 15.5 oz)   SpO2 97%   BMI 21.11 kg/m²   Physical Exam  Vitals and nursing note reviewed.   Constitutional:       Comments: On nasal cannula oxygen   HENT:      Head: Normocephalic.   Eyes:      Pupils: Pupils are equal, round, and reactive to light.   Cardiovascular:      Rate and Rhythm: Normal rate and regular rhythm.   Pulmonary:      Effort: Pulmonary effort is normal.      Breath sounds: Examination of the right-upper field reveals decreased breath sounds. Examination of the right-middle field reveals decreased breath sounds. Examination of the right-lower field reveals decreased breath sounds. Decreased breath sounds present. No wheezing or rhonchi.   Musculoskeletal:         General: Normal range of motion.      Right lower leg: No edema.      Left lower leg: No edema.   Skin:     General: Skin is warm.   Neurological:      General: No focal deficit present.      Mental Status: He is alert.            ASSESSMENT:   Juan Carlos Cr is a 74 y.o. male with Malignant neoplasm of upper lobe of right lung (Multi), Clinical: cT4, cN2.      Mr. 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    I discussed with the patient regarding his clinical presentation, imaging, pathology and pending cytology to determine his final diagnosis stage of locally advanced versus metastatic squamous cell carcinoma of the right upper lobe.  I discussed with the patient regarding the roles of radiation therapy in the form of palliative radiation therapy to the hilar mediastinum and primary disease to prevent further airway compromise and the role systemic therapy plays for improved outcomes if the patient's pleural fluid is positive consistent with M1a disease versus the role of chemoradiation followed by consolidative immunotherapy for treatment of locally advanced disease with improvement in progression free survival and overall  survival compared to chemoradiation therapy alone.    I discussed with the patient regarding the process of radiation therapy, treatment regimens which include a palliative course of 6628-2577 cGy in 5-10 fractions versus definitive course of 6000 cGy in 30 fractions.  I discussed with the patient regarding the acute side effects of radiation therapy to the thorax including but not limited to fatigue, skin changes and esophagitis.  I discussed with the patient regarding the long-term effects of radiation therapy including but not limited to effects on cardiopulmonary function, rare risk of permanent damage to surrounding structures including chest wall, nerves, vessels, esophagus and airway.    The patient had all questions and concerns addressed during the visit.  Consent was obtained.  Patient underwent CT simulation for radiation planning.  The patient will start with concurrent chemoradiation therapy and if found to have M1a disease the patient will undergo palliative radiation doses to a smaller field to prevent airway compromise.    PLAN:    #) Right upper lobe, squamous cell carcinoma, cT4 cN2 cM0 vs cM1a  -Await cytology of right pleural fluid  -If negative plan for chemoradiation therapy 6000 cGy in 30 fractions  -If positive plan for palliative course or radiation therapy 4153-2073 cGy in 10 fractions  -Same day CT simulation to expedite treatment    A total of 50 minutes were spent face-to-face with the patient, the majority of time spent detailing treatment options with an additional 10 minutes spent reviewing records including imaging, pathology and physician notes.    Rojelio Ewing MD  [facility] [place]  [facility]  - Department of Radiation Oncology  Phone: [phone]  Fax: [phone]  hoozin secure chat preferred / Pager [phone]    Note: This was transcribed using Dragon voice recognition software. Attempts were made to correct any errors; however,  errors or omissions may be present.     NCCN Guidelines were applicable to guide this patients treatment plan.

## 2024-10-03 NOTE — PROGRESS NOTES
New patient here today with son and wife to see Dr. Ewing for invasive squamous cell right lung cancer, PDL1 (+). Patient started weekly taxol carbo yesterday. Patient denies SOB, cough, CP or tightness.   Patient to have CT sim today. We reviewed what the Ct/sim will entail including but not limited to the use of IV contrast, evaluation for ABC breathing, Knee sponge, vac bag, tattooing, arms above head, and 4D free breathing. We also reviewed side effects of radiation including but not limited to SOB, cough, sore throat, trouble swallowing, hoarseness, skin irritation, and fatigue and ways to manage side effects. Patient asking appropriate questions. Patient verbalizes understanding with verbal teach back. Joni Kay MSN, RN, OCN

## 2024-10-04 LAB
LAB AP ASR DISCLAIMER: NORMAL
LABORATORY COMMENT REPORT: NORMAL
LABORATORY COMMENT REPORT: NORMAL
PATH REPORT.FINAL DX SPEC: NORMAL
PATH REPORT.GROSS SPEC: NORMAL
PATH REPORT.RELEVANT HX SPEC: NORMAL
PATH REPORT.TOTAL CANCER: NORMAL

## 2024-10-04 SDOH — ECONOMIC STABILITY: GENERAL

## 2024-10-04 ASSESSMENT — PAIN SCALES - PAIN ASSESSMENT IN ADVANCED DEMENTIA (PAINAD)
TOTALSCORE: 0
NEGVOCALIZATION: 0
BREATHING: 0
CONSOLABILITY: 0 - NO NEED TO CONSOLE.
FACIALEXPRESSION: 0 - SMILING OR INEXPRESSIVE.
CONSOLABILITY: 0
NEGVOCALIZATION: 0 - NONE.
BODYLANGUAGE: 0 - RELAXED.
BODYLANGUAGE: 0
FACIALEXPRESSION: 0

## 2024-10-04 ASSESSMENT — ENCOUNTER SYMPTOMS
LAST BOWEL MOVEMENT: 67117
APPETITE LEVEL: GOOD
STOOL FREQUENCY: DAILY
CHANGE IN APPETITE: UNCHANGED
DENIES PAIN: 1
BOWEL PATTERN NORMAL: 1

## 2024-10-04 ASSESSMENT — ACTIVITIES OF DAILY LIVING (ADL): MONEY MANAGEMENT (EXPENSES/BILLS): INDEPENDENT

## 2024-10-08 ENCOUNTER — HOSPITAL ENCOUNTER (OUTPATIENT)
Dept: RADIATION ONCOLOGY | Facility: CLINIC | Age: 74
Setting detail: RADIATION/ONCOLOGY SERIES
Discharge: HOME | End: 2024-10-08
Payer: MEDICARE

## 2024-10-08 PROCEDURE — 77301 RADIOTHERAPY DOSE PLAN IMRT: CPT | Performed by: STUDENT IN AN ORGANIZED HEALTH CARE EDUCATION/TRAINING PROGRAM

## 2024-10-08 PROCEDURE — 77338 DESIGN MLC DEVICE FOR IMRT: CPT | Performed by: STUDENT IN AN ORGANIZED HEALTH CARE EDUCATION/TRAINING PROGRAM

## 2024-10-08 PROCEDURE — 77300 RADIATION THERAPY DOSE PLAN: CPT | Performed by: STUDENT IN AN ORGANIZED HEALTH CARE EDUCATION/TRAINING PROGRAM

## 2024-10-09 ENCOUNTER — INFUSION (OUTPATIENT)
Dept: HEMATOLOGY/ONCOLOGY | Facility: HOSPITAL | Age: 74
End: 2024-10-09
Payer: MEDICARE

## 2024-10-09 VITALS
SYSTOLIC BLOOD PRESSURE: 106 MMHG | BODY MASS INDEX: 20.6 KG/M2 | WEIGHT: 139.11 LBS | RESPIRATION RATE: 18 BRPM | TEMPERATURE: 97.3 F | OXYGEN SATURATION: 85 % | DIASTOLIC BLOOD PRESSURE: 72 MMHG | HEART RATE: 96 BPM | HEIGHT: 69 IN

## 2024-10-09 DIAGNOSIS — C34.11 MALIGNANT NEOPLASM OF UPPER LOBE OF RIGHT LUNG (MULTI): ICD-10-CM

## 2024-10-09 LAB
ALBUMIN SERPL BCP-MCNC: 3.1 G/DL (ref 3.4–5)
ALP SERPL-CCNC: 56 U/L (ref 33–136)
ALT SERPL W P-5'-P-CCNC: 7 U/L (ref 10–52)
ANION GAP SERPL CALC-SCNC: 14 MMOL/L (ref 10–20)
AST SERPL W P-5'-P-CCNC: 15 U/L (ref 9–39)
BASOPHILS # BLD AUTO: 0.05 X10*3/UL (ref 0–0.1)
BASOPHILS NFR BLD AUTO: 0.5 %
BILIRUB SERPL-MCNC: 0.6 MG/DL (ref 0–1.2)
BUN SERPL-MCNC: 13 MG/DL (ref 6–23)
CALCIUM SERPL-MCNC: 9.1 MG/DL (ref 8.6–10.3)
CHLORIDE SERPL-SCNC: 102 MMOL/L (ref 98–107)
CO2 SERPL-SCNC: 17 MMOL/L (ref 21–32)
CREAT SERPL-MCNC: 0.57 MG/DL (ref 0.5–1.3)
EGFRCR SERPLBLD CKD-EPI 2021: >90 ML/MIN/1.73M*2
EOSINOPHIL # BLD AUTO: 0.09 X10*3/UL (ref 0–0.4)
EOSINOPHIL NFR BLD AUTO: 0.9 %
ERYTHROCYTE [DISTWIDTH] IN BLOOD BY AUTOMATED COUNT: 12 % (ref 11.5–14.5)
GLUCOSE SERPL-MCNC: 108 MG/DL (ref 74–99)
HCT VFR BLD AUTO: 42 % (ref 41–52)
HGB BLD-MCNC: 12.5 G/DL (ref 13.5–17.5)
IMM GRANULOCYTES # BLD AUTO: 0.07 X10*3/UL (ref 0–0.5)
IMM GRANULOCYTES NFR BLD AUTO: 0.7 % (ref 0–0.9)
LABORATORY COMMENT REPORT: NORMAL
LYMPHOCYTES # BLD AUTO: 1.46 X10*3/UL (ref 0.8–3)
LYMPHOCYTES NFR BLD AUTO: 14.6 %
MCH RBC QN AUTO: 30.3 PG (ref 26–34)
MCHC RBC AUTO-ENTMCNC: 29.8 G/DL (ref 32–36)
MCV RBC AUTO: 102 FL (ref 80–100)
MONOCYTES # BLD AUTO: 0.85 X10*3/UL (ref 0.05–0.8)
MONOCYTES NFR BLD AUTO: 8.5 %
NEUTROPHILS # BLD AUTO: 7.46 X10*3/UL (ref 1.6–5.5)
NEUTROPHILS NFR BLD AUTO: 74.8 %
NRBC BLD-RTO: 0 /100 WBCS (ref 0–0)
PATH REPORT.FINAL DX SPEC: NORMAL
PATH REPORT.GROSS SPEC: NORMAL
PATH REPORT.TOTAL CANCER: NORMAL
PLATELET # BLD AUTO: 243 X10*3/UL (ref 150–450)
POTASSIUM SERPL-SCNC: 4.7 MMOL/L (ref 3.5–5.3)
PROT SERPL-MCNC: 7.2 G/DL (ref 6.4–8.2)
RBC # BLD AUTO: 4.12 X10*6/UL (ref 4.5–5.9)
SODIUM SERPL-SCNC: 128 MMOL/L (ref 136–145)
WBC # BLD AUTO: 10 X10*3/UL (ref 4.4–11.3)

## 2024-10-09 PROCEDURE — 96417 CHEMO IV INFUS EACH ADDL SEQ: CPT

## 2024-10-09 PROCEDURE — 80053 COMPREHEN METABOLIC PANEL: CPT

## 2024-10-09 PROCEDURE — 2500000004 HC RX 250 GENERAL PHARMACY W/ HCPCS (ALT 636 FOR OP/ED): Performed by: INTERNAL MEDICINE

## 2024-10-09 PROCEDURE — 2500000001 HC RX 250 WO HCPCS SELF ADMINISTERED DRUGS (ALT 637 FOR MEDICARE OP): Performed by: INTERNAL MEDICINE

## 2024-10-09 PROCEDURE — 96413 CHEMO IV INFUSION 1 HR: CPT

## 2024-10-09 PROCEDURE — 96375 TX/PRO/DX INJ NEW DRUG ADDON: CPT | Mod: INF

## 2024-10-09 PROCEDURE — 85025 COMPLETE CBC W/AUTO DIFF WBC: CPT

## 2024-10-09 PROCEDURE — 36415 COLL VENOUS BLD VENIPUNCTURE: CPT

## 2024-10-09 RX ORDER — DIPHENHYDRAMINE HYDROCHLORIDE 50 MG/ML
50 INJECTION INTRAMUSCULAR; INTRAVENOUS AS NEEDED
Status: DISCONTINUED | OUTPATIENT
Start: 2024-10-09 | End: 2024-10-09 | Stop reason: HOSPADM

## 2024-10-09 RX ORDER — ALBUTEROL SULFATE 0.83 MG/ML
3 SOLUTION RESPIRATORY (INHALATION) AS NEEDED
Status: DISCONTINUED | OUTPATIENT
Start: 2024-10-09 | End: 2024-10-09 | Stop reason: HOSPADM

## 2024-10-09 RX ORDER — PROCHLORPERAZINE MALEATE 10 MG
10 TABLET ORAL EVERY 6 HOURS PRN
Status: DISCONTINUED | OUTPATIENT
Start: 2024-10-09 | End: 2024-10-09 | Stop reason: HOSPADM

## 2024-10-09 RX ORDER — DIPHENHYDRAMINE HCL 25 MG
50 CAPSULE ORAL ONCE
Status: COMPLETED | OUTPATIENT
Start: 2024-10-09 | End: 2024-10-09

## 2024-10-09 RX ORDER — EPINEPHRINE 0.3 MG/.3ML
0.3 INJECTION SUBCUTANEOUS EVERY 5 MIN PRN
Status: DISCONTINUED | OUTPATIENT
Start: 2024-10-09 | End: 2024-10-09 | Stop reason: HOSPADM

## 2024-10-09 RX ORDER — FAMOTIDINE 10 MG/ML
20 INJECTION INTRAVENOUS ONCE
Status: COMPLETED | OUTPATIENT
Start: 2024-10-09 | End: 2024-10-09

## 2024-10-09 RX ORDER — PALONOSETRON 0.05 MG/ML
0.25 INJECTION, SOLUTION INTRAVENOUS ONCE
Status: COMPLETED | OUTPATIENT
Start: 2024-10-09 | End: 2024-10-09

## 2024-10-09 RX ORDER — PROCHLORPERAZINE EDISYLATE 5 MG/ML
10 INJECTION INTRAMUSCULAR; INTRAVENOUS EVERY 6 HOURS PRN
Status: DISCONTINUED | OUTPATIENT
Start: 2024-10-09 | End: 2024-10-09 | Stop reason: HOSPADM

## 2024-10-09 RX ORDER — FAMOTIDINE 10 MG/ML
20 INJECTION INTRAVENOUS ONCE AS NEEDED
Status: DISCONTINUED | OUTPATIENT
Start: 2024-10-09 | End: 2024-10-09 | Stop reason: HOSPADM

## 2024-10-09 ASSESSMENT — PAIN SCALES - GENERAL: PAINLEVEL: 0-NO PAIN

## 2024-10-09 NOTE — PROGRESS NOTES
Treatment Pre-Review for Date of Service: 10/9/24    Diagnosis:  Malignant neoplasm of upper lobe of right lung (Multi), Clinical: cT4, cN2      Regimen: Paclitaxel + Carboplatin weekly     Current Cycle/Day: Cycle 1 Day 8   Treatment Date Appropriate: Yes; Last Treatment: 10/2/24  Date change required: none    Dose or Regimen Modifications: Yes:    Paclitaxel starting dose 45 mg/m2  Carboplatin AUC 2    Med Rec/Drug Interactions: None noted during pre-review    Growth Factor: none    Financial Clearance/Authorization: Yes    Echo:  Transthoracic Echo (TTE) Complete 08/29/2024    Lifetime Dose Tracking   No doses have been documented on this patient for the following tracked chemicals: doxorubicin, epirubicin, idarubicin, daunorubicin, mitoxantrone, bleomycin, mitomycin, carmustine, doxorubicin HCl pegylated liposomal, daunorubicin citrate liposomal     Comments: none    I Missael Loaiza, PharmD hereby acknowledge that the treatment plan indicated above has been verified for correctness to the best of my ability on 10/9/2024 at 12:39 PM.

## 2024-10-09 NOTE — PROGRESS NOTES
"October 9, 2024 at 12:42 PM    Patient has no known allergies.    Juan Carlos Cr is a 74 y.o. male   Blood pressure 96/63, pulse 98, temperature 35.9 °C (96.6 °F), temperature source Temporal, resp. rate 16, height 1.753 m (5' 9.02\"), weight 63.1 kg (139 lb 1.8 oz), SpO2 (!) 78%.  Body surface area is 1.75 meters squared.    Past Medical History:   Diagnosis Date    Acute myocardial infarction, unspecified 05/17/2013    Acute myocardial infarction    Atrial fib/flutter, transient (Multi)     COPD (chronic obstructive pulmonary disease) (Multi)     Diabetes mellitus (Multi)     Encounter for screening for malignant neoplasm of prostate 09/02/2015    Encounter for prostate cancer screening    GERD (gastroesophageal reflux disease)     Hypertension     Lung cancer (Multi)     Personal history of other diseases of the nervous system and sense organs 09/13/2017    History of cataract       Encounter Diagnosis   Name Primary?    Malignant neoplasm of upper lobe of right lung (Multi)        Has the entire regimen been authorized by financial clearance (pre-certification)?  Yes     Prior to Admission medications    Medication Sig Start Date End Date Taking? Authorizing Provider   amLODIPine (Norvasc) 5 mg tablet Take 2 tablets (10 mg) by mouth once daily. 9/1/24 10/31/24  Rosa Brown MD   apixaban (Eliquis) 5 mg tablet Take 1 tablet (5 mg) by mouth 2 times a day. 8/30/24   Isis Bridges MD   ascorbic acid (Vitamin C) 500 mg tablet Take 1 tablet (500 mg) by mouth once daily.    Historical Provider, MD   aspirin 81 mg EC tablet Take 1 tablet (81 mg) by mouth once daily.    Historical Provider, MD   blood sugar diagnostic (Accu-Chek Guide test strips) strip 1 strip 2 times a day. 9/5/24   Thanh Cuba MD   blood sugar diagnostic (FreeStyle Lite Strips) strip 1 strip 2 times a day. 9/3/24   Thanh Cuba MD   blood-glucose meter (Accu-Chek Guide Glucose Meter) misc Check blood glucose 2x a day 9/5/24   " Thanh Cuba MD   cholecalciferol (Vitamin D-3) 10 MCG (400 UNIT) tablet Take 2 tablets (20 mcg) by mouth once daily. 9/28/24   Ralph Gandara MD   fluticasone furoate-vilanteroL (Breo Ellipta) 200-25 mcg/dose inhaler Inhale 1 puff once daily. 9/1/24 10/31/24  Rosa Brown MD   fluticasone-umeclidin-vilanter (TRELEGY-ELLIPTA) 200-62.5-25 mcg blister with device Inhale 1 puff early in the morning.. Rinse mouth after taking dose  Patient not taking: Reported on 10/2/2024 9/10/24   Missael Holt MD   folic acid (Folvite) 1 mg tablet Take 1 tablet (1 mg) by mouth once daily. 9/1/24 10/31/24  Rosa Brown MD   FreeStyle Lite Meter kit TEST BLOOD SUGAR LEVELS 2X A DAY 9/3/24 9/3/25  Thanh Cuba MD   lancets (Accu-Chek Fastclix Lancet Drum) misc Check blood glucose 2x day 9/5/24   Thanh Cuba MD   lancets (Accu-Chek Fastclix Lancet Drum) misc Check blood glucose level 2x a day 9/5/24   Thanh Cuba MD   lancets misc TEST BLOOD SUGAR LEVELS 2X A DAY 9/3/24   Thanh Cbua MD   lancets misc Check blood sugar twice daily 9/10/24   Thanh Cuba MD   melatonin 3 mg tablet Take 1 tablet (3 mg) by mouth once daily. 8/31/24 10/31/24  Rosa Brown MD   metFORMIN (Glucophage) 500 mg tablet Take 1 tablet (500 mg) by mouth 2 times daily (morning and late afternoon). 8/31/24 10/31/24  Rosa Brown MD   metoprolol tartrate (Lopressor) 50 mg tablet Take 1 tablet by mouth every 12 hours. 8/31/23   Thanh Cuba MD   multivitamin with minerals tablet Take 1 tablet by mouth once daily. 9/1/24 10/31/24  Rosa Brown MD   nicotine (Nicoderm CQ) 14 mg/24 hr patch Apply 1 patch topically every day.  START AFTER finishing the 21mg patches. 8/31/24 10/2/24  Rosa Brown MD   nicotine (Nicoderm CQ) 21 mg/24 hr patch Place 1 patch over 24 hours on the skin once daily for 42 doses. 9/1/24 10/8/24  Rosa Brown MD   nicotine (Nicoderm CQ) 7 mg/24 hr patch  Place 1 patch over 24 hours on the skin once daily for 14 doses. START AFTER finishing the 14mg patches. 10/22/24 11/5/24  Rosa Brown MD   ondansetron (Zofran) 8 mg tablet Take 1 tablet (8 mg) by mouth every 8 hours if needed for nausea or vomiting. 9/19/24   Janiya Landis MD   oxygen (O2) gas therapy Inhale 2 L/min continuously. Indications: decreased oxygen in the tissues or blood    Historical Provider, MD   pantoprazole (ProtoNix) 40 mg EC tablet Take 1 tablet (40 mg) by mouth once daily. Do not crush, chew, or split. 9/19/24 3/18/25  Janiya Landis MD   potassium chloride CR (Klor-Con M20) 20 mEq ER tablet Take 2 tablets (40 mEq) by mouth once daily. Do not crush or chew. 9/27/24   Ralph Gandara MD   prochlorperazine (Compazine) 10 mg tablet Take 1 tablet (10 mg) by mouth every 6 hours if needed for nausea or vomiting. 9/19/24   Janiya Landis MD   simvastatin (Zocor) 20 mg tablet Take 1 tablet (20 mg) by mouth once daily. 8/31/23   Thanh Cuba MD   thiamine (Vitamin B-1) 100 mg tablet Take 1 tablet (100 mg) by mouth once daily. Do not start  before September 2, 2024. 9/2/24 11/1/24  Rosa Brown MD   tiotropium (Spiriva Respimat) 2.5 mcg/actuation inhaler Inhale 2 puffs once daily. 9/1/24 10/31/24  Rosa Brown MD       Reviewed documented list of home medications.  No significant drug interactions identified between home medications and chemotherapy regimen.    Yes    WBC   Date Value Ref Range Status   10/09/2024 10.0 4.4 - 11.3 x10*3/uL Final   10/02/2024 11.1 4.4 - 11.3 x10*3/uL Final   09/27/2024 9.2 4.4 - 11.3 x10*3/uL Final     Hemoglobin   Date Value Ref Range Status   10/09/2024 12.5 (L) 13.5 - 17.5 g/dL Final   10/02/2024 12.0 (L) 13.5 - 17.5 g/dL Final   09/27/2024 11.2 (L) 13.5 - 17.5 g/dL Final     Hematocrit   Date Value Ref Range Status   10/09/2024 42.0 41.0 - 52.0 % Final   10/02/2024 36.8 (L) 41.0 - 52.0 % Final   09/27/2024 33.3 (L) 41.0 - 52.0 % Final      Neutrophils Absolute   Date Value Ref Range Status   10/09/2024 7.46 (H) 1.60 - 5.50 x10*3/uL Final     Comment:     Percent differential counts (%) should be interpreted in the context of the absolute cell counts (cells/uL).   10/02/2024 8.61 (H) 1.60 - 5.50 x10*3/uL Final     Comment:     Percent differential counts (%) should be interpreted in the context of the absolute cell counts (cells/uL).   09/23/2024 6.39 (H) 1.60 - 5.50 x10*3/uL Final     Comment:     Percent differential counts (%) should be interpreted in the context of the absolute cell counts (cells/uL).     Platelets   Date Value Ref Range Status   10/09/2024 243 150 - 450 x10*3/uL Final   10/02/2024 217 150 - 450 x10*3/uL Final   09/27/2024 176 150 - 450 x10*3/uL Final     Creatinine   Date Value Ref Range Status   10/09/2024 0.57 0.50 - 1.30 mg/dL Final   10/02/2024 0.62 0.50 - 1.30 mg/dL Final   09/27/2024 0.71 0.50 - 1.30 mg/dL Final     Alkaline Phosphatase   Date Value Ref Range Status   10/09/2024 56 33 - 136 U/L Final   10/02/2024 59 33 - 136 U/L Final   09/23/2024 56 33 - 136 U/L Final     AST   Date Value Ref Range Status   10/09/2024 15 9 - 39 U/L Final   10/02/2024 12 9 - 39 U/L Final   09/23/2024 10 9 - 39 U/L Final     ALT   Date Value Ref Range Status   10/09/2024 7 (L) 10 - 52 U/L Final     Comment:     Patients treated with Sulfasalazine may generate falsely decreased results for ALT.   10/02/2024 9 (L) 10 - 52 U/L Final     Comment:     Patients treated with Sulfasalazine may generate falsely decreased results for ALT.   09/23/2024 7 (L) 10 - 52 U/L Final     Comment:     Patients treated with Sulfasalazine may generate falsely decreased results for ALT.     Bilirubin, Total   Date Value Ref Range Status   10/09/2024 0.6 0.0 - 1.2 mg/dL Final   10/02/2024 0.5 0.0 - 1.2 mg/dL Final   09/23/2024 0.5 0.0 - 1.2 mg/dL Final         Does the patient meet the treatment conditions?  Yes    ANC >= 1.5  Plt >= 100    Are dosage  calculations correct?  Yes    Are doses the same as previous cycle?   Yes    Were any doses modified?  Yes - see comments    Paclitaxel starting dose 45 mg/m2  Carboplatin AUC 2    If appropriate, was the Creatinine Clearance independently calculated based on [facility] standards?   Yes      Comments:      I Missael Loaiza, PharmD hereby acknowledge that the treatment plan indicated above has been verified for correctness to the best of my ability on 10/9/2024 at 12:42 PM.

## 2024-10-09 NOTE — SIGNIFICANT EVENT

## 2024-10-09 NOTE — PROGRESS NOTES
First dose follow up for carbo/taxol.  Pt denies any severe fatigue; did notice increased SOB with need for O2.  NO N/V/D or skin changes.  No CP, mouth sores or numbness or tingling.  Next appts provided.

## 2024-10-10 ENCOUNTER — APPOINTMENT (OUTPATIENT)
Dept: PULMONOLOGY | Facility: CLINIC | Age: 74
End: 2024-10-10
Payer: MEDICARE

## 2024-10-10 VITALS
DIASTOLIC BLOOD PRESSURE: 66 MMHG | SYSTOLIC BLOOD PRESSURE: 103 MMHG | WEIGHT: 140.6 LBS | BODY MASS INDEX: 20.75 KG/M2 | OXYGEN SATURATION: 87 % | HEART RATE: 79 BPM

## 2024-10-10 DIAGNOSIS — C34.90 PRIMARY MALIGNANT NEOPLASM OF LUNG METASTATIC TO OTHER SITE, UNSPECIFIED LATERALITY (MULTI): ICD-10-CM

## 2024-10-10 DIAGNOSIS — J98.11 COLLAPSE OF RIGHT LUNG: ICD-10-CM

## 2024-10-10 DIAGNOSIS — J96.11 CHRONIC RESPIRATORY FAILURE WITH HYPOXIA, ON HOME O2 THERAPY (MULTI): Primary | ICD-10-CM

## 2024-10-10 DIAGNOSIS — Z99.81 CHRONIC RESPIRATORY FAILURE WITH HYPOXIA, ON HOME O2 THERAPY (MULTI): Primary | ICD-10-CM

## 2024-10-10 DIAGNOSIS — J44.9 CHRONIC OBSTRUCTIVE PULMONARY DISEASE, UNSPECIFIED COPD TYPE (MULTI): ICD-10-CM

## 2024-10-10 DIAGNOSIS — J90 PLEURAL EFFUSION: ICD-10-CM

## 2024-10-10 PROCEDURE — 1160F RVW MEDS BY RX/DR IN RCRD: CPT | Performed by: PEDIATRICS

## 2024-10-10 PROCEDURE — 99215 OFFICE O/P EST HI 40 MIN: CPT | Performed by: PEDIATRICS

## 2024-10-10 PROCEDURE — 3078F DIAST BP <80 MM HG: CPT | Performed by: PEDIATRICS

## 2024-10-10 PROCEDURE — 3074F SYST BP LT 130 MM HG: CPT | Performed by: PEDIATRICS

## 2024-10-10 PROCEDURE — 1111F DSCHRG MED/CURRENT MED MERGE: CPT | Performed by: PEDIATRICS

## 2024-10-10 PROCEDURE — 1159F MED LIST DOCD IN RCRD: CPT | Performed by: PEDIATRICS

## 2024-10-10 ASSESSMENT — PATIENT HEALTH QUESTIONNAIRE - PHQ9
1. LITTLE INTEREST OR PLEASURE IN DOING THINGS: SEVERAL DAYS
10. IF YOU CHECKED OFF ANY PROBLEMS, HOW DIFFICULT HAVE THESE PROBLEMS MADE IT FOR YOU TO DO YOUR WORK, TAKE CARE OF THINGS AT HOME, OR GET ALONG WITH OTHER PEOPLE: SOMEWHAT DIFFICULT
2. FEELING DOWN, DEPRESSED OR HOPELESS: NOT AT ALL
SUM OF ALL RESPONSES TO PHQ9 QUESTIONS 1 AND 2: 1

## 2024-10-10 NOTE — PROGRESS NOTES
Subjective   Patient ID: Juan Carlos Cr is a 74 y.o. male who presents for lung cancer, lung mass, hypoxia, right airway occlusion    HPI      10/10/2024:  Mr Cr was admitted again with right lung collapse.  He had another bronchoscopy and was found to have mucous occlusion of the airways again.  This was suctioned and the stent was removed.  There was only mild stenosis so another stent was not placed.  He also had a pleural effusion which was drained.  His breathing is much better now.  Trelegy was too expensive so he is using spiriva and breo.  He rarely uses the 3% saline nebulized.    Initial visit 9/10/2024:  Mr Cr is a 74yr old that was admitted to [facility] for acute hypoxic respiratory failure.  He was found to have a large mass encasing the right main airways and vasculature.  He underwent bronchoscopy with debulking of the airway and stent placement.  Pathology shows squamous cell carcinoma.  He was discharged with 3% saline nebulized to help keep secretions easily mobilized.  Since coming home he has used the 3% saline 2 or 3 times, just when he felt the mucous was thick and hard to bring up. Overall he feels much better and his breathing is back to his baseline.  He has COPD and has been on breo and spiriva which work well for him but spiriva is very expensive.  He has follow up appointment with oncology tomorrow     He is former smoker 1/2ppd since age 15, he quit last month      Review of Systems    See scanned documents attached to this note for review of systems, and appropriate scales/scores for this visit.     Objective   Physical Exam  Constitutional:       Appearance: Normal appearance.   HENT:      Head: Normocephalic and atraumatic.      Mouth/Throat:      Pharynx: Oropharynx is clear.   Cardiovascular:      Rate and Rhythm: Normal rate and regular rhythm.      Pulses: Normal pulses.      Heart sounds: Normal heart sounds.   Pulmonary:      Effort: Pulmonary effort is normal.       Breath sounds: Normal breath sounds. No wheezing, rhonchi or rales.   Abdominal:      General: Bowel sounds are normal.      Palpations: Abdomen is soft.   Musculoskeletal:         General: Normal range of motion.   Skin:     General: Skin is warm and dry.   Neurological:      General: No focal deficit present.      Mental Status: He is alert and oriented to person, place, and time.   Psychiatric:         Mood and Affect: Mood normal.       Assessment/Plan       74yr old with COPD, and lung cancer with endobronchial invasion s/p debulking and stent     COPD: controlled  - ttrelegy not covered, continue spiriva and breo  - albuterol as needed     2. Lung cancer: given recurrence of mucous plugging, and need for stent removal and suctioning of secretions again would recommend more frequent use of 3% saline  - continue 3% saline nebulized, do this every day or twice daily if increased secretions  - on chemo and XRT now  - follow up with radiation oncology and medical oncology        Follow up 3 months         Missael Holt MD 10/10/24 8:27 AM

## 2024-10-11 ENCOUNTER — PATIENT OUTREACH (OUTPATIENT)
Dept: CARE COORDINATION | Facility: CLINIC | Age: 74
End: 2024-10-11
Payer: MEDICARE

## 2024-10-11 ENCOUNTER — HOSPITAL ENCOUNTER (OUTPATIENT)
Dept: RADIATION ONCOLOGY | Facility: CLINIC | Age: 74
Setting detail: RADIATION/ONCOLOGY SERIES
Discharge: HOME | End: 2024-10-11
Payer: MEDICARE

## 2024-10-11 DIAGNOSIS — J69.0 ASPIRATION PNEUMONIA OF RIGHT LUNG, UNSPECIFIED ASPIRATION PNEUMONIA TYPE, UNSPECIFIED PART OF LUNG (MULTI): Primary | ICD-10-CM

## 2024-10-11 DIAGNOSIS — C34.81 MALIGNANT NEOPLASM OF OVERLAPPING SITES OF RIGHT BRONCHUS AND LUNG (MULTI): ICD-10-CM

## 2024-10-11 DIAGNOSIS — Z51.0 ENCOUNTER FOR ANTINEOPLASTIC RADIATION THERAPY: ICD-10-CM

## 2024-10-11 LAB
RAD ONC MSQ ACTUAL FRACTIONS DELIVERED: 1
RAD ONC MSQ ACTUAL SESSION DELIVERED DOSE: 200 CGRAY
RAD ONC MSQ ACTUAL TOTAL DOSE: 200 CGRAY
RAD ONC MSQ ELAPSED DAYS: 0
RAD ONC MSQ LAST DATE: NORMAL
RAD ONC MSQ PRESCRIBED FRACTIONAL DOSE: 200 CGRAY
RAD ONC MSQ PRESCRIBED NUMBER OF FRACTIONS: 30
RAD ONC MSQ PRESCRIBED TECHNIQUE: NORMAL
RAD ONC MSQ PRESCRIBED TOTAL DOSE: 6000 CGRAY
RAD ONC MSQ PRESCRIPTION PATTERN COMMENT: NORMAL
RAD ONC MSQ START DATE: NORMAL
RAD ONC MSQ TREATMENT COURSE NUMBER: 1
RAD ONC MSQ TREATMENT SITE: NORMAL

## 2024-10-11 RX ORDER — LEVOFLOXACIN 750 MG/1
750 TABLET ORAL DAILY
Qty: 7 TABLET | Refills: 0 | Status: SHIPPED | OUTPATIENT
Start: 2024-10-11 | End: 2024-10-18

## 2024-10-11 NOTE — PROGRESS NOTES
CBCT shows right lung infiltrates, possible pneumonia. Sending Levaquin and reCT sim next week to assess tissues changes.

## 2024-10-11 NOTE — PROGRESS NOTES
Outreach call to patient following up on appointment with Pulmonologist. Patient did not answer and does not have a voicemail set up. Will continue to follow.      Kath Owens RN/ANDRES  [facility]  [phone]

## 2024-10-12 ENCOUNTER — HOME CARE VISIT (OUTPATIENT)
Dept: HOME HEALTH SERVICES | Facility: HOME HEALTH | Age: 74
End: 2024-10-12
Payer: MEDICARE

## 2024-10-14 ENCOUNTER — HOSPITAL ENCOUNTER (OUTPATIENT)
Dept: RADIATION ONCOLOGY | Facility: CLINIC | Age: 74
Setting detail: RADIATION/ONCOLOGY SERIES
Discharge: HOME | End: 2024-10-14
Payer: MEDICARE

## 2024-10-14 DIAGNOSIS — Z51.0 ENCOUNTER FOR ANTINEOPLASTIC RADIATION THERAPY: ICD-10-CM

## 2024-10-14 DIAGNOSIS — C34.81 MALIGNANT NEOPLASM OF OVERLAPPING SITES OF RIGHT BRONCHUS AND LUNG (MULTI): ICD-10-CM

## 2024-10-14 LAB
RAD ONC MSQ ACTUAL FRACTIONS DELIVERED: 2
RAD ONC MSQ ACTUAL SESSION DELIVERED DOSE: 200 CGRAY
RAD ONC MSQ ACTUAL TOTAL DOSE: 400 CGRAY
RAD ONC MSQ ELAPSED DAYS: 3
RAD ONC MSQ LAST DATE: NORMAL
RAD ONC MSQ PRESCRIBED FRACTIONAL DOSE: 200 CGRAY
RAD ONC MSQ PRESCRIBED NUMBER OF FRACTIONS: 30
RAD ONC MSQ PRESCRIBED TECHNIQUE: NORMAL
RAD ONC MSQ PRESCRIBED TOTAL DOSE: 6000 CGRAY
RAD ONC MSQ PRESCRIPTION PATTERN COMMENT: NORMAL
RAD ONC MSQ START DATE: NORMAL
RAD ONC MSQ TREATMENT COURSE NUMBER: 1
RAD ONC MSQ TREATMENT SITE: NORMAL

## 2024-10-14 PROCEDURE — 77386 HC INTENSITY-MODULATED RADIATION THERAPY (IMRT), COMPLEX: CPT | Performed by: STUDENT IN AN ORGANIZED HEALTH CARE EDUCATION/TRAINING PROGRAM

## 2024-10-15 ENCOUNTER — HOSPITAL ENCOUNTER (OUTPATIENT)
Dept: RADIATION ONCOLOGY | Facility: CLINIC | Age: 74
Setting detail: RADIATION/ONCOLOGY SERIES
Discharge: HOME | End: 2024-10-15
Payer: MEDICARE

## 2024-10-15 ENCOUNTER — APPOINTMENT (OUTPATIENT)
Dept: RADIATION ONCOLOGY | Facility: CLINIC | Age: 74
End: 2024-10-15
Payer: MEDICARE

## 2024-10-15 ENCOUNTER — HOSPITAL ENCOUNTER (OUTPATIENT)
Dept: CARDIOLOGY | Facility: HOSPITAL | Age: 74
Setting detail: OUTPATIENT SURGERY
Discharge: HOME | End: 2024-10-15
Payer: MEDICARE

## 2024-10-15 VITALS
DIASTOLIC BLOOD PRESSURE: 70 MMHG | HEART RATE: 91 BPM | OXYGEN SATURATION: 95 % | SYSTOLIC BLOOD PRESSURE: 106 MMHG | RESPIRATION RATE: 24 BRPM

## 2024-10-15 DIAGNOSIS — Z51.0 ENCOUNTER FOR ANTINEOPLASTIC RADIATION THERAPY: ICD-10-CM

## 2024-10-15 DIAGNOSIS — C34.81 MALIGNANT NEOPLASM OF OVERLAPPING SITES OF RIGHT BRONCHUS AND LUNG (MULTI): ICD-10-CM

## 2024-10-15 DIAGNOSIS — I87.8 VENOFIBROSIS: ICD-10-CM

## 2024-10-15 DIAGNOSIS — C34.11 MALIGNANT NEOPLASM OF UPPER LOBE OF RIGHT LUNG (MULTI): ICD-10-CM

## 2024-10-15 LAB
RAD ONC MSQ ACTUAL FRACTIONS DELIVERED: 3
RAD ONC MSQ ACTUAL SESSION DELIVERED DOSE: 200 CGRAY
RAD ONC MSQ ACTUAL TOTAL DOSE: 600 CGRAY
RAD ONC MSQ ELAPSED DAYS: 4
RAD ONC MSQ LAST DATE: NORMAL
RAD ONC MSQ PRESCRIBED FRACTIONAL DOSE: 200 CGRAY
RAD ONC MSQ PRESCRIBED NUMBER OF FRACTIONS: 30
RAD ONC MSQ PRESCRIBED TECHNIQUE: NORMAL
RAD ONC MSQ PRESCRIBED TOTAL DOSE: 6000 CGRAY
RAD ONC MSQ PRESCRIPTION PATTERN COMMENT: NORMAL
RAD ONC MSQ START DATE: NORMAL
RAD ONC MSQ TREATMENT COURSE NUMBER: 1
RAD ONC MSQ TREATMENT SITE: NORMAL

## 2024-10-15 PROCEDURE — 7100000009 HC PHASE TWO TIME - INITIAL BASE CHARGE

## 2024-10-15 PROCEDURE — 36561 INSERT TUNNELED CV CATH: CPT | Mod: RT | Performed by: RADIOLOGY

## 2024-10-15 PROCEDURE — C1788 PORT, INDWELLING, IMP: HCPCS

## 2024-10-15 PROCEDURE — 2780000003 HC OR 278 NO HCPCS

## 2024-10-15 PROCEDURE — 2500000005 HC RX 250 GENERAL PHARMACY W/O HCPCS: Performed by: RADIOLOGY

## 2024-10-15 PROCEDURE — 7100000010 HC PHASE TWO TIME - EACH INCREMENTAL 1 MINUTE

## 2024-10-15 PROCEDURE — 77336 RADIATION PHYSICS CONSULT: CPT | Performed by: STUDENT IN AN ORGANIZED HEALTH CARE EDUCATION/TRAINING PROGRAM

## 2024-10-15 PROCEDURE — 77001 FLUOROGUIDE FOR VEIN DEVICE: CPT | Performed by: RADIOLOGY

## 2024-10-15 PROCEDURE — C1894 INTRO/SHEATH, NON-LASER: HCPCS

## 2024-10-15 PROCEDURE — 2500000004 HC RX 250 GENERAL PHARMACY W/ HCPCS (ALT 636 FOR OP/ED): Performed by: RADIOLOGY

## 2024-10-15 PROCEDURE — 77386 HC INTENSITY-MODULATED RADIATION THERAPY (IMRT), COMPLEX: CPT | Performed by: STUDENT IN AN ORGANIZED HEALTH CARE EDUCATION/TRAINING PROGRAM

## 2024-10-15 PROCEDURE — 76937 US GUIDE VASCULAR ACCESS: CPT | Performed by: RADIOLOGY

## 2024-10-15 PROCEDURE — 2720000007 HC OR 272 NO HCPCS

## 2024-10-15 PROCEDURE — 99152 MOD SED SAME PHYS/QHP 5/>YRS: CPT

## 2024-10-15 RX ORDER — FENTANYL CITRATE 50 UG/ML
INJECTION, SOLUTION INTRAMUSCULAR; INTRAVENOUS AS NEEDED
Status: DISCONTINUED | OUTPATIENT
Start: 2024-10-15 | End: 2024-10-15 | Stop reason: HOSPADM

## 2024-10-15 RX ORDER — ALBUTEROL SULFATE 90 UG/1
2 INHALANT RESPIRATORY (INHALATION) EVERY 6 HOURS PRN
COMMUNITY
End: 2024-10-19 | Stop reason: HOSPADM

## 2024-10-15 RX ORDER — HEPARIN 100 UNIT/ML
SYRINGE INTRAVENOUS AS NEEDED
Status: DISCONTINUED | OUTPATIENT
Start: 2024-10-15 | End: 2024-10-15 | Stop reason: HOSPADM

## 2024-10-15 RX ORDER — MIDAZOLAM HYDROCHLORIDE 1 MG/ML
INJECTION, SOLUTION INTRAMUSCULAR; INTRAVENOUS AS NEEDED
Status: DISCONTINUED | OUTPATIENT
Start: 2024-10-15 | End: 2024-10-15 | Stop reason: HOSPADM

## 2024-10-15 RX ORDER — LIDOCAINE HYDROCHLORIDE AND EPINEPHRINE 10; 10 MG/ML; UG/ML
INJECTION, SOLUTION INFILTRATION; PERINEURAL AS NEEDED
Status: DISCONTINUED | OUTPATIENT
Start: 2024-10-15 | End: 2024-10-15 | Stop reason: HOSPADM

## 2024-10-15 ASSESSMENT — PAIN SCALES - GENERAL
PAINLEVEL_OUTOF10: 0 - NO PAIN

## 2024-10-15 ASSESSMENT — PAIN - FUNCTIONAL ASSESSMENT
PAIN_FUNCTIONAL_ASSESSMENT: 0-10

## 2024-10-15 NOTE — PRE-PROCEDURE NOTE
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    Relevant review of systems: NA    Relevant Labs:   Lab Results   Component Value Date    CREATININE 0.57 10/09/2024    EGFR >90 10/09/2024    INR 1.2 (H) 09/26/2024    PROTIME 13.4 (H) 09/26/2024       Planned Sedation/Anesthesia: Moderate    Airway assessment: normal    Directed physical examination:    RRR  Decreased RLS    Mallampati: II (hard and soft palate, upper portion of tonsils and uvula visible)    ASA Score: ASA 3 - Patient with moderate systemic disease with functional limitations    Benefits, risks and alternatives of procedure and planned sedation have been discussed with the patient and/or their representative. All questions answered and they agree to proceed.

## 2024-10-15 NOTE — POST-PROCEDURE NOTE
Interventional Radiology Brief Postprocedure Note    Attending: Missael Cheung MD      Assistant: none    Diagnosis: lung ca    Description of procedure: port placement     Anesthesia:  Local, mod sed    Complications: None    Estimated Blood Loss: minimal        See detailed result report with images in PACS.    The patient tolerated the procedure well without incident or complication.

## 2024-10-16 ENCOUNTER — HOSPITAL ENCOUNTER (OUTPATIENT)
Dept: RADIATION ONCOLOGY | Facility: CLINIC | Age: 74
Setting detail: RADIATION/ONCOLOGY SERIES
Discharge: HOME | End: 2024-10-16
Payer: MEDICARE

## 2024-10-16 ENCOUNTER — HOSPITAL ENCOUNTER (INPATIENT)
Facility: HOSPITAL | Age: 74
LOS: 3 days | Discharge: HOME | DRG: 163 | End: 2024-10-19
Attending: EMERGENCY MEDICINE | Admitting: STUDENT IN AN ORGANIZED HEALTH CARE EDUCATION/TRAINING PROGRAM
Payer: MEDICARE

## 2024-10-16 ENCOUNTER — APPOINTMENT (OUTPATIENT)
Dept: RADIOLOGY | Facility: HOSPITAL | Age: 74
DRG: 163 | End: 2024-10-16
Payer: MEDICARE

## 2024-10-16 ENCOUNTER — APPOINTMENT (OUTPATIENT)
Dept: HEMATOLOGY/ONCOLOGY | Facility: HOSPITAL | Age: 74
End: 2024-10-16
Payer: MEDICARE

## 2024-10-16 ENCOUNTER — RADIATION ONCOLOGY OTV (OUTPATIENT)
Dept: RADIATION ONCOLOGY | Facility: CLINIC | Age: 74
End: 2024-10-16
Payer: MEDICARE

## 2024-10-16 ENCOUNTER — CLINICAL SUPPORT (OUTPATIENT)
Dept: EMERGENCY MEDICINE | Facility: HOSPITAL | Age: 74
DRG: 163 | End: 2024-10-16
Payer: MEDICARE

## 2024-10-16 VITALS
HEART RATE: 75 BPM | BODY MASS INDEX: 20.76 KG/M2 | TEMPERATURE: 97 F | RESPIRATION RATE: 18 BRPM | OXYGEN SATURATION: 86 % | SYSTOLIC BLOOD PRESSURE: 93 MMHG | DIASTOLIC BLOOD PRESSURE: 55 MMHG | WEIGHT: 140.65 LBS

## 2024-10-16 DIAGNOSIS — R09.02 HYPOXIA: ICD-10-CM

## 2024-10-16 DIAGNOSIS — C34.11 MALIGNANT NEOPLASM OF UPPER LOBE OF RIGHT LUNG (MULTI): ICD-10-CM

## 2024-10-16 DIAGNOSIS — C34.81 MALIGNANT NEOPLASM OF OVERLAPPING SITES OF RIGHT BRONCHUS AND LUNG (MULTI): ICD-10-CM

## 2024-10-16 DIAGNOSIS — J44.1 COPD EXACERBATION (MULTI): ICD-10-CM

## 2024-10-16 DIAGNOSIS — Z51.0 ENCOUNTER FOR ANTINEOPLASTIC RADIATION THERAPY: ICD-10-CM

## 2024-10-16 DIAGNOSIS — R06.02 SHORTNESS OF BREATH: Primary | ICD-10-CM

## 2024-10-16 DIAGNOSIS — C34.90: ICD-10-CM

## 2024-10-16 LAB
ALBUMIN SERPL BCP-MCNC: 3.3 G/DL (ref 3.4–5)
ALP SERPL-CCNC: 56 U/L (ref 33–136)
ALT SERPL W P-5'-P-CCNC: 7 U/L (ref 10–52)
ANION GAP BLDV CALCULATED.4IONS-SCNC: 10 MMOL/L (ref 10–25)
ANION GAP SERPL CALC-SCNC: 15 MMOL/L (ref 10–20)
AST SERPL W P-5'-P-CCNC: 13 U/L (ref 9–39)
BASE EXCESS BLDV CALC-SCNC: 1.6 MMOL/L (ref -2–3)
BASOPHILS # BLD AUTO: 0.03 X10*3/UL (ref 0–0.1)
BASOPHILS NFR BLD AUTO: 0.4 %
BILIRUB SERPL-MCNC: 0.6 MG/DL (ref 0–1.2)
BNP SERPL-MCNC: 215 PG/ML (ref 0–99)
BODY TEMPERATURE: 37 DEGREES CELSIUS
BUN SERPL-MCNC: 18 MG/DL (ref 6–23)
CA-I BLDV-SCNC: 1.31 MMOL/L (ref 1.1–1.33)
CALCIUM SERPL-MCNC: 9.6 MG/DL (ref 8.6–10.6)
CARDIAC TROPONIN I PNL SERPL HS: 11 NG/L (ref 0–53)
CHLORIDE BLDV-SCNC: 100 MMOL/L (ref 98–107)
CHLORIDE SERPL-SCNC: 101 MMOL/L (ref 98–107)
CO2 SERPL-SCNC: 24 MMOL/L (ref 21–32)
CREAT SERPL-MCNC: 0.66 MG/DL (ref 0.5–1.3)
EGFRCR SERPLBLD CKD-EPI 2021: >90 ML/MIN/1.73M*2
EOSINOPHIL # BLD AUTO: 0.05 X10*3/UL (ref 0–0.4)
EOSINOPHIL NFR BLD AUTO: 0.7 %
ERYTHROCYTE [DISTWIDTH] IN BLOOD BY AUTOMATED COUNT: 12.5 % (ref 11.5–14.5)
FLUAV RNA RESP QL NAA+PROBE: NOT DETECTED
FLUBV RNA RESP QL NAA+PROBE: NOT DETECTED
GLUCOSE BLD MANUAL STRIP-MCNC: 90 MG/DL (ref 74–99)
GLUCOSE BLDV-MCNC: 122 MG/DL (ref 74–99)
GLUCOSE SERPL-MCNC: 113 MG/DL (ref 74–99)
HCO3 BLDV-SCNC: 27.2 MMOL/L (ref 22–26)
HCT VFR BLD AUTO: 35.7 % (ref 41–52)
HCT VFR BLD EST: 38 % (ref 41–52)
HGB BLD-MCNC: 11.6 G/DL (ref 13.5–17.5)
HGB BLDV-MCNC: 12.5 G/DL (ref 13.5–17.5)
HOLD SPECIMEN: NORMAL
HOLD SPECIMEN: NORMAL
IMM GRANULOCYTES # BLD AUTO: 0.07 X10*3/UL (ref 0–0.5)
IMM GRANULOCYTES NFR BLD AUTO: 0.9 % (ref 0–0.9)
INHALED O2 CONCENTRATION: 36 %
LACTATE BLDV-SCNC: 1.9 MMOL/L (ref 0.4–2)
LYMPHOCYTES # BLD AUTO: 0.71 X10*3/UL (ref 0.8–3)
LYMPHOCYTES NFR BLD AUTO: 9.4 %
MCH RBC QN AUTO: 30.4 PG (ref 26–34)
MCHC RBC AUTO-ENTMCNC: 32.5 G/DL (ref 32–36)
MCV RBC AUTO: 94 FL (ref 80–100)
MONOCYTES # BLD AUTO: 0.7 X10*3/UL (ref 0.05–0.8)
MONOCYTES NFR BLD AUTO: 9.2 %
NEUTROPHILS # BLD AUTO: 6.03 X10*3/UL (ref 1.6–5.5)
NEUTROPHILS NFR BLD AUTO: 79.4 %
NRBC BLD-RTO: 0 /100 WBCS (ref 0–0)
OXYHGB MFR BLDV: 10 % (ref 45–75)
PCO2 BLDV: 46 MM HG (ref 41–51)
PH BLDV: 7.38 PH (ref 7.33–7.43)
PLATELET # BLD AUTO: 217 X10*3/UL (ref 150–450)
PO2 BLDV: 16 MM HG (ref 35–45)
POTASSIUM BLDV-SCNC: 5.4 MMOL/L (ref 3.5–5.3)
POTASSIUM SERPL-SCNC: 5.1 MMOL/L (ref 3.5–5.3)
PROT SERPL-MCNC: 7.2 G/DL (ref 6.4–8.2)
RAD ONC MSQ ACTUAL FRACTIONS DELIVERED: 4
RAD ONC MSQ ACTUAL SESSION DELIVERED DOSE: 200 CGRAY
RAD ONC MSQ ACTUAL TOTAL DOSE: 800 CGRAY
RAD ONC MSQ ELAPSED DAYS: 5
RAD ONC MSQ LAST DATE: NORMAL
RAD ONC MSQ PRESCRIBED FRACTIONAL DOSE: 200 CGRAY
RAD ONC MSQ PRESCRIBED NUMBER OF FRACTIONS: 30
RAD ONC MSQ PRESCRIBED TECHNIQUE: NORMAL
RAD ONC MSQ PRESCRIBED TOTAL DOSE: 6000 CGRAY
RAD ONC MSQ PRESCRIPTION PATTERN COMMENT: NORMAL
RAD ONC MSQ START DATE: NORMAL
RAD ONC MSQ TREATMENT COURSE NUMBER: 1
RAD ONC MSQ TREATMENT SITE: NORMAL
RBC # BLD AUTO: 3.82 X10*6/UL (ref 4.5–5.9)
RSV RNA RESP QL NAA+PROBE: NOT DETECTED
SAO2 % BLDV: 10 % (ref 45–75)
SARS-COV-2 RNA RESP QL NAA+PROBE: NOT DETECTED
SODIUM BLDV-SCNC: 132 MMOL/L (ref 136–145)
SODIUM SERPL-SCNC: 135 MMOL/L (ref 136–145)
UFH PPP CHRO-ACNC: 1 IU/ML
WBC # BLD AUTO: 7.6 X10*3/UL (ref 4.4–11.3)

## 2024-10-16 PROCEDURE — 82947 ASSAY GLUCOSE BLOOD QUANT: CPT

## 2024-10-16 PROCEDURE — 99285 EMERGENCY DEPT VISIT HI MDM: CPT | Performed by: PHYSICIAN ASSISTANT

## 2024-10-16 PROCEDURE — 85520 HEPARIN ASSAY: CPT | Performed by: EMERGENCY MEDICINE

## 2024-10-16 PROCEDURE — 71045 X-RAY EXAM CHEST 1 VIEW: CPT | Performed by: RADIOLOGY

## 2024-10-16 PROCEDURE — 93010 ELECTROCARDIOGRAM REPORT: CPT | Performed by: PHYSICIAN ASSISTANT

## 2024-10-16 PROCEDURE — 99222 1ST HOSP IP/OBS MODERATE 55: CPT

## 2024-10-16 PROCEDURE — 84484 ASSAY OF TROPONIN QUANT: CPT | Performed by: PHYSICIAN ASSISTANT

## 2024-10-16 PROCEDURE — 71275 CT ANGIOGRAPHY CHEST: CPT

## 2024-10-16 PROCEDURE — 99285 EMERGENCY DEPT VISIT HI MDM: CPT

## 2024-10-16 PROCEDURE — 2500000004 HC RX 250 GENERAL PHARMACY W/ HCPCS (ALT 636 FOR OP/ED)

## 2024-10-16 PROCEDURE — 93005 ELECTROCARDIOGRAM TRACING: CPT

## 2024-10-16 PROCEDURE — 94640 AIRWAY INHALATION TREATMENT: CPT | Mod: 59

## 2024-10-16 PROCEDURE — 85025 COMPLETE CBC W/AUTO DIFF WBC: CPT | Performed by: PHYSICIAN ASSISTANT

## 2024-10-16 PROCEDURE — 2500000001 HC RX 250 WO HCPCS SELF ADMINISTERED DRUGS (ALT 637 FOR MEDICARE OP)

## 2024-10-16 PROCEDURE — 2500000002 HC RX 250 W HCPCS SELF ADMINISTERED DRUGS (ALT 637 FOR MEDICARE OP, ALT 636 FOR OP/ED)

## 2024-10-16 PROCEDURE — 83880 ASSAY OF NATRIURETIC PEPTIDE: CPT | Performed by: PHYSICIAN ASSISTANT

## 2024-10-16 PROCEDURE — 2500000005 HC RX 250 GENERAL PHARMACY W/O HCPCS

## 2024-10-16 PROCEDURE — 87637 SARSCOV2&INF A&B&RSV AMP PRB: CPT | Performed by: PHYSICIAN ASSISTANT

## 2024-10-16 PROCEDURE — 82330 ASSAY OF CALCIUM: CPT | Performed by: PHYSICIAN ASSISTANT

## 2024-10-16 PROCEDURE — 1210000001 HC SEMI-PRIVATE ROOM DAILY

## 2024-10-16 PROCEDURE — S4991 NICOTINE PATCH NONLEGEND: HCPCS

## 2024-10-16 PROCEDURE — 2550000001 HC RX 255 CONTRASTS: Performed by: EMERGENCY MEDICINE

## 2024-10-16 PROCEDURE — 71045 X-RAY EXAM CHEST 1 VIEW: CPT

## 2024-10-16 PROCEDURE — 82435 ASSAY OF BLOOD CHLORIDE: CPT | Performed by: PHYSICIAN ASSISTANT

## 2024-10-16 PROCEDURE — 77386 HC INTENSITY-MODULATED RADIATION THERAPY (IMRT), COMPLEX: CPT | Performed by: STUDENT IN AN ORGANIZED HEALTH CARE EDUCATION/TRAINING PROGRAM

## 2024-10-16 PROCEDURE — 0B738DZ DILATION OF RIGHT MAIN BRONCHUS WITH INTRALUMINAL DEVICE, VIA NATURAL OR ARTIFICIAL OPENING ENDOSCOPIC: ICD-10-PCS | Performed by: INTERNAL MEDICINE

## 2024-10-16 PROCEDURE — 71275 CT ANGIOGRAPHY CHEST: CPT | Performed by: STUDENT IN AN ORGANIZED HEALTH CARE EDUCATION/TRAINING PROGRAM

## 2024-10-16 PROCEDURE — 0B538ZZ DESTRUCTION OF RIGHT MAIN BRONCHUS, VIA NATURAL OR ARTIFICIAL OPENING ENDOSCOPIC: ICD-10-PCS | Performed by: INTERNAL MEDICINE

## 2024-10-16 RX ORDER — ASPIRIN 81 MG/1
81 TABLET ORAL DAILY
Status: DISCONTINUED | OUTPATIENT
Start: 2024-10-16 | End: 2024-10-19 | Stop reason: HOSPADM

## 2024-10-16 RX ORDER — CYANOCOBALAMIN (VITAMIN B-12) 500 MCG
800 TABLET ORAL DAILY
Status: DISCONTINUED | OUTPATIENT
Start: 2024-10-16 | End: 2024-10-19 | Stop reason: HOSPADM

## 2024-10-16 RX ORDER — SIMVASTATIN 20 MG/1
20 TABLET, FILM COATED ORAL DAILY
Status: DISCONTINUED | OUTPATIENT
Start: 2024-10-16 | End: 2024-10-19 | Stop reason: HOSPADM

## 2024-10-16 RX ORDER — IPRATROPIUM BROMIDE AND ALBUTEROL SULFATE 2.5; .5 MG/3ML; MG/3ML
3 SOLUTION RESPIRATORY (INHALATION)
Status: DISCONTINUED | OUTPATIENT
Start: 2024-10-16 | End: 2024-10-17

## 2024-10-16 RX ORDER — HEPARIN SODIUM 10000 [USP'U]/100ML
0-4000 INJECTION, SOLUTION INTRAVENOUS CONTINUOUS
Status: DISCONTINUED | OUTPATIENT
Start: 2024-10-16 | End: 2024-10-16

## 2024-10-16 RX ORDER — LEVOFLOXACIN 750 MG/1
750 TABLET ORAL DAILY
Status: COMPLETED | OUTPATIENT
Start: 2024-10-16 | End: 2024-10-17

## 2024-10-16 RX ORDER — LANOLIN ALCOHOL/MO/W.PET/CERES
100 CREAM (GRAM) TOPICAL DAILY
Status: DISCONTINUED | OUTPATIENT
Start: 2024-10-16 | End: 2024-10-19 | Stop reason: HOSPADM

## 2024-10-16 RX ORDER — METOPROLOL TARTRATE 50 MG/1
50 TABLET ORAL EVERY 12 HOURS
Status: DISCONTINUED | OUTPATIENT
Start: 2024-10-16 | End: 2024-10-19 | Stop reason: HOSPADM

## 2024-10-16 RX ORDER — ASCORBIC ACID 500 MG
500 TABLET ORAL DAILY
Status: DISCONTINUED | OUTPATIENT
Start: 2024-10-16 | End: 2024-10-19 | Stop reason: HOSPADM

## 2024-10-16 RX ORDER — PANTOPRAZOLE SODIUM 40 MG/1
40 TABLET, DELAYED RELEASE ORAL DAILY
Status: DISCONTINUED | OUTPATIENT
Start: 2024-10-16 | End: 2024-10-19 | Stop reason: HOSPADM

## 2024-10-16 RX ORDER — DEXTROSE 50 % IN WATER (D50W) INTRAVENOUS SYRINGE
12.5
Status: DISCONTINUED | OUTPATIENT
Start: 2024-10-16 | End: 2024-10-19 | Stop reason: HOSPADM

## 2024-10-16 RX ORDER — AMLODIPINE BESYLATE 5 MG/1
10 TABLET ORAL DAILY
Status: DISCONTINUED | OUTPATIENT
Start: 2024-10-16 | End: 2024-10-16

## 2024-10-16 RX ORDER — FLUTICASONE FUROATE AND VILANTEROL 200; 25 UG/1; UG/1
1 POWDER RESPIRATORY (INHALATION)
Status: DISCONTINUED | OUTPATIENT
Start: 2024-10-16 | End: 2024-10-19 | Stop reason: HOSPADM

## 2024-10-16 RX ORDER — INSULIN LISPRO 100 [IU]/ML
0-5 INJECTION, SOLUTION INTRAVENOUS; SUBCUTANEOUS
Status: DISCONTINUED | OUTPATIENT
Start: 2024-10-16 | End: 2024-10-19 | Stop reason: HOSPADM

## 2024-10-16 RX ORDER — FOLIC ACID 1 MG/1
1 TABLET ORAL DAILY
Status: DISCONTINUED | OUTPATIENT
Start: 2024-10-16 | End: 2024-10-19 | Stop reason: HOSPADM

## 2024-10-16 RX ORDER — ALBUTEROL SULFATE 90 UG/1
2 INHALANT RESPIRATORY (INHALATION) EVERY 6 HOURS PRN
Status: DISCONTINUED | OUTPATIENT
Start: 2024-10-16 | End: 2024-10-16

## 2024-10-16 RX ORDER — DEXTROSE 50 % IN WATER (D50W) INTRAVENOUS SYRINGE
25
Status: DISCONTINUED | OUTPATIENT
Start: 2024-10-16 | End: 2024-10-19 | Stop reason: HOSPADM

## 2024-10-16 RX ORDER — POLYETHYLENE GLYCOL 3350 17 G/17G
17 POWDER, FOR SOLUTION ORAL DAILY
Status: DISCONTINUED | OUTPATIENT
Start: 2024-10-16 | End: 2024-10-19 | Stop reason: HOSPADM

## 2024-10-16 RX ORDER — IBUPROFEN 200 MG
1 TABLET ORAL DAILY
Status: DISCONTINUED | OUTPATIENT
Start: 2024-10-16 | End: 2024-10-19 | Stop reason: HOSPADM

## 2024-10-16 RX ADMIN — ASPIRIN 81 MG: 81 TABLET, COATED ORAL at 15:42

## 2024-10-16 RX ADMIN — FOLIC ACID 1 MG: 1 TABLET ORAL at 15:43

## 2024-10-16 RX ADMIN — SODIUM CHLORIDE, POTASSIUM CHLORIDE, SODIUM LACTATE AND CALCIUM CHLORIDE 250 ML: 600; 310; 30; 20 INJECTION, SOLUTION INTRAVENOUS at 17:32

## 2024-10-16 RX ADMIN — METOPROLOL TARTRATE 50 MG: 50 TABLET, FILM COATED ORAL at 15:43

## 2024-10-16 RX ADMIN — PANTOPRAZOLE SODIUM 40 MG: 40 TABLET, DELAYED RELEASE ORAL at 15:43

## 2024-10-16 RX ADMIN — POLYETHYLENE GLYCOL 3350 17 G: 17 POWDER, FOR SOLUTION ORAL at 15:43

## 2024-10-16 RX ADMIN — IOHEXOL 80 ML: 350 INJECTION, SOLUTION INTRAVENOUS at 12:19

## 2024-10-16 RX ADMIN — SIMVASTATIN 20 MG: 20 TABLET, FILM COATED ORAL at 15:43

## 2024-10-16 RX ADMIN — FLUTICASONE FUROATE AND VILANTEROL TRIFENATATE 1 PUFF: 200; 25 POWDER RESPIRATORY (INHALATION) at 15:43

## 2024-10-16 RX ADMIN — LEVOFLOXACIN 750 MG: 750 TABLET, FILM COATED ORAL at 15:47

## 2024-10-16 RX ADMIN — NICOTINE 1 PATCH: 21 PATCH, EXTENDED RELEASE TRANSDERMAL at 15:43

## 2024-10-16 RX ADMIN — OXYCODONE HYDROCHLORIDE AND ACETAMINOPHEN 500 MG: 500 TABLET ORAL at 15:42

## 2024-10-16 RX ADMIN — THIAMINE HCL TAB 100 MG 100 MG: 100 TAB at 15:43

## 2024-10-16 RX ADMIN — Medication 2 L/MIN: at 15:44

## 2024-10-16 ASSESSMENT — PATIENT HEALTH QUESTIONNAIRE - PHQ9
SUM OF ALL RESPONSES TO PHQ9 QUESTIONS 1 AND 2: 0
2. FEELING DOWN, DEPRESSED OR HOPELESS: NOT AT ALL
1. LITTLE INTEREST OR PLEASURE IN DOING THINGS: NOT AT ALL

## 2024-10-16 ASSESSMENT — COLUMBIA-SUICIDE SEVERITY RATING SCALE - C-SSRS
1. IN THE PAST MONTH, HAVE YOU WISHED YOU WERE DEAD OR WISHED YOU COULD GO TO SLEEP AND NOT WAKE UP?: NO
1. IN THE PAST MONTH, HAVE YOU WISHED YOU WERE DEAD OR WISHED YOU COULD GO TO SLEEP AND NOT WAKE UP?: NO
2. HAVE YOU ACTUALLY HAD ANY THOUGHTS OF KILLING YOURSELF?: NO
2. HAVE YOU ACTUALLY HAD ANY THOUGHTS OF KILLING YOURSELF?: NO
6. HAVE YOU EVER DONE ANYTHING, STARTED TO DO ANYTHING, OR PREPARED TO DO ANYTHING TO END YOUR LIFE?: NO
6. HAVE YOU EVER DONE ANYTHING, STARTED TO DO ANYTHING, OR PREPARED TO DO ANYTHING TO END YOUR LIFE?: NO

## 2024-10-16 ASSESSMENT — LIFESTYLE VARIABLES
TOTAL SCORE: 0
EVER HAD A DRINK FIRST THING IN THE MORNING TO STEADY YOUR NERVES TO GET RID OF A HANGOVER: NO
HAVE PEOPLE ANNOYED YOU BY CRITICIZING YOUR DRINKING: NO
HAVE YOU EVER FELT YOU SHOULD CUT DOWN ON YOUR DRINKING: NO
EVER FELT BAD OR GUILTY ABOUT YOUR DRINKING: NO

## 2024-10-16 ASSESSMENT — ENCOUNTER SYMPTOMS
OCCASIONAL FEELINGS OF UNSTEADINESS: 1
LOSS OF SENSATION IN FEET: 0
DEPRESSION: 0

## 2024-10-16 ASSESSMENT — PAIN SCALES - GENERAL: PAINLEVEL_OUTOF10: 0-NO PAIN

## 2024-10-16 NOTE — H&P
MEDICINE ADMISSION NOTE    History of Present Illness  Juan Carlos Cr is a 74 y.o. 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     He presents as a direct ED admission from his radiation oncology treatment visit earlier today, where he was noted to have progression of disease with collapse of the right lung due to mainstem bronchus involvement. He was noted to be saturating about 80% on 2L NC, which is his baseline.     He was recently diagnosed with Stage IIIb squamous cell lung cancer after he underwent bronchscopy with EBUS, RUL bx and debulking, and stent placement on 8/29/24. Pt underwent a second bronchoscopy on 9/25/24 when he was found to have complete collapse of most the right lung, with his stent occluded with a mucus plug and removed. He was started on cycle 1 of weekly carbo/taxol chemo with concurrent RT on 10/2/24. He underwent Mediport placement yesterday, 10/15/24.     He reports significant shortness of breath with any ambulation for about the past two weeks. He notes that he had similar symptoms when he underwent prior bronchoscopy. He states that his shortness of breath is worse when sitting upright and when walking. He has a history of COPD and has been using his home Breo and Spiriva inhalers. He denies any cough. He was an active smoker up until his lung cancer diagnosis.   He states that he had his Mediport placed on the right side yesterday and reports soreness at the insertion site.     Patient denies any fever, chills, lightheadedness, chest pain, abdominal pain, N/V/C/D, lower extremity pain/swelling.    ED Course:  BP (!) 116/97   Pulse 100   Temp 36.4 °C (97.5 °F) (Temporal)   Resp 16   SpO2  (!) 93%    On evaluation in the ED, patient was saturating 97% on 2L NC while lying supine.     Labs:  Results for orders placed or performed during the hospital encounter of 10/16/24 (from the past 24 hours)   Blood Gas Venous Full Panel   Result Value Ref Range    POCT pH, Venous 7.38 7.33 - 7.43 pH    POCT pCO2, Venous 46 41 - 51 mm Hg    POCT pO2, Venous 16 (L) 35 - 45 mm Hg    POCT SO2, Venous 10 (L) 45 - 75 %    POCT Oxy Hemoglobin, Venous 10.0 (L) 45.0 - 75.0 %    POCT Hematocrit Calculated, Venous 38.0 (L) 41.0 - 52.0 %    POCT Sodium, Venous 132 (L) 136 - 145 mmol/L    POCT Potassium, Venous 5.4 (H) 3.5 - 5.3 mmol/L    POCT Chloride, Venous 100 98 - 107 mmol/L    POCT Ionized Calicum, Venous 1.31 1.10 - 1.33 mmol/L    POCT Glucose, Venous 122 (H) 74 - 99 mg/dL    POCT Lactate, Venous 1.9 0.4 - 2.0 mmol/L    POCT Base Excess, Venous 1.6 -2.0 - 3.0 mmol/L    POCT HCO3 Calculated, Venous 27.2 (H) 22.0 - 26.0 mmol/L    POCT Hemoglobin, Venous 12.5 (L) 13.5 - 17.5 g/dL    POCT Anion Gap, Venous 10.0 10.0 - 25.0 mmol/L    Patient Temperature 37.0 degrees Celsius    FiO2 36 %   Troponin I, High Sensitivity   Result Value Ref Range    Troponin I, High Sensitivity (CMC) 11 0 - 53 ng/L   Comprehensive Metabolic Panel   Result Value Ref Range    Glucose 113 (H) 74 - 99 mg/dL    Sodium 135 (L) 136 - 145 mmol/L    Potassium 5.1 3.5 - 5.3 mmol/L    Chloride 101 98 - 107 mmol/L    Bicarbonate 24 21 - 32 mmol/L    Anion Gap 15 10 - 20 mmol/L    Urea Nitrogen 18 6 - 23 mg/dL    Creatinine 0.66 0.50 - 1.30 mg/dL    eGFR >90 >60 mL/min/1.73m*2    Calcium 9.6 8.6 - 10.6 mg/dL    Albumin 3.3 (L) 3.4 - 5.0 g/dL    Alkaline Phosphatase 56 33 - 136 U/L    Total Protein 7.2 6.4 - 8.2 g/dL    AST 13 9 - 39 U/L    Bilirubin, Total 0.6 0.0 - 1.2 mg/dL    ALT 7 (L) 10 - 52 U/L   CBC and Auto Differential   Result Value Ref Range    WBC 7.6 4.4 - 11.3 x10*3/uL    nRBC 0.0 0.0 - 0.0 /100 WBCs    RBC 3.82 (L) 4.50 - 5.90 x10*6/uL     Hemoglobin 11.6 (L) 13.5 - 17.5 g/dL    Hematocrit 35.7 (L) 41.0 - 52.0 %    MCV 94 80 - 100 fL    MCH 30.4 26.0 - 34.0 pg    MCHC 32.5 32.0 - 36.0 g/dL    RDW 12.5 11.5 - 14.5 %    Platelets 217 150 - 450 x10*3/uL    Neutrophils % 79.4 40.0 - 80.0 %    Immature Granulocytes %, Automated 0.9 0.0 - 0.9 %    Lymphocytes % 9.4 13.0 - 44.0 %    Monocytes % 9.2 2.0 - 10.0 %    Eosinophils % 0.7 0.0 - 6.0 %    Basophils % 0.4 0.0 - 2.0 %    Neutrophils Absolute 6.03 (H) 1.60 - 5.50 x10*3/uL    Immature Granulocytes Absolute, Automated 0.07 0.00 - 0.50 x10*3/uL    Lymphocytes Absolute 0.71 (L) 0.80 - 3.00 x10*3/uL    Monocytes Absolute 0.70 0.05 - 0.80 x10*3/uL    Eosinophils Absolute 0.05 0.00 - 0.40 x10*3/uL    Basophils Absolute 0.03 0.00 - 0.10 x10*3/uL   B-Type Natriuretic Peptide   Result Value Ref Range     (H) 0 - 99 pg/mL   Influenza A, and B PCR   Result Value Ref Range    Flu A Result Not Detected Not Detected    Flu B Result Not Detected Not Detected   Sars-CoV-2 PCR   Result Value Ref Range    Coronavirus 2019, PCR Not Detected Not Detected   RSV PCR   Result Value Ref Range    RSV PCR Not Detected Not Detected   Light Blue Top   Result Value Ref Range    Extra Tube Hold for add-ons.    Gray Top   Result Value Ref Range    Extra Tube Hold for add-ons.           Imaging:  CT angio chest for pulmonary embolism    Result Date: 10/16/2024  Interpreted By:  Kamron García, STUDY: CT ANGIO CHEST FOR PULMONARY EMBOLISM;  10/16/2024 12:18 pm   INDICATION: Signs/Symptoms:sob, cancer pt.   COMPARISON: Chest CT 09/26/2024   ACCESSION NUMBER(S): [id number]   ORDERING CLINICIAN: FAB SUE   TECHNIQUE: Helical data acquisition of the chest was obtained after intravenous administration of 80 mL of Omnipaque 350 as per PE protocol. Images were reformatted in coronal and sagittal planes. Axial and coronal maximum intensity projection (MIP) images were created and reviewed.   FINDINGS: POTENTIAL LIMITATIONS OF THE  STUDY: None   HEART AND VESSELS: There are no discrete filling defects within main pulmonary artery and its branches to suggest acute pulmonary embolism. Main pulmonary artery and its branches are normal in caliber, although there is obliteration of right upper lobar pulmonary artery by the below described infiltrative right upper lobe lung mass..   The thoracic aorta normal in course and caliber.There is mild to moderate atherosclerosis present, including calcified and noncalcified plaques. Severe coronary artery calcifications are seen. Please note,the study is not optimized for evaluation of coronary arteries.   The cardiac chambers are not enlarged. There is no pericardial effusion seen. There is similar compression and moderate narrowing of upper SVC by the below described lung mass encroaching the mediastinum (image 112, series 401). There is a right chest wall MediPort now seen in place with tip terminating within superior cavoatrial junction.   MEDIASTINUM AND SUDHAKAR, LOWER NECK AND AXILLA: The visualized thyroid gland is within normal limits. There is again demonstration of mediastinal encroachment by the right upper lobe medial lung mass with a discrete nodular focus measuring 2.1 cm within right lower paratracheal region (image 123, series 401), which may represent extension of the mass versus a metastatic lymph node. Otherwise, no new nas disease as compared to recent CT scan 09/26/2024 Esophagus appears within normal limits as seen.   LUNGS AND AIRWAYS: Trachea and left-sided airways are patent. There is again demonstration of a large right upper paramediastinal centrally necrotic lung mass, which is seen causing invasion of adjacent mediastinum as well as right principal bronchus. Besides right principal bronchus, there is also complete obliteration of multiple lobar segmental and subsegmental bronchi throughout the right lung, with interval worsening of bronchial aeration as compared to recent CT scan  09/26/2024.   Has been interval complete collapse of right lung along with increase in size of now moderate right pleural effusion. Exact size of masses difficult to measure but it again measures approximately 8 cm in greatest axial dimension and contains internal air locules, which may be related to prior intervention. There are similar subpleural reticulations within left lung. There is no left-sided pleural effusion seen.   UPPER ABDOMEN: Similar mild fullness of right renal collecting system with a 3 mm calcified calculus on image 341, series 401. Rest of visualized abdominal structures are grossly unremarkable.   CHEST WALL AND OSSEOUS STRUCTURES: Chest wall is within normal limits. No acute osseous pathology.There are no suspicious osseous lesions.Multilevel degenerative changes within visualized spine       1. No evidence of acute pulmonary embolism. 2. Redemonstrated large infiltrative and centrally necrotic medial right upper lobe lung mass, encroaching the adjacent mediastinum and obliterating the right-sided bronchi, as described above. 3. Interval worsening of right-sided bronchial aeration with new complete collapse of right lung as well as increase in size of moderate pleural effusion. Pulmonary hygiene is recommended. 4. Moderate narrowing of the upper SVC secondary to compression by the above-described mass is now better visualized, although SVC appears patent. 5. Additional chronic stable findings as described above.   MACRO: Critical Finding:  See findings. Notification was initiated on 10/16/2024 at 12:46 pm by  Kamron García.  (**-YCF-**) Instructions:   Signed by: Kamron García 10/16/2024 12:47 PM Dictation workstation:   [id number]    XR chest 1 view    Result Date: 10/16/2024  Interpreted By:  Lee Ibarra and Gupta Jayesh STUDY: XR CHEST 1 VIEW;  10/16/2024 10:42 am   INDICATION: Signs/Symptoms:sob.   COMPARISON: Comparison radiograph 09/27/2024.   CT chest September 26   ACCESSION NUMBER(S):  [id number]   ORDERING CLINICIAN: FAB SUE   TECHNIQUE: AP radiograph of the chest was provided.   FINDINGS: Medical devices:Right chest wall MediPort with distal tip overlying the mid SVC.   CARDIOMEDIASTINAL SILHOUETTE: Cardiomediastinal silhouette is appropriate in size and configuration.   LUNGS: Complete opacification of the right lung. No consolidation, edema, effusion, or pneumothorax.   ABDOMEN: No upper abdominal findings.   BONES: No osseous changes.       Complete opacification of the right lung, likely due to right mainstem bronchus occlusion with large effusion and postobstructive airspace disease secondary to known large mass.   I personally reviewed the images/study and I agree with the findings as stated by Renato García MD. This study was interpreted at [facility], [place], [place].   MACRO: None   Signed by: Lee Ibarra 10/16/2024 11:04 AM Dictation workstation:   [id number]    IR CVC port placement    Result Date: 10/15/2024  Interpreted By:  Missael Cheung, STUDY: IR CVC PORT PLACEMENT;  10/15/2024 1:19 pm   INDICATION: Port placement,C34.11 Malignant neoplasm of upper lobe, right bronchus or lung,I87.8 Other specified disorders of veins.     COMPARISON: None.   ACCESSION NUMBER(S): [id number]   ORDERING CLINICIAN: SIMEON DUNBAR   TECHNIQUE:   CONSENT: The patient/patient's POA/next of kin was informed of the nature of the proposed procedure. The purposes, alternatives, risks, and benefits were explained and discussed. All questions were answered and consent was obtained.     SEDATION: Moderate conscious IV sedation services (supervision of administration, induction, and maintenance) were provided by the physician performing the procedure with intravenous fentanyl 50 mcg and versed 1 mg for 20 minutes. The physician was assisted by an independent trained observer, an interventional radiology nurse, in the continuous monitoring of patient level of  consciousness and physiologic status.   MEDICATION/CONTRAST: No additional   TIME OUT: A time out was performed immediately prior to procedure start with the interventional team, correctly identifying the patient name, date of birth, MRN, procedure, anatomy (including marking of site and side), patient position, procedure consent form, relevant laboratory and imaging test results, antibiotic administration, safety precautions, and procedure-specific equipment needs.   COMPLICATIONS: No immediate adverse events identified.   FINDINGS: Maximum sterile barrier technique was implemented. In the recumbent position, the patient was positioned on the angiography table. The right supraclavicular and infraclavicular cutaneous tissues were prepared and draped in usual sterile manner.   The supraclavicular access site was screened with gray-scale ultrasound with subsequent subcutaneous instillation of Lidocaine 1% local anesthesia. Ultrasound images demonstrate a patent  right internal jugular vein. Under direct ultrasound guidance and Seldinger/micropuncture technique, the  right internal jugular vein was accessed. An ultrasound digital spot image was acquired and stored on the  PACS. After confirmation of location, a 018 Kerkhoven-Mandril guidewire was inserted to secure location. The guidewire was advanced into the inferior vena cava utilizing intermittent fluoroscopy. The micro-access needle was removed over the guidewire. Utilizing a 5-on-4 coaxial dilator sheath system, upsize to a 035 3-J guidewire was performed. Subsequent access tract dilation was performed to an eventual  8.5-Portuguese peel-away sheath dilator system.   Following Lidocaine 1% local anesthesia, a superolateral Mediport pocket was created in the subcutaneous infraclavicular chest wall. A subcutaneous tract was then created from the Mediport pocket to the venous access site. The  8-Portuguese port catheter was introduced maintaining continuity from the Mediport  pocket to the venous access site and into the central venous system. An optimal length of catheter was measured with the tip at the right atrial/superior vena caval junction.   The catheter was trimmed to the optimal length and the external portion was connected to the Mediport reservoir hub. After insertion into the Mediport pocket, a Kirkland needle was then utilized to access the reservoir to assess for leaks, evaluate for aspiration and flushability. The Mediport was then irrigated with low-dose heparinized saline.   A fluoroscopic spot image of the chest was acquired in the AP projection to confirm optimal course and location of the subcutaneous and central venous catheter. In addition, optimal orientation and continuity to the Mediport reservoir were identified.   After confirmation of optimal Mediport functioning, the Mediport pocket site was closed with subcutaneous absorbable Polysorb sutures in addition to cutaneous subcuticular closure. Sterile dressings were then placed at the Mediport reservoir and venotomy access site.   The patient tolerated the procedure without complication.       1. Uncomplicated placement of a  right infraclavicular  single-lumen Mediport- 8-Canadian catheter tip residing at the cavoatrial junction. 2. CT power-injection compatible Mediport. 3. MRI-compatible Mediport.   Optimal functioning device and ready for utilization.     Performed and dictated at [facility].   MACRO: None   Signed by: Missael Cheung 10/15/2024 1:52 PM Dictation workstation:   [id number]      ED Interventions:  Medications   albuterol 90 mcg/actuation inhaler 2 puff (has no administration in time range)   aspirin EC tablet 81 mg (81 mg oral Given 10/16/24 1542)   ascorbic acid (Vitamin C) tablet 500 mg (500 mg oral Given 10/16/24 1542)   cholecalciferol (Vitamin D-3) tablet 20 mcg (has no administration in time range)   fluticasone furoate-vilanteroL (Breo Ellipta) 200-25  mcg/dose inhaler 1 puff (1 puff inhalation Given 10/16/24 1543)   folic acid (Folvite) tablet 1 mg (1 mg oral Given 10/16/24 1543)   levoFLOXacin (Levaquin) tablet 750 mg (750 mg oral Given 10/16/24 1547)   nicotine (Nicoderm CQ) 21 mg/24 hr patch 1 patch (1 patch transdermal Medication Applied 10/16/24 1543)   metoprolol tartrate (Lopressor) tablet 50 mg (50 mg oral Given 10/16/24 1543)   oxygen (O2) therapy (2 L/min inhalation Start 10/16/24 1544)   pantoprazole (ProtoNix) EC tablet 40 mg (40 mg oral Given 10/16/24 1543)   simvastatin (Zocor) tablet 20 mg (20 mg oral Given 10/16/24 1543)   thiamine (Vitamin B-1) tablet 100 mg (100 mg oral Given 10/16/24 1543)   tiotropium (Spiriva Respimat) 2.5 mcg/actuation inhaler 2 puff (2 puffs inhalation Not Given 10/16/24 1544)   polyethylene glycol (Glycolax, Miralax) packet 17 g (17 g oral Given 10/16/24 1543)   insulin lispro (HumaLOG) injection 0-5 Units (has no administration in time range)   heparin bolus from bag 3,828 Units (has no administration in time range)   heparin 25,000 Units in dextrose 5% 250 mL (100 Units/mL) infusion (premix) (has no administration in time range)   heparin bolus from bag 1,500-3,000 Units (has no administration in time range)   glucagon (Glucagen) injection 1 mg (has no administration in time range)   dextrose 50 % injection 25 g (has no administration in time range)   glucagon (Glucagen) injection 1 mg (has no administration in time range)   dextrose 50 % injection 12.5 g (has no administration in time range)   iohexol (OMNIPaque) 350 mg iodine/mL solution 80 mL (80 mL intravenous Given 10/16/24 1219)       Historical Data:  CT angio chest for pulmonary embolism    Result Date: 10/16/2024  Interpreted By:  Kamron García, STUDY: CT ANGIO CHEST FOR PULMONARY EMBOLISM;  10/16/2024 12:18 pm   INDICATION: Signs/Symptoms:sob, cancer pt.   COMPARISON: Chest CT 09/26/2024   ACCESSION NUMBER(S): [id number]   ORDERING CLINICIAN: FAB SUE    TECHNIQUE: Helical data acquisition of the chest was obtained after intravenous administration of 80 mL of Omnipaque 350 as per PE protocol. Images were reformatted in coronal and sagittal planes. Axial and coronal maximum intensity projection (MIP) images were created and reviewed.   FINDINGS: POTENTIAL LIMITATIONS OF THE STUDY: None   HEART AND VESSELS: There are no discrete filling defects within main pulmonary artery and its branches to suggest acute pulmonary embolism. Main pulmonary artery and its branches are normal in caliber, although there is obliteration of right upper lobar pulmonary artery by the below described infiltrative right upper lobe lung mass..   The thoracic aorta normal in course and caliber.There is mild to moderate atherosclerosis present, including calcified and noncalcified plaques. Severe coronary artery calcifications are seen. Please note,the study is not optimized for evaluation of coronary arteries.   The cardiac chambers are not enlarged. There is no pericardial effusion seen. There is similar compression and moderate narrowing of upper SVC by the below described lung mass encroaching the mediastinum (image 112, series 401). There is a right chest wall MediPort now seen in place with tip terminating within superior cavoatrial junction.   MEDIASTINUM AND SUDHAKAR, LOWER NECK AND AXILLA: The visualized thyroid gland is within normal limits. There is again demonstration of mediastinal encroachment by the right upper lobe medial lung mass with a discrete nodular focus measuring 2.1 cm within right lower paratracheal region (image 123, series 401), which may represent extension of the mass versus a metastatic lymph node. Otherwise, no new nas disease as compared to recent CT scan 09/26/2024 Esophagus appears within normal limits as seen.   LUNGS AND AIRWAYS: Trachea and left-sided airways are patent. There is again demonstration of a large right upper paramediastinal centrally necrotic  lung mass, which is seen causing invasion of adjacent mediastinum as well as right principal bronchus. Besides right principal bronchus, there is also complete obliteration of multiple lobar segmental and subsegmental bronchi throughout the right lung, with interval worsening of bronchial aeration as compared to recent CT scan 09/26/2024.   Has been interval complete collapse of right lung along with increase in size of now moderate right pleural effusion. Exact size of masses difficult to measure but it again measures approximately 8 cm in greatest axial dimension and contains internal air locules, which may be related to prior intervention. There are similar subpleural reticulations within left lung. There is no left-sided pleural effusion seen.   UPPER ABDOMEN: Similar mild fullness of right renal collecting system with a 3 mm calcified calculus on image 341, series 401. Rest of visualized abdominal structures are grossly unremarkable.   CHEST WALL AND OSSEOUS STRUCTURES: Chest wall is within normal limits. No acute osseous pathology.There are no suspicious osseous lesions.Multilevel degenerative changes within visualized spine       1. No evidence of acute pulmonary embolism. 2. Redemonstrated large infiltrative and centrally necrotic medial right upper lobe lung mass, encroaching the adjacent mediastinum and obliterating the right-sided bronchi, as described above. 3. Interval worsening of right-sided bronchial aeration with new complete collapse of right lung as well as increase in size of moderate pleural effusion. Pulmonary hygiene is recommended. 4. Moderate narrowing of the upper SVC secondary to compression by the above-described mass is now better visualized, although SVC appears patent. 5. Additional chronic stable findings as described above.   MACRO: Critical Finding:  See findings. Notification was initiated on 10/16/2024 at 12:46 pm by  Kamron García.  (**-YCF-**) Instructions:   Signed by: Kamron García  10/16/2024 12:47 PM Dictation workstation:   [id number]    XR chest 1 view    Result Date: 10/16/2024  Interpreted By:  Lee Ibarra and Gupta Jayesh STUDY: XR CHEST 1 VIEW;  10/16/2024 10:42 am   INDICATION: Signs/Symptoms:sob.   COMPARISON: Comparison radiograph 09/27/2024.   CT chest September 26   ACCESSION NUMBER(S): [id number]   ORDERING CLINICIAN: FAB SUE   TECHNIQUE: AP radiograph of the chest was provided.   FINDINGS: Medical devices:Right chest wall MediPort with distal tip overlying the mid SVC.   CARDIOMEDIASTINAL SILHOUETTE: Cardiomediastinal silhouette is appropriate in size and configuration.   LUNGS: Complete opacification of the right lung. No consolidation, edema, effusion, or pneumothorax.   ABDOMEN: No upper abdominal findings.   BONES: No osseous changes.       Complete opacification of the right lung, likely due to right mainstem bronchus occlusion with large effusion and postobstructive airspace disease secondary to known large mass.   I personally reviewed the images/study and I agree with the findings as stated by Renato García MD. This study was interpreted at [facility], [place], [place].   MACRO: None   Signed by: Lee Ibarra 10/16/2024 11:04 AM Dictation workstation:   [id number]    IR CVC port placement    Result Date: 10/15/2024  Interpreted By:  Missael Cheung, STUDY: IR CVC PORT PLACEMENT;  10/15/2024 1:19 pm   INDICATION: Port placement,C34.11 Malignant neoplasm of upper lobe, right bronchus or lung,I87.8 Other specified disorders of veins.     COMPARISON: None.   ACCESSION NUMBER(S): [id number]   ORDERING CLINICIAN: SIMEON DUNBAR   TECHNIQUE:   CONSENT: The patient/patient's POA/next of kin was informed of the nature of the proposed procedure. The purposes, alternatives, risks, and benefits were explained and discussed. All questions were answered and consent was obtained.     SEDATION: Moderate conscious IV sedation services  (supervision of administration, induction, and maintenance) were provided by the physician performing the procedure with intravenous fentanyl 50 mcg and versed 1 mg for 20 minutes. The physician was assisted by an independent trained observer, an interventional radiology nurse, in the continuous monitoring of patient level of consciousness and physiologic status.   MEDICATION/CONTRAST: No additional   TIME OUT: A time out was performed immediately prior to procedure start with the interventional team, correctly identifying the patient name, date of birth, MRN, procedure, anatomy (including marking of site and side), patient position, procedure consent form, relevant laboratory and imaging test results, antibiotic administration, safety precautions, and procedure-specific equipment needs.   COMPLICATIONS: No immediate adverse events identified.   FINDINGS: Maximum sterile barrier technique was implemented. In the recumbent position, the patient was positioned on the angiography table. The right supraclavicular and infraclavicular cutaneous tissues were prepared and draped in usual sterile manner.   The supraclavicular access site was screened with gray-scale ultrasound with subsequent subcutaneous instillation of Lidocaine 1% local anesthesia. Ultrasound images demonstrate a patent  right internal jugular vein. Under direct ultrasound guidance and Seldinger/micropuncture technique, the  right internal jugular vein was accessed. An ultrasound digital spot image was acquired and stored on the  PACS. After confirmation of location, a 018 [place]-Mandril guidewire was inserted to secure location. The guidewire was advanced into the inferior vena cava utilizing intermittent fluoroscopy. The micro-access needle was removed over the guidewire. Utilizing a 5-on-4 coaxial dilator sheath system, upsize to a 035 3-J guidewire was performed. Subsequent access tract dilation was performed to an eventual  8.5-Greenland[place] peel-away  sheath dilator system.   Following Lidocaine 1% local anesthesia, a superolateral Mediport pocket was created in the subcutaneous infraclavicular chest wall. A subcutaneous tract was then created from the Mediport pocket to the venous access site. The  8-Algerian port catheter was introduced maintaining continuity from the Mediport pocket to the venous access site and into the central venous system. An optimal length of catheter was measured with the tip at the right atrial/superior vena caval junction.   The catheter was trimmed to the optimal length and the external portion was connected to the Mediport reservoir hub. After insertion into the Mediport pocket, a [place] needle was then utilized to access the reservoir to assess for leaks, evaluate for aspiration and flushability. The Mediport was then irrigated with low-dose heparinized saline.   A fluoroscopic spot image of the chest was acquired in the AP projection to confirm optimal course and location of the subcutaneous and central venous catheter. In addition, optimal orientation and continuity to the Mediport reservoir were identified.   After confirmation of optimal Mediport functioning, the Mediport pocket site was closed with subcutaneous absorbable Polysorb sutures in addition to cutaneous subcuticular closure. Sterile dressings were then placed at the Mediport reservoir and venotomy access site.   The patient tolerated the procedure without complication.       1. Uncomplicated placement of a  right infraclavicular  single-lumen Mediport- 8-Algerian catheter tip residing at the cavoatrial junction. 2. CT power-injection compatible Mediport. 3. MRI-compatible Mediport.   Optimal functioning device and ready for utilization.     Performed and dictated at [facility].   MACRO: None   Signed by: Missael Cheung 10/15/2024 1:52 PM Dictation workstation:   [id number]    Rad Onc CT Sim Images    Result Date: 10/3/2024  These images  are not reportable by radiology and will not be interpreted by  Radiologists.    CT chest wo IV contrast    Result Date: 9/27/2024  Interpreted By:  Beatrice Shepard, STUDY: CT CHEST WO IV CONTRAST;  9/26/2024 2:08 pm   INDICATION: Signs/Symptoms:SCC right lung, s/p bronchoscopy with stent removal.     COMPARISON: CT dated 09/23/2024   ACCESSION NUMBER(S): [id number]   ORDERING CLINICIAN: RHEA AMAYA   TECHNIQUE: Helical data acquisition of the chest was obtained  without IV contrast material.  Images were reformatted in axial, coronal, and sagittal planes.   FINDINGS: LUNGS AND AIRWAYS: Interval removal of right mainstem bronchus stent.   Redemonstration of right upper lobe/right perihilar mass measuring 8.3 x 6.1 cm with areas of central lucency and necrosis and extending/invading the right mainstem bronchus and causing obstruction the right upper lobe bronchus. Since prior study, there has been interval improvement in the post obstructive consolidation in the right upper lobe.   There is moderate right pleural effusion, unchanged compared to prior study. There is component of pleural thickening in the right lower lung.   There is interlobular septal thickening predominantly in the right upper lobe. Areas of ground-glass opacity predominantly in the right upper lobe and right middle lobe.   There is background moderate emphysema with extensive subpleural reticulation.   MEDIASTINUM AND SUDHAKAR, LOWER NECK AND AXILLA: The visualized thyroid gland is within normal limits.   Enlarged precarinal/pretracheal lymph node measuring 2.1 cm seen in image 113/346   Esophagus appears within normal limits as seen.   HEART AND VESSELS: The thoracic aorta is of normal course and caliber without vascular calcifications.   Main pulmonary artery and its branches are normal in caliber.   No coronary artery calcifications are seen. The study is not optimized for evaluation of coronary arteries.   The cardiac chambers are not  enlarged.   No evidence of pericardial effusion.   Right upper extremity PICC is in place terminating in the right atrium.   UPPER ABDOMEN: Mild fullness of the collecting system of the right kidney. 3 mm stone in the right kidney.   CHEST WALL AND OSSEOUS STRUCTURES: There are no suspicious osseous lesions. Multilevel degenerative changes are present   Diffuse osteopenia.       1.  Interval removal of right mainstem bronchus stent. 2. Redemonstration of large right upper lobe/right perihilar mass measuring 8.3 x 6.1 cm with areas of necrosis and extending/invading the right mainstem bronchus and causing obstruction of the right upper lobe bronchus, in keeping with patient's known malignancy. Since prior study, there has been interval improvement in the aeration of the right upper lobe with reduction in the extent of postobstructive pneumonitis/volume loss. 3. Redemonstration of enlarged mediastinal lymph nodes throughout the mediastinum, in keeping with nas metastasis. Interlobular septal thickening of the right upper lobe, concerning for lymphangitic spread of tumor. 4. Scattered areas of ground-glass airspace opacity in the right upper lobe and right middle lobe which might be due to re-expansion edema or infectious/inflammatory process. Unchanged moderate right pleural effusion with pleural thickening 5. Smoking-related changes including emphysema and subpleural reticulation. 6. Mild fullness of the collecting system of the right kidney with a 3 mm stone. Consider correlation with renal ultrasound. 7. Additional findings as described above   MACRO: None   Signed by: Beatrice Cerna 9/27/2024 8:54 PM Dictation workstation:   [id number]    XR chest 1 view    Result Date: 9/27/2024  Interpreted By:  Beatrice Shepard, STUDY: XR CHEST 1 VIEW; 9/27/2024 12:09 pm   INDICATION: Signs/Symptoms:S/p thoracentesis.   COMPARISON: Radiograph dated 09/23/2024 and CT dated 09/26/2024   ACCESSION NUMBER(S):  [id number]   ORDERING CLINICIAN: RHEA AMAYA   FINDINGS: Right upper extremity PICC is in place with the tip projecting over lower SV C.   Cardiac silhouette size is within normal limits.   Mass like density in the right upper lung, medially corresponding to large mass seen on CT from 09/26/2024. Coarsened and prominent interstitial markings of the lungs. There is small right pleural effusion. No sizable pneumothorax.   No acute osseous abnormality.       1. Redemonstration of right upper lung mass corresponding to large mass seen on recent CT. 2. Improvement in the aeration of the right lung compared to prior radiograph from 09/23/2024. 3. Residual small right pleural effusion.       Signed by: Beatrice Cerna 9/27/2024 7:12 PM Dictation workstation:   [id number]    US thoracentesis    Result Date: 9/27/2024  Interpreted By:  Imer Gardner, STUDY: US THORACENTESIS; 9/27/202411:10 am   INDICATION: Signs/Symptoms:Thoracentesis.   COMPARISON: None.   ACCESSION NUMBER(S): [id number]   ORDERING CLINICIAN: RHEA AMAYA   TECHNIQUE: INTERVENTIONALIST(S): Imer Gardner   CONSENT: The patient/patient's POA/next of kin was informed of the nature of the proposed procedure. The purposes, alternatives, risks, and benefits were explained and discussed. All questions were answered and consent was obtained.   SEDATION: None   MEDICATION/CONTRAST: No additional   TIME OUT: A time out was performed immediately prior to procedure start with the interventional team, correctly identifying the patient name, date of birth, MRN, procedure, anatomy (including marking of site and side), patient position, procedure consent form, relevant laboratory and imaging test results, antibiotic administration, safety precautions, and procedure-specific equipment needs.   FINDINGS: The patient was placed in the sitting position.   The pleural space was examined with grey scale ultrasound, and the most accessible fluid identified and  marked for thoracentesis.   The skin was prepped and draped in usual manner. Local anesthesia with Lidocaine was administered and a right-sided thoracentesis was performed.  A 5 [place] One-Step Valved thoracentesis needle/catheter was then placed where marked.  Approximately 250 mL of yellowish colored fluid was removed.  The needle/catheter was then withdrawn.   The patient tolerated the procedure well and there were no immediate complications. Specimen(s) sent to the laboratory and pathology for further evaluation, per the requesting team.       Uneventful thoracentesis, as detailed above. Right Pleural space, 250 mL   I personally performed and/or directly supervised this study and was present for the entire procedure.   Performed and dictated at [facility].   Signed by: Imer Gardner 9/27/2024 11:10 AM Dictation workstation:   [id number]    Bedside PICC Imaging    Result Date: 9/26/2024  These images are not reportable by radiology and will not be interpreted by  Radiologists.    ECG 12 lead    Result Date: 9/25/2024  Normal sinus rhythm Possible Left atrial enlargement Incomplete left bundle branch block Borderline ECG When compared with ECG of 01-SEP-2024 12:01, (unconfirmed) Premature ventricular complexes are no longer Present Incomplete left bundle branch block is now Present See ED provider note for full interpretation and clinical correlation Confirmed by Tamra Marti ([id number]) on 9/25/2024 2:15:16 PM    Bronchoscopy Diagnostic    Addendum Date: 9/25/2024    Bronchoscopy Operative Report [facility] Date of procedure: 09/25/24 Patient: Juan Carlos Cr MRN: [id number] YOB: 1950 Gender: male Referring provider/physician: Dr. Rubalcava (inpatient consult procedure) PRE-PROCEDURE DIAGNOSIS: Lung cancer with 10 mm (D) x 40 mm (L) Bonastent placed on 08/29/2024, concerns for an occluded stent, known occluded RUL     PRE-PROCEDURE EVALUATION: A history and physical has been performed, and patient medication allergies have been reviewed. The patient's tolerance of previous anesthesia has been reviewed. The risks and benefits of the procedure and the sedation options and risks were discussed with the patient or their designee. All questions were answered and informed consent obtained. INDICATION: Therapeutic BRONCHOSCOPIST:              Rbuen Yeager MD First Assistant: KARSON Ochoa MD (fellow) Second Assistant: None PROCEDURE SUMMARY: Event Event Time ENDO SCOPE IN TIME 9/25/2024  8:55 AM ENDO SCOPE OUT TIME 9/25/2024  9:29 AM             Fluoroscopy time: Not applicable POST-PROCEDURE DIAGNOSIS: Airway stenosis, known occluded RUL Same as pre-operative diagnoses ANESTHESIA: TIVA. See separate anesthesia provider documentation. This procedure was performed using standard monitoring procedures in [facility]. FINDINGS: After adequate local and intravenous anesthesia, bronchoscopy was performed via an endotracheal tube placed by anesthesia. The distal trachea appeared normal. Inspection of RIGHT bronchial tree to the segmental level appeared abnormal: completely occluded ARSLAN Bonastent. Inspection of LEFT bronchial tree to the segmental level appeared normal. Scant thin secretions were present in the bronchus intermedius, once the stent was removed. These were suctioned out with ease. PROCEDURES: As the stent was fully occluded, I decided it had to be removed. After the patient was properly anesthetized, the patient was positioned without a shoulder roll and with head support. Next, lip and teeth / gum protection was assured.  The patient was then atraumatically intubated with a 12 mm OD rigid bronchial scope.  The airway was secured with ease.  The mouth was then packed with wet Kerlix to provide a good seal for  adequate positive pressure ventilation. The stent was grasped with a forceps and  removed with ease, in toto. Airway examination after stent removal revealed a completely occluded RUL (no change) and only mild stenosis of the ARSLAN and BI (25% luminal compromise). Because the airway stenosis was mild, I did not place another stent. After diagnostic/therapeutic maneuvers, the airway was examined for evidence of bleeding. None was noted. The patient was atraumatically extubated with the rigid bronchoscope. The bronchoscope was removed from the patient's airway and the airway was handed back over to my colleagues from anesthesiology. SPECIMENS: Specimens    ID Type Source Tests Collected by Time  1 : right mainstem stent Tissue FOREIGN BODY(S) -SURGICAL PATHOLOGY EXAM Ruben Yeager MD 9/25/2024 0914     Priority: Routine              COMPLICATIONS: None.    EBL: Minimal, <5 ml    POST PROCEDURE CHEST RADIOGRAPH: Not needed    SUMMARY:  - Inspection of RIGHT bronchial tree to the segmental level appeared abnormal: completely occluded ARSLAN Bonastent. Inspection of LEFT bronchial tree to the segmental level appeared normal. - Scant thin secretions were present in the bronchus intermedius, once the stent was removed. These were suctioned out with ease. - Rigid bronchoscopy was performed. The stent was removed (foreign body removal). - Airway examination after stent removal revealed a completely occluded RUL (no change) and only mild stenosis of the ARSLAN and BI (25% luminal compromise). Because the airway stenosis was mild, I did not place another stent.    RECOMMENDATIONS: Follow up with referring physician - From a bronchoscopy standpoint, can resume Eliquis tomorrow (09/26/2024) - Follow up bronchoscopy in 2-4 weeks - Recommend finish 5-7 day course of antibiotics for post-obstructive pneumonia - As there is no stent in place at this time, oral guaifenesin and nebulized hypertonic saline is not needed for stent maintenance  The patient was transported to the recovery area/PACU in stable condition.  I, Ruben Yeager MD, was personally present throughout this procedure, including all key and non-key portions. Date: 09/25/24 Time: 9:44 AM (Images obtained during this procedure, including ultrasonographic images if performed, can be found in within the electronic medical record and/or PACS.)     Result Date: 9/25/2024  Table formatting from the original result was not included. Images from the original result were not included. Bronchoscopy Operative Report [facility] Date of procedure: 09/25/24 Patient: Juan Carlos Cr MRN: [id number] YOB: 1950 Gender: male Referring provider/physician: Dr. Rubalcava (inpatient consult procedure) PRE-PROCEDURE DIAGNOSIS: Lung cancer with 10 mm (D) x 40 mm (L) Bonastent placed on 08/29/2024, concerns for an occluded stent , known occluded RUL    PRE-PROCEDURE EVALUATION: A history and physical has been performed, and patient medication allergies have been reviewed. The patient's tolerance of previous anesthesia has been reviewed. The risks and benefits of the procedure and the sedation options and risks were discussed with the patient or their designee. All questions were answered and informed consent obtained. INDICATION: Therapeutic BRONCHOSCOPIST:              Ruben Yeager MD First Assistant:  KARSON Ochoa MD (fellow) Second Assistant: None PROCEDURE SUMMARY: Event Event Time ENDO SCOPE IN TIME 9/25/2024  8:55 AM ENDO SCOPE OUT TIME 9/25/2024  9:29 AM             Fluoroscopy time: Not applicable POST-PROCEDURE DIAGNOSIS: Airway stenosis, known occluded RUL Same as pre-operative diagnoses ANESTHESIA: TIVA. See separate anesthesia provider documentation. This procedure was performed using standard monitoring procedures in [facility]. FINDINGS: After adequate local and intravenous anesthesia, bronchoscopy was performed via an endotracheal tube placed by anesthesia. The distal trachea appeared  normal. Inspection of RIGHT bronchial tree to the segmental level appeared abnormal: completely occluded ARSLAN Bonastent . Inspection of LEFT bronchial tree to the segmental level appeared normal. Scant thin secretions were present in the bronchus intermedius, once the stent was removed. These were suctioned out with ease. PROCEDURES: As the stent was fully occluded, I decided it had to be removed. After the patient was properly anesthetized, the patient was positioned without a shoulder roll and with head support. Next, lip and teeth / gum protection was assured.  The patient was then atraumatically intubated with a 12 mm OD rigid bronchial scope.  The airway was secured with ease.  The mouth was then packed with wet Kerlix to provide a good seal for  adequate positive pressure ventilation. The stent was grasped with a forceps and removed with ease, in toto. Airway examination after stent removal revealed a completely occluded RUL (no change) and only mild stenosis of the ARSLAN and BI (25% luminal compromise). Because the airway stenosis was mild, I did not place another stent. After diagnostic/therapeutic maneuvers, the airway was examined for evidence of bleeding. None was noted. The patient was atraumatically extubated with the rigid bronchoscope. The bronchoscope was removed from the patient's airway and the airway was handed back over to my colleagues from anesthesiology. SPECIMENS: Specimens    ID Type Source Tests Collected by Time  1 : right mainstem stent Tissue FOREIGN BODY(S) -SURGICAL PATHOLOGY EXAM Ruben Yeager MD 9/25/2024 0914     Priority: Routine              COMPLICATIONS: None.    EBL: Minimal, <5 ml    POST PROCEDURE CHEST RADIOGRAPH: Not needed    SUMMARY:  - Inspection of RIGHT bronchial tree to the segmental level appeared abnormal: completely occluded ARSLAN Bonastent. Inspection of LEFT bronchial tree to the segmental level appeared normal. - Scant thin secretions were present in the  bronchus intermedius, once the stent was removed. These were suctioned out with ease. - Rigid bronchoscopy was performed. The stent was removed (foreign body removal). - Airway examination after stent removal revealed a completely occluded RUL (no change) and only mild stenosis of the ARSLAN and BI (25% luminal compromise). Because the airway stenosis was mild, I did not place another stent.    RECOMMENDATIONS: Follow up with referring physician - From a bronchoscopy standpoint, can resume Eliquis tomorrow (09/26/2024) - Follow up bronchoscopy in 2-4 weeks - Recommend finish 5-7 day course of antibiotics for post-obstructive pneumonia The patient was transported to the recovery area/PACU in stable condition. I, Ruben Yeager MD, was personally present throughout this procedure, including all key and non-key portions. Date: 09/25/24 Time: 9:44 AM (Images obtained during this procedure, including ultrasonographic images if performed, can be found in within the electronic medical record and/or PACS.)     CT chest wo IV contrast    Result Date: 9/24/2024  Interpreted By:  Finesse Gómez  and Debbie Faria STUDY: CT CHEST WO IV CONTRAST;  9/23/2024 4:21 pm   INDICATION: Signs/Symptoms:Hx of right hilar SCC, worsening SOB concerns for PNA vs. pleural effusion vs. lymphangitic spread. .   COMPARISON: CTA chest 08/26/2024   ACCESSION NUMBER(S): [id number]   ORDERING CLINICIAN: RHEA AMAYA   TECHNIQUE: Helical data acquisition of the chest was obtained. Images were reformatted in axial, coronal, and sagittal planes.   No IV or oral contrast material was administered.   FINDINGS: LUNGS AND AIRWAYS: There is similar near-complete obstruction of the distal right mainstem bronchus with a small amount of debris in the more proximal right mainstem bronchus. Distal right branches are completely occluded. Stenting material in the right mainstem bronchus appears patent.   The left mainstem bronchus and distal branches are  patent. No left-sided endobronchial lesion.   There is a new large right pleural effusion with associated right pulmonary collapse. Minimal aeration of the right lung, primarily of the anterior aspect of the right upper pulmonary lobe. Interlobular septal thickening and micro nodularity throughout the persistently expanded right upper lobe.   There is poor delineation of the patient's known large right pulmonary mass, however right suprahilar mass measures 6.0 x 7.6 x 6.6 cm as visualized.   Moderate paraseptal emphysematous changes of the peripheral left lung and right pulmonary apex. No left-sided lesion is visualized. No airspace opacity or effusion involving the left lung. No pneumothorax is identified.   MEDIASTINUM AND SUDHAKAR, LOWER NECK AND AXILLA: The visualized thyroid gland is within normal limits.   A slight shift of the mediastinum and heart to the right side   There is an enlarged lower paratracheal lymph node measuring 2.0 cm in short axis and several prominent aortopulmonary window lymph nodes. There is poor visualization of the right hilum.   Moderate volume of fluid in the distal esophagus.   HEART AND VESSELS: The thoracic aorta is of normal course and caliber without vascular calcifications.   Main pulmonary artery and its branches are normal in caliber.   Moderate coronary arterial calcifications. The study is not optimized for evaluation of coronary arteries.   The cardiac chambers are not enlarged.   Trace pericardial fluid.   UPPER ABDOMEN: The visualized subdiaphragmatic structures demonstrate no remarkable findings.   CHEST WALL AND OSSEOUS STRUCTURES: Mild chronic deformity and fusion of several right ribs. No acute osseous abnormality. No suspicious osseous lesion is definitively visualized. The chest wall is otherwise unremarkable.       1. Poor delineation of a large right hilar and suprahilar mass with associated complete obstruction of the distal right mainstem bronchus and distal  branches, new compared to prior, with collapse of much of the right. Development of a large right pleural effusion, presumably malignant. Minimal persistent aeration of the anterosuperior right lung, with micronodularity and interlobular septal thickening. 2. A slight shift of the mediastinum and heart to the right side. 3. Multiple enlarged mediastinal lymph nodes are incompletely characterized on this exam. 4. Moderate volume of fluid in the distal esophagus raising concern for aspiration. 5. The left lung is overall clear with peripheral emphysematous changes.   I personally reviewed the image(s)/study and resident interpretation as stated by Dr. Giana Lewis MD. I agree with the findings as stated. This study was interpreted at [facility], [place], [place].   MACRO: Giana Lewis discussed the significance and urgency of this critical finding by secure chat with  Telma Milleron on 9/23/2024 at 6:30 pm.  (**-RCF-**) Findings:  See findings.   Signed by: Finesse Gómez 9/24/2024 9:01 AM Dictation workstation:   [id number]    XR chest 1 view    Result Date: 9/23/2024  Interpreted By:  William Toscano, STUDY: XR CHEST 1 VIEW;  9/23/2024 1:43 am   INDICATION: Signs/Symptoms:Cough wheezing shortness of breath history of lung cancer recent PET scan.   COMPARISON: 08/29/2024   ACCESSION NUMBER(S): [id number]   ORDERING CLINICIAN: LINUS BERNAL   FINDINGS: Large mass redemonstrated right upper lobe with progressive increase opacification of the right hemithorax/lung which may relate to developing consolidation/pneumonia and/or effusion.Left lung remains clear.       Worsening opacification right hemithorax with known underlying malignancy/mass.   MACRO: None   Signed by: William Toscano 9/23/2024 1:49 AM Dictation workstation:   [id number]    NM PET CT bone skull base to mid thigh    Result Date: 9/18/2024  Interpreted By:  Juan Carlos Sadler and Maltbie Grace STUDY: NM PET CT SKULL  BASE TO MID THIGH;  9/18/2024 12:17 pm   INDICATION: Signs/Symptoms:Lung cancer. Staging. ,C34.11 Malignant neoplasm of upper lobe, right bronchus or lung (Multi)   COMPARISON: CT chest 08/26/2024   ACCESSION NUMBER(S): [id number]   ORDERING CLINICIAN: RANJANA BARRIOS   TECHNIQUE: TECHNIQUE DIVISION OF NUCLEAR MEDICINE POSITRON EMISSION TOMOGRAPHY (PET-CT)   The patient received an intravenous dose of 12.4 mCi of Fluorine-18 fluorodeoxyglucose (FDG). Positron emission tomographic (PET) images from skull base to the mid-thighs were then acquired after a one hour delay. Also acquired was a contemporaneous low dose non-contrast CT scan performed for attenuation correction of PET images and anatomic localization. The PET and CT images were digitally fused for display. All images were acquired on a combined PET-CT scanner unit. Some areas of FDG accumulation may be described in standardized uptake value (SUV) units.   CODING: Initial Treatment Strategy (PI)   CALIBRATION: Dose Injection-to-Scan Interval (mins): 50 min Mediastinal bloodpool SUV (normal 1.5-2.5): 2.4 Blood glucose: 124 mg/dL   FINDINGS: HEAD AND NECK: No evidence of focal hypermetabolic lesion in the brain parenchyma, noting that evaluation is limited because of the expected physiologic diffuse FDG uptake in the brain. No focal hypermetabolic soft tissue lesion is seen in the neck. No hypermetabolic cervical lymphadenopathy is present.   CHEST: Hypermetabolic right hilar/suprahilar mass with invasion of the mediastinum and central photopenia consistent necrosis (SUV max 18.2). Hypermetabolic nodule is seen in the right lower lobe, compatible with a satellite lesion (SUV max 15.9). Additional right paratracheal hypermetabolic lymph nodes are seen (SUV max 12.9).   ABDOMEN AND PELVIS: No hypermetabolic soft tissue lesion is present in the abdomen and pelvis. No evidence of hypermetabolic lymphadenopathy. Physiologic radiotracer uptake is present in the  liver and spleen with excretion into the bowel loops and the genitourinary tract.   MUSCULOSKELETAL/EXTREMITIES: No focal hypermetabolic lesion is seen in the axial or appendicular to suggest osseous metastasis.       1. Hypermetabolic right hilar/suprahilar mass with invasion of the mediastinum and central photopenia consistent necrosis. Findings are consistent with patient's known squamous cell carcinoma. 2. Hypermetabolic nodule is seen in the right lower lobe, compatible with a satellite lesion. 3. Hypermetabolic right paratracheal lymph nodes, compatible with nas metastasis.       I personally reviewed the images/study and I agree with the findings as stated by Paloma Stevens MD. This study was interpreted at [facility], [place], [place].   MACRO: None   Signed by: Juan Carlos Sadler 9/18/2024 1:35 PM Dictation workstation:   [id number]        Past Medical History  Past Medical History:   Diagnosis Date    Acute myocardial infarction, unspecified 05/17/2013    Acute myocardial infarction    Atrial fib/flutter, transient (Multi)     COPD (chronic obstructive pulmonary disease) (Multi)     Coronary artery disease     Diabetes mellitus (Multi)     Encounter for screening for malignant neoplasm of prostate 09/02/2015    Encounter for prostate cancer screening    GERD (gastroesophageal reflux disease)     Hyperlipidemia     Hypertension     Lung cancer (Multi)     Personal history of other diseases of the nervous system and sense organs 09/13/2017    History of cataract       Surgical History  Past Surgical History:   Procedure Laterality Date    CATARACT EXTRACTION  09/14/2018    Cataract Surgery    CATARACT EXTRACTION  06/27/2014    Cataract Surgery    CORONARY ANGIOPLASTY WITH STENT PLACEMENT  05/17/2013    Cath Stent Placement       Social History  He reports that he has quit smoking. His smoking use included cigarettes. He has been exposed to tobacco smoke. He has never used  smokeless tobacco. He reports that he does not currently use alcohol. He reports that he does not use drugs.    Family History  Family History   Problem Relation Name Age of Onset    Heart attack Mother      Lung cancer Brother          Allergies  Patient has no known allergies.     Physical Exam  Constitutional: No acute distress, resting comfortably in bed, cooperative. On 2L NC  HEENT: Normocephalic, atraumatic, PERRLA, EOMI, moist mucous membranes, no pharyngeal erythema  Cardiovascular: RRR, normal S1/S2, no murmurs noted  Pulmonary: Right lung field breath sounds absent; left lung without wheezes, rales, or rhonchi. On 2L NC saturating 97%  GI: Soft, non-tender, non-distended, normoactive bowel sounds  : No german catheter  Skin: right Mediport in place with gauze covering  Lower extremities: Warm and well perfused, no lower extremity edema  Neuro: A&O x3, able to move all 4 extremities spontaneously, normal gait  Psych: Appropriate mood and affect     Medications  Scheduled medications  ascorbic acid, 500 mg, oral, Daily  aspirin, 81 mg, oral, Daily  cholecalciferol, 800 Units, oral, Daily  fluticasone furoate-vilanteroL, 1 puff, inhalation, Daily  folic acid, 1 mg, oral, Daily  heparin, 60 Units/kg, intravenous, Once  insulin lispro, 0-5 Units, subcutaneous, TID  levoFLOXacin, 750 mg, oral, Daily  metoprolol tartrate, 50 mg, oral, q12h  nicotine, 1 patch, transdermal, Daily  pantoprazole, 40 mg, oral, Daily  polyethylene glycol, 17 g, oral, Daily  simvastatin, 20 mg, oral, Daily  thiamine, 100 mg, oral, Daily  tiotropium, 2 puff, inhalation, Daily      Continuous medications  heparin, 0-4,000 Units/hr  oxygen, 2 L/min      PRN medications  PRN medications: albuterol, dextrose, dextrose, glucagon, glucagon, heparin      Assessment/Plan     Juan Carlos Cr is a 74 year old male with hx of COPD, chronic hypoxic resp failure on home 2L O2, and T4N2M0 squamous cell lung cancer of the RUL diagnosed 8/2024 on  cycle 1 concurrent chemoradiation who is admitted from his radiation oncology appointment with SOB and hypoxia, found to have disease progression and right lung collapse. Pt s/p EBUS with debulking and stent placement 8/29, followed by repeat bronch 9/25 for mucus plug and stent removal. Pt currently stable on home 2L. Interventional pulm consulted for potential bronchoscopy to regain airway patency.     #Acute on chronic hypoxic respiratory failure  #Stage IIIb T4N2M0 Squamous cell lung cancer  #Right lung collapse 2/2 mainstem compression  AHRF secondary to collapse of the right lung with RUL mass progression, currently undergoing cycle 1 carbo/taxol and RT  :: Pt presented to Rad Onc session today with worsening hypoxia and cone beam CT showing collapse of right lung due to mainstem bronchus involvement  :: s/p bronchoscopy and EBUS with debulking and stent placement 8/29, and bronchoscopy with stent removal 9/25/24  :: PET 9/18/24 showing hypermetabolic right hilar mass with RLL satellite lesion and hypermetabolic right paratracheal lymph nodes  :: Pt took last dose of Eliquis 10/16 AM  Plan:  -Pulmonology consulted; appreciate recs. Will make NPO midnight and start heparin gtt  -Tentatively planning for bronchoscopy on 10/18  -Radiation Oncology consulted; appreciate recs  -Pt's primary oncologist notified of admission. Contacted with question of inpatient carbo/taxol treatment    #COPD  -continue home spiriva and Breo  -Duo-Nebs q6h prn    #Atrial fibrillation  #HFpEF  #HTN  -Eliquis held in anticipation of bronchoscopy; last dose was 10/16 AM  -Continue home metoprolol, ASA, simvastatin  -Amlodipine held d/t relative hypotension       F : PRN  E: PRN  N: regular diet, NPO midnight for possible bronch  GI prophylaxis: PPI  DVT prophylaxis: heparin gtt    Code Status: Full Code (confirmed on admission)   NOK: Extended Emergency Contact Information  Primary Emergency Contact: LULU DAVILA  Address: [address] [place] [address] [place]  Home Phone: [phone]  Mobile Phone: [phone]  Relation: Spouse  Secondary Emergency Contact: Royce Cr  Mobile Phone: [phone]  Relation: Son        Thanh Blas LEE PGY-1  Internal Medicine    Patient to be staffed with Dr. Valencia in the morning.

## 2024-10-16 NOTE — PROGRESS NOTES
Radiation Oncology On Treatment Visit    Patient Name:  Juan Carlos Cr  MRN:  [id number]  :  1950    Referring Provider: No ref. provider found  Primary Care Provider: Thanh Cuba MD  Care Team: Patient Care Team:  Thanh Cuba MD as PCP - General (Family Medicine)  Thanh Cuba MD as PCP - [employer] PCP  Kath Owens RN as Care Manager (Case Management)  Janiya Landis MD as Medical Oncologist (Hematology and Oncology)  Terrence Gallegos MD as Consulting Physician (Hematology and Oncology)  Heydi Landis MD as Medical Oncologist (Hematology and Oncology)    Date of Service: 10/16/2024     Diagnosis:   Specialty Problems          Radiation Oncology Problems    Malignant neoplasm of upper lobe of right lung (Multi)        Primary malignant neoplasm of lung metastatic to other site (Multi)        Squamous cell lung cancer, unspecified laterality (Multi)         Treatment Summary:  IMRT: Right Lung or bronchus    Treatment Period Technique Fraction Dose Fractions Total Dose   Course 1 10/11/2024-10/16/2024  (days elapsed: 5)         R_Thx_Hil_Med 10/11/2024-10/16/2024 VMAT 200 / 200 cGy  800 / 6,000 cGy     SUBJECTIVE: Shortness of breath with any exertion, cone beam CT for radiation therapy reveals progression of disease leading to collapse of right lung due to mainstem bronchus involvement.      OBJECTIVE:   Vital Signs:  BP 93/55 (BP Location: Left arm, Patient Position: Sitting, BP Cuff Size: Adult long)   Pulse 75   Temp 36.1 °C (97 °F) (Temporal)   Resp 18   Wt 63.8 kg (140 lb 10.5 oz)   SpO2 (!) 86%   BMI 20.76 kg/m²     Other Pertinent Findings:     Toxicity Assessment          10/16/2024    09:03   Toxicity Assessment   Treatment Site Thoracic   Anorexia Grade 1   Anxiety Grade 0   Dehydration Grade 0   Depression Grade 1   Dermatitis Radiation Grade 0   Diarrhea Grade 0   Fatigue Grade 0   Fibrosis Deep Connective Tissue Grade 0   Fracture  Grade 0   Nausea Grade 0   Pain Grade 0   Treatment Related Secondary Malignancy Grade 0   Tumor Pain Grade 0   Vomiting Grade 0   Constipation Grade 0   Dyspepsia Grade 0   Dysphagia Grade 0   Esophagitis Grade 0   Mucositis Oral Grade 0   Upper Gastrointestinal Hemorrhage Grade 0   Peripheral Sensory Neuropathy Grade 0   Brachial Plexopathy Grade 0   Pneumonitis Grade 0   Aspiration Grade 0   Hoarseness Grade 0   Laryngeal Edema Grade 0   Myocardial Infarction Grade 0   Pericardial Effusion Grade 0   Pericarditis Grade 0   Esophageal Fistula Grade 0   Esophageal Obstruction Grade 0   Esophageal Pain Grade 0   Esophageal Stenosis Grade 0   Esophageal Ulcer Grade 0   Bronchial Obstruction Grade 3   Cough Grade 1   Dyspnea Grade 2   Epistaxis Grade 0   Hiccups Grade 0   Hypoxia Grade 0   Pulmonary Fibrosis Grade 0   Lymphedema Grade 0   Thromboembolic Event Grade 0   Hot Flashes Grade 0          Concurrent systemic therapy: CARBOplatin (Paraplatin) 230 mg in sodium chloride 0.9% 133 mL IV, 230 mg, intravenous, Once, 1 of 1 cycle    Dose modification: 230 mg (original dose 51.8 mg, Cycle 1, Reason: Dose Not Tolerated)    Administration: 230 mg (10/2/2024), 230 mg (10/9/2024)        PACLitaxeL (Taxol) 81 mg in dextrose 5% 124 mL IV, 45 mg/m2 = 81 mg, intravenous, Once, 1 of 1 cycle    Administration: 81 mg (10/2/2024), 81 mg (10/9/2024)        methylPREDNISolone sod succinate (SOLU-Medrol) 40 mg/mL injection 40 mg, 40 mg, intravenous, As needed, 1 of 1 cycle        palonosetron (Aloxi) injection 250 mcg, 250 mcg, intravenous, Once, 1 of 1 cycle    Administration: 250 mcg (10/2/2024), 250 mcg (10/9/2024)      Labs:   WBC   Date Value Ref Range Status   10/09/2024 10.0 4.4 - 11.3 x10*3/uL Final   10/02/2024 11.1 4.4 - 11.3 x10*3/uL Final     Hemoglobin   Date Value Ref Range Status   10/09/2024 12.5 (L) 13.5 - 17.5 g/dL Final   10/02/2024 12.0 (L) 13.5 - 17.5 g/dL Final     Hematocrit   Date Value Ref Range Status    10/09/2024 42.0 41.0 - 52.0 % Final   10/02/2024 36.8 (L) 41.0 - 52.0 % Final     Neutrophils Absolute   Date Value Ref Range Status   10/09/2024 7.46 (H) 1.60 - 5.50 x10*3/uL Final     Comment:     Percent differential counts (%) should be interpreted in the context of the absolute cell counts (cells/uL).   10/02/2024 8.61 (H) 1.60 - 5.50 x10*3/uL Final     Comment:     Percent differential counts (%) should be interpreted in the context of the absolute cell counts (cells/uL).     Platelets   Date Value Ref Range Status   10/09/2024 243 150 - 450 x10*3/uL Final   10/02/2024 217 150 - 450 x10*3/uL Final     Alkaline Phosphatase   Date Value Ref Range Status   10/09/2024 56 33 - 136 U/L Final   10/02/2024 59 33 - 136 U/L Final     AST   Date Value Ref Range Status   10/09/2024 15 9 - 39 U/L Final   10/02/2024 12 9 - 39 U/L Final     ALT   Date Value Ref Range Status   10/09/2024 7 (L) 10 - 52 U/L Final     Comment:     Patients treated with Sulfasalazine may generate falsely decreased results for ALT.   10/02/2024 9 (L) 10 - 52 U/L Final     Comment:     Patients treated with Sulfasalazine may generate falsely decreased results for ALT.     Bilirubin, Total   Date Value Ref Range Status   10/09/2024 0.6 0.0 - 1.2 mg/dL Final   10/02/2024 0.5 0.0 - 1.2 mg/dL Final     Glucose   Date Value Ref Range Status   10/09/2024 108 (H) 74 - 99 mg/dL Final   10/02/2024 108 (H) 74 - 99 mg/dL Final     Calcium   Date Value Ref Range Status   10/09/2024 9.1 8.6 - 10.3 mg/dL Final   10/02/2024 9.2 8.6 - 10.3 mg/dL Final     Sodium   Date Value Ref Range Status   10/09/2024 128 (L) 136 - 145 mmol/L Final   10/02/2024 132 (L) 136 - 145 mmol/L Final     Potassium   Date Value Ref Range Status   10/09/2024 4.7 3.5 - 5.3 mmol/L Final   10/02/2024 4.2 3.5 - 5.3 mmol/L Final     Bicarbonate   Date Value Ref Range Status   10/09/2024 17 (L) 21 - 32 mmol/L Final   10/02/2024 28 21 - 32 mmol/L Final     Chloride   Date Value Ref Range  Status   10/09/2024 102 98 - 107 mmol/L Final   10/02/2024 99 98 - 107 mmol/L Final     Urea Nitrogen   Date Value Ref Range Status   10/09/2024 13 6 - 23 mg/dL Final   10/02/2024 7 6 - 23 mg/dL Final     Creatinine   Date Value Ref Range Status   10/09/2024 0.57 0.50 - 1.30 mg/dL Final   10/02/2024 0.62 0.50 - 1.30 mg/dL Final         Exam: Moderate distress in chair on increased oxygen via nasal cannula.  Decreased right breath sounds.    Assessment / Plan:  The patient is tolerating radiation therapy as anticipated.  Continue per current treatment plan.      Therapies: Had extensive discussion with the patient regarding sending by squad to be stabilized followed by transfer to [facility] for escalated pulmonology care including bronchoscopy and intervention given concern for complete lung collapse from right mainstem bronchus involvement.  Tentative plan for radiation therapy is to hold, reCT simulation after debulking or pulmonology intervention and possible continuation of chemoradiation therapy inpatient at [facility] with plan for transition to outpatient treatment once fully stabilized.    Side effects reviewed with patient. Images, chart and labs reviewed.    Rojelio Ewing MD

## 2024-10-16 NOTE — ED PROVIDER NOTES
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  Per EMS at the radiation oncology appointment they were concern for collapse of patient's right mainstem bronchus with resultant collapse of the right lung.  Patient states that he does have exertional shortness of breath but otherwise is feeling well.  He denies any shortness of breath or chest pain at this time.  He endorses a mild cough which is unchanged from his baseline.  He denies any fevers or chills.  He denies any lower extremity edema.  Per EMS patient was initially satting in the low 80s on his home 2 L, however on 4 L improved to the low 90s.  Patient denies any history of DVT/PE and denies being on anticoagulation at this time.      History provided by:  Medical records, patient and EMS personnel   used: No             Visit Vitals  /69   Pulse 86   Temp 36.4 °C (97.5 °F) (Temporal)   Resp (!) 21   SpO2 98%   Smoking Status Former          Physical Exam     Physical exam:   General: Vitals noted, no distress. Afebrile.   EENT:  Hearing grossly intact. Normal phonation. MMM. Airway patient. PERRL. EOMI.   Neck: No midline tenderness or paraspinal tenderness. FROM.   Cardiac: Regular, rate, rhythm. Normal S1 and S2.  No murmurs, gallops, rubs.   Pulmonary: On 4 L of oxygen via nasal cannula.  No increased work of breathing.  Symmetric chest rise.  Diminished breath sounds on the right compared to the left.  No accessory muscle use.   Abdomen: Soft, nonsurgical. Nontender. No peritoneal signs. Normoactive bowel sounds.   Back: No CVA tenderness. No midline tenderness or paraspinal tenderness. No obvious deformity.   Extremities: No peripheral edema.  Full range of motion. Moves all extremities freely. No tenderness throughout extremities.   Skin: No rash. Warm and Dry.   Neuro:  No focal neurologic deficits. CN 2-12 grossly intact. Sensation equal bilaterally. No weakness.         Labs Reviewed   BLOOD GAS VENOUS FULL PANEL - Abnormal       Result Value    POCT pH, Venous 7.38      POCT pCO2, Venous 46      POCT pO2, Venous 16 (*)     POCT SO2, Venous 10 (*)     POCT Oxy Hemoglobin, Venous 10.0 (*)     POCT Hematocrit Calculated, Venous 38.0 (*)     POCT Sodium, Venous 132 (*)     POCT Potassium, Venous 5.4 (*)     POCT Chloride, Venous 100      POCT Ionized Calicum, Venous 1.31      POCT Glucose, Venous 122 (*)     POCT Lactate, Venous 1.9      POCT Base Excess, Venous 1.6      POCT HCO3 Calculated, Venous 27.2 (*)     POCT Hemoglobin, Venous 12.5 (*)     POCT Anion Gap, Venous 10.0      Patient Temperature 37.0      FiO2 36     COMPREHENSIVE METABOLIC PANEL - Abnormal    Glucose 113 (*)     Sodium 135 (*)     Potassium 5.1      Chloride 101      Bicarbonate 24      Anion Gap 15      Urea Nitrogen 18      Creatinine 0.66      eGFR >90      Calcium 9.6      Albumin 3.3 (*)     Alkaline Phosphatase 56      Total Protein 7.2      AST 13      Bilirubin, Total 0.6      ALT 7 (*)    CBC WITH AUTO DIFFERENTIAL - Abnormal    WBC 7.6      nRBC 0.0      RBC 3.82 (*)     Hemoglobin 11.6 (*)     Hematocrit 35.7 (*)     MCV 94      MCH 30.4      MCHC 32.5      RDW 12.5      Platelets 217      Neutrophils % 79.4      Immature Granulocytes %, Automated 0.9      Lymphocytes % 9.4      Monocytes % 9.2      Eosinophils % 0.7      Basophils % 0.4      Neutrophils Absolute 6.03 (*)     Immature Granulocytes Absolute, Automated 0.07      Lymphocytes Absolute 0.71 (*)     Monocytes Absolute 0.70      Eosinophils Absolute 0.05      Basophils Absolute 0.03     B-TYPE NATRIURETIC PEPTIDE - Abnormal     (*)     Narrative:        <100 pg/mL - Heart failure unlikely  100-299 pg/mL - Intermediate probability of acute heart                  failure exacerbation. Correlate with clinical                  context  and patient history.    >=300 pg/mL - Heart Failure likely. Correlate with clinical                  context and patient history.     Biotin interference may cause falsely decreased results. Patients taking a Biotin dose of up to 5 mg/day should refrain from taking Biotin for 24 hours before sample  collection. Providers may contact their local laboratory for further information.   TROPONIN I, HIGH SENSITIVITY - Normal    Troponin I, High Sensitivity (CMC) 11      Narrative:     Less than 99th percentile of normal range cutoff-  Female and children under 18 years old <35 ng/L; Male <54 ng/L: Negative  Repeat testing should be performed if clinically indicated.     Female and children under 18 years old  ng/L; Male  ng/L:  Consistent with possible cardiac damage and possible increased clinical   risk. Serial measurements may help to assess extent of myocardial damage.     >120 ng/L: Consistent with cardiac damage, increased clinical risk and  myocardial infarction. Serial measurements may help assess extent of   myocardial damage.      NOTE: Children less than 1 year old may have higher baseline troponin   levels and results should be interpreted in conjunction with the overall   clinical context.    NOTE: Troponin I testing is performed using a different   testing methodology at [facility] than at [facility]. Direct result comparisons should only   be made within the same method.     INFLUENZA A AND B PCR - Normal    Flu A Result Not Detected      Flu B Result Not Detected      Narrative:     This assay is an in vitro diagnostic multiplex nucleic acid amplification test for the detection and discrimination of Influenza A & B from nasopharyngeal specimens, and has been validated for use at [facility]. Negative results do not preclude Influenza A/B infections, and should not be used as the sole basis for diagnosis, treatment, or other management  decisions. If Influenza A/B and RSV PCR results are negative, testing for Parainfluenza virus, Adenovirus and Metapneumovirus is routinely performed for [facility] pediatric oncology and intensive care inpatients, and is available on other patients by placing an add-on request.   SARS-COV-2 PCR - Normal    Coronavirus 2019, PCR Not Detected      Narrative:     This assay has received FDA Emergency Use Authorization (EUA) and is only authorized for the duration of time that circumstances exist to justify the authorization of the emergency use of in vitro diagnostic tests for the detection of SARS-CoV-2 virus and/or diagnosis of COVID-19 infection under section 564(b)(1) of the Act, 21 U.S.C. 360bbb-3(b)(1). This assay is an in vitro diagnostic nucleic acid amplification test for the qualitative detection of SARS-CoV-2 from nasopharyngeal specimens and has been validated for use at [facility]. Negative results do not preclude COVID-19 infections and should not be used as the sole basis for diagnosis, treatment, or other management decisions.     RSV PCR - Normal    RSV PCR Not Detected      Narrative:     This assay is an FDA-cleared, in vitro diagnostic nucleic acid amplification test for the detection of RSV from nasopharyngeal specimens, and has been validated for use at [facility]. Negative results do not preclude RSV infections, and should not be used as the sole basis for diagnosis, treatment, or other management decisions. If Influenza A/B and RSV PCR results are negative, testing for Parainfluenza virus, Adenovirus and Metapneumovirus is routinely performed for pediatric oncology and intensive care inpatients at [facility], and is available on other patients by placing an add-on request.       GRAY TOP    Extra Tube Hold for add-ons.         CT angio chest for pulmonary embolism   Final Result   1. No evidence of acute pulmonary embolism.   2. Redemonstrated large infiltrative  and centrally necrotic medial   right upper lobe lung mass, encroaching the adjacent mediastinum and   obliterating the right-sided bronchi, as described above.   3. Interval worsening of right-sided bronchial aeration with new   complete collapse of right lung as well as increase in size of   moderate pleural effusion. Pulmonary hygiene is recommended.   4. Moderate narrowing of the upper SVC secondary to compression by   the above-described mass is now better visualized, although SVC   appears patent.   5. Additional chronic stable findings as described above.        MACRO:   Critical Finding:  See findings. Notification was initiated on   10/16/2024 at 12:46 pm by  Kamron García.  (**-YCF-**) Instructions:        Signed by: Kamron García 10/16/2024 12:47 PM   Dictation workstation:   [id number]      XR chest 1 view   Final Result   Complete opacification of the right lung, likely due to right   mainstem bronchus occlusion with large effusion and postobstructive   airspace disease secondary to known large mass.        I personally reviewed the images/study and I agree with the findings   as stated by Renato García MD. This study was interpreted at   [facility], [place], [place].        MACRO:   None        Signed by: Lee Ibarra 10/16/2024 11:04 AM   Dictation workstation:   [id number]              ED Course & MDM     Medical Decision Making  This is a 74-year-old male with past medical history of lung cancer as well as COPD who presents to the ED with shortness of breath and hypoxia.  Vitals stable upon arrival to the ED.  On examination patient did have significantly diminished breath sounds to the right compared to the left.  No tracheal deviation.  No crepitus to the chest wall.  No lower extremity edema.  No audible cardiac murmurs.  He did not appear to be in respiratory distress at this time.  Chest x-ray ordered which showed whiteout of the patient's right lung compared to  left.  Per review of his outpatient records and the radiation oncology note they were concerned for collapse of the right lung secondary to tumor involvement of the right mainstem bronchus.  Laboratory studies obtained.  CT PE study ordered.  Patient's laboratory studies overall grossly unremarkable with negative viral swabs.  CMP grossly unremarkable.  Troponin normal at 11.  Slightly elevated BNP at 215.  CBC showed normal WBC at 7.6.  Hemoglobin 11.6.  VBG showed normal pH at 7.38 and normal lactate at 1.9.  CT angio chest showed no evidence of PE but did show redemonstration of large infiltrative and centrally necrotic lung mass with obliteration of the right sided bronchi with complete collapse of the right lung as well as increase in size of the pleural effusion on the right.  There is also moderate narrowing of the upper SVC secondary to compression by this mass.  I discussed these results with the patient.  At this point in time patient was on 4 L of oxygen via nasal cannula and was satting in the upper 90s.  Patient was accepted to oncology for further management of this lung mass with associated hypoxia and collapse of his right lung.  He was agreeable to this plan and had no further questions at time of admission.    Amount and/or Complexity of Data Reviewed  External Data Reviewed: labs, radiology, ECG and notes.  Labs: ordered.  Radiology: ordered and independent interpretation performed.     Details: Chest x-ray with whiteout of the right lung    ECG/medicine tests: ordered and independent interpretation performed.     Details: EKG normal sinus rhythm with occasional PVCs at 82 bpm without any acute ST elevation or depression.  No T wave inversions.  QT/QTc 366/427         Diagnoses as of 10/16/24 1527   Shortness of breath   Hypoxia           Procedures    AMARIS Barrera, FARRAH Rodriguez PA-C  10/16/24 1527

## 2024-10-16 NOTE — ED TRIAGE NOTES
Patient from [facility] hx lnug cancer receiving chemo and radiation and collapsed right lung. 80% on baseline 2L and was 88% when increased to 4L NC. Patient denies SOB. Diminished lung sounds on right side. A&Ox4

## 2024-10-16 NOTE — PROGRESS NOTES
Pharmacy Medication History Review    Juan Carlos Cr is a 74 y.o. male admitted for Shortness of breath. Pharmacy reviewed the patient's [id number] medications and allergies for accuracy.    Medications ADDED:  None   Medications CHANGED:  None   Medications REMOVED:   None      The list below reflects the updated PTA list.   Prior to Admission Medications   Prescriptions Last Dose Informant   FreeStyle Lite Meter kit  Spouse/Significant Other   Sig: TEST BLOOD SUGAR LEVELS 2X A DAY   albuterol 90 mcg/actuation inhaler Past Week Spouse/Significant Other   Sig: Inhale 2 puffs every 6 hours if needed for wheezing.   amLODIPine (Norvasc) 5 mg tablet 10/16/2024 Spouse/Significant Other   Sig: Take 2 tablets (10 mg) by mouth once daily.   apixaban (Eliquis) 5 mg tablet 10/16/2024 Spouse/Significant Other   Sig: Take 1 tablet (5 mg) by mouth 2 times a day.   ascorbic acid (Vitamin C) 500 mg tablet Unknown Spouse/Significant Other   Sig: Take 1 tablet (500 mg) by mouth once daily.   aspirin 81 mg EC tablet 10/16/2024 Spouse/Significant Other   Sig: Take 1 tablet (81 mg) by mouth once daily.   blood sugar diagnostic (Accu-Chek Guide test strips) strip  Spouse/Significant Other   Si strip 2 times a day.   blood sugar diagnostic (FreeStyle Lite Strips) strip  Spouse/Significant Other   Si strip 2 times a day.   blood-glucose meter (Accu-Chek Guide Glucose Meter) misc  Spouse/Significant Other   Sig: Check blood glucose 2x a day   cholecalciferol (Vitamin D-3) 10 MCG (400 UNIT) tablet 10/15/2024 Evening Spouse/Significant Other   Sig: Take 2 tablets (20 mcg) by mouth once daily.   fluticasone furoate-vilanteroL (Breo Ellipta) 200-25 mcg/dose inhaler 10/16/2024 Spouse/Significant Other   Sig: Inhale 1 puff once daily.   fluticasone-umeclidin-vilanter (TRELEGY-ELLIPTA) 200-62.5-25 mcg blister with device  Spouse/Significant Other   Sig: Inhale 1 puff early in the morning.. Rinse mouth after taking dose   folic  acid (Folvite) 1 mg tablet 10/16/2024 Spouse/Significant Other   Sig: Take 1 tablet (1 mg) by mouth once daily.   lancets (Accu-Chek Fastclix Lancet Drum) misc  Spouse/Significant Other   Sig: Check blood glucose 2x day   Patient not taking: Reported on 10/15/2024   lancets (Accu-Chek Fastclix Lancet Drum) misc  Spouse/Significant Other   Sig: Check blood glucose level 2x a day   Patient not taking: Reported on 10/15/2024   lancets misc  Spouse/Significant Other   Sig: TEST BLOOD SUGAR LEVELS 2X A DAY   Patient not taking: Reported on 10/15/2024   lancets misc  Spouse/Significant Other   Sig: Check blood sugar twice daily   levoFLOXacin (Levaquin) 750 mg tablet  Spouse/Significant Other   Sig: Take 1 tablet (750 mg) by mouth once daily for 7 days.   melatonin 3 mg tablet  Spouse/Significant Other   Sig: Take 1 tablet (3 mg) by mouth once daily.   Patient not taking: Reported on 10/15/2024   metFORMIN (Glucophage) 500 mg tablet 10/14/2024 Spouse/Significant Other   Sig: Take 1 tablet (500 mg) by mouth 2 times daily (morning and late afternoon).   metoprolol tartrate (Lopressor) 50 mg tablet 10/16/2024 Spouse/Significant Other   Sig: Take 1 tablet by mouth every 12 hours.   multivitamin with minerals tablet 10/16/2024 Spouse/Significant Other   Sig: Take 1 tablet by mouth once daily.   nicotine (Nicoderm CQ) 14 mg/24 hr patch     Sig: Apply 1 patch topically every day.  START AFTER finishing the 21mg patches.   nicotine (Nicoderm CQ) 21 mg/24 hr patch  Spouse/Significant Other   Sig: Place 1 patch over 24 hours on the skin once daily for 42 doses.   nicotine (Nicoderm CQ) 7 mg/24 hr patch  Spouse/Significant Other   Sig: Place 1 patch over 24 hours on the skin once daily for 14 doses. START AFTER finishing the 14mg patches.   ondansetron (Zofran) 8 mg tablet  Spouse/Significant Other   Sig: Take 1 tablet (8 mg) by mouth every 8 hours if needed for nausea or vomiting.   oxygen (O2) gas therapy  Spouse/Significant Other  "  Sig: Inhale 2 L/min continuously. Indications: decreased oxygen in the tissues or blood   pantoprazole (ProtoNix) 40 mg EC tablet  Spouse/Significant Other   Sig: Take 1 tablet (40 mg) by mouth once daily. Do not crush, chew, or split.   potassium chloride CR (Klor-Con M20) 20 mEq ER tablet 10/15/2024 Noon Spouse/Significant Other   Sig: Take 2 tablets (40 mEq) by mouth once daily. Do not crush or chew.   prochlorperazine (Compazine) 10 mg tablet  Spouse/Significant Other   Sig: Take 1 tablet (10 mg) by mouth every 6 hours if needed for nausea or vomiting.   simvastatin (Zocor) 20 mg tablet 10/15/2024 Evening Spouse/Significant Other   Sig: Take 1 tablet (20 mg) by mouth once daily.   thiamine (Vitamin B-1) 100 mg tablet 10/16/2024 Spouse/Significant Other   Sig: Take 1 tablet (100 mg) by mouth once daily. Do not start  before September 2, 2024.   tiotropium (Spiriva Respimat) 2.5 mcg/actuation inhaler 10/16/2024 Spouse/Significant Other   Sig: Inhale 2 puffs once daily.      Facility-Administered Medications: None        The list below reflects the updated allergy list. Please review each documented allergy for additional clarification and justification.  Allergies  Reviewed by Hanny Pressley on 10/16/2024   No Known Allergies         Patient accepts M2B at discharge.     Sources:   Phone Interview with wife, Cindy Cr (reliable historian)  Medication Dispense History  O[facility]RS    Additional Comments:  Patient expressed that his wife, Cindy Cr keeps up with all of his medications.       HANNY PRESSLEY  Pharmacy Technician  10/16/24     Secure Chat preferred   If no response call [phone] or [employer] \"Med Rec\"    "

## 2024-10-16 NOTE — SIGNIFICANT EVENT
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 He presents as direct admit from radiation oncology appointment with worsening SOB and concern for complete R lung collapse visualized on cone beam CT.     Patient was [facility] diagnosed with lung cancer [facility] during which time he was found to have a large (6.5x 6.4 cm) RUL mass encasing the right hilum, with near completion obstruction of portions of the main right bronchus. He underwent bronchoscopy w/ biopsy and right bronchus stenting at that time, with biopsy results ultimately c/w squamous cell carcinoma (PDL1 5%). He was subsequently referred to Dr. Landis, underwent PET for staging on 9/18, with evidence of distant metastasis. Readmitted 9/23/24 with recurrent respiratory failure, found to have collapsed right lung 2/2 occluded stent, as well as right pleural effusion. Underwent repeat bronchoscopy with stent removal on 9/24, followed by thoracentesis on 9/25 (pathology ultimately negative). Started on concurrent chemoRT with C1D1 of carbo/paclitaxel on 10/2. S/p Mediport placement 10/15.    On evaluation, Mr. Cr denies any current SOB while resting in bed, but has had notable worsening of his baseline dyspnea over the past week with exertion. Notes that he is unable to ambulate a few feet without significant SOB. Denies any recent fever/chills, N/V, chest pain, cough, wheezing, abdominal pain, or leg swelling. Notes that his last dose of Eliquis was this morning.     Objective:   VS: /97, HR 87, O2 97% on 2L NC    Physical Exam:  General: Lying flat in bed, resting comfortably in no apparent distress. Appears stated age  HEENT:  Normocephalic, atraumatic. EOMI, no scleral icterus. MMM  CV: RRR, no murmurs appreciated. Well-perfused throughout. No LE edema  Resp: On 2L NC, normal WOB at rest with no evidence of accessory muscle use. Normal breath sounds over left lung, no wheezing or crackles appreciated. Diminished breath sounds over R lung  GI: Abdomen soft, non-tender, non-distended  MSK: No rashes, bruising, or lesions appreciated. Normal passive and active ROM around extremities  Neuro: Alert & oriented x3. Cranial nerves grossly intact. Strength and sensation grossly intact throughout.      Relevant Labs:   CBC: WBC 7.6, Hgb 11.6, Plt 217  CMP: Na 135, K 5.1, Cl 101, bicarb 24, BUN 18, Cr 0.66, glucose 113  Troponin: 11  BNP: 215 (most recently 210, 2024)  VB.38/46/16, lactate 1.9  Flu/COVID negative    Relevant Imaging:   XR CHEST 1 VIEW; 10/16/2024 10:42 am   IMPRESSION:  Complete opacification of the right lung, likely due to right  mainstem bronchus occlusion with large effusion and postobstructive  airspace disease secondary to known large mass.    CT ANGIO CHEST FOR PULMONARY EMBOLISM; 10/16/2024 12:18 pm   IMPRESSION:  1. No evidence of acute pulmonary embolism.  2. Redemonstrated large infiltrative and centrally necrotic medial  right upper lobe lung mass, encroaching the adjacent mediastinum and  obliterating the right-sided bronchi, as described above.  3. Interval worsening of right-sided bronchial aeration with new  complete collapse of right lung as well as increase in size of  moderate pleural effusion. Pulmonary hygiene is recommended.  4. Moderate narrowing of the upper SVC secondary to compression by  the above-described mass is now better visualized, although SVC  appears patent.  5. Additional chronic stable findings as described above.      Assessment/Plan:   Mr. Cr is a 73 yo M w/ a PMHx of COPD, HFrEF (LVEF 35-40% 2024), Afib, and newly diagnosed RUL stage IIIB squamous cell carcinoma c/b right  mainstem bronchus. Recently initiated on concurrent chemoRT, admitted with concern for near total R lung collapse iso recently removed occluded right main stent.     #Acute on chronic hypoxic respiratory failure  #Stage IIIB NSCLC with right main stem bronchus obstruction  #S/p right mainstem bronchus stent placement (8/2024), c/b stent occlusion and removal (9/24/24)  -Patient on 2L NC at baseline, O2 sats currently in upper 90s at rest  -Pulmonology consulted, tentatively planning for bronchoscopy tomorrow vs Friday (NPO at midnight)  -Radiation oncology consulted, plan to re-engage pending pulmonary intervention (will need re-sim if any significant intervention)    #COPD on b/l 2L NC  -Low suspicion for acute exacerbation, patient denies productive cough or wheezing  -Continue home Breo-Ellipta/Spiriva plus PRN albuterol    #Afib on Eliquis  -Rate-controlled on metoprolol  -Holding home Eliquis (last dose AM 10/16), will send heparin assay. Consider heparin bridge if patient unlikely to go for bronchoscopy tomorrow     #CAD  #HTN  #HLD  #HFrEF (LVEF 35-40% 8/2024)  -Continue home metoprolol succinate, aspirin, rosuvastatin  -Holding home amlodipine iso of soft pressures     #T2DM  -Holding home metformin  -SSI TID w/ meals, qACHS glucose checks    F: PRN, caution w/ HFrEF hx  E: PRN  N: NPO at midnight  GI: home pantoprazole  DVT ppx: holding ahead of possible procedure    Code status: Full (confirmed on admission)  NOK: Wife Cindy- [phone]

## 2024-10-17 ENCOUNTER — APPOINTMENT (OUTPATIENT)
Dept: RADIATION ONCOLOGY | Facility: CLINIC | Age: 74
End: 2024-10-17
Payer: MEDICARE

## 2024-10-17 LAB
ALBUMIN SERPL BCP-MCNC: 3.1 G/DL (ref 3.4–5)
ANION GAP SERPL CALC-SCNC: 13 MMOL/L (ref 10–20)
BUN SERPL-MCNC: 13 MG/DL (ref 6–23)
CALCIUM SERPL-MCNC: 9.1 MG/DL (ref 8.6–10.6)
CHLORIDE SERPL-SCNC: 102 MMOL/L (ref 98–107)
CO2 SERPL-SCNC: 24 MMOL/L (ref 21–32)
CREAT SERPL-MCNC: 0.55 MG/DL (ref 0.5–1.3)
EGFRCR SERPLBLD CKD-EPI 2021: >90 ML/MIN/1.73M*2
ERYTHROCYTE [DISTWIDTH] IN BLOOD BY AUTOMATED COUNT: 12.4 % (ref 11.5–14.5)
GLUCOSE BLD MANUAL STRIP-MCNC: 126 MG/DL (ref 74–99)
GLUCOSE SERPL-MCNC: 122 MG/DL (ref 74–99)
HCT VFR BLD AUTO: 33.5 % (ref 41–52)
HGB BLD-MCNC: 11 G/DL (ref 13.5–17.5)
MAGNESIUM SERPL-MCNC: 1.88 MG/DL (ref 1.6–2.4)
MCH RBC QN AUTO: 30.5 PG (ref 26–34)
MCHC RBC AUTO-ENTMCNC: 32.8 G/DL (ref 32–36)
MCV RBC AUTO: 93 FL (ref 80–100)
NRBC BLD-RTO: 0 /100 WBCS (ref 0–0)
PHOSPHATE SERPL-MCNC: 2.9 MG/DL (ref 2.5–4.9)
PLATELET # BLD AUTO: 178 X10*3/UL (ref 150–450)
POTASSIUM SERPL-SCNC: 4.3 MMOL/L (ref 3.5–5.3)
RBC # BLD AUTO: 3.61 X10*6/UL (ref 4.5–5.9)
SODIUM SERPL-SCNC: 135 MMOL/L (ref 136–145)
UFH PPP CHRO-ACNC: 0.2 IU/ML
UFH PPP CHRO-ACNC: 0.4 IU/ML
UFH PPP CHRO-ACNC: 0.6 IU/ML
WBC # BLD AUTO: 7 X10*3/UL (ref 4.4–11.3)

## 2024-10-17 PROCEDURE — 94640 AIRWAY INHALATION TREATMENT: CPT

## 2024-10-17 PROCEDURE — 80069 RENAL FUNCTION PANEL: CPT

## 2024-10-17 PROCEDURE — 2500000002 HC RX 250 W HCPCS SELF ADMINISTERED DRUGS (ALT 637 FOR MEDICARE OP, ALT 636 FOR OP/ED)

## 2024-10-17 PROCEDURE — 1170000001 HC PRIVATE ONCOLOGY ROOM DAILY

## 2024-10-17 PROCEDURE — 82947 ASSAY GLUCOSE BLOOD QUANT: CPT

## 2024-10-17 PROCEDURE — 85520 HEPARIN ASSAY: CPT

## 2024-10-17 PROCEDURE — 2500000001 HC RX 250 WO HCPCS SELF ADMINISTERED DRUGS (ALT 637 FOR MEDICARE OP)

## 2024-10-17 PROCEDURE — S4991 NICOTINE PATCH NONLEGEND: HCPCS

## 2024-10-17 PROCEDURE — 83735 ASSAY OF MAGNESIUM: CPT

## 2024-10-17 PROCEDURE — 85027 COMPLETE CBC AUTOMATED: CPT

## 2024-10-17 RX ORDER — IPRATROPIUM BROMIDE AND ALBUTEROL SULFATE 2.5; .5 MG/3ML; MG/3ML
3 SOLUTION RESPIRATORY (INHALATION) EVERY 4 HOURS PRN
Status: DISCONTINUED | OUTPATIENT
Start: 2024-10-17 | End: 2024-10-19 | Stop reason: HOSPADM

## 2024-10-17 RX ADMIN — IPRATROPIUM BROMIDE AND ALBUTEROL SULFATE 3 ML: .5; 3 SOLUTION RESPIRATORY (INHALATION) at 10:19

## 2024-10-17 RX ADMIN — METOPROLOL TARTRATE 50 MG: 50 TABLET, FILM COATED ORAL at 22:27

## 2024-10-17 RX ADMIN — SIMVASTATIN 20 MG: 20 TABLET, FILM COATED ORAL at 08:41

## 2024-10-17 RX ADMIN — METOPROLOL TARTRATE 50 MG: 50 TABLET, FILM COATED ORAL at 08:41

## 2024-10-17 RX ADMIN — IPRATROPIUM BROMIDE AND ALBUTEROL SULFATE 3 ML: .5; 3 SOLUTION RESPIRATORY (INHALATION) at 15:44

## 2024-10-17 RX ADMIN — NICOTINE 1 PATCH: 21 PATCH, EXTENDED RELEASE TRANSDERMAL at 08:41

## 2024-10-17 RX ADMIN — THIAMINE HCL TAB 100 MG 100 MG: 100 TAB at 08:40

## 2024-10-17 RX ADMIN — FOLIC ACID 1 MG: 1 TABLET ORAL at 08:41

## 2024-10-17 RX ADMIN — LEVOFLOXACIN 750 MG: 750 TABLET, FILM COATED ORAL at 08:41

## 2024-10-17 RX ADMIN — OXYCODONE HYDROCHLORIDE AND ACETAMINOPHEN 500 MG: 500 TABLET ORAL at 08:40

## 2024-10-17 RX ADMIN — PANTOPRAZOLE SODIUM 40 MG: 40 TABLET, DELAYED RELEASE ORAL at 08:40

## 2024-10-17 RX ADMIN — IPRATROPIUM BROMIDE AND ALBUTEROL SULFATE 3 ML: .5; 3 SOLUTION RESPIRATORY (INHALATION) at 01:01

## 2024-10-17 RX ADMIN — TIOTROPIUM BROMIDE INHALATION SPRAY 2 PUFF: 3.12 SPRAY, METERED RESPIRATORY (INHALATION) at 10:21

## 2024-10-17 RX ADMIN — CHOLECALCIFEROL (VITAMIN D3) 10 MCG (400 UNIT) TABLET 20 MCG: at 01:01

## 2024-10-17 SDOH — SOCIAL STABILITY: SOCIAL INSECURITY: WITHIN THE LAST YEAR, HAVE YOU BEEN AFRAID OF YOUR PARTNER OR EX-PARTNER?: NO

## 2024-10-17 SDOH — SOCIAL STABILITY: SOCIAL INSECURITY: ARE YOU MARRIED, WIDOWED, DIVORCED, SEPARATED, NEVER MARRIED, OR LIVING WITH A PARTNER?: MARRIED

## 2024-10-17 SDOH — HEALTH STABILITY: MENTAL HEALTH: HOW OFTEN DO YOU HAVE A DRINK CONTAINING ALCOHOL?: NEVER

## 2024-10-17 SDOH — SOCIAL STABILITY: SOCIAL INSECURITY: ABUSE: ADULT

## 2024-10-17 SDOH — SOCIAL STABILITY: SOCIAL INSECURITY: WITHIN THE LAST YEAR, HAVE YOU BEEN HUMILIATED OR EMOTIONALLY ABUSED IN OTHER WAYS BY YOUR PARTNER OR EX-PARTNER?: NO

## 2024-10-17 SDOH — SOCIAL STABILITY: SOCIAL INSECURITY: ARE YOU OR HAVE YOU BEEN THREATENED OR ABUSED PHYSICALLY, EMOTIONALLY, OR SEXUALLY BY ANYONE?: NO

## 2024-10-17 SDOH — SOCIAL STABILITY: SOCIAL INSECURITY: HAS ANYONE EVER THREATENED TO HURT YOUR FAMILY OR YOUR PETS?: NO

## 2024-10-17 SDOH — SOCIAL STABILITY: SOCIAL NETWORK: IN A TYPICAL WEEK, HOW MANY TIMES DO YOU TALK ON THE PHONE WITH FAMILY, FRIENDS, OR NEIGHBORS?: THREE TIMES A WEEK

## 2024-10-17 SDOH — SOCIAL STABILITY: SOCIAL NETWORK: HOW OFTEN DO YOU ATTEND CHURCH OR RELIGIOUS SERVICES?: NEVER

## 2024-10-17 SDOH — HEALTH STABILITY: MENTAL HEALTH: EXPERIENCED ANY OF THE FOLLOWING LIFE EVENTS: OTHER (COMMENT)

## 2024-10-17 SDOH — SOCIAL STABILITY: SOCIAL INSECURITY: WERE YOU ABLE TO COMPLETE ALL THE BEHAVIORAL HEALTH SCREENINGS?: YES

## 2024-10-17 SDOH — HEALTH STABILITY: MENTAL HEALTH: HOW OFTEN DO YOU HAVE SIX OR MORE DRINKS ON ONE OCCASION?: NEVER

## 2024-10-17 SDOH — HEALTH STABILITY: MENTAL HEALTH
DO YOU FEEL STRESS - TENSE, RESTLESS, NERVOUS, OR ANXIOUS, OR UNABLE TO SLEEP AT NIGHT BECAUSE YOUR MIND IS TROUBLED ALL THE TIME - THESE DAYS?: NOT AT ALL

## 2024-10-17 SDOH — HEALTH STABILITY: PHYSICAL HEALTH: ON AVERAGE, HOW MANY DAYS PER WEEK DO YOU ENGAGE IN MODERATE TO STRENUOUS EXERCISE (LIKE A BRISK WALK)?: 0 DAYS

## 2024-10-17 SDOH — SOCIAL STABILITY: SOCIAL INSECURITY: ARE THERE ANY APPARENT SIGNS OF INJURIES/BEHAVIORS THAT COULD BE RELATED TO ABUSE/NEGLECT?: NO

## 2024-10-17 SDOH — SOCIAL STABILITY: SOCIAL NETWORK
DO YOU BELONG TO ANY CLUBS OR ORGANIZATIONS SUCH AS CHURCH GROUPS, UNIONS, FRATERNAL OR ATHLETIC GROUPS, OR SCHOOL GROUPS?: NO

## 2024-10-17 SDOH — ECONOMIC STABILITY: HOUSING INSECURITY: IN THE LAST 12 MONTHS, WAS THERE A TIME WHEN YOU WERE NOT ABLE TO PAY THE MORTGAGE OR RENT ON TIME?: NO

## 2024-10-17 SDOH — SOCIAL STABILITY: SOCIAL INSECURITY: DO YOU FEEL ANYONE HAS EXPLOITED OR TAKEN ADVANTAGE OF YOU FINANCIALLY OR OF YOUR PERSONAL PROPERTY?: NO

## 2024-10-17 SDOH — ECONOMIC STABILITY: INCOME INSECURITY: IN THE PAST 12 MONTHS HAS THE ELECTRIC, GAS, OIL, OR WATER COMPANY THREATENED TO SHUT OFF SERVICES IN YOUR HOME?: NO

## 2024-10-17 SDOH — ECONOMIC STABILITY: FOOD INSECURITY: WITHIN THE PAST 12 MONTHS, YOU WORRIED THAT YOUR FOOD WOULD RUN OUT BEFORE YOU GOT THE MONEY TO BUY MORE.: NEVER TRUE

## 2024-10-17 SDOH — SOCIAL STABILITY: SOCIAL INSECURITY: HAVE YOU HAD ANY THOUGHTS OF HARMING ANYONE ELSE?: NO

## 2024-10-17 SDOH — SOCIAL STABILITY: SOCIAL INSECURITY: DOES ANYONE TRY TO KEEP YOU FROM HAVING/CONTACTING OTHER FRIENDS OR DOING THINGS OUTSIDE YOUR HOME?: NO

## 2024-10-17 SDOH — HEALTH STABILITY: MENTAL HEALTH: HOW MANY DRINKS CONTAINING ALCOHOL DO YOU HAVE ON A TYPICAL DAY WHEN YOU ARE DRINKING?: PATIENT DOES NOT DRINK

## 2024-10-17 SDOH — ECONOMIC STABILITY: FOOD INSECURITY: WITHIN THE PAST 12 MONTHS, THE FOOD YOU BOUGHT JUST DIDN'T LAST AND YOU DIDN'T HAVE MONEY TO GET MORE.: NEVER TRUE

## 2024-10-17 SDOH — SOCIAL STABILITY: SOCIAL NETWORK: HOW OFTEN DO YOU GET TOGETHER WITH FRIENDS OR RELATIVES?: THREE TIMES A WEEK

## 2024-10-17 SDOH — ECONOMIC STABILITY: HOUSING INSECURITY: IN THE PAST 12 MONTHS, HOW MANY TIMES HAVE YOU MOVED WHERE YOU WERE LIVING?: 0

## 2024-10-17 SDOH — ECONOMIC STABILITY: FOOD INSECURITY: HOW HARD IS IT FOR YOU TO PAY FOR THE VERY BASICS LIKE FOOD, HOUSING, MEDICAL CARE, AND HEATING?: NOT HARD AT ALL

## 2024-10-17 SDOH — SOCIAL STABILITY: SOCIAL INSECURITY
WITHIN THE LAST YEAR, HAVE YOU BEEN RAPED OR FORCED TO HAVE ANY KIND OF SEXUAL ACTIVITY BY YOUR PARTNER OR EX-PARTNER?: NO

## 2024-10-17 SDOH — ECONOMIC STABILITY: HOUSING INSECURITY: AT ANY TIME IN THE PAST 12 MONTHS, WERE YOU HOMELESS OR LIVING IN A SHELTER (INCLUDING NOW)?: NO

## 2024-10-17 SDOH — SOCIAL STABILITY: SOCIAL INSECURITY: DO YOU FEEL UNSAFE GOING BACK TO THE PLACE WHERE YOU ARE LIVING?: NO

## 2024-10-17 SDOH — SOCIAL STABILITY: SOCIAL INSECURITY: HAVE YOU HAD THOUGHTS OF HARMING ANYONE ELSE?: NO

## 2024-10-17 SDOH — ECONOMIC STABILITY: TRANSPORTATION INSECURITY: IN THE PAST 12 MONTHS, HAS LACK OF TRANSPORTATION KEPT YOU FROM MEDICAL APPOINTMENTS OR FROM GETTING MEDICATIONS?: NO

## 2024-10-17 SDOH — SOCIAL STABILITY: SOCIAL NETWORK: HOW OFTEN DO YOU ATTEND MEETINGS OF THE CLUBS OR ORGANIZATIONS YOU BELONG TO?: NEVER

## 2024-10-17 SDOH — HEALTH STABILITY: PHYSICAL HEALTH: ON AVERAGE, HOW MANY MINUTES DO YOU ENGAGE IN EXERCISE AT THIS LEVEL?: 0 MIN

## 2024-10-17 SDOH — SOCIAL STABILITY: SOCIAL INSECURITY: POSSIBLE ABUSE REPORTED TO:: OTHER (COMMENT)

## 2024-10-17 ASSESSMENT — ACTIVITIES OF DAILY LIVING (ADL)
BATHING: INDEPENDENT
LACK_OF_TRANSPORTATION: NO
HEARING - LEFT EAR: FUNCTIONAL
LACK_OF_TRANSPORTATION: NO
JUDGMENT_ADEQUATE_SAFELY_COMPLETE_DAILY_ACTIVITIES: YES
WALKS IN HOME: INDEPENDENT
GROOMING: INDEPENDENT
PATIENT'S MEMORY ADEQUATE TO SAFELY COMPLETE DAILY ACTIVITIES?: YES
FEEDING YOURSELF: INDEPENDENT
DRESSING YOURSELF: INDEPENDENT
LACK_OF_TRANSPORTATION: NO
ADEQUATE_TO_COMPLETE_ADL: YES
HEARING - RIGHT EAR: FUNCTIONAL
TOILETING: INDEPENDENT

## 2024-10-17 ASSESSMENT — COGNITIVE AND FUNCTIONAL STATUS - GENERAL
DAILY ACTIVITIY SCORE: 24
MOBILITY SCORE: 24
PATIENT BASELINE BEDBOUND: NO

## 2024-10-17 ASSESSMENT — LIFESTYLE VARIABLES
AUDIT-C TOTAL SCORE: 0
PRESCIPTION_ABUSE_PAST_12_MONTHS: NO
HOW OFTEN DO YOU HAVE 6 OR MORE DRINKS ON ONE OCCASION: NEVER
SUBSTANCE_ABUSE_PAST_12_MONTHS: NO
AUDIT-C TOTAL SCORE: 0
HOW MANY STANDARD DRINKS CONTAINING ALCOHOL DO YOU HAVE ON A TYPICAL DAY: PATIENT DOES NOT DRINK
AUDIT-C TOTAL SCORE: 0
SKIP TO QUESTIONS 9-10: 1
PRESCIPTION_ABUSE_PAST_12_MONTHS: NO
HOW OFTEN DO YOU HAVE A DRINK CONTAINING ALCOHOL: NEVER
SKIP TO QUESTIONS 9-10: 1
SUBSTANCE_ABUSE_PAST_12_MONTHS: NO

## 2024-10-17 ASSESSMENT — PAIN SCALES - GENERAL
PAINLEVEL_OUTOF10: 0 - NO PAIN
PAINLEVEL_OUTOF10: 0 - NO PAIN

## 2024-10-17 ASSESSMENT — PAIN - FUNCTIONAL ASSESSMENT: PAIN_FUNCTIONAL_ASSESSMENT: 0-10

## 2024-10-17 ASSESSMENT — PAIN SCALES - WONG BAKER: WONGBAKER_NUMERICALRESPONSE: NO HURT

## 2024-10-17 NOTE — CONSULTS
Reason For Consult  R mainstem obstruction, assess for bronchoscopy    History Of Present Illness  Juan Carlos Cr is a 74 y.o. 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  He was sent to the ER after noted to have complete R lung collapse on CT.    Patient was initially diagnosed with lung cancer 8/2024 following admission for AHRF, during which time he was found to have a large (6.5x 6.4 cm) RUL mass encasing the right hilum, with near completion obstruction of portions of the main right bronchus. He underwent bronchoscopy w/ biopsy and right bronchus stenting at that time, with biopsy results ultimately c/w squamous cell carcinoma. Readmitted 9/23/24 with recurrent respiratory failure, found to have collapsed right lung 2/2 occluded stent, as well as right pleural effusion. Underwent repeat bronchoscopy with stent removal on 9/24, followed by thoracentesis on 9/25 (pathology ultimately negative). Started on concurrent chemoRT with C1D1 of carbo/paclitaxel on 10/2.     On my assessment patient reports worsened dyspnea for a few weeks, but that it hasn't worsened further in the past few days.  Dyspnea is worse with exertion or sitting up, tolerable when he is laying flat.  Denies any cough, sputum production, or hemoptysis.  No fevers/chills, no chest pain.  Believes that he took eliquis 10/16 AM as he reports that he didn't miss any of his regular pills.     Past Medical History  He has a past medical history of Acute myocardial infarction, unspecified (05/17/2013), Atrial fib/flutter, transient (Multi), COPD (chronic obstructive pulmonary disease) (Multi), Coronary artery disease, Diabetes mellitus (Multi), Encounter for screening for malignant neoplasm of prostate (09/02/2015),  GERD (gastroesophageal reflux disease), Hyperlipidemia, Hypertension, Lung cancer (Multi), and Personal history of other diseases of the nervous system and sense organs (09/13/2017).    Surgical History  He has a past surgical history that includes Coronary angioplasty with stent (05/17/2013); Cataract extraction (09/14/2018); and Cataract extraction (06/27/2014).     Social History  He reports that he has quit smoking. His smoking use included cigarettes. He has been exposed to tobacco smoke. He has never used smokeless tobacco. He reports that he does not currently use alcohol. He reports that he does not use drugs.    Family History  Family History   Problem Relation Name Age of Onset    Heart attack Mother      Lung cancer Brother          Allergies  Patient has no known allergies.    Review of Systems  10 point ROS was obtained and is negative except for as stated above     Physical Exam  General: in NAD  HENT: atraumatic, normocephalic  Respiratory: absent air entry in R lung, no excessive respiratory effort on NC  Cardio: RRR, no mrg  GI: NT/ND  Extremities: WWP with no edema  Neuro: no gross deficits  Psych: cooperative    Last Recorded Vitals  Blood pressure 99/61, pulse 83, temperature 36.4 °C (97.5 °F), temperature source Temporal, resp. rate 12, SpO2 96%.    Relevant Results  CT angio chest for pulmonary embolism    Result Date: 10/16/2024  Interpreted By:  Kamron García, STUDY: CT ANGIO CHEST FOR PULMONARY EMBOLISM;  10/16/2024 12:18 pm   INDICATION: Signs/Symptoms:sob, cancer pt.   COMPARISON: Chest CT 09/26/2024   ACCESSION NUMBER(S): [id number]   ORDERING CLINICIAN: FAB SUE   TECHNIQUE: Helical data acquisition of the chest was obtained after intravenous administration of 80 mL of Omnipaque 350 as per PE protocol. Images were reformatted in coronal and sagittal planes. Axial and coronal maximum intensity projection (MIP) images were created and reviewed.   FINDINGS: POTENTIAL LIMITATIONS OF THE  STUDY: None   HEART AND VESSELS: There are no discrete filling defects within main pulmonary artery and its branches to suggest acute pulmonary embolism. Main pulmonary artery and its branches are normal in caliber, although there is obliteration of right upper lobar pulmonary artery by the below described infiltrative right upper lobe lung mass..   The thoracic aorta normal in course and caliber.There is mild to moderate atherosclerosis present, including calcified and noncalcified plaques. Severe coronary artery calcifications are seen. Please note,the study is not optimized for evaluation of coronary arteries.   The cardiac chambers are not enlarged. There is no pericardial effusion seen. There is similar compression and moderate narrowing of upper SVC by the below described lung mass encroaching the mediastinum (image 112, series 401). There is a right chest wall MediPort now seen in place with tip terminating within superior cavoatrial junction.   MEDIASTINUM AND SUDHAKAR, LOWER NECK AND AXILLA: The visualized thyroid gland is within normal limits. There is again demonstration of mediastinal encroachment by the right upper lobe medial lung mass with a discrete nodular focus measuring 2.1 cm within right lower paratracheal region (image 123, series 401), which may represent extension of the mass versus a metastatic lymph node. Otherwise, no new nas disease as compared to recent CT scan 09/26/2024 Esophagus appears within normal limits as seen.   LUNGS AND AIRWAYS: Trachea and left-sided airways are patent. There is again demonstration of a large right upper paramediastinal centrally necrotic lung mass, which is seen causing invasion of adjacent mediastinum as well as right principal bronchus. Besides right principal bronchus, there is also complete obliteration of multiple lobar segmental and subsegmental bronchi throughout the right lung, with interval worsening of bronchial aeration as compared to recent CT scan  09/26/2024.   Has been interval complete collapse of right lung along with increase in size of now moderate right pleural effusion. Exact size of masses difficult to measure but it again measures approximately 8 cm in greatest axial dimension and contains internal air locules, which may be related to prior intervention. There are similar subpleural reticulations within left lung. There is no left-sided pleural effusion seen.   UPPER ABDOMEN: Similar mild fullness of right renal collecting system with a 3 mm calcified calculus on image 341, series 401. Rest of visualized abdominal structures are grossly unremarkable.   CHEST WALL AND OSSEOUS STRUCTURES: Chest wall is within normal limits. No acute osseous pathology.There are no suspicious osseous lesions.Multilevel degenerative changes within visualized spine       1. No evidence of acute pulmonary embolism. 2. Redemonstrated large infiltrative and centrally necrotic medial right upper lobe lung mass, encroaching the adjacent mediastinum and obliterating the right-sided bronchi, as described above. 3. Interval worsening of right-sided bronchial aeration with new complete collapse of right lung as well as increase in size of moderate pleural effusion. Pulmonary hygiene is recommended. 4. Moderate narrowing of the upper SVC secondary to compression by the above-described mass is now better visualized, although SVC appears patent. 5. Additional chronic stable findings as described above.   MACRO: Critical Finding:  See findings. Notification was initiated on 10/16/2024 at 12:46 pm by  Kamron García.  (**-YCF-**) Instructions:   Signed by: Kamron García 10/16/2024 12:47 PM Dictation workstation:   [id number]    XR chest 1 view    Result Date: 10/16/2024  Interpreted By:  Lee Ibarra and Gupta Jayesh STUDY: XR CHEST 1 VIEW;  10/16/2024 10:42 am   INDICATION: Signs/Symptoms:sob.   COMPARISON: Comparison radiograph 09/27/2024.   CT chest September 26   ACCESSION NUMBER(S):  [id number]   ORDERING CLINICIAN: FAB SUE   TECHNIQUE: AP radiograph of the chest was provided.   FINDINGS: Medical devices:Right chest wall MediPort with distal tip overlying the mid SVC.   CARDIOMEDIASTINAL SILHOUETTE: Cardiomediastinal silhouette is appropriate in size and configuration.   LUNGS: Complete opacification of the right lung. No consolidation, edema, effusion, or pneumothorax.   ABDOMEN: No upper abdominal findings.   BONES: No osseous changes.       Complete opacification of the right lung, likely due to right mainstem bronchus occlusion with large effusion and postobstructive airspace disease secondary to known large mass.   I personally reviewed the images/study and I agree with the findings as stated by Renato García MD. This study was interpreted at [facility], [place], [place].   MACRO: None   Signed by: Lee Ibarra 10/16/2024 11:04 AM Dictation workstation:   [id number]    IR CVC port placement    Result Date: 10/15/2024  Interpreted By:  Missael Cheung, STUDY: IR CVC PORT PLACEMENT;  10/15/2024 1:19 pm   INDICATION: Port placement,C34.11 Malignant neoplasm of upper lobe, right bronchus or lung,I87.8 Other specified disorders of veins.     COMPARISON: None.   ACCESSION NUMBER(S): [id number]   ORDERING CLINICIAN: SIMEON DUNBAR   TECHNIQUE:   CONSENT: The patient/patient's POA/next of kin was informed of the nature of the proposed procedure. The purposes, alternatives, risks, and benefits were explained and discussed. All questions were answered and consent was obtained.     SEDATION: Moderate conscious IV sedation services (supervision of administration, induction, and maintenance) were provided by the physician performing the procedure with intravenous fentanyl 50 mcg and versed 1 mg for 20 minutes. The physician was assisted by an independent trained observer, an interventional radiology nurse, in the continuous monitoring of patient level of  consciousness and physiologic status.   MEDICATION/CONTRAST: No additional   TIME OUT: A time out was performed immediately prior to procedure start with the interventional team, correctly identifying the patient name, date of birth, MRN, procedure, anatomy (including marking of site and side), patient position, procedure consent form, relevant laboratory and imaging test results, antibiotic administration, safety precautions, and procedure-specific equipment needs.   COMPLICATIONS: No immediate adverse events identified.   FINDINGS: Maximum sterile barrier technique was implemented. In the recumbent position, the patient was positioned on the angiography table. The right supraclavicular and infraclavicular cutaneous tissues were prepared and draped in usual sterile manner.   The supraclavicular access site was screened with gray-scale ultrasound with subsequent subcutaneous instillation of Lidocaine 1% local anesthesia. Ultrasound images demonstrate a patent  right internal jugular vein. Under direct ultrasound guidance and Seldinger/micropuncture technique, the  right internal jugular vein was accessed. An ultrasound digital spot image was acquired and stored on the  PACS. After confirmation of location, a 018 Mitchell-Mandril guidewire was inserted to secure location. The guidewire was advanced into the inferior vena cava utilizing intermittent fluoroscopy. The micro-access needle was removed over the guidewire. Utilizing a 5-on-4 coaxial dilator sheath system, upsize to a 035 3-J guidewire was performed. Subsequent access tract dilation was performed to an eventual  8.5-Austrian peel-away sheath dilator system.   Following Lidocaine 1% local anesthesia, a superolateral Mediport pocket was created in the subcutaneous infraclavicular chest wall. A subcutaneous tract was then created from the Mediport pocket to the venous access site. The  8-Austrian port catheter was introduced maintaining continuity from the Mediport  pocket to the venous access site and into the central venous system. An optimal length of catheter was measured with the tip at the right atrial/superior vena caval junction.   The catheter was trimmed to the optimal length and the external portion was connected to the Mediport reservoir hub. After insertion into the Mediport pocket, a Kirkland needle was then utilized to access the reservoir to assess for leaks, evaluate for aspiration and flushability. The Mediport was then irrigated with low-dose heparinized saline.   A fluoroscopic spot image of the chest was acquired in the AP projection to confirm optimal course and location of the subcutaneous and central venous catheter. In addition, optimal orientation and continuity to the Mediport reservoir were identified.   After confirmation of optimal Mediport functioning, the Mediport pocket site was closed with subcutaneous absorbable Polysorb sutures in addition to cutaneous subcuticular closure. Sterile dressings were then placed at the Mediport reservoir and venotomy access site.   The patient tolerated the procedure without complication.       1. Uncomplicated placement of a  right infraclavicular  single-lumen Mediport- 8-Norwegian catheter tip residing at the cavoatrial junction. 2. CT power-injection compatible Mediport. 3. MRI-compatible Mediport.   Optimal functioning device and ready for utilization.     Performed and dictated at [facility].   MACRO: None   Signed by: Missael Cheung 10/15/2024 1:52 PM Dictation workstation:   [id number]      Assessment/Plan   74 y.o. 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  Previously had a stent in the ARSLAN/BI which was removed several weeks ago (9/25) due to  complete occlusion.  On removal was noted to have only 25% airway compromise and therefore a stent was not redeployed.  Now with recurrent collapse.    #R mainstem squamous cell with complete lung collapse  #Acute on chronic hypoxemic respiratory failure    Recommendations:  -Please continue holding eliquis, defer to primary team whether a heparin bridge is indicated  -Given potential need for tumor debulking and stability of patient, will add-on for therapeutic bronchoscopy on 10/18    Patient will be staffed with attending physician Dr. Milian in the am.  Pulmonary will follow    Marisa Vázquez MD

## 2024-10-17 NOTE — CARE PLAN
Problem: Pain - Adult  Goal: Verbalizes/displays adequate comfort level or baseline comfort level  Outcome: Progressing     Problem: Safety - Adult  Goal: Free from fall injury  Outcome: Progressing     Problem: Discharge Planning  Goal: Discharge to home or other facility with appropriate resources  Outcome: Progressing     Problem: Fall/Injury  Goal: Not fall by end of shift  Outcome: Progressing  Goal: Be free from injury by end of the shift  Outcome: Progressing  Goal: Verbalize understanding of personal risk factors for fall in the hospital  Outcome: Progressing  Goal: Verbalize understanding of risk factor reduction measures to prevent injury from fall in the home  Outcome: Progressing  Goal: Use assistive devices by end of the shift  Outcome: Progressing  Goal: Pace activities to prevent fatigue by end of the shift  Outcome: Progressing      The clinical goals for the shift include Pt to remain free of increased o2 demands thru shift.

## 2024-10-17 NOTE — HOSPITAL COURSE
Juan Carlos Cr is a 74 year old male with hx of COPD, chronic hypoxic resp failure on home 2L O2, and T4N2M0 squamous cell lung cancer of the RUL diagnosed 8/2024 on cycle 1 concurrent chemoradiation who was admitted from his radiation oncology appointment with SOB and hypoxia, found to have disease progression and right lung collapse. Pt is s/p EBUS with debulking and stent placement 8/29, followed by repeat bronch 9/25 for mucus plug and stent removal. On arrival and on the floor, he was saturating normally on 2L oxygen, which is his baseline. However, pt had significant dyspnea with sitting and any ambulation. Pulmonology was consulted for possible bronchoscopic intervention. Pt's Eliquis was held on admission in anticipation of a bronchoscopy. Inpatient carbo/taxol chemotherapy was deferred while undergoing Pulm workup and intervention. Pt underwent bronchoscopy on 10/18/24, and had tumor debulking with placement of a stent in the right mainstem bronchus. He was started on bronchopulmonary hygiene with TID 3% saline and albuterol nebulized treatments. Eliquis was resumed 10/18 PM following stent and tumor debulking. Pt had simulation for radiation therapy completed on 10/18/24 given anatomy change post-bronch. He is pending follow-up with radiation oncology to determine when he is due to start treatment.

## 2024-10-17 NOTE — PROGRESS NOTES
10/17/24 1308   Discharge Planning   Living Arrangements Spouse/significant other   Support Systems Spouse/significant other;Children   Assistance Needed none   Type of Residence Private residence   Number of Stairs to Enter Residence 2   Number of Stairs Within Residence 13   Do you have animals or pets at home? Yes   Type of Animals or Pets 1 cat   Who is requesting discharge planning? Provider   Home or Post Acute Services None   Expected Discharge Disposition Home   Does the patient need discharge transport arranged? No  (Family will provide transport)   Financial Resource Strain   How hard is it for you to pay for the very basics like food, housing, medical care, and heating? Not hard   Housing Stability   In the last 12 months, was there a time when you were not able to pay the mortgage or rent on time? N   In the past 12 months, how many times have you moved where you were living? 0   At any time in the past 12 months, were you homeless or living in a shelter (including now)? N   Transportation Needs   In the past 12 months, has lack of transportation kept you from medical appointments or from getting medications? no   In the past 12 months, has lack of transportation kept you from meetings, work, or from getting things needed for daily living? No     Juan Carlos Cr is a 74 y.o. 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  He was sent to the ER after noted to have complete R lung collapse on CT.     Plan per Medical/Surgical Team: Pending bronchoscopy with pulmonary today vs tomorrow, pending PT/OT recommendations.    Assessment Note: Met with patient and introduced myself as care coordinator and member of the care transitions team for discharge planning. Patient lives with  his wife, home does have an upper level but they stay on the first floor. Patient is independent, does not use assistive devices. Patient identified wife as primary supportive caregiver. Patient does use continuous O2 N/[employer], does not remember the name of the company. Patient receives home health services weekly, he is unsure of the agency or if it is through his insurance company. Patient gave [facility] permission to call his wife for more information. TCC call patient's home, no answer and no voice mail. Will attempt at a later date. Patient's sister and son's provide transportation to appointments states he also uses some type of Bus transportation provider for low income. Does not know the name of company. Demographics and contacts verified. Care transitions will continue to follow for homegoing needs. Leila Barone RN       Addendum @ 2:44 pm                                          DME: None  O2: [employer]   Dialysis: N/A  DM:  Yes, checks BS daily    Home Care: [facility]  PCP: Leeann Olivo, APRN-CNP ([facility])  Pharm: [facility]  Potential Barriers: None  Discharge Disposition: Home pending PT rec  ADOD: TBD  Transport at Discharge: Sister or son    Patient also uses round trip [employer] through [employer] for transportation to appointments.

## 2024-10-17 NOTE — CARE PLAN
Problem: Pain - Adult  Goal: Verbalizes/displays adequate comfort level or baseline comfort level  Outcome: Progressing     Problem: Safety - Adult  Goal: Free from fall injury  Outcome: Progressing     Problem: Fall/Injury  Goal: Not fall by end of shift  Outcome: Progressing     Problem: Respiratory  Goal: Minimize anxiety/maximize coping throughout shift  Outcome: Progressing   The patient's goals for the shift include      The clinical goals for the shift include To get rest    Pt is alert and oriented at bedside. Pt shows no s/s of distress on 2L NC. Pt oriented into room and unit. Bed in lowest position and call light within reach. Will continue to monitor, assess, and update pt on plan of care.

## 2024-10-17 NOTE — PROGRESS NOTES
"  MEDICINE INPATIENT PROGRESS NOTE    Juan Carlos Cr is a 74 y.o. male on hospital day 1    Subjective   No acute events overnight.  Upon evaluation this AM, he denies any new changes or symptoms. He denies any worsening shortness of breath. He remains on 2L NC which is his home baseline.   He notes about 40 lb weight loss since the start of the summer. He declined seeing our supportive oncology team today. He states that he has been eating about two meals per day at home.          Objective     Last Recorded Vitals  Blood pressure 107/70, pulse 83, temperature 36.3 °C (97.3 °F), temperature source Temporal, resp. rate 18, height 1.753 m (5' 9\"), weight 62.3 kg (137 lb 6.4 oz), SpO2 97%.    Vital Trends Last 24hrs  Temp:  [36.3 °C (97.3 °F)] 36.3 °C (97.3 °F)  Heart Rate:  [] 83  Resp:  [12-22] 18  BP: ()/(56-97) 107/70     Intake/Output last 3 Shifts:  No intake/output data recorded.    Physical Exam    General: Alert and interactive, in no acute distress, on 2L NC. Cachectic  HEENT: NC/AT, EOMI, no conjunctival icterus, mucosa   Cardiovascular: Regular rate & rhythm, no murmurs  Pulmonary: Right lung breath sounds absent. Left lung without wheezes, rales, or rhonchi. On 2L NC  GI/Abd: Normoactive bowel sounds. Scaphoid abdomen.   Extremities: No peripheral edema  Skin: No jaundice, rashes. No wounds  Neuro: Alert and oriented x3. No focal deficit.   Psych: Appropriate mood and affect      Relevant Results  Results from last 7 days   Lab Units 10/17/24  0831 10/16/24  1031   WBC AUTO x10*3/uL 7.0 7.6   HEMOGLOBIN g/dL 11.0* 11.6*   HEMATOCRIT % 33.5* 35.7*   PLATELETS AUTO x10*3/uL 178 217     Results from last 7 days   Lab Units 10/17/24  0831 10/16/24  1031   SODIUM mmol/L 135* 135*   POTASSIUM mmol/L 4.3 5.1   CO2 mmol/L 24 24   ANION GAP mmol/L 13 15   BUN mg/dL 13 18   CREATININE mg/dL 0.55 0.66   GLUCOSE mg/dL 122* 113*   EGFR mL/min/1.73m*2 >90 >90   MAGNESIUM mg/dL 1.88  --    PHOSPHORUS mg/dL " "2.9  --       Results from last 7 days   Lab Units 10/16/24  1031   ALT U/L 7*   AST U/L 13   ALK PHOS U/L 56            No lab exists for component: \"PT\"  Results from last 7 days   Lab Units 10/16/24  1031   FIO2 % 36     Results from last 7 days   Lab Units 10/16/24  1031   POCT PH, VENOUS pH 7.38   POCT PCO2, VENOUS mm Hg 46           Lab Results   Component Value Date    BLOODCULT No growth at 4 days -  FINAL REPORT 09/23/2024    BLOODCULT No growth at 4 days -  FINAL REPORT 09/23/2024        Medications    ascorbic acid, 500 mg, oral, Daily  aspirin, 81 mg, oral, Daily  cholecalciferol, 800 Units, oral, Daily  fluticasone furoate-vilanteroL, 1 puff, inhalation, Daily  folic acid, 1 mg, oral, Daily  insulin lispro, 0-5 Units, subcutaneous, TID  ipratropium-albuteroL, 3 mL, nebulization, q6h  metoprolol tartrate, 50 mg, oral, q12h  nicotine, 1 patch, transdermal, Daily  pantoprazole, 40 mg, oral, Daily  polyethylene glycol, 17 g, oral, Daily  simvastatin, 20 mg, oral, Daily  thiamine, 100 mg, oral, Daily  tiotropium, 2 puff, inhalation, Daily        oxygen, 2 L/min        PRN medications: dextrose, dextrose, glucagon, glucagon           Assessment/Plan   Juan Carlos Cr is a 74 year old male with hx of COPD, chronic hypoxic resp failure on home 2L O2, and T4N2M0 squamous cell lung cancer of the RUL diagnosed 8/2024 on cycle 1 concurrent chemoradiation who is admitted from his radiation oncology appointment with SOB and hypoxia, found to have disease progression and right lung collapse. Pt s/p EBUS with debulking and stent placement 8/29, followed by repeat bronch 9/25 for mucus plug and stent removal. Pt currently stable on home 2L. Interventional pulm consulted for potential bronchoscopy to regain airway patency.     Updates 10/17/24:  -Pulmonology consulted; planning for bronchoscopy on 10/18  -Holding eliquis and bridging to heparin gtt  -NPO midnight  -No plan for inpatient chemotherapy this admission   "   #Acute on chronic hypoxic respiratory failure  #Stage IIIb T4N2M0 Squamous cell lung cancer  #Right lung collapse 2/2 mainstem compression  AHRF secondary to collapse of the right lung with RUL mass progression, currently undergoing cycle 1 carbo/taxol and RT  :: Pt presented to Rad Onc session today with worsening hypoxia and cone beam CT showing collapse of right lung due to mainstem bronchus involvement  :: s/p bronchoscopy and EBUS with debulking and stent placement 8/29, and bronchoscopy with stent removal 9/25/24  :: PET 9/18/24 showing hypermetabolic right hilar mass with RLL satellite lesion and hypermetabolic right paratracheal lymph nodes  :: Pt took last dose of Eliquis 10/16 AM  Plan:  -Tentatively planning for bronchoscopy on 10/18  -Radiation Oncology consulted; appreciate recs  -Pt's primary oncologist notified of admission. Contacted with question of inpatient carbo/taxol treatment  -NPO midnight      #COPD  -continue home spiriva and Breo  -Duo-Nebs q6h prn     #Atrial fibrillation  #HFpEF  #HTN  -Eliquis held in anticipation of bronchoscopy; last dose was 10/16 AM  -Continue home metoprolol, ASA, simvastatin  -Amlodipine held d/t relative hypotension         F : PRN  E: PRN  N: regular diet  GI prophylaxis: PPI  DVT prophylaxis: eliquis on hold, bridging to heparin gtt    Code Status: Full Code (confirmed on admission)   NOK: Cindy Cr (spouse) [phone]       Thanh Odonnell MD  Internal Medicine, PGY-1

## 2024-10-17 NOTE — SIGNIFICANT EVENT
Patient is added on for likely first start tomorrow 10/18 for a therapeutic bronchoscopy to assess for tumor debulking.  Please make NPO at midnight on 10/18, hold heparin drip at midnight.

## 2024-10-18 ENCOUNTER — APPOINTMENT (OUTPATIENT)
Dept: RADIOLOGY | Facility: HOSPITAL | Age: 74
DRG: 163 | End: 2024-10-18
Payer: MEDICARE

## 2024-10-18 ENCOUNTER — HOSPITAL ENCOUNTER (INPATIENT)
Dept: RADIOLOGY | Facility: HOSPITAL | Age: 74
Discharge: HOME | DRG: 163 | End: 2024-10-18
Payer: MEDICARE

## 2024-10-18 ENCOUNTER — ANESTHESIA EVENT (OUTPATIENT)
Dept: GASTROENTEROLOGY | Facility: HOSPITAL | Age: 74
End: 2024-10-18
Payer: MEDICARE

## 2024-10-18 ENCOUNTER — APPOINTMENT (OUTPATIENT)
Dept: GASTROENTEROLOGY | Facility: HOSPITAL | Age: 74
DRG: 163 | End: 2024-10-18
Payer: MEDICARE

## 2024-10-18 ENCOUNTER — HOSPITAL ENCOUNTER (OUTPATIENT)
Dept: RADIATION ONCOLOGY | Facility: HOSPITAL | Age: 74
Setting detail: RADIATION/ONCOLOGY SERIES
Discharge: HOME | End: 2024-10-18
Payer: MEDICARE

## 2024-10-18 ENCOUNTER — ANESTHESIA (OUTPATIENT)
Dept: GASTROENTEROLOGY | Facility: HOSPITAL | Age: 74
End: 2024-10-18
Payer: MEDICARE

## 2024-10-18 ENCOUNTER — HOME CARE VISIT (OUTPATIENT)
Dept: HOME HEALTH SERVICES | Facility: HOME HEALTH | Age: 74
End: 2024-10-18
Payer: MEDICARE

## 2024-10-18 ENCOUNTER — APPOINTMENT (OUTPATIENT)
Dept: RADIATION ONCOLOGY | Facility: CLINIC | Age: 74
End: 2024-10-18
Payer: MEDICARE

## 2024-10-18 ENCOUNTER — HOSPITAL ENCOUNTER (OUTPATIENT)
Dept: RADIOLOGY | Facility: EXTERNAL LOCATION | Age: 74
Discharge: HOME | End: 2024-10-18

## 2024-10-18 DIAGNOSIS — C34.81 MALIGNANT NEOPLASM OF OVERLAPPING SITES OF RIGHT BRONCHUS AND LUNG (MULTI): ICD-10-CM

## 2024-10-18 DIAGNOSIS — C34.11 MALIGNANT NEOPLASM OF UPPER LOBE OF RIGHT LUNG (MULTI): ICD-10-CM

## 2024-10-18 DIAGNOSIS — C34.90 PRIMARY MALIGNANT NEOPLASM OF LUNG METASTATIC TO OTHER SITE, UNSPECIFIED LATERALITY (MULTI): Primary | ICD-10-CM

## 2024-10-18 LAB
ABO GROUP (TYPE) IN BLOOD: NORMAL
ALBUMIN SERPL BCP-MCNC: 3.1 G/DL (ref 3.4–5)
ANION GAP SERPL CALC-SCNC: 13 MMOL/L (ref 10–20)
ANTIBODY SCREEN: NORMAL
BUN SERPL-MCNC: 11 MG/DL (ref 6–23)
CALCIUM SERPL-MCNC: 8.6 MG/DL (ref 8.6–10.6)
CHLORIDE SERPL-SCNC: 99 MMOL/L (ref 98–107)
CO2 SERPL-SCNC: 25 MMOL/L (ref 21–32)
CREAT SERPL-MCNC: 0.53 MG/DL (ref 0.5–1.3)
EGFRCR SERPLBLD CKD-EPI 2021: >90 ML/MIN/1.73M*2
ERYTHROCYTE [DISTWIDTH] IN BLOOD BY AUTOMATED COUNT: 12.7 % (ref 11.5–14.5)
GLUCOSE BLD MANUAL STRIP-MCNC: 101 MG/DL (ref 74–99)
GLUCOSE BLD MANUAL STRIP-MCNC: 132 MG/DL (ref 74–99)
GLUCOSE BLD MANUAL STRIP-MCNC: 240 MG/DL (ref 74–99)
GLUCOSE BLD MANUAL STRIP-MCNC: 242 MG/DL (ref 74–99)
GLUCOSE SERPL-MCNC: 167 MG/DL (ref 74–99)
HCT VFR BLD AUTO: 30.8 % (ref 41–52)
HGB BLD-MCNC: 10.1 G/DL (ref 13.5–17.5)
MAGNESIUM SERPL-MCNC: 1.72 MG/DL (ref 1.6–2.4)
MCH RBC QN AUTO: 30.3 PG (ref 26–34)
MCHC RBC AUTO-ENTMCNC: 32.8 G/DL (ref 32–36)
MCV RBC AUTO: 93 FL (ref 80–100)
NRBC BLD-RTO: 0 /100 WBCS (ref 0–0)
PHOSPHATE SERPL-MCNC: 2.6 MG/DL (ref 2.5–4.9)
PLATELET # BLD AUTO: 148 X10*3/UL (ref 150–450)
POTASSIUM SERPL-SCNC: 3.6 MMOL/L (ref 3.5–5.3)
RBC # BLD AUTO: 3.33 X10*6/UL (ref 4.5–5.9)
RH FACTOR (ANTIGEN D): NORMAL
SODIUM SERPL-SCNC: 133 MMOL/L (ref 136–145)
WBC # BLD AUTO: 6 X10*3/UL (ref 4.4–11.3)

## 2024-10-18 PROCEDURE — 36416 COLLJ CAPILLARY BLOOD SPEC: CPT

## 2024-10-18 PROCEDURE — 99232 SBSQ HOSP IP/OBS MODERATE 35: CPT

## 2024-10-18 PROCEDURE — 80069 RENAL FUNCTION PANEL: CPT

## 2024-10-18 PROCEDURE — 2500000004 HC RX 250 GENERAL PHARMACY W/ HCPCS (ALT 636 FOR OP/ED): Performed by: ANESTHESIOLOGIST ASSISTANT

## 2024-10-18 PROCEDURE — 83735 ASSAY OF MAGNESIUM: CPT

## 2024-10-18 PROCEDURE — 2500000005 HC RX 250 GENERAL PHARMACY W/O HCPCS

## 2024-10-18 PROCEDURE — C1726 CATH, BAL DIL, NON-VASCULAR: HCPCS

## 2024-10-18 PROCEDURE — 71045 X-RAY EXAM CHEST 1 VIEW: CPT

## 2024-10-18 PROCEDURE — P9045 ALBUMIN (HUMAN), 5%, 250 ML: HCPCS | Mod: JZ | Performed by: ANESTHESIOLOGIST ASSISTANT

## 2024-10-18 PROCEDURE — 82947 ASSAY GLUCOSE BLOOD QUANT: CPT

## 2024-10-18 PROCEDURE — 7100000009 HC PHASE TWO TIME - INITIAL BASE CHARGE

## 2024-10-18 PROCEDURE — 31636 BRONCHOSCOPY BRONCH STENTS: CPT | Performed by: STUDENT IN AN ORGANIZED HEALTH CARE EDUCATION/TRAINING PROGRAM

## 2024-10-18 PROCEDURE — 2500000002 HC RX 250 W HCPCS SELF ADMINISTERED DRUGS (ALT 637 FOR MEDICARE OP, ALT 636 FOR OP/ED)

## 2024-10-18 PROCEDURE — 31641 BRONCHOSCOPY TREAT BLOCKAGE: CPT | Performed by: STUDENT IN AN ORGANIZED HEALTH CARE EDUCATION/TRAINING PROGRAM

## 2024-10-18 PROCEDURE — 2500000001 HC RX 250 WO HCPCS SELF ADMINISTERED DRUGS (ALT 637 FOR MEDICARE OP)

## 2024-10-18 PROCEDURE — 31645 BRNCHSC W/THER ASPIR 1ST: CPT | Performed by: STUDENT IN AN ORGANIZED HEALTH CARE EDUCATION/TRAINING PROGRAM

## 2024-10-18 PROCEDURE — 31622 DX BRONCHOSCOPE/WASH: CPT | Performed by: STUDENT IN AN ORGANIZED HEALTH CARE EDUCATION/TRAINING PROGRAM

## 2024-10-18 PROCEDURE — 85027 COMPLETE CBC AUTOMATED: CPT

## 2024-10-18 PROCEDURE — 7100000001 HC RECOVERY ROOM TIME - INITIAL BASE CHARGE

## 2024-10-18 PROCEDURE — 3700000002 HC GENERAL ANESTHESIA TIME - EACH INCREMENTAL 1 MINUTE

## 2024-10-18 PROCEDURE — 86901 BLOOD TYPING SEROLOGIC RH(D): CPT | Performed by: ANESTHESIOLOGIST ASSISTANT

## 2024-10-18 PROCEDURE — 31630 BRONCHOSCOPY DILATE/FX REPR: CPT | Performed by: STUDENT IN AN ORGANIZED HEALTH CARE EDUCATION/TRAINING PROGRAM

## 2024-10-18 PROCEDURE — 7100000010 HC PHASE TWO TIME - EACH INCREMENTAL 1 MINUTE

## 2024-10-18 PROCEDURE — S4991 NICOTINE PATCH NONLEGEND: HCPCS

## 2024-10-18 PROCEDURE — 1170000001 HC PRIVATE ONCOLOGY ROOM DAILY

## 2024-10-18 PROCEDURE — 31625 BRONCHOSCOPY W/BIOPSY(S): CPT | Performed by: STUDENT IN AN ORGANIZED HEALTH CARE EDUCATION/TRAINING PROGRAM

## 2024-10-18 PROCEDURE — 71045 X-RAY EXAM CHEST 1 VIEW: CPT | Performed by: RADIOLOGY

## 2024-10-18 PROCEDURE — 3700000001 HC GENERAL ANESTHESIA TIME - INITIAL BASE CHARGE

## 2024-10-18 PROCEDURE — 7100000002 HC RECOVERY ROOM TIME - EACH INCREMENTAL 1 MINUTE

## 2024-10-18 PROCEDURE — C1769 GUIDE WIRE: HCPCS

## 2024-10-18 PROCEDURE — 88305 TISSUE EXAM BY PATHOLOGIST: CPT | Mod: TC,SUR | Performed by: STUDENT IN AN ORGANIZED HEALTH CARE EDUCATION/TRAINING PROGRAM

## 2024-10-18 PROCEDURE — 2500000004 HC RX 250 GENERAL PHARMACY W/ HCPCS (ALT 636 FOR OP/ED)

## 2024-10-18 PROCEDURE — 94640 AIRWAY INHALATION TREATMENT: CPT

## 2024-10-18 PROCEDURE — 2780000003 HC OR 278 NO HCPCS

## 2024-10-18 PROCEDURE — C1874 STENT, COATED/COV W/DEL SYS: HCPCS

## 2024-10-18 PROCEDURE — 2720000007 HC OR 272 NO HCPCS

## 2024-10-18 RX ORDER — SODIUM CHLORIDE FOR INHALATION 3 %
3 VIAL, NEBULIZER (ML) INHALATION 3 TIMES DAILY
Status: DISCONTINUED | OUTPATIENT
Start: 2024-10-18 | End: 2024-10-19 | Stop reason: HOSPADM

## 2024-10-18 RX ORDER — SODIUM CHLORIDE FOR INHALATION 3 %
4 VIAL, NEBULIZER (ML) INHALATION 3 TIMES DAILY
Qty: 240 ML | Refills: 0 | Status: SHIPPED | OUTPATIENT
Start: 2024-10-18 | End: 2024-11-17

## 2024-10-18 RX ORDER — GLYCOPYRROLATE 0.2 MG/ML
INJECTION INTRAMUSCULAR; INTRAVENOUS AS NEEDED
Status: DISCONTINUED | OUTPATIENT
Start: 2024-10-18 | End: 2024-10-18

## 2024-10-18 RX ORDER — SODIUM CHLORIDE, SODIUM LACTATE, POTASSIUM CHLORIDE, CALCIUM CHLORIDE 600; 310; 30; 20 MG/100ML; MG/100ML; MG/100ML; MG/100ML
INJECTION, SOLUTION INTRAVENOUS CONTINUOUS PRN
Status: DISCONTINUED | OUTPATIENT
Start: 2024-10-18 | End: 2024-10-18

## 2024-10-18 RX ORDER — ALBUTEROL SULFATE 0.83 MG/ML
2.5 SOLUTION RESPIRATORY (INHALATION) 3 TIMES DAILY
Status: DISCONTINUED | OUTPATIENT
Start: 2024-10-18 | End: 2024-10-19 | Stop reason: HOSPADM

## 2024-10-18 RX ORDER — ONDANSETRON HYDROCHLORIDE 2 MG/ML
INJECTION, SOLUTION INTRAVENOUS AS NEEDED
Status: DISCONTINUED | OUTPATIENT
Start: 2024-10-18 | End: 2024-10-18

## 2024-10-18 RX ORDER — LIDOCAINE HYDROCHLORIDE 10 MG/ML
0.1 INJECTION, SOLUTION INFILTRATION; PERINEURAL ONCE
Status: DISCONTINUED | OUTPATIENT
Start: 2024-10-18 | End: 2024-10-19 | Stop reason: HOSPADM

## 2024-10-18 RX ORDER — POTASSIUM CHLORIDE 20 MEQ/1
40 TABLET, EXTENDED RELEASE ORAL ONCE
Status: COMPLETED | OUTPATIENT
Start: 2024-10-18 | End: 2024-10-18

## 2024-10-18 RX ORDER — ONDANSETRON HYDROCHLORIDE 2 MG/ML
4 INJECTION, SOLUTION INTRAVENOUS ONCE AS NEEDED
Status: DISCONTINUED | OUTPATIENT
Start: 2024-10-18 | End: 2024-10-19 | Stop reason: HOSPADM

## 2024-10-18 RX ORDER — PHENYLEPHRINE HCL IN 0.9% NACL 0.4MG/10ML
SYRINGE (ML) INTRAVENOUS AS NEEDED
Status: DISCONTINUED | OUTPATIENT
Start: 2024-10-18 | End: 2024-10-18

## 2024-10-18 RX ORDER — ROCURONIUM BROMIDE 10 MG/ML
INJECTION, SOLUTION INTRAVENOUS AS NEEDED
Status: DISCONTINUED | OUTPATIENT
Start: 2024-10-18 | End: 2024-10-18

## 2024-10-18 RX ORDER — FENTANYL CITRATE 50 UG/ML
INJECTION, SOLUTION INTRAMUSCULAR; INTRAVENOUS CONTINUOUS PRN
Status: DISCONTINUED | OUTPATIENT
Start: 2024-10-18 | End: 2024-10-18

## 2024-10-18 RX ORDER — ALBUTEROL SULFATE 0.83 MG/ML
2.5 SOLUTION RESPIRATORY (INHALATION) 3 TIMES DAILY
Qty: 270 ML | Refills: 0 | Status: SHIPPED | OUTPATIENT
Start: 2024-10-18 | End: 2024-11-18

## 2024-10-18 RX ORDER — PROPOFOL 10 MG/ML
INJECTION, EMULSION INTRAVENOUS AS NEEDED
Status: DISCONTINUED | OUTPATIENT
Start: 2024-10-18 | End: 2024-10-18

## 2024-10-18 RX ORDER — SODIUM CHLORIDE FOR INHALATION 3 %
3 VIAL, NEBULIZER (ML) INHALATION EVERY 8 HOURS
Status: DISCONTINUED | OUTPATIENT
Start: 2024-10-18 | End: 2024-10-18

## 2024-10-18 RX ORDER — PHENYLEPHRINE 10 MG/250 ML(40 MCG/ML)IN 0.9 % SOD.CHLORIDE INTRAVENOUS
CONTINUOUS PRN
Status: DISCONTINUED | OUTPATIENT
Start: 2024-10-18 | End: 2024-10-18

## 2024-10-18 RX ORDER — ALBUMIN HUMAN 50 G/1000ML
SOLUTION INTRAVENOUS AS NEEDED
Status: DISCONTINUED | OUTPATIENT
Start: 2024-10-18 | End: 2024-10-18

## 2024-10-18 RX ORDER — LIDOCAINE HYDROCHLORIDE 20 MG/ML
INJECTION, SOLUTION INFILTRATION; PERINEURAL AS NEEDED
Status: DISCONTINUED | OUTPATIENT
Start: 2024-10-18 | End: 2024-10-18

## 2024-10-18 RX ADMIN — FOLIC ACID 1 MG: 1 TABLET ORAL at 11:44

## 2024-10-18 RX ADMIN — POLYETHYLENE GLYCOL 3350 17 G: 17 POWDER, FOR SOLUTION ORAL at 11:45

## 2024-10-18 RX ADMIN — ALBUTEROL SULFATE 2.5 MG: 2.5 SOLUTION RESPIRATORY (INHALATION) at 16:04

## 2024-10-18 RX ADMIN — SODIUM CHLORIDE SOLN NEBU 3% 3 ML: 3 NEBU SOLN at 16:04

## 2024-10-18 RX ADMIN — INSULIN LISPRO 2 UNITS: 100 INJECTION, SOLUTION INTRAVENOUS; SUBCUTANEOUS at 16:14

## 2024-10-18 RX ADMIN — POTASSIUM CHLORIDE 40 MEQ: 1500 TABLET, EXTENDED RELEASE ORAL at 15:28

## 2024-10-18 RX ADMIN — METOPROLOL TARTRATE 50 MG: 50 TABLET, FILM COATED ORAL at 20:42

## 2024-10-18 RX ADMIN — ASPIRIN 81 MG: 81 TABLET, COATED ORAL at 11:44

## 2024-10-18 RX ADMIN — APIXABAN 5 MG: 5 TABLET, FILM COATED ORAL at 20:42

## 2024-10-18 RX ADMIN — NICOTINE 1 PATCH: 21 PATCH, EXTENDED RELEASE TRANSDERMAL at 12:25

## 2024-10-18 RX ADMIN — THIAMINE HCL TAB 100 MG 100 MG: 100 TAB at 11:45

## 2024-10-18 RX ADMIN — OXYCODONE HYDROCHLORIDE AND ACETAMINOPHEN 500 MG: 500 TABLET ORAL at 11:44

## 2024-10-18 RX ADMIN — PANTOPRAZOLE SODIUM 40 MG: 40 TABLET, DELAYED RELEASE ORAL at 11:44

## 2024-10-18 ASSESSMENT — PAIN SCALES - GENERAL
PAINLEVEL_OUTOF10: 0 - NO PAIN

## 2024-10-18 ASSESSMENT — COGNITIVE AND FUNCTIONAL STATUS - GENERAL
DRESSING REGULAR UPPER BODY CLOTHING: A LITTLE
MOVING FROM LYING ON BACK TO SITTING ON SIDE OF FLAT BED WITH BEDRAILS: A LITTLE
STANDING UP FROM CHAIR USING ARMS: A LITTLE
TOILETING: A LITTLE
TURNING FROM BACK TO SIDE WHILE IN FLAT BAD: A LITTLE
MOBILITY SCORE: 20
PERSONAL GROOMING: A LITTLE
DAILY ACTIVITIY SCORE: 20
HELP NEEDED FOR BATHING: A LITTLE
DAILY ACTIVITIY SCORE: 18
EATING MEALS: A LITTLE
TOILETING: A LITTLE
MOVING TO AND FROM BED TO CHAIR: A LITTLE
PERSONAL GROOMING: A LITTLE
DRESSING REGULAR LOWER BODY CLOTHING: A LITTLE
MOVING TO AND FROM BED TO CHAIR: A LITTLE
EATING MEALS: A LITTLE
WALKING IN HOSPITAL ROOM: A LITTLE
WALKING IN HOSPITAL ROOM: A LITTLE
MOBILITY SCORE: 18
DRESSING REGULAR UPPER BODY CLOTHING: A LITTLE
CLIMB 3 TO 5 STEPS WITH RAILING: A LITTLE
CLIMB 3 TO 5 STEPS WITH RAILING: A LITTLE
STANDING UP FROM CHAIR USING ARMS: A LITTLE

## 2024-10-18 ASSESSMENT — ENCOUNTER SYMPTOMS
JOINT SWELLING: 0
WOUND: 0
WEAKNESS: 0
BRUISES/BLEEDS EASILY: 0
WHEEZING: 0
CHILLS: 0
PALPITATIONS: 0
HEMATURIA: 0
DIARRHEA: 0
SEIZURES: 0
VOMITING: 0
CONSTIPATION: 0
ABDOMINAL PAIN: 0
VOICE CHANGE: 0
TROUBLE SWALLOWING: 0
DIFFICULTY URINATING: 0
BACK PAIN: 0
DIZZINESS: 0
NUMBNESS: 0
BLOOD IN STOOL: 0
POLYDIPSIA: 0
CHEST TIGHTNESS: 0
SHORTNESS OF BREATH: 1
NECK PAIN: 0
DYSURIA: 0
NAUSEA: 0
COUGH: 1
UNEXPECTED WEIGHT CHANGE: 0
FEVER: 0
NECK STIFFNESS: 0
FLANK PAIN: 0

## 2024-10-18 ASSESSMENT — PAIN SCALES - WONG BAKER
WONGBAKER_NUMERICALRESPONSE: NO HURT
WONGBAKER_NUMERICALRESPONSE: NO HURT

## 2024-10-18 ASSESSMENT — PAIN - FUNCTIONAL ASSESSMENT
PAIN_FUNCTIONAL_ASSESSMENT: 0-10

## 2024-10-18 NOTE — PROGRESS NOTES
Bronchoscopy Scheduling Request     Pre-bronchoscopy visit: Follow-up visit with Bronchoscopy group provider  Please schedule procedure: After 4-6 weeks from 10/18/24        Cytology on-site:  No  Location:  [facility]  Performing physician:  Interventional bronchoscopist  Referring physician:  Dov Holt MD, Janiya Landis MD, Rojelio Ewing MD  Indication:  Squamous cell lung cancer with occluded RUL and RMI+BI stenosis, stented on 08/29/2024 by DD; removed by SKA on 09/25/2024 - fully occluded stent (not using home nebulizer or maintenance meds?),  re-stented on 10/18 as ARSLAN and BI -->100% occlusion   Sedation / Anesthesia:  GA  Procedure:  Therapeutic - possible rigid +/- stent removal vs inspection  Time:  Tier 2  Fluorscopy:  Yes  Imaging needed:  CT CHEST w/o  Labs:  CBC, BMP  Meds:  Eliquis (needs to be re-confirmed on pre-procedure visit)   Special Considerations:  Radiation oncology plans to continue radiation following 10/18 procedure (message sent to Rojelio Ewing)  Reviewed by:  Loretta Nunez MD

## 2024-10-18 NOTE — ANESTHESIA POSTPROCEDURE EVALUATION
Patient: Juan Carlos Cr    Procedure Summary       Date: 10/18/24 Room / Location: [facility]    Anesthesia Start: 0830 Anesthesia Stop: 1012    Procedure: BRONCHOSCOPY Diagnosis: Malignant neoplasm of upper lobe of right lung (Multi)    Scheduled Providers: Loretta Nunez MD; Armand Vilalr MD Responsible Provider: Armand Villar MD    Anesthesia Type: general ASA Status: 3            Anesthesia Type: general    Vitals Value Taken Time   /71 10/18/24 1010   Temp 36.2 °C (97.2 °F) 10/18/24 1010   Pulse 83 10/18/24 1010   Resp 23 10/18/24 1010   SpO2 100 % 10/18/24 1010       Anesthesia Post Evaluation    Patient location during evaluation: PACU  Patient participation: complete - patient participated  Level of consciousness: awake and alert  Pain management: adequate  Airway patency: patent  Cardiovascular status: acceptable  Respiratory status: acceptable  Hydration status: acceptable  Postoperative Nausea and Vomiting: none        No notable events documented.

## 2024-10-18 NOTE — CARE PLAN
Problem: Pain - Adult  Goal: Verbalizes/displays adequate comfort level or baseline comfort level  Outcome: Progressing     Problem: Safety - Adult  Goal: Free from fall injury  Outcome: Progressing     Problem: Fall/Injury  Goal: Not fall by end of shift  Outcome: Progressing  Goal: Be free from injury by end of the shift  Outcome: Progressing  Goal: Verbalize understanding of personal risk factors for fall in the hospital  Outcome: Progressing  Goal: Verbalize understanding of risk factor reduction measures to prevent injury from fall in the home  Outcome: Progressing  Goal: Use assistive devices by end of the shift  Outcome: Progressing  Goal: Pace activities to prevent fatigue by end of the shift  Outcome: Progressing      The clinical goals for the shift include Pt to remain free of increased o2 thru shift.

## 2024-10-18 NOTE — ANESTHESIA PREPROCEDURE EVALUATION
Patient: Juan Carlos Cr    Procedure Information       Date/Time: 10/18/24 0900    Scheduled providers: Loretta Nunez MD; Armand Villar MD    Procedure: BRONCHOSCOPY    Location: [facility] ([facility]]          74yom with lung cancer, initially presented with SOB/hypoxia. Found to have right lung collapse. Her for interventional bronchoscopy.  Relevant Problems   Cardiac   (+) Abnormal EKG   (+) Atrial fibrillation (Multi)   (+) CAD (coronary artery disease)   (+) Hypertension   (+) Pure hypercholesterolemia   (+) Stented coronary artery      Pulmonary   (+) COPD exacerbation (Multi)   (+) Malignant neoplasm of upper lobe of right lung (Multi)   (+) Primary malignant neoplasm of lung metastatic to other site (Multi)   (+) Pulmonary nodules   (+) Squamous cell lung cancer, unspecified laterality (Multi)      Hematology   (+) Anticoagulant long-term use    1. Poorly visualized anatomical structures due to suboptimal image quality.   2. Left ventricular ejection fraction is moderately decreased, by visual estimate at 35-40%.   3. There is global hypokinesis of the left ventricle with minor regional variations.   4. Left ventricular diastolic filling was indeterminate.   5. There is normal right ventricular global systolic function.   6. The left atrium is mildly dilated.   7. Mild aortic valve regurgitation.    Clinical information reviewed:                   NPO Detail:  No data recorded     Physical Exam    Airway  Mallampati: II  TM distance: >3 FB  Neck ROM: full     Cardiovascular   Rhythm: irregular     Dental   (+) upper dentures, lower dentures     Pulmonary - normal exam     Abdominal            Anesthesia Plan    History of general anesthesia?: yes  History of complications of general anesthesia?: no    ASA 3     general     intravenous induction   Anesthetic plan and risks discussed with patient.

## 2024-10-18 NOTE — PROGRESS NOTES
"Juan Carlos Cr is a 74 y.o. male on day 2 of admission presenting with Shortness of breath.    Subjective   Patient seen post bronchoscopy       Objective   /63   Pulse 77   Temp 36 °C (96.8 °F) (Temporal)   Resp 18   Ht 1.753 m (5' 9\")   Wt 62.1 kg (137 lb)   SpO2 94%   BMI 20.23 kg/m²     General: in NAD  HENT: atraumatic, normocephalic  Respiratory: decreased air entry in R lung, no excessive respiratory effort on NC  Cardio: RRR, no mrg  GI: NT/ND  Extremities: WWP with no edema  Neuro: no gross deficits  Psych: cooperative    Last Recorded Vitals  Blood pressure 108/63, pulse 77, temperature 36 °C (96.8 °F), temperature source Temporal, resp. rate 18, height 1.753 m (5' 9\"), weight 62.1 kg (137 lb), SpO2 94%.  Intake/Output last 3 Shifts:  I/O last 3 completed shifts:  In: 0 (0 mL/kg)   Out: 1000 (16 mL/kg) [Urine:1000 (0.4 mL/kg/hr)]  Weight: 62.3 kg     Relevant Results  Thoracentesis    Result Date: 10/18/2024  There was minimal fluid present when assessing the right pleural effusion. Not amenable for thoracentesis.     XR chest 1 view    Result Date: 10/18/2024  Interpreted By:  Xavi Hankins and Gupta Jayesh STUDY: XR CHEST 1 VIEW;  10/18/2024 10:43 am   INDICATION: Signs/Symptoms:s/p right mainstem stent.   COMPARISON: Comparison radiograph 10/16/2024. CT chest 10/16/2024.   ACCESSION NUMBER(S): [id number]   ORDERING CLINICIAN: JODI CASEY   TECHNIQUE: AP radiograph of the chest was provided.   FINDINGS: Medical device: Interval placement of a stent projecting over the right mainstem bronchus. Right chest wall MediPort with distal tip overlying the lower SVC.   CARDIOMEDIASTINAL SILHOUETTE: Cardiomediastinal silhouette is stable in size and configuration, however with slightly improved visualization of the right heart border.   LUNGS: There has been interval improvement in aeration within the right lower lung with persistent moderate-to-large right-sided pleural effusion and right " upper lung consolidation. There are similar peripheral interstitial markings within the left lung. No focal consolidation within the left lung. No evidence of pneumothorax.   ABDOMEN: No upper abdominal findings.   BONES: No osseous changes.       Status post right mainstem stent with improvement in right lower lung aeration.   Persistent right upper lung opacification in the setting of the known paramediastinal lung mass with moderate-to-large right-sided pleural effusion.   I personally reviewed the images/study and I agree with the findings as stated by Renato García MD. This study was interpreted at [facility], [place], [place].   MACRO: None   Signed by: Xavi Hankins 10/18/2024 11:39 AM Dictation workstation:   [id number]    Bronchoscopy Tier 2; Therapeutic, w Stent Placement    Result Date: 10/18/2024  Table formatting from the original result was not included. Bronchoscopy Report [facility] Location: [facility] procedure room 8 INDICATION: Bronchial stenosis BRONCHOSCOPIST: Loretta Nunez MD First Assistant: Pallavi Sharma, MD REFERRING:  MD Gifty Mckeon MD Matthew B Pawlicki, MD FINDINGS: After obtaining informed consent and performing a time out, the patient was anesthetized and an LMA was placed by anesthesia.  The flexible bronchoscope was then introduced through the advanced airway, and an airway examination was performed. The LMA is in good position.  The trachea is of normal caliber. The haydee is sharp. The tracheobronchial tree of the left lung was examined to at least the first subsegmental level.  Bronchial anatomy is normal of the left; there are no endobronchial lesions, and no secretions. On the right, there was endobronchial tumor obstructing 100% of the right mainstem lumen. After the patient was properly anesthetized, the patient was positioned with a shoulder roll and head support.  Next, lip and  teeth / gum protection was assured.  The patient was then intubated with a 12 mm OD rigid bronchoscope.  The airway was secured with ease.  The mouth was then packed with wet Kerlix to provide a good seal for adequate positive pressure ventilation.  The rigid telescope was then withdrawn and replaced with the flexible bronchoscope for a complete airway examination. Given 100% occlusion of the right mainstem, debulking was initiated. After decreasing the FIO2 to achieve an end-tidal O2 <40%, argon plasma coagulation was first performed in the Right mainstem bronchus for ablation of surface vascularity of the endobronchial mass.  The argon flow rate was 0.4 L/min, and the power setting was 30 shah.  After application of APC, the surface of the endobronchial tumor achieved hemostasis.  There were no complications. Then, cryotherapy was performed in the Right mainstem bronchus for debulking of the tumor.  After application of cryotherapy, the airway lumen was improved though there was still significant obstruction of the bronchus intermedius lumen of 80%.  With manual debulking, there was a visible bronchus intermedius lumen. When this was followed, the distal BI, right middle lobe, and right lower lobe was visible. The right upper lobe remains obliterated by tumor. Then, a guidewire was loaded with a 10mm x 40mm Bonastent. With forceps through the flexible bronchoscope, the guidewire was introduced into the right lower lobe for appropriate positioning of the stent. With direct visualization, the stent was deployed into the right mainstem. The right middle lobe and right lower lobe was visualized at the distal end of the stent. Balloon dilation was performed in the Bronchus intermedius stent using a 3 cm length 8-9-10 mm diameter CRE balloon.  The balloon was inflated up to 9 latasha proximally and 5 latasha distally for 1 minute(s) a total of 3 times.  After dilation, the airway lumen was improved. The lumen of the right main  was completely open after these interventions. The rigid bronchoscope was then removed and replaced with an LMA by anesthesia. Final airway inspection was performed through the LMA and revealed the stent was in good position. COMPLICATIONS: None ESTIMATED BLOOD LOSS:  Minimal, < 5 mL The physical status of the patient was re-assessed after the procedure.  The patient was transported to the recovery area / PACU in stable condition.      The right mainstem was 100% occluded by endobronchial tumor Rigid debulking was performed with APC and cryotherapy with some improvement of the airway lumen A 10mm x 40mm Bonastent was placed in the right mainstem Balloon dilation was performed in the stent to improve the right mainstem lumen with no residual obstruction The distal bronchus intermedius, right middle lobe, and right lower lobe was patent after stent placement The right upper lobe is obliterated by tumor RECOMMENDATION: The patient will be returned to the medical floor for ongoing care. Follow-up with referring physician. Repeat bronchoscopy in 4 week(s). On homegoing, patient will need albuterol nebulizer followed by 3% saline nebulization therapy 3 times per day while the stent is in place Staff Staff Role Loretta Nunez MD Proceduralist Events Procedure Events Event Event Time ENDO SCOPE IN TIME 10/18/2024  8:43 AM ENDO SCOPE OUT TIME 10/18/2024 10:01 AM Specimens ID Type Source Tests Collected by Time 1 : CRYO , R MAINSTEM ENDOBRONCHIAL BX MASS Tissue LUNG BIOPSY RIGHT (SPECIFY SITE) SURGICAL PATHOLOGY EXAM Loretta Nunez MD 10/18/2024 0951 Procedure Location [facility] [address] [facility] [id number] [phone]       Assessment/Plan   74 y.o. male with PMH COPD on 2L NC at baseline, CAD (s/p distant PCI 27 years ago), HFrEF (LVEF 35-40% 8/2024), Afib on Eliquis, HTN, HLD, and newly diagnosed stage IIIB NSCLC (squamous cell) of the RUL who presented with worsening  dyspnea, found to have complete R lung collapse.  Previously had a stent in the ARSLAN/BI which was removed several weeks ago (9/25) due to complete occlusion.  On removal was noted to have only 25% airway compromise and therefore a stent was not redeployed.  Now with recurrent collapse s/p repeat bronchoscopy on 10/18 with [id number] Bonastent placed in the right mainstem/BI.     #R mainstem squamous cell with complete lung collapse  #Acute on chronic hypoxemic respiratory failure     Recommendations:  -Underwent therapeutic bronchoscopy with placement of a [id number] Bonastent in right mainstem/BI  -Ok to restart eliquis 10/18 PM per pulmonary perspective  -Please start nebulized albuterol followed by hypertonic saline TID, patient will also need this on discharge  -Please obtain an XR tomography tomorrow 10/19     Patient seen, examined, and discussed with Dr. Milian.  Pulmonary will follow.    Marisa Vázquez MD

## 2024-10-18 NOTE — ANESTHESIA PROCEDURE NOTES
Peripheral IV  Date/Time: 10/18/2024 8:49 AM  Inserted by: HERIBERTO Scott    Placement  Needle size: 16 G  Laterality: right  Location: wrist  Site prep: alcohol  Attempts: 1

## 2024-10-18 NOTE — CONSULTS
"Nutrition Initial Assessment:   Nutrition Assessment    The patient is a 74 y.o. male who is hospital day #2.  Pt admitted with SOB and hypoxia. Found to have progression of disease and R lung collapse. Pt is s/p bronch w/ stent placement on 10/18.     PMHx: COPD, chronic hypoxic resp failure on home 2L O2, and T4N2M0 squamous cell lung cancer of the RUL diagnosed 8/2024 on cycle 1 concurrent chemoradiation    Reason for Assessment: Admission nursing screening  Malnutrition Screening Tool (MST)  Have you recently lost weight without trying?: Yes  If yes, how much weight have you lost?: Lost 24 - 33 pounds  Weight Loss Score: 3  Have you been eating poorly because of a decreased appetite?: No  Malnutrition Score: 3      Nutrition History:  Food and Nutrient History: Pt reports good appetite and eating at his baseline. Says he ate good yesterday: 100% of soup, BBQ chicken, mashed potatoes, jello = 600 kcal and 25g protein. Pt reports at home he eats 3 meals a day. Whatever his wife cooks him. Breakfast could be cereal, oatmeal, eggs, or waffle. Lunch is usually soup. States he is eating his regular portion sizes. 3 weeks ago started drinking protein shakes which family member got for him. Unsure which brand are they but states they are low sugar. Has been drinking 1 per day. Also drinking plenty of water. Likes coffee. No N/V/D. Endorses constipation. No sophy allergies.  Vitamin/Herbal Supplement Use: vitamin C per pt      Anthropometrics:  Start of admission anthropometrics:  Height: 175.3 cm (5' 9\")  Weight: 62.3 kg (137 lb 6.4 oz)  BMI (Calculated): 20.28    IBW/kg (Dietitian Calculated): 72.73 kg  Percent of IBW: 85 %       Wt Hx   10/18/24 62.1 kg (137 lb)   10/16/24 63.8 kg (140 lb 10.5 oz)   10/09/24 63.1 kg (139 lb 1.8 oz)   09/19/24 66.7 kg (147 lb 0.8 oz)   08/26/24 74.7 kg (164 lb 10.9 oz)   09/30/22 80.2 kg (176 lb 12.8 oz)   09/24/21 80.5 kg (177 lb 6.4 oz)   09/25/20 80.8 kg (178 lb 3.2 oz)     Weight " Change %:  Weight History / % Weight Change: Pt reports a UBW of 170# and that he is now 137# - thinks this wt loss occured in the past couple of weeks. EMR wt hx shows 1.5 wt loss x1 week; 7% wt loss x1 month; and 16% wt loss x2 months.  Significant Weight Loss: Yes    Nutrition Focused Physical Exam Findings:    Subcutaneous Fat Loss:   Orbital Fat Pads: Mild-Moderate (slight dark circles and slight hollowing)  Buccal Fat Pads: Severe (hollow, sunken and narrow face)  Triceps: Severe (negligible fat tissue)  Muscle Wasting:  Temporalis: Severe (hollowed scooping depression)  Pectoralis (Clavicular Region): Severe (protruding prominent clavicle)  Deltoid/Trapezius: Severe (squared shoulders, acromion process prominent)  Interosseous: Severe (depressed area between thumb and forefinger)  Quadriceps: Severe (depressions on inner and outer thigh)  Gastrocnemius: Severe (minimal muscle definition)  Edema:  Edema: none  Physical Findings:  Hair: Positive  Eyes: Negative  Mouth: Negative  Nails: Negative  Skin: Negative    Objective Data:    Last BM Date: 10/15/24    Nutrition Significant Labs:    Results from last 7 days   Lab Units 10/17/24  0831 10/16/24  1031   HEMOGLOBIN g/dL 11.0* 11.6*   MCV fL 93 94   GLUCOSE mg/dL 122* 113*   POTASSIUM mmol/L 4.3 5.1   SODIUM mmol/L 135* 135*   PHOSPHORUS mg/dL 2.9  --    MAGNESIUM mg/dL 1.88  --    CREATININE mg/dL 0.55 0.66   BUN mg/dL 13 18   ALT U/L  --  7*   AST U/L  --  13   ALK PHOS U/L  --  56       Nutrition Specific Medications:  ascorbic acid, 500 mg, oral, Daily  cholecalciferol, 800 Units, oral, Daily  folic acid, 1 mg, oral, Daily  pantoprazole, 40 mg, oral, Daily  polyethylene glycol, 17 g, oral, Daily  simvastatin, 20 mg, oral, Daily  thiamine, 100 mg, oral, Daily    I/O:   I/O last 2 completed shifts:  In: 0 (0 mL/kg)   Out: 1000 (16 mL/kg) [Urine:1000 (0.7 mL/kg/hr)]  Weight: 62.3 kg     Dietary Orders (From admission, onward)       Start     Ordered     10/18/24 1135  Adult diet Regular  Diet effective now        Question:  Diet type  Answer:  Regular    10/18/24 1134    10/17/24 0336  May Participate in Room Service  ( ROOM SERVICE MAY PARTICIPATE)  Once        Question:  .  Answer:  Yes    10/17/24 0335                     Estimated Needs:   Total Energy Estimated Needs (kCal): 1736 kCal  Method for Estimating Needs: 28 kcal/kg ABW    Total Protein Estimated Needs (g): 80.73 g  Method for Estimating Needs: 1.3 g/kg ABW    Method for Estimating Needs: 1 ml/kcal or per team          Nutrition Diagnosis   Malnutrition Diagnosis  Patient has Malnutrition Diagnosis: Yes  Diagnosis Status: New  Malnutrition Diagnosis: Severe malnutrition related to chronic disease or condition  As Evidenced by: significant wt loss of 1.5% x1week; 7% x1 month; 16% x2 months and moderate to severe adipose tissue and fat loss.  Additional Assessment Information: PO intake despite of being at baseline appears to be inadequate. Will need more info from intake during admission to determine.            Nutrition Interventions/Recommendations   Individualized Nutrition Prescription Provided for : 1750 kcal and 80g protein via oral diet    Medical Food Supplement: Commercial beverage  Goal: Ensure Plus (350 kcal, 13g protein) BID  Vitamin Supplementation  Consider starting pt on daily MVI and discontinuing individual vitamin supplementation (vitamin C, vit D, thiamine, folic acid).               Nutrition Monitoring and Evaluation   Monitoring and Evaluation Plan: Energy intake  Energy Intake: Estimated energy intake  Criteria: >50% of nutr needs    Monitoring and Evaluation Plan: Weight  Weight: Weight change  Criteria: no wt change                   Time Spent (min): 45 minutes

## 2024-10-18 NOTE — H&P
History Of Present Illness  74M PMH COPD on 2L, CAD, HFrEF (35-40% 8/2024), Afib on eliquis, HTN, DLD, and newly diagnosed stage IIIB NSCLC (squamous cell) of the RUL c/f R airway occlusion. He did have a Bonastent in place from 8/29-9/25 and then thoracentesis 9/25. Stent was removed due to complete occlusion and with removal there was only 25% occlusion of the BI. He then had increased SOB and presented to the ED on 10/16 where he was found to have complete occlusion of his R main. Plan is for therapeutic bronchoscopy and R thoracentesis today     Past Medical History  Past Medical History:   Diagnosis Date    Acute myocardial infarction, unspecified 05/17/2013    Acute myocardial infarction    Atrial fib/flutter, transient (Multi)     COPD (chronic obstructive pulmonary disease) (Multi)     Coronary artery disease     Diabetes mellitus (Multi)     Encounter for screening for malignant neoplasm of prostate 09/02/2015    Encounter for prostate cancer screening    GERD (gastroesophageal reflux disease)     Hyperlipidemia     Hypertension     Lung cancer (Multi)     Personal history of other diseases of the nervous system and sense organs 09/13/2017    History of cataract       Surgical History  Past Surgical History:   Procedure Laterality Date    CATARACT EXTRACTION  09/14/2018    Cataract Surgery    CATARACT EXTRACTION  06/27/2014    Cataract Surgery    CORONARY ANGIOPLASTY WITH STENT PLACEMENT  05/17/2013    Cath Stent Placement        Social History  He reports that he has quit smoking. His smoking use included cigarettes. He has been exposed to tobacco smoke. He has never used smokeless tobacco. He reports that he does not currently use alcohol. He reports that he does not use drugs.    Family History  Family History   Problem Relation Name Age of Onset    Heart attack Mother      Lung cancer Brother          Allergies  Patient has no known allergies.    Review of Systems   Constitutional:  Negative for chills,  fever and unexpected weight change.   HENT:  Negative for congestion, drooling, postnasal drip, trouble swallowing and voice change.    Respiratory:  Positive for cough and shortness of breath. Negative for chest tightness and wheezing.    Cardiovascular:  Negative for chest pain, palpitations and leg swelling.   Gastrointestinal:  Negative for abdominal pain, blood in stool, constipation, diarrhea, nausea and vomiting.   Endocrine: Negative for cold intolerance, heat intolerance, polydipsia and polyuria.   Genitourinary:  Negative for difficulty urinating, dysuria, flank pain and hematuria.   Musculoskeletal:  Negative for back pain, joint swelling, neck pain and neck stiffness.   Skin:  Negative for rash and wound.   Neurological:  Negative for dizziness, seizures, syncope, weakness and numbness.   Hematological:  Does not bruise/bleed easily.        Physical Exam  Constitutional:       Appearance: He is not ill-appearing.   Cardiovascular:      Rate and Rhythm: Normal rate and regular rhythm.      Pulses: Normal pulses.      Heart sounds: Normal heart sounds. No murmur heard.  Pulmonary:      Effort: Pulmonary effort is normal. No respiratory distress.      Breath sounds: No wheezing or rhonchi.      Comments: Diminished on R  Chest:      Chest wall: No tenderness.   Abdominal:      General: Abdomen is flat. Bowel sounds are normal. There is no distension.      Palpations: Abdomen is soft.      Tenderness: There is no abdominal tenderness. There is no guarding or rebound.   Musculoskeletal:         General: No swelling or tenderness.      Right lower leg: No edema.      Left lower leg: No edema.   Skin:     General: Skin is warm and dry.   Neurological:      General: No focal deficit present.      Mental Status: He is alert and oriented to person, place, and time. Mental status is at baseline.          Last Recorded Vitals  Blood pressure 117/69, pulse 95, temperature 36.5 °C (97.7 °F), temperature source  "Temporal, resp. rate 18, height 1.753 m (5' 9\"), weight 62.3 kg (137 lb 6.4 oz), SpO2 95%.    Relevant Results  Bronch 9/25/24   SUMMARY:   - Inspection of RIGHT bronchial tree to the segmental level appeared abnormal: completely occluded ARSLAN Bonastent. Inspection of LEFT bronchial tree to the segmental level appeared normal.   - Scant thin secretions were present in the bronchus intermedius, once the stent was removed. These were suctioned out with ease.  - Rigid bronchoscopy was performed. The stent was removed (foreign body removal).  - Airway examination after stent removal revealed a completely occluded RUL (no change) and only mild stenosis of the ARSLAN and BI (25% luminal compromise). Because the airway stenosis was mild, I did not place another stent.     Assessment/Plan   74M PMH COPD on 2L, CAD, HFrEF (35-40% 8/2024), Afib on eliquis, HTN, DLD, and newly diagnosed stage IIIB NSCLC (squamous cell) of the RUL with R BI occlusion, s/p Bonastent removal on 9/25, presenting for repeat therapeutic bronchoscopy with possible stent placement and thoracentesis due to complete R lung collapse    #complete R lung collapse  -plan for therapeutic bronchoscopy, possible rigid, possible stent placement today  -also will perform R thoracentesis if pocket is amenable     Pallavi Sharma, MD    "

## 2024-10-18 NOTE — ANESTHESIA PROCEDURE NOTES
Airway  Date/Time: 10/18/2024 8:39 AM  Urgency: elective      Staffing  Performed: HERIBERTO   Authorized by: Armand Villar MD    Performed by: HERIBERTO Scott  Patient location during procedure: OR    Indications and Patient Condition  Indications for airway management: anesthesia  Spontaneous Ventilation: absent  Sedation level: deep  Preoxygenated: yes  Mask difficulty assessment: 1 - vent by mask    Final Airway Details  Final airway type: supraglottic airway      Successful airway: Size 4     Number of attempts at approach: 1

## 2024-10-18 NOTE — DISCHARGE INSTRUCTIONS
Dear Mr. Cr,     You were admitted to [facility] on 10/16 after you were found to have collapse of your right lung. We believe this was caused by obstruction of the main airway in your right lung by your lung tumor. You were evaluated by our lung specialists, who were able to place a stent to re-open the airway. They have asked that you use the nebulized albuterol inhaler first followed by hypertonic saline three times a day. They plan to follow-up with you in 2 weeks. After that, you will need a bronchoscopy in 4 to 6 weeks. You must continue using the breathing treatments until you are told otherwise.     You may run out of breathing treatments prior to the bronchoscopy. You have prescriptions for the medications at [facility] after one month. They should be available at that time. If you do not have them, please ASK the pulmonologist at your 2 week visit to prescribe you more medication. This is very important. If you do not hear from them regarding scheduling an appointment in 1 week, please call them to schedule. You should be seen at the pulmonary fellows clinic. This is very important. You do not want to miss the appointment.     You were also seen by our radiation oncologists, who did a re-simulation scan after the stent was placed. They will use these new images to plan your radiation treatment going forward. You can anticipate a call from them early next to get you re-scheduled for radiation treatment at [place].     You are scheduled to follow-up with  your oncologist, Dr. Landis on 10/23/24.     Please continue taking all home medications as you were prior to your hospitalization.     Please do not hesitate to reach out with any further questions or concerns. It was a pleasure taking care of you!    Sincerely,   Your  Team    [facility] Interventional Pulmonology    The anesthetics, sedatives or narcotics which were given to you today will be acting  in your body for the next 24 hours, so you might feel a little sleepy or groggy. This feeling should slowly wear off.   Carefully read and follow the instructions below:   You received sedation today.   Do not drive or operate machinery or power tools of any kind.   No alcoholic beverages today, not even beer or wine.   No over the counter medications that contain alcohol or may cause drowsiness.   Do not make important decisions or sign legal documents.     Do not use Aspirin containing products or non-steroidal medications for the next 24 hours.  (Examples of these types of medications include: Advil, Aleve, Ecotrin, Ibuprofen, Motrin or Naprosyn.  This list is not all-inclusive.  Check with your physician or pharmacist before resuming these medications.)  Tylenol, cough medicine, cough drops or throat lozenges may be used when you are allowed to resume eating and drinking.     Call your physician if any of these symptoms occur:   High fever over 101 degrees or chills (a low grade fever is common for 24 hours)   Rash or hives   Persistent nausea or vomiting   Inability to urinate within 8 hours after the procedure  Go directly to the emergency room if you notice any of the following:   Shortness of breath   Chest pain    Coughing up large amounts of bright red blood greater than a teaspoonful of blood clots (about a teaspoonful for the next 24-48 hours is normal, especially if you had a biopsy)    Resume all normal medications unless directed otherwise by your doctor.     Your doctor recommends these additional instructions:    Follow up with your referring physician as previously scheduled.    If you experience any problems or have any questions following discharge, please call:   Before 5 pm: [phone]   After 5pm and on weekends: [phone] / [phone] and ask for the Pulmonary Fellow on-call (Pager Number: [phone])

## 2024-10-18 NOTE — CARE PLAN
The patient's goals for the shift include      The clinical goals for the shift include Pt to remain free of increased o2 demands thru shift.    Problem: Pain - Adult  Goal: Verbalizes/displays adequate comfort level or baseline comfort level  Outcome: Progressing     Problem: Discharge Planning  Goal: Discharge to home or other facility with appropriate resources  Outcome: Progressing     Problem: Fall/Injury  Goal: Not fall by end of shift  Outcome: Progressing  Goal: Be free from injury by end of the shift  Outcome: Progressing  Goal: Verbalize understanding of personal risk factors for fall in the hospital  Outcome: Progressing  Goal: Verbalize understanding of risk factor reduction measures to prevent injury from fall in the home  Outcome: Progressing  Goal: Use assistive devices by end of the shift  Outcome: Progressing  Goal: Pace activities to prevent fatigue by end of the shift  Outcome: Progressing     Problem: Respiratory  Goal: Clear secretions with interventions this shift  Outcome: Progressing  Goal: Minimize anxiety/maximize coping throughout shift  Outcome: Progressing  Goal: Minimal/no exertional discomfort or dyspnea this shift  Outcome: Progressing  Goal: No signs of respiratory distress (eg. Use of accessory muscles. Peds grunting)  Outcome: Progressing  Goal: Patent airway maintained this shift  Outcome: Progressing  Goal: Tolerate mechanical ventilation evidenced by VS/agitation level this shift  Outcome: Progressing  Goal: Tolerate pulmonary toileting this shift  Outcome: Progressing  Goal: Verbalize decreased shortness of breath this shift  Outcome: Progressing  Goal: Wean oxygen to maintain O2 saturation per order/standard this shift  Outcome: Progressing  Goal: Increase self care and/or family involvement in next 24 hours  Outcome: Progressing

## 2024-10-18 NOTE — TREATMENT PLAN
The patient has completed successful stenting of the ARSLAN and BI.    His breathing has improved.    We will obtain an updated CT simulation for radiotherapy planning.  The patient is free to be discharged, with plans to reinitiate CRT at  [place].  He will be contacted with further details pertaining to treatment schedules with the re-plan has been completed.

## 2024-10-18 NOTE — PROGRESS NOTES
"  MEDICINE INPATIENT PROGRESS NOTE    Juan Carlos Cr is a 74 y.o. male on hospital day 2    Subjective   No acute events overnight. He denies any new changes or symptoms. He is scheduled for first bronchoscopy case this AM with interventional pulm. He has been NPO since midnight.        Objective     Last Recorded Vitals  Blood pressure (!) 120/99, pulse 93, temperature 36.8 °C (98.2 °F), temperature source Temporal, resp. rate 18, height 1.753 m (5' 9\"), weight 62.1 kg (137 lb), SpO2 95%.    Vital Trends Last 24hrs  Temp:  [36.3 °C (97.3 °F)-37.2 °C (99 °F)] 36.8 °C (98.2 °F)  Heart Rate:  [78-99] 93  Resp:  [16-18] 18  BP: (107-122)/(59-99) 120/99     Intake/Output last 3 Shifts:  I/O last 3 completed shifts:  In: 0 (0 mL/kg)   Out: 1000 (16 mL/kg) [Urine:1000 (0.4 mL/kg/hr)]  Weight: 62.3 kg     Physical Exam    General: Alert and interactive, in no acute distress, on 2L NC. Cachectic  HEENT: NC/AT, EOMI, no conjunctival icterus, mucosa   Cardiovascular: Regular rate & rhythm, no murmurs  Pulmonary: Right lung breath sounds absent. Left lung without wheezes, rales, or rhonchi. On 2L NC  GI/Abd: Normoactive bowel sounds. Scaphoid abdomen.   Extremities: No peripheral edema  Skin: No jaundice, rashes. No wounds  Neuro: Alert and oriented x3. No focal deficit.   Psych: Appropriate mood and affect      Relevant Results  Results from last 7 days   Lab Units 10/17/24  0831 10/16/24  1031   WBC AUTO x10*3/uL 7.0 7.6   HEMOGLOBIN g/dL 11.0* 11.6*   HEMATOCRIT % 33.5* 35.7*   PLATELETS AUTO x10*3/uL 178 217     Results from last 7 days   Lab Units 10/17/24  0831 10/16/24  1031   SODIUM mmol/L 135* 135*   POTASSIUM mmol/L 4.3 5.1   CO2 mmol/L 24 24   ANION GAP mmol/L 13 15   BUN mg/dL 13 18   CREATININE mg/dL 0.55 0.66   GLUCOSE mg/dL 122* 113*   EGFR mL/min/1.73m*2 >90 >90   MAGNESIUM mg/dL 1.88  --    PHOSPHORUS mg/dL 2.9  --       Results from last 7 days   Lab Units 10/16/24  1031   ALT U/L 7*   AST U/L 13   ALK PHOS " "U/L 56            No lab exists for component: \"PT\"  Results from last 7 days   Lab Units 10/16/24  1031   FIO2 % 36     Results from last 7 days   Lab Units 10/16/24  1031   POCT PH, VENOUS pH 7.38   POCT PCO2, VENOUS mm Hg 46           Lab Results   Component Value Date    BLOODCULT No growth at 4 days -  FINAL REPORT 09/23/2024    BLOODCULT No growth at 4 days -  FINAL REPORT 09/23/2024        Medications    ascorbic acid, 500 mg, oral, Daily  aspirin, 81 mg, oral, Daily  cholecalciferol, 800 Units, oral, Daily  fluticasone furoate-vilanteroL, 1 puff, inhalation, Daily  folic acid, 1 mg, oral, Daily  insulin lispro, 0-5 Units, subcutaneous, TID  metoprolol tartrate, 50 mg, oral, q12h  nicotine, 1 patch, transdermal, Daily  pantoprazole, 40 mg, oral, Daily  polyethylene glycol, 17 g, oral, Daily  simvastatin, 20 mg, oral, Daily  thiamine, 100 mg, oral, Daily  tiotropium, 2 puff, inhalation, Daily        oxygen, 2 L/min        PRN medications: dextrose, dextrose, glucagon, glucagon, ipratropium-albuteroL           Assessment/Plan   Juan Carlos Cr is a 74 year old male with hx of COPD, chronic hypoxic resp failure on home 2L O2, and T4N2M0 squamous cell lung cancer of the RUL diagnosed 8/2024 on cycle 1 concurrent chemoradiation who is admitted from his radiation oncology appointment with SOB and hypoxia, found to have disease progression and right lung collapse. Pt s/p EBUS with debulking and stent placement 8/29, followed by repeat bronch 9/25 for mucus plug and stent removal. Pt currently stable on home 2L. S/p bronchoscopy on 10/18 with stent placed in right mainstem bronchus.     Updates 10/18/24:  -pt s/p bronchoscopy this morning with stent placed in right mainstem  -Per Pulm recs, will need TID 3% saline and albuterol treatments  -Will need discharge with nebulizer to continue bronchopulmonary hygiene, and will need repeat bronch in 4-6 weeks     #Acute on chronic hypoxic respiratory failure  #Stage IIIb " T4N2M0 Squamous cell lung cancer  #Right lung collapse 2/2 mainstem compression  AHRF secondary to collapse of the right lung with RUL mass progression, currently undergoing weekly carbo/taxol and RT as of 10/2/24  :: Pt presented to Rad Onc session on 10/15 with worsening hypoxia and cone beam CT showing collapse of right lung due to mainstem bronchus involvement  :: s/p bronchoscopy and EBUS with debulking and stent placement 8/29, and bronchoscopy with stent removal 9/25/24  :: PET 9/18/24 showing hypermetabolic right hilar mass with RLL satellite lesion and hypermetabolic right paratracheal lymph nodes  :: Pt took last dose of Eliquis 10/16 AM  :: s/p bronchoscopy on 10/18 with right mainstem stent placed  Plan:  -TID 3% saline and albuterol nebs  -Resumed home Eliquis after bronchoscopy     #COPD  -continue home spiriva and Breo  -Duo-Nebs q6h prn     #Atrial fibrillation  #HFpEF  #HTN  -Eliquis last dose was 10/16 AM  -Continue home metoprolol, ASA, simvastatin  -Amlodipine held d/t relative hypotension         F : PRN  E: PRN  N: regular diet  GI prophylaxis: PPI  DVT prophylaxis: eliquis on hold, bridging to heparin gtt    Code Status: Full Code (confirmed on admission)   NOK: Cindy Cr (spouse) [phone]       Thanh Odonnell MD  Internal Medicine, PGY-1

## 2024-10-18 NOTE — SIGNIFICANT EVENT
Repeat Bronchoscopy completed today. 10 x 40 mm Bonastent placed in the right mainstem. Patient needs to be on albuterol followed by 3% hypertonic saline TID. He needs to be discharged with these medications. He will need to verify that he has nebulizer machine at home as well.    We will set him up with repeat bronchoscopy in 4 to 6 weeks.       Loretta Nunez MD  Interventional Pulmonology

## 2024-10-19 ENCOUNTER — APPOINTMENT (OUTPATIENT)
Dept: RADIOLOGY | Facility: HOSPITAL | Age: 74
DRG: 163 | End: 2024-10-19
Payer: MEDICARE

## 2024-10-19 ENCOUNTER — PHARMACY VISIT (OUTPATIENT)
Dept: PHARMACY | Facility: CLINIC | Age: 74
End: 2024-10-19
Payer: COMMERCIAL

## 2024-10-19 VITALS
BODY MASS INDEX: 20.29 KG/M2 | DIASTOLIC BLOOD PRESSURE: 59 MMHG | OXYGEN SATURATION: 92 % | SYSTOLIC BLOOD PRESSURE: 107 MMHG | TEMPERATURE: 97.7 F | WEIGHT: 137 LBS | HEART RATE: 89 BPM | HEIGHT: 69 IN | RESPIRATION RATE: 18 BRPM

## 2024-10-19 LAB
ALBUMIN SERPL BCP-MCNC: 2.9 G/DL (ref 3.4–5)
ANION GAP SERPL CALC-SCNC: 11 MMOL/L (ref 10–20)
BUN SERPL-MCNC: 13 MG/DL (ref 6–23)
CALCIUM SERPL-MCNC: 8.6 MG/DL (ref 8.6–10.6)
CHLORIDE SERPL-SCNC: 101 MMOL/L (ref 98–107)
CO2 SERPL-SCNC: 26 MMOL/L (ref 21–32)
CREAT SERPL-MCNC: 0.5 MG/DL (ref 0.5–1.3)
EGFRCR SERPLBLD CKD-EPI 2021: >90 ML/MIN/1.73M*2
ERYTHROCYTE [DISTWIDTH] IN BLOOD BY AUTOMATED COUNT: 13 % (ref 11.5–14.5)
GLUCOSE BLD MANUAL STRIP-MCNC: 137 MG/DL (ref 74–99)
GLUCOSE BLD MANUAL STRIP-MCNC: 158 MG/DL (ref 74–99)
GLUCOSE SERPL-MCNC: 140 MG/DL (ref 74–99)
HCT VFR BLD AUTO: 28.5 % (ref 41–52)
HGB BLD-MCNC: 9.5 G/DL (ref 13.5–17.5)
MAGNESIUM SERPL-MCNC: 1.94 MG/DL (ref 1.6–2.4)
MCH RBC QN AUTO: 30.6 PG (ref 26–34)
MCHC RBC AUTO-ENTMCNC: 33.3 G/DL (ref 32–36)
MCV RBC AUTO: 92 FL (ref 80–100)
NRBC BLD-RTO: 0 /100 WBCS (ref 0–0)
PHOSPHATE SERPL-MCNC: 2.7 MG/DL (ref 2.5–4.9)
PLATELET # BLD AUTO: 149 X10*3/UL (ref 150–450)
POTASSIUM SERPL-SCNC: 4.3 MMOL/L (ref 3.5–5.3)
RBC # BLD AUTO: 3.1 X10*6/UL (ref 4.5–5.9)
SODIUM SERPL-SCNC: 134 MMOL/L (ref 136–145)
WBC # BLD AUTO: 7.1 X10*3/UL (ref 4.4–11.3)

## 2024-10-19 PROCEDURE — 80069 RENAL FUNCTION PANEL: CPT

## 2024-10-19 PROCEDURE — RXMED WILLOW AMBULATORY MEDICATION CHARGE

## 2024-10-19 PROCEDURE — 85027 COMPLETE CBC AUTOMATED: CPT

## 2024-10-19 PROCEDURE — 2500000001 HC RX 250 WO HCPCS SELF ADMINISTERED DRUGS (ALT 637 FOR MEDICARE OP)

## 2024-10-19 PROCEDURE — S4991 NICOTINE PATCH NONLEGEND: HCPCS

## 2024-10-19 PROCEDURE — 82947 ASSAY GLUCOSE BLOOD QUANT: CPT

## 2024-10-19 PROCEDURE — 94640 AIRWAY INHALATION TREATMENT: CPT

## 2024-10-19 PROCEDURE — 99239 HOSP IP/OBS DSCHRG MGMT >30: CPT

## 2024-10-19 PROCEDURE — 2500000004 HC RX 250 GENERAL PHARMACY W/ HCPCS (ALT 636 FOR OP/ED)

## 2024-10-19 PROCEDURE — 2500000002 HC RX 250 W HCPCS SELF ADMINISTERED DRUGS (ALT 637 FOR MEDICARE OP, ALT 636 FOR OP/ED)

## 2024-10-19 PROCEDURE — 76100 X-RAY EXAM OF BODY SECTION: CPT | Performed by: RADIOLOGY

## 2024-10-19 PROCEDURE — 83735 ASSAY OF MAGNESIUM: CPT

## 2024-10-19 PROCEDURE — 2500000005 HC RX 250 GENERAL PHARMACY W/O HCPCS: Performed by: STUDENT IN AN ORGANIZED HEALTH CARE EDUCATION/TRAINING PROGRAM

## 2024-10-19 PROCEDURE — 76100 X-RAY EXAM OF BODY SECTION: CPT

## 2024-10-19 RX ORDER — ALBUTEROL SULFATE 0.83 MG/ML
2.5 SOLUTION RESPIRATORY (INHALATION) 3 TIMES DAILY
Qty: 270 ML | Refills: 0 | Status: SHIPPED | OUTPATIENT
Start: 2024-11-17 | End: 2024-12-17

## 2024-10-19 RX ADMIN — APIXABAN 5 MG: 5 TABLET, FILM COATED ORAL at 09:22

## 2024-10-19 RX ADMIN — POLYETHYLENE GLYCOL 3350 17 G: 17 POWDER, FOR SOLUTION ORAL at 09:21

## 2024-10-19 RX ADMIN — ALBUTEROL SULFATE 2.5 MG: 2.5 SOLUTION RESPIRATORY (INHALATION) at 09:30

## 2024-10-19 RX ADMIN — ALBUTEROL SULFATE 2.5 MG: 2.5 SOLUTION RESPIRATORY (INHALATION) at 15:40

## 2024-10-19 RX ADMIN — SODIUM CHLORIDE SOLN NEBU 3% 3 ML: 3 NEBU SOLN at 09:30

## 2024-10-19 RX ADMIN — NICOTINE 1 PATCH: 21 PATCH, EXTENDED RELEASE TRANSDERMAL at 09:21

## 2024-10-19 RX ADMIN — FOLIC ACID 1 MG: 1 TABLET ORAL at 09:21

## 2024-10-19 RX ADMIN — THIAMINE HCL TAB 100 MG 100 MG: 100 TAB at 09:21

## 2024-10-19 RX ADMIN — INSULIN LISPRO 1 UNITS: 100 INJECTION, SOLUTION INTRAVENOUS; SUBCUTANEOUS at 12:30

## 2024-10-19 RX ADMIN — OXYCODONE HYDROCHLORIDE AND ACETAMINOPHEN 500 MG: 500 TABLET ORAL at 09:21

## 2024-10-19 RX ADMIN — ASPIRIN 81 MG: 81 TABLET, COATED ORAL at 09:21

## 2024-10-19 RX ADMIN — TIOTROPIUM BROMIDE INHALATION SPRAY 2 PUFF: 3.12 SPRAY, METERED RESPIRATORY (INHALATION) at 09:33

## 2024-10-19 RX ADMIN — METOPROLOL TARTRATE 50 MG: 50 TABLET, FILM COATED ORAL at 09:21

## 2024-10-19 RX ADMIN — SODIUM CHLORIDE SOLN NEBU 3% 3 ML: 3 NEBU SOLN at 15:40

## 2024-10-19 RX ADMIN — CHOLECALCIFEROL (VITAMIN D3) 10 MCG (400 UNIT) TABLET 20 MCG: at 09:21

## 2024-10-19 RX ADMIN — SIMVASTATIN 20 MG: 20 TABLET, FILM COATED ORAL at 09:22

## 2024-10-19 RX ADMIN — PANTOPRAZOLE SODIUM 40 MG: 40 TABLET, DELAYED RELEASE ORAL at 09:22

## 2024-10-19 ASSESSMENT — COGNITIVE AND FUNCTIONAL STATUS - GENERAL
CLIMB 3 TO 5 STEPS WITH RAILING: A LITTLE
TOILETING: A LITTLE
STANDING UP FROM CHAIR USING ARMS: A LITTLE
WALKING IN HOSPITAL ROOM: A LITTLE
MOBILITY SCORE: 21
DRESSING REGULAR LOWER BODY CLOTHING: A LITTLE
DAILY ACTIVITIY SCORE: 21
HELP NEEDED FOR BATHING: A LITTLE

## 2024-10-19 ASSESSMENT — PAIN - FUNCTIONAL ASSESSMENT: PAIN_FUNCTIONAL_ASSESSMENT: 0-10

## 2024-10-19 ASSESSMENT — PAIN SCALES - GENERAL: PAINLEVEL_OUTOF10: 0 - NO PAIN

## 2024-10-19 NOTE — PROGRESS NOTES
"Juan Carlos Cr is a 74 y.o. male on day 3 of admission presenting with Shortness of breath.    Subjective   Patient feels better today denies any further shortness of breath, coughing, phlegm production       Objective     Physical Exam  Vitals and nursing note reviewed.   Constitutional:       Appearance: Normal appearance. He is normal weight.   HENT:      Head: Normocephalic and atraumatic.      Mouth/Throat:      Mouth: Mucous membranes are moist.      Pharynx: Oropharynx is clear.   Eyes:      Extraocular Movements: Extraocular movements intact.      Conjunctiva/sclera: Conjunctivae normal.      Pupils: Pupils are equal, round, and reactive to light.   Cardiovascular:      Rate and Rhythm: Normal rate and regular rhythm.   Pulmonary:      Breath sounds: Decreased air movement present. Examination of the right-upper field reveals decreased breath sounds. Examination of the right-middle field reveals decreased breath sounds. Decreased breath sounds present.   Abdominal:      General: Abdomen is flat. Bowel sounds are normal.      Palpations: Abdomen is soft.   Musculoskeletal:         General: Normal range of motion.      Cervical back: Normal range of motion and neck supple.   Skin:     General: Skin is warm and dry.      Capillary Refill: Capillary refill takes less than 2 seconds.   Neurological:      General: No focal deficit present.      Mental Status: He is alert and oriented to person, place, and time. Mental status is at baseline.   Psychiatric:         Mood and Affect: Mood normal.         Behavior: Behavior normal.         Thought Content: Thought content normal.         Judgment: Judgment normal.         Last Recorded Vitals  Blood pressure 106/68, pulse 76, temperature 36.6 °C (97.9 °F), temperature source Temporal, resp. rate 18, height 1.753 m (5' 9\"), weight 62.1 kg (137 lb), SpO2 95%.  Intake/Output last 3 Shifts:  I/O last 3 completed shifts:  In: 300 (4.8 mL/kg) [I.V.:300 (4.8 mL/kg)]  Out: " 1400 (22.5 mL/kg) [Urine:1400 (0.6 mL/kg/hr)]  Weight: 62.1 kg     Relevant Results  XR tomography pending     Result Date: 10/18/2024  There was minimal fluid present when assessing the right pleural effusion. Not amenable for thoracentesis.      XR chest 1 view     Result Date: 10/18/2024  Interpreted By:  Xavi Hankins  and Ricky Gross STUDY: XR CHEST 1 VIEW;  10/18/2024 10:43 am   INDICATION: Signs/Symptoms:s/p right mainstem stent.   COMPARISON: Comparison radiograph 10/16/2024. CT chest 10/16/2024.   ACCESSION NUMBER(S): [id number]   ORDERING CLINICIAN: JODI CASEY   TECHNIQUE: AP radiograph of the chest was provided.   FINDINGS: Medical device: Interval placement of a stent projecting over the right mainstem bronchus. Right chest wall MediPort with distal tip overlying the lower SVC.   CARDIOMEDIASTINAL SILHOUETTE: Cardiomediastinal silhouette is stable in size and configuration, however with slightly improved visualization of the right heart border.   LUNGS: There has been interval improvement in aeration within the right lower lung with persistent moderate-to-large right-sided pleural effusion and right upper lung consolidation. There are similar peripheral interstitial markings within the left lung. No focal consolidation within the left lung. No evidence of pneumothorax.   ABDOMEN: No upper abdominal findings.   BONES: No osseous changes.        Status post right mainstem stent with improvement in right lower lung aeration.   Persistent right upper lung opacification in the setting of the known paramediastinal lung mass with moderate-to-large right-sided pleural effusion.   I personally reviewed the images/study and I agree with the findings as stated by Renato García MD. This study was interpreted at [facility], [place], [place].   MACRO: None   Signed by: Xavi Hankins 10/18/2024 11:39 AM Dictation workstation:   [id number]        Assessment/Plan   74 y.o. 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  Previously had a stent in the ARSLAN/BI which was removed several weeks ago (9/25) due to complete occlusion.  On removal was noted to have only 25% airway compromise and therefore a stent was not redeployed.  Now with recurrent collapse s/p repeat bronchoscopy on 10/18 with [id number] Bonastent placed in the right mainstem/BI.     #R mainstem squamous cell with complete lung collapse s/p Bonastent in ARSLAN/BI  #Acute on chronic hypoxemic respiratory failure  # Stage IIIB NSCLC (squamous cell) of the RUL with necrosis     Recommendations:  -  ABH: Continue using nebulized albuterol followed by hypertonic saline TID, after discharge as long as he has the stent until he is advised otherwise.   - Continue Outpatient Triple Therapy, and Rescue Inhaler PRN.   - Follow up with the fellows clinic in two weeks   - Bronchoscopy in 4-6 weeks.     Primary team was updated.     Susan Griffin MD

## 2024-10-19 NOTE — NURSING NOTE
10/19/240 1901:    Pt discharge complete. IVs removed, catheters intact. Discharge instructions printed and reviewed with patient, including use of nebulizer and medication schedule. Pt belongings sent with patient, including meds to beds prescriptions. Vitals stable at time of discharge. Pt wheeled to lobby via wheelchair by transport to discharge to home via private vehicle. No questions or concerns at time of discharge.     A Antwan RN

## 2024-10-19 NOTE — DISCHARGE SUMMARY
Discharge Diagnosis  Shortness of breath    Issues Requiring Follow-Up  [ ] Pulmonology follow-up in 2 weeks, followed by bronchoscopy in 4-6 weeks   [ ] Must continue nebulizers until told otherwise; will likely need a refill   [ ] Results from the bronchoscopy  [ ] Radiation therapy outpatient     Test Results Pending At Discharge  Pending Labs       Order Current Status    Surgical Pathology Exam In process          Hospital Course  Juan Carlos Cr is a 74 year old male with hx of COPD, chronic hypoxic resp failure on home 2L O2, and T4N2M0 squamous cell lung cancer of the RUL diagnosed 8/2024 on cycle 1 concurrent chemoradiation who was admitted from his radiation oncology appointment with SOB and hypoxia, found to have disease progression and right lung collapse. Pt is s/p EBUS with debulking and stent placement 8/29, followed by repeat bronch 9/25 for mucus plug and stent removal. On arrival and on the floor, he was saturating normally on 2L oxygen, which is his baseline. However, pt had significant dyspnea with sitting and any ambulation. Pulmonology was consulted for possible bronchoscopic intervention. Pt's Eliquis was held on admission in anticipation of a bronchoscopy. Inpatient carbo/taxol chemotherapy was deferred while undergoing Pulm workup and intervention. Pt underwent bronchoscopy on 10/18/24, and had tumor debulking with placement of a stent in the right mainstem bronchus. He was started on bronchopulmonary hygiene with TID 3% saline and albuterol nebulized treatments. Eliquis was resumed 10/18 PM following stent and tumor debulking. Pt had simulation for radiation therapy completed on 10/18/24 given anatomy change post-bronch. He is pending follow-up with radiation oncology to determine when he is due to start treatment.     Pertinent Physical Exam At Time of Discharge  Visit Vitals  /59 (BP Location: Right arm, Patient Position: Lying)   Pulse 89   Temp 36.5 °C (97.7 °F) (Temporal)   Resp  "18   Ht 1.753 m (5' 9\")   Wt 62.1 kg (137 lb)   SpO2 92%   BMI 20.23 kg/m²   Smoking Status Former   BSA 1.74 m²   General: Alert and interactive, in no acute distress, on RA. Cachectic  HEENT: NC/AT, EOMI, no conjunctival icterus, mucosa   Cardiovascular: Regular rate & rhythm, no murmurs  Pulmonary: Right lung breath sounds diminished with minimal aeration. Left lung without wheezes, rales, or rhonchi.  GI/Abd: Abdomen soft, nondistended, nontender.   Extremities: No peripheral edema  Skin: No jaundice, rashes.   Neuro: Grossly intact    Home Medications     Medication List      START taking these medications     * albuterol 2.5 mg /3 mL (0.083 %) nebulizer solution; Take 3 mL (2.5   mg) by nebulization 3 times a day.; Replaces: albuterol 90 mcg/actuation   inhaler   * albuterol 2.5 mg /3 mL (0.083 %) nebulizer solution; Take 3 mL (2.5   mg) by nebulization 3 times a day. Start taking on November 19th or as   prescribed by your doctor. Do not fill before November 17, 2024.; Start   taking on: November 17, 2024   * NebuSal 3 % nebulizer solution; Generic drug: sodium chloride; Take 4   mL by nebulization 3 times a day.   * sodium chloride 3% nebulizer solution; Take 4 mL by nebulization 3   times a day. Start taking on November 19th or as prescribed by your   doctor. Take after albuterol. Do not fill before November 17, 2024.; Start   taking on: November 17, 2024  * This list has 4 medication(s) that are the same as other medications   prescribed for you. Read the directions carefully, and ask your doctor or   other care provider to review them with you.     CONTINUE taking these medications     amLODIPine 5 mg tablet; Commonly known as: Norvasc; Take 2 tablets (10   mg) by mouth once daily.   apixaban 5 mg tablet; Commonly known as: Eliquis; Take 1 tablet (5 mg)   by mouth 2 times a day.   ascorbic acid 500 mg tablet; Commonly known as: Vitamin C   aspirin 81 mg EC tablet   fluticasone furoate-vilanteroL 200-25 " mcg/dose inhaler; Commonly known   as: Breo Ellipta; Inhale 1 puff once daily.   fluticasone-umeclidin-vilanter 200-62.5-25 mcg blister with device;   Commonly known as: TRELEGY-ELLIPTA; Inhale 1 puff early in the morning..   Rinse mouth after taking dose   folic acid 1 mg tablet; Commonly known as: Folvite; Take 1 tablet (1 mg)   by mouth once daily.   * FreeStyle Lite Meter kit; Generic drug: Blood glucose monitoring meter   kit; TEST BLOOD SUGAR LEVELS 2X A DAY   * blood-glucose meter misc; Commonly known as: Accu-Chek Guide Glucose   Meter; Check blood glucose 2x a day   * FreeStyle Lite Strips strip; Generic drug: blood sugar diagnostic; 1   strip 2 times a day.   * Accu-Chek Guide test strips strip; Generic drug: blood sugar   diagnostic; 1 strip 2 times a day.   * lancets misc; TEST BLOOD SUGAR LEVELS 2X A DAY   * lancets misc; Commonly known as: Accu-Chek Fastclix Lancet Drum; Check   blood glucose 2x day   * lancets misc; Commonly known as: Accu-Chek Fastclix Lancet Drum; Check   blood glucose level 2x a day   * lancets misc; Check blood sugar twice daily   metFORMIN 500 mg tablet; Commonly known as: Glucophage; Take 1 tablet   (500 mg) by mouth 2 times daily (morning and late afternoon).   metoprolol tartrate 50 mg tablet; Commonly known as: Lopressor; Take 1   tablet by mouth every 12 hours.   multivitamin with minerals tablet; Take 1 tablet by mouth once daily.   * nicotine 14 mg/24 hr patch; Commonly known as: Nicoderm CQ; Apply 1   patch topically every day.  START AFTER finishing the 21mg patches.   * nicotine 21 mg/24 hr patch; Commonly known as: Nicoderm CQ; Place 1   patch over 24 hours on the skin once daily for 42 doses.   * nicotine 7 mg/24 hr patch; Commonly known as: Nicoderm CQ; Place 1   patch over 24 hours on the skin once daily for 14 doses. START AFTER   finishing the 14mg patches.; Start taking on: October 22, 2024   ondansetron 8 mg tablet; Commonly known as: Zofran; Take 1 tablet (8  mg)   by mouth every 8 hours if needed for nausea or vomiting.   oxygen gas therapy; Commonly known as: O2   pantoprazole 40 mg EC tablet; Commonly known as: ProtoNix; Take 1 tablet   (40 mg) by mouth once daily. Do not crush, chew, or split.   potassium chloride CR 20 mEq ER tablet; Commonly known as: Klor-Con M20;   Take 2 tablets (40 mEq) by mouth once daily. Do not crush or chew.   prochlorperazine 10 mg tablet; Commonly known as: Compazine; Take 1   tablet (10 mg) by mouth every 6 hours if needed for nausea or vomiting.   simvastatin 20 mg tablet; Commonly known as: Zocor; Take 1 tablet (20   mg) by mouth once daily.   thiamine 100 mg tablet; Commonly known as: Vitamin B-1; Take 1 tablet   (100 mg) by mouth once daily. Do not start  before September 2, 2024.   tiotropium 2.5 mcg/actuation inhaler; Commonly known as: Spiriva   Respimat; Inhale 2 puffs once daily.   Vitamin D3 10 mcg (400 unit) tablet; Generic drug: cholecalciferol; Take   2 tablets (20 mcg) by mouth once daily.  * This list has 11 medication(s) that are the same as other medications   prescribed for you. Read the directions carefully, and ask your doctor or   other care provider to review them with you.     STOP taking these medications     albuterol 90 mcg/actuation inhaler; Replaced by: albuterol 2.5 mg /3 mL   (0.083 %) nebulizer solution   levoFLOXacin 750 mg tablet; Commonly known as: Levaquin   melatonin 3 mg tablet     Outpatient Follow-Up  Future Appointments   Date Time Provider Department Center   10/21/2024 11:00 AM LUVTB2 [id number] [facility]   10/22/2024 11:00 AM LUVTB2 [id number] [facility]   10/23/2024  7:45 AM LUVTB2 [id number] [facility]   10/23/2024 10:30 AM MD MARTA BellaCMOC1 [facility]   10/23/2024 11:00 AM INF 07 LOIS CRAVEN [facility]   10/24/2024 11:00 AM LUVTB2 [id number] [facility]   10/25/2024 To Be Determined Sylvia Key LPN [facility]   10/25/2024 11:00 AM LUVTB2 [address]   10/28/2024 11:00 AM LUVTB2 [address]   10/29/2024  To Be Determined Sylvia Key, SOLANGEN [facility]   10/29/2024 11:00 AM LUVTB2 [id number] [facility]   10/30/2024  9:00 AM LUVTB2 [id number] [facility]   10/30/2024 10:15 AM Janiya Landis MD [id number] [facility]   10/30/2024 10:30 AM INF 06 [facility]   10/31/2024 11:00 AM LUVTB2 [id number] [facility]   11/1/2024  8:00 AM Lisa Ko MD [id number] [facility]   11/1/2024 11:00 AM LUVTB2 [id number] [facility]   11/4/2024 11:00 AM LUVTB2 [id number] [facility]   11/5/2024 11:00 AM LUVTB2 [id number] [facility]   11/6/2024 To Be Determined Юлия Ahn RN [facility]   11/6/2024  9:00 AM LUVTB2 [id number] [facility]   11/6/2024 10:30 [facility]   11/7/2024 11:15 AM LUVTB2 [id number] [facility]   11/8/2024 11:00 AM LUVTB2 [id number] [facility]   11/11/2024 11:00 AM LUVTB2 [id number] [facility]   11/12/2024 11:00 AM LUVTB2 [id number] [facility]   11/13/2024  8:30 AM LUVTB2 [id number] [facility]   11/13/2024 10:15 AM Janiya Landis MD GENSCCMOC1 [facility]   11/13/2024 10:30 AM INF 06 [facility]   11/14/2024 11:00 AM LUVTB2 [id number] [facility]   11/15/2024 11:00 AM LUVTB2 [id number] [facility]   11/18/2024 11:00 AM LUVTB2 [id number] [facility]   11/19/2024 11:00 AM LUVTB2 [id number] [facility]   11/20/2024 11:00 AM LUVTB2 [id number] [facility]   11/21/2024 11:00 AM LUVTB2 [id number] [facility]   11/22/2024 11:00 AM LUVTB2 [id number] [facility]   1/10/2025 10:00 AM Missael Holt MD [id number] [facility]     Cynthia Cowart MD

## 2024-10-19 NOTE — NURSING NOTE
10/19/24 1630:    Pts home going nebulizer machine and nebulizer meds-to-beds delivered to patient. RN instructed patient on use of nebulizer, how to fill machine with medication, and the nebulizer scheduled that was prescribed to him. Pt demonstrated proper use of nebulizer and had no questions or concerns at this time.     CIARRA Moncada RN

## 2024-10-19 NOTE — CARE PLAN
The patient's goals for the shift include      The clinical goals for the shift include Patient to remain safe and free from falls and/or injury this shift      Problem: Pain - Adult  Goal: Verbalizes/displays adequate comfort level or baseline comfort level  Outcome: Progressing     Problem: Safety - Adult  Goal: Free from fall injury  Outcome: Progressing     Problem: Discharge Planning  Goal: Discharge to home or other facility with appropriate resources  Outcome: Progressing     Problem: Chronic Conditions and Co-morbidities  Goal: Patient's chronic conditions and co-morbidity symptoms are monitored and maintained or improved  Outcome: Progressing     Problem: Fall/Injury  Goal: Not fall by end of shift  Outcome: Progressing  Goal: Be free from injury by end of the shift  Outcome: Progressing  Goal: Verbalize understanding of personal risk factors for fall in the hospital  Outcome: Progressing  Goal: Verbalize understanding of risk factor reduction measures to prevent injury from fall in the home  Outcome: Progressing  Goal: Use assistive devices by end of the shift  Outcome: Progressing  Goal: Pace activities to prevent fatigue by end of the shift  Outcome: Progressing

## 2024-10-19 NOTE — PROGRESS NOTES
10/19/24 1531   Discharge Planning   Type of Residence Private residence   Home or Post Acute Services None   Expected Discharge Disposition Home   Does the patient need discharge transport arranged? No     [facility] notified by team that he will need a nebulizer at discharge and wanted to return home today if possible. Pt lives in [place] and would need to call for a ride home.  [facility] and [facility] initially contacted regarding need for nebulizer, however [facility] is OON and [facility] suggested bringing prescription to nearest pharmacy.  [facility] contacted [facility] through referral and states they will be able to deliver today within next few hours. Pt's team notified. Confirmed with team no oxygen or home care needed at discharge.  Pt to call ride and return home once machine and medication are received.    Melly Renee RN  (Weekend Transitional Care Coordinator-[facility], send Epic message)

## 2024-10-19 NOTE — PROGRESS NOTES
"  MEDICINE INPATIENT PROGRESS NOTE    Juan Carlos Cr is a 74 y.o. male on hospital day 3    Subjective   No acute events overnight. He states his breathing is better. His lungs aren't hurting. Before, he could not walk because of his breathing. He is not on oxygen and is not in pain. He is coughing up sputum, and he is not coughing up any blood. The cough hasn't gotten worse. It has gotten better.      Objective   Last Recorded Vitals  Blood pressure 107/59, pulse 89, temperature 36.5 °C (97.7 °F), temperature source Temporal, resp. rate 18, height 1.753 m (5' 9\"), weight 62.1 kg (137 lb), SpO2 92%.    Vital Trends Last 24hrs  Temp:  [36.4 °C (97.5 °F)-36.7 °C (98.1 °F)] 36.5 °C (97.7 °F)  Heart Rate:  [67-89] 89  Resp:  [16-18] 18  BP: (101-111)/(54-69) 107/59     Intake/Output last 3 Shifts:  I/O last 3 completed shifts:  In: 300 (4.8 mL/kg) [I.V.:300 (4.8 mL/kg)]  Out: 1400 (22.5 mL/kg) [Urine:1400 (0.6 mL/kg/hr)]  Weight: 62.1 kg     Physical Exam  General: Alert and interactive, in no acute distress, on RA. Cachectic  HEENT: NC/AT, EOMI, no conjunctival icterus, mucosa   Cardiovascular: Regular rate & rhythm, no murmurs  Pulmonary: Right lung breath sounds diminished with minimal aeration. Left lung without wheezes, rales, or rhonchi.  GI/Abd: Abdomen soft, nondistended, nontender.   Extremities: No peripheral edema  Skin: No jaundice, rashes.   Neuro: Grossly intact    Relevant Results  Results from last 7 days   Lab Units 10/19/24  0852 10/18/24  1325 10/17/24  0831   WBC AUTO x10*3/uL 7.1 6.0 7.0   HEMOGLOBIN g/dL 9.5* 10.1* 11.0*   HEMATOCRIT % 28.5* 30.8* 33.5*   PLATELETS AUTO x10*3/uL 149* 148* 178     Results from last 7 days   Lab Units 10/19/24  0852 10/18/24  1325 10/17/24  0831   SODIUM mmol/L 134* 133* 135*   POTASSIUM mmol/L 4.3 3.6 4.3   CO2 mmol/L 26 25 24   ANION GAP mmol/L 11 13 13   BUN mg/dL 13 11 13   CREATININE mg/dL 0.50 0.53 0.55   GLUCOSE mg/dL 140* 167* 122*   EGFR mL/min/1.73m*2 >90 " ">90 >90   MAGNESIUM mg/dL 1.94 1.72 1.88   PHOSPHORUS mg/dL 2.7 2.6 2.9      Results from last 7 days   Lab Units 10/16/24  1031   ALT U/L 7*   AST U/L 13   ALK PHOS U/L 56            No lab exists for component: \"PT\"  Results from last 7 days   Lab Units 10/16/24  1031   FIO2 % 36     Results from last 7 days   Lab Units 10/16/24  1031   POCT PH, VENOUS pH 7.38   POCT PCO2, VENOUS mm Hg 46     Lab Results   Component Value Date    BLOODCULT No growth at 4 days -  FINAL REPORT 09/23/2024    BLOODCULT No growth at 4 days -  FINAL REPORT 09/23/2024      Medications    albuterol, 2.5 mg, nebulization, TID  apixaban, 5 mg, oral, BID  ascorbic acid, 500 mg, oral, Daily  aspirin, 81 mg, oral, Daily  cholecalciferol, 800 Units, oral, Daily  fluticasone furoate-vilanteroL, 1 puff, inhalation, Daily  folic acid, 1 mg, oral, Daily  insulin lispro, 0-5 Units, subcutaneous, TID  lidocaine, 0.1 mL, subcutaneous, Once  metoprolol tartrate, 50 mg, oral, q12h  nicotine, 1 patch, transdermal, Daily  pantoprazole, 40 mg, oral, Daily  polyethylene glycol, 17 g, oral, Daily  simvastatin, 20 mg, oral, Daily  sodium chloride, 3 mL, nebulization, TID  thiamine, 100 mg, oral, Daily  tiotropium, 2 puff, inhalation, Daily      oxygen, 2 L/min      PRN medications: dextrose, dextrose, glucagon, glucagon, ipratropium-albuteroL, ondansetron, oxygen         Assessment/Plan   Juan Carlos Cr is a 74 year old male with hx of COPD, chronic hypoxic resp failure on home 2L O2, and T4N2M0 squamous cell lung cancer of the RUL diagnosed 8/2024 on cycle 1 concurrent chemoradiation who is admitted from his radiation oncology appointment with SOB and hypoxia, found to have disease progression and right lung collapse. Pt s/p EBUS with debulking and stent placement 8/29, followed by repeat bronch 9/25 for mucus plug and stent removal. Pt currently stable on home 2L. S/p bronchoscopy on 10/18 with stent placed in right mainstem bronchus.     Updates " 10/19/24:  - Plan for nebulizer for patient and discharge  - Will need pulm outpatient follow-up in 2 weeks and then a bronchoscopy in 4-6 weeks     #Acute on chronic hypoxic respiratory failure  #Stage IIIb T4N2M0 Squamous cell lung cancer  #Right lung collapse 2/2 mainstem compression  AHRF secondary to collapse of the right lung with RUL mass progression, currently undergoing weekly carbo/taxol and RT as of 10/2/24  :: Pt presented to Rad Onc session on 10/15 with worsening hypoxia and cone beam CT showing collapse of right lung due to mainstem bronchus involvement  :: s/p bronchoscopy and EBUS with debulking and stent placement 8/29, and bronchoscopy with stent removal 9/25/24  :: PET 9/18/24 showing hypermetabolic right hilar mass with RLL satellite lesion and hypermetabolic right paratracheal lymph nodes  :: Pt took last dose of Eliquis 10/16 AM  :: s/p bronchoscopy on 10/18 with right mainstem stent placed  Plan:  -TID 3% saline and albuterol nebs  -Resumed home Eliquis after bronchoscopy     #COPD  -continue home spiriva and Breo  -Duo-Nebs q6h prn     #Atrial fibrillation  #HFpEF  #HTN  - Continue home metoprolol, ASA, simvastatin  - Amlodipine held d/t relative hypotension      F: PRN  E: PRN  N: Regular diet  GI prophylaxis: PPI  DVT prophylaxis: Eliquis    Code Status: Full Code (confirmed on admission)   NOK: Cindy Cr (spouse) [phone]     Cynthia Cowart MD  Internal Medicine, PGY-2

## 2024-10-21 ENCOUNTER — HOME CARE VISIT (OUTPATIENT)
Dept: HOME HEALTH SERVICES | Facility: HOME HEALTH | Age: 74
End: 2024-10-21
Payer: MEDICARE

## 2024-10-21 ENCOUNTER — APPOINTMENT (OUTPATIENT)
Dept: RADIATION ONCOLOGY | Facility: CLINIC | Age: 74
End: 2024-10-21
Payer: MEDICARE

## 2024-10-21 ASSESSMENT — PAIN SCALES - GENERAL: PAINLEVEL_OUTOF10: 0 - NO PAIN

## 2024-10-22 ENCOUNTER — HOSPITAL ENCOUNTER (OUTPATIENT)
Dept: RADIATION ONCOLOGY | Facility: HOSPITAL | Age: 74
Setting detail: RADIATION/ONCOLOGY SERIES
Discharge: HOME | End: 2024-10-22
Payer: MEDICARE

## 2024-10-22 ENCOUNTER — PATIENT OUTREACH (OUTPATIENT)
Dept: CARE COORDINATION | Facility: CLINIC | Age: 74
End: 2024-10-22
Payer: MEDICARE

## 2024-10-22 ENCOUNTER — APPOINTMENT (OUTPATIENT)
Dept: RADIATION ONCOLOGY | Facility: CLINIC | Age: 74
End: 2024-10-22
Payer: MEDICARE

## 2024-10-22 LAB
LABORATORY COMMENT REPORT: NORMAL
PATH REPORT.FINAL DX SPEC: NORMAL
PATH REPORT.GROSS SPEC: NORMAL
PATH REPORT.TOTAL CANCER: NORMAL
RESIDENT REVIEW: NORMAL

## 2024-10-22 PROCEDURE — 77293 RESPIRATOR MOTION MGMT SIMUL: CPT | Performed by: STUDENT IN AN ORGANIZED HEALTH CARE EDUCATION/TRAINING PROGRAM

## 2024-10-22 NOTE — PROGRESS NOTES
[facility]  Admitted 10/16/2024  Discharged 10/19/2024  Dx:  Admitted from his radiation oncology appointment with SOB and hypoxia, found to have disease progression and right lung collapse.     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     Resumed  Home Care    Outreach call to patient to support a smooth transition of care from recent admission.  Patient states, he feels real good, his breathing is better. He is not on home oxygen. Reviewed discharge medications, discharge instructions, assessed social needs, and provided education on importance of follow-up appointment with provider.      Patient scheduled for chemotherapy 10/23/2024    Engagement  Call Start Time: 1729 (10/22/2024  5:29 PM)    Medications  Medications reviewed with patient/caregiver?: Yes (10/22/2024  5:29 PM)  Is the patient having any side effects they believe may be caused by any medication additions or changes?: No (10/22/2024  5:29 PM)  Does the patient have all medications ordered at discharge?: Yes (10/22/2024  5:29 PM)  Care Management Interventions: No intervention needed (10/22/2024  5:29 PM)  Is the patient taking all medications as directed (includes completed medication regime)?: Yes (10/22/2024  5:29 PM)    Appointments  Does the patient have a primary care provider?: Yes (Patient following with Oncology on 10/23/2024) (10/22/2024  5:29 PM)  Care Management Interventions: Verified appointment date/time/provider (10/22/2024  5:29 PM)    Self Management  What is the home health agency?:  [facility] (10/22/2024  5:29 PM)  Has home health visited the patient within 72 hours of discharge?: Yes (10/22/2024  5:29 PM)    Patient Teaching  Does the patient have access to their discharge instructions?: Yes (10/22/2024  5:29 PM)  Care Management  Interventions: Reviewed instructions with patient (10/22/2024  5:29 PM)  What is the patient's perception of their health status since discharge?: Improving (10/22/2024  5:29 PM)  Is the patient/caregiver able to teach back the hierarchy of who to call/visit for symptoms/problems? PCP, Specialist, Home Health nurse, Urgent Care, ED, 911: Yes (10/22/2024  5:29 PM)    Will continue to monitor through transition period.

## 2024-10-23 ENCOUNTER — APPOINTMENT (OUTPATIENT)
Dept: RADIATION ONCOLOGY | Facility: CLINIC | Age: 74
End: 2024-10-23
Payer: MEDICARE

## 2024-10-23 ENCOUNTER — LAB (OUTPATIENT)
Dept: LAB | Facility: LAB | Age: 74
End: 2024-10-23
Payer: MEDICARE

## 2024-10-23 ENCOUNTER — INFUSION (OUTPATIENT)
Dept: HEMATOLOGY/ONCOLOGY | Facility: HOSPITAL | Age: 74
End: 2024-10-23
Payer: MEDICARE

## 2024-10-23 ENCOUNTER — OFFICE VISIT (OUTPATIENT)
Dept: HEMATOLOGY/ONCOLOGY | Facility: HOSPITAL | Age: 74
End: 2024-10-23
Payer: MEDICARE

## 2024-10-23 VITALS
RESPIRATION RATE: 16 BRPM | SYSTOLIC BLOOD PRESSURE: 106 MMHG | DIASTOLIC BLOOD PRESSURE: 67 MMHG | HEART RATE: 79 BPM | OXYGEN SATURATION: 95 % | TEMPERATURE: 97 F

## 2024-10-23 VITALS
SYSTOLIC BLOOD PRESSURE: 96 MMHG | HEIGHT: 69 IN | RESPIRATION RATE: 16 BRPM | TEMPERATURE: 96.6 F | OXYGEN SATURATION: 96 % | HEART RATE: 71 BPM | BODY MASS INDEX: 20.85 KG/M2 | WEIGHT: 140.76 LBS | DIASTOLIC BLOOD PRESSURE: 61 MMHG

## 2024-10-23 DIAGNOSIS — C34.11 MALIGNANT NEOPLASM OF UPPER LOBE OF RIGHT LUNG (MULTI): ICD-10-CM

## 2024-10-23 DIAGNOSIS — C34.11 MALIGNANT NEOPLASM OF UPPER LOBE OF RIGHT LUNG (MULTI): Primary | ICD-10-CM

## 2024-10-23 DIAGNOSIS — C34.90 PRIMARY MALIGNANT NEOPLASM OF LUNG METASTATIC TO OTHER SITE, UNSPECIFIED LATERALITY (MULTI): ICD-10-CM

## 2024-10-23 LAB
ALBUMIN SERPL BCP-MCNC: 2.9 G/DL (ref 3.4–5)
ALP SERPL-CCNC: 61 U/L (ref 33–136)
ALT SERPL W P-5'-P-CCNC: 12 U/L (ref 10–52)
ANION GAP SERPL CALC-SCNC: 11 MMOL/L (ref 10–20)
AST SERPL W P-5'-P-CCNC: 14 U/L (ref 9–39)
BASOPHILS # BLD AUTO: 0.03 X10*3/UL (ref 0–0.1)
BASOPHILS NFR BLD AUTO: 0.4 %
BILIRUB SERPL-MCNC: 0.5 MG/DL (ref 0–1.2)
BUN SERPL-MCNC: 9 MG/DL (ref 6–23)
CALCIUM SERPL-MCNC: 8.7 MG/DL (ref 8.6–10.3)
CHLORIDE SERPL-SCNC: 97 MMOL/L (ref 98–107)
CO2 SERPL-SCNC: 26 MMOL/L (ref 21–32)
CREAT SERPL-MCNC: 0.5 MG/DL (ref 0.5–1.3)
EGFRCR SERPLBLD CKD-EPI 2021: >90 ML/MIN/1.73M*2
EOSINOPHIL # BLD AUTO: 0.05 X10*3/UL (ref 0–0.4)
EOSINOPHIL NFR BLD AUTO: 0.6 %
ERYTHROCYTE [DISTWIDTH] IN BLOOD BY AUTOMATED COUNT: 13.2 % (ref 11.5–14.5)
GLUCOSE SERPL-MCNC: 109 MG/DL (ref 74–99)
HCT VFR BLD AUTO: 32.5 % (ref 41–52)
HGB BLD-MCNC: 10 G/DL (ref 13.5–17.5)
IMM GRANULOCYTES # BLD AUTO: 0.04 X10*3/UL (ref 0–0.5)
IMM GRANULOCYTES NFR BLD AUTO: 0.5 % (ref 0–0.9)
LYMPHOCYTES # BLD AUTO: 0.77 X10*3/UL (ref 0.8–3)
LYMPHOCYTES NFR BLD AUTO: 9.3 %
MCH RBC QN AUTO: 30.2 PG (ref 26–34)
MCHC RBC AUTO-ENTMCNC: 30.8 G/DL (ref 32–36)
MCV RBC AUTO: 98 FL (ref 80–100)
MONOCYTES # BLD AUTO: 0.84 X10*3/UL (ref 0.05–0.8)
MONOCYTES NFR BLD AUTO: 10.1 %
NEUTROPHILS # BLD AUTO: 6.59 X10*3/UL (ref 1.6–5.5)
NEUTROPHILS NFR BLD AUTO: 79.1 %
NRBC BLD-RTO: 0 /100 WBCS (ref 0–0)
PLATELET # BLD AUTO: 149 X10*3/UL (ref 150–450)
POTASSIUM SERPL-SCNC: 4 MMOL/L (ref 3.5–5.3)
PROT SERPL-MCNC: 6.3 G/DL (ref 6.4–8.2)
RBC # BLD AUTO: 3.31 X10*6/UL (ref 4.5–5.9)
SODIUM SERPL-SCNC: 130 MMOL/L (ref 136–145)
WBC # BLD AUTO: 8.3 X10*3/UL (ref 4.4–11.3)

## 2024-10-23 PROCEDURE — 3078F DIAST BP <80 MM HG: CPT | Performed by: INTERNAL MEDICINE

## 2024-10-23 PROCEDURE — 3008F BODY MASS INDEX DOCD: CPT | Performed by: INTERNAL MEDICINE

## 2024-10-23 PROCEDURE — 96375 TX/PRO/DX INJ NEW DRUG ADDON: CPT | Mod: INF

## 2024-10-23 PROCEDURE — 1111F DSCHRG MED/CURRENT MED MERGE: CPT | Performed by: INTERNAL MEDICINE

## 2024-10-23 PROCEDURE — 80053 COMPREHEN METABOLIC PANEL: CPT

## 2024-10-23 PROCEDURE — 2500000001 HC RX 250 WO HCPCS SELF ADMINISTERED DRUGS (ALT 637 FOR MEDICARE OP): Performed by: INTERNAL MEDICINE

## 2024-10-23 PROCEDURE — 96413 CHEMO IV INFUSION 1 HR: CPT

## 2024-10-23 PROCEDURE — 1159F MED LIST DOCD IN RCRD: CPT | Performed by: INTERNAL MEDICINE

## 2024-10-23 PROCEDURE — G2211 COMPLEX E/M VISIT ADD ON: HCPCS | Performed by: INTERNAL MEDICINE

## 2024-10-23 PROCEDURE — 96417 CHEMO IV INFUS EACH ADDL SEQ: CPT

## 2024-10-23 PROCEDURE — 99215 OFFICE O/P EST HI 40 MIN: CPT | Mod: 25 | Performed by: INTERNAL MEDICINE

## 2024-10-23 PROCEDURE — 2500000004 HC RX 250 GENERAL PHARMACY W/ HCPCS (ALT 636 FOR OP/ED): Performed by: INTERNAL MEDICINE

## 2024-10-23 PROCEDURE — 1126F AMNT PAIN NOTED NONE PRSNT: CPT | Performed by: INTERNAL MEDICINE

## 2024-10-23 PROCEDURE — 3074F SYST BP LT 130 MM HG: CPT | Performed by: INTERNAL MEDICINE

## 2024-10-23 PROCEDURE — 85025 COMPLETE CBC W/AUTO DIFF WBC: CPT

## 2024-10-23 PROCEDURE — 99215 OFFICE O/P EST HI 40 MIN: CPT | Performed by: INTERNAL MEDICINE

## 2024-10-23 RX ORDER — PALONOSETRON 0.05 MG/ML
0.25 INJECTION, SOLUTION INTRAVENOUS ONCE
Status: COMPLETED | OUTPATIENT
Start: 2024-10-23 | End: 2024-10-23

## 2024-10-23 RX ORDER — FAMOTIDINE 10 MG/ML
20 INJECTION INTRAVENOUS ONCE AS NEEDED
Status: DISCONTINUED | OUTPATIENT
Start: 2024-10-23 | End: 2024-10-23 | Stop reason: HOSPADM

## 2024-10-23 RX ORDER — DEXAMETHASONE 6 MG/1
12 TABLET ORAL ONCE
Status: COMPLETED | OUTPATIENT
Start: 2024-10-23 | End: 2024-10-23

## 2024-10-23 RX ORDER — ALBUTEROL SULFATE 0.83 MG/ML
3 SOLUTION RESPIRATORY (INHALATION) AS NEEDED
Status: DISCONTINUED | OUTPATIENT
Start: 2024-10-23 | End: 2024-10-23 | Stop reason: HOSPADM

## 2024-10-23 RX ORDER — DIPHENHYDRAMINE HYDROCHLORIDE 50 MG/ML
50 INJECTION INTRAMUSCULAR; INTRAVENOUS AS NEEDED
Status: DISCONTINUED | OUTPATIENT
Start: 2024-10-23 | End: 2024-10-23 | Stop reason: HOSPADM

## 2024-10-23 RX ORDER — PROCHLORPERAZINE EDISYLATE 5 MG/ML
10 INJECTION INTRAMUSCULAR; INTRAVENOUS EVERY 6 HOURS PRN
Status: DISCONTINUED | OUTPATIENT
Start: 2024-10-23 | End: 2024-10-23 | Stop reason: HOSPADM

## 2024-10-23 RX ORDER — FAMOTIDINE 10 MG/ML
20 INJECTION INTRAVENOUS ONCE
Status: COMPLETED | OUTPATIENT
Start: 2024-10-23 | End: 2024-10-23

## 2024-10-23 RX ORDER — DIPHENHYDRAMINE HCL 25 MG
50 CAPSULE ORAL ONCE
Status: COMPLETED | OUTPATIENT
Start: 2024-10-23 | End: 2024-10-23

## 2024-10-23 RX ORDER — PROCHLORPERAZINE MALEATE 10 MG
10 TABLET ORAL EVERY 6 HOURS PRN
Status: DISCONTINUED | OUTPATIENT
Start: 2024-10-23 | End: 2024-10-23 | Stop reason: HOSPADM

## 2024-10-23 RX ORDER — EPINEPHRINE 0.3 MG/.3ML
0.3 INJECTION SUBCUTANEOUS EVERY 5 MIN PRN
Status: DISCONTINUED | OUTPATIENT
Start: 2024-10-23 | End: 2024-10-23 | Stop reason: HOSPADM

## 2024-10-23 ASSESSMENT — ENCOUNTER SYMPTOMS
FATIGUE: 1
GASTROINTESTINAL NEGATIVE: 1
FEVER: 0
RESPIRATORY NEGATIVE: 1
CARDIOVASCULAR NEGATIVE: 1
UNEXPECTED WEIGHT CHANGE: 0

## 2024-10-23 ASSESSMENT — PAIN SCALES - GENERAL: PAINLEVEL_OUTOF10: 0-NO PAIN

## 2024-10-23 NOTE — PROGRESS NOTES
"Patient ID: Juan Carlos Cr is a 74 y.o. male.    Subjective:  Follows up for lung cancer. Feels very well today after stent was placed last week in bronchus    Heme/Onc History:  - p/w dyspne ain Aug 2024. CT Chest: 6 cm R upper lung central mass. MR Brain: No mets  - Bronch: Squamous cell carcinoma. PD-L1 5%. Cytology from [facility] 4 and 7 Lns are neg for malignancy  - PET/CT (Sep 2024): Large central R lung mass with mediastinal LAPs. Satellite RLL peripheral nodule  - Admitted to hospital with dyspnea in late Sep with acute dyspnea. R sided large pleural effusion => Cytology neg for malignancy  - Started weekly carbo-taxol on 10/2/24 and Radiation on 10/14 => admitted to hospital for acute dyspnea again on 10/16 => Stents replaced    Assessment/Plan:  ? NSCLC: [id number] as per PET/CT findings although cytology in bronch from Lns were negative. Cytology from pleural effusion negative.    We had started hin on weekly chemo before radiation,s till he developed acute dypsnea again and had a stent placed in R main bronchus. Today, he feels very well. Dyspnea has improved. More energy. Will continue with chemo today. Will resume radiation tomorrow.      We will plan to give him durvalumab after chemoRT    Hypercalcemia: Resolved after zometa.     I provide longitudinal care for Mr. Cr for his lung cancer    Review Of Systems:  Review of Systems   Constitutional:  Positive for fatigue. Negative for fever and unexpected weight change.   HENT:  Negative.     Respiratory: Negative.     Cardiovascular: Negative.    Gastrointestinal: Negative.    Genitourinary: Negative.         Physical Exam:  BP 96/61 (BP Location: Left arm, Patient Position: Sitting, BP Cuff Size: Adult)   Pulse 71   Temp 35.9 °C (96.6 °F) (Temporal)   Resp 16   Ht 1.753 m (5' 9\")   Wt 63.9 kg (140 lb 12.2 oz)   SpO2 96%   BMI 20.79 kg/m²   BSA: 1.76 meters squared  Performance Status: Symptomatic; fully ambulatory  Physical Exam  Constitutional:  "      General: He is not in acute distress.     Appearance: He is not ill-appearing.   HENT:      Head: Normocephalic and atraumatic.   Eyes:      General: No scleral icterus.     Pupils: Pupils are equal, round, and reactive to light.   Cardiovascular:      Rate and Rhythm: Normal rate and regular rhythm.   Pulmonary:      Effort: Pulmonary effort is normal.      Comments: Breath sounds decreased at R mid to base. NO crackles  Abdominal:      General: Abdomen is flat.      Palpations: Abdomen is soft. There is no mass.   Musculoskeletal:         General: Normal range of motion.   Lymphadenopathy:      Cervical: No cervical adenopathy.   Skin:     Coloration: Skin is not jaundiced.   Neurological:      General: No focal deficit present.      Mental Status: He is alert and oriented to person, place, and time.         Results:  Diagnostic Results   Lab Results   Component Value Date    WBC 8.3 10/23/2024    HGB 10.0 (L) 10/23/2024    HCT 32.5 (L) 10/23/2024    MCV 98 10/23/2024     (L) 10/23/2024     Lab Results   Component Value Date    CALCIUM 8.7 10/23/2024     (L) 10/23/2024    K 4.0 10/23/2024    CO2 26 10/23/2024    CL 97 (L) 10/23/2024    BUN 9 10/23/2024    CREATININE 0.50 10/23/2024    ALT 12 10/23/2024    AST 14 10/23/2024       Current Outpatient Medications:     albuterol 2.5 mg /3 mL (0.083 %) nebulizer solution, Take 3 mL (2.5 mg) by nebulization 3 times a day., Disp: 270 mL, Rfl: 0    [START ON 11/17/2024] albuterol 2.5 mg /3 mL (0.083 %) nebulizer solution, Take 3 mL (2.5 mg) by nebulization 3 times a day. Start taking on November 19th or as prescribed by your doctor. Do not fill before November 17, 2024., Disp: 270 mL, Rfl: 0    amLODIPine (Norvasc) 5 mg tablet, Take 2 tablets (10 mg) by mouth once daily., Disp: 120 tablet, Rfl: 0    apixaban (Eliquis) 5 mg tablet, Take 1 tablet (5 mg) by mouth 2 times a day., Disp: 180 tablet, Rfl: 0    ascorbic acid (Vitamin C) 500 mg tablet, Take 1  tablet (500 mg) by mouth once daily., Disp: , Rfl:     aspirin 81 mg EC tablet, Take 1 tablet (81 mg) by mouth once daily., Disp: , Rfl:     blood sugar diagnostic (Accu-Chek Guide test strips) strip, 1 strip 2 times a day., Disp: 200 each, Rfl: 3    blood sugar diagnostic (FreeStyle Lite Strips) strip, 1 strip 2 times a day., Disp: 200 each, Rfl: 3    blood-glucose meter (Accu-Chek Guide Glucose Meter) misc, Check blood glucose 2x a day, Disp: 1 each, Rfl: 0    cholecalciferol (Vitamin D-3) 10 MCG (400 UNIT) tablet, Take 2 tablets (20 mcg) by mouth once daily., Disp: 60 tablet, Rfl: 0    fluticasone furoate-vilanteroL (Breo Ellipta) 200-25 mcg/dose inhaler, Inhale 1 puff once daily., Disp: 60 each, Rfl: 1    folic acid (Folvite) 1 mg tablet, Take 1 tablet (1 mg) by mouth once daily., Disp: 60 tablet, Rfl: 0    FreeStyle Lite Meter kit, TEST BLOOD SUGAR LEVELS 2X A DAY, Disp: 1 each, Rfl: 0    lancets (Accu-Chek Fastclix Lancet Drum) misc, Check blood glucose 2x day, Disp: 200 each, Rfl: 0    lancets (Accu-Chek Fastclix Lancet Drum) misc, Check blood glucose level 2x a day, Disp: 200 each, Rfl: 0    lancets misc, TEST BLOOD SUGAR LEVELS 2X A DAY, Disp: 100 each, Rfl: 0    lancets misc, Check blood sugar twice daily, Disp: 200 each, Rfl: 2    metFORMIN (Glucophage) 500 mg tablet, Take 1 tablet (500 mg) by mouth 2 times daily (morning and late afternoon)., Disp: 120 tablet, Rfl: 0    metoprolol tartrate (Lopressor) 50 mg tablet, Take 1 tablet by mouth every 12 hours., Disp: 90 tablet, Rfl: 3    multivitamin with minerals tablet, Take 1 tablet by mouth once daily., Disp: 30 tablet, Rfl: 1    nicotine (Nicoderm CQ) 14 mg/24 hr patch, Apply 1 patch topically every day.  START AFTER finishing the 21mg patches., Disp: 14 patch, Rfl: 0    ondansetron (Zofran) 8 mg tablet, Take 1 tablet (8 mg) by mouth every 8 hours if needed for nausea or vomiting., Disp: 30 tablet, Rfl: 5    pantoprazole (ProtoNix) 40 mg EC tablet, Take 1  tablet (40 mg) by mouth once daily. Do not crush, chew, or split., Disp: 60 tablet, Rfl: 2    potassium chloride CR (Klor-Con M20) 20 mEq ER tablet, Take 2 tablets (40 mEq) by mouth once daily. Do not crush or chew., Disp: 60 tablet, Rfl: 0    prochlorperazine (Compazine) 10 mg tablet, Take 1 tablet (10 mg) by mouth every 6 hours if needed for nausea or vomiting., Disp: 30 tablet, Rfl: 5    simvastatin (Zocor) 20 mg tablet, Take 1 tablet (20 mg) by mouth once daily., Disp: 90 tablet, Rfl: 3    sodium chloride 3 % nebulizer solution, Take 4 mL by nebulization 3 times a day., Disp: 240 mL, Rfl: 0    [START ON 11/17/2024] sodium chloride 3% nebulizer solution, Take 4 mL by nebulization 3 times a day. Start taking on November 19th or as prescribed by your doctor. Take after albuterol. Do not fill before November 17, 2024., Disp: 360 mL, Rfl: 0    thiamine (Vitamin B-1) 100 mg tablet, Take 1 tablet (100 mg) by mouth once daily. Do not start  before September 2, 2024., Disp: 30 tablet, Rfl: 1    tiotropium (Spiriva Respimat) 2.5 mcg/actuation inhaler, Inhale 2 puffs once daily., Disp: 8 g, Rfl: 1    fluticasone-umeclidin-vilanter (TRELEGY-ELLIPTA) 200-62.5-25 mcg blister with device, Inhale 1 puff early in the morning.. Rinse mouth after taking dose (Patient not taking: Reported on 10/23/2024), Disp: 60 each, Rfl: 11    nicotine (Nicoderm CQ) 21 mg/24 hr patch, Place 1 patch over 24 hours on the skin once daily for 42 doses., Disp: 42 patch, Rfl: 1    nicotine (Nicoderm CQ) 7 mg/24 hr patch, Place 1 patch over 24 hours on the skin once daily for 14 doses. START AFTER finishing the 14mg patches. (Patient not taking: Reported on 10/23/2024), Disp: 14 patch, Rfl: 0    oxygen (O2) gas therapy, Inhale 2 L/min continuously. Indications: decreased oxygen in the tissues or blood (Patient not taking: Reported on 10/23/2024), Disp: , Rfl:      Past Surgical History:   Procedure Laterality Date    CATARACT EXTRACTION  09/14/2018     Cataract Surgery    CATARACT EXTRACTION  06/27/2014    Cataract Surgery    CORONARY ANGIOPLASTY WITH STENT PLACEMENT  05/17/2013    Cath Stent Placement     Family History   Problem Relation Name Age of Onset    Heart attack Mother      Lung cancer Brother        reports that he has quit smoking. His smoking use included cigarettes. He has been exposed to tobacco smoke. He has never used smokeless tobacco.  Social History     Socioeconomic History    Marital status:      Spouse name: Not on file    Number of children: Not on file    Years of education: Not on file    Highest education level: Not on file   Occupational History    Not on file   Tobacco Use    Smoking status: Former     Current packs/day: 0.50     Types: Cigarettes     Passive exposure: Past    Smokeless tobacco: Never   Vaping Use    Vaping status: Never Used   Substance and Sexual Activity    Alcohol use: Not Currently     Comment: 4-5 beers and a glass a wine a day    Drug use: Never    Sexual activity: Defer   Other Topics Concern    Not on file   Social History Narrative    Not on file     Social Drivers of Health     Financial Resource Strain: Low Risk  (10/17/2024)    Overall Financial Resource Strain (CARDIA)     Difficulty of Paying Living Expenses: Not hard at all   Food Insecurity: No Food Insecurity (10/17/2024)    Hunger Vital Sign     Worried About Running Out of Food in the Last Year: Never true     Ran Out of Food in the Last Year: Never true   Transportation Needs: No Transportation Needs (10/17/2024)    PRAPARE - Transportation     Lack of Transportation (Medical): No     Lack of Transportation (Non-Medical): No   Physical Activity: Inactive (10/17/2024)    Exercise Vital Sign     Days of Exercise per Week: 0 days     Minutes of Exercise per Session: 0 min   Stress: No Stress Concern Present (10/17/2024)    [employer] - Occupational Stress Questionnaire     Feeling of Stress : Not at all   Social  Connections: Moderately Isolated (10/17/2024)    Social Connection and Isolation Panel [NHANES]     Frequency of Communication with Friends and Family: Three times a week     Frequency of Social Gatherings with Friends and Family: Three times a week     Attends Yazdanism Services: Never     Active Member of Clubs or Organizations: No     Attends Club or Organization Meetings: Never     Marital Status:    Intimate Partner Violence: Not At Risk (10/17/2024)    Humiliation, Afraid, Rape, and Kick questionnaire     Fear of Current or Ex-Partner: No     Emotionally Abused: No     Physically Abused: No     Sexually Abused: No   Housing Stability: Low Risk  (10/17/2024)    Housing Stability Vital Sign     Unable to Pay for Housing in the Last Year: No     Number of Times Moved in the Last Year: 0     Homeless in the Last Year: No       Diagnoses and all orders for this visit:  Malignant neoplasm of upper lobe of right lung (Multi)  -     Clinic Appointment Request; Future  -     Infusion Appointment Request; Future  -     CBC and Auto Differential; Future  -     Comprehensive metabolic panel; Future  Other orders  -     CARBOplatin (Paraplatin) 230 mg in sodium chloride 0.9% 123 mL IV       I spent more than 60 minutes for the patient today, including face-to-face conversation, pre-visit preparation, post-visit orders, and others.   Janiya Landis MD

## 2024-10-23 NOTE — PROGRESS NOTES
October 23, 2024 at 11:14 AM    Patient has no known allergies.    Juan Carlos Cr is a 74 y.o. male   There were no vitals taken for this visit.  There is no height or weight on file to calculate BSA.    Past Medical History:   Diagnosis Date    Acute myocardial infarction, unspecified 05/17/2013    Acute myocardial infarction    Atrial fib/flutter, transient (Multi)     COPD (chronic obstructive pulmonary disease) (Multi)     Coronary artery disease     Diabetes mellitus (Multi)     Diabetes mellitus (Multi)     per pt    Encounter for screening for malignant neoplasm of prostate 09/02/2015    Encounter for prostate cancer screening    GERD (gastroesophageal reflux disease)     Hyperlipidemia     Hypertension     Lung cancer (Multi)     Personal history of other diseases of the nervous system and sense organs 09/13/2017    History of cataract       Encounter Diagnosis   Name Primary?    Malignant neoplasm of upper lobe of right lung (Multi)        Has the entire regimen been authorized by financial clearance (pre-certification)?  Yes     Prior to Admission medications    Medication Sig Start Date End Date Taking? Authorizing Provider   albuterol 2.5 mg /3 mL (0.083 %) nebulizer solution Take 3 mL (2.5 mg) by nebulization 3 times a day. 10/18/24 11/18/24  Tamra Nicole MD   albuterol 2.5 mg /3 mL (0.083 %) nebulizer solution Take 3 mL (2.5 mg) by nebulization 3 times a day. Start taking on November 19th or as prescribed by your doctor. Do not fill before November 17, 2024. 11/17/24 12/17/24  Cynthia Cowart MD   amLODIPine (Norvasc) 5 mg tablet Take 2 tablets (10 mg) by mouth once daily. 9/1/24 10/31/24  Rosa Brown MD   apixaban (Eliquis) 5 mg tablet Take 1 tablet (5 mg) by mouth 2 times a day. 8/30/24   Isis Bridges MD   ascorbic acid (Vitamin C) 500 mg tablet Take 1 tablet (500 mg) by mouth once daily.    Historical Provider, MD   aspirin 81 mg EC tablet Take 1 tablet (81 mg) by mouth once daily.     Jamey Segura MD   blood sugar diagnostic (Accu-Chek Guide test strips) strip 1 strip 2 times a day. 9/5/24   Thanh Cuba MD   blood sugar diagnostic (FreeStyle Lite Strips) strip 1 strip 2 times a day. 9/3/24   Thanh Cuba MD   blood-glucose meter (Accu-Chek Guide Glucose Meter) misc Check blood glucose 2x a day 9/5/24   Thanh Cuba MD   cholecalciferol (Vitamin D-3) 10 MCG (400 UNIT) tablet Take 2 tablets (20 mcg) by mouth once daily. 9/28/24   Ralph Gandara MD   fluticasone furoate-vilanteroL (Breo Ellipta) 200-25 mcg/dose inhaler Inhale 1 puff once daily. 9/1/24 10/31/24  Rosa Brown MD   fluticasone-umeclidin-vilanter (TRELEGY-ELLIPTA) 200-62.5-25 mcg blister with device Inhale 1 puff early in the morning.. Rinse mouth after taking dose  Patient not taking: Reported on 10/23/2024 9/10/24   Missael Holt MD   folic acid (Folvite) 1 mg tablet Take 1 tablet (1 mg) by mouth once daily. 9/1/24 10/31/24  Rosa Brown MD   FreeStyle Lite Meter kit TEST BLOOD SUGAR LEVELS 2X A DAY 9/3/24 9/3/25  Thanh Cuba MD   lancets (Accu-Chek Fastclix Lancet Drum) misc Check blood glucose 2x day 9/5/24   Thanh Cuba MD   lancets (Accu-Chek Fastclix Lancet Drum) misc Check blood glucose level 2x a day 9/5/24   Thanh Cuba MD   lancets misc TEST BLOOD SUGAR LEVELS 2X A DAY 9/3/24   Thanh Cuba MD   lancets misc Check blood sugar twice daily 9/10/24   Thanh Cuba MD   metFORMIN (Glucophage) 500 mg tablet Take 1 tablet (500 mg) by mouth 2 times daily (morning and late afternoon). 8/31/24 10/31/24  Rosa Brown MD   metoprolol tartrate (Lopressor) 50 mg tablet Take 1 tablet by mouth every 12 hours. 8/31/23   Thanh Cuba MD   multivitamin with minerals tablet Take 1 tablet by mouth once daily. 9/1/24 10/31/24  Rosa Brown MD   nicotine (Nicoderm CQ) 14 mg/24 hr patch Apply 1 patch topically every day.  START AFTER finishing  the 21mg patches. 8/31/24 10/23/24  Rosa Brown MD   nicotine (Nicoderm CQ) 21 mg/24 hr patch Place 1 patch over 24 hours on the skin once daily for 42 doses. 9/1/24 10/8/24  Rosa Brown MD   nicotine (Nicoderm CQ) 7 mg/24 hr patch Place 1 patch over 24 hours on the skin once daily for 14 doses. START AFTER finishing the 14mg patches.  Patient not taking: Reported on 10/23/2024 10/22/24 11/5/24  Rosa Brown MD   ondansetron (Zofran) 8 mg tablet Take 1 tablet (8 mg) by mouth every 8 hours if needed for nausea or vomiting. 9/19/24   Janiya Landis MD   oxygen (O2) gas therapy Inhale 2 L/min continuously. Indications: decreased oxygen in the tissues or blood  Patient not taking: Reported on 10/23/2024    Historical Provider, MD   pantoprazole (ProtoNix) 40 mg EC tablet Take 1 tablet (40 mg) by mouth once daily. Do not crush, chew, or split. 9/19/24 3/18/25  Janiya Landis MD   potassium chloride CR (Klor-Con M20) 20 mEq ER tablet Take 2 tablets (40 mEq) by mouth once daily. Do not crush or chew. 9/27/24   Ralph Gandara MD   prochlorperazine (Compazine) 10 mg tablet Take 1 tablet (10 mg) by mouth every 6 hours if needed for nausea or vomiting. 9/19/24   Janiya Landis MD   simvastatin (Zocor) 20 mg tablet Take 1 tablet (20 mg) by mouth once daily. 8/31/23   Thanh Cuba MD   sodium chloride 3 % nebulizer solution Take 4 mL by nebulization 3 times a day. 10/18/24 11/17/24  Tamra Nicole MD   sodium chloride 3% nebulizer solution Take 4 mL by nebulization 3 times a day. Start taking on November 19th or as prescribed by your doctor. Take after albuterol. Do not fill before November 17, 2024. 11/17/24 12/17/24  Cynthia Cowart MD   thiamine (Vitamin B-1) 100 mg tablet Take 1 tablet (100 mg) by mouth once daily. Do not start  before September 2, 2024. 9/2/24 11/1/24  Rosa Brown MD   tiotropium (Spiriva Respimat) 2.5 mcg/actuation inhaler Inhale 2 puffs once daily. 9/1/24  10/31/24  Rosa Brown MD   albuterol 90 mcg/actuation inhaler Inhale 2 puffs every 6 hours if needed for wheezing.  10/19/24  Historical Provider, MD   levoFLOXacin (Levaquin) 750 mg tablet Take 1 tablet (750 mg) by mouth once daily for 7 days. 10/11/24 10/19/24  Rojelio Ewing MD   melatonin 3 mg tablet Take 1 tablet (3 mg) by mouth once daily.  Patient not taking: Reported on 10/15/2024 8/31/24 10/19/24  Rosa Brown MD       Reviewed documented list of home medications.  No significant drug interactions identified between home medications and chemotherapy regimen.    Yes    WBC   Date Value Ref Range Status   10/23/2024 8.3 4.4 - 11.3 x10*3/uL Final   10/19/2024 7.1 4.4 - 11.3 x10*3/uL Final   10/18/2024 6.0 4.4 - 11.3 x10*3/uL Final     Hemoglobin   Date Value Ref Range Status   10/23/2024 10.0 (L) 13.5 - 17.5 g/dL Final   10/19/2024 9.5 (L) 13.5 - 17.5 g/dL Final   10/18/2024 10.1 (L) 13.5 - 17.5 g/dL Final     Hematocrit   Date Value Ref Range Status   10/23/2024 32.5 (L) 41.0 - 52.0 % Final   10/19/2024 28.5 (L) 41.0 - 52.0 % Final   10/18/2024 30.8 (L) 41.0 - 52.0 % Final     Neutrophils Absolute   Date Value Ref Range Status   10/23/2024 6.59 (H) 1.60 - 5.50 x10*3/uL Final     Comment:     Percent differential counts (%) should be interpreted in the context of the absolute cell counts (cells/uL).   10/16/2024 6.03 (H) 1.60 - 5.50 x10*3/uL Final     Comment:     Percent differential counts (%) should be interpreted in the context of the absolute cell counts (cells/uL).   10/09/2024 7.46 (H) 1.60 - 5.50 x10*3/uL Final     Comment:     Percent differential counts (%) should be interpreted in the context of the absolute cell counts (cells/uL).     Platelets   Date Value Ref Range Status   10/23/2024 149 (L) 150 - 450 x10*3/uL Final   10/19/2024 149 (L) 150 - 450 x10*3/uL Final   10/18/2024 148 (L) 150 - 450 x10*3/uL Final     Creatinine   Date Value Ref Range Status   10/23/2024 0.50 0.50 - 1.30  mg/dL Final   10/19/2024 0.50 0.50 - 1.30 mg/dL Final   10/18/2024 0.53 0.50 - 1.30 mg/dL Final     Alkaline Phosphatase   Date Value Ref Range Status   10/23/2024 61 33 - 136 U/L Final   10/16/2024 56 33 - 136 U/L Final   10/09/2024 56 33 - 136 U/L Final     AST   Date Value Ref Range Status   10/23/2024 14 9 - 39 U/L Final   10/16/2024 13 9 - 39 U/L Final   10/09/2024 15 9 - 39 U/L Final     ALT   Date Value Ref Range Status   10/23/2024 12 10 - 52 U/L Final     Comment:     Patients treated with Sulfasalazine may generate falsely decreased results for ALT.   10/16/2024 7 (L) 10 - 52 U/L Final     Comment:     Patients treated with Sulfasalazine may generate falsely decreased results for ALT.   10/09/2024 7 (L) 10 - 52 U/L Final     Comment:     Patients treated with Sulfasalazine may generate falsely decreased results for ALT.     Bilirubin, Total   Date Value Ref Range Status   10/23/2024 0.5 0.0 - 1.2 mg/dL Final   10/16/2024 0.6 0.0 - 1.2 mg/dL Final   10/09/2024 0.6 0.0 - 1.2 mg/dL Final         Does the patient meet the treatment conditions?  Yes    ANC >= 1.5  Plt >= 100     Are dosage calculations correct?  Yes    Are doses the same as previous cycle?   Yes    Were any doses modified?  No    If appropriate, was the Creatinine Clearance independently calculated based on [facility] standards?   Yes      Comments:      I Missael Loaiza, PharmD hereby acknowledge that the treatment plan indicated above has been verified for correctness to the best of my ability on 10/23/2024 at 11:14 AM.

## 2024-10-23 NOTE — SIGNIFICANT EVENT
10/23/24 1057   Prechemo Checklist   Has the patient been in the hospital, ED, or urgent care since last date of service Yes   Chemo/Immuno Consent Completed and Signed Yes   Protocol/Indications Verified Yes   Confirmed to previous date/time of medication Yes   Compared to previous dose Yes   All medications are dated accurately Yes   Pregnancy Test Negative Not applicable   Parameters Met Yes   BSA/Weight-Height Verified Yes   Dose Calculations Verified (current, total, cumulative) Yes

## 2024-10-23 NOTE — PROGRESS NOTES
Treatment Pre-Review for Date of Service: 10/23/24    Diagnosis:  Malignant neoplasm of upper lobe of right lung (Multi), Clinical: Stage IIIB (cT4, cN2, cM0)      Regimen: Paclitaxel + Carboplatin weekly     Current Cycle/Day: Cycle 1 Day 15   Treatment Date Appropriate: Yes; Last Treatment: 10/9/24  Date change required:  Treatment cancelled 10/16/24  due to ER visit/shortness of breath. Patient was found to have a right lung collapse.     Dose or Regimen Modifications: None noted during pre-review    Med Rec/Drug Interactions: None noted during pre-review    Growth Factor: none    Financial Clearance/Authorization: Yes    Echo:  Transthoracic Echo (TTE) Complete 08/29/2024    Lifetime Dose Tracking   No doses have been documented on this patient for the following tracked chemicals: doxorubicin, epirubicin, idarubicin, daunorubicin, mitoxantrone, bleomycin, mitomycin, carmustine, doxorubicin HCl pegylated liposomal, daunorubicin citrate liposomal     Comments: none    I Missael Loaiza, PharmD hereby acknowledge that the treatment plan indicated above has been verified for correctness to the best of my ability on 10/23/2024 at 9:45 AM.

## 2024-10-24 ENCOUNTER — HOSPITAL ENCOUNTER (OUTPATIENT)
Dept: RADIATION ONCOLOGY | Facility: CLINIC | Age: 74
Setting detail: RADIATION/ONCOLOGY SERIES
Discharge: HOME | End: 2024-10-24
Payer: MEDICARE

## 2024-10-24 DIAGNOSIS — Z51.0 ENCOUNTER FOR ANTINEOPLASTIC RADIATION THERAPY: ICD-10-CM

## 2024-10-24 DIAGNOSIS — C34.81 MALIGNANT NEOPLASM OF OVERLAPPING SITES OF RIGHT BRONCHUS AND LUNG (MULTI): ICD-10-CM

## 2024-10-24 LAB
GLUCOSE BLD MANUAL STRIP-MCNC: 113 MG/DL (ref 74–99)
GLUCOSE BLD MANUAL STRIP-MCNC: 157 MG/DL (ref 74–99)
RAD ONC MSQ ACTUAL FRACTIONS DELIVERED: 1
RAD ONC MSQ ACTUAL SESSION DELIVERED DOSE: 200 CGRAY
RAD ONC MSQ ACTUAL TOTAL DOSE: 200 CGRAY
RAD ONC MSQ ELAPSED DAYS: 0
RAD ONC MSQ LAST DATE: NORMAL
RAD ONC MSQ PRESCRIBED FRACTIONAL DOSE: 200 CGRAY
RAD ONC MSQ PRESCRIBED NUMBER OF FRACTIONS: 26
RAD ONC MSQ PRESCRIBED TECHNIQUE: NORMAL
RAD ONC MSQ PRESCRIBED TOTAL DOSE: 5200 CGRAY
RAD ONC MSQ PRESCRIPTION PATTERN COMMENT: NORMAL
RAD ONC MSQ START DATE: NORMAL
RAD ONC MSQ TREATMENT COURSE NUMBER: 1
RAD ONC MSQ TREATMENT SITE: NORMAL

## 2024-10-24 PROCEDURE — 77386 HC INTENSITY-MODULATED RADIATION THERAPY (IMRT), COMPLEX: CPT | Performed by: STUDENT IN AN ORGANIZED HEALTH CARE EDUCATION/TRAINING PROGRAM

## 2024-10-25 ENCOUNTER — HOSPITAL ENCOUNTER (OUTPATIENT)
Dept: RADIATION ONCOLOGY | Facility: CLINIC | Age: 74
Setting detail: RADIATION/ONCOLOGY SERIES
Discharge: HOME | End: 2024-10-25
Payer: MEDICARE

## 2024-10-25 DIAGNOSIS — Z51.0 ENCOUNTER FOR ANTINEOPLASTIC RADIATION THERAPY: ICD-10-CM

## 2024-10-25 DIAGNOSIS — C34.81 MALIGNANT NEOPLASM OF OVERLAPPING SITES OF RIGHT BRONCHUS AND LUNG (MULTI): ICD-10-CM

## 2024-10-25 LAB
RAD ONC MSQ ACTUAL FRACTIONS DELIVERED: 2
RAD ONC MSQ ACTUAL SESSION DELIVERED DOSE: 200 CGRAY
RAD ONC MSQ ACTUAL TOTAL DOSE: 400 CGRAY
RAD ONC MSQ ELAPSED DAYS: 1
RAD ONC MSQ LAST DATE: NORMAL
RAD ONC MSQ PRESCRIBED FRACTIONAL DOSE: 200 CGRAY
RAD ONC MSQ PRESCRIBED NUMBER OF FRACTIONS: 26
RAD ONC MSQ PRESCRIBED TECHNIQUE: NORMAL
RAD ONC MSQ PRESCRIBED TOTAL DOSE: 5200 CGRAY
RAD ONC MSQ PRESCRIPTION PATTERN COMMENT: NORMAL
RAD ONC MSQ START DATE: NORMAL
RAD ONC MSQ TREATMENT COURSE NUMBER: 1
RAD ONC MSQ TREATMENT SITE: NORMAL

## 2024-10-25 PROCEDURE — 77386 HC INTENSITY-MODULATED RADIATION THERAPY (IMRT), COMPLEX: CPT | Performed by: STUDENT IN AN ORGANIZED HEALTH CARE EDUCATION/TRAINING PROGRAM

## 2024-10-28 ENCOUNTER — HOSPITAL ENCOUNTER (OUTPATIENT)
Dept: RADIATION ONCOLOGY | Facility: CLINIC | Age: 74
Setting detail: RADIATION/ONCOLOGY SERIES
Discharge: HOME | End: 2024-10-28
Payer: MEDICARE

## 2024-10-28 DIAGNOSIS — C34.81 MALIGNANT NEOPLASM OF OVERLAPPING SITES OF RIGHT BRONCHUS AND LUNG (MULTI): ICD-10-CM

## 2024-10-28 DIAGNOSIS — Z51.0 ENCOUNTER FOR ANTINEOPLASTIC RADIATION THERAPY: ICD-10-CM

## 2024-10-28 LAB
RAD ONC MSQ ACTUAL FRACTIONS DELIVERED: 3
RAD ONC MSQ ACTUAL SESSION DELIVERED DOSE: 200 CGRAY
RAD ONC MSQ ACTUAL TOTAL DOSE: 600 CGRAY
RAD ONC MSQ ELAPSED DAYS: 4
RAD ONC MSQ LAST DATE: NORMAL
RAD ONC MSQ PRESCRIBED FRACTIONAL DOSE: 200 CGRAY
RAD ONC MSQ PRESCRIBED NUMBER OF FRACTIONS: 26
RAD ONC MSQ PRESCRIBED TECHNIQUE: NORMAL
RAD ONC MSQ PRESCRIBED TOTAL DOSE: 5200 CGRAY
RAD ONC MSQ PRESCRIPTION PATTERN COMMENT: NORMAL
RAD ONC MSQ START DATE: NORMAL
RAD ONC MSQ TREATMENT COURSE NUMBER: 1
RAD ONC MSQ TREATMENT SITE: NORMAL

## 2024-10-28 PROCEDURE — 77386 HC INTENSITY-MODULATED RADIATION THERAPY (IMRT), COMPLEX: CPT | Performed by: STUDENT IN AN ORGANIZED HEALTH CARE EDUCATION/TRAINING PROGRAM

## 2024-10-29 ENCOUNTER — HOSPITAL ENCOUNTER (OUTPATIENT)
Dept: RADIATION ONCOLOGY | Facility: CLINIC | Age: 74
Setting detail: RADIATION/ONCOLOGY SERIES
Discharge: HOME | End: 2024-10-29
Payer: MEDICARE

## 2024-10-29 DIAGNOSIS — Z51.0 ENCOUNTER FOR ANTINEOPLASTIC RADIATION THERAPY: ICD-10-CM

## 2024-10-29 DIAGNOSIS — C34.81 MALIGNANT NEOPLASM OF OVERLAPPING SITES OF RIGHT BRONCHUS AND LUNG (MULTI): ICD-10-CM

## 2024-10-29 LAB
RAD ONC MSQ ACTUAL FRACTIONS DELIVERED: 4
RAD ONC MSQ ACTUAL SESSION DELIVERED DOSE: 200 CGRAY
RAD ONC MSQ ACTUAL TOTAL DOSE: 800 CGRAY
RAD ONC MSQ ELAPSED DAYS: 5
RAD ONC MSQ LAST DATE: NORMAL
RAD ONC MSQ PRESCRIBED FRACTIONAL DOSE: 200 CGRAY
RAD ONC MSQ PRESCRIBED NUMBER OF FRACTIONS: 26
RAD ONC MSQ PRESCRIBED TECHNIQUE: NORMAL
RAD ONC MSQ PRESCRIBED TOTAL DOSE: 5200 CGRAY
RAD ONC MSQ PRESCRIPTION PATTERN COMMENT: NORMAL
RAD ONC MSQ START DATE: NORMAL
RAD ONC MSQ TREATMENT COURSE NUMBER: 1
RAD ONC MSQ TREATMENT SITE: NORMAL

## 2024-10-29 PROCEDURE — 77386 HC INTENSITY-MODULATED RADIATION THERAPY (IMRT), COMPLEX: CPT | Performed by: STUDENT IN AN ORGANIZED HEALTH CARE EDUCATION/TRAINING PROGRAM

## 2024-10-30 ENCOUNTER — APPOINTMENT (OUTPATIENT)
Dept: HEMATOLOGY/ONCOLOGY | Facility: HOSPITAL | Age: 74
End: 2024-10-30
Payer: MEDICARE

## 2024-10-30 ENCOUNTER — HOSPITAL ENCOUNTER (OUTPATIENT)
Dept: RADIOLOGY | Facility: EXTERNAL LOCATION | Age: 74
Discharge: HOME | End: 2024-10-30

## 2024-10-30 ENCOUNTER — RADIATION ONCOLOGY OTV (OUTPATIENT)
Dept: RADIATION ONCOLOGY | Facility: CLINIC | Age: 74
End: 2024-10-30
Payer: MEDICARE

## 2024-10-30 ENCOUNTER — INFUSION (OUTPATIENT)
Dept: HEMATOLOGY/ONCOLOGY | Facility: HOSPITAL | Age: 74
End: 2024-10-30
Payer: MEDICARE

## 2024-10-30 ENCOUNTER — HOSPITAL ENCOUNTER (OUTPATIENT)
Dept: RADIATION ONCOLOGY | Facility: CLINIC | Age: 74
Setting detail: RADIATION/ONCOLOGY SERIES
Discharge: HOME | End: 2024-10-30
Payer: MEDICARE

## 2024-10-30 VITALS
OXYGEN SATURATION: 94 % | HEART RATE: 96 BPM | TEMPERATURE: 97.9 F | BODY MASS INDEX: 20.28 KG/M2 | SYSTOLIC BLOOD PRESSURE: 100 MMHG | DIASTOLIC BLOOD PRESSURE: 66 MMHG | WEIGHT: 137.35 LBS | RESPIRATION RATE: 16 BRPM

## 2024-10-30 VITALS
TEMPERATURE: 96.6 F | SYSTOLIC BLOOD PRESSURE: 100 MMHG | RESPIRATION RATE: 18 BRPM | BODY MASS INDEX: 20.32 KG/M2 | WEIGHT: 137.57 LBS | HEART RATE: 90 BPM | OXYGEN SATURATION: 92 % | DIASTOLIC BLOOD PRESSURE: 60 MMHG

## 2024-10-30 DIAGNOSIS — C34.81 MALIGNANT NEOPLASM OF OVERLAPPING SITES OF RIGHT LUNG (MULTI): Primary | ICD-10-CM

## 2024-10-30 DIAGNOSIS — Z51.0 ENCOUNTER FOR ANTINEOPLASTIC RADIATION THERAPY: ICD-10-CM

## 2024-10-30 DIAGNOSIS — C34.11 MALIGNANT NEOPLASM OF UPPER LOBE OF RIGHT LUNG (MULTI): ICD-10-CM

## 2024-10-30 DIAGNOSIS — C34.81 MALIGNANT NEOPLASM OF OVERLAPPING SITES OF RIGHT LUNG (MULTI): ICD-10-CM

## 2024-10-30 LAB
ALBUMIN SERPL BCP-MCNC: 2.8 G/DL (ref 3.4–5)
ALP SERPL-CCNC: 76 U/L (ref 33–136)
ALT SERPL W P-5'-P-CCNC: 17 U/L (ref 10–52)
ANION GAP SERPL CALC-SCNC: 13 MMOL/L (ref 10–20)
AST SERPL W P-5'-P-CCNC: 24 U/L (ref 9–39)
BASOPHILS # BLD AUTO: 0.02 X10*3/UL (ref 0–0.1)
BASOPHILS NFR BLD AUTO: 0.2 %
BILIRUB SERPL-MCNC: 0.6 MG/DL (ref 0–1.2)
BUN SERPL-MCNC: 11 MG/DL (ref 6–23)
CALCIUM SERPL-MCNC: 8.6 MG/DL (ref 8.6–10.3)
CHLORIDE SERPL-SCNC: 94 MMOL/L (ref 98–107)
CO2 SERPL-SCNC: 24 MMOL/L (ref 21–32)
CREAT SERPL-MCNC: 0.57 MG/DL (ref 0.5–1.3)
EGFRCR SERPLBLD CKD-EPI 2021: >90 ML/MIN/1.73M*2
EOSINOPHIL # BLD AUTO: 0.03 X10*3/UL (ref 0–0.4)
EOSINOPHIL NFR BLD AUTO: 0.3 %
ERYTHROCYTE [DISTWIDTH] IN BLOOD BY AUTOMATED COUNT: 13.1 % (ref 11.5–14.5)
GLUCOSE SERPL-MCNC: 101 MG/DL (ref 74–99)
HCT VFR BLD AUTO: 26 % (ref 41–52)
HGB BLD-MCNC: 8.6 G/DL (ref 13.5–17.5)
IMM GRANULOCYTES # BLD AUTO: 0.09 X10*3/UL (ref 0–0.5)
IMM GRANULOCYTES NFR BLD AUTO: 1 % (ref 0–0.9)
LYMPHOCYTES # BLD AUTO: 0.56 X10*3/UL (ref 0.8–3)
LYMPHOCYTES NFR BLD AUTO: 6 %
MCH RBC QN AUTO: 30.4 PG (ref 26–34)
MCHC RBC AUTO-ENTMCNC: 33.1 G/DL (ref 32–36)
MCV RBC AUTO: 92 FL (ref 80–100)
MONOCYTES # BLD AUTO: 0.41 X10*3/UL (ref 0.05–0.8)
MONOCYTES NFR BLD AUTO: 4.4 %
NEUTROPHILS # BLD AUTO: 8.28 X10*3/UL (ref 1.6–5.5)
NEUTROPHILS NFR BLD AUTO: 88.1 %
NRBC BLD-RTO: 0 /100 WBCS (ref 0–0)
PLATELET # BLD AUTO: 123 X10*3/UL (ref 150–450)
POTASSIUM SERPL-SCNC: 4.4 MMOL/L (ref 3.5–5.3)
PROT SERPL-MCNC: 6.4 G/DL (ref 6.4–8.2)
RAD ONC MSQ ACTUAL FRACTIONS DELIVERED: 5
RAD ONC MSQ ACTUAL SESSION DELIVERED DOSE: 200 CGRAY
RAD ONC MSQ ACTUAL TOTAL DOSE: 1000 CGRAY
RAD ONC MSQ ELAPSED DAYS: 6
RAD ONC MSQ LAST DATE: NORMAL
RAD ONC MSQ PRESCRIBED FRACTIONAL DOSE: 200 CGRAY
RAD ONC MSQ PRESCRIBED NUMBER OF FRACTIONS: 26
RAD ONC MSQ PRESCRIBED TECHNIQUE: NORMAL
RAD ONC MSQ PRESCRIBED TOTAL DOSE: 5200 CGRAY
RAD ONC MSQ PRESCRIPTION PATTERN COMMENT: NORMAL
RAD ONC MSQ START DATE: NORMAL
RAD ONC MSQ TREATMENT COURSE NUMBER: 1
RAD ONC MSQ TREATMENT SITE: NORMAL
RBC # BLD AUTO: 2.83 X10*6/UL (ref 4.5–5.9)
SODIUM SERPL-SCNC: 127 MMOL/L (ref 136–145)
WBC # BLD AUTO: 9.4 X10*3/UL (ref 4.4–11.3)

## 2024-10-30 PROCEDURE — 2500000001 HC RX 250 WO HCPCS SELF ADMINISTERED DRUGS (ALT 637 FOR MEDICARE OP): Performed by: INTERNAL MEDICINE

## 2024-10-30 PROCEDURE — 77386 HC INTENSITY-MODULATED RADIATION THERAPY (IMRT), COMPLEX: CPT | Performed by: STUDENT IN AN ORGANIZED HEALTH CARE EDUCATION/TRAINING PROGRAM

## 2024-10-30 PROCEDURE — 77336 RADIATION PHYSICS CONSULT: CPT | Performed by: STUDENT IN AN ORGANIZED HEALTH CARE EDUCATION/TRAINING PROGRAM

## 2024-10-30 PROCEDURE — 96417 CHEMO IV INFUS EACH ADDL SEQ: CPT

## 2024-10-30 PROCEDURE — 85025 COMPLETE CBC W/AUTO DIFF WBC: CPT

## 2024-10-30 PROCEDURE — 96375 TX/PRO/DX INJ NEW DRUG ADDON: CPT | Mod: INF

## 2024-10-30 PROCEDURE — 96413 CHEMO IV INFUSION 1 HR: CPT

## 2024-10-30 PROCEDURE — 2500000004 HC RX 250 GENERAL PHARMACY W/ HCPCS (ALT 636 FOR OP/ED): Performed by: INTERNAL MEDICINE

## 2024-10-30 PROCEDURE — 84075 ASSAY ALKALINE PHOSPHATASE: CPT

## 2024-10-30 RX ORDER — EPINEPHRINE 0.3 MG/.3ML
0.3 INJECTION SUBCUTANEOUS EVERY 5 MIN PRN
Status: DISCONTINUED | OUTPATIENT
Start: 2024-10-30 | End: 2024-10-30 | Stop reason: HOSPADM

## 2024-10-30 RX ORDER — DEXAMETHASONE 6 MG/1
12 TABLET ORAL ONCE
Status: COMPLETED | OUTPATIENT
Start: 2024-10-30 | End: 2024-10-30

## 2024-10-30 RX ORDER — FAMOTIDINE 10 MG/ML
20 INJECTION INTRAVENOUS ONCE
Status: COMPLETED | OUTPATIENT
Start: 2024-10-30 | End: 2024-10-30

## 2024-10-30 RX ORDER — PROCHLORPERAZINE EDISYLATE 5 MG/ML
10 INJECTION INTRAMUSCULAR; INTRAVENOUS EVERY 6 HOURS PRN
Status: DISCONTINUED | OUTPATIENT
Start: 2024-10-30 | End: 2024-10-30 | Stop reason: HOSPADM

## 2024-10-30 RX ORDER — ALBUTEROL SULFATE 0.83 MG/ML
3 SOLUTION RESPIRATORY (INHALATION) AS NEEDED
Status: DISCONTINUED | OUTPATIENT
Start: 2024-10-30 | End: 2024-10-30 | Stop reason: HOSPADM

## 2024-10-30 RX ORDER — HEPARIN SODIUM,PORCINE/PF 10 UNIT/ML
50 SYRINGE (ML) INTRAVENOUS AS NEEDED
OUTPATIENT
Start: 2024-10-30

## 2024-10-30 RX ORDER — DIPHENHYDRAMINE HYDROCHLORIDE 50 MG/ML
50 INJECTION INTRAMUSCULAR; INTRAVENOUS AS NEEDED
Status: DISCONTINUED | OUTPATIENT
Start: 2024-10-30 | End: 2024-10-30 | Stop reason: HOSPADM

## 2024-10-30 RX ORDER — HEPARIN 100 UNIT/ML
500 SYRINGE INTRAVENOUS AS NEEDED
Status: DISCONTINUED | OUTPATIENT
Start: 2024-10-30 | End: 2024-10-30 | Stop reason: HOSPADM

## 2024-10-30 RX ORDER — DIPHENHYDRAMINE HCL 25 MG
50 CAPSULE ORAL ONCE
Status: COMPLETED | OUTPATIENT
Start: 2024-10-30 | End: 2024-10-30

## 2024-10-30 RX ORDER — PROCHLORPERAZINE MALEATE 10 MG
10 TABLET ORAL EVERY 6 HOURS PRN
Status: DISCONTINUED | OUTPATIENT
Start: 2024-10-30 | End: 2024-10-30 | Stop reason: HOSPADM

## 2024-10-30 RX ORDER — HEPARIN SODIUM,PORCINE/PF 10 UNIT/ML
50 SYRINGE (ML) INTRAVENOUS AS NEEDED
Status: DISCONTINUED | OUTPATIENT
Start: 2024-10-30 | End: 2024-10-30 | Stop reason: HOSPADM

## 2024-10-30 RX ORDER — HEPARIN 100 UNIT/ML
500 SYRINGE INTRAVENOUS AS NEEDED
OUTPATIENT
Start: 2024-10-30

## 2024-10-30 RX ORDER — PALONOSETRON 0.05 MG/ML
0.25 INJECTION, SOLUTION INTRAVENOUS ONCE
Status: COMPLETED | OUTPATIENT
Start: 2024-10-30 | End: 2024-10-30

## 2024-10-30 RX ORDER — FAMOTIDINE 10 MG/ML
20 INJECTION INTRAVENOUS ONCE AS NEEDED
Status: DISCONTINUED | OUTPATIENT
Start: 2024-10-30 | End: 2024-10-30 | Stop reason: HOSPADM

## 2024-10-30 ASSESSMENT — PATIENT HEALTH QUESTIONNAIRE - PHQ9
SUM OF ALL RESPONSES TO PHQ9 QUESTIONS 1 AND 2: 0
1. LITTLE INTEREST OR PLEASURE IN DOING THINGS: NOT AT ALL
1. LITTLE INTEREST OR PLEASURE IN DOING THINGS: NOT AT ALL
2. FEELING DOWN, DEPRESSED OR HOPELESS: NOT AT ALL
SUM OF ALL RESPONSES TO PHQ9 QUESTIONS 1 & 2: 0
2. FEELING DOWN, DEPRESSED OR HOPELESS: NOT AT ALL

## 2024-10-30 ASSESSMENT — ENCOUNTER SYMPTOMS
LOSS OF SENSATION IN FEET: 0
OCCASIONAL FEELINGS OF UNSTEADINESS: 0

## 2024-10-30 ASSESSMENT — PAIN SCALES - GENERAL
PAINLEVEL_OUTOF10: 0-NO PAIN
PAINLEVEL_OUTOF10: 0-NO PAIN

## 2024-10-31 ENCOUNTER — APPOINTMENT (OUTPATIENT)
Dept: RADIATION ONCOLOGY | Facility: CLINIC | Age: 74
End: 2024-10-31
Payer: MEDICARE

## 2024-10-31 ENCOUNTER — HOSPITAL ENCOUNTER (OUTPATIENT)
Dept: RADIATION ONCOLOGY | Facility: CLINIC | Age: 74
Setting detail: RADIATION/ONCOLOGY SERIES
Discharge: HOME | End: 2024-10-31
Payer: MEDICARE

## 2024-10-31 DIAGNOSIS — Z51.0 ENCOUNTER FOR ANTINEOPLASTIC RADIATION THERAPY: ICD-10-CM

## 2024-10-31 DIAGNOSIS — C34.81 MALIGNANT NEOPLASM OF OVERLAPPING SITES OF RIGHT BRONCHUS AND LUNG (MULTI): ICD-10-CM

## 2024-10-31 LAB
RAD ONC MSQ ACTUAL FRACTIONS DELIVERED: 6
RAD ONC MSQ ACTUAL SESSION DELIVERED DOSE: 200 CGRAY
RAD ONC MSQ ACTUAL TOTAL DOSE: 1200 CGRAY
RAD ONC MSQ ELAPSED DAYS: 7
RAD ONC MSQ LAST DATE: NORMAL
RAD ONC MSQ PRESCRIBED FRACTIONAL DOSE: 200 CGRAY
RAD ONC MSQ PRESCRIBED NUMBER OF FRACTIONS: 7
RAD ONC MSQ PRESCRIBED TECHNIQUE: NORMAL
RAD ONC MSQ PRESCRIBED TOTAL DOSE: 1400 CGRAY
RAD ONC MSQ PRESCRIPTION PATTERN COMMENT: NORMAL
RAD ONC MSQ START DATE: NORMAL
RAD ONC MSQ TREATMENT COURSE NUMBER: 1
RAD ONC MSQ TREATMENT SITE: NORMAL

## 2024-10-31 PROCEDURE — 77386 HC INTENSITY-MODULATED RADIATION THERAPY (IMRT), COMPLEX: CPT | Performed by: STUDENT IN AN ORGANIZED HEALTH CARE EDUCATION/TRAINING PROGRAM

## 2024-10-31 RX ORDER — AMLODIPINE BESYLATE 10 MG/1
1 TABLET ORAL
COMMUNITY
Start: 2024-09-21 | End: 2024-11-01 | Stop reason: ALTCHOICE

## 2024-11-01 ENCOUNTER — HOSPITAL ENCOUNTER (OUTPATIENT)
Dept: CARDIOLOGY | Facility: HOSPITAL | Age: 74
Discharge: HOME | End: 2024-11-01
Payer: MEDICARE

## 2024-11-01 ENCOUNTER — APPOINTMENT (OUTPATIENT)
Dept: CARDIOLOGY | Facility: CLINIC | Age: 74
End: 2024-11-01
Payer: MEDICARE

## 2024-11-01 ENCOUNTER — HOSPITAL ENCOUNTER (OUTPATIENT)
Dept: RADIATION ONCOLOGY | Facility: CLINIC | Age: 74
Setting detail: RADIATION/ONCOLOGY SERIES
Discharge: HOME | End: 2024-11-01
Payer: MEDICARE

## 2024-11-01 VITALS
SYSTOLIC BLOOD PRESSURE: 107 MMHG | DIASTOLIC BLOOD PRESSURE: 66 MMHG | HEART RATE: 77 BPM | OXYGEN SATURATION: 98 % | WEIGHT: 136 LBS | BODY MASS INDEX: 20.61 KG/M2 | HEIGHT: 68 IN

## 2024-11-01 DIAGNOSIS — I48.0 PAROXYSMAL ATRIAL FIBRILLATION (MULTI): Primary | ICD-10-CM

## 2024-11-01 DIAGNOSIS — I50.22 CHRONIC SYSTOLIC HEART FAILURE: ICD-10-CM

## 2024-11-01 DIAGNOSIS — I10 PRIMARY HYPERTENSION: ICD-10-CM

## 2024-11-01 DIAGNOSIS — C34.81 MALIGNANT NEOPLASM OF OVERLAPPING SITES OF RIGHT BRONCHUS AND LUNG (MULTI): ICD-10-CM

## 2024-11-01 DIAGNOSIS — I48.0 PAROXYSMAL ATRIAL FIBRILLATION (MULTI): ICD-10-CM

## 2024-11-01 DIAGNOSIS — Z95.5 STENTED CORONARY ARTERY: ICD-10-CM

## 2024-11-01 DIAGNOSIS — Z51.0 ENCOUNTER FOR ANTINEOPLASTIC RADIATION THERAPY: ICD-10-CM

## 2024-11-01 LAB
ATRIAL RATE: 76 BPM
P AXIS: 66 DEGREES
P OFFSET: 188 MS
P ONSET: 135 MS
PR INTERVAL: 168 MS
Q ONSET: 219 MS
QRS COUNT: 12 BEATS
QRS DURATION: 100 MS
QT INTERVAL: 396 MS
QTC CALCULATION(BAZETT): 445 MS
QTC FREDERICIA: 428 MS
R AXIS: 39 DEGREES
RAD ONC MSQ ACTUAL FRACTIONS DELIVERED: 7
RAD ONC MSQ ACTUAL SESSION DELIVERED DOSE: 200 CGRAY
RAD ONC MSQ ACTUAL TOTAL DOSE: 1400 CGRAY
RAD ONC MSQ ELAPSED DAYS: 8
RAD ONC MSQ LAST DATE: NORMAL
RAD ONC MSQ PRESCRIBED FRACTIONAL DOSE: 200 CGRAY
RAD ONC MSQ PRESCRIBED NUMBER OF FRACTIONS: 7
RAD ONC MSQ PRESCRIBED TECHNIQUE: NORMAL
RAD ONC MSQ PRESCRIBED TOTAL DOSE: 1400 CGRAY
RAD ONC MSQ PRESCRIPTION PATTERN COMMENT: NORMAL
RAD ONC MSQ START DATE: NORMAL
RAD ONC MSQ TREATMENT COURSE NUMBER: 1
RAD ONC MSQ TREATMENT SITE: NORMAL
T AXIS: 37 DEGREES
T OFFSET: 417 MS
VENTRICULAR RATE: 76 BPM

## 2024-11-01 PROCEDURE — 93010 ELECTROCARDIOGRAM REPORT: CPT | Performed by: STUDENT IN AN ORGANIZED HEALTH CARE EDUCATION/TRAINING PROGRAM

## 2024-11-01 PROCEDURE — 93005 ELECTROCARDIOGRAM TRACING: CPT

## 2024-11-01 PROCEDURE — RXMED WILLOW AMBULATORY MEDICATION CHARGE

## 2024-11-01 PROCEDURE — 3078F DIAST BP <80 MM HG: CPT | Performed by: STUDENT IN AN ORGANIZED HEALTH CARE EDUCATION/TRAINING PROGRAM

## 2024-11-01 PROCEDURE — 1159F MED LIST DOCD IN RCRD: CPT | Performed by: STUDENT IN AN ORGANIZED HEALTH CARE EDUCATION/TRAINING PROGRAM

## 2024-11-01 PROCEDURE — 77386 HC INTENSITY-MODULATED RADIATION THERAPY (IMRT), COMPLEX: CPT | Performed by: STUDENT IN AN ORGANIZED HEALTH CARE EDUCATION/TRAINING PROGRAM

## 2024-11-01 PROCEDURE — 1111F DSCHRG MED/CURRENT MED MERGE: CPT | Performed by: STUDENT IN AN ORGANIZED HEALTH CARE EDUCATION/TRAINING PROGRAM

## 2024-11-01 PROCEDURE — 99205 OFFICE O/P NEW HI 60 MIN: CPT | Performed by: STUDENT IN AN ORGANIZED HEALTH CARE EDUCATION/TRAINING PROGRAM

## 2024-11-01 PROCEDURE — G2211 COMPLEX E/M VISIT ADD ON: HCPCS | Performed by: STUDENT IN AN ORGANIZED HEALTH CARE EDUCATION/TRAINING PROGRAM

## 2024-11-01 PROCEDURE — 3008F BODY MASS INDEX DOCD: CPT | Performed by: STUDENT IN AN ORGANIZED HEALTH CARE EDUCATION/TRAINING PROGRAM

## 2024-11-01 PROCEDURE — 3074F SYST BP LT 130 MM HG: CPT | Performed by: STUDENT IN AN ORGANIZED HEALTH CARE EDUCATION/TRAINING PROGRAM

## 2024-11-01 RX ORDER — ROSUVASTATIN CALCIUM 20 MG/1
20 TABLET, COATED ORAL DAILY
Qty: 30 TABLET | Refills: 11 | Status: SHIPPED | OUTPATIENT
Start: 2024-11-01 | End: 2025-11-01

## 2024-11-01 RX ORDER — LOSARTAN POTASSIUM 25 MG/1
25 TABLET ORAL DAILY
Qty: 30 TABLET | Refills: 11 | Status: SHIPPED | OUTPATIENT
Start: 2024-11-01 | End: 2025-11-01

## 2024-11-01 RX ORDER — METOPROLOL SUCCINATE 50 MG/1
50 TABLET, EXTENDED RELEASE ORAL DAILY
Qty: 30 TABLET | Refills: 11 | Status: SHIPPED | OUTPATIENT
Start: 2024-11-01 | End: 2025-11-01

## 2024-11-04 ENCOUNTER — HOSPITAL ENCOUNTER (OUTPATIENT)
Dept: RADIATION ONCOLOGY | Facility: CLINIC | Age: 74
Setting detail: RADIATION/ONCOLOGY SERIES
Discharge: HOME | End: 2024-11-04
Payer: MEDICARE

## 2024-11-04 DIAGNOSIS — C34.81 MALIGNANT NEOPLASM OF OVERLAPPING SITES OF RIGHT BRONCHUS AND LUNG (MULTI): ICD-10-CM

## 2024-11-04 DIAGNOSIS — J44.1 COPD EXACERBATION (MULTI): ICD-10-CM

## 2024-11-04 DIAGNOSIS — Z51.0 ENCOUNTER FOR ANTINEOPLASTIC RADIATION THERAPY: ICD-10-CM

## 2024-11-04 PROBLEM — I50.22 CHRONIC SYSTOLIC HEART FAILURE: Status: ACTIVE | Noted: 2024-11-04

## 2024-11-04 LAB
RAD ONC MSQ ACTUAL FRACTIONS DELIVERED: 8
RAD ONC MSQ ACTUAL SESSION DELIVERED DOSE: 200 CGRAY
RAD ONC MSQ ACTUAL TOTAL DOSE: 1600 CGRAY
RAD ONC MSQ ELAPSED DAYS: 11
RAD ONC MSQ LAST DATE: NORMAL
RAD ONC MSQ PRESCRIBED FRACTIONAL DOSE: 200 CGRAY
RAD ONC MSQ PRESCRIBED NUMBER OF FRACTIONS: 8
RAD ONC MSQ PRESCRIBED TECHNIQUE: NORMAL
RAD ONC MSQ PRESCRIBED TOTAL DOSE: 1600 CGRAY
RAD ONC MSQ PRESCRIPTION PATTERN COMMENT: NORMAL
RAD ONC MSQ START DATE: NORMAL
RAD ONC MSQ TREATMENT COURSE NUMBER: 1
RAD ONC MSQ TREATMENT SITE: NORMAL

## 2024-11-04 PROCEDURE — 77300 RADIATION THERAPY DOSE PLAN: CPT | Performed by: STUDENT IN AN ORGANIZED HEALTH CARE EDUCATION/TRAINING PROGRAM

## 2024-11-04 PROCEDURE — 77301 RADIOTHERAPY DOSE PLAN IMRT: CPT | Performed by: STUDENT IN AN ORGANIZED HEALTH CARE EDUCATION/TRAINING PROGRAM

## 2024-11-04 PROCEDURE — 77338 DESIGN MLC DEVICE FOR IMRT: CPT | Performed by: STUDENT IN AN ORGANIZED HEALTH CARE EDUCATION/TRAINING PROGRAM

## 2024-11-04 PROCEDURE — 77386 HC INTENSITY-MODULATED RADIATION THERAPY (IMRT), COMPLEX: CPT | Performed by: STUDENT IN AN ORGANIZED HEALTH CARE EDUCATION/TRAINING PROGRAM

## 2024-11-04 RX ORDER — FLUTICASONE FUROATE AND VILANTEROL 200; 25 UG/1; UG/1
1 POWDER RESPIRATORY (INHALATION)
Qty: 60 EACH | Refills: 11 | Status: SHIPPED | OUTPATIENT
Start: 2024-11-04 | End: 2025-01-03

## 2024-11-05 ENCOUNTER — PHARMACY VISIT (OUTPATIENT)
Dept: PHARMACY | Facility: CLINIC | Age: 74
End: 2024-11-05
Payer: COMMERCIAL

## 2024-11-05 ENCOUNTER — HOSPITAL ENCOUNTER (OUTPATIENT)
Dept: RADIATION ONCOLOGY | Facility: CLINIC | Age: 74
Setting detail: RADIATION/ONCOLOGY SERIES
Discharge: HOME | End: 2024-11-05
Payer: MEDICARE

## 2024-11-05 DIAGNOSIS — C34.81 MALIGNANT NEOPLASM OF OVERLAPPING SITES OF RIGHT BRONCHUS AND LUNG (MULTI): ICD-10-CM

## 2024-11-05 DIAGNOSIS — Z51.0 ENCOUNTER FOR ANTINEOPLASTIC RADIATION THERAPY: ICD-10-CM

## 2024-11-05 LAB
RAD ONC MSQ ACTUAL FRACTIONS DELIVERED: 1
RAD ONC MSQ ACTUAL SESSION DELIVERED DOSE: 200 CGRAY
RAD ONC MSQ ACTUAL TOTAL DOSE: 200 CGRAY
RAD ONC MSQ ELAPSED DAYS: 0
RAD ONC MSQ LAST DATE: NORMAL
RAD ONC MSQ PRESCRIBED FRACTIONAL DOSE: 200 CGRAY
RAD ONC MSQ PRESCRIBED NUMBER OF FRACTIONS: 18
RAD ONC MSQ PRESCRIBED TECHNIQUE: NORMAL
RAD ONC MSQ PRESCRIBED TOTAL DOSE: 3600 CGRAY
RAD ONC MSQ PRESCRIPTION PATTERN COMMENT: NORMAL
RAD ONC MSQ START DATE: NORMAL
RAD ONC MSQ TREATMENT COURSE NUMBER: 1
RAD ONC MSQ TREATMENT SITE: NORMAL

## 2024-11-05 PROCEDURE — 77336 RADIATION PHYSICS CONSULT: CPT | Performed by: STUDENT IN AN ORGANIZED HEALTH CARE EDUCATION/TRAINING PROGRAM

## 2024-11-05 PROCEDURE — 77386 HC INTENSITY-MODULATED RADIATION THERAPY (IMRT), COMPLEX: CPT | Performed by: STUDENT IN AN ORGANIZED HEALTH CARE EDUCATION/TRAINING PROGRAM

## 2024-11-06 ENCOUNTER — LAB (OUTPATIENT)
Dept: LAB | Facility: LAB | Age: 74
End: 2024-11-06
Payer: MEDICARE

## 2024-11-06 ENCOUNTER — RADIATION ONCOLOGY OTV (OUTPATIENT)
Dept: RADIATION ONCOLOGY | Facility: CLINIC | Age: 74
End: 2024-11-06
Payer: MEDICARE

## 2024-11-06 ENCOUNTER — HOSPITAL ENCOUNTER (OUTPATIENT)
Dept: RADIATION ONCOLOGY | Facility: CLINIC | Age: 74
Setting detail: RADIATION/ONCOLOGY SERIES
Discharge: HOME | End: 2024-11-06
Payer: MEDICARE

## 2024-11-06 ENCOUNTER — INFUSION (OUTPATIENT)
Dept: HEMATOLOGY/ONCOLOGY | Facility: HOSPITAL | Age: 74
End: 2024-11-06
Payer: MEDICARE

## 2024-11-06 ENCOUNTER — OFFICE VISIT (OUTPATIENT)
Dept: HEMATOLOGY/ONCOLOGY | Facility: HOSPITAL | Age: 74
End: 2024-11-06
Payer: MEDICARE

## 2024-11-06 VITALS
HEART RATE: 89 BPM | OXYGEN SATURATION: 98 % | TEMPERATURE: 97 F | WEIGHT: 133.82 LBS | SYSTOLIC BLOOD PRESSURE: 95 MMHG | DIASTOLIC BLOOD PRESSURE: 60 MMHG | RESPIRATION RATE: 16 BRPM | BODY MASS INDEX: 20.35 KG/M2

## 2024-11-06 VITALS
HEART RATE: 93 BPM | RESPIRATION RATE: 18 BRPM | BODY MASS INDEX: 20.36 KG/M2 | WEIGHT: 133.93 LBS | SYSTOLIC BLOOD PRESSURE: 98 MMHG | TEMPERATURE: 97.2 F | OXYGEN SATURATION: 92 % | DIASTOLIC BLOOD PRESSURE: 57 MMHG

## 2024-11-06 VITALS
HEART RATE: 104 BPM | SYSTOLIC BLOOD PRESSURE: 106 MMHG | TEMPERATURE: 96.8 F | OXYGEN SATURATION: 92 % | RESPIRATION RATE: 16 BRPM | DIASTOLIC BLOOD PRESSURE: 58 MMHG

## 2024-11-06 DIAGNOSIS — Z51.0 ENCOUNTER FOR ANTINEOPLASTIC RADIATION THERAPY: ICD-10-CM

## 2024-11-06 DIAGNOSIS — C34.11 MALIGNANT NEOPLASM OF UPPER LOBE OF RIGHT LUNG (MULTI): ICD-10-CM

## 2024-11-06 DIAGNOSIS — C34.81 MALIGNANT NEOPLASM OF OVERLAPPING SITES OF RIGHT BRONCHUS AND LUNG (MULTI): ICD-10-CM

## 2024-11-06 DIAGNOSIS — C34.11 MALIGNANT NEOPLASM OF UPPER LOBE OF RIGHT LUNG (MULTI): Primary | ICD-10-CM

## 2024-11-06 LAB
ALBUMIN SERPL BCP-MCNC: 2.7 G/DL (ref 3.4–5)
ALP SERPL-CCNC: 86 U/L (ref 33–136)
ALT SERPL W P-5'-P-CCNC: 19 U/L (ref 10–52)
ANION GAP SERPL CALC-SCNC: 13 MMOL/L (ref 10–20)
AST SERPL W P-5'-P-CCNC: 22 U/L (ref 9–39)
BASOPHILS # BLD AUTO: 0.02 X10*3/UL (ref 0–0.1)
BASOPHILS NFR BLD AUTO: 0.6 %
BILIRUB SERPL-MCNC: 0.3 MG/DL (ref 0–1.2)
BUN SERPL-MCNC: 14 MG/DL (ref 6–23)
CALCIUM SERPL-MCNC: 8.2 MG/DL (ref 8.6–10.3)
CHLORIDE SERPL-SCNC: 94 MMOL/L (ref 98–107)
CO2 SERPL-SCNC: 26 MMOL/L (ref 21–32)
CREAT SERPL-MCNC: 0.69 MG/DL (ref 0.5–1.3)
EGFRCR SERPLBLD CKD-EPI 2021: >90 ML/MIN/1.73M*2
EOSINOPHIL # BLD AUTO: 0.02 X10*3/UL (ref 0–0.4)
EOSINOPHIL NFR BLD AUTO: 0.6 %
ERYTHROCYTE [DISTWIDTH] IN BLOOD BY AUTOMATED COUNT: 13.3 % (ref 11.5–14.5)
GLUCOSE SERPL-MCNC: 159 MG/DL (ref 74–99)
HCT VFR BLD AUTO: 25.9 % (ref 41–52)
HGB BLD-MCNC: 8.4 G/DL (ref 13.5–17.5)
IMM GRANULOCYTES # BLD AUTO: 0.03 X10*3/UL (ref 0–0.5)
IMM GRANULOCYTES NFR BLD AUTO: 0.8 % (ref 0–0.9)
LYMPHOCYTES # BLD AUTO: 0.41 X10*3/UL (ref 0.8–3)
LYMPHOCYTES NFR BLD AUTO: 11.4 %
MAGNESIUM SERPL-MCNC: 1.83 MG/DL (ref 1.6–2.4)
MCH RBC QN AUTO: 30.4 PG (ref 26–34)
MCHC RBC AUTO-ENTMCNC: 32.4 G/DL (ref 32–36)
MCV RBC AUTO: 94 FL (ref 80–100)
MONOCYTES # BLD AUTO: 0.25 X10*3/UL (ref 0.05–0.8)
MONOCYTES NFR BLD AUTO: 7 %
NEUTROPHILS # BLD AUTO: 2.86 X10*3/UL (ref 1.6–5.5)
NEUTROPHILS NFR BLD AUTO: 79.6 %
NRBC BLD-RTO: 0 /100 WBCS (ref 0–0)
PLATELET # BLD AUTO: 111 X10*3/UL (ref 150–450)
POTASSIUM SERPL-SCNC: 2.9 MMOL/L (ref 3.5–5.3)
PROT SERPL-MCNC: 6.4 G/DL (ref 6.4–8.2)
RAD ONC MSQ ACTUAL FRACTIONS DELIVERED: 2
RAD ONC MSQ ACTUAL SESSION DELIVERED DOSE: 200 CGRAY
RAD ONC MSQ ACTUAL TOTAL DOSE: 400 CGRAY
RAD ONC MSQ ELAPSED DAYS: 1
RAD ONC MSQ LAST DATE: NORMAL
RAD ONC MSQ PRESCRIBED FRACTIONAL DOSE: 200 CGRAY
RAD ONC MSQ PRESCRIBED NUMBER OF FRACTIONS: 18
RAD ONC MSQ PRESCRIBED TECHNIQUE: NORMAL
RAD ONC MSQ PRESCRIBED TOTAL DOSE: 3600 CGRAY
RAD ONC MSQ PRESCRIPTION PATTERN COMMENT: NORMAL
RAD ONC MSQ START DATE: NORMAL
RAD ONC MSQ TREATMENT COURSE NUMBER: 1
RAD ONC MSQ TREATMENT SITE: NORMAL
RBC # BLD AUTO: 2.76 X10*6/UL (ref 4.5–5.9)
SODIUM SERPL-SCNC: 130 MMOL/L (ref 136–145)
WBC # BLD AUTO: 3.6 X10*3/UL (ref 4.4–11.3)

## 2024-11-06 PROCEDURE — 1159F MED LIST DOCD IN RCRD: CPT | Performed by: INTERNAL MEDICINE

## 2024-11-06 PROCEDURE — 96366 THER/PROPH/DIAG IV INF ADDON: CPT

## 2024-11-06 PROCEDURE — 2500000004 HC RX 250 GENERAL PHARMACY W/ HCPCS (ALT 636 FOR OP/ED): Performed by: INTERNAL MEDICINE

## 2024-11-06 PROCEDURE — 96375 TX/PRO/DX INJ NEW DRUG ADDON: CPT | Mod: INF

## 2024-11-06 PROCEDURE — 96367 TX/PROPH/DG ADDL SEQ IV INF: CPT | Mod: INF,INF2

## 2024-11-06 PROCEDURE — 99215 OFFICE O/P EST HI 40 MIN: CPT | Mod: 25 | Performed by: INTERNAL MEDICINE

## 2024-11-06 PROCEDURE — 85025 COMPLETE CBC W/AUTO DIFF WBC: CPT

## 2024-11-06 PROCEDURE — 2500000001 HC RX 250 WO HCPCS SELF ADMINISTERED DRUGS (ALT 637 FOR MEDICARE OP): Performed by: INTERNAL MEDICINE

## 2024-11-06 PROCEDURE — 99215 OFFICE O/P EST HI 40 MIN: CPT | Performed by: INTERNAL MEDICINE

## 2024-11-06 PROCEDURE — 96417 CHEMO IV INFUS EACH ADDL SEQ: CPT

## 2024-11-06 PROCEDURE — 1126F AMNT PAIN NOTED NONE PRSNT: CPT | Performed by: INTERNAL MEDICINE

## 2024-11-06 PROCEDURE — 3074F SYST BP LT 130 MM HG: CPT | Performed by: INTERNAL MEDICINE

## 2024-11-06 PROCEDURE — 1111F DSCHRG MED/CURRENT MED MERGE: CPT | Performed by: INTERNAL MEDICINE

## 2024-11-06 PROCEDURE — 83735 ASSAY OF MAGNESIUM: CPT

## 2024-11-06 PROCEDURE — 80053 COMPREHEN METABOLIC PANEL: CPT

## 2024-11-06 PROCEDURE — 3078F DIAST BP <80 MM HG: CPT | Performed by: INTERNAL MEDICINE

## 2024-11-06 PROCEDURE — 77386 HC INTENSITY-MODULATED RADIATION THERAPY (IMRT), COMPLEX: CPT | Performed by: STUDENT IN AN ORGANIZED HEALTH CARE EDUCATION/TRAINING PROGRAM

## 2024-11-06 PROCEDURE — 96413 CHEMO IV INFUSION 1 HR: CPT

## 2024-11-06 RX ORDER — HEPARIN 100 UNIT/ML
500 SYRINGE INTRAVENOUS AS NEEDED
OUTPATIENT
Start: 2024-11-06

## 2024-11-06 RX ORDER — EPINEPHRINE 0.3 MG/.3ML
0.3 INJECTION SUBCUTANEOUS EVERY 5 MIN PRN
OUTPATIENT
Start: 2024-12-04

## 2024-11-06 RX ORDER — ALBUTEROL SULFATE 0.83 MG/ML
3 SOLUTION RESPIRATORY (INHALATION) AS NEEDED
Status: DISCONTINUED | OUTPATIENT
Start: 2024-11-06 | End: 2024-11-06 | Stop reason: HOSPADM

## 2024-11-06 RX ORDER — HEPARIN SODIUM,PORCINE/PF 10 UNIT/ML
50 SYRINGE (ML) INTRAVENOUS AS NEEDED
Status: DISCONTINUED | OUTPATIENT
Start: 2024-11-06 | End: 2024-11-06 | Stop reason: HOSPADM

## 2024-11-06 RX ORDER — PROCHLORPERAZINE EDISYLATE 5 MG/ML
10 INJECTION INTRAMUSCULAR; INTRAVENOUS EVERY 6 HOURS PRN
OUTPATIENT
Start: 2024-11-27

## 2024-11-06 RX ORDER — ALBUTEROL SULFATE 0.83 MG/ML
3 SOLUTION RESPIRATORY (INHALATION) AS NEEDED
OUTPATIENT
Start: 2024-12-04

## 2024-11-06 RX ORDER — HEPARIN SODIUM,PORCINE/PF 10 UNIT/ML
50 SYRINGE (ML) INTRAVENOUS AS NEEDED
OUTPATIENT
Start: 2024-11-06

## 2024-11-06 RX ORDER — FAMOTIDINE 10 MG/ML
20 INJECTION INTRAVENOUS ONCE
OUTPATIENT
Start: 2024-12-04

## 2024-11-06 RX ORDER — PALONOSETRON 0.05 MG/ML
0.25 INJECTION, SOLUTION INTRAVENOUS ONCE
OUTPATIENT
Start: 2024-11-27

## 2024-11-06 RX ORDER — POTASSIUM CHLORIDE 29.8 MG/ML
20 INJECTION INTRAVENOUS ONCE
OUTPATIENT
Start: 2024-11-20

## 2024-11-06 RX ORDER — FAMOTIDINE 10 MG/ML
20 INJECTION INTRAVENOUS ONCE AS NEEDED
Status: DISCONTINUED | OUTPATIENT
Start: 2024-11-06 | End: 2024-11-06 | Stop reason: HOSPADM

## 2024-11-06 RX ORDER — MAGNESIUM SULFATE HEPTAHYDRATE 40 MG/ML
2 INJECTION, SOLUTION INTRAVENOUS ONCE
Status: COMPLETED | OUTPATIENT
Start: 2024-11-06 | End: 2024-11-06

## 2024-11-06 RX ORDER — POTASSIUM CHLORIDE 29.8 MG/ML
20 INJECTION INTRAVENOUS ONCE
OUTPATIENT
Start: 2024-11-27

## 2024-11-06 RX ORDER — HEPARIN 100 UNIT/ML
500 SYRINGE INTRAVENOUS AS NEEDED
Status: DISCONTINUED | OUTPATIENT
Start: 2024-11-06 | End: 2024-11-06 | Stop reason: HOSPADM

## 2024-11-06 RX ORDER — PROCHLORPERAZINE MALEATE 10 MG
10 TABLET ORAL EVERY 6 HOURS PRN
Status: DISCONTINUED | OUTPATIENT
Start: 2024-11-06 | End: 2024-11-06 | Stop reason: HOSPADM

## 2024-11-06 RX ORDER — PROCHLORPERAZINE MALEATE 5 MG
10 TABLET ORAL EVERY 6 HOURS PRN
OUTPATIENT
Start: 2024-12-04

## 2024-11-06 RX ORDER — PROCHLORPERAZINE MALEATE 5 MG
10 TABLET ORAL EVERY 6 HOURS PRN
OUTPATIENT
Start: 2024-11-27

## 2024-11-06 RX ORDER — EPINEPHRINE 0.3 MG/.3ML
0.3 INJECTION SUBCUTANEOUS EVERY 5 MIN PRN
OUTPATIENT
Start: 2024-11-27

## 2024-11-06 RX ORDER — DIPHENHYDRAMINE HCL 25 MG
50 CAPSULE ORAL ONCE
Status: COMPLETED | OUTPATIENT
Start: 2024-11-06 | End: 2024-11-06

## 2024-11-06 RX ORDER — EPINEPHRINE 0.3 MG/.3ML
0.3 INJECTION SUBCUTANEOUS EVERY 5 MIN PRN
Status: DISCONTINUED | OUTPATIENT
Start: 2024-11-06 | End: 2024-11-06 | Stop reason: HOSPADM

## 2024-11-06 RX ORDER — PALONOSETRON 0.05 MG/ML
0.25 INJECTION, SOLUTION INTRAVENOUS ONCE
OUTPATIENT
Start: 2024-12-04

## 2024-11-06 RX ORDER — FAMOTIDINE 10 MG/ML
20 INJECTION INTRAVENOUS ONCE AS NEEDED
OUTPATIENT
Start: 2024-12-04

## 2024-11-06 RX ORDER — DIPHENHYDRAMINE HYDROCHLORIDE 50 MG/ML
50 INJECTION INTRAMUSCULAR; INTRAVENOUS AS NEEDED
OUTPATIENT
Start: 2024-11-27

## 2024-11-06 RX ORDER — POTASSIUM CHLORIDE 29.8 MG/ML
20 INJECTION INTRAVENOUS ONCE
Status: CANCELLED | OUTPATIENT
Start: 2024-11-13

## 2024-11-06 RX ORDER — DIPHENHYDRAMINE HCL 50 MG
50 CAPSULE ORAL ONCE
OUTPATIENT
Start: 2024-12-04

## 2024-11-06 RX ORDER — PROCHLORPERAZINE EDISYLATE 5 MG/ML
10 INJECTION INTRAMUSCULAR; INTRAVENOUS EVERY 6 HOURS PRN
OUTPATIENT
Start: 2024-12-04

## 2024-11-06 RX ORDER — DIPHENHYDRAMINE HYDROCHLORIDE 50 MG/ML
50 INJECTION INTRAMUSCULAR; INTRAVENOUS AS NEEDED
OUTPATIENT
Start: 2024-12-04

## 2024-11-06 RX ORDER — POTASSIUM CHLORIDE 20 MEQ/1
20 TABLET, EXTENDED RELEASE ORAL DAILY
Qty: 60 TABLET | Refills: 0 | Status: SHIPPED | OUTPATIENT
Start: 2024-11-06 | End: 2025-01-05

## 2024-11-06 RX ORDER — DIPHENHYDRAMINE HYDROCHLORIDE 50 MG/ML
50 INJECTION INTRAMUSCULAR; INTRAVENOUS AS NEEDED
Status: DISCONTINUED | OUTPATIENT
Start: 2024-11-06 | End: 2024-11-06 | Stop reason: HOSPADM

## 2024-11-06 RX ORDER — DIPHENHYDRAMINE HCL 50 MG
50 CAPSULE ORAL ONCE
OUTPATIENT
Start: 2024-11-27

## 2024-11-06 RX ORDER — FAMOTIDINE 10 MG/ML
20 INJECTION INTRAVENOUS ONCE
Status: COMPLETED | OUTPATIENT
Start: 2024-11-06 | End: 2024-11-06

## 2024-11-06 RX ORDER — DEXAMETHASONE 6 MG/1
12 TABLET ORAL ONCE
Status: COMPLETED | OUTPATIENT
Start: 2024-11-06 | End: 2024-11-06

## 2024-11-06 RX ORDER — PROCHLORPERAZINE EDISYLATE 5 MG/ML
10 INJECTION INTRAMUSCULAR; INTRAVENOUS EVERY 6 HOURS PRN
Status: DISCONTINUED | OUTPATIENT
Start: 2024-11-06 | End: 2024-11-06 | Stop reason: HOSPADM

## 2024-11-06 RX ORDER — FAMOTIDINE 10 MG/ML
20 INJECTION INTRAVENOUS ONCE AS NEEDED
OUTPATIENT
Start: 2024-11-27

## 2024-11-06 RX ORDER — ALBUTEROL SULFATE 0.83 MG/ML
3 SOLUTION RESPIRATORY (INHALATION) AS NEEDED
OUTPATIENT
Start: 2024-11-27

## 2024-11-06 RX ORDER — FAMOTIDINE 10 MG/ML
20 INJECTION INTRAVENOUS ONCE
OUTPATIENT
Start: 2024-11-27

## 2024-11-06 RX ORDER — PALONOSETRON 0.05 MG/ML
0.25 INJECTION, SOLUTION INTRAVENOUS ONCE
Status: COMPLETED | OUTPATIENT
Start: 2024-11-06 | End: 2024-11-06

## 2024-11-06 RX ORDER — POTASSIUM CHLORIDE 14.9 MG/ML
20 INJECTION INTRAVENOUS ONCE
Status: COMPLETED | OUTPATIENT
Start: 2024-11-06 | End: 2024-11-06

## 2024-11-06 ASSESSMENT — ENCOUNTER SYMPTOMS
UNEXPECTED WEIGHT CHANGE: 0
GASTROINTESTINAL NEGATIVE: 1
LOSS OF SENSATION IN FEET: 0
DEPRESSION: 0
FATIGUE: 1
FEVER: 0
CARDIOVASCULAR NEGATIVE: 1
OCCASIONAL FEELINGS OF UNSTEADINESS: 0
RESPIRATORY NEGATIVE: 1

## 2024-11-06 ASSESSMENT — PAIN SCALES - GENERAL
PAINLEVEL_OUTOF10: 0-NO PAIN
PAINLEVEL_OUTOF10: 0-NO PAIN

## 2024-11-06 ASSESSMENT — SOCIAL DETERMINANTS OF HEALTH (SDOH): HOW HARD IS IT FOR YOU TO PAY FOR THE VERY BASICS LIKE FOOD, HOUSING, MEDICAL CARE, AND HEATING?: HARD

## 2024-11-06 NOTE — SIGNIFICANT EVENT

## 2024-11-06 NOTE — PROGRESS NOTES
November 6, 2024 at 11:34 AM    Patient has no known allergies.    Juan Carlos Cr is a 74 y.o. male   There were no vitals taken for this visit.  There is no height or weight on file to calculate BSA.    Past Medical History:   Diagnosis Date    Acute myocardial infarction, unspecified 05/17/2013    Acute myocardial infarction    Atrial fib/flutter, transient (Multi)     COPD (chronic obstructive pulmonary disease) (Multi)     Coronary artery disease     Diabetes mellitus (Multi)     Diabetes mellitus (Multi)     per pt    Encounter for screening for malignant neoplasm of prostate 09/02/2015    Encounter for prostate cancer screening    GERD (gastroesophageal reflux disease)     Hyperlipidemia     Hypertension     Lung cancer (Multi)     Personal history of other diseases of the nervous system and sense organs 09/13/2017    History of cataract       Encounter Diagnosis   Name Primary?    Malignant neoplasm of upper lobe of right lung (Multi)        Has the entire regimen been authorized by financial clearance (pre-certification)?  Yes     Prior to Admission medications    Medication Sig Start Date End Date Taking? Authorizing Provider   albuterol 2.5 mg /3 mL (0.083 %) nebulizer solution Take 3 mL (2.5 mg) by nebulization 3 times a day. 10/18/24 11/18/24  Tamra Nicole MD   albuterol 2.5 mg /3 mL (0.083 %) nebulizer solution Take 3 mL (2.5 mg) by nebulization 3 times a day. Start taking on November 19th or as prescribed by your doctor. Do not fill before November 17, 2024. 11/17/24 12/17/24  Cynthia Cowart MD   ascorbic acid (Vitamin C) 500 mg tablet Take 1 tablet (500 mg) by mouth once daily.    Historical Provider, MD   aspirin 81 mg EC tablet Take 1 tablet (81 mg) by mouth once daily.    Historical Provider, MD   blood sugar diagnostic (Accu-Chek Guide test strips) strip 1 strip 2 times a day. 9/5/24   Thanh Cuba MD   blood sugar diagnostic (FreeStyle Lite Strips) strip 1 strip 2 times a day.  9/3/24   Thanh Cuba MD   blood-glucose meter (Accu-Chek Guide Glucose Meter) misc Check blood glucose 2x a day 9/5/24   Thanh Cuba MD   cholecalciferol (Vitamin D-3) 10 MCG (400 UNIT) tablet Take 2 tablets (20 mcg) by mouth once daily. 9/28/24   Ralph Gandara MD   fluticasone furoate-vilanteroL (Breo Ellipta) 200-25 mcg/dose inhaler Inhale 1 puff once daily. 11/4/24 1/3/25  Missael Holt MD   fluticasone-umeclidin-vilanter (TRELEGY-ELLIPTA) 200-62.5-25 mcg blister with device Inhale 1 puff early in the morning.. Rinse mouth after taking dose 9/10/24   Missael Holt MD   folic acid (Folvite) 1 mg tablet Take 1 tablet (1 mg) by mouth once daily. 9/1/24 10/31/24  Rosa Brown MD   FreeStyle Lite Meter kit TEST BLOOD SUGAR LEVELS 2X A DAY 9/3/24 9/3/25  Thanh Cuba MD   lancets (Accu-Chek Fastclix Lancet Drum) misc Check blood glucose 2x day 9/5/24   Thanh Cuba MD   lancets (Accu-Chek Fastclix Lancet Drum) misc Check blood glucose level 2x a day 9/5/24   Thanh uCba MD   lancets misc TEST BLOOD SUGAR LEVELS 2X A DAY 9/3/24   Thanh Cuba MD   lancets misc Check blood sugar twice daily 9/10/24   Thanh Cuba MD   losartan (Cozaar) 25 mg tablet Take 1 tablet (25 mg) by mouth once daily. 11/1/24 11/1/25  Lisa Ko MD   metFORMIN (Glucophage) 500 mg tablet Take 1 tablet (500 mg) by mouth 2 times daily (morning and late afternoon). 8/31/24 10/31/24  Rosa Brown MD   metoprolol succinate XL (Toprol-XL) 50 mg 24 hr tablet Take 1 tablet (50 mg) by mouth once daily. Do not crush or chew. 11/1/24 11/1/25  Lisa Ko MD   multivitamin with minerals tablet Take 1 tablet by mouth once daily. 9/1/24 10/31/24  Rosa Brown MD   nicotine (Nicoderm CQ) 14 mg/24 hr patch Apply 1 patch topically every day.  START AFTER finishing the 21mg patches. 8/31/24 10/23/24  Rosa Brown MD   nicotine (Nicoderm CQ) 21 mg/24 hr patch Place 1  patch over 24 hours on the skin once daily for 42 doses. 9/1/24 10/8/24  Rosa Brown MD   nicotine (Nicoderm CQ) 7 mg/24 hr patch Place 1 patch over 24 hours on the skin once daily for 14 doses. START AFTER finishing the 14mg patches. 10/22/24 11/5/24  Rosa Brown MD   ondansetron (Zofran) 8 mg tablet Take 1 tablet (8 mg) by mouth every 8 hours if needed for nausea or vomiting. 9/19/24   Janiya Landis MD   oxygen (O2) gas therapy Inhale 2 L/min continuously.    Jamey Segura MD   pantoprazole (ProtoNix) 40 mg EC tablet Take 1 tablet (40 mg) by mouth once daily. Do not crush, chew, or split. 9/19/24 3/18/25  Janiya Landis MD   potassium chloride CR (Klor-Con M20) 20 mEq ER tablet Take 2 tablets (40 mEq) by mouth once daily. Do not crush or chew. 9/27/24   Ralph Gandara MD   prochlorperazine (Compazine) 10 mg tablet Take 1 tablet (10 mg) by mouth every 6 hours if needed for nausea or vomiting. 9/19/24   Janiya Landis MD   rivaroxaban (Xarelto) 20 mg tablet Take 1 tablet (20 mg) by mouth once daily in the evening. Take with meals. Take with food. 11/1/24 11/1/25  Lisa Ko MD   rosuvastatin (Crestor) 20 mg tablet Take 1 tablet (20 mg) by mouth once daily. 11/1/24 11/1/25  Lisa Ko MD   sodium chloride 3 % nebulizer solution Take 4 mL by nebulization 3 times a day. 10/18/24 11/17/24  Tamra Nicole MD   sodium chloride 3% nebulizer solution Take 4 mL by nebulization 3 times a day. Start taking on November 19th or as prescribed by your doctor. Take after albuterol. Do not fill before November 17, 2024. 11/17/24 12/17/24  Cynthia Cowart MD   thiamine (Vitamin B-1) 100 mg tablet Take 1 tablet (100 mg) by mouth once daily. Do not start  before September 2, 2024. 9/2/24 11/1/24  Rosa Brown MD   tiotropium (Spiriva Respimat) 2.5 mcg/actuation inhaler Inhale 2 puffs once daily. 11/4/24 1/3/25  Missael Holt MD   amLODIPine (Norvasc) 10 mg tablet Take 1 tablet  (10 mg) by mouth early in the morning..  Patient not taking: Reported on 11/1/2024 9/21/24 11/1/24  Historical Provider, MD   amLODIPine (Norvasc) 5 mg tablet Take 2 tablets (10 mg) by mouth once daily. 9/1/24 11/1/24  Rosa Brown MD   apixaban (Eliquis) 5 mg tablet Take 1 tablet (5 mg) by mouth 2 times a day. 8/30/24 11/1/24  Isis Bridges MD   fluticasone furoate-vilanteroL (Breo Ellipta) 200-25 mcg/dose inhaler Inhale 1 puff once daily. 9/1/24 11/4/24  Rosa Brown MD   metoprolol tartrate (Lopressor) 50 mg tablet Take 1 tablet by mouth every 12 hours. 8/31/23 11/1/24  Thanh Cuba MD   simvastatin (Zocor) 20 mg tablet Take 1 tablet (20 mg) by mouth once daily. 8/31/23 11/1/24  Thanh Cuba MD   tiotropium (Spiriva Respimat) 2.5 mcg/actuation inhaler Inhale 2 puffs once daily. 9/1/24 11/4/24  Rosa Brown MD       Reviewed documented list of home medications.  No significant drug interactions identified between home medications and chemotherapy regimen.    Yes    WBC   Date Value Ref Range Status   11/06/2024 3.6 (L) 4.4 - 11.3 x10*3/uL Final   10/30/2024 9.4 4.4 - 11.3 x10*3/uL Final   10/23/2024 8.3 4.4 - 11.3 x10*3/uL Final     Hemoglobin   Date Value Ref Range Status   11/06/2024 8.4 (L) 13.5 - 17.5 g/dL Final   10/30/2024 8.6 (L) 13.5 - 17.5 g/dL Final   10/23/2024 10.0 (L) 13.5 - 17.5 g/dL Final     Hematocrit   Date Value Ref Range Status   11/06/2024 25.9 (L) 41.0 - 52.0 % Final   10/30/2024 26.0 (L) 41.0 - 52.0 % Final   10/23/2024 32.5 (L) 41.0 - 52.0 % Final     Neutrophils Absolute   Date Value Ref Range Status   11/06/2024 2.86 1.60 - 5.50 x10*3/uL Final     Comment:     Percent differential counts (%) should be interpreted in the context of the absolute cell counts (cells/uL).   10/30/2024 8.28 (H) 1.60 - 5.50 x10*3/uL Final     Comment:     Percent differential counts (%) should be interpreted in the context of the absolute cell counts (cells/uL).   10/23/2024 6.59 (H)  1.60 - 5.50 x10*3/uL Final     Comment:     Percent differential counts (%) should be interpreted in the context of the absolute cell counts (cells/uL).     Platelets   Date Value Ref Range Status   11/06/2024 111 (L) 150 - 450 x10*3/uL Final   10/30/2024 123 (L) 150 - 450 x10*3/uL Final   10/23/2024 149 (L) 150 - 450 x10*3/uL Final     Creatinine   Date Value Ref Range Status   11/06/2024 0.69 0.50 - 1.30 mg/dL Final   10/30/2024 0.57 0.50 - 1.30 mg/dL Final   10/23/2024 0.50 0.50 - 1.30 mg/dL Final     Alkaline Phosphatase   Date Value Ref Range Status   11/06/2024 86 33 - 136 U/L Final   10/30/2024 76 33 - 136 U/L Final   10/23/2024 61 33 - 136 U/L Final     AST   Date Value Ref Range Status   11/06/2024 22 9 - 39 U/L Final   10/30/2024 24 9 - 39 U/L Final   10/23/2024 14 9 - 39 U/L Final     ALT   Date Value Ref Range Status   11/06/2024 19 10 - 52 U/L Final     Comment:     Patients treated with Sulfasalazine may generate falsely decreased results for ALT.   10/30/2024 17 10 - 52 U/L Final     Comment:     Patients treated with Sulfasalazine may generate falsely decreased results for ALT.   10/23/2024 12 10 - 52 U/L Final     Comment:     Patients treated with Sulfasalazine may generate falsely decreased results for ALT.     Bilirubin, Total   Date Value Ref Range Status   11/06/2024 0.3 0.0 - 1.2 mg/dL Final   10/30/2024 0.6 0.0 - 1.2 mg/dL Final   10/23/2024 0.5 0.0 - 1.2 mg/dL Final         Does the patient meet the treatment conditions?  Yes    ANC >= 1.5  Plt >= 100    Are dosage calculations correct?  Yes    Are doses the same as previous cycle?   Yes    Were any doses modified?  No    If appropriate, was the Creatinine Clearance independently calculated based on [facility] standards?   Yes      Comments:      I Missael Loaiza, PharmD hereby acknowledge that the treatment plan indicated above has been verified for correctness to the best of my ability on 11/6/2024 at 11:34 AM.

## 2024-11-06 NOTE — PROGRESS NOTES
Radiation Oncology On Treatment Visit    Patient Name:  Juan Carlos Cr  MRN:  [id number]  :  1950    Referring Provider: No ref. provider found  Primary Care Provider: Thanh Cuba MD  Care Team: Patient Care Team:  Thanh Cuba MD as PCP - General (Family Medicine)  Thanh Cuba MD as PCP - [employer] PCP  Kath Owens RN as Care Manager (Case Management)  Janiya Landis MD as Medical Oncologist (Hematology and Oncology)  Terrence Gallegos MD as Consulting Physician (Hematology and Oncology)  Heydi Landis MD as Medical Oncologist (Hematology and Oncology)  Natalia Valencia MD PhD as Consulting Physician (Hematology and Oncology)    Date of Service: 2024     Diagnosis:   Specialty Problems          Radiation Oncology Problems    Malignant neoplasm of upper lobe of right lung (Multi)        Primary malignant neoplasm of lung metastatic to other site (Multi)        Squamous cell lung cancer, unspecified laterality (Multi)         Treatment Summary:  IMRT: Right Lung or bronchus    Treatment Period Technique Fraction Dose Fractions Total Dose   Course 1 10/11/2024-2024  (days elapsed: 26)         R_Thx_Hil_Med 10/11/2024-10/16/2024 VMAT 200 / 200 cGy  800 / 6,000 cGy         R_Thx_H_M_RP 10/24/2024-2024 VMAT 200 / 200 cGy  1600 / 1,600 cGy         R_Thx_H_M_RP2 2024-2024 VMAT 200 / 200 cGy  400 / 3,600 cGy     SUBJECTIVE: No significant esophagitis.  Baseline shortness of breath with exertion, using intermittent oxygen.  Having financial difficulties with his Spiriva prescription.      OBJECTIVE:   Vital Signs:  BP 98/57   Pulse 93   Temp 36.2 °C (97.2 °F) (Temporal)   Resp 18   Wt 60.7 kg (133 lb 14.9 oz)   SpO2 92%   BMI 20.36 kg/m²     Other Pertinent Findings:     Toxicity Assessment          10/16/2024    09:03 10/30/2024    09:44 2024    09:41   Toxicity Assessment   Treatment Site Thoracic Thoracic  Thoracic   Anorexia Grade 1 Grade 0 Grade 1   Anxiety Grade 0 Grade 0 Grade 0   Dehydration Grade 0 Grade 0 Grade 0   Depression Grade 1 Grade 0 Grade 0   Dermatitis Radiation Grade 0 Grade 0 Grade 0   Diarrhea Grade 0 Grade 0 Grade 0   Fatigue Grade 0 Grade 0 Grade 0   Fibrosis Deep Connective Tissue Grade 0 Grade 0 Grade 0   Fracture Grade 0 Grade 0 Grade 0   Nausea Grade 0 Grade 0 Grade 0   Pain Grade 0 Grade 0 Grade 0   Treatment Related Secondary Malignancy Grade 0 Grade 0 Grade 0   Tumor Pain Grade 0 Grade 0 Grade 0   Vomiting Grade 0 Grade 0 Grade 0   Constipation Grade 0  Grade 0   Dyspepsia Grade 0  Grade 0   Dysphagia Grade 0  Grade 0   Esophagitis Grade 0  Grade 0   Mucositis Oral Grade 0  Grade 0   Upper Gastrointestinal Hemorrhage Grade 0  Grade 0   Peripheral Sensory Neuropathy Grade 0     Brachial Plexopathy Grade 0     Pneumonitis Grade 0 Grade 0    Aspiration Grade 0 Grade 0 Grade 0   Hoarseness Grade 0 Grade 0 Grade 0   Laryngeal Edema Grade 0 Grade 0 Grade 0   Myocardial Infarction Grade 0 Grade 0 Grade 0   Pericardial Effusion Grade 0 Grade 0 Grade 0   Pericarditis Grade 0 Grade 0 Grade 0   Esophageal Fistula Grade 0 Grade 0 Grade 0   Esophageal Obstruction Grade 0 Grade 0 Grade 0   Esophageal Pain Grade 0 Grade 0 Grade 0   Esophageal Stenosis Grade 0 Grade 0 Grade 0   Esophageal Ulcer Grade 0 Grade 0 Grade 0   Bronchial Obstruction Grade 3 Grade 0 Grade 0   Cough Grade 1 Grade 1 Grade 1       sometimes has sputum(no blood)   Dyspnea Grade 2 Grade 1 Grade 0   Epistaxis Grade 0 Grade 0 Grade 0   Hiccups Grade 0 Grade 0 Grade 0   Hypoxia Grade 0 Grade 0 Grade 0   Pulmonary Fibrosis Grade 0 Grade 0 Grade 0   Lymphedema Grade 0 Grade 0 Grade 0   Thromboembolic Event Grade 0 Grade 0 Grade 0   Hot Flashes Grade 0 Grade 0 Grade 0          Concurrent systemic therapy: CARBOplatin (Paraplatin) 230 mg in sodium chloride 0.9% 133 mL IV, 230 mg, intravenous, Once, 1 of 1 cycle    Dose modification: 230 mg  (original dose 51.8 mg, Cycle 1, Reason: Dose Not Tolerated),   (original dose 51.8 mg, Cycle 1, Reason: Abnormal Renal Function)    Administration: 230 mg (10/2/2024), 230 mg (10/9/2024), 230 mg (10/23/2024), 230 mg (10/30/2024)        PACLitaxeL (Taxol) 81 mg in dextrose 5% 124 mL IV, 45 mg/m2 = 81 mg, intravenous, Once, 1 of 1 cycle    Administration: 81 mg (10/2/2024), 81 mg (10/9/2024), 81 mg (10/23/2024), 81 mg (10/30/2024)        methylPREDNISolone sod succinate (SOLU-Medrol) 40 mg/mL injection 40 mg, 40 mg, intravenous, As needed, 1 of 1 cycle        palonosetron (Aloxi) injection 250 mcg, 250 mcg, intravenous, Once, 1 of 1 cycle    Administration: 250 mcg (10/2/2024), 250 mcg (10/9/2024), 250 mcg (10/23/2024), 250 mcg (10/30/2024)      Labs:   WBC   Date Value Ref Range Status   10/30/2024 9.4 4.4 - 11.3 x10*3/uL Final   10/23/2024 8.3 4.4 - 11.3 x10*3/uL Final     Hemoglobin   Date Value Ref Range Status   10/30/2024 8.6 (L) 13.5 - 17.5 g/dL Final   10/23/2024 10.0 (L) 13.5 - 17.5 g/dL Final     Hematocrit   Date Value Ref Range Status   10/30/2024 26.0 (L) 41.0 - 52.0 % Final   10/23/2024 32.5 (L) 41.0 - 52.0 % Final     Neutrophils Absolute   Date Value Ref Range Status   10/30/2024 8.28 (H) 1.60 - 5.50 x10*3/uL Final     Comment:     Percent differential counts (%) should be interpreted in the context of the absolute cell counts (cells/uL).   10/23/2024 6.59 (H) 1.60 - 5.50 x10*3/uL Final     Comment:     Percent differential counts (%) should be interpreted in the context of the absolute cell counts (cells/uL).     Platelets   Date Value Ref Range Status   10/30/2024 123 (L) 150 - 450 x10*3/uL Final   10/23/2024 149 (L) 150 - 450 x10*3/uL Final     Alkaline Phosphatase   Date Value Ref Range Status   10/30/2024 76 33 - 136 U/L Final   10/23/2024 61 33 - 136 U/L Final     AST   Date Value Ref Range Status   10/30/2024 24 9 - 39 U/L Final   10/23/2024 14 9 - 39 U/L Final     ALT   Date Value Ref Range  Status   10/30/2024 17 10 - 52 U/L Final     Comment:     Patients treated with Sulfasalazine may generate falsely decreased results for ALT.   10/23/2024 12 10 - 52 U/L Final     Comment:     Patients treated with Sulfasalazine may generate falsely decreased results for ALT.     Bilirubin, Total   Date Value Ref Range Status   10/30/2024 0.6 0.0 - 1.2 mg/dL Final   10/23/2024 0.5 0.0 - 1.2 mg/dL Final     Glucose   Date Value Ref Range Status   10/30/2024 101 (H) 74 - 99 mg/dL Final   10/23/2024 109 (H) 74 - 99 mg/dL Final     Calcium   Date Value Ref Range Status   10/30/2024 8.6 8.6 - 10.3 mg/dL Final   10/23/2024 8.7 8.6 - 10.3 mg/dL Final     Sodium   Date Value Ref Range Status   10/30/2024 127 (L) 136 - 145 mmol/L Final   10/23/2024 130 (L) 136 - 145 mmol/L Final     Potassium   Date Value Ref Range Status   10/30/2024 4.4 3.5 - 5.3 mmol/L Final   10/23/2024 4.0 3.5 - 5.3 mmol/L Final     Bicarbonate   Date Value Ref Range Status   10/30/2024 24 21 - 32 mmol/L Final   10/23/2024 26 21 - 32 mmol/L Final     Chloride   Date Value Ref Range Status   10/30/2024 94 (L) 98 - 107 mmol/L Final   10/23/2024 97 (L) 98 - 107 mmol/L Final     Urea Nitrogen   Date Value Ref Range Status   10/30/2024 11 6 - 23 mg/dL Final   10/23/2024 9 6 - 23 mg/dL Final     Creatinine   Date Value Ref Range Status   10/30/2024 0.57 0.50 - 1.30 mg/dL Final   10/23/2024 0.50 0.50 - 1.30 mg/dL Final         Exam: Resting comfortably in chair in no acute distress.     Assessment / Plan:  The patient is tolerating radiation therapy as anticipated.  Continue per current treatment plan.       Therapies: Continue to monitor chronically low sodium and chloride.  Discussed with the patient regarding reaching out to pulmonology for discussion of alternatives to Spiriva, encouraged continued use of nebulizers in the interim.  Will monitor for esophagitis.    Side effects reviewed with patient. Images, chart and labs reviewed.    Rojelio Ewing MD

## 2024-11-06 NOTE — PROGRESS NOTES
Patient ID: Juan Carlos Cr is a 74 y.o. male.    Subjective:  Follows up for lung cancer. Feels very well today after stent was placed last week in bronchus    Heme/Onc History:  - p/w dyspne ain Aug 2024. CT Chest: 6 cm R upper lung central mass. MR Brain: No mets  - Bronch: Squamous cell carcinoma. PD-L1 5%. Cytology from [facility] 4 and 7 Lns are neg for malignancy  - PET/CT (Sep 2024): Large central R lung mass with mediastinal LAPs. Satellite RLL peripheral nodule  - Admitted to hospital with dyspnea in late Sep with acute dyspnea. R sided large pleural effusion => Cytology neg for malignancy  - Started weekly carbo-taxol on 10/2/24 and Radiation on 10/14 => admitted to hospital for acute dyspnea again on 10/16 => Stents replaced => Continued with treatment    Assessment/Plan:  ? NSCLC: T4N2M0 as per PET/CT findings although cytology in bronch from Lns were negative. Cytology from pleural effusion negative.    We had started hin on weekly chemo before radiation, still he developed acute dypsnea again and had a stent placed in R main bronchus.     Today, he feels well. No dyspnea. Chronic cough+. No fever. No paresthesia. Will continue with weekly chemotherapy until radiation ends. Then will start him on durvalumab after getting a CT Chest. I will discuss these in more detail next visit.    Hypercalcemia: Resolved after zometa.     I provide longitudinal care for Mr. Cr for his lung cancer    Review Of Systems:  Review of Systems   Constitutional:  Positive for fatigue. Negative for fever and unexpected weight change.   HENT:  Negative.     Respiratory: Negative.     Cardiovascular: Negative.    Gastrointestinal: Negative.    Genitourinary: Negative.         Physical Exam:  BP 95/60 (BP Location: Left arm, Patient Position: Sitting)   Pulse 89   Temp 36.1 °C (97 °F) (Temporal)   Resp 16   Wt 60.7 kg (133 lb 13.1 oz)   SpO2 98%   BMI 20.35 kg/m²   BSA: 1.71 meters squared  Performance Status: Symptomatic;  fully ambulatory  Physical Exam  Constitutional:       General: He is not in acute distress.     Appearance: He is not ill-appearing.   HENT:      Head: Normocephalic and atraumatic.   Eyes:      General: No scleral icterus.     Pupils: Pupils are equal, round, and reactive to light.   Cardiovascular:      Rate and Rhythm: Normal rate and regular rhythm.   Pulmonary:      Effort: Pulmonary effort is normal.      Comments: Breath sounds decreased at R mid to base. NO crackles  Abdominal:      General: Abdomen is flat.      Palpations: Abdomen is soft. There is no mass.   Musculoskeletal:         General: Normal range of motion.   Lymphadenopathy:      Cervical: No cervical adenopathy.   Skin:     Coloration: Skin is not jaundiced.   Neurological:      General: No focal deficit present.      Mental Status: He is alert and oriented to person, place, and time.         Results:  Diagnostic Results   Lab Results   Component Value Date    WBC 3.6 (L) 11/06/2024    HGB 8.4 (L) 11/06/2024    HCT 25.9 (L) 11/06/2024    MCV 94 11/06/2024     (L) 11/06/2024     Lab Results   Component Value Date    CALCIUM 8.6 10/30/2024     (L) 10/30/2024    K 4.4 10/30/2024    CO2 24 10/30/2024    CL 94 (L) 10/30/2024    BUN 11 10/30/2024    CREATININE 0.57 10/30/2024    ALT 17 10/30/2024    AST 24 10/30/2024       Current Outpatient Medications:     albuterol 2.5 mg /3 mL (0.083 %) nebulizer solution, Take 3 mL (2.5 mg) by nebulization 3 times a day., Disp: 270 mL, Rfl: 0    [START ON 11/17/2024] albuterol 2.5 mg /3 mL (0.083 %) nebulizer solution, Take 3 mL (2.5 mg) by nebulization 3 times a day. Start taking on November 19th or as prescribed by your doctor. Do not fill before November 17, 2024., Disp: 270 mL, Rfl: 0    ascorbic acid (Vitamin C) 500 mg tablet, Take 1 tablet (500 mg) by mouth once daily., Disp: , Rfl:     aspirin 81 mg EC tablet, Take 1 tablet (81 mg) by mouth once daily., Disp: , Rfl:     blood sugar diagnostic  (Accu-Chek Guide test strips) strip, 1 strip 2 times a day., Disp: 200 each, Rfl: 3    blood sugar diagnostic (FreeStyle Lite Strips) strip, 1 strip 2 times a day., Disp: 200 each, Rfl: 3    blood-glucose meter (Accu-Chek Guide Glucose Meter) misc, Check blood glucose 2x a day, Disp: 1 each, Rfl: 0    cholecalciferol (Vitamin D-3) 10 MCG (400 UNIT) tablet, Take 2 tablets (20 mcg) by mouth once daily., Disp: 60 tablet, Rfl: 0    fluticasone furoate-vilanteroL (Breo Ellipta) 200-25 mcg/dose inhaler, Inhale 1 puff once daily., Disp: 60 each, Rfl: 11    fluticasone-umeclidin-vilanter (TRELEGY-ELLIPTA) 200-62.5-25 mcg blister with device, Inhale 1 puff early in the morning.. Rinse mouth after taking dose, Disp: 60 each, Rfl: 11    FreeStyle Lite Meter kit, TEST BLOOD SUGAR LEVELS 2X A DAY, Disp: 1 each, Rfl: 0    lancets (Accu-Chek Fastclix Lancet Drum) misc, Check blood glucose 2x day, Disp: 200 each, Rfl: 0    lancets (Accu-Chek Fastclix Lancet Drum) misc, Check blood glucose level 2x a day, Disp: 200 each, Rfl: 0    lancets misc, TEST BLOOD SUGAR LEVELS 2X A DAY, Disp: 100 each, Rfl: 0    lancets misc, Check blood sugar twice daily, Disp: 200 each, Rfl: 2    losartan (Cozaar) 25 mg tablet, Take 1 tablet (25 mg) by mouth once daily., Disp: 30 tablet, Rfl: 11    metFORMIN (Glucophage) 500 mg tablet, Take 1 tablet (500 mg) by mouth 2 times daily (morning and late afternoon)., Disp: 120 tablet, Rfl: 0    metoprolol succinate XL (Toprol-XL) 50 mg 24 hr tablet, Take 1 tablet (50 mg) by mouth once daily. Do not crush or chew., Disp: 30 tablet, Rfl: 11    nicotine (Nicoderm CQ) 14 mg/24 hr patch, Apply 1 patch topically every day.  START AFTER finishing the 21mg patches., Disp: 14 patch, Rfl: 0    nicotine (Nicoderm CQ) 21 mg/24 hr patch, Place 1 patch over 24 hours on the skin once daily for 42 doses., Disp: 42 patch, Rfl: 1    nicotine (Nicoderm CQ) 7 mg/24 hr patch, Place 1 patch over 24 hours on the skin once daily for 14  doses. START AFTER finishing the 14mg patches., Disp: 14 patch, Rfl: 0    ondansetron (Zofran) 8 mg tablet, Take 1 tablet (8 mg) by mouth every 8 hours if needed for nausea or vomiting., Disp: 30 tablet, Rfl: 5    oxygen (O2) gas therapy, Inhale 2 L/min continuously., Disp: , Rfl:     pantoprazole (ProtoNix) 40 mg EC tablet, Take 1 tablet (40 mg) by mouth once daily. Do not crush, chew, or split., Disp: 60 tablet, Rfl: 2    potassium chloride CR (Klor-Con M20) 20 mEq ER tablet, Take 2 tablets (40 mEq) by mouth once daily. Do not crush or chew., Disp: 60 tablet, Rfl: 0    prochlorperazine (Compazine) 10 mg tablet, Take 1 tablet (10 mg) by mouth every 6 hours if needed for nausea or vomiting., Disp: 30 tablet, Rfl: 5    rivaroxaban (Xarelto) 20 mg tablet, Take 1 tablet (20 mg) by mouth once daily in the evening. Take with meals. Take with food., Disp: 30 tablet, Rfl: 11    rosuvastatin (Crestor) 20 mg tablet, Take 1 tablet (20 mg) by mouth once daily., Disp: 30 tablet, Rfl: 11    sodium chloride 3 % nebulizer solution, Take 4 mL by nebulization 3 times a day., Disp: 240 mL, Rfl: 0    [START ON 11/17/2024] sodium chloride 3% nebulizer solution, Take 4 mL by nebulization 3 times a day. Start taking on November 19th or as prescribed by your doctor. Take after albuterol. Do not fill before November 17, 2024., Disp: 360 mL, Rfl: 0    tiotropium (Spiriva Respimat) 2.5 mcg/actuation inhaler, Inhale 2 puffs once daily., Disp: 8 g, Rfl: 11     Past Surgical History:   Procedure Laterality Date    CATARACT EXTRACTION  09/14/2018    Cataract Surgery    CATARACT EXTRACTION  06/27/2014    Cataract Surgery    CORONARY ANGIOPLASTY WITH STENT PLACEMENT  05/17/2013    Cath Stent Placement     Family History   Problem Relation Name Age of Onset    Heart attack Mother      Lung cancer Brother        reports that he has quit smoking. His smoking use included cigarettes. He has been exposed to tobacco smoke. He has never used smokeless  tobacco.  Social History     Socioeconomic History    Marital status:      Spouse name: Not on file    Number of children: Not on file    Years of education: Not on file    Highest education level: Not on file   Occupational History    Not on file   Tobacco Use    Smoking status: Former     Current packs/day: 0.50     Types: Cigarettes     Passive exposure: Past    Smokeless tobacco: Never   Vaping Use    Vaping status: Never Used   Substance and Sexual Activity    Alcohol use: Not Currently     Comment: 4-5 beers and a glass a wine a day    Drug use: Never    Sexual activity: Defer   Other Topics Concern    Not on file   Social History Narrative    Not on file     Social Drivers of Health     Financial Resource Strain: High Risk (11/6/2024)    Overall Financial Resource Strain (CARDIA)     Difficulty of Paying Living Expenses: Hard   Food Insecurity: No Food Insecurity (10/17/2024)    Hunger Vital Sign     Worried About Running Out of Food in the Last Year: Never true     Ran Out of Food in the Last Year: Never true   Transportation Needs: No Transportation Needs (10/17/2024)    PRAPARE - Transportation     Lack of Transportation (Medical): No     Lack of Transportation (Non-Medical): No   Physical Activity: Inactive (10/17/2024)    Exercise Vital Sign     Days of Exercise per Week: 0 days     Minutes of Exercise per Session: 0 min   Stress: No Stress Concern Present (11/6/2024)    [employer] [place] [employer] - Occupational Stress Questionnaire     Feeling of Stress : Not at all   Social Connections: Moderately Isolated (10/17/2024)    Social Connection and Isolation Panel [NHANES]     Frequency of Communication with Friends and Family: Three times a week     Frequency of Social Gatherings with Friends and Family: Three times a week     Attends Faith Services: Never     Active Member of Clubs or Organizations: No     Attends Club or Organization Meetings: Never     Marital Status:     Intimate Partner Violence: Not At Risk (10/17/2024)    Humiliation, Afraid, Rape, and Kick questionnaire     Fear of Current or Ex-Partner: No     Emotionally Abused: No     Physically Abused: No     Sexually Abused: No   Housing Stability: Low Risk  (10/17/2024)    Housing Stability Vital Sign     Unable to Pay for Housing in the Last Year: No     Number of Times Moved in the Last Year: 0     Homeless in the Last Year: No       Diagnoses and all orders for this visit:  Malignant neoplasm of upper lobe of right lung (Multi)  -     Clinic Appointment Request  -     Clinic Appointment Request  -     Clinic Appointment Request; Future  -     Infusion Appointment Request; Future  -     CBC and Auto Differential; Future  -     Comprehensive metabolic panel; Future  -     Clinic Appointment Request; Future  -     Infusion Appointment Request; Future  -     CBC and Auto Differential; Future  -     Comprehensive metabolic panel; Future  Other orders  -     CARBOplatin (Paraplatin) 250 mg in sodium chloride 0.9% 125 mL IV  -     CARBOplatin (Paraplatin) 250 mg in sodium chloride 0.9% 125 mL IV  -     dexAMETHasone (Decadron) 12 mg in dextrose 5% 50 mL IV  -     diphenhydrAMINE (BENADryl) capsule 50 mg  -     famotidine PF (Pepcid) injection 20 mg  -     palonosetron (Aloxi) injection 250 mcg  -     prochlorperazine (Compazine) tablet 10 mg  -     prochlorperazine (Compazine) injection 10 mg  -     PACLitaxeL (Taxol) 81 mg in dextrose 5% 513.5 mL IV  -     CARBOplatin (Paraplatin) 250 mg in sodium chloride 0.9% 125 mL IV  -     sodium chloride 0.9 % bolus 500 mL  -     dextrose 5 % in water (D5W) bolus  -     diphenhydrAMINE (BENADryl) injection 50 mg  -     methylPREDNISolone sod succinate (SOLU-Medrol) 40 mg/mL injection 40 mg  -     famotidine PF (Pepcid) injection 20 mg  -     EPINEPHrine (Epipen) injection syringe 0.3 mg  -     albuterol 2.5 mg /3 mL (0.083 %) nebulizer solution 3 mL  -     dexAMETHasone (Decadron) 12  mg in dextrose 5% 50 mL IV  -     diphenhydrAMINE (BENADryl) capsule 50 mg  -     famotidine PF (Pepcid) injection 20 mg  -     palonosetron (Aloxi) injection 250 mcg  -     prochlorperazine (Compazine) tablet 10 mg  -     prochlorperazine (Compazine) injection 10 mg  -     PACLitaxeL (Taxol) 81 mg in dextrose 5% 513.5 mL IV  -     CARBOplatin (Paraplatin) 250 mg in sodium chloride 0.9% 125 mL IV  -     sodium chloride 0.9 % bolus 500 mL  -     dextrose 5 % in water (D5W) bolus  -     diphenhydrAMINE (BENADryl) injection 50 mg  -     methylPREDNISolone sod succinate (SOLU-Medrol) 40 mg/mL injection 40 mg  -     famotidine PF (Pepcid) injection 20 mg  -     EPINEPHrine (Epipen) injection syringe 0.3 mg  -     albuterol 2.5 mg /3 mL (0.083 %) nebulizer solution 3 mL       I spent more than 60 minutes for the patient today, including face-to-face conversation, pre-visit preparation, post-visit orders, and others.   Janiya Landis MD

## 2024-11-06 NOTE — PROGRESS NOTES
Treatment Pre-Review for Date of Service: 11/6/24    Diagnosis:  Malignant neoplasm of upper lobe of right lung (Multi), Clinical: Stage IIIB (cT4, cN2, cM0)      Regimen: Paclitaxel + Carboplatin weekly     Current Cycle/Day: Cycle 1 Day 35   Treatment Date Appropriate: Yes; Last Treatment: 10/30/24  Date change required: none    Dose or Regimen Modifications: None noted during pre-review    Med Rec/Drug Interactions: None noted during pre-review    Growth Factor: none    Financial Clearance/Authorization: Yes    Echo:  Transthoracic Echo (TTE) Complete 08/29/2024    Lifetime Dose Tracking   No doses have been documented on this patient for the following tracked chemicals: doxorubicin, epirubicin, idarubicin, daunorubicin, mitoxantrone, bleomycin, mitomycin, carmustine, doxorubicin HCl pegylated liposomal, daunorubicin citrate liposomal     Comments: none    I Missael Loaiza, PharmD hereby acknowledge that the treatment plan indicated above has been verified for correctness to the best of my ability on 11/6/2024 at 9:02 AM.

## 2024-11-07 ENCOUNTER — HOSPITAL ENCOUNTER (OUTPATIENT)
Dept: RADIATION ONCOLOGY | Facility: CLINIC | Age: 74
Setting detail: RADIATION/ONCOLOGY SERIES
Discharge: HOME | End: 2024-11-07
Payer: MEDICARE

## 2024-11-07 DIAGNOSIS — Z51.0 ENCOUNTER FOR ANTINEOPLASTIC RADIATION THERAPY: ICD-10-CM

## 2024-11-07 DIAGNOSIS — C34.81 MALIGNANT NEOPLASM OF OVERLAPPING SITES OF RIGHT BRONCHUS AND LUNG (MULTI): ICD-10-CM

## 2024-11-07 LAB
RAD ONC MSQ ACTUAL FRACTIONS DELIVERED: 3
RAD ONC MSQ ACTUAL SESSION DELIVERED DOSE: 200 CGRAY
RAD ONC MSQ ACTUAL TOTAL DOSE: 600 CGRAY
RAD ONC MSQ ELAPSED DAYS: 2
RAD ONC MSQ LAST DATE: NORMAL
RAD ONC MSQ PRESCRIBED FRACTIONAL DOSE: 200 CGRAY
RAD ONC MSQ PRESCRIBED NUMBER OF FRACTIONS: 18
RAD ONC MSQ PRESCRIBED TECHNIQUE: NORMAL
RAD ONC MSQ PRESCRIBED TOTAL DOSE: 3600 CGRAY
RAD ONC MSQ PRESCRIPTION PATTERN COMMENT: NORMAL
RAD ONC MSQ START DATE: NORMAL
RAD ONC MSQ TREATMENT COURSE NUMBER: 1
RAD ONC MSQ TREATMENT SITE: NORMAL

## 2024-11-07 PROCEDURE — 77386 HC INTENSITY-MODULATED RADIATION THERAPY (IMRT), COMPLEX: CPT | Performed by: STUDENT IN AN ORGANIZED HEALTH CARE EDUCATION/TRAINING PROGRAM

## 2024-11-08 ENCOUNTER — HOSPITAL ENCOUNTER (OUTPATIENT)
Dept: RADIATION ONCOLOGY | Facility: CLINIC | Age: 74
Setting detail: RADIATION/ONCOLOGY SERIES
Discharge: HOME | End: 2024-11-08
Payer: MEDICARE

## 2024-11-08 ENCOUNTER — PATIENT OUTREACH (OUTPATIENT)
Dept: CARE COORDINATION | Facility: CLINIC | Age: 74
End: 2024-11-08
Payer: MEDICARE

## 2024-11-08 DIAGNOSIS — Z51.0 ENCOUNTER FOR ANTINEOPLASTIC RADIATION THERAPY: ICD-10-CM

## 2024-11-08 DIAGNOSIS — C34.81 MALIGNANT NEOPLASM OF OVERLAPPING SITES OF RIGHT BRONCHUS AND LUNG (MULTI): ICD-10-CM

## 2024-11-08 LAB
RAD ONC MSQ ACTUAL FRACTIONS DELIVERED: 4
RAD ONC MSQ ACTUAL SESSION DELIVERED DOSE: 200 CGRAY
RAD ONC MSQ ACTUAL TOTAL DOSE: 800 CGRAY
RAD ONC MSQ ELAPSED DAYS: 3
RAD ONC MSQ LAST DATE: NORMAL
RAD ONC MSQ PRESCRIBED FRACTIONAL DOSE: 200 CGRAY
RAD ONC MSQ PRESCRIBED NUMBER OF FRACTIONS: 18
RAD ONC MSQ PRESCRIBED TECHNIQUE: NORMAL
RAD ONC MSQ PRESCRIBED TOTAL DOSE: 3600 CGRAY
RAD ONC MSQ PRESCRIPTION PATTERN COMMENT: NORMAL
RAD ONC MSQ START DATE: NORMAL
RAD ONC MSQ TREATMENT COURSE NUMBER: 1
RAD ONC MSQ TREATMENT SITE: NORMAL

## 2024-11-08 PROCEDURE — 77386 HC INTENSITY-MODULATED RADIATION THERAPY (IMRT), COMPLEX: CPT | Performed by: STUDENT IN AN ORGANIZED HEALTH CARE EDUCATION/TRAINING PROGRAM

## 2024-11-08 NOTE — PROGRESS NOTES
Outreach call to patient following up on appointment with Cardiology and Oncology providers.  Patient states, he is doing alright. He states, he is doing radiation therapy with chemotherapy. No side effects at this time. Patient states, his breathing is better. Denies any shortness of breath. Was able to receive spiriva inhaler samples. Patient states, the samples should last him a good amount of time.  Plan is to continue chemo and radiation therapy. Patient with no additional questions at this time.  Will continue to follow.      Kath Owens RN/CM  [facility]  [phone]

## 2024-11-11 ENCOUNTER — HOSPITAL ENCOUNTER (OUTPATIENT)
Dept: RADIATION ONCOLOGY | Facility: CLINIC | Age: 74
Setting detail: RADIATION/ONCOLOGY SERIES
Discharge: HOME | End: 2024-11-11
Payer: MEDICARE

## 2024-11-11 DIAGNOSIS — Z51.0 ENCOUNTER FOR ANTINEOPLASTIC RADIATION THERAPY: ICD-10-CM

## 2024-11-11 DIAGNOSIS — C34.81 MALIGNANT NEOPLASM OF OVERLAPPING SITES OF RIGHT BRONCHUS AND LUNG (MULTI): ICD-10-CM

## 2024-11-11 LAB
RAD ONC MSQ ACTUAL FRACTIONS DELIVERED: 5
RAD ONC MSQ ACTUAL SESSION DELIVERED DOSE: 200 CGRAY
RAD ONC MSQ ACTUAL TOTAL DOSE: 1000 CGRAY
RAD ONC MSQ ELAPSED DAYS: 6
RAD ONC MSQ LAST DATE: NORMAL
RAD ONC MSQ PRESCRIBED FRACTIONAL DOSE: 200 CGRAY
RAD ONC MSQ PRESCRIBED NUMBER OF FRACTIONS: 18
RAD ONC MSQ PRESCRIBED TECHNIQUE: NORMAL
RAD ONC MSQ PRESCRIBED TOTAL DOSE: 3600 CGRAY
RAD ONC MSQ PRESCRIPTION PATTERN COMMENT: NORMAL
RAD ONC MSQ START DATE: NORMAL
RAD ONC MSQ TREATMENT COURSE NUMBER: 1
RAD ONC MSQ TREATMENT SITE: NORMAL

## 2024-11-11 PROCEDURE — 77386 HC INTENSITY-MODULATED RADIATION THERAPY (IMRT), COMPLEX: CPT | Performed by: STUDENT IN AN ORGANIZED HEALTH CARE EDUCATION/TRAINING PROGRAM

## 2024-11-12 ENCOUNTER — APPOINTMENT (OUTPATIENT)
Dept: CARDIOLOGY | Facility: HOSPITAL | Age: 74
End: 2024-11-12
Payer: MEDICARE

## 2024-11-12 ENCOUNTER — HOSPITAL ENCOUNTER (INPATIENT)
Facility: HOSPITAL | Age: 74
Discharge: HOME HEALTH | DRG: 871 | End: 2024-11-12
Attending: STUDENT IN AN ORGANIZED HEALTH CARE EDUCATION/TRAINING PROGRAM | Admitting: INTERNAL MEDICINE
Payer: MEDICARE

## 2024-11-12 ENCOUNTER — NURSE TRIAGE (OUTPATIENT)
Dept: HEMATOLOGY/ONCOLOGY | Facility: HOSPITAL | Age: 74
End: 2024-11-12
Payer: MEDICARE

## 2024-11-12 ENCOUNTER — HOSPITAL ENCOUNTER (EMERGENCY)
Facility: HOSPITAL | Age: 74
Discharge: OTHER INSTITUTION | End: 2024-11-12
Attending: FAMILY MEDICINE
Payer: MEDICARE

## 2024-11-12 ENCOUNTER — HOSPITAL ENCOUNTER (OUTPATIENT)
Dept: RADIATION ONCOLOGY | Facility: CLINIC | Age: 74
Setting detail: RADIATION/ONCOLOGY SERIES
Discharge: HOME | End: 2024-11-12
Payer: MEDICARE

## 2024-11-12 ENCOUNTER — APPOINTMENT (OUTPATIENT)
Dept: RADIOLOGY | Facility: HOSPITAL | Age: 74
End: 2024-11-12
Payer: MEDICARE

## 2024-11-12 VITALS
OXYGEN SATURATION: 98 % | RESPIRATION RATE: 31 BRPM | HEIGHT: 69 IN | DIASTOLIC BLOOD PRESSURE: 61 MMHG | HEART RATE: 102 BPM | WEIGHT: 136.02 LBS | SYSTOLIC BLOOD PRESSURE: 100 MMHG | BODY MASS INDEX: 20.15 KG/M2

## 2024-11-12 DIAGNOSIS — Z99.81 CHRONIC RESPIRATORY FAILURE WITH HYPOXIA, ON HOME O2 THERAPY (MULTI): ICD-10-CM

## 2024-11-12 DIAGNOSIS — C34.90 PRIMARY MALIGNANT NEOPLASM OF LUNG METASTATIC TO OTHER SITE, UNSPECIFIED LATERALITY (MULTI): Primary | ICD-10-CM

## 2024-11-12 DIAGNOSIS — Z51.0 ENCOUNTER FOR ANTINEOPLASTIC RADIATION THERAPY: ICD-10-CM

## 2024-11-12 DIAGNOSIS — I48.0 PAROXYSMAL ATRIAL FIBRILLATION (MULTI): ICD-10-CM

## 2024-11-12 DIAGNOSIS — I50.22 CHRONIC SYSTOLIC HEART FAILURE: ICD-10-CM

## 2024-11-12 DIAGNOSIS — J18.9 MULTIFOCAL PNEUMONIA: ICD-10-CM

## 2024-11-12 DIAGNOSIS — C34.81 MALIGNANT NEOPLASM OF OVERLAPPING SITES OF RIGHT BRONCHUS AND LUNG (MULTI): ICD-10-CM

## 2024-11-12 DIAGNOSIS — Z99.81 OXYGEN DEPENDENT: ICD-10-CM

## 2024-11-12 DIAGNOSIS — R06.09 OTHER FORM OF DYSPNEA: ICD-10-CM

## 2024-11-12 DIAGNOSIS — J44.1 ACUTE EXACERBATION OF COPD WITH ASTHMA: ICD-10-CM

## 2024-11-12 DIAGNOSIS — Z85.46 HISTORY OF PROSTATE CANCER: ICD-10-CM

## 2024-11-12 DIAGNOSIS — J44.1 COPD EXACERBATION (MULTI): Primary | ICD-10-CM

## 2024-11-12 DIAGNOSIS — J96.11 CHRONIC RESPIRATORY FAILURE WITH HYPOXIA, ON HOME O2 THERAPY (MULTI): ICD-10-CM

## 2024-11-12 DIAGNOSIS — J15.69 PNEUMONIA DUE TO GRAM-NEGATIVE BACTERIA (MULTI): Primary | ICD-10-CM

## 2024-11-12 PROBLEM — R06.00 DYSPNEA: Status: ACTIVE | Noted: 2024-01-01

## 2024-11-12 LAB
ALBUMIN SERPL BCP-MCNC: 2.6 G/DL (ref 3.4–5)
ALP SERPL-CCNC: 92 U/L (ref 33–136)
ALT SERPL W P-5'-P-CCNC: 15 U/L (ref 10–52)
ANION GAP SERPL CALC-SCNC: 16 MMOL/L (ref 10–20)
APPEARANCE UR: CLEAR
AST SERPL W P-5'-P-CCNC: 42 U/L (ref 9–39)
BACTERIA #/AREA URNS AUTO: ABNORMAL /HPF
BASOPHILS # BLD MANUAL: 0 X10*3/UL (ref 0–0.1)
BASOPHILS NFR BLD MANUAL: 0 %
BILIRUB SERPL-MCNC: 0.5 MG/DL (ref 0–1.2)
BILIRUB UR STRIP.AUTO-MCNC: NEGATIVE MG/DL
BNP SERPL-MCNC: 231 PG/ML (ref 0–99)
BUN SERPL-MCNC: 13 MG/DL (ref 6–23)
CALCIUM SERPL-MCNC: 8 MG/DL (ref 8.6–10.3)
CARDIAC TROPONIN I PNL SERPL HS: 11 NG/L (ref 0–20)
CARDIAC TROPONIN I PNL SERPL HS: 11 NG/L (ref 0–20)
CHLORIDE SERPL-SCNC: 93 MMOL/L (ref 98–107)
CO2 SERPL-SCNC: 21 MMOL/L (ref 21–32)
COLOR UR: YELLOW
CREAT SERPL-MCNC: 0.73 MG/DL (ref 0.5–1.3)
DOHLE BOD BLD QL SMEAR: PRESENT
EGFRCR SERPLBLD CKD-EPI 2021: >90 ML/MIN/1.73M*2
EOSINOPHIL # BLD MANUAL: 0 X10*3/UL (ref 0–0.4)
EOSINOPHIL NFR BLD MANUAL: 0 %
ERYTHROCYTE [DISTWIDTH] IN BLOOD BY AUTOMATED COUNT: 14 % (ref 11.5–14.5)
GIANT PLATELETS BLD QL SMEAR: ABNORMAL
GLUCOSE SERPL-MCNC: 100 MG/DL (ref 74–99)
GLUCOSE UR STRIP.AUTO-MCNC: NORMAL MG/DL
HCT VFR BLD AUTO: 26.3 % (ref 41–52)
HGB BLD-MCNC: 8.2 G/DL (ref 13.5–17.5)
HYALINE CASTS #/AREA URNS AUTO: ABNORMAL /LPF
IMM GRANULOCYTES # BLD AUTO: 0.19 X10*3/UL (ref 0–0.5)
IMM GRANULOCYTES NFR BLD AUTO: 6 % (ref 0–0.9)
KETONES UR STRIP.AUTO-MCNC: NEGATIVE MG/DL
LACTATE SERPL-SCNC: 1.2 MMOL/L (ref 0.4–2)
LEUKOCYTE ESTERASE UR QL STRIP.AUTO: NEGATIVE
LIPASE SERPL-CCNC: <3 U/L (ref 9–82)
LYMPHOCYTES # BLD MANUAL: 0.19 X10*3/UL (ref 0.8–3)
LYMPHOCYTES NFR BLD MANUAL: 6 %
MAGNESIUM SERPL-MCNC: 1.75 MG/DL (ref 1.6–2.4)
MCH RBC QN AUTO: 29.9 PG (ref 26–34)
MCHC RBC AUTO-ENTMCNC: 31.2 G/DL (ref 32–36)
MCV RBC AUTO: 96 FL (ref 80–100)
METAMYELOCYTES # BLD MANUAL: 0.03 X10*3/UL
METAMYELOCYTES NFR BLD MANUAL: 1 %
MONOCYTES # BLD MANUAL: 0.16 X10*3/UL (ref 0.05–0.8)
MONOCYTES NFR BLD MANUAL: 5 %
MUCOUS THREADS #/AREA URNS AUTO: ABNORMAL /LPF
NEUTROPHILS # BLD MANUAL: 2.81 X10*3/UL (ref 1.6–5.5)
NEUTS BAND # BLD MANUAL: 0.83 X10*3/UL (ref 0–0.5)
NEUTS BAND NFR BLD MANUAL: 26 %
NEUTS SEG # BLD MANUAL: 1.98 X10*3/UL (ref 1.6–5)
NEUTS SEG NFR BLD MANUAL: 62 %
NITRITE UR QL STRIP.AUTO: NEGATIVE
NRBC BLD-RTO: 0 /100 WBCS (ref 0–0)
OVALOCYTES BLD QL SMEAR: ABNORMAL
PH UR STRIP.AUTO: 6 [PH]
PLATELET # BLD AUTO: 159 X10*3/UL (ref 150–450)
POTASSIUM SERPL-SCNC: 5.5 MMOL/L (ref 3.5–5.3)
POTASSIUM SERPL-SCNC: 6.2 MMOL/L (ref 3.5–5.3)
PROT SERPL-MCNC: 6.4 G/DL (ref 6.4–8.2)
PROT UR STRIP.AUTO-MCNC: ABNORMAL MG/DL
RAD ONC MSQ ACTUAL FRACTIONS DELIVERED: 6
RAD ONC MSQ ACTUAL SESSION DELIVERED DOSE: 200 CGRAY
RAD ONC MSQ ACTUAL TOTAL DOSE: 1200 CGRAY
RAD ONC MSQ ELAPSED DAYS: 7
RAD ONC MSQ LAST DATE: NORMAL
RAD ONC MSQ PRESCRIBED FRACTIONAL DOSE: 200 CGRAY
RAD ONC MSQ PRESCRIBED NUMBER OF FRACTIONS: 18
RAD ONC MSQ PRESCRIBED TECHNIQUE: NORMAL
RAD ONC MSQ PRESCRIBED TOTAL DOSE: 3600 CGRAY
RAD ONC MSQ PRESCRIPTION PATTERN COMMENT: NORMAL
RAD ONC MSQ START DATE: NORMAL
RAD ONC MSQ TREATMENT COURSE NUMBER: 1
RAD ONC MSQ TREATMENT SITE: NORMAL
RBC # BLD AUTO: 2.74 X10*6/UL (ref 4.5–5.9)
RBC # UR STRIP.AUTO: NEGATIVE /UL
RBC #/AREA URNS AUTO: ABNORMAL /HPF
RBC MORPH BLD: ABNORMAL
SODIUM SERPL-SCNC: 124 MMOL/L (ref 136–145)
SP GR UR STRIP.AUTO: >1.05
SQUAMOUS #/AREA URNS AUTO: ABNORMAL /HPF
TOTAL CELLS COUNTED BLD: 100
TRANS CELLS #/AREA UR COMP ASSIST: ABNORMAL /HPF
UROBILINOGEN UR STRIP.AUTO-MCNC: NORMAL MG/DL
WBC # BLD AUTO: 3.2 X10*3/UL (ref 4.4–11.3)
WBC #/AREA URNS AUTO: ABNORMAL /HPF

## 2024-11-12 PROCEDURE — 94664 DEMO&/EVAL PT USE INHALER: CPT

## 2024-11-12 PROCEDURE — 1200000002 HC GENERAL ROOM WITH TELEMETRY DAILY

## 2024-11-12 PROCEDURE — 87641 MR-STAPH DNA AMP PROBE: CPT | Performed by: INTERNAL MEDICINE

## 2024-11-12 PROCEDURE — 2500000002 HC RX 250 W HCPCS SELF ADMINISTERED DRUGS (ALT 637 FOR MEDICARE OP, ALT 636 FOR OP/ED): Performed by: FAMILY MEDICINE

## 2024-11-12 PROCEDURE — 96375 TX/PRO/DX INJ NEW DRUG ADDON: CPT

## 2024-11-12 PROCEDURE — 87899 AGENT NOS ASSAY W/OPTIC: CPT | Mod: GEALAB | Performed by: INTERNAL MEDICINE

## 2024-11-12 PROCEDURE — 93005 ELECTROCARDIOGRAM TRACING: CPT

## 2024-11-12 PROCEDURE — 9420000001 HC RT PATIENT EDUCATION 5 MIN

## 2024-11-12 PROCEDURE — 71275 CT ANGIOGRAPHY CHEST: CPT | Performed by: RADIOLOGY

## 2024-11-12 PROCEDURE — 99285 EMERGENCY DEPT VISIT HI MDM: CPT | Mod: 25 | Performed by: FAMILY MEDICINE

## 2024-11-12 PROCEDURE — 84484 ASSAY OF TROPONIN QUANT: CPT | Performed by: FAMILY MEDICINE

## 2024-11-12 PROCEDURE — 94760 N-INVAS EAR/PLS OXIMETRY 1: CPT

## 2024-11-12 PROCEDURE — 71275 CT ANGIOGRAPHY CHEST: CPT

## 2024-11-12 PROCEDURE — 84075 ASSAY ALKALINE PHOSPHATASE: CPT | Performed by: FAMILY MEDICINE

## 2024-11-12 PROCEDURE — 36415 COLL VENOUS BLD VENIPUNCTURE: CPT | Performed by: FAMILY MEDICINE

## 2024-11-12 PROCEDURE — 83735 ASSAY OF MAGNESIUM: CPT | Performed by: FAMILY MEDICINE

## 2024-11-12 PROCEDURE — 83605 ASSAY OF LACTIC ACID: CPT | Performed by: FAMILY MEDICINE

## 2024-11-12 PROCEDURE — 85007 BL SMEAR W/DIFF WBC COUNT: CPT | Performed by: FAMILY MEDICINE

## 2024-11-12 PROCEDURE — 83880 ASSAY OF NATRIURETIC PEPTIDE: CPT | Performed by: FAMILY MEDICINE

## 2024-11-12 PROCEDURE — 94640 AIRWAY INHALATION TREATMENT: CPT

## 2024-11-12 PROCEDURE — 96365 THER/PROPH/DIAG IV INF INIT: CPT | Mod: 59

## 2024-11-12 PROCEDURE — 85027 COMPLETE CBC AUTOMATED: CPT | Performed by: FAMILY MEDICINE

## 2024-11-12 PROCEDURE — 2500000004 HC RX 250 GENERAL PHARMACY W/ HCPCS (ALT 636 FOR OP/ED): Performed by: FAMILY MEDICINE

## 2024-11-12 PROCEDURE — 2550000001 HC RX 255 CONTRASTS: Performed by: FAMILY MEDICINE

## 2024-11-12 PROCEDURE — 87449 NOS EACH ORGANISM AG IA: CPT | Mod: GEALAB | Performed by: INTERNAL MEDICINE

## 2024-11-12 PROCEDURE — 83690 ASSAY OF LIPASE: CPT | Performed by: FAMILY MEDICINE

## 2024-11-12 PROCEDURE — 87040 BLOOD CULTURE FOR BACTERIA: CPT | Mod: GENLAB | Performed by: FAMILY MEDICINE

## 2024-11-12 PROCEDURE — 77336 RADIATION PHYSICS CONSULT: CPT | Performed by: STUDENT IN AN ORGANIZED HEALTH CARE EDUCATION/TRAINING PROGRAM

## 2024-11-12 PROCEDURE — 84132 ASSAY OF SERUM POTASSIUM: CPT | Performed by: FAMILY MEDICINE

## 2024-11-12 PROCEDURE — 99223 1ST HOSP IP/OBS HIGH 75: CPT | Performed by: INTERNAL MEDICINE

## 2024-11-12 PROCEDURE — 96361 HYDRATE IV INFUSION ADD-ON: CPT

## 2024-11-12 PROCEDURE — 77386 HC INTENSITY-MODULATED RADIATION THERAPY (IMRT), COMPLEX: CPT | Performed by: STUDENT IN AN ORGANIZED HEALTH CARE EDUCATION/TRAINING PROGRAM

## 2024-11-12 PROCEDURE — 96367 TX/PROPH/DG ADDL SEQ IV INF: CPT

## 2024-11-12 PROCEDURE — 81001 URINALYSIS AUTO W/SCOPE: CPT | Performed by: FAMILY MEDICINE

## 2024-11-12 PROCEDURE — 81003 URINALYSIS AUTO W/O SCOPE: CPT | Performed by: FAMILY MEDICINE

## 2024-11-12 PROCEDURE — 2500000005 HC RX 250 GENERAL PHARMACY W/O HCPCS: Performed by: FAMILY MEDICINE

## 2024-11-12 RX ORDER — DIPHENHYDRAMINE HYDROCHLORIDE 50 MG/ML
25 INJECTION INTRAMUSCULAR; INTRAVENOUS ONCE
Status: COMPLETED | OUTPATIENT
Start: 2024-11-12 | End: 2024-11-12

## 2024-11-12 RX ORDER — CYANOCOBALAMIN (VITAMIN B-12) 500 MCG
800 TABLET ORAL DAILY
Status: DISCONTINUED | OUTPATIENT
Start: 2024-11-13 | End: 2024-11-27

## 2024-11-12 RX ORDER — DEXTROSE 50 % IN WATER (D50W) INTRAVENOUS SYRINGE
25
Status: DISCONTINUED | OUTPATIENT
Start: 2024-11-12 | End: 2024-11-27

## 2024-11-12 RX ORDER — ROSUVASTATIN CALCIUM 20 MG/1
20 TABLET, COATED ORAL DAILY
Status: DISCONTINUED | OUTPATIENT
Start: 2024-11-13 | End: 2024-11-27

## 2024-11-12 RX ORDER — METOPROLOL SUCCINATE 50 MG/1
50 TABLET, EXTENDED RELEASE ORAL DAILY
Status: DISCONTINUED | OUTPATIENT
Start: 2024-11-13 | End: 2024-11-27 | Stop reason: HOSPADM

## 2024-11-12 RX ORDER — IPRATROPIUM BROMIDE AND ALBUTEROL SULFATE 2.5; .5 MG/3ML; MG/3ML
3 SOLUTION RESPIRATORY (INHALATION) ONCE
Status: COMPLETED | OUTPATIENT
Start: 2024-11-12 | End: 2024-11-12

## 2024-11-12 RX ORDER — SODIUM CHLORIDE 9 MG/ML
125 INJECTION, SOLUTION INTRAVENOUS CONTINUOUS
Status: DISCONTINUED | OUTPATIENT
Start: 2024-11-12 | End: 2024-11-12 | Stop reason: HOSPADM

## 2024-11-12 RX ORDER — FLUTICASONE FUROATE AND VILANTEROL 200; 25 UG/1; UG/1
1 POWDER RESPIRATORY (INHALATION)
Status: DISCONTINUED | OUTPATIENT
Start: 2024-11-13 | End: 2024-11-27 | Stop reason: HOSPADM

## 2024-11-12 RX ORDER — DEXTROSE 50 % IN WATER (D50W) INTRAVENOUS SYRINGE
12.5
Status: DISCONTINUED | OUTPATIENT
Start: 2024-11-12 | End: 2024-11-27

## 2024-11-12 RX ORDER — ASCORBIC ACID 500 MG
500 TABLET ORAL DAILY
Status: DISCONTINUED | OUTPATIENT
Start: 2024-11-13 | End: 2024-11-27

## 2024-11-12 RX ORDER — VANCOMYCIN HYDROCHLORIDE 1 G/20ML
INJECTION, POWDER, LYOPHILIZED, FOR SOLUTION INTRAVENOUS DAILY PRN
Status: DISCONTINUED | OUTPATIENT
Start: 2024-11-12 | End: 2024-11-17

## 2024-11-12 RX ORDER — LOSARTAN POTASSIUM 50 MG/1
25 TABLET ORAL DAILY
Status: DISCONTINUED | OUTPATIENT
Start: 2024-11-13 | End: 2024-11-27

## 2024-11-12 RX ORDER — LIDOCAINE HYDROCHLORIDE 20 MG/ML
JELLY TOPICAL
Status: DISCONTINUED
Start: 2024-11-12 | End: 2024-11-12 | Stop reason: HOSPADM

## 2024-11-12 RX ORDER — PROCHLORPERAZINE MALEATE 5 MG
10 TABLET ORAL EVERY 6 HOURS PRN
Status: DISCONTINUED | OUTPATIENT
Start: 2024-11-12 | End: 2024-11-27 | Stop reason: HOSPADM

## 2024-11-12 RX ORDER — PANTOPRAZOLE SODIUM 40 MG/1
40 TABLET, DELAYED RELEASE ORAL DAILY
Status: DISCONTINUED | OUTPATIENT
Start: 2024-11-13 | End: 2024-11-27

## 2024-11-12 RX ORDER — VANCOMYCIN HYDROCHLORIDE 1 G/200ML
1000 INJECTION, SOLUTION INTRAVENOUS ONCE
Status: COMPLETED | OUTPATIENT
Start: 2024-11-12 | End: 2024-11-12

## 2024-11-12 RX ORDER — ASPIRIN 81 MG/1
81 TABLET ORAL DAILY
Status: DISCONTINUED | OUTPATIENT
Start: 2024-11-13 | End: 2024-11-27 | Stop reason: HOSPADM

## 2024-11-12 RX ORDER — INSULIN LISPRO 100 [IU]/ML
0-10 INJECTION, SOLUTION INTRAVENOUS; SUBCUTANEOUS
Status: DISCONTINUED | OUTPATIENT
Start: 2024-11-13 | End: 2024-11-27

## 2024-11-12 RX ORDER — METFORMIN HYDROCHLORIDE 500 MG/1
500 TABLET ORAL
Status: DISCONTINUED | OUTPATIENT
Start: 2024-11-13 | End: 2024-11-27

## 2024-11-12 SDOH — ECONOMIC STABILITY: INCOME INSECURITY: IN THE PAST 12 MONTHS HAS THE ELECTRIC, GAS, OIL, OR WATER COMPANY THREATENED TO SHUT OFF SERVICES IN YOUR HOME?: NO

## 2024-11-12 SDOH — SOCIAL STABILITY: SOCIAL INSECURITY: DOES ANYONE TRY TO KEEP YOU FROM HAVING/CONTACTING OTHER FRIENDS OR DOING THINGS OUTSIDE YOUR HOME?: NO

## 2024-11-12 SDOH — ECONOMIC STABILITY: FOOD INSECURITY: WITHIN THE PAST 12 MONTHS, YOU WORRIED THAT YOUR FOOD WOULD RUN OUT BEFORE YOU GOT THE MONEY TO BUY MORE.: NEVER TRUE

## 2024-11-12 SDOH — ECONOMIC STABILITY: FOOD INSECURITY: WITHIN THE PAST 12 MONTHS, THE FOOD YOU BOUGHT JUST DIDN'T LAST AND YOU DIDN'T HAVE MONEY TO GET MORE.: NEVER TRUE

## 2024-11-12 SDOH — SOCIAL STABILITY: SOCIAL INSECURITY: ARE THERE ANY APPARENT SIGNS OF INJURIES/BEHAVIORS THAT COULD BE RELATED TO ABUSE/NEGLECT?: NO

## 2024-11-12 SDOH — SOCIAL STABILITY: SOCIAL INSECURITY: HAVE YOU HAD THOUGHTS OF HARMING ANYONE ELSE?: NO

## 2024-11-12 SDOH — SOCIAL STABILITY: SOCIAL INSECURITY: HAS ANYONE EVER THREATENED TO HURT YOUR FAMILY OR YOUR PETS?: NO

## 2024-11-12 SDOH — SOCIAL STABILITY: SOCIAL INSECURITY: DO YOU FEEL UNSAFE GOING BACK TO THE PLACE WHERE YOU ARE LIVING?: NO

## 2024-11-12 SDOH — SOCIAL STABILITY: SOCIAL INSECURITY: ABUSE: ADULT

## 2024-11-12 SDOH — SOCIAL STABILITY: SOCIAL INSECURITY: HAVE YOU HAD ANY THOUGHTS OF HARMING ANYONE ELSE?: NO

## 2024-11-12 SDOH — SOCIAL STABILITY: SOCIAL INSECURITY: ARE YOU OR HAVE YOU BEEN THREATENED OR ABUSED PHYSICALLY, EMOTIONALLY, OR SEXUALLY BY ANYONE?: NO

## 2024-11-12 SDOH — SOCIAL STABILITY: SOCIAL INSECURITY: DO YOU FEEL ANYONE HAS EXPLOITED OR TAKEN ADVANTAGE OF YOU FINANCIALLY OR OF YOUR PERSONAL PROPERTY?: NO

## 2024-11-12 SDOH — SOCIAL STABILITY: SOCIAL INSECURITY: WITHIN THE LAST YEAR, HAVE YOU BEEN AFRAID OF YOUR PARTNER OR EX-PARTNER?: NO

## 2024-11-12 SDOH — SOCIAL STABILITY: SOCIAL INSECURITY: WERE YOU ABLE TO COMPLETE ALL THE BEHAVIORAL HEALTH SCREENINGS?: YES

## 2024-11-12 SDOH — SOCIAL STABILITY: SOCIAL INSECURITY: WITHIN THE LAST YEAR, HAVE YOU BEEN HUMILIATED OR EMOTIONALLY ABUSED IN OTHER WAYS BY YOUR PARTNER OR EX-PARTNER?: NO

## 2024-11-12 ASSESSMENT — COGNITIVE AND FUNCTIONAL STATUS - GENERAL
TOILETING: A LITTLE
DRESSING REGULAR UPPER BODY CLOTHING: A LITTLE
MOVING FROM LYING ON BACK TO SITTING ON SIDE OF FLAT BED WITH BEDRAILS: A LITTLE
HELP NEEDED FOR BATHING: A LITTLE
STANDING UP FROM CHAIR USING ARMS: A LITTLE
DAILY ACTIVITIY SCORE: 20
DRESSING REGULAR LOWER BODY CLOTHING: A LITTLE
WALKING IN HOSPITAL ROOM: A LITTLE
CLIMB 3 TO 5 STEPS WITH RAILING: A LITTLE
PATIENT BASELINE BEDBOUND: NO
MOBILITY SCORE: 18
TURNING FROM BACK TO SIDE WHILE IN FLAT BAD: A LITTLE
MOVING TO AND FROM BED TO CHAIR: A LITTLE

## 2024-11-12 ASSESSMENT — PAIN SCALES - GENERAL
PAINLEVEL_OUTOF10: 0 - NO PAIN
PAINLEVEL_OUTOF10: 0 - NO PAIN

## 2024-11-12 ASSESSMENT — ACTIVITIES OF DAILY LIVING (ADL)
FEEDING YOURSELF: INDEPENDENT
PATIENT'S MEMORY ADEQUATE TO SAFELY COMPLETE DAILY ACTIVITIES?: YES
DRESSING YOURSELF: INDEPENDENT
JUDGMENT_ADEQUATE_SAFELY_COMPLETE_DAILY_ACTIVITIES: YES
LACK_OF_TRANSPORTATION: NO
ADEQUATE_TO_COMPLETE_ADL: YES
BATHING: INDEPENDENT
HEARING - RIGHT EAR: FUNCTIONAL
GROOMING: INDEPENDENT
WALKS IN HOME: INDEPENDENT
HEARING - LEFT EAR: FUNCTIONAL
TOILETING: INDEPENDENT

## 2024-11-12 ASSESSMENT — COLUMBIA-SUICIDE SEVERITY RATING SCALE - C-SSRS
6. HAVE YOU EVER DONE ANYTHING, STARTED TO DO ANYTHING, OR PREPARED TO DO ANYTHING TO END YOUR LIFE?: NO
2. HAVE YOU ACTUALLY HAD ANY THOUGHTS OF KILLING YOURSELF?: NO
6. HAVE YOU EVER DONE ANYTHING, STARTED TO DO ANYTHING, OR PREPARED TO DO ANYTHING TO END YOUR LIFE?: NO
2. HAVE YOU ACTUALLY HAD ANY THOUGHTS OF KILLING YOURSELF?: NO
1. IN THE PAST MONTH, HAVE YOU WISHED YOU WERE DEAD OR WISHED YOU COULD GO TO SLEEP AND NOT WAKE UP?: NO
1. IN THE PAST MONTH, HAVE YOU WISHED YOU WERE DEAD OR WISHED YOU COULD GO TO SLEEP AND NOT WAKE UP?: NO

## 2024-11-12 ASSESSMENT — LIFESTYLE VARIABLES
HOW OFTEN DO YOU HAVE 6 OR MORE DRINKS ON ONE OCCASION: NEVER
HOW MANY STANDARD DRINKS CONTAINING ALCOHOL DO YOU HAVE ON A TYPICAL DAY: PATIENT DOES NOT DRINK
AUDIT-C TOTAL SCORE: 0
AUDIT-C TOTAL SCORE: 0
HOW OFTEN DO YOU HAVE A DRINK CONTAINING ALCOHOL: NEVER
SKIP TO QUESTIONS 9-10: 1

## 2024-11-12 ASSESSMENT — PAIN - FUNCTIONAL ASSESSMENT
PAIN_FUNCTIONAL_ASSESSMENT: 0-10
PAIN_FUNCTIONAL_ASSESSMENT: 0-10

## 2024-11-12 NOTE — TELEPHONE ENCOUNTER
"Received below [facility] Secure Chat from [facility] scheduling:   Patient is due for infusion tomorrow. Wife, Germaine, just called in with concerns. Patient is back on oxygen and is having trouble breathing. She can best be reached at [phone].     Called wife back at provided number. Wife  states patient has had decreased appetite over 2-3 days and has been feeling weaker than normal. Patient's wife woke at 4 a.m. and found patient had placed himself on home O2, patient has not required oxygen since recent discharge from hospital 10/16/24-10/19/24.     Patient is breathing faster, has found breathing has become more difficult especially with activity. No cough. Patient's wife stated she administered albuterol and 3% NaCl neb treatment with minimal relief to patient.     Patient's wife states she is concerned that his lung has collapsed again or that his stent might have closed. Advised to please seek treatment in the emergency department to be emergently evaluated for this condition. Patient's wife verbalized understanding.     Stated she would call [facility] office if he was admitted/unable to make appointment tomorrow to reschedule Paclitaxel/carboplatin infusion.     Additional Information   Do you have new or worsening problems breathing?     Started overnight, patient placed himself on home O2, patient's wife states he has not been on O2 since being discharged from hospital   Commented on: What helps these problems?     Patient started home O2, albuterol/3% NaCl neb treatment --> minimal relief   Commented on: What else do you want to tell me about this problem?     Wife has concern that patient's \" stent may have closed or lung may have collapsed again\"    Protocols used: Dyspena (Shortness of Breath)    "

## 2024-11-12 NOTE — DISCHARGE INSTRUCTIONS
Patient transfer has been accepted to [facility] by Dr. Donovan, no beds at  main [place] patient and family agreed.

## 2024-11-12 NOTE — ED PROVIDER NOTES
HPI   Chief Complaint   Patient presents with   • Shortness of Breath     SOB today, mild cough,being treated for lung cancer, on 2-3L [employer]  This 74-year male patient with history of lung cancer diagnosed about 3 months ago when he stopped smoking he also has COPD history of paroxysmal atrial fibrillation, prior history of MI and stenting placed has been on blood thinner and has a port in place started feeling short of breath for last couple days progressively worse despite taking O2 through nasal cannula usually about 2 to 3 L of O2 through nasal cannula as result decided come to ER for evaluation.  Patient is stable and he is denying severe shortness of breath.  He also has chest pain with a cough only denies hemoptysis.  Denies any pain in arms or jaw or cold sores.  Denies any vomiting or diarrhea.  He is undergoing chemo and radiation therapy.  Patient wife states that she is unable to be intubated and there.  She wants him to be DNR Comfort Care arrest want patient to be treated with no intubation.      Family: Reviewed  Social history: Reviewed, quit smoking 3 months ago.  Diagnosis cancer 3 months  Review of system: 10 review of system 10 review of systems In HPI otherwise negative.  Patient History   Past Medical History:   Diagnosis Date   • Acute myocardial infarction, unspecified 05/17/2013    Acute myocardial infarction   • Atrial fib/flutter, transient (Multi)    • COPD (chronic obstructive pulmonary disease) (Multi)    • Coronary artery disease    • Diabetes mellitus (Multi)    • Diabetes mellitus (Multi)     per pt   • Encounter for screening for malignant neoplasm of prostate 09/02/2015    Encounter for prostate cancer screening   • GERD (gastroesophageal reflux disease)    • Hyperlipidemia    • Hypertension    • Lung cancer (Multi)    • Personal history of other diseases of the nervous system and sense organs 09/13/2017    History of cataract     Past Surgical History:   Procedure  Laterality Date   • CATARACT EXTRACTION  09/14/2018    Cataract Surgery   • CATARACT EXTRACTION  06/27/2014    Cataract Surgery   • CORONARY ANGIOPLASTY WITH STENT PLACEMENT  05/17/2013    Cath Stent Placement     Family History   Problem Relation Name Age of Onset   • Heart attack Mother     • Lung cancer Brother       Social History     Tobacco Use   • Smoking status: Former     Current packs/day: 0.50     Types: Cigarettes     Passive exposure: Past   • Smokeless tobacco: Never   Vaping Use   • Vaping status: Never Used   Substance Use Topics   • Alcohol use: Not Currently     Comment: 4-5 beers and a glass a wine a day   • Drug use: Never   EKG obtained at 1423 hrs. shows sinus tachycardia at a rate of 118.  No ST-T evaluation.  No STEMI.  Otherwise normal EKG.  I read this EKG.  Second EKG obtained at 1552 hrs. showed sinus tachycardia rate of 111 no ST-T evaluation.  No STEMI.  Tachycardia otherwise normal EKG.  Read this EKG.  Physical Exam   ED Triage Vitals [11/12/24 1430]   Temp Heart Rate Respirations BP   -- (!) 109 20 98/59      Pulse Ox Temp src Heart Rate Source Patient Position   (!) 92 % -- Monitor --      BP Location FiO2 (%)     -- --       Physical Exam  Constitutional:       General: He is in acute distress.      Appearance: Normal appearance. He is ill-appearing. He is not toxic-appearing or diaphoretic.      Comments: Chronically sick looking male patient who appeared to have significant muscle mass loss isiah noted on the chest wall further radiation ER support in the left side chest.  He appeared to have significant muscle weight loss.  He was however talking without acute discharge hypoxemia requiring O2 through nasal cannula.  On auscultation he has crackles and coarse rhonchi bilaterally.  Shallow inspiration.  He was noted to have tachycardia regular rate and rhythm.  On the monitor rate 118.  And rhythm neurovascularly abdomen soft nondistended flat soft thin without any palpable mass or  tenderness.  No CVA tenderness noted calf is nontender.  Able to move his arms and leg strength 4/5 bilaterally both upper lower extremity and generalized weakness both upper lower extremity without isolated focal weakness.  No nuchal rigidity.  No drooping or drooling no stridor.   HENT:      Head: Normocephalic and atraumatic.      Right Ear: External ear normal.      Left Ear: External ear normal.      Nose: Nose normal. No congestion or rhinorrhea.   Eyes:      Extraocular Movements: Extraocular movements intact.      Conjunctiva/sclera: Conjunctivae normal.      Pupils: Pupils are equal, round, and reactive to light.   Cardiovascular:      Rate and Rhythm: Regular rhythm. Tachycardia present. No extrasystoles are present.     Pulses: Normal pulses.      Heart sounds: No murmur heard.     No friction rub.   Pulmonary:      Effort: Pulmonary effort is normal. Tachypnea present.      Breath sounds: Examination of the right-upper field reveals wheezing and rhonchi. Examination of the left-upper field reveals wheezing and rhonchi. Examination of the right-middle field reveals wheezing and rhonchi. Examination of the left-middle field reveals wheezing and rhonchi. Examination of the right-lower field reveals decreased breath sounds, wheezing, rhonchi and rales. Examination of the left-lower field reveals decreased breath sounds, wheezing, rhonchi and rales. Decreased breath sounds, wheezing, rhonchi and rales present.   Chest:      Chest wall: No mass, deformity, tenderness, crepitus or edema. There is no dullness to percussion.   Abdominal:      General: Abdomen is flat. Bowel sounds are normal.      Palpations: Abdomen is soft. There is no hepatomegaly, splenomegaly or mass.      Tenderness: There is no abdominal tenderness. There is no guarding or rebound.   Musculoskeletal:      Cervical back: Normal range of motion and neck supple.      Right lower leg: No tenderness. No edema.      Left lower leg: No  tenderness. No edema.   Skin:     General: Skin is warm.      Capillary Refill: Capillary refill takes less than 2 seconds.      Coloration: Skin is not cyanotic or pale.      Findings: No ecchymosis, erythema or rash.   Neurological:      General: No focal deficit present.      Mental Status: He is alert and oriented to person, place, and time.   Psychiatric:         Mood and Affect: Mood normal.         Behavior: Behavior normal.           ED Course & MDM   ED Course as of 12/13/24 1034   Fri Dec 13, 2024   1028 POTASSIUM(!!): 6.2 [SP]      ED Course User Index  [SP] Marleny Gaxiola MD         Diagnoses as of 12/13/24 1034   Primary malignant neoplasm of lung metastatic to other site, unspecified laterality (Multi)   Multifocal pneumonia   Other form of dyspnea   Oxygen dependent   Acute exacerbation of COPD with asthma                 No data recorded                                 Medical Decision Making  This 74-year-old male patient with history of lung cancer with severe wasting and fatigue weakness and has multiple other comorbidities including atrial fibrillation COPD, myocardial infarct in the past has been taking blood thinner brought in the ER because of complaint of fatigue weakness shortness of breath but denies any chest pain or hemoptysis.  Family wants and patient want to be DNR Comfort Care arrest only no intubation resuscitation but medical treatment as needed and supportive care.  Clinically patient chronically sick looking  Severe wasting.  Hypoxemia require O2 through nasal cannula and auscultation coarse rhonchi wheezing and crackles.  Heart with mild tachycardia no friction rub no murmur abdomen soft positive bowel sound nontender no guarding rebound surgery.  Good motion arms and legs.    Patient look sick advance stage and is lung cancer condition workup was done considering his poor prognosis and need for treatment.  His white count is 3.2 neutropenia possibly due to chemo treatment, his  H&H is 8.2 and 26.3 anemia but no emergent need for transfusion potassium 6.2 elevated BUN was 13 creatinine 0.73 with potassium elevation is suspected to be mistake as his kidney function is normal and his potassium is normal and kidney function were normal.  Urinalysis negative.  Lipase less than 3 troponin x 2 are negative.    CT of the chest was obtained showed extensive airspace consolidation previously noted collapsed lung resolved but patient has progressive disease with right upper lung mass and mediastinal metastases, extensive metastatic disease and further progression of malignant lesions..  I advised patient to be transferred to high-level facility there are no beds available at  main [place] however patient transfer has been accepted to [facility] they will obtain oncology and pulmonology consult.  Dr. Donovan accepted patient transfer patient a poor prognosis family understood and actually did want patient to be DNR Comfort Care arrest only understood the grave prognosis of this patient unfortunate advanced stage cancer disease.  Procedure  Procedures     Marleny Gaxiola MD  12/13/24 [id number]

## 2024-11-13 ENCOUNTER — APPOINTMENT (OUTPATIENT)
Dept: HEMATOLOGY/ONCOLOGY | Facility: HOSPITAL | Age: 74
End: 2024-11-13
Payer: MEDICARE

## 2024-11-13 ENCOUNTER — APPOINTMENT (OUTPATIENT)
Dept: RADIATION ONCOLOGY | Facility: CLINIC | Age: 74
End: 2024-11-13
Payer: MEDICARE

## 2024-11-13 LAB
ALBUMIN SERPL BCP-MCNC: 2.1 G/DL (ref 3.4–5)
ALP SERPL-CCNC: 79 U/L (ref 33–136)
ALT SERPL W P-5'-P-CCNC: 14 U/L (ref 10–52)
ANION GAP SERPL CALC-SCNC: 13 MMOL/L (ref 10–20)
AST SERPL W P-5'-P-CCNC: 15 U/L (ref 9–39)
BACTERIA SPEC RESP CULT: ABNORMAL
BILIRUB SERPL-MCNC: 0.4 MG/DL (ref 0–1.2)
BUN SERPL-MCNC: 14 MG/DL (ref 6–23)
CALCIUM SERPL-MCNC: 6.9 MG/DL (ref 8.6–10.3)
CHLORIDE SERPL-SCNC: 95 MMOL/L (ref 98–107)
CO2 SERPL-SCNC: 27 MMOL/L (ref 21–32)
CREAT SERPL-MCNC: 0.54 MG/DL (ref 0.5–1.3)
CRP SERPL-MCNC: 33.57 MG/DL
EGFRCR SERPLBLD CKD-EPI 2021: >90 ML/MIN/1.73M*2
ERYTHROCYTE [DISTWIDTH] IN BLOOD BY AUTOMATED COUNT: 13.9 % (ref 11.5–14.5)
GLUCOSE BLD MANUAL STRIP-MCNC: 123 MG/DL (ref 74–99)
GLUCOSE BLD MANUAL STRIP-MCNC: 157 MG/DL (ref 74–99)
GLUCOSE BLD MANUAL STRIP-MCNC: 167 MG/DL (ref 74–99)
GLUCOSE BLD MANUAL STRIP-MCNC: 217 MG/DL (ref 74–99)
GLUCOSE SERPL-MCNC: 187 MG/DL (ref 74–99)
GRAM STN SPEC: ABNORMAL
HCT VFR BLD AUTO: 23.1 % (ref 41–52)
HGB BLD-MCNC: 7.4 G/DL (ref 13.5–17.5)
HOLD SPECIMEN: NORMAL
LEGIONELLA AG UR QL: NEGATIVE
MCH RBC QN AUTO: 29.1 PG (ref 26–34)
MCHC RBC AUTO-ENTMCNC: 32 G/DL (ref 32–36)
MCV RBC AUTO: 91 FL (ref 80–100)
MRSA DNA SPEC QL NAA+PROBE: DETECTED
NRBC BLD-RTO: 0.6 /100 WBCS (ref 0–0)
PLATELET # BLD AUTO: 163 X10*3/UL (ref 150–450)
POTASSIUM SERPL-SCNC: 3.7 MMOL/L (ref 3.5–5.3)
PROCALCITONIN SERPL-MCNC: 2.3 NG/ML
PROT SERPL-MCNC: 4.7 G/DL (ref 6.4–8.2)
RBC # BLD AUTO: 2.54 X10*6/UL (ref 4.5–5.9)
S PNEUM AG UR QL: NEGATIVE
SODIUM SERPL-SCNC: 131 MMOL/L (ref 136–145)
WBC # BLD AUTO: 3.2 X10*3/UL (ref 4.4–11.3)

## 2024-11-13 PROCEDURE — 2500000002 HC RX 250 W HCPCS SELF ADMINISTERED DRUGS (ALT 637 FOR MEDICARE OP, ALT 636 FOR OP/ED): Performed by: INTERNAL MEDICINE

## 2024-11-13 PROCEDURE — 36415 COLL VENOUS BLD VENIPUNCTURE: CPT | Performed by: STUDENT IN AN ORGANIZED HEALTH CARE EDUCATION/TRAINING PROGRAM

## 2024-11-13 PROCEDURE — 36415 COLL VENOUS BLD VENIPUNCTURE: CPT | Performed by: INTERNAL MEDICINE

## 2024-11-13 PROCEDURE — 84145 PROCALCITONIN (PCT): CPT | Mod: GEALAB | Performed by: INTERNAL MEDICINE

## 2024-11-13 PROCEDURE — 2500000001 HC RX 250 WO HCPCS SELF ADMINISTERED DRUGS (ALT 637 FOR MEDICARE OP): Performed by: INTERNAL MEDICINE

## 2024-11-13 PROCEDURE — 86140 C-REACTIVE PROTEIN: CPT | Performed by: INTERNAL MEDICINE

## 2024-11-13 PROCEDURE — 2500000002 HC RX 250 W HCPCS SELF ADMINISTERED DRUGS (ALT 637 FOR MEDICARE OP, ALT 636 FOR OP/ED): Performed by: STUDENT IN AN ORGANIZED HEALTH CARE EDUCATION/TRAINING PROGRAM

## 2024-11-13 PROCEDURE — 87077 CULTURE AEROBIC IDENTIFY: CPT | Mod: GEALAB | Performed by: INTERNAL MEDICINE

## 2024-11-13 PROCEDURE — 2500000004 HC RX 250 GENERAL PHARMACY W/ HCPCS (ALT 636 FOR OP/ED): Performed by: INTERNAL MEDICINE

## 2024-11-13 PROCEDURE — 87205 SMEAR GRAM STAIN: CPT | Mod: GEALAB | Performed by: INTERNAL MEDICINE

## 2024-11-13 PROCEDURE — 85027 COMPLETE CBC AUTOMATED: CPT | Performed by: STUDENT IN AN ORGANIZED HEALTH CARE EDUCATION/TRAINING PROGRAM

## 2024-11-13 PROCEDURE — 2500000005 HC RX 250 GENERAL PHARMACY W/O HCPCS: Performed by: INTERNAL MEDICINE

## 2024-11-13 PROCEDURE — 2500000001 HC RX 250 WO HCPCS SELF ADMINISTERED DRUGS (ALT 637 FOR MEDICARE OP): Performed by: STUDENT IN AN ORGANIZED HEALTH CARE EDUCATION/TRAINING PROGRAM

## 2024-11-13 PROCEDURE — 99233 SBSQ HOSP IP/OBS HIGH 50: CPT | Performed by: STUDENT IN AN ORGANIZED HEALTH CARE EDUCATION/TRAINING PROGRAM

## 2024-11-13 PROCEDURE — 84075 ASSAY ALKALINE PHOSPHATASE: CPT | Performed by: STUDENT IN AN ORGANIZED HEALTH CARE EDUCATION/TRAINING PROGRAM

## 2024-11-13 PROCEDURE — 1200000002 HC GENERAL ROOM WITH TELEMETRY DAILY

## 2024-11-13 PROCEDURE — 82947 ASSAY GLUCOSE BLOOD QUANT: CPT

## 2024-11-13 RX ORDER — EAR PLUGS
1 EACH OTIC (EAR) 3 TIMES DAILY
Status: DISCONTINUED | OUTPATIENT
Start: 2024-11-13 | End: 2024-11-27 | Stop reason: HOSPADM

## 2024-11-13 RX ORDER — CALCIUM CARBONATE 200(500)MG
1000 TABLET,CHEWABLE ORAL ONCE
Status: COMPLETED | OUTPATIENT
Start: 2024-11-14 | End: 2024-11-13

## 2024-11-13 RX ORDER — TAMSULOSIN HYDROCHLORIDE 0.4 MG/1
0.4 CAPSULE ORAL DAILY
Status: DISCONTINUED | OUTPATIENT
Start: 2024-11-13 | End: 2024-11-27 | Stop reason: HOSPADM

## 2024-11-13 RX ORDER — MUPIROCIN 20 MG/G
OINTMENT TOPICAL 3 TIMES DAILY
Status: DISCONTINUED | OUTPATIENT
Start: 2024-11-13 | End: 2024-11-27 | Stop reason: HOSPADM

## 2024-11-13 RX ORDER — SODIUM CHLORIDE 9 MG/ML
50 INJECTION, SOLUTION INTRAVENOUS CONTINUOUS
Status: DISCONTINUED | OUTPATIENT
Start: 2024-11-13 | End: 2024-11-14

## 2024-11-13 ASSESSMENT — PAIN SCALES - GENERAL
PAINLEVEL_OUTOF10: 0 - NO PAIN

## 2024-11-13 ASSESSMENT — COGNITIVE AND FUNCTIONAL STATUS - GENERAL
WALKING IN HOSPITAL ROOM: A LITTLE
DRESSING REGULAR UPPER BODY CLOTHING: A LITTLE
MOBILITY SCORE: 18
TOILETING: A LITTLE
WALKING IN HOSPITAL ROOM: A LITTLE
DRESSING REGULAR UPPER BODY CLOTHING: A LITTLE
CLIMB 3 TO 5 STEPS WITH RAILING: A LITTLE
STANDING UP FROM CHAIR USING ARMS: A LITTLE
MOVING TO AND FROM BED TO CHAIR: A LITTLE
CLIMB 3 TO 5 STEPS WITH RAILING: A LITTLE
DRESSING REGULAR LOWER BODY CLOTHING: A LITTLE
MOVING FROM LYING ON BACK TO SITTING ON SIDE OF FLAT BED WITH BEDRAILS: A LITTLE
DAILY ACTIVITIY SCORE: 20
TURNING FROM BACK TO SIDE WHILE IN FLAT BAD: A LITTLE
MOVING TO AND FROM BED TO CHAIR: A LITTLE
MOBILITY SCORE: 18
STANDING UP FROM CHAIR USING ARMS: A LITTLE
HELP NEEDED FOR BATHING: A LITTLE
TOILETING: A LITTLE
TURNING FROM BACK TO SIDE WHILE IN FLAT BAD: A LITTLE
HELP NEEDED FOR BATHING: A LITTLE
DAILY ACTIVITIY SCORE: 20
MOVING FROM LYING ON BACK TO SITTING ON SIDE OF FLAT BED WITH BEDRAILS: A LITTLE
DRESSING REGULAR LOWER BODY CLOTHING: A LITTLE

## 2024-11-13 ASSESSMENT — ENCOUNTER SYMPTOMS
PSYCHIATRIC NEGATIVE: 1
CARDIOVASCULAR NEGATIVE: 1
GASTROINTESTINAL NEGATIVE: 1
ENDOCRINE COMMENTS: PT IS DIABETIC
MUSCULOSKELETAL NEGATIVE: 1
NEUROLOGICAL NEGATIVE: 1
EYES NEGATIVE: 1
HEMATOLOGIC/LYMPHATIC NEGATIVE: 1

## 2024-11-13 NOTE — CONSULTS
"  Wound Care Consult     Visit Date: 11/13/2024      Patient Name: Juan Carlos Cr         MRN: [id number]           YOB: 1950     Reason for Consult: wound        Wound History: Patient currently on chemo and radiation treatments, states, \"I think I got this while I lay on the table during radiation\".       Pertinent Labs:   Albumin   Date Value Ref Range Status   11/13/2024 2.1 (L) 3.4 - 5.0 g/dL Final     Albumin, Urine Random   Date Value Ref Range Status   08/27/2024 160.0 Not established mg/L Final       Wound Assessment:  Wound 11/12/24 Buttock Left (Active)   Wound Image   11/12/24 2209   Dressing Open to air 11/13/24 0900       Wound Team Summary Assessment: Very kind gentleman, seen in bed with Left ischial stage 2 pressure injury, dry red wound bed, periwound pink, 0.7 x 0.7 cm.  Patient is ambulatory.     Wound Team Plan: Goal is to protect, promote local blood flow and heal wound.    -- Zinc 40% under mepilex  -- Q 2 hour turns  -- Educate patient on reposition for bony prominences, hips heels, elbows, sacrum.  -- Waffle seat cushion if allowed during radiation- patient will ask.  Please message with questions.    Alana Hernandez RN, [facility]  11/13/2024  5:42 PM        "

## 2024-11-13 NOTE — CONSULTS
Consults  Referred by ROSA Lira    Primary MD: Leeann Olivo, FARRAH    Reason For Consult  pneumonia    History Of Present Illness  Juan Carlos Cr is a 74 y.o. male, hx of COPD on O2, hx of DM, hx of CAD, hx of HTN, hx of AF, hx of NSCLC on Taxol / Carboplatin / radiation, hx of Rt main bronchus stent, hx of a port, he was admitted for increasing sob, wheezing, productive cough, no chest pain, no fever, no hemoptysis, WBC 3.2, ANC 2.81, the CT with Rt sided cavitary mass, bilateral infiltrates, cavitary lesion on the Lt side     Past Medical History  He has a past medical history of Acute myocardial infarction, unspecified (05/17/2013), Atrial fib/flutter, transient (Multi), COPD (chronic obstructive pulmonary disease) (Multi), Coronary artery disease, Diabetes mellitus (Multi), Diabetes mellitus (Multi), Encounter for screening for malignant neoplasm of prostate (09/02/2015), GERD (gastroesophageal reflux disease), Hyperlipidemia, Hypertension, Lung cancer (Multi), and Personal history of other diseases of the nervous system and sense organs (09/13/2017).    Surgical History  He has a past surgical history that includes Coronary angioplasty with stent (05/17/2013); Cataract extraction (09/14/2018); and Cataract extraction (06/27/2014).     Social History     Occupational History    Not on file   Tobacco Use    Smoking status: Former     Current packs/day: 0.50     Types: Cigarettes     Passive exposure: Past    Smokeless tobacco: Never   Vaping Use    Vaping status: Never Used   Substance and Sexual Activity    Alcohol use: Not Currently     Comment: 4-5 beers and a glass a wine a day    Drug use: Never    Sexual activity: Defer     Travel History   Travel since 10/13/24    No documented travel since 10/13/24          Family History  Family History   Problem Relation Name Age of Onset    Heart attack Mother      Lung cancer Brother       Allergies  Patient has no known allergies.       There is no immunization  history on file for this patient.  Pneumonia and influeza vaccines are up to date  Curiel fall risk 45, preventive protocol was implemented  Depression screen is negative     Medications  Home medications:  Medications Prior to Admission   Medication Sig Dispense Refill Last Dose/Taking    albuterol 2.5 mg /3 mL (0.083 %) nebulizer solution Take 3 mL (2.5 mg) by nebulization 3 times a day. 270 mL 0 11/12/2024    ascorbic acid (Vitamin C) 500 mg tablet Take 1 tablet (500 mg) by mouth once daily.   11/12/2024    aspirin 81 mg EC tablet Take 1 tablet (81 mg) by mouth once daily.   11/12/2024    cholecalciferol (Vitamin D-3) 10 MCG (400 UNIT) tablet Take 2 tablets (20 mcg) by mouth once daily. 60 tablet 0 11/12/2024    fluticasone furoate-vilanteroL (Breo Ellipta) 200-25 mcg/dose inhaler Inhale 1 puff once daily. 60 each 11 11/12/2024    losartan (Cozaar) 25 mg tablet Take 1 tablet (25 mg) by mouth once daily. 30 tablet 11 11/12/2024    metFORMIN (Glucophage) 500 mg tablet Take 1 tablet (500 mg) by mouth 2 times daily (morning and late afternoon). 120 tablet 0 11/12/2024    metoprolol succinate XL (Toprol-XL) 50 mg 24 hr tablet Take 1 tablet (50 mg) by mouth once daily. Do not crush or chew. 30 tablet 11 11/12/2024    nicotine (Nicoderm CQ) 7 mg/24 hr patch Place 1 patch over 24 hours on the skin once daily for 14 doses. START AFTER finishing the 14mg patches. 14 patch 0 11/12/2024    oxygen (O2) gas therapy Inhale 2 L/min continuously.   11/12/2024    pantoprazole (ProtoNix) 40 mg EC tablet Take 1 tablet (40 mg) by mouth once daily. Do not crush, chew, or split. 60 tablet 2 11/12/2024    potassium chloride CR (Klor-Con M20) 20 mEq ER tablet Take 1 tablet (20 mEq) by mouth once daily. Do not crush or chew. (Patient taking differently: Take 2 tablets (40 mEq) by mouth once daily. Do not crush or chew.) 60 tablet 0 11/12/2024    rivaroxaban (Xarelto) 20 mg tablet Take 1 tablet (20 mg) by mouth once daily in the evening.  Take with meals. Take with food. 30 tablet 11 Unknown    rosuvastatin (Crestor) 20 mg tablet Take 1 tablet (20 mg) by mouth once daily. 30 tablet 11 11/12/2024    sodium chloride 3 % nebulizer solution Take 4 mL by nebulization 3 times a day. 240 mL 0 11/12/2024    [START ON 11/17/2024] albuterol 2.5 mg /3 mL (0.083 %) nebulizer solution Take 3 mL (2.5 mg) by nebulization 3 times a day. Start taking on November 19th or as prescribed by your doctor. Do not fill before November 17, 2024. 270 mL 0     blood sugar diagnostic (Accu-Chek Guide test strips) strip 1 strip 2 times a day. 200 each 3     blood sugar diagnostic (FreeStyle Lite Strips) strip 1 strip 2 times a day. 200 each 3     blood-glucose meter (Accu-Chek Guide Glucose Meter) misc Check blood glucose 2x a day 1 each 0     fluticasone-umeclidin-vilanter (TRELEGY-ELLIPTA) 200-62.5-25 mcg blister with device Inhale 1 puff early in the morning.. Rinse mouth after taking dose 60 each 11     FreeStyle Lite Meter kit TEST BLOOD SUGAR LEVELS 2X A DAY 1 each 0     lancets (Accu-Chek Fastclix Lancet Drum) misc Check blood glucose 2x day 200 each 0     lancets (Accu-Chek Fastclix Lancet Drum) misc Check blood glucose level 2x a day 200 each 0     lancets misc TEST BLOOD SUGAR LEVELS 2X A  each 0     lancets misc Check blood sugar twice daily 200 each 2     nicotine (Nicoderm CQ) 14 mg/24 hr patch Apply 1 patch topically every day.  START AFTER finishing the 21mg patches. 14 patch 0     nicotine (Nicoderm CQ) 21 mg/24 hr patch Place 1 patch over 24 hours on the skin once daily for 42 doses. 42 patch 1     ondansetron (Zofran) 8 mg tablet Take 1 tablet (8 mg) by mouth every 8 hours if needed for nausea or vomiting. 30 tablet 5 Unknown    prochlorperazine (Compazine) 10 mg tablet Take 1 tablet (10 mg) by mouth every 6 hours if needed for nausea or vomiting. 30 tablet 5 Unknown    [START ON 11/17/2024] sodium chloride 3% nebulizer solution Take 4 mL by nebulization  "3 times a day. Start taking on November 19th or as prescribed by your doctor. Take after albuterol. Do not fill before November 17, 2024. 360 mL 0     tiotropium (Spiriva Respimat) 2.5 mcg/actuation inhaler Inhale 2 puffs by mouth once daily. 8 g 11      Current medications:  Scheduled medications  ascorbic acid, 500 mg, oral, Daily  aspirin, 81 mg, oral, Daily  cholecalciferol, 800 Units, oral, Daily  fluticasone furoate-vilanteroL, 1 puff, inhalation, Daily  insulin lispro, 0-10 Units, subcutaneous, Before meals & nightly  [Held by provider] losartan, 25 mg, oral, Daily  metFORMIN, 500 mg, oral, BID  metoprolol succinate XL, 50 mg, oral, Daily  mupirocin, , Each Nostril, TID  oxygen, , inhalation, Continuous - Inhalation  pantoprazole, 40 mg, oral, Daily  piperacillin-tazobactam, 4.5 g, intravenous, q6h  rivaroxaban, 20 mg, oral, Daily with evening meal  rosuvastatin, 20 mg, oral, Daily  tiotropium, 2 puff, inhalation, Daily  vancomycin, 1,500 mg, intravenous, q24h      Continuous medications  sodium chloride 0.9%, 50 mL/hr      PRN medications  PRN medications: dextrose, dextrose, glucagon, glucagon, prochlorperazine, vancomycin    Review of Systems   All other systems reviewed and are negative.       Objective  Range of Vitals (last 24 hours)  Heart Rate:  []   Temp:  [36.5 °C (97.7 °F)-36.6 °C (97.9 °F)]   Resp:  [20-38]   BP: ()/(47-78)   Height:  [175.3 cm (5' 9\")]   Weight:  [60.1 kg (132 lb 9.6 oz)-61.7 kg (136 lb 0.4 oz)]   SpO2:  [92 %-99 %]   Daily Weight  11/12/24 : 60.1 kg (132 lb 9.6 oz)    Body mass index is 19.58 kg/m².     Physical Exam  Constitutional:       Appearance: Normal appearance.   HENT:      Head: Normocephalic and atraumatic.      Mouth/Throat:      Mouth: Mucous membranes are moist.      Pharynx: Oropharynx is clear.   Eyes:      Pupils: Pupils are equal, round, and reactive to light.   Cardiovascular:      Rate and Rhythm: Normal rate and regular rhythm.      Heart sounds: " "Normal heart sounds.   Pulmonary:      Effort: Pulmonary effort is normal.      Breath sounds: Rales present.      Comments: Rt sided port, no redness  Abdominal:      General: Abdomen is flat. Bowel sounds are normal.      Palpations: Abdomen is soft.   Musculoskeletal:      Cervical back: Normal range of motion.   Neurological:      Mental Status: He is alert.          Relevant Results  Outside Hospital Results  reviewed  Labs  Results from last 72 hours   Lab Units 11/12/24  1450   WBC AUTO x10*3/uL 3.2*   HEMOGLOBIN g/dL 8.2*   HEMATOCRIT % 26.3*   PLATELETS AUTO x10*3/uL 159   LYMPHO PCT MAN % 6.0   MONO PCT MAN % 5.0   EOSINO PCT MAN % 0.0     Results from last 72 hours   Lab Units 11/12/24  1557 11/12/24  1450   SODIUM mmol/L  --  124*   POTASSIUM mmol/L 5.5* 6.2*   CHLORIDE mmol/L  --  93*   CO2 mmol/L  --  21   BUN mg/dL  --  13   CREATININE mg/dL  --  0.73   GLUCOSE mg/dL  --  100*   CALCIUM mg/dL  --  8.0*   ANION GAP mmol/L  --  16   EGFR mL/min/1.73m*2  --  >90     Results from last 72 hours   Lab Units 11/12/24  1450   ALK PHOS U/L 92   BILIRUBIN TOTAL mg/dL 0.5   PROTEIN TOTAL g/dL 6.4   ALT U/L 15   AST U/L 42*   ALBUMIN g/dL 2.6*     Estimated Creatinine Clearance: 75.5 mL/min (by C-G formula based on SCr of 0.73 mg/dL).  No results found for: \"CRP\", \"SEDRATE\"  HIV 1/2 Antigen/Antibody Screen with Reflex to Confirmation   Date Value Ref Range Status   08/31/2024 Nonreactive Nonreactive Final     No results found for: \"HEPCABINIT\", \"HEPCAB\", \"HCVPCRQUANT\"  Microbiology  Reviewed  Imaging  Reviewed      Assessment/Plan   Respiratory failure / COPD / pneumonia / abscess  NSCLC / Rt bronchus stent / on Taxol / Carboplatin / radiation    Recommendations :  Continue Zosyn and Vancomycin  Cultures  Inflammatory markers  Incentive spirometry / chest PT    I spent minutes in the professional and overall care of this patient.      Krysten Magallon MD  "

## 2024-11-13 NOTE — PROGRESS NOTES
11/13/24 1233   Discharge Planning   Living Arrangements Spouse/significant other   Support Systems Spouse/significant other;Children   Assistance Needed patient is A&Ox3, reports independent in ADL's, uses no AD, O2 at 2-3L (unknown company), drives when feeling well enough, reports he is active with homecare for nurse visits (unsure agency)   Type of Residence Private residence   Number of Stairs to Enter Residence 2   Number of Stairs Within Residence 13  (reports he and wife moved downstairs)   Do you have animals or pets at home? Yes   Type of Animals or Pets 1 cat   Who is requesting discharge planning? Provider   Home or Post Acute Services In home services   Type of Home Care Services Home nursing visits   Expected Discharge Disposition Home Health   Does the patient need discharge transport arranged? No

## 2024-11-13 NOTE — PROGRESS NOTES
"Juan Carlos Cr is a 74 y.o. male on day 1 of admission presenting with Pneumonia due to gram-negative bacteria (Multi).    Subjective   Pt is having productive cough and SOB. He is on 3L NC.        Objective     Physical Exam  Vitals and nursing note reviewed.   Constitutional:       General: He is not in acute distress.     Appearance: He is ill-appearing. He is not toxic-appearing.   HENT:      Head: Normocephalic and atraumatic.      Nose: Nose normal.      Mouth/Throat:      Mouth: Mucous membranes are moist.   Eyes:      Extraocular Movements: Extraocular movements intact.      Conjunctiva/sclera: Conjunctivae normal.      Pupils: Pupils are equal, round, and reactive to light.   Cardiovascular:      Rate and Rhythm: Normal rate and regular rhythm.      Heart sounds: No murmur heard.     No gallop.   Pulmonary:      Effort: No respiratory distress.      Breath sounds: Wheezing and rhonchi present. No rales.   Abdominal:      General: Abdomen is flat. Bowel sounds are normal. There is no distension.      Palpations: Abdomen is soft. There is no mass.      Tenderness: There is no abdominal tenderness.   Musculoskeletal:         General: No swelling or tenderness. Normal range of motion.      Cervical back: Normal range of motion and neck supple.      Right lower leg: Edema present.      Left lower leg: Edema present.   Skin:     General: Skin is warm and dry.   Neurological:      Mental Status: He is alert and oriented to person, place, and time.      Motor: Weakness present.   Psychiatric:         Mood and Affect: Mood normal.         Behavior: Behavior normal.         Thought Content: Thought content normal.         Judgment: Judgment normal.         Last Recorded Vitals:  /78 (BP Location: Left arm, Patient Position: Lying)   Pulse 102   Temp 36.6 °C (97.9 °F) (Temporal)   Resp 20   Ht 1.753 m (5' 9\")   Wt 60.1 kg (132 lb 9.6 oz)   SpO2 96%   BMI 19.58 kg/m²      Scheduled medications:  ascorbic " acid, 500 mg, oral, Daily  aspirin, 81 mg, oral, Daily  cholecalciferol, 800 Units, oral, Daily  fluticasone furoate-vilanteroL, 1 puff, inhalation, Daily  insulin lispro, 0-10 Units, subcutaneous, Before meals & nightly  [Held by provider] losartan, 25 mg, oral, Daily  metFORMIN, 500 mg, oral, BID  metoprolol succinate XL, 50 mg, oral, Daily  mupirocin, , Each Nostril, TID  oxygen, , inhalation, Continuous - Inhalation  pantoprazole, 40 mg, oral, Daily  piperacillin-tazobactam, 4.5 g, intravenous, q6h  rivaroxaban, 20 mg, oral, Daily with evening meal  rosuvastatin, 20 mg, oral, Daily  tiotropium, 2 puff, inhalation, Daily  vancomycin, 1,500 mg, intravenous, q24h      Continuous medications:  sodium chloride 0.9%, 50 mL/hr, Last Rate: 50 mL/hr (11/13/24 0906)      PRN medications:  PRN medications: dextrose, dextrose, glucagon, glucagon, prochlorperazine, vancomycin     Relevant Results:  Results for orders placed or performed during the hospital encounter of 11/12/24 (from the past 24 hours)   MRSA Surveillance for Vancomycin De-escalation, PCR    Specimen: Anterior Nares; Swab   Result Value Ref Range    MRSA PCR Detected (A) Not Detected   POCT GLUCOSE   Result Value Ref Range    POCT Glucose 157 (H) 74 - 99 mg/dL       ECG 12 lead    Result Date: 11/13/2024  Sinus tachycardia Otherwise normal ECG When compared with ECG of 12-NOV-2024 14:23, (unconfirmed) No significant change was found    ECG 12 lead    Result Date: 11/13/2024  Sinus tachycardia Otherwise normal ECG When compared with ECG of 01-NOV-2024 08:10, Vent. rate has increased BY  42 BPM Criteria for Septal infarct are no longer Present T wave amplitude has decreased in Lateral leads    CT angio chest for pulmonary embolism    Result Date: 11/12/2024  Interpreted By:  Bryce Estrada, STUDY: CT ANGIO CHEST FOR PULMONARY EMBOLISM;  11/12/2024 3:08 pm   INDICATION: Signs/Symptoms:History of lung cancer shortness of breath COPD hypoxemic significant weight  loss.   COMPARISON: 10/16/2024   ACCESSION NUMBER(S): [id number]   ORDERING CLINICIAN: LINUS BERNAL   TECHNIQUE: Helical data acquisition of the chest was obtained with  75 mL Omnipaque 350. Images were reformatted in axial, coronal, and sagittal planes.MIP reformatted images were also generated.   FINDINGS: LUNGS and AIRWAYS: Right upper lobe mass, difficult to differentiate from adjacent consolidation, grossly appears decreased in size from 9.1 cm 8.1 cm. Central necrosis has increased.   Previously, the right lung was collapsed. There is now aeration of the previously occluded right mainstem bronchus as well as the lobar branches of the middle and lower lobes. There is persistent occlusion of the upper lobe bronchus. The previously collapsed upper, middle, and lower lobes are now aerated, although there are diffusely scattered areas of predominantly peripheral consolidation. There are also peripheral areas of new consolidation scattered throughout the left lung. A new nodule in the left lung base measuring 23 mm is centrally cavitated.   Underlying mild pulmonary fibrosis. Trace right pleural effusion.   MEDIASTINUM and SUDHAKAR, LOWER NECK AND AXILLA: The visualized thyroid gland is grossly unremarkable.   A left paratracheal node is enlarged from 8 mm to 10 mm short axis. The right upper lobe mass infiltrates the mediastinum and is contiguous with adjacent lymphadenopathy which is difficult to measure discretely. Pretracheal node is roughly unchanged at 18 mm short axis. A 13 mm subcarinal node appears new.   Esophagus is not dilated.   HEART and VESSELS: The heart is normal in gross morphology and size.   No significant pericardial effusion.   Severe coronary atherosclerosis.   Thoracic aorta is severely atherosclerotic but otherwise patent without aneurysm. The great vessels are patent. Right IJ chest port catheter is still present. There is persistent narrowing of the SVC relating to compression by adjacent  tumor.   No pulmonary embolism is identified.   UPPER ABDOMEN: The visualized subdiaphragmatic structures demonstrate no acute findings on limited images.   CHEST WALL and OSSEOUS STRUCTURES: No suspicious osseous lesions. Multilevel degenerative changes of the thoracic spine.         1.  No pulmonary embolism identified. 2. Grossly right upper lobe mass is decreased in size with increased central necrosis. 3. Previous collapse of the right lung has resolved. There are however extensive new areas of bilateral airspace consolidation suggestive of multifocal pneumonia. A new cavitated nodule in the left lung base may be infectious or inflammatory although metastasis is not excluded. 4. Trace right pleural effusion. 5. Mediastinal lymphadenopathy has slightly progressed.     Signed by: Bryce Estrada 11/12/2024 3:45 PM Dictation workstation:   [id number]                    Assessment/Plan   Principal Problem:    Pneumonia due to gram-negative bacteria (Multi)    Multifocal pneumonia  CT chest: right upper lobe mass with central necrosis, multifocal pneumonia, new left lung base cavitated nodule  - on home 3L NC  - ID following  - vanc/zosyn    HyperK  - repeat labs    HypoNa  - repeat labs  - IVF    Normocytic anemia  - monitor    Leukopenia  - monitor    Non small cell lung ca  Currently on chemo and XRT  Oncology follow up on discharge    HTN/Afib  - metoprolol  - xarelto    HLD   - statin  - ASA    DM  - metformin  - ISS    GERD  - protonix    Dispo: monitor clinically          Tiffanie Viera MD  Hospitalist

## 2024-11-13 NOTE — PROGRESS NOTES
Vancomycin Dosing by Pharmacy- INITIAL    Juan Carlos rC is a 74 y.o. year old male who Pharmacy has been consulted for vancomycin dosing for pneumonia. Based on the patient's indication and renal status this patient will be dosed based on a goal AUC of 500-600.     Renal function is currently declining.    There were no vitals taken for this visit.     Lab Results   Component Value Date    CREATININE 0.73 11/12/2024    CREATININE 0.69 11/06/2024    CREATININE 0.57 10/30/2024    CREATININE 0.50 10/23/2024        Patient weight is as follows:   Vitals:    11/12/24 2212   Weight: 60.1 kg (132 lb 9.6 oz)       Cultures:  No results found for the encounter in last 14 days.        No intake/output data recorded.  I/O during current shift:  No intake/output data recorded.    No data recorded.         Assessment/Plan     Patient has already been given a loading dose of 1000 mg x1 in ED.  Will initiate vancomycin maintenance,  1500 mg every 24 hours starting at 15:00 on 11/13/24    This dosing regimen is predicted by [employer] to result in the following pharmacokinetic parameters:    Loading dose: 1000mg x1 in ED @18:40  Regimen: 1500 mg IV every 24 hours.  Start time: 15:00 on 11/13/2024  Exposure target: AUC24 (range)400-600 mg/L.hr   [id number]: 440 mg/L.hr  AUC24,ss: 477 mg/L.hr  Probability of AUC24 > 400: 69 %  Ctrough,ss: 12.2 mg/L  Probability of Ctrough,ss > 20: 15 %    Follow-up level will be ordered on 11/14 at 1st a.m. draw unless clinically indicated sooner.  Will continue to monitor renal function daily while on vancomycin and order serum creatinine at least every 48 hours if not already ordered.  Follow for continued vancomycin needs, clinical response, and signs/symptoms of toxicity.       Lina Cottrell, PharmD

## 2024-11-13 NOTE — CONSULTS
"Patient has Malnutrition Diagnosis: Yes  Diagnosis Status: New  Malnutrition Diagnosis: Severe malnutrition related to chronic disease or condition  As Evidenced by: significant weight loss of 20%/3 months, severe loss of muscle and adipose stores  Additional Assessment Information: ONS added for additional kcal and protein  Nutrition Assessment    Reason for Assessment: Admission nursing screening (weight loss, eating poorly)    Patient is a 74 y.o. male presenting with: Pneumonia due to gram-negative bacteria (Multi),   Past Medical History:   Diagnosis Date    Acute myocardial infarction, unspecified 05/17/2013    Acute myocardial infarction    Atrial fib/flutter, transient (Multi)     COPD (chronic obstructive pulmonary disease) (Multi)     Coronary artery disease     Diabetes mellitus (Multi)     Diabetes mellitus (Multi)     per pt    Encounter for screening for malignant neoplasm of prostate 09/02/2015    Encounter for prostate cancer screening    GERD (gastroesophageal reflux disease)     Hyperlipidemia     Hypertension     Lung cancer (Multi)     Personal history of other diseases of the nervous system and sense organs 09/13/2017    History of cataract      Past Surgical History:   Procedure Laterality Date    CATARACT EXTRACTION  09/14/2018    Cataract Surgery    CATARACT EXTRACTION  06/27/2014    Cataract Surgery    CORONARY ANGIOPLASTY WITH STENT PLACEMENT  05/17/2013    Cath Stent Placement      Nutrition History:  Food and Nutrient History: Pt reports poor appetite with weight loss that started about 3 months ago. Pt does not have any N, V, D, or C, chewing and swallowing ok. Ambulation has become more difficult, agrees to take ONS while hospitalized.     No Known Allergies   GI Symptoms: None  Vitamin/Herbal Supplement Use: vitamin C per pt  Oral Problems: None          Anthropometrics:  Height: 175.3 cm (5' 9\")   Weight: 60.1 kg (132 lb 9.6 oz)   BMI (Calculated): 19.57  IBW/kg (Dietitian " Calculated): 72.7 kg  Percent of IBW: 83 %       Weight History:     Daily Weight  11/12/24 : 60.1 kg (132 lb 9.6 oz)  11/12/24 : 61.7 kg (136 lb 0.4 oz)  11/06/24 : 60.7 kg (133 lb 13.1 oz)  11/06/24 : 60.7 kg (133 lb 14.9 oz)  11/01/24 : 61.7 kg (136 lb)  10/30/24 : 62.3 kg (137 lb 5.6 oz)  10/30/24 : 62.4 kg (137 lb 9.1 oz)  10/23/24 : 63.9 kg (140 lb 12.2 oz)  10/18/24 : 62.1 kg (137 lb)  10/16/24 : 63.8 kg (140 lb 10.5 oz)    Weight         11/12/2024 2212             Weight: 60.1 kg (132 lb 9.6 oz)            Weight Change %:  Weight History / % Weight Change: 74.7 kg (8/26/24) down 20%/3 months  Significant Weight Loss: Yes  Interpretation of Weight Loss: >7.5% in 3 months       Nutrition Focused Physical Exam Findings:    Subcutaneous Fat Loss  Orbital Fat Pads: Severe (dark circles, hollowing and loose skin)  Buccal Fat Pads: Severe (hollow, sunken and narrow face)  Triceps: Mild-Moderate (less than ample fat tissue)  Muscle Wasting  Temporalis: Severe (hollowed scooping depression)  Pectoralis (Clavicular Region): Mild-Moderate (some protrusion of clavicle)  Deltoid/Trapezius: Mild-Moderate (slight protrusion of acromion process)  Interosseous: Mild-Moderate (slightly depressed area between thumb and forefinger)  Quadriceps: Severe (depressions on inner and outer thigh)  Gastrocnemius: Severe (minimal muscle definition)  Edema  Edema: none  Physical Findings (Nutrition Deficiency/Toxicity)  Skin: Positive (L buttock wound)    Nutrition Significant Labs:    Results from last 7 days   Lab Units 11/13/24  0954 11/12/24  1557 11/12/24  1450   GLUCOSE mg/dL 187*  --  100*   SODIUM mmol/L 131*  --  124*   POTASSIUM mmol/L 3.7 5.5* 6.2*   CHLORIDE mmol/L 95*  --  93*   CO2 mmol/L 27  --  21   BUN mg/dL 14  --  13   CREATININE mg/dL 0.54  --  0.73   EGFR mL/min/1.73m*2 >90  --  >90   CALCIUM mg/dL 6.9*  --  8.0*   MAGNESIUM mg/dL  --   --  1.75     Lab Results   Component Value Date    HGBA1C 7.6 (H) 08/26/2024     HGBA1C 5.7 09/25/2020     Results from last 7 days   Lab Units 11/13/24  1646 11/13/24  1118 11/13/24  0800   POCT GLUCOSE mg/dL 217* 167* 157*     Lab Results   Component Value Date    ALBUMIN 2.1 (L) 11/13/2024      Lab Results   Component Value Date    CRP 33.57 (H) 11/13/2024       Nutrition Specific Medications:   Scheduled medications:  ascorbic acid, 500 mg, oral, Daily  aspirin, 81 mg, oral, Daily  cholecalciferol, 800 Units, oral, Daily  fluticasone furoate-vilanteroL, 1 puff, inhalation, Daily  insulin lispro, 0-10 Units, subcutaneous, Before meals & nightly  [Held by provider] losartan, 25 mg, oral, Daily  metFORMIN, 500 mg, oral, BID  metoprolol succinate XL, 50 mg, oral, Daily  mupirocin, , Each Nostril, TID  oxygen, , inhalation, Continuous - Inhalation  pantoprazole, 40 mg, oral, Daily  piperacillin-tazobactam, 4.5 g, intravenous, q6h  rivaroxaban, 20 mg, oral, Daily with evening meal  rosuvastatin, 20 mg, oral, Daily  tamsulosin, 0.4 mg, oral, Daily  tiotropium, 2 puff, inhalation, Daily  vancomycin, 1,500 mg, intravenous, q24h  zinc oxide, 1 Application, Topical, TID      Continuous medications:  sodium chloride 0.9%, 50 mL/hr, Last Rate: 50 mL/hr (11/13/24 0906)      PRN medications:  PRN medications: dextrose, dextrose, glucagon, glucagon, prochlorperazine, vancomycin     Nursing Data Per flowsheet:   Stool Appearance: Unable to assess (11/13/24 1103)  Gastrointestinal  Gastrointestinal (WDL): Within Defined Limits  Bowel Incontinence: No  Stool Appearance: Unable to assess  Stool Color: Unable to assess  Feeding assistance level:      Intake/Output Summary (Last 24 hours) at 11/13/2024 1738  Last data filed at 11/13/2024 1500  Gross per 24 hour   Intake 1635 ml   Output 1600 ml   Net 35 ml      0-10 (Numeric) Pain Score: 0 - No pain   Dietary Orders (From admission, onward)       Start     Ordered    11/13/24 1154  Oral nutritional supplements  Until discontinued        Question Answer Comment    Deliver with Breakfast strawberry   Deliver with Dinner    Select supplement: Glucerna Shake        11/13/24 1153    11/13/24 1154  Oral nutritional supplements  Until discontinued        Question Answer Comment   Deliver with Lunch chocolate   Select supplement: Ensure High Protein        11/13/24 1153    11/12/24 2219  May Participate in Room Service  ( ROOM SERVICE MAY PARTICIPATE)  Once        Question:  .  Answer:  Yes    11/12/24 2218 11/12/24 2216  Adult diet Regular, Consistent Carb; CCD 60 gm/meal  Diet effective now        Question Answer Comment   Diet type Regular    Diet type Consistent Carb    Carb diet selection: CCD 60 gm/meal        11/12/24 2220                     Estimated Needs:   Total Energy Estimated Needs (kCal): 1803 kCal  Method for Estimating Needs: 30 kcal/kg  Total Protein Estimated Needs (g): 72 g  Method for Estimating Needs: 1.2 g/kg     Method for Estimating Needs: 1 ml/kcal or per team       Nutrition Diagnosis   Malnutrition Diagnosis  Patient has Malnutrition Diagnosis: Yes  Diagnosis Status: New  Malnutrition Diagnosis: Severe malnutrition related to chronic disease or condition  As Evidenced by: significant weight loss of 20%/3 months, severe loss of muscle and adipose stores  Additional Assessment Information: ONS added for additional kcal and protein            Nutrition Interventions/Recommendations   Nutrition Prescription:  diet, fluids    Nutrition Interventions:   Interventions: Medical food supplement  Goal: Ensure HP= 160 kcal, 16 g protein (per container) Glucerna daily= 220 kcal, 10 g protein (per container)      Coordination of Care: NATTY Mckinney  Nutrition Education:   N/A  Recommendations:  Continue with Regular diet  Consider palliative care consult  RFP, Mg daily; replete lytes         Nutrition Monitoring and Evaluation   Food/Nutrient Related History Monitoring  Monitoring and Evaluation Plan: Amount of food  Criteria: Pt will consume 50-75% of meals and  ONS provided    Body Composition/Growth/Weight History  Monitoring and Evaluation Plan: Weight  Weight: Measured weight  Criteria: Monitor weights    Biochemical Data, Medical Tests and Procedures  Monitoring and Evaluation Plan: Electrolyte/renal panel, Glucose/endocrine profile  Criteria: Monitor      Time Spent (min): 60 minutes

## 2024-11-13 NOTE — H&P
History Of Present Illness  Juan Carlos Cr is a 74 y.o. male with history of oxygen dependent COPD, diabetes mellitus, hypertension, MI, CAD, and non-small cell lung cancer, currently on chemo and radiation treatment presenting with SOB. He says he became dyspneic on minimal exertion 3-4 days ago. He mentions having a productive cough with wheezing, orthopnea and PND. He denies chest pain, fever, chills, sweats. He says he has lost 10 pounds in the last 3 months. His sister drove him to [facility] ED today and CTA chest was negative for PE but revealed extensive new areas of bilateral airspace consolidation suggestive of multifocal pneumonia. He was transferred to [facility] for further care and management.     Past Medical History  Past Medical History:   Diagnosis Date    Acute myocardial infarction, unspecified 05/17/2013    Acute myocardial infarction    Atrial fib/flutter, transient (Multi)     COPD (chronic obstructive pulmonary disease) (Multi)     Coronary artery disease     Diabetes mellitus (Multi)     Diabetes mellitus (Multi)     per pt    Encounter for screening for malignant neoplasm of prostate 09/02/2015    Encounter for prostate cancer screening    GERD (gastroesophageal reflux disease)     Hyperlipidemia     Hypertension     Lung cancer (Multi)     Personal history of other diseases of the nervous system and sense organs 09/13/2017    History of cataract       Past Surgical History  Past Surgical History:   Procedure Laterality Date    CATARACT EXTRACTION  09/14/2018    Cataract Surgery    CATARACT EXTRACTION  06/27/2014    Cataract Surgery    CORONARY ANGIOPLASTY WITH STENT PLACEMENT  05/17/2013    Cath Stent Placement        Social History  He reports that he has quit smoking. His smoking use included cigarettes. He has been exposed to tobacco smoke. He has never used smokeless tobacco. He reports that he does not currently use alcohol. He reports that he does not use  drugs.    Family History  Family History   Problem Relation Name Age of Onset    Heart attack Mother      Lung cancer Brother          Allergies  Patient has no known allergies.    Review of Systems   Constitutional:         See HPI   HENT: Negative.     Eyes: Negative.    Respiratory:          See HPI   Cardiovascular: Negative.    Gastrointestinal: Negative.    Endocrine:        Pt is diabetic    Genitourinary: Negative.    Musculoskeletal: Negative.    Skin: Negative.    Neurological: Negative.    Hematological: Negative.    Psychiatric/Behavioral: Negative.          Physical Exam  Constitutional:       General: He is in acute distress.      Appearance: Normal appearance. He is not ill-appearing, toxic-appearing or diaphoretic.      Comments: Elderly male in mild respiratory distress   HENT:      Head: Normocephalic and atraumatic.      Right Ear: External ear normal.      Left Ear: External ear normal.      Nose: Nose normal.      Mouth/Throat:      Mouth: Mucous membranes are dry.      Pharynx: Oropharynx is clear. No oropharyngeal exudate or posterior oropharyngeal erythema.   Eyes:      General: No scleral icterus.        Right eye: No discharge.         Left eye: No discharge.      Conjunctiva/sclera: Conjunctivae normal.   Cardiovascular:      Rate and Rhythm: Normal rate and regular rhythm.      Heart sounds: No murmur heard.  Pulmonary:      Breath sounds: Rales present. No wheezing or rhonchi.      Comments: Crackles heard bilaterally.   Abdominal:      General: There is no distension.      Palpations: Abdomen is soft. There is no mass.      Tenderness: There is no abdominal tenderness. There is no right CVA tenderness, guarding or rebound.   Musculoskeletal:      Cervical back: Neck supple.      Right lower leg: Edema present.      Left lower leg: Edema present.      Comments: 2+ BLE pedal edema   Lymphadenopathy:      Cervical: No cervical adenopathy.   Skin:     General: Skin is warm and dry.       "Capillary Refill: Capillary refill takes less than 2 seconds.   Neurological:      General: No focal deficit present.      Mental Status: He is alert and oriented to person, place, and time.   Psychiatric:         Mood and Affect: Mood normal.         Behavior: Behavior normal.          Last Recorded Vitals  /76 (BP Location: Left arm, Patient Position: Lying)   Pulse 108   Temp 36.5 °C (97.7 °F) (Temporal)   Resp 24   Ht 1.753 m (5' 9\")   Wt 60.1 kg (132 lb 9.6 oz)   SpO2 95%   BMI 19.58 kg/m²       Relevant Results   Latest Reference Range & Units 11/12/24 14:50 11/12/24 15:57 11/12/24 16:33 11/12/24 16:46 11/12/24 20:45   GLUCOSE 74 - 99 mg/dL 100 (H)       SODIUM 136 - 145 mmol/L 124 (L)       POTASSIUM 3.5 - 5.3 mmol/L 6.2 (HH) 5.5 (H)      CHLORIDE 98 - 107 mmol/L 93 (L)       Bicarbonate 21 - 32 mmol/L 21       Anion Gap 10 - 20 mmol/L 16       Blood Urea Nitrogen 6 - 23 mg/dL 13       Creatinine 0.50 - 1.30 mg/dL 0.73       EGFR >60 mL/min/1.73m*2 >90       Calcium 8.6 - 10.3 mg/dL 8.0 (L)       Albumin 3.4 - 5.0 g/dL 2.6 (L)       Alkaline Phosphatase 33 - 136 U/L 92       ALT 10 - 52 U/L 15       AST 9 - 39 U/L 42 (H)       Bilirubin Total 0.0 - 1.2 mg/dL 0.5       Total Protein 6.4 - 8.2 g/dL 6.4       MAGNESIUM 1.60 - 2.40 mg/dL 1.75       Lactate 0.4 - 2.0 mmol/L    1.2    BNP 0 - 99 pg/mL 231 (H)       LIPASE 9 - 82 U/L <3 (L)       Troponin I, High Sensitivity 0 - 20 ng/L 11 11      WBC 4.4 - 11.3 x10*3/uL 3.2 (L)       nRBC 0.0 - 0.0 /100 WBCs 0.0       RBC 4.50 - 5.90 x10*6/uL 2.74 (L)       HEMOGLOBIN 13.5 - 17.5 g/dL 8.2 (L)       HEMATOCRIT 41.0 - 52.0 % 26.3 (L)       MCV 80 - 100 fL 96       MCH 26.0 - 34.0 pg 29.9       MCHC 32.0 - 36.0 g/dL 31.2 (L)       RED CELL DISTRIBUTION WIDTH 11.5 - 14.5 % 14.0       Platelets 150 - 450 x10*3/uL 159       Immature Granulocytes %, Automated 0.0 - 0.9 % 6.0 (H)       Immature Granulocytes Absolute, Automated 0.00 - 0.50 x10*3/uL 0.19     "   Neutrophils %, Manual 40.0 - 80.0 % 62.0       Bands %, Manual 0.0 - 5.0 % 26.0       Lymphocytes %, Manual 13.0 - 44.0 % 6.0       Monocytes %, Manual 2.0 - 10.0 % 5.0       Eosinophils %, Manual 0.0 - 6.0 % 0.0       Basophils %, Manual 0.0 - 2.0 % 0.0       Metamyelocytes % 0.0 - 0.0 % 1.0       Seg Neutrophils Absolute, Manual 1.60 - 5.00 x10*3/uL 1.98       Bands Absolute, Manual 0.00 - 0.50 x10*3/uL 0.83 (H)       Lymphocytes Absolute, Manual 0.80 - 3.00 x10*3/uL 0.19 (L)       Monocytes Absolute, Manual 0.05 - 0.80 x10*3/uL 0.16       Eosinophils Absolute, Manual 0.00 - 0.40 x10*3/uL 0.00       Basophils Absolute, Manual 0.00 - 0.10 x10*3/uL 0.00       Metamyelocytes Absolute 0.00 - 0.00 x10*3/uL 0.03       Total Cells Counted  100       Neutrophils Absolute, Manual 1.60 - 5.50 x10*3/uL 2.81       RBC Morphology  See Below       Ovalocytes  Few       Dohle Bodies  Present       Giant Platelets  Few       BLOOD CULTURE    Rpt (IP)  Rpt (IP)     Color, Urine Light-Yellow, Yellow, Dark-Yellow      Yellow   Appearance, Urine Clear      Clear   Specific Gravity, Urine 1.005 - 1.035      >1.050 !   pH, Urine 5.0, 5.5, 6.0, 6.5, 7.0, 7.5, 8.0      6.0   Protein, Urine NEGATIVE, 10 (TRACE), 20 (TRACE) mg/dL     30 (1+) !   Glucose, Urine Normal mg/dL     Normal   Blood, Urine NEGATIVE      NEGATIVE   Ketones, Urine NEGATIVE mg/dL     NEGATIVE   Bilirubin, Urine NEGATIVE      NEGATIVE   Urobilinogen, Urine Normal mg/dL     Normal   Nitrite, Urine NEGATIVE      NEGATIVE   Leukocyte Esterase, Urine NEGATIVE      NEGATIVE   Hyaline Casts, Urine NONE /LPF     2+ !   Mucus, Urine Reference range not established. /LPF     FEW   Squamous Epithelial Cells, Urine Reference range not established. /HPF     1-9 (SPARSE)   Transitional Epithelial Cells, Urine Reference range not established. /HPF     1-2 (FEW)   Bacteria, Urine NONE SEEN /HPF     1+ !   RBC, Urine NONE, 1-2, 3-5 /HPF     1-2   WBC, Urine 1-5, NONE /HPF     1-5    (HH): Data is critically high  (H): Data is abnormally high  (L): Data is abnormally low  !: Data is abnormal  (IP): In Process  Rpt: View report in Results Review for more information    CT ANGIO CHEST:     IMPRESSION:  1.  No pulmonary embolism identified.  2. Grossly right upper lobe mass is decreased in size with increased  central necrosis.  3. Previous collapse of the right lung has resolved. There are  however extensive new areas of bilateral airspace consolidation  suggestive of multifocal pneumonia. A new cavitated nodule in the  left lung base may be infectious or inflammatory although metastasis  is not excluded.  4. Trace right pleural effusion.  5. Mediastinal lymphadenopathy has slightly progressed.     Assessment/Plan   Multifocal pneumonia  Admit to medical floor  Follow up on already obtained cultures  Sputum culture  Check urine for Legionella and Strep antigens  Empiric antibiotic treatment with Vancomycin and Zosyn    Hyperkalemia  Borderline  Hold losartan  Recheck level in am    Non small cell lung cancer  Currently on chemo and XRT  Oncology follow up on discharge    Type II DM  Resume home oral glucose lowering medication  Accu checks and will add ISS coverage      I spent 75 minutes in the professional and overall care of this patient.      Jesus Alberto Lira MD

## 2024-11-13 NOTE — CARE PLAN
The patient's goals for the shift include    Problem: Nutrition  Goal: Promote healing  Outcome: Progressing     Problem: Safety - Adult  Goal: Free from fall injury  Outcome: Progressing     Problem: Skin  Goal: Prevent/manage excess moisture  Outcome: Progressing     Problem: Skin  Goal: Prevent/minimize sheer/friction injuries  Outcome: Progressing     Problem: Diabetes  Goal: Achieve decreasing blood glucose levels by end of shift  Outcome: Progressing       The clinical goals for the shift include to rest cofmortably

## 2024-11-14 ENCOUNTER — APPOINTMENT (OUTPATIENT)
Dept: RADIATION ONCOLOGY | Facility: CLINIC | Age: 74
End: 2024-11-14
Payer: MEDICARE

## 2024-11-14 LAB
ALBUMIN SERPL BCP-MCNC: 2.1 G/DL (ref 3.4–5)
ALP SERPL-CCNC: 73 U/L (ref 33–136)
ALT SERPL W P-5'-P-CCNC: 21 U/L (ref 10–52)
ANION GAP SERPL CALC-SCNC: 12 MMOL/L (ref 10–20)
AST SERPL W P-5'-P-CCNC: 30 U/L (ref 9–39)
BASOPHILS # BLD AUTO: 0.01 X10*3/UL (ref 0–0.1)
BASOPHILS NFR BLD AUTO: 0.3 %
BILIRUB SERPL-MCNC: 0.2 MG/DL (ref 0–1.2)
BUN SERPL-MCNC: 14 MG/DL (ref 6–23)
CALCIUM SERPL-MCNC: 7.3 MG/DL (ref 8.6–10.3)
CHLORIDE SERPL-SCNC: 96 MMOL/L (ref 98–107)
CO2 SERPL-SCNC: 26 MMOL/L (ref 21–32)
CREAT SERPL-MCNC: 0.44 MG/DL (ref 0.5–1.3)
EGFRCR SERPLBLD CKD-EPI 2021: >90 ML/MIN/1.73M*2
EOSINOPHIL # BLD AUTO: 0 X10*3/UL (ref 0–0.4)
EOSINOPHIL NFR BLD AUTO: 0 %
ERYTHROCYTE [DISTWIDTH] IN BLOOD BY AUTOMATED COUNT: 14.1 % (ref 11.5–14.5)
ERYTHROCYTE [SEDIMENTATION RATE] IN BLOOD BY WESTERGREN METHOD: 61 MM/H (ref 0–20)
GLUCOSE BLD MANUAL STRIP-MCNC: 110 MG/DL (ref 74–99)
GLUCOSE BLD MANUAL STRIP-MCNC: 143 MG/DL (ref 74–99)
GLUCOSE BLD MANUAL STRIP-MCNC: 153 MG/DL (ref 74–99)
GLUCOSE SERPL-MCNC: 122 MG/DL (ref 74–99)
HCT VFR BLD AUTO: 24.7 % (ref 41–52)
HGB BLD-MCNC: 7.5 G/DL (ref 13.5–17.5)
IMM GRANULOCYTES # BLD AUTO: 0.05 X10*3/UL (ref 0–0.5)
IMM GRANULOCYTES NFR BLD AUTO: 1.6 % (ref 0–0.9)
LYMPHOCYTES # BLD AUTO: 0.17 X10*3/UL (ref 0.8–3)
LYMPHOCYTES NFR BLD AUTO: 5.4 %
MCH RBC QN AUTO: 29.4 PG (ref 26–34)
MCHC RBC AUTO-ENTMCNC: 30.4 G/DL (ref 32–36)
MCV RBC AUTO: 97 FL (ref 80–100)
MONOCYTES # BLD AUTO: 0.31 X10*3/UL (ref 0.05–0.8)
MONOCYTES NFR BLD AUTO: 9.8 %
NEUTROPHILS # BLD AUTO: 2.63 X10*3/UL (ref 1.6–5.5)
NEUTROPHILS NFR BLD AUTO: 82.9 %
NRBC BLD-RTO: 0 /100 WBCS (ref 0–0)
PLATELET # BLD AUTO: 155 X10*3/UL (ref 150–450)
POTASSIUM SERPL-SCNC: 3.5 MMOL/L (ref 3.5–5.3)
PROT SERPL-MCNC: 5 G/DL (ref 6.4–8.2)
RBC # BLD AUTO: 2.55 X10*6/UL (ref 4.5–5.9)
SODIUM SERPL-SCNC: 130 MMOL/L (ref 136–145)
VANCOMYCIN SERPL-MCNC: 8.9 UG/ML (ref 5–20)
WBC # BLD AUTO: 3.2 X10*3/UL (ref 4.4–11.3)

## 2024-11-14 PROCEDURE — 2500000005 HC RX 250 GENERAL PHARMACY W/O HCPCS: Performed by: INTERNAL MEDICINE

## 2024-11-14 PROCEDURE — 85025 COMPLETE CBC W/AUTO DIFF WBC: CPT | Performed by: STUDENT IN AN ORGANIZED HEALTH CARE EDUCATION/TRAINING PROGRAM

## 2024-11-14 PROCEDURE — 2500000001 HC RX 250 WO HCPCS SELF ADMINISTERED DRUGS (ALT 637 FOR MEDICARE OP): Performed by: NURSE PRACTITIONER

## 2024-11-14 PROCEDURE — 2500000002 HC RX 250 W HCPCS SELF ADMINISTERED DRUGS (ALT 637 FOR MEDICARE OP, ALT 636 FOR OP/ED): Performed by: STUDENT IN AN ORGANIZED HEALTH CARE EDUCATION/TRAINING PROGRAM

## 2024-11-14 PROCEDURE — 2500000004 HC RX 250 GENERAL PHARMACY W/ HCPCS (ALT 636 FOR OP/ED): Performed by: INTERNAL MEDICINE

## 2024-11-14 PROCEDURE — 1200000002 HC GENERAL ROOM WITH TELEMETRY DAILY

## 2024-11-14 PROCEDURE — 85652 RBC SED RATE AUTOMATED: CPT | Performed by: INTERNAL MEDICINE

## 2024-11-14 PROCEDURE — 82947 ASSAY GLUCOSE BLOOD QUANT: CPT

## 2024-11-14 PROCEDURE — 2500000001 HC RX 250 WO HCPCS SELF ADMINISTERED DRUGS (ALT 637 FOR MEDICARE OP): Performed by: INTERNAL MEDICINE

## 2024-11-14 PROCEDURE — 80053 COMPREHEN METABOLIC PANEL: CPT | Performed by: STUDENT IN AN ORGANIZED HEALTH CARE EDUCATION/TRAINING PROGRAM

## 2024-11-14 PROCEDURE — 2500000002 HC RX 250 W HCPCS SELF ADMINISTERED DRUGS (ALT 637 FOR MEDICARE OP, ALT 636 FOR OP/ED): Performed by: INTERNAL MEDICINE

## 2024-11-14 PROCEDURE — 2500000004 HC RX 250 GENERAL PHARMACY W/ HCPCS (ALT 636 FOR OP/ED): Mod: JZ | Performed by: STUDENT IN AN ORGANIZED HEALTH CARE EDUCATION/TRAINING PROGRAM

## 2024-11-14 PROCEDURE — 80202 ASSAY OF VANCOMYCIN: CPT | Performed by: INTERNAL MEDICINE

## 2024-11-14 PROCEDURE — 99232 SBSQ HOSP IP/OBS MODERATE 35: CPT

## 2024-11-14 RX ORDER — TAMSULOSIN HYDROCHLORIDE 0.4 MG/1
0.4 CAPSULE ORAL DAILY
Qty: 30 CAPSULE | Refills: 0 | Status: CANCELLED | OUTPATIENT
Start: 2024-11-15

## 2024-11-14 ASSESSMENT — COGNITIVE AND FUNCTIONAL STATUS - GENERAL
DRESSING REGULAR UPPER BODY CLOTHING: A LITTLE
HELP NEEDED FOR BATHING: A LITTLE
HELP NEEDED FOR BATHING: A LITTLE
DRESSING REGULAR LOWER BODY CLOTHING: A LITTLE
MOVING FROM LYING ON BACK TO SITTING ON SIDE OF FLAT BED WITH BEDRAILS: A LITTLE
TURNING FROM BACK TO SIDE WHILE IN FLAT BAD: A LITTLE
CLIMB 3 TO 5 STEPS WITH RAILING: A LOT
DRESSING REGULAR LOWER BODY CLOTHING: A LITTLE
STANDING UP FROM CHAIR USING ARMS: A LITTLE
DAILY ACTIVITIY SCORE: 20
WALKING IN HOSPITAL ROOM: A LITTLE
MOBILITY SCORE: 18
DRESSING REGULAR UPPER BODY CLOTHING: A LITTLE
WALKING IN HOSPITAL ROOM: A LITTLE
MOBILITY SCORE: 17
TOILETING: A LITTLE
CLIMB 3 TO 5 STEPS WITH RAILING: A LITTLE
DAILY ACTIVITIY SCORE: 20
TOILETING: A LITTLE
MOVING TO AND FROM BED TO CHAIR: A LITTLE
MOVING FROM LYING ON BACK TO SITTING ON SIDE OF FLAT BED WITH BEDRAILS: A LITTLE
MOVING TO AND FROM BED TO CHAIR: A LITTLE
TURNING FROM BACK TO SIDE WHILE IN FLAT BAD: A LITTLE
STANDING UP FROM CHAIR USING ARMS: A LITTLE

## 2024-11-14 ASSESSMENT — PAIN SCALES - GENERAL
PAINLEVEL_OUTOF10: 0 - NO PAIN
PAINLEVEL_OUTOF10: 0 - NO PAIN

## 2024-11-14 ASSESSMENT — PAIN - FUNCTIONAL ASSESSMENT: PAIN_FUNCTIONAL_ASSESSMENT: 0-10

## 2024-11-14 NOTE — PROGRESS NOTES
Vancomycin Dosing by Pharmacy- FOLLOW UP  Juan Carlos Cr is a 74 y.o. year old male who Pharmacy has been consulted for vancomycin dosing for pneumonia. Based on the patient's indication and renal status this patient is being dosed based on a goal AUC of 400-600.     Renal function is currently improving.    Current vancomycin dose: 1500 mg given every 24 hours    Estimated vancomycin AUC on current dose: 328 mg/L.hr     Visit Vitals  BP 97/58 (BP Location: Right arm, Patient Position: Lying)   Pulse 86   Temp 37 °C (98.6 °F) (Temporal)   Resp 16        Lab Results   Component Value Date    CREATININE 0.44 (L) 2024    CREATININE 0.54 2024    CREATININE 0.73 2024    CREATININE 0.69 2024        Patient weight is as follows:   Vitals:    24 2212   Weight: 60.1 kg (132 lb 9.6 oz)       Cultures:  No results found for the encounter in last 14 days.       I/O last 3 completed shifts:  In: 1815 (30.2 mL/kg) [P.O.:720; I.V.:295 (4.9 mL/kg); IV Piggyback:800]  Out: 4050 (67.3 mL/kg) [Urine:4050 (1.9 mL/kg/hr)]  Weight: 60.1 kg   I/O during current shift:  No intake/output data recorded.    Temp (24hrs), Av °C (98.6 °F), Min:37 °C (98.6 °F), Max:37 °C (98.6 °F)      Assessment/Plan  Below goal AUC. Orders placed for new vancomcyin regimen of 1250 mg every 12 hours to begin at 11:00.     This dosing regimen is predicted by [employer] to result in the following pharmacokinetic parameters:  Regimen: 1250 mg IV every 12 hours.  Start time: 11:00 on 2024  Exposure target: AUC24 (range)400-600 mg/L.hr   [id number]: 506 mg/L.hr  AUC24,ss: 548 mg/L.hr  Probability of AUC24 > 400: 98 %  Ctrough,ss: 15.4 mg/L  Probability of Ctrough,ss > 20: 10 %    The next level will be obtained on 11/15 at 05:00. May be obtained sooner if clinically indicated.   Will continue to monitor renal function daily while on vancomycin and order serum creatinine at least every 48 hours if not already ordered.  Follow  for continued vancomycin needs, clinical response, and signs/symptoms of toxicity.       Xenia Galeano, PharmD

## 2024-11-14 NOTE — PROGRESS NOTES
"Juan Carlos Cr is a 74 y.o. male on day 2 of admission presenting with Pneumonia due to gram-negative bacteria (Multi).    Subjective   No acute overnight events. Patient awake and sitting up on examination this morning. Reports slight SOB at rest, does get SOB when moving/ambulating. Reports productive cough persisting with frothy/white sputum. Currently on 3L NC. Denies any fevers, chills, chest pain.        Objective     Physical Exam  Constitutional:       General: He is not in acute distress.  HENT:      Head: Normocephalic and atraumatic.   Eyes:      Extraocular Movements: Extraocular movements intact.      Conjunctiva/sclera: Conjunctivae normal.      Pupils: Pupils are equal, round, and reactive to light.   Cardiovascular:      Rate and Rhythm: Normal rate and regular rhythm.      Heart sounds: No murmur heard.  Pulmonary:      Effort: Pulmonary effort is normal. No respiratory distress.      Breath sounds: Rales present.      Comments: Most prominent BLL   Abdominal:      General: Abdomen is flat. Bowel sounds are normal. There is no distension.      Palpations: Abdomen is soft.      Tenderness: There is no abdominal tenderness.   Musculoskeletal:      Right lower leg: Edema present.      Left lower leg: Edema present.   Skin:     General: Skin is warm and dry.   Neurological:      Mental Status: He is alert and oriented to person, place, and time.   Psychiatric:         Mood and Affect: Mood normal.         Behavior: Behavior normal.         Last Recorded Vitals  Blood pressure 97/58, pulse 86, temperature 37 °C (98.6 °F), temperature source Temporal, resp. rate 16, height 1.753 m (5' 9\"), weight 60.1 kg (132 lb 9.6 oz), SpO2 96%.  Intake/Output last 3 Shifts:  I/O last 3 completed shifts:  In: 1815 (30.2 mL/kg) [P.O.:720; I.V.:295 (4.9 mL/kg); IV Piggyback:800]  Out: 4050 (67.3 mL/kg) [Urine:4050 (1.9 mL/kg/hr)]  Weight: 60.1 kg     Relevant Results  Scheduled medications  ascorbic acid, 500 mg, oral, " Daily  aspirin, 81 mg, oral, Daily  cholecalciferol, 800 Units, oral, Daily  fluticasone furoate-vilanteroL, 1 puff, inhalation, Daily  insulin lispro, 0-10 Units, subcutaneous, Before meals & nightly  [Held by provider] losartan, 25 mg, oral, Daily  metFORMIN, 500 mg, oral, BID  [Held by provider] metoprolol succinate XL, 50 mg, oral, Daily  mupirocin, , Each Nostril, TID  oxygen, , inhalation, Continuous - Inhalation  pantoprazole, 40 mg, oral, Daily  piperacillin-tazobactam, 4.5 g, intravenous, q6h  rivaroxaban, 20 mg, oral, Daily with evening meal  rosuvastatin, 20 mg, oral, Daily  tamsulosin, 0.4 mg, oral, Daily  tiotropium, 2 puff, inhalation, Daily  vancomycin, 1,250 mg, intravenous, q12h  zinc oxide, 1 Application, Topical, TID      Continuous medications     PRN medications  PRN medications: dextrose, dextrose, glucagon, glucagon, prochlorperazine, vancomycin    Results for orders placed or performed during the hospital encounter of 11/12/24 (from the past 24 hours)   C-reactive protein   Result Value Ref Range    C-Reactive Protein 33.57 (H) <1.00 mg/dL   Comprehensive metabolic panel   Result Value Ref Range    Glucose 187 (H) 74 - 99 mg/dL    Sodium 131 (L) 136 - 145 mmol/L    Potassium 3.7 3.5 - 5.3 mmol/L    Chloride 95 (L) 98 - 107 mmol/L    Bicarbonate 27 21 - 32 mmol/L    Anion Gap 13 10 - 20 mmol/L    Urea Nitrogen 14 6 - 23 mg/dL    Creatinine 0.54 0.50 - 1.30 mg/dL    eGFR >90 >60 mL/min/1.73m*2    Calcium 6.9 (L) 8.6 - 10.3 mg/dL    Albumin 2.1 (L) 3.4 - 5.0 g/dL    Alkaline Phosphatase 79 33 - 136 U/L    Total Protein 4.7 (L) 6.4 - 8.2 g/dL    AST 15 9 - 39 U/L    Bilirubin, Total 0.4 0.0 - 1.2 mg/dL    ALT 14 10 - 52 U/L   Procalcitonin   Result Value Ref Range    Procalcitonin 2.30 (H) <=0.07 ng/mL   POCT GLUCOSE   Result Value Ref Range    POCT Glucose 167 (H) 74 - 99 mg/dL   CBC   Result Value Ref Range    WBC 3.2 (L) 4.4 - 11.3 x10*3/uL    nRBC 0.6 (H) 0.0 - 0.0 /100 WBCs    RBC 2.54 (L)  4.50 - 5.90 x10*6/uL    Hemoglobin 7.4 (L) 13.5 - 17.5 g/dL    Hematocrit 23.1 (L) 41.0 - 52.0 %    MCV 91 80 - 100 fL    MCH 29.1 26.0 - 34.0 pg    MCHC 32.0 32.0 - 36.0 g/dL    RDW 13.9 11.5 - 14.5 %    Platelets 163 150 - 450 x10*3/uL   Respiratory Culture/Smear    Specimen: SPUTUM; Fluid   Result Value Ref Range    Gram Stain       Gram stain indicates specimen consists of lower respiratory tract secretions.    Gram Stain No predominant organism    POCT GLUCOSE   Result Value Ref Range    POCT Glucose 217 (H) 74 - 99 mg/dL   POCT GLUCOSE   Result Value Ref Range    POCT Glucose 123 (H) 74 - 99 mg/dL   Vancomycin   Result Value Ref Range    Vancomycin 8.9 5.0 - 20.0 ug/mL   Sedimentation Rate   Result Value Ref Range    Sedimentation Rate 61 (H) 0 - 20 mm/h   Comprehensive Metabolic Panel   Result Value Ref Range    Glucose 122 (H) 74 - 99 mg/dL    Sodium 130 (L) 136 - 145 mmol/L    Potassium 3.5 3.5 - 5.3 mmol/L    Chloride 96 (L) 98 - 107 mmol/L    Bicarbonate 26 21 - 32 mmol/L    Anion Gap 12 10 - 20 mmol/L    Urea Nitrogen 14 6 - 23 mg/dL    Creatinine 0.44 (L) 0.50 - 1.30 mg/dL    eGFR >90 >60 mL/min/1.73m*2    Calcium 7.3 (L) 8.6 - 10.3 mg/dL    Albumin 2.1 (L) 3.4 - 5.0 g/dL    Alkaline Phosphatase 73 33 - 136 U/L    Total Protein 5.0 (L) 6.4 - 8.2 g/dL    AST 30 9 - 39 U/L    Bilirubin, Total 0.2 0.0 - 1.2 mg/dL    ALT 21 10 - 52 U/L   CBC and Auto Differential   Result Value Ref Range    WBC 3.2 (L) 4.4 - 11.3 x10*3/uL    nRBC 0.0 0.0 - 0.0 /100 WBCs    RBC 2.55 (L) 4.50 - 5.90 x10*6/uL    Hemoglobin 7.5 (L) 13.5 - 17.5 g/dL    Hematocrit 24.7 (L) 41.0 - 52.0 %    MCV 97 80 - 100 fL    MCH 29.4 26.0 - 34.0 pg    MCHC 30.4 (L) 32.0 - 36.0 g/dL    RDW 14.1 11.5 - 14.5 %    Platelets 155 150 - 450 x10*3/uL    Neutrophils % 82.9 40.0 - 80.0 %    Immature Granulocytes %, Automated 1.6 (H) 0.0 - 0.9 %    Lymphocytes % 5.4 13.0 - 44.0 %    Monocytes % 9.8 2.0 - 10.0 %    Eosinophils % 0.0 0.0 - 6.0 %     Basophils % 0.3 0.0 - 2.0 %    Neutrophils Absolute 2.63 1.60 - 5.50 x10*3/uL    Immature Granulocytes Absolute, Automated 0.05 0.00 - 0.50 x10*3/uL    Lymphocytes Absolute 0.17 (L) 0.80 - 3.00 x10*3/uL    Monocytes Absolute 0.31 0.05 - 0.80 x10*3/uL    Eosinophils Absolute 0.00 0.00 - 0.40 x10*3/uL    Basophils Absolute 0.01 0.00 - 0.10 x10*3/uL   POCT GLUCOSE   Result Value Ref Range    POCT Glucose 110 (H) 74 - 99 mg/dL     ECG 12 lead    Result Date: 11/13/2024  Sinus tachycardia Otherwise normal ECG When compared with ECG of 12-NOV-2024 14:23, (unconfirmed) No significant change was found    ECG 12 lead    Result Date: 11/13/2024  Sinus tachycardia Otherwise normal ECG When compared with ECG of 01-NOV-2024 08:10, Vent. rate has increased BY  42 BPM Criteria for Septal infarct are no longer Present T wave amplitude has decreased in Lateral leads    CT angio chest for pulmonary embolism    Result Date: 11/12/2024  Interpreted By:  Bryce Estrada, STUDY: CT ANGIO CHEST FOR PULMONARY EMBOLISM;  11/12/2024 3:08 pm   INDICATION: Signs/Symptoms:History of lung cancer shortness of breath COPD hypoxemic significant weight loss.   COMPARISON: 10/16/2024   ACCESSION NUMBER(S): [id number]   ORDERING CLINICIAN: LINUS BERNAL   TECHNIQUE: Helical data acquisition of the chest was obtained with  75 mL Omnipaque 350. Images were reformatted in axial, coronal, and sagittal planes.MIP reformatted images were also generated.   FINDINGS: LUNGS and AIRWAYS: Right upper lobe mass, difficult to differentiate from adjacent consolidation, grossly appears decreased in size from 9.1 cm 8.1 cm. Central necrosis has increased.   Previously, the right lung was collapsed. There is now aeration of the previously occluded right mainstem bronchus as well as the lobar branches of the middle and lower lobes. There is persistent occlusion of the upper lobe bronchus. The previously collapsed upper, middle, and lower lobes are now aerated, although  there are diffusely scattered areas of predominantly peripheral consolidation. There are also peripheral areas of new consolidation scattered throughout the left lung. A new nodule in the left lung base measuring 23 mm is centrally cavitated.   Underlying mild pulmonary fibrosis. Trace right pleural effusion.   MEDIASTINUM and SUDHAKAR, LOWER NECK AND AXILLA: The visualized thyroid gland is grossly unremarkable.   A left paratracheal node is enlarged from 8 mm to 10 mm short axis. The right upper lobe mass infiltrates the mediastinum and is contiguous with adjacent lymphadenopathy which is difficult to measure discretely. Pretracheal node is roughly unchanged at 18 mm short axis. A 13 mm subcarinal node appears new.   Esophagus is not dilated.   HEART and VESSELS: The heart is normal in gross morphology and size.   No significant pericardial effusion.   Severe coronary atherosclerosis.   Thoracic aorta is severely atherosclerotic but otherwise patent without aneurysm. The great vessels are patent. Right IJ chest port catheter is still present. There is persistent narrowing of the SVC relating to compression by adjacent tumor.   No pulmonary embolism is identified.   UPPER ABDOMEN: The visualized subdiaphragmatic structures demonstrate no acute findings on limited images.   CHEST WALL and OSSEOUS STRUCTURES: No suspicious osseous lesions. Multilevel degenerative changes of the thoracic spine.         1.  No pulmonary embolism identified. 2. Grossly right upper lobe mass is decreased in size with increased central necrosis. 3. Previous collapse of the right lung has resolved. There are however extensive new areas of bilateral airspace consolidation suggestive of multifocal pneumonia. A new cavitated nodule in the left lung base may be infectious or inflammatory although metastasis is not excluded. 4. Trace right pleural effusion. 5. Mediastinal lymphadenopathy has slightly progressed.     Signed by: Bryce Estrada 11/12/2024  3:45 PM Dictation workstation:   [id number]    ECG 12 Lead    Result Date: 11/1/2024  Normal sinus rhythm Possible Left atrial enlargement Septal infarct , age undetermined Abnormal ECG When compared with ECG of 23-SEP-2024 01:03, Incomplete left bundle branch block is no longer Present Confirmed by Lisa Ko ([id number]) on 11/1/2024 12:18:47 PM    Rad Onc CT Sim Images    Result Date: 10/30/2024  These images are not reportable by radiology and will not be interpreted by  Radiologists.    XR tomography limited    Result Date: 10/23/2024  Interpreted By:  Juan Carlos Crooks, STUDY: XR TOMOGRAPHY LIMITED;  10/19/2024 10:18 am   INDICATION: Signs/Symptoms:Assess position of stent.     COMPARISON: None.   ACCESSION NUMBER(S): [id number]   ORDERING CLINICIAN: ORLIN HE   TECHNIQUE: Digital tomographic evaluation of the chest was performed.   FINDINGS: Surgical clips overlie the right-sided mediastinum. There is a cavitary right upper lobe lung mass. There is loculated right-sided pleural effusion. There is diffuse prominence of the interstitium. There is mild narrowing of the distal trachea. There is mild narrowing of the right mainstem bronchus stent. The right mainstem bronchus stent appears patent.   Due to patient respiratory motion evaluation of the segmental bronchi is limited.       1. The right mainstem bronchus stent demonstrates is patent. It demonstrates mild narrowing. 2.    Cavitary right upper lobe lung mass, loculated fluid and diffuse interstitial prominence consistent with smoking-related interstitial changes..     MACRO: None   Signed by: Juan Carlos Crooks 10/23/2024 8:47 AM Dictation workstation:   [id number]    Rad Onc CT Sim Images    Result Date: 10/18/2024  These images are not reportable by radiology and will not be interpreted by  Radiologists.    Bronchoscopy Tier 2; Therapeutic, w Stent Placement    Result Date: 10/18/2024  Table formatting from the original result was not  included. Bronchoscopy Report [facility] Location: [facility] procedure room 8 INDICATION: Bronchial stenosis, Squamous Cell Lung Cancer BRONCHOSCOPIST: MD Rob Harris MD First Assistant: Pallavi Sharma, MD REFERRING:  MD Gifty Mckeon MD Matthew B Pawlicki, MD PROCEDURES PERFORMED: Flexible Bronchoscopy Rigid Bronchoscopy APC and Cryo debulking of ARSLAN Balloon Dilation with CRE 8,9,10 Balloon ARSLAN/BI 10 x 40 mm stent placed in ARSLAN and BI FINDINGS: After obtaining informed consent and performing a time out, the patient was anesthetized and an LMA was placed by anesthesia.  The flexible bronchoscope was then introduced through the advanced airway, and an airway examination was performed. The LMA is in good position.  The trachea is of normal caliber. The haydee is sharp. The tracheobronchial tree of the left lung was examined to at least the first subsegmental level.  Bronchial anatomy is normal of the left; there are no endobronchial lesions, and no secretions. On the right, there was endobronchial tumor obstructing 100% of the right mainstem lumen. After the patient was properly anesthetized, the patient was positioned with a shoulder roll and head support.  Next, lip and teeth / gum protection was assured.  The patient was then intubated with a 12 mm OD rigid bronchoscope.  The airway was secured with ease.  The mouth was then packed with wet Kerlix to provide a good seal for adequate positive pressure ventilation.  The rigid telescope was then withdrawn and replaced with the flexible bronchoscope for a complete airway examination. Given 100% occlusion of the right mainstem, debulking was initiated. After decreasing the FIO2 to achieve an end-tidal O2 <40%, argon plasma coagulation was first performed in the Right mainstem bronchus for ablation of surface vascularity of the endobronchial mass.  The argon flow rate was 0.4 L/min, and the  power setting was 30 shah.  After application of APC, the surface of the endobronchial tumor achieved hemostasis.  There were no complications. Then, cryotherapy was performed in the Right mainstem bronchus for debulking of the tumor.  After application of cryotherapy, the airway lumen was improved though there was still significant obstruction of the bronchus intermedius lumen of 80%.  With manual debulking, there was a visible bronchus intermedius lumen. When this was followed, the distal BI, right middle lobe, and right lower lobe was visible. The right upper lobe remains obliterated by tumor. Balloon dilation with CRE 8,9,10 mm balloon up to 8 mm was performed to increase patency prior to placing stent. Then, a guidewire was loaded with a 10mm x 40mm Bonastent. With forceps through the flexible bronchoscope, the guidewire was introduced into the right lower lobe for appropriate positioning of the stent. With direct visualization, the stent was deployed into the right mainstem. The right middle lobe and right lower lobe was visualized at the distal end of the stent. Balloon dilation was performed in the Bronchus intermedius stent using a 3 cm length 8,9-,0 mm diameter CRE balloon.  The balloon was inflated up to 9 latasha proximally and 5 latasha distally for 1 minute(s) a total of 3 times.  After dilation, the airway lumen was improved. The lumen of the right main was completely open after these interventions. The rigid bronchoscope was then removed and replaced with an LMA by anesthesia. Final airway inspection was performed through the LMA and revealed the stent was in good position. COMPLICATIONS: None ESTIMATED BLOOD LOSS:  Minimal, < 5 mL The physical status of the patient was re-assessed after the procedure.  The patient was transported to the recovery area / PACU in stable condition.      The right mainstem was 100% occluded by endobronchial tumor Rigid debulking was performed with APC and cryotherapy with some  improvement of the airway lumen A 10mm x 40mm Bonastent was placed in the right mainstem Balloon dilation was performed in the stent to improve the right mainstem lumen with no residual obstruction The distal bronchus intermedius, right middle lobe, and right lower lobe was patent after stent placement The right upper lobe is obliterated by tumor Endobronchial involvement of tumor in proximal BI and ARSLAN RECOMMENDATION: The patient will be returned to the medical floor for ongoing care. Follow-up with referring physician. Repeat bronchoscopy in 4 -6 week(s). On homegoing, patient will need albuterol nebulizer followed by 3% saline nebulization therapy 3 times per day while the stent is in place Message sent to radiation oncology, oncology, and outpatient pulmonary regarding new stent placement to help guide further care Staff Staff Role Jodi Casey MD Proceduralist Events Procedure Events Event Event Time ENDO SCOPE IN TIME 10/18/2024  8:43 AM ENDO SCOPE OUT TIME 10/18/2024 10:01 AM Specimens ID Type Source Tests Collected by Time 1 : CRYO , R MAINSTEM ENDOBRONCHIAL BX MASS Tissue LUNG BIOPSY RIGHT (SPECIFY SITE) SURGICAL PATHOLOGY EXAM Jodi Casey MD 10/18/2024 0951 Procedure Location [facility] [address] [place] [facility] [address] [phone]    Thoracentesis    Result Date: 10/18/2024  There was minimal fluid present when assessing the right pleural effusion. Not amenable for thoracentesis.     XR chest 1 view    Result Date: 10/18/2024  Interpreted By:  Xavi Hankins and Gupta Jayesh STUDY: XR CHEST 1 VIEW;  10/18/2024 10:43 am   INDICATION: Signs/Symptoms:s/p right mainstem stent.   COMPARISON: Comparison radiograph 10/16/2024. CT chest 10/16/2024.   ACCESSION NUMBER(S): [id number]   ORDERING CLINICIAN: JODI CASEY   TECHNIQUE: AP radiograph of the chest was provided.   FINDINGS: Medical device: Interval placement of a stent projecting over the right  mainstem bronchus. Right chest wall MediPort with distal tip overlying the lower SVC.   CARDIOMEDIASTINAL SILHOUETTE: Cardiomediastinal silhouette is stable in size and configuration, however with slightly improved visualization of the right heart border.   LUNGS: There has been interval improvement in aeration within the right lower lung with persistent moderate-to-large right-sided pleural effusion and right upper lung consolidation. There are similar peripheral interstitial markings within the left lung. No focal consolidation within the left lung. No evidence of pneumothorax.   ABDOMEN: No upper abdominal findings.   BONES: No osseous changes.       Status post right mainstem stent with improvement in right lower lung aeration.   Persistent right upper lung opacification in the setting of the known paramediastinal lung mass with moderate-to-large right-sided pleural effusion.   I personally reviewed the images/study and I agree with the findings as stated by Renato García MD. This study was interpreted at [facility], [place], [place].   MACRO: None   Signed by: Xavi Hankins 10/18/2024 11:39 AM Dictation workstation:   [id number]    CT angio chest for pulmonary embolism    Result Date: 10/16/2024  Interpreted By:  Kamron García, STUDY: CT ANGIO CHEST FOR PULMONARY EMBOLISM;  10/16/2024 12:18 pm   INDICATION: Signs/Symptoms:sob, cancer pt.   COMPARISON: Chest CT 09/26/2024   ACCESSION NUMBER(S): [id number]   ORDERING CLINICIAN: FAB SUE   TECHNIQUE: Helical data acquisition of the chest was obtained after intravenous administration of 80 mL of Omnipaque 350 as per PE protocol. Images were reformatted in coronal and sagittal planes. Axial and coronal maximum intensity projection (MIP) images were created and reviewed.   FINDINGS: POTENTIAL LIMITATIONS OF THE STUDY: None   HEART AND VESSELS: There are no discrete filling defects within main pulmonary artery and its branches to  suggest acute pulmonary embolism. Main pulmonary artery and its branches are normal in caliber, although there is obliteration of right upper lobar pulmonary artery by the below described infiltrative right upper lobe lung mass..   The thoracic aorta normal in course and caliber.There is mild to moderate atherosclerosis present, including calcified and noncalcified plaques. Severe coronary artery calcifications are seen. Please note,the study is not optimized for evaluation of coronary arteries.   The cardiac chambers are not enlarged. There is no pericardial effusion seen. There is similar compression and moderate narrowing of upper SVC by the below described lung mass encroaching the mediastinum (image 112, series 401). There is a right chest wall MediPort now seen in place with tip terminating within superior cavoatrial junction.   MEDIASTINUM AND SUDHAKAR, LOWER NECK AND AXILLA: The visualized thyroid gland is within normal limits. There is again demonstration of mediastinal encroachment by the right upper lobe medial lung mass with a discrete nodular focus measuring 2.1 cm within right lower paratracheal region (image 123, series 401), which may represent extension of the mass versus a metastatic lymph node. Otherwise, no new nas disease as compared to recent CT scan 09/26/2024 Esophagus appears within normal limits as seen.   LUNGS AND AIRWAYS: Trachea and left-sided airways are patent. There is again demonstration of a large right upper paramediastinal centrally necrotic lung mass, which is seen causing invasion of adjacent mediastinum as well as right principal bronchus. Besides right principal bronchus, there is also complete obliteration of multiple lobar segmental and subsegmental bronchi throughout the right lung, with interval worsening of bronchial aeration as compared to recent CT scan 09/26/2024.   Has been interval complete collapse of right lung along with increase in size of now moderate right  pleural effusion. Exact size of masses difficult to measure but it again measures approximately 8 cm in greatest axial dimension and contains internal air locules, which may be related to prior intervention. There are similar subpleural reticulations within left lung. There is no left-sided pleural effusion seen.   UPPER ABDOMEN: Similar mild fullness of right renal collecting system with a 3 mm calcified calculus on image 341, series 401. Rest of visualized abdominal structures are grossly unremarkable.   CHEST WALL AND OSSEOUS STRUCTURES: Chest wall is within normal limits. No acute osseous pathology.There are no suspicious osseous lesions.Multilevel degenerative changes within visualized spine       1. No evidence of acute pulmonary embolism. 2. Redemonstrated large infiltrative and centrally necrotic medial right upper lobe lung mass, encroaching the adjacent mediastinum and obliterating the right-sided bronchi, as described above. 3. Interval worsening of right-sided bronchial aeration with new complete collapse of right lung as well as increase in size of moderate pleural effusion. Pulmonary hygiene is recommended. 4. Moderate narrowing of the upper SVC secondary to compression by the above-described mass is now better visualized, although SVC appears patent. 5. Additional chronic stable findings as described above.   MACRO: Critical Finding:  See findings. Notification was initiated on 10/16/2024 at 12:46 pm by  Kamron García.  (**-YCF-**) Instructions:   Signed by: Kamron García 10/16/2024 12:47 PM Dictation workstation:   [id number]    XR chest 1 view    Result Date: 10/16/2024  Interpreted By:  Lee Ibarra and Gupta Jayesh STUDY: XR CHEST 1 VIEW;  10/16/2024 10:42 am   INDICATION: Signs/Symptoms:sob.   COMPARISON: Comparison radiograph 09/27/2024.   CT chest September 26   ACCESSION NUMBER(S): [id number]   ORDERING CLINICIAN: FAB SUE   TECHNIQUE: AP radiograph of the chest was provided.   FINDINGS:  Medical devices:Right chest wall MediPort with distal tip overlying the mid SVC.   CARDIOMEDIASTINAL SILHOUETTE: Cardiomediastinal silhouette is appropriate in size and configuration.   LUNGS: Complete opacification of the right lung. No consolidation, edema, effusion, or pneumothorax.   ABDOMEN: No upper abdominal findings.   BONES: No osseous changes.       Complete opacification of the right lung, likely due to right mainstem bronchus occlusion with large effusion and postobstructive airspace disease secondary to known large mass.   I personally reviewed the images/study and I agree with the findings as stated by Renato García MD. This study was interpreted at [facility], [place], [place].   MACRO: None   Signed by: Lee Ibarra 10/16/2024 11:04 AM Dictation workstation:   [id number]    IR CVC port placement    Result Date: 10/15/2024  Interpreted By:  Missael Cheung, STUDY: IR CVC PORT PLACEMENT;  10/15/2024 1:19 pm   INDICATION: Port placement,C34.11 Malignant neoplasm of upper lobe, right bronchus or lung,I87.8 Other specified disorders of veins.     COMPARISON: None.   ACCESSION NUMBER(S): [id number]   ORDERING CLINICIAN: SIMEON DUNBAR   TECHNIQUE:   CONSENT: The patient/patient's POA/next of kin was informed of the nature of the proposed procedure. The purposes, alternatives, risks, and benefits were explained and discussed. All questions were answered and consent was obtained.     SEDATION: Moderate conscious IV sedation services (supervision of administration, induction, and maintenance) were provided by the physician performing the procedure with intravenous fentanyl 50 mcg and versed 1 mg for 20 minutes. The physician was assisted by an independent trained observer, an interventional radiology nurse, in the continuous monitoring of patient level of consciousness and physiologic status.   MEDICATION/CONTRAST: No additional   TIME OUT: A time out was performed immediately  prior to procedure start with the interventional team, correctly identifying the patient name, date of birth, MRN, procedure, anatomy (including marking of site and side), patient position, procedure consent form, relevant laboratory and imaging test results, antibiotic administration, safety precautions, and procedure-specific equipment needs.   COMPLICATIONS: No immediate adverse events identified.   FINDINGS: Maximum sterile barrier technique was implemented. In the recumbent position, the patient was positioned on the angiography table. The right supraclavicular and infraclavicular cutaneous tissues were prepared and draped in usual sterile manner.   The supraclavicular access site was screened with gray-scale ultrasound with subsequent subcutaneous instillation of Lidocaine 1% local anesthesia. Ultrasound images demonstrate a patent  right internal jugular vein. Under direct ultrasound guidance and Seldinger/micropuncture technique, the  right internal jugular vein was accessed. An ultrasound digital spot image was acquired and stored on the  PACS. After confirmation of location, a 018 [place]-Mandril guidewire was inserted to secure location. The guidewire was advanced into the inferior vena cava utilizing intermittent fluoroscopy. The micro-access needle was removed over the guidewire. Utilizing a 5-on-4 coaxial dilator sheath system, upsize to a 035 3-J guidewire was performed. Subsequent access tract dilation was performed to an eventual  8.5-Angolan peel-away sheath dilator system.   Following Lidocaine 1% local anesthesia, a superolateral Mediport pocket was created in the subcutaneous infraclavicular chest wall. A subcutaneous tract was then created from the Mediport pocket to the venous access site. The  8-Angolan port catheter was introduced maintaining continuity from the Mediport pocket to the venous access site and into the central venous system. An optimal length of catheter was measured with the tip  at the right atrial/superior vena caval junction.   The catheter was trimmed to the optimal length and the external portion was connected to the Mediport reservoir hub. After insertion into the Mediport pocket, a Kirkland needle was then utilized to access the reservoir to assess for leaks, evaluate for aspiration and flushability. The Mediport was then irrigated with low-dose heparinized saline.   A fluoroscopic spot image of the chest was acquired in the AP projection to confirm optimal course and location of the subcutaneous and central venous catheter. In addition, optimal orientation and continuity to the Mediport reservoir were identified.   After confirmation of optimal Mediport functioning, the Mediport pocket site was closed with subcutaneous absorbable Polysorb sutures in addition to cutaneous subcuticular closure. Sterile dressings were then placed at the Mediport reservoir and venotomy access site.   The patient tolerated the procedure without complication.       1. Uncomplicated placement of a  right infraclavicular  single-lumen Mediport- 8-Mongolian catheter tip residing at the cavoatrial junction. 2. CT power-injection compatible Mediport. 3. MRI-compatible Mediport.   Optimal functioning device and ready for utilization.     Performed and dictated at [facility].   MACRO: None   Signed by: Missael Cheung 10/15/2024 1:52 PM Dictation workstation:   [id number]     Assessment/Plan   Assessment & Plan  Pneumonia due to gram-negative bacteria (Multi)      Juan Carlos Cr is a 74 y.o. male with history of oxygen dependent COPD, diabetes mellitus, hypertension, MI, CAD, and non-small cell lung cancer, currently on chemo and radiation treatment who presented yesterday with SOB, subsequently found to have multifocal pneumonia on CT imaging.          Multifocal pneumonia   - CT chest on 11/12 showed a right upper lobe mass with central necrosis, new left lung base cavitated nodule,  bilateral airspace consolidation suggestive of multifocal pneumonia    - MRSA positive 11/12   - Procalcitonin 2.30   - Blood cultures x2 NGTD  - Lactate normal   - Strep pneumo, legionella negative   - ID following, recommended continuing Zosyn/Vancomycin   - On 3L NC, which is his baseline   - Home inhalers, incentive spirometry       Hyponatremia   - Likely SIADH 2/2 chemotherapy   - Na 130 today  - Will hold mIVF today and continue monitoring       HLD  - Continue asa, statin       HTN/Atrial fibrillation  - Continue Xarelto  - Metoprolol held in setting of soft blood pressures      Normocytic anemia  - Stable, continue monitoring       Leukopenia   - Likely 2/2 current chemotherapy   - Stable, will continue to monitor       Hyperkalemia, resolved  - Potassium 3.5 today  - Still holding home losartan given soft pressures overnight   - Continue monitoring       DM2  - Hold metformin  - ISS, accuchecks       Non small cell lung cancer   - Currently on chemotherapy and radiation   - Oncology follow up outpatient       GERD  - Continue 40 mg pantoprazole daily          DVT ppx: Xarelto   Code: Full   Diet: controlled carbohydrate       Erick Puga DO  PGY-1, Family Medicine

## 2024-11-14 NOTE — PROGRESS NOTES
Juan Carlos Cr is a 74 y.o. male on day 2 of admission presenting with Pneumonia due to gram-negative bacteria (Multi).    Subjective   Interval History: no fever, no new complaints        Review of Systems    Objective   Range of Vitals (last 24 hours)  Heart Rate:  [86]   Temp:  [37 °C (98.6 °F)]   Resp:  [16]   BP: (97)/(58)   SpO2:  [96 %]   Daily Weight  11/12/24 : 60.1 kg (132 lb 9.6 oz)    Body mass index is 19.58 kg/m².    Physical Exam  Constitutional:       Appearance: Normal appearance.   HENT:      Head: Normocephalic and atraumatic.      Mouth/Throat:      Mouth: Mucous membranes are moist.      Pharynx: Oropharynx is clear.   Eyes:      Pupils: Pupils are equal, round, and reactive to light.   Cardiovascular:      Rate and Rhythm: Normal rate and regular rhythm.      Heart sounds: Normal heart sounds.   Pulmonary:      Effort: Pulmonary effort is normal.      Breath sounds: Normal breath sounds.   Abdominal:      General: Abdomen is flat. Bowel sounds are normal.      Palpations: Abdomen is soft.   Musculoskeletal:      Cervical back: Normal range of motion.   Neurological:      Mental Status: He is alert.         Antibiotics  mupirocin - 2 %  piperacillin-tazobactam - 4.5 gram/100 mL  vancomycin - 1250 mg/250 mL    Relevant Results  Labs  Results from last 72 hours   Lab Units 11/14/24  0635 11/13/24  1134 11/12/24  1450   WBC AUTO x10*3/uL 3.2* 3.2* 3.2*   HEMOGLOBIN g/dL 7.5* 7.4* 8.2*   HEMATOCRIT % 24.7* 23.1* 26.3*   PLATELETS AUTO x10*3/uL 155 163 159   NEUTROS PCT AUTO % 82.9  --   --    LYMPHO PCT MAN %  --   --  6.0   LYMPHS PCT AUTO % 5.4  --   --    MONO PCT MAN %  --   --  5.0   MONOS PCT AUTO % 9.8  --   --    EOSINO PCT MAN %  --   --  0.0   EOS PCT AUTO % 0.0  --   --      Results from last 72 hours   Lab Units 11/14/24  0635 11/13/24  0954 11/12/24  1557 11/12/24  1450   SODIUM mmol/L 130* 131*  --  124*   POTASSIUM mmol/L 3.5 3.7 5.5* 6.2*   CHLORIDE mmol/L 96* 95*  --  93*   CO2  mmol/L 26 27  --  21   BUN mg/dL 14 14  --  13   CREATININE mg/dL 0.44* 0.54  --  0.73   GLUCOSE mg/dL 122* 187*  --  100*   CALCIUM mg/dL 7.3* 6.9*  --  8.0*   ANION GAP mmol/L 12 13  --  16   EGFR mL/min/1.73m*2 >90 >90  --  >90     Results from last 72 hours   Lab Units 11/14/24  0635 11/13/24  0954 11/12/24  1450   ALK PHOS U/L 73 79 92   BILIRUBIN TOTAL mg/dL 0.2 0.4 0.5   PROTEIN TOTAL g/dL 5.0* 4.7* 6.4   ALT U/L 21 14 15   AST U/L 30 15 42*   ALBUMIN g/dL 2.1* 2.1* 2.6*     Estimated Creatinine Clearance: 125 mL/min (A) (by C-G formula based on SCr of 0.44 mg/dL (L)).  C-Reactive Protein   Date Value Ref Range Status   11/13/2024 33.57 (H) <1.00 mg/dL Final     Microbiology  Reviewed  Imaging  Reviewed        Assessment/Plan   Respiratory failure / COPD / pneumonia / abscess, MRSA screen is positive, procalcitonin 2.30  NSCLC / Rt bronchus stent / on Taxol / Carboplatin / radiation     Recommendations :  Continue Zosyn and Vancomycin  Discussed with the medical team     I spent minutes in the professional and overall care of this patient.      Krysten Magallon MD

## 2024-11-14 NOTE — CARE PLAN
Problem: Pain - Adult  Goal: Verbalizes/displays adequate comfort level or baseline comfort level  Outcome: Progressing     Problem: Safety - Adult  Goal: Free from fall injury  Outcome: Progressing     Problem: Skin  Goal: Promote/optimize nutrition  Outcome: Progressing  Flowsheets (Taken 11/13/2024 8985)  Promote/optimize nutrition:   Consume > 50% meals/supplements   Offer water/supplements/favorite foods     Problem: Fall/Injury  Goal: Not fall by end of shift  Outcome: Progressing   The patient's goals for the shift include remain free of injury overnight    The clinical goals for the shift include pt will remain comfortable throughout shift

## 2024-11-15 ENCOUNTER — APPOINTMENT (OUTPATIENT)
Dept: CARDIOLOGY | Facility: HOSPITAL | Age: 74
End: 2024-11-15
Payer: MEDICARE

## 2024-11-15 ENCOUNTER — APPOINTMENT (OUTPATIENT)
Dept: CARDIOLOGY | Facility: HOSPITAL | Age: 74
DRG: 871 | End: 2024-11-15
Payer: MEDICARE

## 2024-11-15 ENCOUNTER — APPOINTMENT (OUTPATIENT)
Dept: RADIATION ONCOLOGY | Facility: CLINIC | Age: 74
End: 2024-11-15
Payer: MEDICARE

## 2024-11-15 LAB
ALBUMIN SERPL BCP-MCNC: 2.1 G/DL (ref 3.4–5)
ALP SERPL-CCNC: 70 U/L (ref 33–136)
ALT SERPL W P-5'-P-CCNC: 17 U/L (ref 10–52)
ANION GAP SERPL CALC-SCNC: 9 MMOL/L (ref 10–20)
ANION GAP SERPL CALC-SCNC: 9 MMOL/L (ref 10–20)
AST SERPL W P-5'-P-CCNC: 18 U/L (ref 9–39)
ATRIAL RATE: 182 BPM
BACTERIA SPEC RESP CULT: ABNORMAL
BASOPHILS # BLD AUTO: 0.01 X10*3/UL (ref 0–0.1)
BASOPHILS NFR BLD AUTO: 0.2 %
BILIRUB SERPL-MCNC: 0.2 MG/DL (ref 0–1.2)
BUN SERPL-MCNC: 8 MG/DL (ref 6–23)
BUN SERPL-MCNC: 8 MG/DL (ref 6–23)
CALCIUM SERPL-MCNC: 7.3 MG/DL (ref 8.6–10.3)
CALCIUM SERPL-MCNC: 7.3 MG/DL (ref 8.6–10.3)
CARDIAC TROPONIN I PNL SERPL HS: 11 NG/L (ref 0–20)
CARDIAC TROPONIN I PNL SERPL HS: 11 NG/L (ref 0–20)
CARDIAC TROPONIN I PNL SERPL HS: 14 NG/L (ref 0–20)
CHLORIDE SERPL-SCNC: 93 MMOL/L (ref 98–107)
CHLORIDE SERPL-SCNC: 93 MMOL/L (ref 98–107)
CO2 SERPL-SCNC: 30 MMOL/L (ref 21–32)
CO2 SERPL-SCNC: 30 MMOL/L (ref 21–32)
CREAT SERPL-MCNC: 0.41 MG/DL (ref 0.5–1.3)
CREAT SERPL-MCNC: 0.41 MG/DL (ref 0.5–1.3)
EGFRCR SERPLBLD CKD-EPI 2021: >90 ML/MIN/1.73M*2
EGFRCR SERPLBLD CKD-EPI 2021: >90 ML/MIN/1.73M*2
EOSINOPHIL # BLD AUTO: 0.01 X10*3/UL (ref 0–0.4)
EOSINOPHIL NFR BLD AUTO: 0.2 %
ERYTHROCYTE [DISTWIDTH] IN BLOOD BY AUTOMATED COUNT: 13.8 % (ref 11.5–14.5)
GLUCOSE BLD MANUAL STRIP-MCNC: 103 MG/DL (ref 74–99)
GLUCOSE BLD MANUAL STRIP-MCNC: 124 MG/DL (ref 74–99)
GLUCOSE BLD MANUAL STRIP-MCNC: 143 MG/DL (ref 74–99)
GLUCOSE BLD MANUAL STRIP-MCNC: 145 MG/DL (ref 74–99)
GLUCOSE BLD MANUAL STRIP-MCNC: 163 MG/DL (ref 74–99)
GLUCOSE SERPL-MCNC: 98 MG/DL (ref 74–99)
GLUCOSE SERPL-MCNC: 98 MG/DL (ref 74–99)
GRAM STN SPEC: ABNORMAL
GRAM STN SPEC: ABNORMAL
HCT VFR BLD AUTO: 24.1 % (ref 41–52)
HGB BLD-MCNC: 7.6 G/DL (ref 13.5–17.5)
IMM GRANULOCYTES # BLD AUTO: 0.06 X10*3/UL (ref 0–0.5)
IMM GRANULOCYTES NFR BLD AUTO: 1.3 % (ref 0–0.9)
LYMPHOCYTES # BLD AUTO: 0.15 X10*3/UL (ref 0.8–3)
LYMPHOCYTES NFR BLD AUTO: 3.1 %
MAGNESIUM SERPL-MCNC: 1.37 MG/DL (ref 1.6–2.4)
MCH RBC QN AUTO: 29.3 PG (ref 26–34)
MCHC RBC AUTO-ENTMCNC: 31.5 G/DL (ref 32–36)
MCV RBC AUTO: 93 FL (ref 80–100)
MONOCYTES # BLD AUTO: 0.37 X10*3/UL (ref 0.05–0.8)
MONOCYTES NFR BLD AUTO: 7.7 %
NEUTROPHILS # BLD AUTO: 4.19 X10*3/UL (ref 1.6–5.5)
NEUTROPHILS NFR BLD AUTO: 87.5 %
NRBC BLD-RTO: 0 /100 WBCS (ref 0–0)
PLATELET # BLD AUTO: 156 X10*3/UL (ref 150–450)
POTASSIUM SERPL-SCNC: 2.8 MMOL/L (ref 3.5–5.3)
POTASSIUM SERPL-SCNC: 2.8 MMOL/L (ref 3.5–5.3)
PROT SERPL-MCNC: 4.9 G/DL (ref 6.4–8.2)
Q ONSET: 219 MS
QRS COUNT: 23 BEATS
QRS DURATION: 94 MS
QT INTERVAL: 306 MS
QTC CALCULATION(BAZETT): 472 MS
QTC FREDERICIA: 409 MS
R AXIS: 60 DEGREES
RBC # BLD AUTO: 2.59 X10*6/UL (ref 4.5–5.9)
SODIUM SERPL-SCNC: 129 MMOL/L (ref 136–145)
SODIUM SERPL-SCNC: 129 MMOL/L (ref 136–145)
T AXIS: 231 DEGREES
T OFFSET: 372 MS
TSH SERPL-ACNC: 1.09 MIU/L (ref 0.44–3.98)
VANCOMYCIN SERPL-MCNC: 11.3 UG/ML (ref 5–20)
VENTRICULAR RATE: 143 BPM
WBC # BLD AUTO: 4.8 X10*3/UL (ref 4.4–11.3)

## 2024-11-15 PROCEDURE — 2500000004 HC RX 250 GENERAL PHARMACY W/ HCPCS (ALT 636 FOR OP/ED): Performed by: INTERNAL MEDICINE

## 2024-11-15 PROCEDURE — 83735 ASSAY OF MAGNESIUM: CPT | Performed by: INTERNAL MEDICINE

## 2024-11-15 PROCEDURE — 2500000002 HC RX 250 W HCPCS SELF ADMINISTERED DRUGS (ALT 637 FOR MEDICARE OP, ALT 636 FOR OP/ED): Performed by: INTERNAL MEDICINE

## 2024-11-15 PROCEDURE — 2500000004 HC RX 250 GENERAL PHARMACY W/ HCPCS (ALT 636 FOR OP/ED)

## 2024-11-15 PROCEDURE — 85025 COMPLETE CBC W/AUTO DIFF WBC: CPT | Performed by: STUDENT IN AN ORGANIZED HEALTH CARE EDUCATION/TRAINING PROGRAM

## 2024-11-15 PROCEDURE — 84443 ASSAY THYROID STIM HORMONE: CPT | Performed by: INTERNAL MEDICINE

## 2024-11-15 PROCEDURE — 2500000001 HC RX 250 WO HCPCS SELF ADMINISTERED DRUGS (ALT 637 FOR MEDICARE OP): Performed by: INTERNAL MEDICINE

## 2024-11-15 PROCEDURE — 99223 1ST HOSP IP/OBS HIGH 75: CPT | Performed by: INTERNAL MEDICINE

## 2024-11-15 PROCEDURE — 80202 ASSAY OF VANCOMYCIN: CPT | Performed by: STUDENT IN AN ORGANIZED HEALTH CARE EDUCATION/TRAINING PROGRAM

## 2024-11-15 PROCEDURE — 2500000005 HC RX 250 GENERAL PHARMACY W/O HCPCS: Performed by: INTERNAL MEDICINE

## 2024-11-15 PROCEDURE — 2500000004 HC RX 250 GENERAL PHARMACY W/ HCPCS (ALT 636 FOR OP/ED): Mod: JZ | Performed by: STUDENT IN AN ORGANIZED HEALTH CARE EDUCATION/TRAINING PROGRAM

## 2024-11-15 PROCEDURE — 2060000001 HC INTERMEDIATE ICU ROOM DAILY

## 2024-11-15 PROCEDURE — 99232 SBSQ HOSP IP/OBS MODERATE 35: CPT | Performed by: INTERNAL MEDICINE

## 2024-11-15 PROCEDURE — 93005 ELECTROCARDIOGRAM TRACING: CPT

## 2024-11-15 PROCEDURE — 99233 SBSQ HOSP IP/OBS HIGH 50: CPT | Performed by: STUDENT IN AN ORGANIZED HEALTH CARE EDUCATION/TRAINING PROGRAM

## 2024-11-15 PROCEDURE — 84484 ASSAY OF TROPONIN QUANT: CPT | Performed by: INTERNAL MEDICINE

## 2024-11-15 PROCEDURE — 2500000001 HC RX 250 WO HCPCS SELF ADMINISTERED DRUGS (ALT 637 FOR MEDICARE OP): Performed by: STUDENT IN AN ORGANIZED HEALTH CARE EDUCATION/TRAINING PROGRAM

## 2024-11-15 PROCEDURE — 94760 N-INVAS EAR/PLS OXIMETRY 1: CPT

## 2024-11-15 PROCEDURE — 2500000002 HC RX 250 W HCPCS SELF ADMINISTERED DRUGS (ALT 637 FOR MEDICARE OP, ALT 636 FOR OP/ED): Performed by: STUDENT IN AN ORGANIZED HEALTH CARE EDUCATION/TRAINING PROGRAM

## 2024-11-15 PROCEDURE — 2500000004 HC RX 250 GENERAL PHARMACY W/ HCPCS (ALT 636 FOR OP/ED): Performed by: NURSE PRACTITIONER

## 2024-11-15 PROCEDURE — 82947 ASSAY GLUCOSE BLOOD QUANT: CPT

## 2024-11-15 PROCEDURE — 80048 BASIC METABOLIC PNL TOTAL CA: CPT | Mod: CCI | Performed by: INTERNAL MEDICINE

## 2024-11-15 PROCEDURE — 2500000002 HC RX 250 W HCPCS SELF ADMINISTERED DRUGS (ALT 637 FOR MEDICARE OP, ALT 636 FOR OP/ED): Performed by: NURSE PRACTITIONER

## 2024-11-15 PROCEDURE — 80053 COMPREHEN METABOLIC PANEL: CPT | Performed by: STUDENT IN AN ORGANIZED HEALTH CARE EDUCATION/TRAINING PROGRAM

## 2024-11-15 PROCEDURE — 93010 ELECTROCARDIOGRAM REPORT: CPT | Performed by: INTERNAL MEDICINE

## 2024-11-15 PROCEDURE — 99222 1ST HOSP IP/OBS MODERATE 55: CPT

## 2024-11-15 RX ORDER — POTASSIUM CHLORIDE 14.9 MG/ML
20 INJECTION INTRAVENOUS
Status: COMPLETED | OUTPATIENT
Start: 2024-11-15 | End: 2024-11-15

## 2024-11-15 RX ORDER — MAGNESIUM SULFATE HEPTAHYDRATE 40 MG/ML
4 INJECTION, SOLUTION INTRAVENOUS ONCE
Status: COMPLETED | OUTPATIENT
Start: 2024-11-15 | End: 2024-11-15

## 2024-11-15 RX ORDER — POTASSIUM CHLORIDE 14.9 MG/ML
INJECTION INTRAVENOUS
Status: COMPLETED
Start: 2024-11-15 | End: 2024-11-15

## 2024-11-15 RX ORDER — BENZONATATE 100 MG/1
100 CAPSULE ORAL 3 TIMES DAILY PRN
Status: DISCONTINUED | OUTPATIENT
Start: 2024-11-15 | End: 2024-11-27 | Stop reason: HOSPADM

## 2024-11-15 RX ORDER — DIGOXIN 0.25 MG/ML
250 INJECTION INTRAMUSCULAR; INTRAVENOUS ONCE
Status: COMPLETED | OUTPATIENT
Start: 2024-11-15 | End: 2024-11-15

## 2024-11-15 RX ORDER — DIGOXIN 0.25 MG/ML
250 INJECTION INTRAMUSCULAR; INTRAVENOUS ONCE
Status: DISCONTINUED | OUTPATIENT
Start: 2024-11-15 | End: 2024-11-15

## 2024-11-15 RX ORDER — METOPROLOL TARTRATE 1 MG/ML
5 INJECTION, SOLUTION INTRAVENOUS ONCE
Status: COMPLETED | OUTPATIENT
Start: 2024-11-15 | End: 2024-11-15

## 2024-11-15 RX ORDER — POTASSIUM CHLORIDE 20 MEQ/1
40 TABLET, EXTENDED RELEASE ORAL ONCE
Status: COMPLETED | OUTPATIENT
Start: 2024-11-15 | End: 2024-11-15

## 2024-11-15 RX ORDER — DIGOXIN 125 MCG
125 TABLET ORAL DAILY
Status: DISCONTINUED | OUTPATIENT
Start: 2024-11-16 | End: 2024-11-15

## 2024-11-15 ASSESSMENT — COGNITIVE AND FUNCTIONAL STATUS - GENERAL
DRESSING REGULAR LOWER BODY CLOTHING: A LITTLE
HELP NEEDED FOR BATHING: A LITTLE
TOILETING: A LITTLE
MOVING FROM LYING ON BACK TO SITTING ON SIDE OF FLAT BED WITH BEDRAILS: A LITTLE
MOVING TO AND FROM BED TO CHAIR: A LITTLE
TURNING FROM BACK TO SIDE WHILE IN FLAT BAD: A LITTLE
MOVING FROM LYING ON BACK TO SITTING ON SIDE OF FLAT BED WITH BEDRAILS: A LITTLE
DRESSING REGULAR LOWER BODY CLOTHING: A LITTLE
WALKING IN HOSPITAL ROOM: A LITTLE
MOVING TO AND FROM BED TO CHAIR: A LITTLE
TOILETING: A LITTLE
DAILY ACTIVITIY SCORE: 20
DAILY ACTIVITIY SCORE: 20
DRESSING REGULAR UPPER BODY CLOTHING: A LITTLE
HELP NEEDED FOR BATHING: A LITTLE
STANDING UP FROM CHAIR USING ARMS: A LITTLE
TURNING FROM BACK TO SIDE WHILE IN FLAT BAD: A LITTLE
CLIMB 3 TO 5 STEPS WITH RAILING: A LOT
WALKING IN HOSPITAL ROOM: A LITTLE
MOBILITY SCORE: 17
STANDING UP FROM CHAIR USING ARMS: A LITTLE
CLIMB 3 TO 5 STEPS WITH RAILING: A LOT
DRESSING REGULAR UPPER BODY CLOTHING: A LITTLE
MOBILITY SCORE: 17

## 2024-11-15 ASSESSMENT — PAIN - FUNCTIONAL ASSESSMENT: PAIN_FUNCTIONAL_ASSESSMENT: 0-10

## 2024-11-15 ASSESSMENT — PAIN SCALES - GENERAL
PAINLEVEL_OUTOF10: 0 - NO PAIN
PAINLEVEL_OUTOF10: 0 - NO PAIN

## 2024-11-15 NOTE — PROGRESS NOTES
"Called re: pt with HR in the 140s  Stat EKG obtained and it showed atrial fibrillation with /min  Pt seen and examined  He reports having history of atrial fibrillation. He is on Rivaroxaban long term  He denies having palpitations, chest pain, diaphoresis, LH, nausea or vomiting  He admits to SOB which he's had prior to admission. Breathing has not worsened  Exam: Elderly male in NAD at rest  VS: BP 96/63 (BP Location: Right arm, Patient Position: Lying)   Pulse (!) 118   Temp 37 °C (98.6 °F) (Temporal)   Resp 19   Ht 1.753 m (5' 9\")   Wt 60.1 kg (132 lb 9.6 oz)   SpO2 95%   BMI 19.58 kg/m²   HEENT: NCAT, EOMI, DOM, no scleral icterus and no conjunctival injection  Neck: Supple, FROM. No JVD  Lungs: Rales present bibasally  Heart: S1 and S2 irregularly irregular and tachy  Abd: Soft, non tender  Ext: No edema  Neuro: Alert and oriented. No focal deficits  EKG: As above  A/P:  PAF with RVR.   5 mg of IV metoprolol was ordered and given for rate control with HR decreasing from ~140 to 118  Because of SBP in the 90s, will try digoxin  NS bolus ordered in the meantime  Will check Mg, BMP, troponin, TSH  Will ask cardiology to see  As previously stated, he is on Rivaroxaban.     Jesus Alberto Lira MD  "

## 2024-11-15 NOTE — PROGRESS NOTES
Vancomycin Dosing by Pharmacy- FOLLOW UP    Juan Carlos Cr is a 74 y.o. year old male who Pharmacy has been consulted for vancomycin dosing for pneumonia. Based on the patient's indication and renal status this patient is being dosed based on a goal AUC of 400-600.     Renal function is currently stable but very low serum creatinine (0.41)    Current vancomycin dose: 1250 mg given every 12 hours    Estimated vancomycin AUC on current dose: 44 mg/L.hr      Visit Vitals  BP 98/57 (BP Location: Right arm, Patient Position: Lying)   Pulse 96   Temp 37 °C (98.6 °F) (Temporal)   Resp (!) 28        Lab Results   Component Value Date    CREATININE 0.41 (L) 11/15/2024    CREATININE 0.41 (L) 11/15/2024    CREATININE 0.44 (L) 2024    CREATININE 0.54 2024        Patient weight is as follows:   Vitals:    24 2212   Weight: 60.1 kg (132 lb 9.6 oz)       Cultures:  Susceptibility data for the encounter in last 14 days.  Collected Specimen Info Organism   24 Fluid from SPUTUM Staphylococcus aureus        I/O last 3 completed shifts:  In: 2253 (37.5 mL/kg) [P.O.:687; I.V.:766 (12.7 mL/kg); IV Piggyback:800]  Out: 3300 (54.9 mL/kg) [Urine:3300 (1.5 mL/kg/hr)]  Weight: 60.1 kg   I/O during current shift:  I/O this shift:  In: 615 [I.V.:15; IV Piggyback:600]  Out: -     Temp (24hrs), Av.2 °C (98.9 °F), Min:37 °C (98.6 °F), Max:37.5 °C (99.5 °F)      Assessment/Plan    Within goal AUC range. Continue current vancomycin regimen.    This dosing regimen is predicted by [employer] to result in the following pharmacokinetic parameters:  >>>  Loading dose: N/A  Regimen: 1250 mg IV every 12 hours.  Start time: 12:17 on 11/15/2024  Exposure target: AUC24 (range)400-600 mg/L.hr   ULF55-85: 436 mg/L.hr  AUC24,ss: 443 mg/L.hr  Probability of AUC24 > 400: 76 %  Ctrough,ss: 11.8 mg/L  Probability of Ctrough,ss > 20: 1 %    The next level will be obtained on 2024 at 0500  . May be obtained sooner if  clinically indicated.   Will continue to monitor renal function daily while on vancomycin and order serum creatinine at least every 48 hours if not already ordered.  Follow for continued vancomycin needs, clinical response, and signs/symptoms of toxicity.       Monserrat Fontenot, PharmD

## 2024-11-15 NOTE — PROGRESS NOTES
11/15/24 1100   Discharge Planning   Living Arrangements Spouse/significant other   Support Systems Spouse/significant other;Children   Assistance Needed patient is A&Ox3, reports independent in ADL's, uses no AD, O2 at 2-3L (unknown company), drives when feeling well enough, reports he is active with homecare for nurse visits (unsure agency)   Type of Residence Private residence   Number of Stairs to Enter Residence 2   Number of Stairs Within Residence 13  (reports he and wife moved downstairs)   Do you have animals or pets at home? Yes   Type of Animals or Pets 1 cat   Who is requesting discharge planning? Provider   Home or Post Acute Services In home services   Type of Home Care Services Home nursing visits;Home OT;Home PT   Expected Discharge Disposition Home Health

## 2024-11-15 NOTE — NURSING NOTE
0715 Nurse to nurse bedside shift report received. Pt. awake and alert in bed, VS machine monitoring VS. Pt. denies any pain or discomfort. Per NS RN, pt. will be evaluated by provider at bedside this AM.  0815 Per provider at bedside, pt. Has improved but keeping an eye on his spo2 is warranted due to his PNA. Also per provider, pt. DOES have hx of Afib hence his Xeralto rx.

## 2024-11-15 NOTE — CONSULTS
PALLIATIVE MEDICINE CONSULT   Attending Provider: Tiffanie Viera MD   Reason For Consult: Assistance with goals of care, complex medical decision making, code status discussion    INTRODUCTION TO PALLIATIVE MEDICINE    Met with patient, his wife Cindy, and son Maynor at bedside.   Patient's Capacity: Patient alert and oriented, has capacity to make their own medical decisions at this time  Service: Palliative Medicine was introduced as a specialty service for patients with serious illness to help with symptom management, improve quality of life, assist with goals of care conversations, navigate complex decision making, and provide support to patients and families. Support and empathy was provided throughout the encounter.     SUBJECTIVE    History Of Present Illness  Juan Carlos Cr is a 74 y.o. male with past medical history of oxygen dependent COPD, diabetes mellitus, hypertension, MI, CAD, PAF (diagnosed in 8/24), HFrEF (EF 35-40%), and non-small cell lung cancer (diagnosed 8/2024), currently on chemo and radiation treatment who presented to [place] ED on 11/12/24 with a chief complaint of shortness of breath. Per H&P, patient became dyspneic on minimal exertion 3-4 days prior to presenting to the hospital. He also endorsed having a productive cough with wheezing, orthopnea, and a 10 pound weight loss over the past three months. CTA chest was negative for PE but revealed right upper lobe mass wit central necrosis, extensive new areas of bilateral airspace consolidation suggestive of multifocal pneumonia, and cavitated left lung base nodule. He was transferred to [facility] and admitted for further evaluation and management of acute on chronic hypoxic respiratory failure secondary to pneumonia. ID was consulted and is following. Patient MRSA nares was positive 11/12 and his sputum culture was also positive for MRSA today 11/15. He remains on vancomycin and zosyn. Patient was transferred to the [facility]  down unit today 11/15 for a-fib RVR. He was started on an amio infusion. Palliative Medicine was consulted for goals of care and complex medical decision making.     History obtained from chart review including ED note, H&P, patient's daily progress notes, review of lab/test results, and discussion with primary team and bedside RN.    Allergies  Patient has no known allergies.    Past Medical History  He has a past medical history of Acute myocardial infarction, unspecified (05/17/2013), Atrial fib/flutter, transient (Multi), COPD (chronic obstructive pulmonary disease) (Multi), Coronary artery disease, Diabetes mellitus (Multi), Diabetes mellitus (Multi), Encounter for screening for malignant neoplasm of prostate (09/02/2015), GERD (gastroesophageal reflux disease), Hyperlipidemia, Hypertension, Lung cancer (Multi), and Personal history of other diseases of the nervous system and sense organs (09/13/2017).     Surgical History  He has a past surgical history that includes Coronary angioplasty with stent (05/17/2013); Cataract extraction (09/14/2018); and Cataract extraction (06/27/2014).    Family History  Family History   Problem Relation Name Age of Onset    Heart attack Mother      Lung cancer Brother       Social History  He reports that he has quit smoking. His smoking use included cigarettes. He has been exposed to tobacco smoke. He has never used smokeless tobacco. He reports that he does not currently use alcohol. He reports that he does not use drugs.       ADVANCE CARE PLANNING: Patient and/or family consented to a voluntary advance care planning meeting    Advance Care Planning    Serious Illness Assessment:    Perception: Patient's, his wife Cindy's, and his son Maynor's     Life Limiting Disease: Acute on chronic hypoxic respiratory failure secondary to MRSA pneumonia and non small cell lung cancer posing threat to life or function; HFrEF    Disease Specific Counseling: Counseling provided on patient's  current clinical condition, including diagnoses (see above), and management plan.     Goals: Author attempted to discuss goals of care, health care wishes, and any treatment limitations patient would want established in regards to his medical conditions above with patient and his family. Patient confirmed that his goals are survival and time based and he is hoping to get well enough to discharge to get back but he did not wish to discuss goals or treatment limitations any further at this time.     Health Preferences and Priorities with Disease Progression: Patient did not wish to discuss at this time.     Resuscitation Assessment: Counseling provided on the benefit versus burden of CPR in the setting of patient's overall health status. Patient wishes to remain a full code.     Advance Directives: Patient does not have a HPOA or living will. Counseling provided on benefit of completing advance directives in order to establish a person to make one's medical decision if they were unable to speak for themselves and to make their health care wishes and treatment limitations known to both hospital staff and their designated HPOA. Patient declined to complete advance directives during this admission.     Symptom Management: Patient denied any distressing symptoms related to his cancer and treatment at this time. Did endorse cough from his pneumonia is sometimes bothersome. Author offered to refer patient to Supportive Oncology for symptom management outpatient. Patient declined.     All questions and concerns were addressed during encounter.   -------------------------------------------------------------------------------------------    Medication History   Current Outpatient Medications   Medication Instructions    albuterol 2.5 mg, nebulization, 3 times daily    [START ON 11/17/2024] albuterol 2.5 mg, nebulization, 3 times daily, Start taking on November 19th or as prescribed by your doctor.    ascorbic acid (VITAMIN C)  500 mg, Daily    aspirin 81 mg, Daily    blood sugar diagnostic (Accu-Chek Guide test strips) strip 1 strip, miscellaneous, 2 times daily    blood sugar diagnostic (FreeStyle Lite Strips) strip 1 strip, miscellaneous, 2 times daily    blood-glucose meter (Accu-Chek Guide Glucose Meter) misc Check blood glucose 2x a day    fluticasone furoate-vilanteroL (Breo Ellipta) 200-25 mcg/dose inhaler 1 puff, inhalation, Daily RT    fluticasone-umeclidin-vilanter (TRELEGY-ELLIPTA) 200-62.5-25 mcg blister with device 1 puff, inhalation, Daily, Rinse mouth after taking dose    FreeStyle Lite Meter kit TEST BLOOD SUGAR LEVELS 2X A DAY    lancets (Accu-Chek Fastclix Lancet Drum) [facility] Check blood glucose 2x day    lancets (Accu-Chek Fastclix Lancet Drum) [facility] Check blood glucose level 2x a day    lancets misc TEST BLOOD SUGAR LEVELS 2X A DAY    lancets misc Check blood sugar twice daily    losartan (COZAAR) 25 mg, oral, Daily    metFORMIN (GLUCOPHAGE) 500 mg, oral, 2 times daily (morning and late afternoon)    metoprolol succinate XL (TOPROL-XL) 50 mg, oral, Daily, Do not crush or chew.    nicotine (Nicoderm CQ) 14 mg/24 hr patch Apply 1 patch topically every day.  START AFTER finishing the 21mg patches.    nicotine (Nicoderm CQ) 21 mg/24 hr patch Place 1 patch over 24 hours on the skin once daily for 42 doses.    nicotine (Nicoderm CQ) 7 mg/24 hr patch Place 1 patch over 24 hours on the skin once daily for 14 doses. START AFTER finishing the 14mg patches.    ondansetron (ZOFRAN) 8 mg, oral, Every 8 hours PRN    oxygen (O2) 2 L/min, Continuous    pantoprazole (PROTONIX) 40 mg, oral, Daily, Do not crush, chew, or split.    potassium chloride CR (Klor-Con M20) 20 mEq ER tablet 20 mEq, oral, Daily, Do not crush or chew.    prochlorperazine (COMPAZINE) 10 mg, oral, Every 6 hours PRN    rosuvastatin (CRESTOR) 20 mg, oral, Daily    sodium chloride 3 % nebulizer solution 4 mL, nebulization, 3 times daily    [START ON 11/17/2024] sodium  "chloride 3% nebulizer solution 4 mL, nebulization, 3 times daily, Start taking on November 19th or as prescribed by your doctor. Take after albuterol.    tiotropium (Spiriva Respimat) 2.5 mcg/actuation inhaler Inhale 2 puffs by mouth once daily.    Vitamin D3 20 mcg, oral, Daily    Xarelto 20 mg, oral, Daily with evening meal, Take with food.     Scheduled medications   ascorbic acid, 500 mg, oral, Daily  aspirin, 81 mg, oral, Daily  cholecalciferol, 800 Units, oral, Daily  fluticasone furoate-vilanteroL, 1 puff, inhalation, Daily  insulin lispro, 0-10 Units, subcutaneous, Before meals & nightly  [Held by provider] losartan, 25 mg, oral, Daily  [Held by provider] metFORMIN, 500 mg, oral, BID  [Held by provider] metoprolol succinate XL, 50 mg, oral, Daily  mupirocin, , Each Nostril, TID  oxygen, , inhalation, Continuous - Inhalation  pantoprazole, 40 mg, oral, Daily  rivaroxaban, 20 mg, oral, Daily with evening meal  rosuvastatin, 20 mg, oral, Daily  tamsulosin, 0.4 mg, oral, Daily  tiotropium, 2 puff, inhalation, Daily  vancomycin, 1,250 mg, intravenous, q12h  zinc oxide, 1 Application, Topical, TID      Continuous medications  amiodarone, 1 mg/min, Last Rate: 1 mg/min (11/15/24 1652)   Followed by  amiodarone, 0.5 mg/min      PRN medications  PRN medications: benzonatate, dextrose, dextrose, glucagon, glucagon, prochlorperazine, vancomycin     Last Recorded Vitals  Blood pressure 118/64, pulse 107, temperature 37 °C (98.6 °F), temperature source Temporal, resp. rate 23, height 1.753 m (5' 9\"), weight 60.1 kg (132 lb 9.6 oz), SpO2 95%.  7 Day Weight Change: Unable to Calculate   Body mass index is 19.58 kg/m².     Relevant Results  Lab Results   Component Value Date    WBC 4.8 11/15/2024    HGB 7.6 (L) 11/15/2024    HCT 24.1 (L) 11/15/2024    MCV 93 11/15/2024     11/15/2024      Lab Results   Component Value Date    GLUCOSE 98 11/15/2024    GLUCOSE 98 11/15/2024    CALCIUM 7.3 (L) 11/15/2024    CALCIUM 7.3 " (L) 11/15/2024     (L) 11/15/2024     (L) 11/15/2024    K 2.8 (LL) 11/15/2024    K 2.8 (LL) 11/15/2024    CO2 30 11/15/2024    CO2 30 11/15/2024    CL 93 (L) 11/15/2024    CL 93 (L) 11/15/2024    BUN 8 11/15/2024    BUN 8 11/15/2024    CREATININE 0.41 (L) 11/15/2024    CREATININE 0.41 (L) 11/15/2024      Lab Results   Component Value Date    ALT 17 11/15/2024    AST 18 11/15/2024    ALKPHOS 70 11/15/2024    BILITOT 0.2 11/15/2024      Lab Results   Component Value Date    ALBUMIN 2.1 (L) 11/15/2024     Serum creatinine: 0.41 mg/dL (L) 11/15/24 0706  Estimated creatinine clearance: 125 mL/min (A)     Relevant Imaging  Electrocardiogram, 12-lead PRN ACS symptoms  Atrial fibrillation with rapid ventricular response with premature ventricular or aberrantly conducted complexes  Nonspecific ST and T wave abnormality  Abnormal ECG  When compared with ECG of 12-NOV-2024 15:52, (unconfirmed)  Atrial fibrillation has replaced Sinus rhythm  ST now depressed in Lateral leads  Nonspecific T wave abnormality now evident in Inferior leads  Nonspecific T wave abnormality now evident in Lateral leads     Diagnostics Review With Patient or Family  Yes    Physical Exam:   Physical Exam  Constitutional:       Appearance: He is ill-appearing.      Comments: Frail    HENT:      Head: Normocephalic and atraumatic.   Eyes:      Conjunctiva/sclera: Conjunctivae normal.   Pulmonary:      Comments: Mild conversational dyspnea  Neurological:      General: No focal deficit present.      Mental Status: He is alert and oriented to person, place, and time.        ASSESSMENT AND PLAN    - Continue supportive care through primary team    #Goals of Care:  #Complex Medical Decision Making:  #Advance Care Planning:  - goals are survival and time based: patient wishes to continue with current treatment plan including hospital care with hopes to get well enough to continue treatment for his lung cancer   - confirmed code status is full   -  patient declined referral to Supportive Oncology for outpatient symptom management in setting of his cancer and cancer treatment   - patient not interested in completing advance directives prior to discharge     Plan of Care discussed with: Updated Dr. Viera and bedside RN on goals of care discussion via face-to-face encounter. Dr. Viera aware team will sign off at this time, will re-consult if additional needs arise during admission.     Thank you for asking Palliative Care to assist with care of this patient.  Please contact us for additional questions or concerns.    Signature and billing  I spent 50 minutes in the care of this patient which included extensive chart review, interviewing patient/family, discussion with primary team and bedside RN, coordination of care, and high risk management/treatment.     SIGNATURE: AMISHA Rivera  PAGER/CONTACT:  Contact information:  Palliative Medicine  Contact Via Epic Secure chat

## 2024-11-15 NOTE — CONSULTS
Inpatient consult to Cardiology  Consult performed by: STONE Hammond-CNP  Consult ordered by: Jesus Alberto Lira MD        History Of Present Illness:    Juan Carlos Cr is a 74 y.o. male from home with h/o SCLC RUL diagnosed 8/2024 currently receiving chemoradiation therapy, right lung collapse s/p EBUS with mass debulking and bronchial stent 8/2024, COPD on home O2 2-3L, type 2 DM, htn, CAD with remote PCI 1997, PAF (diagnosed 8/2024 in setting of newly identified lung cancer) on Xarelto, HFrEF (EF 35-40% 8/2024) presenting to the ER with 3-4 days of progressive dyspnea with cough.  CTA chest in ER negative for PE, shows decrease in size of right lung mass, previous right lung collapse resolved with new are of extensive airspace consolidation suggestive of pneumonia as well as cavitated left lung base nodule vs. Abscess.  Patient went into a.fib RVR overnight with -150's.  Denies symptoms of palpitations, worsening SOB, or chest pain.  Was given IV lopressor, IV digoxin with no improvement in HR.       Last Recorded Vitals:  Vitals:    11/15/24 0658 11/15/24 0700 11/15/24 0715 11/15/24 0725   BP:  104/61 95/50 98/57   BP Location:  Right arm Right arm Right arm   Patient Position:  Lying Lying Lying   Pulse:  (!) 113 104 96   Resp:   (!) 28    Temp:       TempSrc:       SpO2: 94%  93% 94%   Weight:       Height:           Last Labs:  CBC - 11/15/2024:  7:06 AM  4.8 7.6 156    24.1      CMP - 11/15/2024:  7:06 AM;  7:06 AM  7.3; 7.3 4.9 18 --- 0.2   2.7 2.1 17 70      PTT - No results in last year.  1.2   13.4 _     Troponin I, High Sensitivity   Date/Time Value Ref Range Status   11/15/2024 07:06 AM 11 0 - 20 ng/L Final   11/12/2024 03:57 PM 11 0 - 20 ng/L Final   11/12/2024 02:50 PM 11 0 - 20 ng/L Final     BNP   Date/Time Value Ref Range Status   11/12/2024 02:50  (H) 0 - 99 pg/mL Final   10/16/2024 10:31  (H) 0 - 99 pg/mL Final     Hemoglobin A1C   Date/Time Value Ref Range Status    08/26/2024 04:50 PM 7.6 (H) see below % Final   09/25/2020 10:18 AM 5.7 % Final     Comment:          Diagnosis of Diabetes-Adults   Non-Diabetic: < or = 5.6%   Increased risk for developing diabetes: 5.7-6.4%   Diagnostic of diabetes: > or = 6.5%  .       Monitoring of Diabetes                Age (y)     Therapeutic Goal (%)   Adults:          >18           <7.0   Pediatrics:    13-18           <7.5                   7-12           <8.0                   0- 6            7.5-8.5   American Diabetes Association. Diabetes Care 33(S1), Jan 2010.       LDL Calculated   Date/Time Value Ref Range Status   08/28/2024 07:28 AM 63 <=99 mg/dL Final     Comment:                                 Near   Borderline      AGE      Desirable  Optimal    High     High     Very High     0-19 Y     0 - 109     ---    110-129   >/= 130     ----    20-24 Y     0 - 119     ---    120-159   >/= 160     ----      >24 Y     0 -  99   100-129  130-159   160-189     >/=190       VLDL   Date/Time Value Ref Range Status   08/28/2024 07:28 AM 19 0 - 40 mg/dL Final   09/30/2022 12:02 PM 16 0 - 40 mg/dL Final   09/24/2021 11:16 AM 21 0 - 40 mg/dL Final      Last I/O:  I/O last 3 completed shifts:  In: 2253 (37.5 mL/kg) [P.O.:687; I.V.:766 (12.7 mL/kg); IV Piggyback:800]  Out: 3300 (54.9 mL/kg) [Urine:3300 (1.5 mL/kg/hr)]  Weight: 60.1 kg     Past Cardiology Tests (Last 3 Years):  EKG:  ECG 12 lead 11/12/2024 (Preliminary)  NSR     Echo:  Transthoracic Echo (TTE) Complete 08/29/2024  CONCLUSIONS:   1. Poorly visualized anatomical structures due to suboptimal image quality.   2. Left ventricular ejection fraction is moderately decreased, by visual estimate at 35-40%.   3. There is global hypokinesis of the left ventricle with minor regional variations.   4. Left ventricular diastolic filling was indeterminate.   5. There is normal right ventricular global systolic function.   6. The left atrium is mildly dilated.   7. Mild aortic valve  regurgitation.      Past Medical History:  He has a past medical history of Acute myocardial infarction, unspecified (05/17/2013), Atrial fib/flutter, transient (Multi), COPD (chronic obstructive pulmonary disease) (Multi), Coronary artery disease, Diabetes mellitus (Multi), Diabetes mellitus (Multi), Encounter for screening for malignant neoplasm of prostate (09/02/2015), GERD (gastroesophageal reflux disease), Hyperlipidemia, Hypertension, Lung cancer (Multi), and Personal history of other diseases of the nervous system and sense organs (09/13/2017).    Past Surgical History:  He has a past surgical history that includes Coronary angioplasty with stent (05/17/2013); Cataract extraction (09/14/2018); and Cataract extraction (06/27/2014).      Social History:  He reports that he has quit smoking. His smoking use included cigarettes. He has been exposed to tobacco smoke. He has never used smokeless tobacco. He reports that he does not currently use alcohol. He reports that he does not use drugs.    Family History:  Family History   Problem Relation Name Age of Onset    Heart attack Mother      Lung cancer Brother          Allergies:  Patient has no known allergies.    Inpatient Medications:  Scheduled medications   Medication Dose Route Frequency    amiodarone  150 mg intravenous Once    ascorbic acid  500 mg oral Daily    aspirin  81 mg oral Daily    cholecalciferol  800 Units oral Daily    fluticasone furoate-vilanteroL  1 puff inhalation Daily    insulin lispro  0-10 Units subcutaneous Before meals & nightly    [Held by provider] losartan  25 mg oral Daily    magnesium sulfate  4 g intravenous Once    [Held by provider] metFORMIN  500 mg oral BID    [Held by provider] metoprolol succinate XL  50 mg oral Daily    mupirocin   Each Nostril TID    oxygen   inhalation Continuous - Inhalation    pantoprazole  40 mg oral Daily    piperacillin-tazobactam  4.5 g intravenous q6h    potassium chloride  20 mEq intravenous q2h     potassium chloride CR  40 mEq oral Once    rivaroxaban  20 mg oral Daily with evening meal    rosuvastatin  20 mg oral Daily    tamsulosin  0.4 mg oral Daily    tiotropium  2 puff inhalation Daily    vancomycin  1,250 mg intravenous q12h    zinc oxide  1 Application Topical TID     PRN medications   Medication    benzonatate    dextrose    dextrose    glucagon    glucagon    prochlorperazine    vancomycin     Continuous Medications   Medication Dose Last Rate    amiodarone  1 mg/min      Followed by    amiodarone  0.5 mg/min       Outpatient Medications:  Current Outpatient Medications   Medication Instructions    albuterol 2.5 mg, nebulization, 3 times daily    [START ON 11/17/2024] albuterol 2.5 mg, nebulization, 3 times daily, Start taking on November 19th or as prescribed by your doctor.    ascorbic acid (VITAMIN C) 500 mg, Daily    aspirin 81 mg, Daily    blood sugar diagnostic (Accu-Chek Guide test strips) strip 1 strip, miscellaneous, 2 times daily    blood sugar diagnostic (FreeStyle Lite Strips) strip 1 strip, miscellaneous, 2 times daily    blood-glucose meter (Accu-Chek Guide Glucose Meter) misc Check blood glucose 2x a day    fluticasone furoate-vilanteroL (Breo Ellipta) 200-25 mcg/dose inhaler 1 puff, inhalation, Daily RT    fluticasone-umeclidin-vilanter (TRELEGY-ELLIPTA) 200-62.5-25 mcg blister with device 1 puff, inhalation, Daily, Rinse mouth after taking dose    FreeStyle Lite Meter kit TEST BLOOD SUGAR LEVELS 2X A DAY    lancets (Accu-Chek Fastclix Lancet Drum) [facility] Check blood glucose 2x day    lancets (Accu-Chek Fastclix Lancet Drum) [facility] [place] Check blood glucose level 2x a day    lancets misc TEST BLOOD SUGAR LEVELS 2X A DAY    lancets misc Check blood sugar twice daily    losartan (COZAAR) 25 mg, oral, Daily    metFORMIN (GLUCOPHAGE) 500 mg, oral, 2 times daily (morning and late afternoon)    metoprolol succinate XL (TOPROL-XL) 50 mg, oral, Daily, Do not crush or chew.    nicotine (Nicoderm CQ)  14 mg/24 hr patch Apply 1 patch topically every day.  START AFTER finishing the 21mg patches.    nicotine (Nicoderm CQ) 21 mg/24 hr patch Place 1 patch over 24 hours on the skin once daily for 42 doses.    nicotine (Nicoderm CQ) 7 mg/24 hr patch Place 1 patch over 24 hours on the skin once daily for 14 doses. START AFTER finishing the 14mg patches.    ondansetron (ZOFRAN) 8 mg, oral, Every 8 hours PRN    oxygen (O2) 2 L/min, Continuous    pantoprazole (PROTONIX) 40 mg, oral, Daily, Do not crush, chew, or split.    potassium chloride CR (Klor-Con M20) 20 mEq ER tablet 20 mEq, oral, Daily, Do not crush or chew.    prochlorperazine (COMPAZINE) 10 mg, oral, Every 6 hours PRN    rosuvastatin (CRESTOR) 20 mg, oral, Daily    sodium chloride 3 % nebulizer solution 4 mL, nebulization, 3 times daily    [START ON 11/17/2024] sodium chloride 3% nebulizer solution 4 mL, nebulization, 3 times daily, Start taking on November 19th or as prescribed by your doctor. Take after albuterol.    tiotropium (Spiriva Respimat) 2.5 mcg/actuation inhaler Inhale 2 puffs by mouth once daily.    Vitamin D3 20 mcg, oral, Daily    Xarelto 20 mg, oral, Daily with evening meal, Take with food.       Physical Exam:  Constitutional: Pleasant, Awake/Alert/Oriented to person place and time. No distress  Head: Atraumatic, Normocephalic  Eyes: EOMI. ANDRES  Neck: No JVD  Cardiovascular: irreg/irreg, no murmur   Respiratory: coarse rhonchi noted anteriorly   Abdomen: Soft, Nontender. Bowel sounds appreciated  Musculoskeletal: ROM intact. Muscle strength grossly intact upper and lower extremities 5/5.   Neurological: CNII-XII intact. Sensation grossly intact  Extremities: Warm and dry. No acute rashes and lesions  Psychiatric: Appropriate mood and affect       Assessment/Plan   74 y.o. male from home with h/o SCLC RUL diagnosed 8/2024 currently receiving chemoradiation therapy, right lung collapse s/p EBUS with mass debulking and bronchial stent 8/2024, COPD  on home O2 2-3L, type 2 DM, htn, CAD with remote PCI 1997, PAF (diagnosed 8/2024 in setting of newly identified lung cancer) on Xarelto, HFrEF (EF 35-40% 8/2024) presenting to the ER with 3-4 days of progressive dyspnea with cough    Assessment:  Acute on chronic hypoxic respiratory failure  Right lung pneumonia  Possible lung abscess (left lung cavitated LN)  Paroxysmal a.fib RVR  HFrEF, not acutely decompensated    Plan:  -Recommend starting amiodarone drip  -Continue Xarelto 20mg daily  -Toprol XL and losartan held due to relative hypotension, but will look to resume as tolerated  -Continue aspirin 81mg daily given h/o prior cardiac stent     Peripheral IV 11/12/24 Left Forearm (Active)   Site Assessment Dry;Intact;Clean 11/15/24 0900   Dressing Status Clean;Dry 11/15/24 0900   Number of days: 3       Urethral Catheter Coude 14 Fr. (Active)   Site Assessment Clean;Skin intact 11/15/24 0858   Number of days: 2       Code Status:  Full Code      STONE Hammond-CNP    Seen, discussed and examined with IGNACIO Euceda, above is representative of my medical decision making.    John Gallegos MD

## 2024-11-15 NOTE — PROGRESS NOTES
"Juan Carlos Cr is a 74 y.o. male on day 3 of admission presenting with Pneumonia due to gram-negative bacteria (Multi).    Subjective   Pt went into A fib RVR overnight and was given digoxin. He says he has SOB.        Objective     Physical Exam  Vitals and nursing note reviewed.   Constitutional:       General: He is not in acute distress.     Appearance: He is ill-appearing. He is not toxic-appearing.   HENT:      Head: Normocephalic and atraumatic.      Nose: Nose normal.      Mouth/Throat:      Mouth: Mucous membranes are moist.   Eyes:      Extraocular Movements: Extraocular movements intact.      Conjunctiva/sclera: Conjunctivae normal.      Pupils: Pupils are equal, round, and reactive to light.   Cardiovascular:      Rate and Rhythm: Normal rate. Rhythm irregular.      Heart sounds: No murmur heard.     No gallop.   Pulmonary:      Effort: Pulmonary effort is normal. No respiratory distress.      Breath sounds: Wheezing and rhonchi present. No rales.   Abdominal:      General: Abdomen is flat. Bowel sounds are normal. There is no distension.      Palpations: Abdomen is soft. There is no mass.      Tenderness: There is no abdominal tenderness.   Musculoskeletal:         General: No swelling or tenderness. Normal range of motion.      Cervical back: Normal range of motion and neck supple.      Right lower leg: Edema present.      Left lower leg: Edema present.   Skin:     General: Skin is warm and dry.   Neurological:      Mental Status: He is alert and oriented to person, place, and time.      Motor: Weakness present.   Psychiatric:         Mood and Affect: Mood normal.         Behavior: Behavior normal.         Thought Content: Thought content normal.         Judgment: Judgment normal.         Last Recorded Vitals:  BP 98/57 (BP Location: Right arm, Patient Position: Lying)   Pulse 96   Temp 37 °C (98.6 °F) (Temporal)   Resp (!) 28   Ht 1.753 m (5' 9\")   Wt 60.1 kg (132 lb 9.6 oz)   SpO2 94%   BMI " 19.58 kg/m²      Scheduled medications:  amiodarone, 150 mg, intravenous, Once  ascorbic acid, 500 mg, oral, Daily  aspirin, 81 mg, oral, Daily  cholecalciferol, 800 Units, oral, Daily  fluticasone furoate-vilanteroL, 1 puff, inhalation, Daily  insulin lispro, 0-10 Units, subcutaneous, Before meals & nightly  [Held by provider] losartan, 25 mg, oral, Daily  magnesium sulfate, 4 g, intravenous, Once  [Held by provider] metFORMIN, 500 mg, oral, BID  [Held by provider] metoprolol succinate XL, 50 mg, oral, Daily  mupirocin, , Each Nostril, TID  oxygen, , inhalation, Continuous - Inhalation  pantoprazole, 40 mg, oral, Daily  piperacillin-tazobactam, 4.5 g, intravenous, q6h  potassium chloride, 20 mEq, intravenous, q2h  potassium chloride CR, 40 mEq, oral, Once  rivaroxaban, 20 mg, oral, Daily with evening meal  rosuvastatin, 20 mg, oral, Daily  tamsulosin, 0.4 mg, oral, Daily  tiotropium, 2 puff, inhalation, Daily  vancomycin, 1,250 mg, intravenous, q12h  zinc oxide, 1 Application, Topical, TID      Continuous medications:  amiodarone, 1 mg/min   Followed by  amiodarone, 0.5 mg/min      PRN medications:  PRN medications: benzonatate, dextrose, dextrose, glucagon, glucagon, prochlorperazine, vancomycin     Relevant Results:  Results for orders placed or performed during the hospital encounter of 11/12/24 (from the past 24 hours)   POCT GLUCOSE   Result Value Ref Range    POCT Glucose 143 (H) 74 - 99 mg/dL   POCT GLUCOSE   Result Value Ref Range    POCT Glucose 153 (H) 74 - 99 mg/dL   Comprehensive Metabolic Panel   Result Value Ref Range    Glucose 98 74 - 99 mg/dL    Sodium 129 (L) 136 - 145 mmol/L    Potassium 2.8 (LL) 3.5 - 5.3 mmol/L    Chloride 93 (L) 98 - 107 mmol/L    Bicarbonate 30 21 - 32 mmol/L    Anion Gap 9 (L) 10 - 20 mmol/L    Urea Nitrogen 8 6 - 23 mg/dL    Creatinine 0.41 (L) 0.50 - 1.30 mg/dL    eGFR >90 >60 mL/min/1.73m*2    Calcium 7.3 (L) 8.6 - 10.3 mg/dL    Albumin 2.1 (L) 3.4 - 5.0 g/dL    Alkaline  Phosphatase 70 33 - 136 U/L    Total Protein 4.9 (L) 6.4 - 8.2 g/dL    AST 18 9 - 39 U/L    Bilirubin, Total 0.2 0.0 - 1.2 mg/dL    ALT 17 10 - 52 U/L   CBC and Auto Differential   Result Value Ref Range    WBC 4.8 4.4 - 11.3 x10*3/uL    nRBC 0.0 0.0 - 0.0 /100 WBCs    RBC 2.59 (L) 4.50 - 5.90 x10*6/uL    Hemoglobin 7.6 (L) 13.5 - 17.5 g/dL    Hematocrit 24.1 (L) 41.0 - 52.0 %    MCV 93 80 - 100 fL    MCH 29.3 26.0 - 34.0 pg    MCHC 31.5 (L) 32.0 - 36.0 g/dL    RDW 13.8 11.5 - 14.5 %    Platelets 156 150 - 450 x10*3/uL    Neutrophils % 87.5 40.0 - 80.0 %    Immature Granulocytes %, Automated 1.3 (H) 0.0 - 0.9 %    Lymphocytes % 3.1 13.0 - 44.0 %    Monocytes % 7.7 2.0 - 10.0 %    Eosinophils % 0.2 0.0 - 6.0 %    Basophils % 0.2 0.0 - 2.0 %    Neutrophils Absolute 4.19 1.60 - 5.50 x10*3/uL    Immature Granulocytes Absolute, Automated 0.06 0.00 - 0.50 x10*3/uL    Lymphocytes Absolute 0.15 (L) 0.80 - 3.00 x10*3/uL    Monocytes Absolute 0.37 0.05 - 0.80 x10*3/uL    Eosinophils Absolute 0.01 0.00 - 0.40 x10*3/uL    Basophils Absolute 0.01 0.00 - 0.10 x10*3/uL   Vancomycin   Result Value Ref Range    Vancomycin 11.3 5.0 - 20.0 ug/mL   Magnesium   Result Value Ref Range    Magnesium 1.37 (L) 1.60 - 2.40 mg/dL   TSH   Result Value Ref Range    Thyroid Stimulating Hormone 1.09 0.44 - 3.98 mIU/L   Troponin I, High Sensitivity   Result Value Ref Range    Troponin I, High Sensitivity 11 0 - 20 ng/L   Basic metabolic panel   Result Value Ref Range    Glucose 98 74 - 99 mg/dL    Sodium 129 (L) 136 - 145 mmol/L    Potassium 2.8 (LL) 3.5 - 5.3 mmol/L    Chloride 93 (L) 98 - 107 mmol/L    Bicarbonate 30 21 - 32 mmol/L    Anion Gap 9 (L) 10 - 20 mmol/L    Urea Nitrogen 8 6 - 23 mg/dL    Creatinine 0.41 (L) 0.50 - 1.30 mg/dL    eGFR >90 >60 mL/min/1.73m*2    Calcium 7.3 (L) 8.6 - 10.3 mg/dL   POCT GLUCOSE   Result Value Ref Range    POCT Glucose 103 (H) 74 - 99 mg/dL       No results found.            Malnutrition Diagnosis Status:  New  Malnutrition Diagnosis: Severe malnutrition related to chronic disease or condition  As Evidenced by: significant weight loss of 20%/3 months, severe loss of muscle and adipose stores  I agree with the dietitian's malnutrition diagnosis.        Assessment/Plan   Principal Problem:    Pneumonia due to gram-negative bacteria (Multi)    Multifocal pneumonia  CT chest: right upper lobe mass with central necrosis, multifocal pneumonia, new left lung base cavitated nodule  - on home 3L NC  - ID following  - vanc/zosyn  - Spiriva    A fib RVR  Required digoxin, RVR improved  - cardio consult  - amio gtt  - xarelto    Urinary retention  - german  - flomax  - can try voiding trial prior to dc     HypoK/HypoMg  - replace    Normocytic anemia  - monitor     Leukopenia  - monitor     Non small cell lung ca  Currently on chemo and XRT  Oncology follow up on discharge     HTN  - hold BP meds due to low BP     HLD   - statin  - ASA     DM  - hold metformin  - ISS     GERD  - protonix     Code status: Full, palliative consult  Dispo: monitor clinically          Tiffanie Viera MD  Hospitalist

## 2024-11-15 NOTE — PROGRESS NOTES
Juan Carlos Cr is a 74 y.o. male on day 3 of admission presenting with Pneumonia due to gram-negative bacteria (Multi).    Subjective   Interval History: no fever, no new complaints, moved to step down for AF       Review of Systems    Objective   Range of Vitals (last 24 hours)  Heart Rate:  []   Temp:  [37 °C (98.6 °F)-37.5 °C (99.5 °F)]   Resp:  [17-29]   BP: ()/(50-64)   SpO2:  [90 %-95 %]   Daily Weight  11/12/24 : 60.1 kg (132 lb 9.6 oz)    Body mass index is 19.58 kg/m².    Physical Exam  Constitutional:       Appearance: Normal appearance.   HENT:      Head: Normocephalic and atraumatic.      Mouth/Throat:      Mouth: Mucous membranes are moist.      Pharynx: Oropharynx is clear.   Eyes:      Pupils: Pupils are equal, round, and reactive to light.   Cardiovascular:      Rate and Rhythm: Normal rate and regular rhythm.      Heart sounds: Normal heart sounds.   Pulmonary:      Effort: Pulmonary effort is normal.      Breath sounds: Normal breath sounds.   Abdominal:      General: Abdomen is flat. Bowel sounds are normal.      Palpations: Abdomen is soft.   Musculoskeletal:      Cervical back: Normal range of motion.   Neurological:      Mental Status: He is alert.         Antibiotics  mupirocin - 2 %  piperacillin-tazobactam - 4.5 gram/100 mL  vancomycin - 1250 mg/250 mL    Relevant Results  Labs  Results from last 72 hours   Lab Units 11/15/24  0706 11/14/24  0635 11/13/24  1134 11/12/24  1450   WBC AUTO x10*3/uL 4.8 3.2* 3.2* 3.2*   HEMOGLOBIN g/dL 7.6* 7.5* 7.4* 8.2*   HEMATOCRIT % 24.1* 24.7* 23.1* 26.3*   PLATELETS AUTO x10*3/uL 156 155 163 159   NEUTROS PCT AUTO % 87.5 82.9  --   --    LYMPHO PCT MAN %  --   --   --  6.0   LYMPHS PCT AUTO % 3.1 5.4  --   --    MONO PCT MAN %  --   --   --  5.0   MONOS PCT AUTO % 7.7 9.8  --   --    EOSINO PCT MAN %  --   --   --  0.0   EOS PCT AUTO % 0.2 0.0  --   --      Results from last 72 hours   Lab Units 11/15/24  0706 11/14/24  0635 11/13/24  0954    SODIUM mmol/L 129*  129* 130* 131*   POTASSIUM mmol/L 2.8*  2.8* 3.5 3.7   CHLORIDE mmol/L 93*  93* 96* 95*   CO2 mmol/L 30  30 26 27   BUN mg/dL 8  8 14 14   CREATININE mg/dL 0.41*  0.41* 0.44* 0.54   GLUCOSE mg/dL 98  98 122* 187*   CALCIUM mg/dL 7.3*  7.3* 7.3* 6.9*   ANION GAP mmol/L 9*  9* 12 13   EGFR mL/min/1.73m*2 >90  >90 >90 >90     Results from last 72 hours   Lab Units 11/15/24  0706 11/14/24  0635 11/13/24  0954   ALK PHOS U/L 70 73 79   BILIRUBIN TOTAL mg/dL 0.2 0.2 0.4   PROTEIN TOTAL g/dL 4.9* 5.0* 4.7*   ALT U/L 17 21 14   AST U/L 18 30 15   ALBUMIN g/dL 2.1* 2.1* 2.1*     Estimated Creatinine Clearance: 125 mL/min (A) (by C-G formula based on SCr of 0.41 mg/dL (L)).  C-Reactive Protein   Date Value Ref Range Status   11/13/2024 33.57 (H) <1.00 mg/dL Final     Microbiology  Reviewed  Imaging  Reviewed        Assessment/Plan   Respiratory failure / COPD / pneumonia / abscess, MRSA screen is positive, procalcitonin 2.30, sputum with MRSA  NSCLC / Rt bronchus stent / on Taxol / Carboplatin / radiation     Recommendations :  Continue Vancomycin  Discussed with the medical team     I spent minutes in the professional and overall care of this patient.      Krysten Magallon MD

## 2024-11-15 NOTE — DOCUMENTATION CLARIFICATION NOTE
"    PATIENT:               RONALD DAVILA  ACCT #:                  [id number]  MRN:                       [id number]  :                       1950  ADMIT DATE:       2024 10:03 PM  DISCH DATE:  RESPONDING PROVIDER #:        [id number]          PROVIDER RESPONSE TEXT:    Sepsis with hepatic organ dysfunction of transaminitis    CDI QUERY TEXT:    Clarification    Instruction:  Based on your assessment of the patient and the clinical information, please provide the requested documentation by clicking on the appropriate radio button and enter any additional information if prompted.    Question: Is there a diagnosis indicative of a patient meeting SIRS criteria and with organ dysfunction in the setting of gram negative PNA    When answering this query, please exercise your independent professional judgment. The fact that a question is being asked, does not imply that any particular answer is desired or expected.    The patient's clinical indicators include:  Clinical Information: 74 year old male, BMI 19.57, presenting with gram negative PNA in the setting of non small cell lung cancer and COPD.    Clinical Indicators:  Per VS (-11/15):  , 102, 125, 121, 118, 118, 144, 113, 104, 96  RR 24, 29, 28    Per Labs (-11/15):  WBC 3.2, 3.2, 3.2, 4.8  AST 42, 15, 30, 18  CRP 33.57 ()    Per  H/P: \"Multifocal pneumonia\"    per  ID Note: \"Respiratory failure / COPD / pneumonia / abscess\"    Per  IM Note: \"Pneumonia due to gram-negative bacteria...Leukopenia\"    Per  ID Note: \"MRSA screen is positive, procalcitonin 2.30\"    Per 11/15 IM Note: \"Urinary retention\"    Per 11/15 Cardiology Note: \"Acute on chronic hypoxic respiratory failure\"    Treatment:  ID consult, oxygen therapy, Zosyn 4.5g IV q6hrs (-current), Zosyn 3.375g IV x1 (), Vanco 1250mg IV q12hrs (-current), Vanco 1500mg IV q24hrs (), Vanco 1000mg IV x1 (), Solumedrol 125mg IV x1 (), DuoNeb " 0.5-2.5mg nebulizer x1 (11/12), Breo Ellipta 200-25mcg inhaler qDay (11/13-current), Spiriva Respimat 2.5mcg inhaler qDay (11/13-11/15), NS IV bolus 500ml x1 (11/15), NS IV 50ml/hr infusion (11/13), NS IV 125ml/hr infusion (11/12), repeated labs, repeated VS, supportive care.    Risk Factors: 74 year old male being treated for PNA and acute on chronic hypoxic respiratory failure; in the setting of small cell lung cancer and COPD.  Options provided:  -- Sepsis with hepatic organ dysfunction of transaminitis  -- Sepsis with genitourinary organ dysfunction of urinary retention  -- Sepsis with multi-system organ dysfunction of transaminitis and urinary retention  -- Sepsis with other organ dysfunction, Please specify sepsis associated organ dysfunction below  -- Patient treated for gram negative PNA without Sepsis  -- Other - I will add my own diagnosis  -- Refer to Clinical Documentation Reviewer    Query created by: Beena Wisdom on 11/15/2024 11:16 AM      Electronically signed by:  LUIS HORTA MD 11/15/2024 11:24 AM

## 2024-11-15 NOTE — PROGRESS NOTES
Vancomycin Dosing by Pharmacy- FOLLOW UP    Juan Carlos Cr is a 74 y.o. year old male who Pharmacy has been consulted for vancomycin dosing for pneumonia. Based on the patient's indication and renal status this patient is being dosed based on a goal AUC of 400-600.     Renal function is currently stable but very low serum creatinine (0.41)    Current vancomycin dose: 1250 mg given every 12 hours    Estimated vancomycin AUC on current dose: 44 mg/L.hr      Visit Vitals  BP 98/57 (BP Location: Right arm, Patient Position: Lying)   Pulse 96   Temp 37 °C (98.6 °F) (Temporal)   Resp (!) 28        Lab Results   Component Value Date    CREATININE 0.41 (L) 11/15/2024    CREATININE 0.41 (L) 11/15/2024    CREATININE 0.44 (L) 2024    CREATININE 0.54 2024        Patient weight is as follows:   Vitals:    24 2212   Weight: 60.1 kg (132 lb 9.6 oz)       Cultures:  Susceptibility data for the encounter in last 14 days.  Collected Specimen Info Organism   24 Fluid from SPUTUM Staphylococcus aureus        I/O last 3 completed shifts:  In: 2253 (37.5 mL/kg) [P.O.:687; I.V.:766 (12.7 mL/kg); IV Piggyback:800]  Out: 3300 (54.9 mL/kg) [Urine:3300 (1.5 mL/kg/hr)]  Weight: 60.1 kg   I/O during current shift:  I/O this shift:  In: 615 [I.V.:15; IV Piggyback:600]  Out: -     Temp (24hrs), Av.2 °C (98.9 °F), Min:37 °C (98.6 °F), Max:37.5 °C (99.5 °F)      Assessment/Plan    Within goal AUC range. Continue current vancomycin regimen.    This dosing regimen is predicted by [employer] to result in the following pharmacokinetic parameters:  >>>  Loading dose: N/A  Regimen: 1250 mg IV every 12 hours.  Start time: 12:17 on 11/15/2024  Exposure target: AUC24 (range)400-600 mg/L.hr   [id number]: 436 mg/L.hr  AUC24,ss: 443 mg/L.hr  Probability of AUC24 > 400: 76 %  Ctrough,ss: 11.8 mg/L  Probability of Ctrough,ss > 20: 1 %    The next level will be obtained on 2024 at 0500  . May be obtained sooner if  clinically indicated.   Will continue to monitor renal function daily while on vancomycin and order serum creatinine at least every 48 hours if not already ordered.  Follow for continued vancomycin needs, clinical response, and signs/symptoms of toxicity.       Monserrat Fontenot, PharmD

## 2024-11-16 LAB
ALBUMIN SERPL BCP-MCNC: 2.1 G/DL (ref 3.4–5)
ALP SERPL-CCNC: 74 U/L (ref 33–136)
ALT SERPL W P-5'-P-CCNC: 15 U/L (ref 10–52)
ANION GAP SERPL CALC-SCNC: 10 MMOL/L (ref 10–20)
AST SERPL W P-5'-P-CCNC: 15 U/L (ref 9–39)
ATRIAL RATE: 111 BPM
ATRIAL RATE: 118 BPM
BASOPHILS # BLD AUTO: 0.01 X10*3/UL (ref 0–0.1)
BASOPHILS NFR BLD AUTO: 0.2 %
BILIRUB SERPL-MCNC: 0.2 MG/DL (ref 0–1.2)
BUN SERPL-MCNC: 5 MG/DL (ref 6–23)
CALCIUM SERPL-MCNC: 7.2 MG/DL (ref 8.6–10.3)
CHLORIDE SERPL-SCNC: 92 MMOL/L (ref 98–107)
CO2 SERPL-SCNC: 29 MMOL/L (ref 21–32)
CREAT SERPL-MCNC: 0.36 MG/DL (ref 0.5–1.3)
EGFRCR SERPLBLD CKD-EPI 2021: >90 ML/MIN/1.73M*2
EOSINOPHIL # BLD AUTO: 0 X10*3/UL (ref 0–0.4)
EOSINOPHIL NFR BLD AUTO: 0 %
ERYTHROCYTE [DISTWIDTH] IN BLOOD BY AUTOMATED COUNT: 13.9 % (ref 11.5–14.5)
GLUCOSE BLD MANUAL STRIP-MCNC: 121 MG/DL (ref 74–99)
GLUCOSE BLD MANUAL STRIP-MCNC: 128 MG/DL (ref 74–99)
GLUCOSE BLD MANUAL STRIP-MCNC: 145 MG/DL (ref 74–99)
GLUCOSE BLD MANUAL STRIP-MCNC: 182 MG/DL (ref 74–99)
GLUCOSE SERPL-MCNC: 117 MG/DL (ref 74–99)
HCT VFR BLD AUTO: 24.6 % (ref 41–52)
HGB BLD-MCNC: 7.8 G/DL (ref 13.5–17.5)
IMM GRANULOCYTES # BLD AUTO: 0.14 X10*3/UL (ref 0–0.5)
IMM GRANULOCYTES NFR BLD AUTO: 2.2 % (ref 0–0.9)
LYMPHOCYTES # BLD AUTO: 0.22 X10*3/UL (ref 0.8–3)
LYMPHOCYTES NFR BLD AUTO: 3.5 %
MAGNESIUM SERPL-MCNC: 1.67 MG/DL (ref 1.6–2.4)
MCH RBC QN AUTO: 29.4 PG (ref 26–34)
MCHC RBC AUTO-ENTMCNC: 31.7 G/DL (ref 32–36)
MCV RBC AUTO: 93 FL (ref 80–100)
MONOCYTES # BLD AUTO: 0.4 X10*3/UL (ref 0.05–0.8)
MONOCYTES NFR BLD AUTO: 6.4 %
NEUTROPHILS # BLD AUTO: 5.52 X10*3/UL (ref 1.6–5.5)
NEUTROPHILS NFR BLD AUTO: 87.7 %
NRBC BLD-RTO: 0 /100 WBCS (ref 0–0)
P AXIS: 48 DEGREES
P AXIS: 57 DEGREES
P OFFSET: 190 MS
P OFFSET: 195 MS
P ONSET: 139 MS
P ONSET: 143 MS
PLATELET # BLD AUTO: 165 X10*3/UL (ref 150–450)
POTASSIUM SERPL-SCNC: 3.1 MMOL/L (ref 3.5–5.3)
PR INTERVAL: 156 MS
PR INTERVAL: 164 MS
PROT SERPL-MCNC: 4.9 G/DL (ref 6.4–8.2)
Q ONSET: 221 MS
Q ONSET: 221 MS
QRS COUNT: 19 BEATS
QRS COUNT: 20 BEATS
QRS DURATION: 86 MS
QRS DURATION: 98 MS
QT INTERVAL: 320 MS
QT INTERVAL: 340 MS
QTC CALCULATION(BAZETT): 448 MS
QTC CALCULATION(BAZETT): 462 MS
QTC FREDERICIA: 401 MS
QTC FREDERICIA: 417 MS
R AXIS: 12 DEGREES
R AXIS: 28 DEGREES
RBC # BLD AUTO: 2.65 X10*6/UL (ref 4.5–5.9)
SODIUM SERPL-SCNC: 128 MMOL/L (ref 136–145)
T AXIS: 37 DEGREES
T AXIS: 41 DEGREES
T OFFSET: 381 MS
T OFFSET: 391 MS
VENTRICULAR RATE: 111 BPM
VENTRICULAR RATE: 118 BPM
WBC # BLD AUTO: 6.3 X10*3/UL (ref 4.4–11.3)

## 2024-11-16 PROCEDURE — 99233 SBSQ HOSP IP/OBS HIGH 50: CPT | Performed by: STUDENT IN AN ORGANIZED HEALTH CARE EDUCATION/TRAINING PROGRAM

## 2024-11-16 PROCEDURE — 2500000004 HC RX 250 GENERAL PHARMACY W/ HCPCS (ALT 636 FOR OP/ED): Performed by: NURSE PRACTITIONER

## 2024-11-16 PROCEDURE — 84075 ASSAY ALKALINE PHOSPHATASE: CPT | Performed by: STUDENT IN AN ORGANIZED HEALTH CARE EDUCATION/TRAINING PROGRAM

## 2024-11-16 PROCEDURE — 2500000002 HC RX 250 W HCPCS SELF ADMINISTERED DRUGS (ALT 637 FOR MEDICARE OP, ALT 636 FOR OP/ED): Performed by: INTERNAL MEDICINE

## 2024-11-16 PROCEDURE — 2500000005 HC RX 250 GENERAL PHARMACY W/O HCPCS: Performed by: INTERNAL MEDICINE

## 2024-11-16 PROCEDURE — 2500000002 HC RX 250 W HCPCS SELF ADMINISTERED DRUGS (ALT 637 FOR MEDICARE OP, ALT 636 FOR OP/ED): Performed by: NURSE PRACTITIONER

## 2024-11-16 PROCEDURE — 83735 ASSAY OF MAGNESIUM: CPT | Performed by: STUDENT IN AN ORGANIZED HEALTH CARE EDUCATION/TRAINING PROGRAM

## 2024-11-16 PROCEDURE — 2500000002 HC RX 250 W HCPCS SELF ADMINISTERED DRUGS (ALT 637 FOR MEDICARE OP, ALT 636 FOR OP/ED): Performed by: STUDENT IN AN ORGANIZED HEALTH CARE EDUCATION/TRAINING PROGRAM

## 2024-11-16 PROCEDURE — 2500000004 HC RX 250 GENERAL PHARMACY W/ HCPCS (ALT 636 FOR OP/ED): Performed by: STUDENT IN AN ORGANIZED HEALTH CARE EDUCATION/TRAINING PROGRAM

## 2024-11-16 PROCEDURE — 2060000001 HC INTERMEDIATE ICU ROOM DAILY

## 2024-11-16 PROCEDURE — 85025 COMPLETE CBC W/AUTO DIFF WBC: CPT | Performed by: STUDENT IN AN ORGANIZED HEALTH CARE EDUCATION/TRAINING PROGRAM

## 2024-11-16 PROCEDURE — 99232 SBSQ HOSP IP/OBS MODERATE 35: CPT | Performed by: NURSE PRACTITIONER

## 2024-11-16 PROCEDURE — 2500000001 HC RX 250 WO HCPCS SELF ADMINISTERED DRUGS (ALT 637 FOR MEDICARE OP): Performed by: INTERNAL MEDICINE

## 2024-11-16 PROCEDURE — 94760 N-INVAS EAR/PLS OXIMETRY 1: CPT

## 2024-11-16 PROCEDURE — 2500000005 HC RX 250 GENERAL PHARMACY W/O HCPCS: Performed by: STUDENT IN AN ORGANIZED HEALTH CARE EDUCATION/TRAINING PROGRAM

## 2024-11-16 PROCEDURE — 82947 ASSAY GLUCOSE BLOOD QUANT: CPT

## 2024-11-16 RX ORDER — AMIODARONE HYDROCHLORIDE 200 MG/1
400 TABLET ORAL 2 TIMES DAILY
Status: COMPLETED | OUTPATIENT
Start: 2024-11-16 | End: 2024-11-23

## 2024-11-16 RX ORDER — POTASSIUM CHLORIDE 20 MEQ/1
40 TABLET, EXTENDED RELEASE ORAL ONCE
Status: COMPLETED | OUTPATIENT
Start: 2024-11-16 | End: 2024-11-16

## 2024-11-16 RX ORDER — AMIODARONE HYDROCHLORIDE 200 MG/1
200 TABLET ORAL DAILY
Status: DISCONTINUED | OUTPATIENT
Start: 2024-11-24 | End: 2024-11-27 | Stop reason: HOSPADM

## 2024-11-16 ASSESSMENT — COGNITIVE AND FUNCTIONAL STATUS - GENERAL
STANDING UP FROM CHAIR USING ARMS: A LITTLE
PERSONAL GROOMING: A LITTLE
PERSONAL GROOMING: A LITTLE
CLIMB 3 TO 5 STEPS WITH RAILING: A LOT
TURNING FROM BACK TO SIDE WHILE IN FLAT BAD: A LITTLE
DAILY ACTIVITIY SCORE: 19
CLIMB 3 TO 5 STEPS WITH RAILING: A LOT
HELP NEEDED FOR BATHING: A LITTLE
DRESSING REGULAR UPPER BODY CLOTHING: A LITTLE
TOILETING: A LITTLE
DAILY ACTIVITIY SCORE: 19
DRESSING REGULAR UPPER BODY CLOTHING: A LITTLE
TURNING FROM BACK TO SIDE WHILE IN FLAT BAD: A LITTLE
WALKING IN HOSPITAL ROOM: A LITTLE
MOBILITY SCORE: 17
STANDING UP FROM CHAIR USING ARMS: A LITTLE
DRESSING REGULAR LOWER BODY CLOTHING: A LITTLE
HELP NEEDED FOR BATHING: A LITTLE
WALKING IN HOSPITAL ROOM: A LITTLE
DRESSING REGULAR LOWER BODY CLOTHING: A LITTLE
MOBILITY SCORE: 17
MOVING FROM LYING ON BACK TO SITTING ON SIDE OF FLAT BED WITH BEDRAILS: A LITTLE
MOVING TO AND FROM BED TO CHAIR: A LITTLE
MOVING FROM LYING ON BACK TO SITTING ON SIDE OF FLAT BED WITH BEDRAILS: A LITTLE
MOVING TO AND FROM BED TO CHAIR: A LITTLE
TOILETING: A LITTLE

## 2024-11-16 ASSESSMENT — PAIN SCALES - GENERAL: PAINLEVEL_OUTOF10: 0 - NO PAIN

## 2024-11-16 ASSESSMENT — PAIN - FUNCTIONAL ASSESSMENT: PAIN_FUNCTIONAL_ASSESSMENT: 0-10

## 2024-11-16 NOTE — PROGRESS NOTES
Subjective Data:  Converted to sinus rhythm        Objective Data:  Last Recorded Vitals:  Vitals:    11/15/24 1930 11/15/24 1937 11/16/24 0600 11/16/24 1027   BP: 118/62  122/69 110/61   BP Location: Right arm  Right arm Right arm   Patient Position: Lying  Lying Lying   Pulse:   105 108   Resp: (!) 29   20   Temp: 37.6 °C (99.7 °F)  37.7 °C (99.9 °F) 36.4 °C (97.5 °F)   TempSrc: Temporal  Temporal Temporal   SpO2:  91%  93%   Weight:       Height:           Last Labs:  CBC - 11/16/2024:  5:46 AM  6.3 7.8 165    24.6      CMP - 11/16/2024:  5:46 AM  7.2 4.9 15 --- 0.2   2.7 2.1 15 74      PTT - No results in last year.  1.2   13.4 _     TROPHS   Date/Time Value Ref Range Status   11/15/2024 12:40 PM 14 0 - 20 ng/L Final   11/15/2024 09:51 AM 11 0 - 20 ng/L Final   11/15/2024 07:06 AM 11 0 - 20 ng/L Final     BNP   Date/Time Value Ref Range Status   11/12/2024 02:50  0 - 99 pg/mL Final   10/16/2024 10:31  0 - 99 pg/mL Final     HGBA1C   Date/Time Value Ref Range Status   08/26/2024 04:50 PM 7.6 see below % Final   09/25/2020 10:18 AM 5.7 % Final     Comment:          Diagnosis of Diabetes-Adults   Non-Diabetic: < or = 5.6%   Increased risk for developing diabetes: 5.7-6.4%   Diagnostic of diabetes: > or = 6.5%  .       Monitoring of Diabetes                Age (y)     Therapeutic Goal (%)   Adults:          >18           <7.0   Pediatrics:    13-18           <7.5                   7-12           <8.0                   0- 6            7.5-8.5   American Diabetes Association. Diabetes Care 33(S1), Jan 2010.       LDLCALC   Date/Time Value Ref Range Status   08/28/2024 07:28 AM 63 <=99 mg/dL Final     Comment:                                 Near   Borderline      AGE      Desirable  Optimal    High     High     Very High     0-19 Y     0 - 109     ---    110-129   >/= 130     ----    20-24 Y     0 - 119     ---    120-159   >/= 160     ----      >24 Y     0 -  99   100-129  130-159   160-189     >/=190        VLDL   Date/Time Value Ref Range Status   08/28/2024 07:28 AM 19 0 - 40 mg/dL Final   09/30/2022 12:02 PM 16 0 - 40 mg/dL Final   09/24/2021 11:16 AM 21 0 - 40 mg/dL Final      Last I/O:  I/O last 3 completed shifts:  In: 1833.2 (30.5 mL/kg) [P.O.:30; I.V.:153.2 (2.5 mL/kg); IV Piggyback:1650]  Out: 2100 (34.9 mL/kg) [Urine:2100 (1 mL/kg/hr)]  Weight: 60.1 kg     Past Cardiology Tests (Last 3 Years):  Echo:  Transthoracic Echo (TTE) Complete 08/29/2024  CONCLUSIONS:   1. Poorly visualized anatomical structures due to suboptimal image quality.   2. Left ventricular ejection fraction is moderately decreased, by visual estimate at 35-40%.   3. There is global hypokinesis of the left ventricle with minor regional variations.   4. Left ventricular diastolic filling was indeterminate.   5. There is normal right ventricular global systolic function.   6. The left atrium is mildly dilated.   7. Mild aortic valve regurgitation.         Inpatient Medications:  Scheduled medications   Medication Dose Route Frequency    amiodarone  400 mg oral BID    Followed by    [START ON 11/24/2024] amiodarone  200 mg oral Daily    ascorbic acid  500 mg oral Daily    aspirin  81 mg oral Daily    cholecalciferol  800 Units oral Daily    fluticasone furoate-vilanteroL  1 puff inhalation Daily    insulin lispro  0-10 Units subcutaneous Before meals & nightly    [Held by provider] losartan  25 mg oral Daily    [Held by provider] metFORMIN  500 mg oral BID    [Held by provider] metoprolol succinate XL  50 mg oral Daily    mupirocin   Each Nostril TID    oxygen   inhalation Continuous - Inhalation    pantoprazole  40 mg oral Daily    rivaroxaban  20 mg oral Daily with evening meal    rosuvastatin  20 mg oral Daily    tamsulosin  0.4 mg oral Daily    tiotropium  2 puff inhalation Daily    vancomycin  1,500 mg intravenous q12h    zinc oxide  1 Application Topical TID     PRN medications   Medication    benzonatate    dextrose    dextrose     glucagon    glucagon    prochlorperazine    vancomycin     Continuous Medications   Medication Dose Last Rate       Physical Exam:  Constitutional: Pleasant, Awake/Alert/Oriented to person place and time.  Head: Atraumatic, Normocephalic  Eyes: EOMI. ANDRES  Neck:No JVD  Cardiovascular: Regular rate and rhythm, S1, S2. No extra heart sounds or murmurs  Respiratory: coarse rhonchi noted   Abdomen: Soft, Nontender, Obese. Bowel sounds appreciated  Musculoskeletal: ROM intact. Muscle strength grossly intact upper and lower extremities 5/5.   Neurological: CNII-XII intact. Sensation grossly intact  Extremities: Warm and dry. No acute rashes and lesions  Psychiatric: Appropriate mood and affect       Assessment/Plan   74 y.o. male from home with h/o SCLC RUL diagnosed 8/2024 currently receiving chemoradiation therapy, right lung collapse s/p EBUS with mass debulking and bronchial stent 8/2024, COPD on home O2 2-3L, type 2 DM, htn, CAD with remote PCI 1997, PAF (diagnosed 8/2024 in setting of newly identified lung cancer) on Xarelto, HFrEF (EF 35-40% 8/2024) presenting to the ER with 3-4 days of progressive dyspnea with cough     Assessment:  Acute on chronic hypoxic respiratory failure  Right lung pneumonia  Possible lung abscess (left lung cavitated LN)  Paroxysmal a.fib RVR-- back in NSR/ST   HFrEF, not acutely decompensated    Plan:  -Transition to PO amiodarone 400mg BID x10 days, then 200mg daily  -Continue Xarelto 20mg daily  -Keep K+ >4 and Mag >2   -Toprol and losartan remain held to avoid hypotension-- will look to resume   -Continue asa and crestor     Peripheral IV 11/12/24 Left Forearm (Active)   Site Assessment Clean;Dry;Intact 11/16/24 0854   Dressing Status Clean;Dry 11/16/24 0854   Number of days: 4       Peripheral IV 11/15/24 22 G Right;Anterior Forearm (Active)   Site Assessment Clean;Dry;Intact 11/16/24 0854   Dressing Status Clean;Dry 11/16/24 0854   Number of days: 1       Implantable Port 10/15/24  Right Chest Single lumen port (Active)   Line Necessity No, provider contacted 11/16/24 1100   Number of days: 32       Urethral Catheter Coude 14 Fr. (Active)   Reason for Continuing Urinary Catheterization acute urinary retention 11/16/24 0900   Number of days: 3       Code Status:  Full Code      STONE Hammond-CNP

## 2024-11-16 NOTE — CARE PLAN
The clinical goals for the shift include Patient will convert to NSR throughout shift.      Problem: Nutrition  Goal: Oral intake greater than 50%  Outcome: Progressing     Problem: Safety - Adult  Goal: Free from fall injury  Outcome: Progressing     Problem: Discharge Planning  Goal: Discharge to home or other facility with appropriate resources  Outcome: Progressing     Problem: Skin  Goal: Participates in plan/prevention/treatment measures  Outcome: Progressing     Problem: Fall/Injury  Goal: Not fall by end of shift  Outcome: Progressing

## 2024-11-16 NOTE — PROGRESS NOTES
"Juan Carlos Cr is a 74 y.o. male on day 4 of admission presenting with Pneumonia due to gram-negative bacteria (Multi).    Subjective   Pt continues to have intermittent A fib RVR       Objective     Physical Exam  Vitals and nursing note reviewed.   Constitutional:       General: He is not in acute distress.     Appearance: He is ill-appearing. He is not toxic-appearing.   HENT:      Head: Normocephalic and atraumatic.      Nose: Nose normal.      Mouth/Throat:      Mouth: Mucous membranes are moist.   Eyes:      Extraocular Movements: Extraocular movements intact.      Conjunctiva/sclera: Conjunctivae normal.      Pupils: Pupils are equal, round, and reactive to light.   Cardiovascular:      Rate and Rhythm: Normal rate. Rhythm irregular.      Heart sounds: No murmur heard.     No gallop.   Pulmonary:      Effort: Pulmonary effort is normal. No respiratory distress.      Breath sounds: Wheezing and rhonchi present. No rales.   Abdominal:      General: Abdomen is flat. Bowel sounds are normal. There is no distension.      Palpations: Abdomen is soft. There is no mass.      Tenderness: There is no abdominal tenderness.   Musculoskeletal:         General: No swelling or tenderness. Normal range of motion.      Cervical back: Normal range of motion and neck supple.      Right lower leg: Edema present.      Left lower leg: Edema present.   Skin:     General: Skin is warm and dry.   Neurological:      Mental Status: He is alert and oriented to person, place, and time.      Motor: Weakness present.   Psychiatric:         Mood and Affect: Mood normal.         Behavior: Behavior normal.         Thought Content: Thought content normal.         Judgment: Judgment normal.         Last Recorded Vitals:  /69 (BP Location: Right arm, Patient Position: Lying)   Pulse 105   Temp 37.7 °C (99.9 °F) (Temporal)   Resp (!) 29   Ht 1.753 m (5' 9\")   Wt 60.1 kg (132 lb 9.6 oz)   SpO2 91%   BMI 19.58 kg/m²      Scheduled " medications:  ascorbic acid, 500 mg, oral, Daily  aspirin, 81 mg, oral, Daily  cholecalciferol, 800 Units, oral, Daily  fluticasone furoate-vilanteroL, 1 puff, inhalation, Daily  insulin lispro, 0-10 Units, subcutaneous, Before meals & nightly  [Held by provider] losartan, 25 mg, oral, Daily  [Held by provider] metFORMIN, 500 mg, oral, BID  [Held by provider] metoprolol succinate XL, 50 mg, oral, Daily  mupirocin, , Each Nostril, TID  oxygen, , inhalation, Continuous - Inhalation  pantoprazole, 40 mg, oral, Daily  potassium chloride CR, 40 mEq, oral, Once  rivaroxaban, 20 mg, oral, Daily with evening meal  rosuvastatin, 20 mg, oral, Daily  tamsulosin, 0.4 mg, oral, Daily  tiotropium, 2 puff, inhalation, Daily  vancomycin, 1,500 mg, intravenous, q12h  zinc oxide, 1 Application, Topical, TID      Continuous medications:  amiodarone, 0.5 mg/min, Last Rate: 0.5 mg/min (11/16/24 0314)      PRN medications:  PRN medications: benzonatate, dextrose, dextrose, glucagon, glucagon, prochlorperazine, vancomycin     Relevant Results:  Results for orders placed or performed during the hospital encounter of 11/12/24 (from the past 24 hours)   Troponin I, High Sensitivity   Result Value Ref Range    Troponin I, High Sensitivity 11 0 - 20 ng/L   POCT GLUCOSE   Result Value Ref Range    POCT Glucose 143 (H) 74 - 99 mg/dL   Troponin I, High Sensitivity   Result Value Ref Range    Troponin I, High Sensitivity 14 0 - 20 ng/L   POCT GLUCOSE   Result Value Ref Range    POCT Glucose 163 (H) 74 - 99 mg/dL   POCT GLUCOSE   Result Value Ref Range    POCT Glucose 124 (H) 74 - 99 mg/dL   Comprehensive Metabolic Panel   Result Value Ref Range    Glucose 117 (H) 74 - 99 mg/dL    Sodium 128 (L) 136 - 145 mmol/L    Potassium 3.1 (L) 3.5 - 5.3 mmol/L    Chloride 92 (L) 98 - 107 mmol/L    Bicarbonate 29 21 - 32 mmol/L    Anion Gap 10 10 - 20 mmol/L    Urea Nitrogen 5 (L) 6 - 23 mg/dL    Creatinine 0.36 (L) 0.50 - 1.30 mg/dL    eGFR >90 >60  mL/min/1.73m*2    Calcium 7.2 (L) 8.6 - 10.3 mg/dL    Albumin 2.1 (L) 3.4 - 5.0 g/dL    Alkaline Phosphatase 74 33 - 136 U/L    Total Protein 4.9 (L) 6.4 - 8.2 g/dL    AST 15 9 - 39 U/L    Bilirubin, Total 0.2 0.0 - 1.2 mg/dL    ALT 15 10 - 52 U/L   CBC and Auto Differential   Result Value Ref Range    WBC 6.3 4.4 - 11.3 x10*3/uL    nRBC 0.0 0.0 - 0.0 /100 WBCs    RBC 2.65 (L) 4.50 - 5.90 x10*6/uL    Hemoglobin 7.8 (L) 13.5 - 17.5 g/dL    Hematocrit 24.6 (L) 41.0 - 52.0 %    MCV 93 80 - 100 fL    MCH 29.4 26.0 - 34.0 pg    MCHC 31.7 (L) 32.0 - 36.0 g/dL    RDW 13.9 11.5 - 14.5 %    Platelets 165 150 - 450 x10*3/uL    Neutrophils % 87.7 40.0 - 80.0 %    Immature Granulocytes %, Automated 2.2 (H) 0.0 - 0.9 %    Lymphocytes % 3.5 13.0 - 44.0 %    Monocytes % 6.4 2.0 - 10.0 %    Eosinophils % 0.0 0.0 - 6.0 %    Basophils % 0.2 0.0 - 2.0 %    Neutrophils Absolute 5.52 (H) 1.60 - 5.50 x10*3/uL    Immature Granulocytes Absolute, Automated 0.14 0.00 - 0.50 x10*3/uL    Lymphocytes Absolute 0.22 (L) 0.80 - 3.00 x10*3/uL    Monocytes Absolute 0.40 0.05 - 0.80 x10*3/uL    Eosinophils Absolute 0.00 0.00 - 0.40 x10*3/uL    Basophils Absolute 0.01 0.00 - 0.10 x10*3/uL   Magnesium   Result Value Ref Range    Magnesium 1.67 1.60 - 2.40 mg/dL   POCT GLUCOSE   Result Value Ref Range    POCT Glucose 121 (H) 74 - 99 mg/dL       Electrocardiogram, 12-lead PRN ACS symptoms    Result Date: 11/15/2024  Atrial fibrillation with rapid ventricular response with premature ventricular or aberrantly conducted complexes Nonspecific ST and T wave abnormality Abnormal ECG When compared with ECG of 12-NOV-2024 15:52, (unconfirmed) Atrial fibrillation has replaced Sinus rhythm ST now depressed in Lateral leads Nonspecific T wave abnormality now evident in Inferior leads Nonspecific T wave abnormality now evident in Lateral leads              Malnutrition Diagnosis Status: New  Malnutrition Diagnosis: Severe malnutrition related to chronic disease or  condition  As Evidenced by: significant weight loss of 20%/3 months, severe loss of muscle and adipose stores  I agree with the dietitian's malnutrition diagnosis.        Assessment/Plan   Principal Problem:    Pneumonia due to gram-negative bacteria (Multi)    Multifocal pneumonia  CT chest: right upper lobe mass with central necrosis, multifocal pneumonia, new left lung base cavitated nodule  - on home 3L NC  - ID following  - vanc  - Spiriva    A fib RVR  Required digoxin, Heart rate did improve slightly  - cardio following  - amio gtt  - xarelto    Urinary retention  - german  - flomax  - can try voiding trial prior to dc     HypoK  Replace    HypoMg  - resolved    Normocytic anemia  - monitor     Leukopenia  resolved     Non small cell lung ca  Currently on chemo and XRT  Oncology follow up on discharge     HTN  - hold BP meds due to low BP     HLD   - statin  - ASA     DM  - hold metformin  - ISS     GERD  - protonix     Code status: Full, palliative recs appreciated  Dispo: monitor clinically          Tiffanie Viera MD  Hospitalist

## 2024-11-16 NOTE — PROGRESS NOTES
Vancomycin Dosing by Pharmacy- FOLLOW UP    Juan Carlos Cr is a 74 y.o. year old male who Pharmacy has been consulted for vancomycin dosing for pneumonia. Based on the patient's indication and renal status this patient is being dosed based on a goal AUC of 400-600.     Renal function is currently stable. Scr is very low and continues to go down  (0.36)    Current vancomycin dose: 1250 mg given every 12 hours    Estimated vancomycin AUC on current dose: 402 mg/L.hr     Visit Vitals  /69 (BP Location: Right arm, Patient Position: Lying)   Pulse 105   Temp 37.7 °C (99.9 °F) (Temporal)   Resp (!) 29        Lab Results   Component Value Date    CREATININE 0.36 (L) 2024    CREATININE 0.41 (L) 11/15/2024    CREATININE 0.41 (L) 11/15/2024    CREATININE 0.44 (L) 2024        Patient weight is as follows:   Vitals:    24 2212   Weight: 60.1 kg (132 lb 9.6 oz)       Cultures:  Susceptibility data for the encounter in last 14 days.  Collected Specimen Info Organism Clindamycin Erythromycin Oxacillin Tetracycline Trimethoprim/Sulfamethoxazole Vancomycin   24 Fluid from SPUTUM Methicillin Resistant Staphylococcus aureus (MRSA)  S  R  R  S  S  S        I/O last 3 completed shifts:  In: 1833.2 (30.5 mL/kg) [P.O.:30; I.V.:153.2 (2.5 mL/kg); IV Piggyback:1650]  Out: 2100 (34.9 mL/kg) [Urine:2100 (1 mL/kg/hr)]  Weight: 60.1 kg   I/O during current shift:  No intake/output data recorded.    Temp (24hrs), Av.3 °C (99.2 °F), Min:37 °C (98.6 °F), Max:37.7 °C (99.9 °F)      Assessment/Plan    Below goal AUC. Orders placed for new vancomcyin regimen of 1500 every 12 hours to begin at 1100 due to treating pneumonia.     This dosing regimen is predicted by [employer] to result in the following pharmacokinetic parameters:  Loading dose: N/A  Regimen: 1500 mg IV every 12 hours.  Start time: 11:31 on 2024  Exposure target: AUC24 (range)400-600 mg/L.hr   [id number]: 476 mg/L.hr  AUC24,ss: 482  mg/L.hr  Probability of AUC24 > 400: 90 %  Ctrough,ss: 12.3 mg/L  Probability of Ctrough,ss > 20: 1 %  >>>>>  The next level will be obtained on 11/17 at 0500. May be obtained sooner if clinically indicated.   Will continue to monitor renal function daily while on vancomycin and order serum creatinine at least every 48 hours if not already ordered.  Follow for continued vancomycin needs, clinical response, and signs/symptoms of toxicity.       Monserrat Fontenot, PharmD

## 2024-11-16 NOTE — CARE PLAN
Problem: Nutrition  Goal: Less than 5 days NPO/clear liquids  Outcome: Progressing  Goal: Oral intake greater than 50%  Outcome: Progressing  Goal: Oral intake greater 75%  Outcome: Progressing  Goal: Consume prescribed supplement  Outcome: Progressing  Goal: Adequate PO fluid intake  Outcome: Progressing  Goal: Nutrition support goals are met within 48 hrs  Outcome: Progressing  Goal: Nutrition support is meeting 75% of nutrient needs  Outcome: Progressing  Goal: Tube feed tolerance  Outcome: Progressing  Goal: BG  mg/dL  Outcome: Progressing  Goal: Lab values WNL  Outcome: Progressing  Goal: Electrolytes WNL  Outcome: Progressing  Goal: Promote healing  Outcome: Progressing  Goal: Maintain stable weight  Outcome: Progressing  Goal: Reduce weight from edema/fluid  Outcome: Progressing  Goal: Gradual weight gain  Outcome: Progressing  Goal: Improve ostomy output  Outcome: Progressing

## 2024-11-16 NOTE — PROGRESS NOTES
Juan Carlos Cr is a 74 y.o. male on day 4 of admission presenting with Pneumonia due to gram-negative bacteria (Multi).    Subjective   Interval History: no fever, no new complaints,       Review of Systems    Objective   Range of Vitals (last 24 hours)  Heart Rate:  [105-115]   Temp:  [36.4 °C (97.5 °F)-37.7 °C (99.9 °F)]   Resp:  [20-29]   BP: (102-122)/(61-69)   SpO2:  [90 %-95 %]   Daily Weight  11/12/24 : 60.1 kg (132 lb 9.6 oz)    Body mass index is 19.58 kg/m².    Physical Exam  Constitutional:       Appearance: Normal appearance.   HENT:      Head: Normocephalic and atraumatic.      Mouth/Throat:      Mouth: Mucous membranes are moist.      Pharynx: Oropharynx is clear.   Eyes:      Pupils: Pupils are equal, round, and reactive to light.   Cardiovascular:      Rate and Rhythm: Normal rate and regular rhythm.      Heart sounds: Normal heart sounds.   Pulmonary:      Effort: Pulmonary effort is normal.      Breath sounds: Normal breath sounds.   Abdominal:      General: Abdomen is flat. Bowel sounds are normal.      Palpations: Abdomen is soft.   Musculoskeletal:      Cervical back: Normal range of motion.   Neurological:      Mental Status: He is alert.         Antibiotics  mupirocin - 2 %  vancomycin - 1500 mg/500 mL    Relevant Results  Labs  Results from last 72 hours   Lab Units 11/16/24  0546 11/15/24  0706 11/14/24  0635   WBC AUTO x10*3/uL 6.3 4.8 3.2*   HEMOGLOBIN g/dL 7.8* 7.6* 7.5*   HEMATOCRIT % 24.6* 24.1* 24.7*   PLATELETS AUTO x10*3/uL 165 156 155   NEUTROS PCT AUTO % 87.7 87.5 82.9   LYMPHS PCT AUTO % 3.5 3.1 5.4   MONOS PCT AUTO % 6.4 7.7 9.8   EOS PCT AUTO % 0.0 0.2 0.0     Results from last 72 hours   Lab Units 11/16/24  0546 11/15/24  0706 11/14/24  0635   SODIUM mmol/L 128* 129*  129* 130*   POTASSIUM mmol/L 3.1* 2.8*  2.8* 3.5   CHLORIDE mmol/L 92* 93*  93* 96*   CO2 mmol/L 29 30  30 26   BUN mg/dL 5* 8  8 14   CREATININE mg/dL 0.36* 0.41*  0.41* 0.44*   GLUCOSE mg/dL 117* 98  98  122*   CALCIUM mg/dL 7.2* 7.3*  7.3* 7.3*   ANION GAP mmol/L 10 9*  9* 12   EGFR mL/min/1.73m*2 >90 >90  >90 >90     Results from last 72 hours   Lab Units 11/16/24  0546 11/15/24  0706 11/14/24  0635   ALK PHOS U/L 74 70 73   BILIRUBIN TOTAL mg/dL 0.2 0.2 0.2   PROTEIN TOTAL g/dL 4.9* 4.9* 5.0*   ALT U/L 15 17 21   AST U/L 15 18 30   ALBUMIN g/dL 2.1* 2.1* 2.1*     Estimated Creatinine Clearance: 125 mL/min (A) (by C-G formula based on SCr of 0.36 mg/dL (L)).  C-Reactive Protein   Date Value Ref Range Status   11/13/2024 33.57 (H) <1.00 mg/dL Final     Microbiology  Reviewed  Imaging  Reviewed        Assessment/Plan   Respiratory failure / COPD / pneumonia / abscess, MRSA screen is positive, procalcitonin 2.30, sputum with MRSA  NSCLC / Rt bronchus stent / on Taxol / Carboplatin / radiation     Recommendations :  Continue Vancomycin  Chest PT  Discussed with the medical team     I spent minutes in the professional and overall care of this patient.      Krysten Magallon MD

## 2024-11-17 VITALS
RESPIRATION RATE: 20 BRPM | HEIGHT: 69 IN | OXYGEN SATURATION: 96 % | TEMPERATURE: 98.6 F | BODY MASS INDEX: 19.64 KG/M2 | WEIGHT: 132.6 LBS | SYSTOLIC BLOOD PRESSURE: 106 MMHG | DIASTOLIC BLOOD PRESSURE: 55 MMHG | HEART RATE: 102 BPM

## 2024-11-17 LAB
ALBUMIN SERPL BCP-MCNC: 2 G/DL (ref 3.4–5)
ALP SERPL-CCNC: 78 U/L (ref 33–136)
ALT SERPL W P-5'-P-CCNC: 15 U/L (ref 10–52)
ANION GAP SERPL CALC-SCNC: 8 MMOL/L (ref 10–20)
AST SERPL W P-5'-P-CCNC: 16 U/L (ref 9–39)
BACTERIA BLD CULT: NORMAL
BACTERIA BLD CULT: NORMAL
BASOPHILS # BLD AUTO: 0.01 X10*3/UL (ref 0–0.1)
BASOPHILS NFR BLD AUTO: 0.1 %
BILIRUB SERPL-MCNC: 0.3 MG/DL (ref 0–1.2)
BUN SERPL-MCNC: 4 MG/DL (ref 6–23)
CALCIUM SERPL-MCNC: 7.1 MG/DL (ref 8.6–10.3)
CHLORIDE SERPL-SCNC: 90 MMOL/L (ref 98–107)
CO2 SERPL-SCNC: 32 MMOL/L (ref 21–32)
CREAT SERPL-MCNC: 0.38 MG/DL (ref 0.5–1.3)
EGFRCR SERPLBLD CKD-EPI 2021: >90 ML/MIN/1.73M*2
EOSINOPHIL # BLD AUTO: 0.01 X10*3/UL (ref 0–0.4)
EOSINOPHIL NFR BLD AUTO: 0.1 %
ERYTHROCYTE [DISTWIDTH] IN BLOOD BY AUTOMATED COUNT: 13.9 % (ref 11.5–14.5)
GLUCOSE BLD MANUAL STRIP-MCNC: 120 MG/DL (ref 74–99)
GLUCOSE BLD MANUAL STRIP-MCNC: 126 MG/DL (ref 74–99)
GLUCOSE BLD MANUAL STRIP-MCNC: 141 MG/DL (ref 74–99)
GLUCOSE BLD MANUAL STRIP-MCNC: 96 MG/DL (ref 74–99)
GLUCOSE SERPL-MCNC: 122 MG/DL (ref 74–99)
HCT VFR BLD AUTO: 23.8 % (ref 41–52)
HGB BLD-MCNC: 7.6 G/DL (ref 13.5–17.5)
IMM GRANULOCYTES # BLD AUTO: 0.18 X10*3/UL (ref 0–0.5)
IMM GRANULOCYTES NFR BLD AUTO: 2.1 % (ref 0–0.9)
LYMPHOCYTES # BLD AUTO: 0.24 X10*3/UL (ref 0.8–3)
LYMPHOCYTES NFR BLD AUTO: 2.8 %
MCH RBC QN AUTO: 29.5 PG (ref 26–34)
MCHC RBC AUTO-ENTMCNC: 31.9 G/DL (ref 32–36)
MCV RBC AUTO: 92 FL (ref 80–100)
MONOCYTES # BLD AUTO: 0.53 X10*3/UL (ref 0.05–0.8)
MONOCYTES NFR BLD AUTO: 6.3 %
NEUTROPHILS # BLD AUTO: 7.51 X10*3/UL (ref 1.6–5.5)
NEUTROPHILS NFR BLD AUTO: 88.6 %
NRBC BLD-RTO: 0 /100 WBCS (ref 0–0)
PLATELET # BLD AUTO: 175 X10*3/UL (ref 150–450)
POTASSIUM SERPL-SCNC: 3.2 MMOL/L (ref 3.5–5.3)
PROT SERPL-MCNC: 4.9 G/DL (ref 6.4–8.2)
RBC # BLD AUTO: 2.58 X10*6/UL (ref 4.5–5.9)
SODIUM SERPL-SCNC: 127 MMOL/L (ref 136–145)
VANCOMYCIN SERPL-MCNC: 20.7 UG/ML (ref 5–20)
WBC # BLD AUTO: 8.5 X10*3/UL (ref 4.4–11.3)

## 2024-11-17 PROCEDURE — 94669 MECHANICAL CHEST WALL OSCILL: CPT

## 2024-11-17 PROCEDURE — 2500000002 HC RX 250 W HCPCS SELF ADMINISTERED DRUGS (ALT 637 FOR MEDICARE OP, ALT 636 FOR OP/ED): Performed by: NURSE PRACTITIONER

## 2024-11-17 PROCEDURE — 94760 N-INVAS EAR/PLS OXIMETRY 1: CPT

## 2024-11-17 PROCEDURE — 99232 SBSQ HOSP IP/OBS MODERATE 35: CPT | Performed by: STUDENT IN AN ORGANIZED HEALTH CARE EDUCATION/TRAINING PROGRAM

## 2024-11-17 PROCEDURE — 85025 COMPLETE CBC W/AUTO DIFF WBC: CPT | Performed by: STUDENT IN AN ORGANIZED HEALTH CARE EDUCATION/TRAINING PROGRAM

## 2024-11-17 PROCEDURE — 2500000005 HC RX 250 GENERAL PHARMACY W/O HCPCS: Performed by: STUDENT IN AN ORGANIZED HEALTH CARE EDUCATION/TRAINING PROGRAM

## 2024-11-17 PROCEDURE — 2500000001 HC RX 250 WO HCPCS SELF ADMINISTERED DRUGS (ALT 637 FOR MEDICARE OP): Performed by: INTERNAL MEDICINE

## 2024-11-17 PROCEDURE — 99232 SBSQ HOSP IP/OBS MODERATE 35: CPT | Performed by: NURSE PRACTITIONER

## 2024-11-17 PROCEDURE — 2500000001 HC RX 250 WO HCPCS SELF ADMINISTERED DRUGS (ALT 637 FOR MEDICARE OP): Performed by: STUDENT IN AN ORGANIZED HEALTH CARE EDUCATION/TRAINING PROGRAM

## 2024-11-17 PROCEDURE — 2500000001 HC RX 250 WO HCPCS SELF ADMINISTERED DRUGS (ALT 637 FOR MEDICARE OP): Performed by: NURSE PRACTITIONER

## 2024-11-17 PROCEDURE — 2500000002 HC RX 250 W HCPCS SELF ADMINISTERED DRUGS (ALT 637 FOR MEDICARE OP, ALT 636 FOR OP/ED): Performed by: INTERNAL MEDICINE

## 2024-11-17 PROCEDURE — 82947 ASSAY GLUCOSE BLOOD QUANT: CPT

## 2024-11-17 PROCEDURE — 2500000002 HC RX 250 W HCPCS SELF ADMINISTERED DRUGS (ALT 637 FOR MEDICARE OP, ALT 636 FOR OP/ED): Performed by: STUDENT IN AN ORGANIZED HEALTH CARE EDUCATION/TRAINING PROGRAM

## 2024-11-17 PROCEDURE — 80202 ASSAY OF VANCOMYCIN: CPT | Performed by: STUDENT IN AN ORGANIZED HEALTH CARE EDUCATION/TRAINING PROGRAM

## 2024-11-17 PROCEDURE — 2500000004 HC RX 250 GENERAL PHARMACY W/ HCPCS (ALT 636 FOR OP/ED): Performed by: STUDENT IN AN ORGANIZED HEALTH CARE EDUCATION/TRAINING PROGRAM

## 2024-11-17 PROCEDURE — 2060000001 HC INTERMEDIATE ICU ROOM DAILY

## 2024-11-17 PROCEDURE — 84075 ASSAY ALKALINE PHOSPHATASE: CPT | Performed by: STUDENT IN AN ORGANIZED HEALTH CARE EDUCATION/TRAINING PROGRAM

## 2024-11-17 RX ORDER — TAMSULOSIN HYDROCHLORIDE 0.4 MG/1
0.4 CAPSULE ORAL DAILY
Qty: 30 CAPSULE | Refills: 0 | Status: SHIPPED | OUTPATIENT
Start: 2024-11-18 | End: 2024-12-03

## 2024-11-17 RX ORDER — AMIODARONE HYDROCHLORIDE 200 MG/1
TABLET ORAL
Qty: 58 TABLET | Refills: 0 | Status: SHIPPED | OUTPATIENT
Start: 2024-11-17 | End: 2024-12-03

## 2024-11-17 RX ORDER — DOXYCYCLINE 100 MG/1
100 CAPSULE ORAL 2 TIMES DAILY
Qty: 42 CAPSULE | Refills: 0 | Status: SHIPPED | OUTPATIENT
Start: 2024-11-17 | End: 2024-12-03

## 2024-11-17 RX ORDER — DOXYCYCLINE HYCLATE 100 MG
100 TABLET ORAL EVERY 12 HOURS SCHEDULED
Status: DISCONTINUED | OUTPATIENT
Start: 2024-11-17 | End: 2024-11-27

## 2024-11-17 ASSESSMENT — COGNITIVE AND FUNCTIONAL STATUS - GENERAL
WALKING IN HOSPITAL ROOM: A LITTLE
MOBILITY SCORE: 12
MOBILITY SCORE: 18
TURNING FROM BACK TO SIDE WHILE IN FLAT BAD: A LITTLE
TURNING FROM BACK TO SIDE WHILE IN FLAT BAD: A LOT
TOILETING: A LITTLE
MOVING TO AND FROM BED TO CHAIR: A LITTLE
STANDING UP FROM CHAIR USING ARMS: A LOT
DRESSING REGULAR LOWER BODY CLOTHING: A LOT
MOVING TO AND FROM BED TO CHAIR: A LOT
DAILY ACTIVITIY SCORE: 12
EATING MEALS: A LOT
DRESSING REGULAR LOWER BODY CLOTHING: A LITTLE
PERSONAL GROOMING: A LOT
MOVING FROM LYING ON BACK TO SITTING ON SIDE OF FLAT BED WITH BEDRAILS: A LOT
CLIMB 3 TO 5 STEPS WITH RAILING: A LOT
WALKING IN HOSPITAL ROOM: A LOT
HELP NEEDED FOR BATHING: A LOT
STANDING UP FROM CHAIR USING ARMS: A LITTLE
DAILY ACTIVITIY SCORE: 21
CLIMB 3 TO 5 STEPS WITH RAILING: A LOT
TOILETING: A LOT
DRESSING REGULAR UPPER BODY CLOTHING: A LOT
HELP NEEDED FOR BATHING: A LITTLE

## 2024-11-17 ASSESSMENT — PAIN SCALES - GENERAL
PAINLEVEL_OUTOF10: 0 - NO PAIN

## 2024-11-17 ASSESSMENT — PAIN - FUNCTIONAL ASSESSMENT
PAIN_FUNCTIONAL_ASSESSMENT: 0-10
PAIN_FUNCTIONAL_ASSESSMENT: 0-10

## 2024-11-17 NOTE — CARE PLAN
The clinical goals for the shift include pt will clear secretions and not wean to baseline oxygen

## 2024-11-17 NOTE — PROGRESS NOTES
11/17/24 1129   Discharge Planning   Expected Discharge Disposition Home  (dc 11/17, TCC discussed HHC with pt and he declined need. Instructed pt if he changes his mind to reach out to his PCP to establish.)     11/17/2024 1323: Made aware by bedside RN that pt was having difficulty standing at the bedside. PT/OT ordered and to see pt prior to discharge. NSOC pending PT/OT eval recs and patient preference.

## 2024-11-17 NOTE — PROGRESS NOTES
Juan Carlos Cr is a 74 y.o. male on day 5 of admission presenting with Pneumonia due to gram-negative bacteria (Multi).    Subjective   Interval History: no fever, no new complaints,       Review of Systems    Objective   Range of Vitals (last 24 hours)  Heart Rate:  []   Temp:  [36.4 °C (97.5 °F)-37.1 °C (98.7 °F)]   Resp:  [20-23]   BP: (110-126)/(60-76)   SpO2:  [93 %-95 %]   Daily Weight  11/12/24 : 60.1 kg (132 lb 9.6 oz)    Body mass index is 19.58 kg/m².    Physical Exam  Constitutional:       Appearance: Normal appearance.   HENT:      Head: Normocephalic and atraumatic.      Mouth/Throat:      Mouth: Mucous membranes are moist.      Pharynx: Oropharynx is clear.   Eyes:      Pupils: Pupils are equal, round, and reactive to light.   Cardiovascular:      Rate and Rhythm: Normal rate and regular rhythm.      Heart sounds: Normal heart sounds.   Pulmonary:      Effort: Pulmonary effort is normal.      Breath sounds: Normal breath sounds.   Abdominal:      General: Abdomen is flat. Bowel sounds are normal.      Palpations: Abdomen is soft.   Musculoskeletal:      Cervical back: Normal range of motion.   Neurological:      Mental Status: He is alert.         Antibiotics  doxycycline - 100 mg  mupirocin - 2 %  vancomycin - 1500 mg/500 mL    Relevant Results  Labs  Results from last 72 hours   Lab Units 11/17/24  0543 11/16/24  0546 11/15/24  0706   WBC AUTO x10*3/uL 8.5 6.3 4.8   HEMOGLOBIN g/dL 7.6* 7.8* 7.6*   HEMATOCRIT % 23.8* 24.6* 24.1*   PLATELETS AUTO x10*3/uL 175 165 156   NEUTROS PCT AUTO % 88.6 87.7 87.5   LYMPHS PCT AUTO % 2.8 3.5 3.1   MONOS PCT AUTO % 6.3 6.4 7.7   EOS PCT AUTO % 0.1 0.0 0.2     Results from last 72 hours   Lab Units 11/17/24  0543 11/16/24  0546 11/15/24  0706   SODIUM mmol/L 127* 128* 129*  129*   POTASSIUM mmol/L 3.2* 3.1* 2.8*  2.8*   CHLORIDE mmol/L 90* 92* 93*  93*   CO2 mmol/L 32 29 30  30   BUN mg/dL 4* 5* 8  8   CREATININE mg/dL 0.38* 0.36* 0.41*  0.41*    GLUCOSE mg/dL 122* 117* 98  98   CALCIUM mg/dL 7.1* 7.2* 7.3*  7.3*   ANION GAP mmol/L 8* 10 9*  9*   EGFR mL/min/1.73m*2 >90 >90 >90  >90     Results from last 72 hours   Lab Units 11/17/24  0543 11/16/24  0546 11/15/24  0706   ALK PHOS U/L 78 74 70   BILIRUBIN TOTAL mg/dL 0.3 0.2 0.2   PROTEIN TOTAL g/dL 4.9* 4.9* 4.9*   ALT U/L 15 15 17   AST U/L 16 15 18   ALBUMIN g/dL 2.0* 2.1* 2.1*     Estimated Creatinine Clearance: 125 mL/min (A) (by C-G formula based on SCr of 0.38 mg/dL (L)).  C-Reactive Protein   Date Value Ref Range Status   11/13/2024 33.57 (H) <1.00 mg/dL Final     Microbiology  Reviewed  Imaging  Reviewed        Assessment/Plan   Respiratory failure / COPD / pneumonia / abscess, MRSA screen is positive, procalcitonin 2.30, sputum with MRSA  NSCLC / Rt bronchus stent / on Taxol / Carboplatin / radiation     Recommendations :  Continue Vancomycin, plan on oral Doxycycline x 3 weeks with discharge  Discussed with the medical team     I spent minutes in the professional and overall care of this patient.      Krysten Magallon MD

## 2024-11-17 NOTE — PROGRESS NOTES
Subjective Data:  Remains NSR/sinus tachy  Has a moist cough-- having difficulty expectorating sputum        Objective Data:  Last Recorded Vitals:  Vitals:    11/16/24 2040 11/17/24 0545 11/17/24 0800 11/17/24 0848   BP: 119/64 119/67  118/60   BP Location: Right arm Right arm     Patient Position: Sitting Lying     Pulse: 107 98  107   Resp: 21 23     Temp: 36.9 °C (98.4 °F) 37.1 °C (98.7 °F)     TempSrc: Temporal Temporal     SpO2: 94% 94% 93%    Weight:       Height:           Last Labs:  CBC - 11/17/2024:  5:43 AM  8.5 7.6 175    23.8      CMP - 11/17/2024:  5:43 AM  7.1 4.9 16 --- 0.3   2.7 2.0 15 78      PTT - No results in last year.  1.2   13.4 _     TROPHS   Date/Time Value Ref Range Status   11/15/2024 12:40 PM 14 0 - 20 ng/L Final   11/15/2024 09:51 AM 11 0 - 20 ng/L Final   11/15/2024 07:06 AM 11 0 - 20 ng/L Final     BNP   Date/Time Value Ref Range Status   11/12/2024 02:50  0 - 99 pg/mL Final   10/16/2024 10:31  0 - 99 pg/mL Final     HGBA1C   Date/Time Value Ref Range Status   08/26/2024 04:50 PM 7.6 see below % Final   09/25/2020 10:18 AM 5.7 % Final     Comment:          Diagnosis of Diabetes-Adults   Non-Diabetic: < or = 5.6%   Increased risk for developing diabetes: 5.7-6.4%   Diagnostic of diabetes: > or = 6.5%  .       Monitoring of Diabetes                Age (y)     Therapeutic Goal (%)   Adults:          >18           <7.0   Pediatrics:    13-18           <7.5                   7-12           <8.0                   0- 6            7.5-8.5   American Diabetes Association. Diabetes Care 33(S1), Jan 2010.       LDLCALC   Date/Time Value Ref Range Status   08/28/2024 07:28 AM 63 <=99 mg/dL Final     Comment:                                 Near   Borderline      AGE      Desirable  Optimal    High     High     Very High     0-19 Y     0 - 109     ---    110-129   >/= 130     ----    20-24 Y     0 - 119     ---    120-159   >/= 160     ----      >24 Y     0 -  99   100-129  130-159    160-189     >/=190       VLDL   Date/Time Value Ref Range Status   08/28/2024 07:28 AM 19 0 - 40 mg/dL Final   09/30/2022 12:02 PM 16 0 - 40 mg/dL Final   09/24/2021 11:16 AM 21 0 - 40 mg/dL Final      Last I/O:  I/O last 3 completed shifts:  In: 2492.4 (41.4 mL/kg) [P.O.:820; I.V.:422.4 (7 mL/kg); IV Piggyback:1250]  Out: 1400 (23.3 mL/kg) [Urine:1400 (0.6 mL/kg/hr)]  Weight: 60.1 kg     Past Cardiology Tests (Last 3 Years):  Echo:  Transthoracic Echo (TTE) Complete 08/29/2024  CONCLUSIONS:   1. Poorly visualized anatomical structures due to suboptimal image quality.   2. Left ventricular ejection fraction is moderately decreased, by visual estimate at 35-40%.   3. There is global hypokinesis of the left ventricle with minor regional variations.   4. Left ventricular diastolic filling was indeterminate.   5. There is normal right ventricular global systolic function.   6. The left atrium is mildly dilated.   7. Mild aortic valve regurgitation.     Inpatient Medications:  Scheduled medications   Medication Dose Route Frequency    amiodarone  400 mg oral BID    Followed by    [START ON 11/24/2024] amiodarone  200 mg oral Daily    ascorbic acid  500 mg oral Daily    aspirin  81 mg oral Daily    cholecalciferol  800 Units oral Daily    fluticasone furoate-vilanteroL  1 puff inhalation Daily    insulin lispro  0-10 Units subcutaneous Before meals & nightly    [Held by provider] losartan  25 mg oral Daily    [Held by provider] metFORMIN  500 mg oral BID    metoprolol succinate XL  50 mg oral Daily    mupirocin   Each Nostril TID    oxygen   inhalation Continuous - Inhalation    pantoprazole  40 mg oral Daily    rivaroxaban  20 mg oral Daily with evening meal    rosuvastatin  20 mg oral Daily    tamsulosin  0.4 mg oral Daily    tiotropium  2 puff inhalation Daily    vancomycin  1,500 mg intravenous q12h    zinc oxide  1 Application Topical TID     PRN medications   Medication    benzonatate    dextrose    dextrose     glucagon    glucagon    prochlorperazine    vancomycin     Continuous Medications   Medication Dose Last Rate       Physical Exam:  Constitutional: Pleasant, Awake/Alert/Oriented to person place and time.  Head: Atraumatic, Normocephalic  Eyes: EOMI. ANDRES  Neck:No JVD  Cardiovascular: Regular rate and rhythm, S1, S2. No extra heart sounds or murmurs  Respiratory: coarse rhonchi noted   Abdomen: Soft, Nontender, Obese. Bowel sounds appreciated  Musculoskeletal: ROM intact. Muscle strength grossly intact upper and lower extremities 5/5.   Neurological: CNII-XII intact. Sensation grossly intact  Extremities: Warm and dry. No acute rashes and lesions  Psychiatric: Appropriate mood and affect     Assessment/Plan   74 y.o. male from home with h/o SCLC RUL diagnosed 8/2024 currently receiving chemoradiation therapy, right lung collapse s/p EBUS with mass debulking and bronchial stent 8/2024, COPD on home O2 2-3L, type 2 DM, htn, CAD with remote PCI 1997, PAF (diagnosed 8/2024 in setting of newly identified lung cancer) on Xarelto, HFrEF (EF 35-40% 8/2024) presenting to the ER with 3-4 days of progressive dyspnea with cough     Assessment:  Acute on chronic hypoxic respiratory failure  Right lung pneumonia  Possible lung abscess (left lung cavitated LN)  Paroxysmal a.fib RVR-- back in NSR/ST   HFrEF, not acutely decompensated     Plan:  -Continue PO amiodarone 400mg BID x10 days, then 200mg daily  -Continue Xarelto 20mg daily  -Keep K+ >4 and Mag >2   -Resume Toprol XL 50mg daily   -Resume losartan 25mg daily   -Continue asa and crestor   -Will arrange cardiology follow up with Leslie LARKIN   -We will sign off. Please call with questions.      Peripheral IV 11/15/24 22 G Right;Anterior Forearm (Active)   Site Assessment Clean;Dry;Intact 11/17/24 0900   Dressing Type Transparent 11/17/24 0900   Line Status Blood return noted;Flushed;Saline locked 11/17/24 0900   Dressing Status Clean;Dry;Occlusive 11/17/24 0900   Number of  days: 2       Implantable Port 10/15/24 Right Chest Single lumen port (Active)   Site Assessment Other (Comment) 11/17/24 0900   Dressing Status Other (Comment) 11/17/24 0900   Number of days: 33       Urethral Catheter Coude 14 Fr. (Active)   Site Assessment Clean;Skin intact 11/17/24 0900   Collection Container Standard drainage bag 11/17/24 0900   Securement Method Securing device (Describe) 11/17/24 0900   Reason for Continuing Urinary Catheterization acute urinary retention 11/17/24 0900   Output (mL) 1000 mL 11/17/24 0900   Number of days: 4       Code Status:  Full Code      Ivory Euceda, APRN-CNP

## 2024-11-17 NOTE — PROGRESS NOTES
Vancomycin Dosing by Pharmacy- Cessation of Therapy    Consult to pharmacy for vancomycin dosing has been discontinued by the prescriber, pharmacy will sign off at this time.    Please call pharmacy if there are further questions or re-enter a consult if vancomycin is resumed.     Syeda Carrera, JarodD

## 2024-11-18 ENCOUNTER — APPOINTMENT (OUTPATIENT)
Dept: RADIATION ONCOLOGY | Facility: CLINIC | Age: 74
End: 2024-11-18
Payer: MEDICARE

## 2024-11-18 ENCOUNTER — APPOINTMENT (OUTPATIENT)
Dept: RADIOLOGY | Facility: HOSPITAL | Age: 74
End: 2024-11-18
Payer: MEDICARE

## 2024-11-18 LAB
ALBUMIN SERPL BCP-MCNC: 1.9 G/DL (ref 3.4–5)
ALP SERPL-CCNC: 80 U/L (ref 33–136)
ALT SERPL W P-5'-P-CCNC: 14 U/L (ref 10–52)
ANION GAP SERPL CALC-SCNC: 13 MMOL/L (ref 10–20)
AST SERPL W P-5'-P-CCNC: 16 U/L (ref 9–39)
BASOPHILS # BLD AUTO: 0.01 X10*3/UL (ref 0–0.1)
BASOPHILS NFR BLD AUTO: 0.1 %
BILIRUB SERPL-MCNC: 0.3 MG/DL (ref 0–1.2)
BUN SERPL-MCNC: 5 MG/DL (ref 6–23)
CALCIUM SERPL-MCNC: 7 MG/DL (ref 8.6–10.3)
CHLORIDE SERPL-SCNC: 87 MMOL/L (ref 98–107)
CO2 SERPL-SCNC: 31 MMOL/L (ref 21–32)
CREAT SERPL-MCNC: 0.35 MG/DL (ref 0.5–1.3)
EGFRCR SERPLBLD CKD-EPI 2021: >90 ML/MIN/1.73M*2
EOSINOPHIL # BLD AUTO: 0.01 X10*3/UL (ref 0–0.4)
EOSINOPHIL NFR BLD AUTO: 0.1 %
ERYTHROCYTE [DISTWIDTH] IN BLOOD BY AUTOMATED COUNT: 14 % (ref 11.5–14.5)
GLUCOSE BLD MANUAL STRIP-MCNC: 101 MG/DL (ref 74–99)
GLUCOSE BLD MANUAL STRIP-MCNC: 141 MG/DL (ref 74–99)
GLUCOSE BLD MANUAL STRIP-MCNC: 169 MG/DL (ref 74–99)
GLUCOSE BLD MANUAL STRIP-MCNC: 99 MG/DL (ref 74–99)
GLUCOSE SERPL-MCNC: 103 MG/DL (ref 74–99)
HCT VFR BLD AUTO: 23.7 % (ref 41–52)
HGB BLD-MCNC: 7.5 G/DL (ref 13.5–17.5)
IMM GRANULOCYTES # BLD AUTO: 0.14 X10*3/UL (ref 0–0.5)
IMM GRANULOCYTES NFR BLD AUTO: 1.4 % (ref 0–0.9)
LYMPHOCYTES # BLD AUTO: 0.23 X10*3/UL (ref 0.8–3)
LYMPHOCYTES NFR BLD AUTO: 2.3 %
MAGNESIUM SERPL-MCNC: 1.67 MG/DL (ref 1.6–2.4)
MCH RBC QN AUTO: 29.3 PG (ref 26–34)
MCHC RBC AUTO-ENTMCNC: 31.6 G/DL (ref 32–36)
MCV RBC AUTO: 93 FL (ref 80–100)
MONOCYTES # BLD AUTO: 0.6 X10*3/UL (ref 0.05–0.8)
MONOCYTES NFR BLD AUTO: 5.9 %
NEUTROPHILS # BLD AUTO: 9.21 X10*3/UL (ref 1.6–5.5)
NEUTROPHILS NFR BLD AUTO: 90.2 %
NRBC BLD-RTO: 0 /100 WBCS (ref 0–0)
PLATELET # BLD AUTO: 177 X10*3/UL (ref 150–450)
POTASSIUM SERPL-SCNC: 3.2 MMOL/L (ref 3.5–5.3)
PROT SERPL-MCNC: 4.1 G/DL (ref 6.4–8.2)
RBC # BLD AUTO: 2.56 X10*6/UL (ref 4.5–5.9)
SODIUM SERPL-SCNC: 128 MMOL/L (ref 136–145)
WBC # BLD AUTO: 10.2 X10*3/UL (ref 4.4–11.3)

## 2024-11-18 PROCEDURE — 2500000001 HC RX 250 WO HCPCS SELF ADMINISTERED DRUGS (ALT 637 FOR MEDICARE OP): Performed by: NURSE PRACTITIONER

## 2024-11-18 PROCEDURE — 99223 1ST HOSP IP/OBS HIGH 75: CPT

## 2024-11-18 PROCEDURE — 82947 ASSAY GLUCOSE BLOOD QUANT: CPT

## 2024-11-18 PROCEDURE — 80053 COMPREHEN METABOLIC PANEL: CPT | Performed by: STUDENT IN AN ORGANIZED HEALTH CARE EDUCATION/TRAINING PROGRAM

## 2024-11-18 PROCEDURE — 2060000001 HC INTERMEDIATE ICU ROOM DAILY

## 2024-11-18 PROCEDURE — 2500000002 HC RX 250 W HCPCS SELF ADMINISTERED DRUGS (ALT 637 FOR MEDICARE OP, ALT 636 FOR OP/ED): Performed by: INTERNAL MEDICINE

## 2024-11-18 PROCEDURE — 71045 X-RAY EXAM CHEST 1 VIEW: CPT | Performed by: STUDENT IN AN ORGANIZED HEALTH CARE EDUCATION/TRAINING PROGRAM

## 2024-11-18 PROCEDURE — 2500000001 HC RX 250 WO HCPCS SELF ADMINISTERED DRUGS (ALT 637 FOR MEDICARE OP): Performed by: STUDENT IN AN ORGANIZED HEALTH CARE EDUCATION/TRAINING PROGRAM

## 2024-11-18 PROCEDURE — 2500000002 HC RX 250 W HCPCS SELF ADMINISTERED DRUGS (ALT 637 FOR MEDICARE OP, ALT 636 FOR OP/ED): Performed by: STUDENT IN AN ORGANIZED HEALTH CARE EDUCATION/TRAINING PROGRAM

## 2024-11-18 PROCEDURE — 2500000001 HC RX 250 WO HCPCS SELF ADMINISTERED DRUGS (ALT 637 FOR MEDICARE OP): Performed by: INTERNAL MEDICINE

## 2024-11-18 PROCEDURE — 2500000005 HC RX 250 GENERAL PHARMACY W/O HCPCS: Performed by: STUDENT IN AN ORGANIZED HEALTH CARE EDUCATION/TRAINING PROGRAM

## 2024-11-18 PROCEDURE — 99233 SBSQ HOSP IP/OBS HIGH 50: CPT | Performed by: STUDENT IN AN ORGANIZED HEALTH CARE EDUCATION/TRAINING PROGRAM

## 2024-11-18 PROCEDURE — 97161 PT EVAL LOW COMPLEX 20 MIN: CPT | Mod: GP

## 2024-11-18 PROCEDURE — 36415 COLL VENOUS BLD VENIPUNCTURE: CPT | Performed by: STUDENT IN AN ORGANIZED HEALTH CARE EDUCATION/TRAINING PROGRAM

## 2024-11-18 PROCEDURE — 97165 OT EVAL LOW COMPLEX 30 MIN: CPT | Mod: GO | Performed by: OCCUPATIONAL THERAPIST

## 2024-11-18 PROCEDURE — 71045 X-RAY EXAM CHEST 1 VIEW: CPT

## 2024-11-18 PROCEDURE — 2500000002 HC RX 250 W HCPCS SELF ADMINISTERED DRUGS (ALT 637 FOR MEDICARE OP, ALT 636 FOR OP/ED): Performed by: NURSE PRACTITIONER

## 2024-11-18 PROCEDURE — 99221 1ST HOSP IP/OBS SF/LOW 40: CPT | Performed by: STUDENT IN AN ORGANIZED HEALTH CARE EDUCATION/TRAINING PROGRAM

## 2024-11-18 PROCEDURE — 97530 THERAPEUTIC ACTIVITIES: CPT | Mod: GP

## 2024-11-18 PROCEDURE — 83735 ASSAY OF MAGNESIUM: CPT | Performed by: STUDENT IN AN ORGANIZED HEALTH CARE EDUCATION/TRAINING PROGRAM

## 2024-11-18 PROCEDURE — 85025 COMPLETE CBC W/AUTO DIFF WBC: CPT | Performed by: STUDENT IN AN ORGANIZED HEALTH CARE EDUCATION/TRAINING PROGRAM

## 2024-11-18 RX ORDER — POTASSIUM CHLORIDE 20 MEQ/1
40 TABLET, EXTENDED RELEASE ORAL ONCE
Status: COMPLETED | OUTPATIENT
Start: 2024-11-18 | End: 2024-11-18

## 2024-11-18 ASSESSMENT — COGNITIVE AND FUNCTIONAL STATUS - GENERAL
DAILY ACTIVITIY SCORE: 21
CLIMB 3 TO 5 STEPS WITH RAILING: A LOT
MOVING TO AND FROM BED TO CHAIR: A LOT
PERSONAL GROOMING: A LOT
MOVING FROM LYING ON BACK TO SITTING ON SIDE OF FLAT BED WITH BEDRAILS: A LITTLE
TURNING FROM BACK TO SIDE WHILE IN FLAT BAD: A LOT
HELP NEEDED FOR BATHING: A LITTLE
MOVING FROM LYING ON BACK TO SITTING ON SIDE OF FLAT BED WITH BEDRAILS: A LOT
TOILETING: A LOT
DRESSING REGULAR LOWER BODY CLOTHING: A LITTLE
DRESSING REGULAR UPPER BODY CLOTHING: A LOT
TURNING FROM BACK TO SIDE WHILE IN FLAT BAD: A LITTLE
STANDING UP FROM CHAIR USING ARMS: A LOT
DRESSING REGULAR LOWER BODY CLOTHING: A LOT
HELP NEEDED FOR BATHING: A LOT
WALKING IN HOSPITAL ROOM: A LOT
DAILY ACTIVITIY SCORE: 12
CLIMB 3 TO 5 STEPS WITH RAILING: A LOT
MOBILITY SCORE: 16
STANDING UP FROM CHAIR USING ARMS: A LITTLE
MOVING TO AND FROM BED TO CHAIR: A LITTLE
EATING MEALS: A LOT
TOILETING: A LITTLE
WALKING IN HOSPITAL ROOM: A LOT
MOBILITY SCORE: 12

## 2024-11-18 ASSESSMENT — ACTIVITIES OF DAILY LIVING (ADL)
ADL_ASSISTANCE: INDEPENDENT
LACK_OF_TRANSPORTATION: NO
ADL_ASSISTANCE: INDEPENDENT
BATHING_ASSISTANCE: MINIMAL
ADLS_ADDRESSED: YES

## 2024-11-18 ASSESSMENT — PAIN - FUNCTIONAL ASSESSMENT
PAIN_FUNCTIONAL_ASSESSMENT: 0-10

## 2024-11-18 ASSESSMENT — PAIN SCALES - GENERAL
PAINLEVEL_OUTOF10: 0 - NO PAIN

## 2024-11-18 NOTE — PROGRESS NOTES
"Juan Carlos Cr is a 74 y.o. male on day 6 of admission presenting with Pneumonia due to gram-negative bacteria (Multi).    Subjective   Pt is having a lot of SOB. He desaturated to mid 80s and is now on 10L HF.        Objective     Physical Exam  Vitals and nursing note reviewed.   Constitutional:       General: He is not in acute distress.     Appearance: He is ill-appearing. He is not toxic-appearing.   HENT:      Head: Normocephalic and atraumatic.      Nose: Nose normal.      Mouth/Throat:      Mouth: Mucous membranes are moist.   Eyes:      Extraocular Movements: Extraocular movements intact.      Conjunctiva/sclera: Conjunctivae normal.      Pupils: Pupils are equal, round, and reactive to light.   Cardiovascular:      Rate and Rhythm: Normal rate. Rhythm irregular.      Heart sounds: No murmur heard.     No gallop.   Pulmonary:      Effort: No respiratory distress.      Breath sounds: Wheezing and rhonchi present. No rales.      Comments: + conversational dyspnea  Abdominal:      General: Abdomen is flat. Bowel sounds are normal. There is no distension.      Palpations: Abdomen is soft. There is no mass.      Tenderness: There is no abdominal tenderness.   Musculoskeletal:         General: No swelling or tenderness. Normal range of motion.      Cervical back: Normal range of motion and neck supple.      Right lower leg: Edema present.      Left lower leg: Edema present.   Skin:     General: Skin is warm and dry.   Neurological:      Mental Status: He is alert and oriented to person, place, and time.      Motor: Weakness present.   Psychiatric:         Mood and Affect: Mood normal.         Behavior: Behavior normal.         Thought Content: Thought content normal.         Judgment: Judgment normal.         Last Recorded Vitals:  /64   Pulse 95   Temp 36.7 °C (98.1 °F) (Temporal)   Resp 26   Ht 1.753 m (5' 9\")   Wt 60.1 kg (132 lb 9.6 oz)   SpO2 97%   BMI 19.58 kg/m²      Scheduled " medications:  amiodarone, 400 mg, oral, BID   Followed by  [START ON 11/24/2024] amiodarone, 200 mg, oral, Daily  ascorbic acid, 500 mg, oral, Daily  aspirin, 81 mg, oral, Daily  cholecalciferol, 800 Units, oral, Daily  doxycycline, 100 mg, oral, q12h MASSIEL  fluticasone furoate-vilanteroL, 1 puff, inhalation, Daily  insulin lispro, 0-10 Units, subcutaneous, Before meals & nightly  losartan, 25 mg, oral, Daily  [Held by provider] metFORMIN, 500 mg, oral, BID  metoprolol succinate XL, 50 mg, oral, Daily  mupirocin, , Each Nostril, TID  oxygen, , inhalation, Continuous - Inhalation  pantoprazole, 40 mg, oral, Daily  rivaroxaban, 20 mg, oral, Daily with evening meal  rosuvastatin, 20 mg, oral, Daily  tamsulosin, 0.4 mg, oral, Daily  tiotropium, 2 puff, inhalation, Daily  zinc oxide, 1 Application, Topical, TID      Continuous medications:       PRN medications:  PRN medications: benzonatate, dextrose, dextrose, glucagon, glucagon, prochlorperazine     Relevant Results:  Results for orders placed or performed during the hospital encounter of 11/12/24 (from the past 24 hours)   POCT GLUCOSE   Result Value Ref Range    POCT Glucose 141 (H) 74 - 99 mg/dL   POCT GLUCOSE   Result Value Ref Range    POCT Glucose 126 (H) 74 - 99 mg/dL   POCT GLUCOSE   Result Value Ref Range    POCT Glucose 96 74 - 99 mg/dL   Comprehensive Metabolic Panel   Result Value Ref Range    Glucose 103 (H) 74 - 99 mg/dL    Sodium 128 (L) 136 - 145 mmol/L    Potassium 3.2 (L) 3.5 - 5.3 mmol/L    Chloride 87 (L) 98 - 107 mmol/L    Bicarbonate 31 21 - 32 mmol/L    Anion Gap 13 10 - 20 mmol/L    Urea Nitrogen 5 (L) 6 - 23 mg/dL    Creatinine 0.35 (L) 0.50 - 1.30 mg/dL    eGFR >90 >60 mL/min/1.73m*2    Calcium 7.0 (L) 8.6 - 10.3 mg/dL    Albumin 1.9 (L) 3.4 - 5.0 g/dL    Alkaline Phosphatase 80 33 - 136 U/L    Total Protein 4.1 (L) 6.4 - 8.2 g/dL    AST 16 9 - 39 U/L    Bilirubin, Total 0.3 0.0 - 1.2 mg/dL    ALT 14 10 - 52 U/L   CBC and Auto Differential    Result Value Ref Range    WBC 10.2 4.4 - 11.3 x10*3/uL    nRBC 0.0 0.0 - 0.0 /100 WBCs    RBC 2.56 (L) 4.50 - 5.90 x10*6/uL    Hemoglobin 7.5 (L) 13.5 - 17.5 g/dL    Hematocrit 23.7 (L) 41.0 - 52.0 %    MCV 93 80 - 100 fL    MCH 29.3 26.0 - 34.0 pg    MCHC 31.6 (L) 32.0 - 36.0 g/dL    RDW 14.0 11.5 - 14.5 %    Platelets 177 150 - 450 x10*3/uL    Neutrophils % 90.2 40.0 - 80.0 %    Immature Granulocytes %, Automated 1.4 (H) 0.0 - 0.9 %    Lymphocytes % 2.3 13.0 - 44.0 %    Monocytes % 5.9 2.0 - 10.0 %    Eosinophils % 0.1 0.0 - 6.0 %    Basophils % 0.1 0.0 - 2.0 %    Neutrophils Absolute 9.21 (H) 1.60 - 5.50 x10*3/uL    Immature Granulocytes Absolute, Automated 0.14 0.00 - 0.50 x10*3/uL    Lymphocytes Absolute 0.23 (L) 0.80 - 3.00 x10*3/uL    Monocytes Absolute 0.60 0.05 - 0.80 x10*3/uL    Eosinophils Absolute 0.01 0.00 - 0.40 x10*3/uL    Basophils Absolute 0.01 0.00 - 0.10 x10*3/uL   POCT GLUCOSE   Result Value Ref Range    POCT Glucose 99 74 - 99 mg/dL       No results found.             Malnutrition Diagnosis Status: New  Malnutrition Diagnosis: Severe malnutrition related to chronic disease or condition  As Evidenced by: significant weight loss of 20%/3 months, severe loss of muscle and adipose stores  I agree with the dietitian's malnutrition diagnosis.        Assessment/Plan   Principal Problem:    Pneumonia due to gram-negative bacteria (Multi)    Multifocal pneumonia  CT chest: right upper lobe mass with central necrosis, multifocal pneumonia, new left lung base cavitated nodule  Sputum is showing MRSA  - 10L HF, uses 3L NC at home  - ID following  - doxycycline  - Spiriva  - pulm consult    A fib RVR  Required digoxin, Heart rate did improve slightly  - cardio following  - PO amio  - xarelto    Urinary retention  Failed 1x voiding trial on 11/17  - german  - flomax  - urology     HypoK  Replace    Normocytic anemia  - monitor     Leukopenia  resolved     Non small cell lung ca  Currently on chemo and  XRT  Oncology follow up on discharge     HTN  - hold BP meds due to low BP     HLD   - statin  - ASA     DM  - hold metformin  - ISS     GERD  - protonix     Code status: Full, palliative recs appreciated  Dispo: monitor clinically   PT/OT evals as pt can not stand by himself         Tiffanie Viera MD  Hospitalist

## 2024-11-18 NOTE — CARE PLAN
The patient's goals for the shift include  Pt. Will work with PT and OT    The clinical goals for the shift include pt will be free from falls through shift

## 2024-11-18 NOTE — PROGRESS NOTES
Juan Carlos Cr is a 74 y.o. male on day 6 of admission presenting with Pneumonia due to gram-negative bacteria (Multi).    Subjective   Interval History: no fever, no new complaints,       Review of Systems    Objective   Range of Vitals (last 24 hours)  Heart Rate:  []   Temp:  [36.7 °C (98.1 °F)-37.4 °C (99.3 °F)]   Resp:  [20-32]   BP: (106-133)/(55-73)   SpO2:  [84 %-100 %]   Daily Weight  11/12/24 : 60.1 kg (132 lb 9.6 oz)    Body mass index is 19.58 kg/m².    Physical Exam  Constitutional:       Appearance: Normal appearance.   HENT:      Head: Normocephalic and atraumatic.      Mouth/Throat:      Mouth: Mucous membranes are moist.      Pharynx: Oropharynx is clear.   Eyes:      Pupils: Pupils are equal, round, and reactive to light.   Cardiovascular:      Rate and Rhythm: Normal rate and regular rhythm.      Heart sounds: Normal heart sounds.   Pulmonary:      Effort: Pulmonary effort is normal.      Breath sounds: Normal breath sounds.   Abdominal:      General: Abdomen is flat. Bowel sounds are normal.      Palpations: Abdomen is soft.   Musculoskeletal:      Cervical back: Normal range of motion.   Neurological:      Mental Status: He is alert.         Antibiotics  doxycycline - 100 mg, 100 mg  mupirocin - 2 %    Relevant Results  Labs  Results from last 72 hours   Lab Units 11/18/24 0525 11/17/24  0543 11/16/24  0546   WBC AUTO x10*3/uL 10.2 8.5 6.3   HEMOGLOBIN g/dL 7.5* 7.6* 7.8*   HEMATOCRIT % 23.7* 23.8* 24.6*   PLATELETS AUTO x10*3/uL 177 175 165   NEUTROS PCT AUTO % 90.2 88.6 87.7   LYMPHS PCT AUTO % 2.3 2.8 3.5   MONOS PCT AUTO % 5.9 6.3 6.4   EOS PCT AUTO % 0.1 0.1 0.0     Results from last 72 hours   Lab Units 11/18/24 0525 11/17/24  0543 11/16/24  0546   SODIUM mmol/L 128* 127* 128*   POTASSIUM mmol/L 3.2* 3.2* 3.1*   CHLORIDE mmol/L 87* 90* 92*   CO2 mmol/L 31 32 29   BUN mg/dL 5* 4* 5*   CREATININE mg/dL 0.35* 0.38* 0.36*   GLUCOSE mg/dL 103* 122* 117*   CALCIUM mg/dL 7.0* 7.1* 7.2*    ANION GAP mmol/L 13 8* 10   EGFR mL/min/1.73m*2 >90 >90 >90     Results from last 72 hours   Lab Units 11/18/24  0525 11/17/24  0543 11/16/24  0546   ALK PHOS U/L 80 78 74   BILIRUBIN TOTAL mg/dL 0.3 0.3 0.2   PROTEIN TOTAL g/dL 4.1* 4.9* 4.9*   ALT U/L 14 15 15   AST U/L 16 16 15   ALBUMIN g/dL 1.9* 2.0* 2.1*     Estimated Creatinine Clearance: 125 mL/min (A) (by C-G formula based on SCr of 0.35 mg/dL (L)).  C-Reactive Protein   Date Value Ref Range Status   11/13/2024 33.57 (H) <1.00 mg/dL Final     Microbiology  Reviewed  Imaging  Reviewed        Assessment/Plan   Respiratory failure / COPD / pneumonia / abscess, MRSA screen is positive, procalcitonin 2.30, sputum with MRSA  NSCLC / Rt bronchus stent / on Taxol / Carboplatin / radiation     Recommendations :  Plan on oral Doxycycline x 3 weeks with discharge  Discussed with the medical team     I spent minutes in the professional and overall care of this patient.      Krysten Magallon MD

## 2024-11-18 NOTE — PROGRESS NOTES
Physical Therapy    Physical Therapy Evaluation & Treatment    Patient Name: Juan Carlos Cr  MRN: [id number]  Department: [address]  Room: [address] [place], [place] [address]  Today's Date: 11/18/2024   Time Calculation  Start Time: 0925  Stop Time: 0953  Time Calculation (min): 28 min    Assessment/Plan   PT Assessment  PT Assessment Results: Decreased strength, Decreased endurance, Impaired balance, Decreased mobility  Rehab Prognosis: Good  Barriers to Discharge: Medical Management  Evaluation/Treatment Tolerance: Patient limited by fatigue  Medical Staff Made Aware: Yes  Strengths: Ability to acquire knowledge, Housing layout  Barriers to Participation: Comorbidities, Support of Caregivers (Patients wife able to assist but pt reports no other assist available)  End of Session Communication: Bedside nurse, PCT/NA/CTA  Assessment Comment: Patient with limited activity tolerance for mobility at this time. Primary concern at this time is limited support available, desating and stairs to enter home with rails. Rec mod intensity PT intervention.  End of Session Patient Position: Up in chair, Alarm on (all needs within reach)   IP OR SWING BED PT PLAN  Inpatient or Swing Bed: Inpatient  PT Plan  Treatment/Interventions: Transfer training, Gait training, Stair training, Balance training, Bed mobility, Endurance training, Therapeutic exercise  PT Plan: Ongoing PT  PT Frequency: 3 times per week  PT Discharge Recommendations: Moderate intensity level of continued care  Equipment Recommended upon Discharge: Wheeled walker  PT Recommended Transfer Status: Assist x1  PT - OK to Discharge: Yes (per PT POC)      Subjective     General Visit Information:  General  Reason for Referral: Impaired functional mobility; pneumonia  Referred By: Tiffanie Viera  Past Medical History Relevant to Rehab: oxygen dependent COPD, diabetes mellitus, hypertension, MI, CAD, and non-small cell lung cancer, currently on chemo and radiation treatmen  Family/Caregiver Present:  No  Prior to Session Communication: Bedside nurse  Patient Position Received: Bed, 3 rail up, Alarm on  General Comment: Patient cooperative and agreeable to PT eval  Home Living:  Home Living  Type of Home: House  Lives With: Spouse  Home Adaptive Equipment: Cane  Home Layout: Two level, Able to live on main level with bedroom/bathroom  Home Access: Stairs to enter without rails  Entrance Stairs-Rails: None  Entrance Stairs-Number of Steps: 2  Bathroom Shower/Tub: Walk-in shower  Prior Level of Function:  Prior Function Per Pt/Caregiver Report  Level of [place]: Independent with ADLs and functional transfers, Independent with homemaking with ambulation  Receives Help From: Family  ADL Assistance: Independent  Homemaking Assistance: Independent  Ambulatory Assistance: Independent  Vocational: Retired  Prior Function Comments: (+) driving  Precautions:  Precautions  Medical Precautions: Fall precautions, Oxygen therapy device and L/min (5L O2, tele)    Vital Signs (Past 2hrs)        Date/Time Vitals Session Patient Position Pulse Resp SpO2 BP MAP (mmHg)    11/18/24 0925 Pre PT  Lying  94  --  92 %  --  --     11/18/24 0935 During PT  Sitting  102  --  84 %  --  --     11/18/24 0953 Post PT  Sitting  94  --  91 %  --  --     11/18/24 1102 --  --  --  --  100 %  --  --                   Vital Signs Comment: RN aware of decreased SpO2 with mobility    Objective   Pain:  Pain Assessment  Pain Assessment: 0-10  0-10 (Numeric) Pain Score: 0 - No pain  Cognition:  Cognition  Overall Cognitive Status: Within Functional Limits  Orientation Level: Oriented X4    General Assessments:  General Observation  General Observation: Patient easily fatigued with correlating SpO2 drop during limited mobility     Activity Tolerance  Endurance: Tolerates 10 - 20 min exercise with multiple rests    Sensation  Light Touch: No apparent deficits    Strength  Strength Comments: B LE 4+/5    Coordination  Movements are Fluid and  Coordinated: Yes    Postural Control  Postural Control: Within Functional Limits    Static Sitting Balance  Static Sitting-Balance Support: No upper extremity supported, Feet supported  Static Sitting-Level of Assistance: Distant supervision    Static Standing Balance  Static Standing-Balance Support: Bilateral upper extremity supported  Static Standing-Level of Assistance: Contact guard  Functional Assessments:    Bed Mobility  Bed Mobility: Yes  Bed Mobility 1  Bed Mobility 1: Supine to sitting  Level of Assistance 1: Close supervision    Transfers  Transfer: Yes  Transfer 1  Transfer From 1: Sit to, Stand to  Transfer to 1: Sit, Stand  Technique 1: Sit to stand, Stand to sit  Transfer Device 1: Walker  Transfer Level of Assistance 1: Contact guard  Transfers 2  Transfer From 2: Bed to  Transfer to 2: Chair with arms  Technique 2: Stand pivot  Transfer Device 2: Walker  Transfer Level of Assistance 2: Contact guard    Ambulation/Gait Training  Ambulation/Gait Training Performed: Yes  Ambulation/Gait Training 1  Surface 1: Level tile  Device 1: Rolling walker  Assistance 1: Contact guard  Quality of Gait 1:  (flexed posture, easily fatigued)  Comments/Distance (ft) 1: 3'    Treatments:  ADL  ADL's Addressed: Yes (Attempted use of BSC without success)     Bed Mobility  Bed Mobility: Yes  Bed Mobility 1  Bed Mobility 1: Supine to sitting  Level of Assistance 1: Close supervision    Ambulation/Gait Training  Ambulation/Gait Training Performed: Yes  Ambulation/Gait Training 1  Surface 1: Level tile  Device 1: Rolling walker  Assistance 1: Contact guard  Quality of Gait 1:  (flexed posture, easily fatigued)  Comments/Distance (ft) 1: 3'  Transfers  Transfer: Yes  Transfer 1  Transfer From 1: Sit to, Stand to  Transfer to 1: Sit, Stand  Technique 1: Sit to stand, Stand to sit  Transfer Device 1: Walker  Transfer Level of Assistance 1: Contact guard  Transfers 2  Transfer From 2: Bed to  Transfer to 2: Chair with  arms  Technique 2: Stand pivot  Transfer Device 2: Walker  Transfer Level of Assistance 2: Contact guard       Outcome Measures:  [facility] Basic Mobility  Turning from your back to your side while in a flat bed without using bedrails: A little  Moving from lying on your back to sitting on the side of a flat bed without using bedrails: A little  Moving to and from bed to chair (including a wheelchair): A little  Standing up from a chair using your arms (e.g. wheelchair or bedside chair): A little  To walk in hospital room: A lot  Climbing 3-5 steps with railing: A lot  Basic Mobility - Total Score: 16    Encounter Problems       Encounter Problems (Active)       Balance       STG - Maintains dynamic standing balance without upper extremity support x 5' with dual task supervision  (Progressing)       Start:  11/18/24    Expected End:  12/02/24       INTERVENTIONS:  1. Practice standing with minimal support.  2. Educate patient about standing tolerance.  3. Educate patient about independence with gait, transfers, and ADL's.  4. Educate patient about use of assistive device.  5. Educate patient about self-directed care.            Mobility       STG - Patient will ambulate with 2WW 25' SBA  (Progressing)       Start:  11/18/24    Expected End:  12/02/24            STG - Patient will ascend and descend two stairs with LRD CGA  (Progressing)       Start:  11/18/24    Expected End:  12/02/24               PT Transfers       STG - Patient will perform bed mobility independently  (Progressing)       Start:  11/18/24    Expected End:  12/02/24            STG - Patient will transfer sit to and from stand mod I  (Progressing)       Start:  11/18/24    Expected End:  12/02/24               Pain - Adult              Education Documentation  Precautions, taught by Marilyn Whitlock, PT at 11/18/2024 11:04 AM.  Learner: Patient  Readiness: Acceptance  Method: Explanation  Response: Verbalizes Understanding    Body Mechanics, taught by  Marilyn Whitlock, PT at 11/18/2024 11:04 AM.  Learner: Patient  Readiness: Acceptance  Method: Explanation  Response: Verbalizes Understanding    Mobility Training, taught by Marilyn Whitlock PT at 11/18/2024 11:04 AM.  Learner: Patient  Readiness: Acceptance  Method: Explanation  Response: Verbalizes Understanding    Education Comments  No comments found.

## 2024-11-18 NOTE — CONSULTS
Reason For Consult  Lung CA, COPD     History Of Present Illness  Juan Carlos Cr is a 74 y.o. male with squamous cell lung cancer diagnosed 8/2024 currently on chemoradiation, COPD, chronic respiratory failure on 3-4L home O2, recurrent R lung collapse with current R main bronchus stent, HFrEF EF 35-40%, afib on Xarelto, HTN, HLD, GERD, CAD, T2DM who was admitted for PNA. Pulm consulted for lung CA, COPD, hypoxia.     Pt was admitted to [facility] and had bronch 10/18/24 with tumor debulking and R mainstem stent placement. Notably had ARSLAN stent removed 9/25/24 before that. He was recommended to have another bronchoscopy in 4 to 6 weeks. He is currently receiving chemo. No prior PFT found.   On interview 11/18, pt says he came in for increased shortness of breath and cough with clear sputum. Denied fever, chills, CP, abd pain, diarrhea. His shortness of breath hasn't really improved but has not gotten worse.     Since admission 11/12, pt has had + MRSA nares and MRSA in sputum. CTA chest 11/12 showed R mainstem is open, RUL mass visualized. There is evidence of multifocal PNA with bilateral consolidations. Has been treated with Zosyn 11/12-11/15, iv vanc 11/12-11/17, and also doxycycline since 11/17. ID is on board.     Allergies: NKDA   Surgical hx: EBUS w mass debulking and R bronchus stent, cataract extraction, hx PCI 1997  Family hx: mother MI, brother lung cancer   Social hx: 2 PPD 40 years, pt stopped 8/2024 “quit cold turkey”. Hx alcohol use, stopped 8/2024..     Past Medical History  He has a past medical history of Acute myocardial infarction, unspecified (05/17/2013), Atrial fib/flutter, transient (Multi), COPD (chronic obstructive pulmonary disease) (Multi), Coronary artery disease, Diabetes mellitus (Multi), Diabetes mellitus (Multi), Encounter for screening for malignant neoplasm of prostate (09/02/2015), GERD (gastroesophageal reflux disease), Hyperlipidemia, Hypertension, Lung cancer (Multi), and Personal  "history of other diseases of the nervous system and sense organs (09/13/2017).    Surgical History  He has a past surgical history that includes Coronary angioplasty with stent (05/17/2013); Cataract extraction (09/14/2018); and Cataract extraction (06/27/2014).     Social History  He reports that he has quit smoking. His smoking use included cigarettes. He has been exposed to tobacco smoke. He has never used smokeless tobacco. He reports that he does not currently use alcohol. He reports that he does not use drugs.    Family History  Family History   Problem Relation Name Age of Onset    Heart attack Mother      Lung cancer Brother          Allergies  Patient has no known allergies.    Review of Systems  +MCELROY, cough, sputum   -fevers, chills, CP, abd pain, diarrhea.      Physical Exam  Constitutional: No acute distress. Pleasant in conversation. On 5L NC. Appears thin   Eyes: clear sclera, good eye contact   Head: atraumatic, normocephalic  Neck: no JVD   Lungs: Bilateral rales, wheezing to lower lobes   Heart: RRR, no murmurs, rubs, or gallops  Abdomen: non-tender to palpation, + BS  MSK: no edema, bruising, or tenderness to BLEs.   Neuro: moving all extremities. answering questions, following commands  Skin: warm and dry with no rash  Psych: appropriate mood and behavior      Last Recorded Vitals  Blood pressure 118/64, pulse 94, temperature 37.2 °C (99 °F), resp. rate 26, height 1.753 m (5' 9\"), weight 60.1 kg (132 lb 9.6 oz), SpO2 100%.    Relevant Results  Scheduled medications  amiodarone, 400 mg, oral, BID   Followed by  [START ON 11/24/2024] amiodarone, 200 mg, oral, Daily  ascorbic acid, 500 mg, oral, Daily  aspirin, 81 mg, oral, Daily  cholecalciferol, 800 Units, oral, Daily  doxycycline, 100 mg, oral, q12h MASSIEL  fluticasone furoate-vilanteroL, 1 puff, inhalation, Daily  insulin lispro, 0-10 Units, subcutaneous, Before meals & nightly  losartan, 25 mg, oral, Daily  [Held by provider] metFORMIN, 500 mg, " oral, BID  metoprolol succinate XL, 50 mg, oral, Daily  mupirocin, , Each Nostril, TID  oxygen, , inhalation, Continuous - Inhalation  pantoprazole, 40 mg, oral, Daily  rivaroxaban, 20 mg, oral, Daily with evening meal  rosuvastatin, 20 mg, oral, Daily  tamsulosin, 0.4 mg, oral, Daily  tiotropium, 2 puff, inhalation, Daily  zinc oxide, 1 Application, Topical, TID      Continuous medications     PRN medications  PRN medications: benzonatate, dextrose, dextrose, glucagon, glucagon, prochlorperazine  ECG 12 lead    Result Date: 11/16/2024  Sinus tachycardia Otherwise normal ECG When compared with ECG of 12-NOV-2024 14:23, (unconfirmed) No significant change was found See ED provider note for full interpretation and clinical correlation Confirmed by Leila Luke ([id number]) on 11/16/2024 2:15:23 PM    ECG 12 lead    Result Date: 11/16/2024  Sinus tachycardia Otherwise normal ECG When compared with ECG of 01-NOV-2024 08:10, Vent. rate has increased BY  42 BPM Criteria for Septal infarct are no longer Present T wave amplitude has decreased in Lateral leads See ED provider note for full interpretation and clinical correlation Confirmed by Leila Luke ([id number]) on 11/16/2024 2:15:13 PM    Electrocardiogram, 12-lead PRN ACS symptoms    Result Date: 11/15/2024  Atrial fibrillation with rapid ventricular response with premature ventricular or aberrantly conducted complexes Nonspecific ST and T wave abnormality Abnormal ECG When compared with ECG of 12-NOV-2024 15:52, (unconfirmed) Atrial fibrillation has replaced Sinus rhythm ST now depressed in Lateral leads Nonspecific T wave abnormality now evident in Inferior leads Nonspecific T wave abnormality now evident in Lateral leads    CT angio chest for pulmonary embolism    Result Date: 11/12/2024  Interpreted By:  Bryce Estrada, STUDY: CT ANGIO CHEST FOR PULMONARY EMBOLISM;  11/12/2024 3:08 pm   INDICATION: Signs/Symptoms:History of lung cancer shortness of breath COPD hypoxemic  significant weight loss.   COMPARISON: 10/16/2024   ACCESSION NUMBER(S): [id number]   ORDERING CLINICIAN: LINUS BERNAL   TECHNIQUE: Helical data acquisition of the chest was obtained with  75 mL Omnipaque 350. Images were reformatted in axial, coronal, and sagittal planes.MIP reformatted images were also generated.   FINDINGS: LUNGS and AIRWAYS: Right upper lobe mass, difficult to differentiate from adjacent consolidation, grossly appears decreased in size from 9.1 cm 8.1 cm. Central necrosis has increased.   Previously, the right lung was collapsed. There is now aeration of the previously occluded right mainstem bronchus as well as the lobar branches of the middle and lower lobes. There is persistent occlusion of the upper lobe bronchus. The previously collapsed upper, middle, and lower lobes are now aerated, although there are diffusely scattered areas of predominantly peripheral consolidation. There are also peripheral areas of new consolidation scattered throughout the left lung. A new nodule in the left lung base measuring 23 mm is centrally cavitated.   Underlying mild pulmonary fibrosis. Trace right pleural effusion.   MEDIASTINUM and SUDHAKAR, LOWER NECK AND AXILLA: The visualized thyroid gland is grossly unremarkable.   A left paratracheal node is enlarged from 8 mm to 10 mm short axis. The right upper lobe mass infiltrates the mediastinum and is contiguous with adjacent lymphadenopathy which is difficult to measure discretely. Pretracheal node is roughly unchanged at 18 mm short axis. A 13 mm subcarinal node appears new.   Esophagus is not dilated.   HEART and VESSELS: The heart is normal in gross morphology and size.   No significant pericardial effusion.   Severe coronary atherosclerosis.   Thoracic aorta is severely atherosclerotic but otherwise patent without aneurysm. The great vessels are patent. Right IJ chest port catheter is still present. There is persistent narrowing of the SVC relating to  compression by adjacent tumor.   No pulmonary embolism is identified.   UPPER ABDOMEN: The visualized subdiaphragmatic structures demonstrate no acute findings on limited images.   CHEST WALL and OSSEOUS STRUCTURES: No suspicious osseous lesions. Multilevel degenerative changes of the thoracic spine.         1.  No pulmonary embolism identified. 2. Grossly right upper lobe mass is decreased in size with increased central necrosis. 3. Previous collapse of the right lung has resolved. There are however extensive new areas of bilateral airspace consolidation suggestive of multifocal pneumonia. A new cavitated nodule in the left lung base may be infectious or inflammatory although metastasis is not excluded. 4. Trace right pleural effusion. 5. Mediastinal lymphadenopathy has slightly progressed.     Signed by: Bryce Estrada 11/12/2024 3:45 PM Dictation workstation:   [id number]    ECG 12 Lead    Result Date: 11/1/2024  Normal sinus rhythm Possible Left atrial enlargement Septal infarct , age undetermined Abnormal ECG When compared with ECG of 23-SEP-2024 01:03, Incomplete left bundle branch block is no longer Present Confirmed by Lisa Ko ([id number]) on 11/1/2024 12:18:47 PM    Rad Onc CT Sim Images    Result Date: 10/30/2024  These images are not reportable by radiology and will not be interpreted by  Radiologists.   Results for orders placed or performed during the hospital encounter of 11/12/24 (from the past 24 hours)   POCT GLUCOSE   Result Value Ref Range    POCT Glucose 126 (H) 74 - 99 mg/dL   POCT GLUCOSE   Result Value Ref Range    POCT Glucose 96 74 - 99 mg/dL   Comprehensive Metabolic Panel   Result Value Ref Range    Glucose 103 (H) 74 - 99 mg/dL    Sodium 128 (L) 136 - 145 mmol/L    Potassium 3.2 (L) 3.5 - 5.3 mmol/L    Chloride 87 (L) 98 - 107 mmol/L    Bicarbonate 31 21 - 32 mmol/L    Anion Gap 13 10 - 20 mmol/L    Urea Nitrogen 5 (L) 6 - 23 mg/dL    Creatinine 0.35 (L) 0.50 - 1.30 mg/dL    eGFR >90 >60  mL/min/1.73m*2    Calcium 7.0 (L) 8.6 - 10.3 mg/dL    Albumin 1.9 (L) 3.4 - 5.0 g/dL    Alkaline Phosphatase 80 33 - 136 U/L    Total Protein 4.1 (L) 6.4 - 8.2 g/dL    AST 16 9 - 39 U/L    Bilirubin, Total 0.3 0.0 - 1.2 mg/dL    ALT 14 10 - 52 U/L   CBC and Auto Differential   Result Value Ref Range    WBC 10.2 4.4 - 11.3 x10*3/uL    nRBC 0.0 0.0 - 0.0 /100 WBCs    RBC 2.56 (L) 4.50 - 5.90 x10*6/uL    Hemoglobin 7.5 (L) 13.5 - 17.5 g/dL    Hematocrit 23.7 (L) 41.0 - 52.0 %    MCV 93 80 - 100 fL    MCH 29.3 26.0 - 34.0 pg    MCHC 31.6 (L) 32.0 - 36.0 g/dL    RDW 14.0 11.5 - 14.5 %    Platelets 177 150 - 450 x10*3/uL    Neutrophils % 90.2 40.0 - 80.0 %    Immature Granulocytes %, Automated 1.4 (H) 0.0 - 0.9 %    Lymphocytes % 2.3 13.0 - 44.0 %    Monocytes % 5.9 2.0 - 10.0 %    Eosinophils % 0.1 0.0 - 6.0 %    Basophils % 0.1 0.0 - 2.0 %    Neutrophils Absolute 9.21 (H) 1.60 - 5.50 x10*3/uL    Immature Granulocytes Absolute, Automated 0.14 0.00 - 0.50 x10*3/uL    Lymphocytes Absolute 0.23 (L) 0.80 - 3.00 x10*3/uL    Monocytes Absolute 0.60 0.05 - 0.80 x10*3/uL    Eosinophils Absolute 0.01 0.00 - 0.40 x10*3/uL    Basophils Absolute 0.01 0.00 - 0.10 x10*3/uL   Magnesium   Result Value Ref Range    Magnesium 1.67 1.60 - 2.40 mg/dL   POCT GLUCOSE   Result Value Ref Range    POCT Glucose 99 74 - 99 mg/dL   POCT GLUCOSE   Result Value Ref Range    POCT Glucose 169 (H) 74 - 99 mg/dL        Assessment/Plan     74m with squamous cell lung cancer diagnosed 8/2024 currently on chemoradiation, COPD, chronic respiratory failure on 3-4L home O2, recurrent R lung collapse with current R main bronchus stent, HFrEF EF 35-40%, afib on Xarelto, HTN, HLD, GERD, CAD, T2DM who was admitted for PNA. Pulm consulted for lung CA, COPD, hypoxia.     #Acute on chronic hypoxic respiratory failure, normally 3-4L NC, 5L NC now   #MRSA pneumonia, +nares and sputum   #COPD, stable  #HFrEF, stable  #hx R lung collapse s/p R main bronchus stent  -11/12  CTA chest reviewed, R main bronchus was patent. Other prior imaging reviewed. Get CXR today.   -Continue doxycycline   -Continue Breo and Spiriva, consider prn nebs     Nichole Gómez, DO PGY3 IM    Discussed with Dr. Delatorre

## 2024-11-18 NOTE — PROGRESS NOTES
11/18/24 1522   Discharge Planning   Living Arrangements Spouse/significant other   Support Systems Spouse/significant other;Children   Assistance Needed Patient is A&Ox3,  independent in ADL's, uses no assistive devices, O2 at 2-3L (unknown company)   Type of Residence Private residence   Number of Stairs to Enter Residence 2   Number of Stairs Within Residence 13   Do you have animals or pets at home? Yes   Type of Animals or Pets 1 cat   Who is requesting discharge planning? Provider   Home or Post Acute Services Post acute facilities (Rehab/SNF/etc)   Type of Post Acute Facility Services Skilled nursing   Expected Discharge Disposition SNF  (Spoke to patient regarding therapy recomendations of moderate and patietn was agreeable to SNF. List provided and patietn selected 1. [facility], referral being sent and will await acceptance, patient will require precert.)   Does the patient need discharge transport arranged? Yes   RoundTrip coordination needed? Yes   Has discharge transport been arranged? No   Financial Resource Strain   How hard is it for you to pay for the very basics like food, housing, medical care, and heating? Not very   Housing Stability   In the last 12 months, was there a time when you were not able to pay the mortgage or rent on time? N   In the past 12 months, how many times have you moved where you were living? 0   At any time in the past 12 months, were you homeless or living in a shelter (including now)? N   Transportation Needs   In the past 12 months, has lack of transportation kept you from medical appointments or from getting medications? no   In the past 12 months, has lack of transportation kept you from meetings, work, or from getting things needed for daily living? No   Patient Choice   Provider Choice list and CMS website (https://medicare.gov/care-compare#search) for post-acute Quality and Resource Measure Data were provided and reviewed with: Patient   Patient / Family  choosing to utilize agency / facility established prior to hospitalization No

## 2024-11-18 NOTE — PROGRESS NOTES
Occupational Therapy    Evaluation    Patient Name: Juan Carlos Cr  MRN: [id number]  Department: [address]  Room: [address] [place] [address]  Today's Date: 11/18/2024  Time Calculation  Start Time: 1511  Stop Time: 1527  Time Calculation (min): 16 min    Assessment  IP OT Assessment  OT Assessment: Pt would benefit from continued skilled OT intervention to focus on ADL performance and energy conservation techniques to promote increased activity and independence  Prognosis: Good  Evaluation/Treatment Tolerance: Patient limited by fatigue  Medical Staff Made Aware: Yes  End of Session Communication: Bedside nurse  End of Session Patient Position: Bed, 3 rail up, Alarm on  Plan:  Treatment Interventions: ADL retraining, Functional transfer training, UE strengthening/ROM, Endurance training, Patient/family training, Equipment evaluation/education, Neuromuscular reeducation, Compensatory technique education  OT Frequency: 2 times per week  OT Discharge Recommendations: Moderate intensity level of continued care  OT - OK to Discharge: Yes    Subjective   Current Problem:  1. Pneumonia due to gram-negative bacteria (Multi)        2. Multifocal pneumonia  doxycycline (Vibramycin) 100 mg capsule      3. History of prostate cancer  tamsulosin (Flomax) 0.4 mg 24 hr capsule      4. Paroxysmal atrial fibrillation (Multi)  amiodarone (Pacerone) 200 mg tablet        General:  General  Reason for Referral: OT for ADL assessment (74 YOM admitted with pneumonia)  Referred By: Tiffanie Viera  Past Medical History Relevant to Rehab: oxygen dependent COPD, diabetes mellitus, hypertension, MI, CAD, and non-small cell lung cancer, currently on chemo and radiation treatmen  Missed Visit: No  Family/Caregiver Present: No  Prior to Session Communication: Bedside nurse  Patient Position Received: Bed, 3 rail up, Alarm on  General Comment: OT orders received and chart reviewed. Pt educated on reason for OT consultation and acute services. Pt agreeable to  participate.  Precautions:  Medical Precautions: Fall precautions, Oxygen therapy device and L/min    Vital Sign (Past 2hrs)        Date/Time Vitals Session Patient Position Pulse Resp SpO2 BP MAP (mmHg)    11/18/24 1511 Post OT  Lying  100  23  96 %  --  --                        Pain:  Pain Assessment  Pain Assessment: 0-10  0-10 (Numeric) Pain Score: 0 - No pain    Objective   Cognition:  Overall Cognitive Status: Within Functional Limits  Orientation Level: Oriented X4     Confusion Assessment Method (CAM)  Acute Onset and Fluctuating Course (1A): No  [place]ara Agitation Sedation Scale  Mcnamraa Agitation Sedation Scale (RASS): Alert and calm  Home Living:  Type of Home: House  Lives With: Spouse  Home Adaptive Equipment: Cane  Home Layout: Two level, Able to live on main level with bedroom/bathroom  Home Access: Stairs to enter without rails  Entrance Stairs-Number of Steps: 2  Bathroom Shower/Tub: Walk-in shower   Prior Function:  Level of [place]: Independent with ADLs and functional transfers, Independent with homemaking with ambulation  Receives Help From: Family  ADL Assistance: Independent  Homemaking Assistance: Independent  Ambulatory Assistance: Independent  Vocational: Retired  IADL History:  Current License: Yes  Mode of Transportation: Car  ADL:  Eating Assistance: Independent  Grooming Assistance: Independent  Grooming Deficit: Increased time to complete  Bathing Assistance: Minimal  Bathing Deficit: Increased time to complete  (anticipate frequent rest breaks needed to manage respiration)  UE Dressing Assistance: Independent  UE Dressing Deficit: Increased time to complete  LE Dressing Assistance: Minimal  LE Dressing Deficit: Increased time to complete (rest breaks needed to manage respiratory needs)  Toileting Assistance with Device: Moderate  Activity Tolerance:  Endurance: Tolerates 10 - 20 min exercise with multiple rests  Bed Mobility/Transfers: Bed Mobility  Bed Mobility: Yes  Bed  Mobility 1  Bed Mobility 1: Supine to sitting, Sitting to supine  Level of Assistance 1: Close supervision    Transfers  Transfer: Yes  Transfer 1  Transfer From 1: Sit to, Stand to  Transfer to 1: Stand, Sit  Technique 1: Sit to stand, Stand to sit  Transfer Device 1: Walker  Transfer Level of Assistance 1: Contact guard  Trials/Comments 1: from bedside and returned to bedside.; pt requesting to return for a nap  Modalities:     IADL's:   Current License: Yes  Mode of Transportation: Car  Vision: Vision - Basic Assessment  Current Vision: No visual deficits  Sensation:  Light Touch: No apparent deficits  Strength:  Strength Comments: BUE WFL  Perception:     Coordination:  Movements are Fluid and Coordinated: Yes   Hand Function:  Hand Function  Gross Grasp: Functional  Coordination: Functional  Extremities: RUE   RUE : Within Functional Limits and LUE   LUE: Within Functional Limits      Outcome Measures: [facility] Daily Activity  Putting on and taking off regular lower body clothing: A little  Bathing (including washing, rinsing, drying): A little  Putting on and taking off regular upper body clothing: None  Toileting, which includes using toilet, bedpan or urinal: A little  Taking care of personal grooming such as brushing teeth: None  Eating Meals: None  Daily Activity - Total Score: 21      Education Documentation  Handouts, taught by Radha Cline OT at 11/18/2024  4:01 PM.  Learner: Patient  Readiness: Acceptance  Method: Explanation  Response: Verbalizes Understanding    Home Exercise Program, taught by Radha Cline OT at 11/18/2024  4:01 PM.  Learner: Patient  Readiness: Acceptance  Method: Explanation  Response: Verbalizes Understanding    ADL Training, taught by Radha Cline OT at 11/18/2024  4:01 PM.  Learner: Patient  Readiness: Acceptance  Method: Explanation  Response: Verbalizes Understanding    Education Comments  No comments found.      Goals:   Encounter Problems       Encounter Problems  (Active)       ADLs       Energy Conservation       Start:  11/18/24    Expected End:  12/02/24       Pt will demo and/or verbalize 2-3 energy conservation techniques to incorporate into functional mobility or ADL to improve performance and increase independence during this LOS.             EXERCISE/STRENGTHENING       Patient will be educated on BUE HEP for increased ADL performance.       Start:  11/18/24    Expected End:  12/02/24               MOBILITY       Patient will perform Functional mobility no external Household distances/Community Distances with independent level of assistance and least restrictive device in order to improve safety and functional mobility.       Start:  11/18/24    Expected End:  12/02/24

## 2024-11-18 NOTE — CONSULTS
Reason For Consult  retention             HISTORY OF PRESENT ILLNESS:  This is a  74 y.o. y.o. who presents with pneumonia.  Has a history of non-small cell lung cancer undergoing chemoradiation.  Presented 11/12/2024 with pneumonia symptoms.  During the course of his hospitalization developed incomplete bladder emptying.  Was started on Flomax.  Failed 1 voiding trial yesterday.  Denies any history of urinary symptoms prior to coming to the hospital.  Has never had any prostate procedures per report.         Past Medical History  He has a past medical history of Acute myocardial infarction, unspecified (05/17/2013), Atrial fib/flutter, transient (Multi), COPD (chronic obstructive pulmonary disease) (Multi), Coronary artery disease, Diabetes mellitus (Multi), Diabetes mellitus (Multi), Encounter for screening for malignant neoplasm of prostate (09/02/2015), GERD (gastroesophageal reflux disease), Hyperlipidemia, Hypertension, Lung cancer (Multi), and Personal history of other diseases of the nervous system and sense organs (09/13/2017).    Surgical History  He has a past surgical history that includes Coronary angioplasty with stent (05/17/2013); Cataract extraction (09/14/2018); and Cataract extraction (06/27/2014).     Social History  He reports that he has quit smoking. His smoking use included cigarettes. He has been exposed to tobacco smoke. He has never used smokeless tobacco. He reports that he does not currently use alcohol. He reports that he does not use drugs.    Family History  Family History   Problem Relation Name Age of Onset    Heart attack Mother      Lung cancer Brother          Allergies  Patient has no known allergies.        A comprehensive 10+ review of systems was negative except for: see hpi                    PHYSICAL EXAMINATION:  BP Readings from Last 3 Encounters:   11/18/24 133/73   11/12/24 100/61   11/06/24 106/58      Wt Readings from Last 3 Encounters:   11/12/24 60.1 kg (132 lb 9.6  "oz)   11/12/24 61.7 kg (136 lb 0.4 oz)   11/06/24 60.7 kg (133 lb 13.1 oz)      BMI: Estimated body mass index is 19.58 kg/m² as calculated from the following:    Height as of this encounter: 1.753 m (5' 9\").    Weight as of this encounter: 60.1 kg (132 lb 9.6 oz).  BSA: Estimated body surface area is 1.71 meters squared as calculated from the following:    Height as of this encounter: 1.753 m (5' 9\").    Weight as of this encounter: 60.1 kg (132 lb 9.6 oz).  HEENT: Normocephalic, atraumatic, PER EOMI, nonicteric, trachea normal, thyroid normal, oropharynx normal.  CARDIAC: regular rate & rhythm, S1 & S2 normal.  No heaves, thrills, gallops or murmurs.  LUNGS: Clear to auscultation, no spinal or CV tenderness.  EXTREMITIES: No evidence of cyanosis, clubbing or edema.      Urine is clear and light yellow.      IMPRESSION AND PLAN:  Juan Carlos Cr is a 74 y.o. who presents with pneumonia developed acute urinary retention    May be transient related to acute illness    If staying inpatient for the next 2 days can do bladder rest for 2 days and try voiding trial if he is being discharged in the next 1 to 2 days he can be discharged with a [place] and follow-up for trial of void with urology    All questions and concerns were answered and addressed.  The patient expressed understanding and agrees with the plan.     11/18/2024       "

## 2024-11-18 NOTE — PROGRESS NOTES
"Nutrition Progress Note  Nutrition Follow Up Note  Patient has Malnutrition Diagnosis: Yes  Diagnosis Status: Ongoing  Malnutrition Diagnosis: Severe malnutrition related to chronic disease or condition  As Evidenced by: significant weight loss of 20%/3 months, severe loss of muscle and adipose stores  Additional Assessment Information: ONS added for additional kcal and protein  Nutrition Assessment    Pt remains hospitalized for treatment for PNA; A Fib RVR, urinary retention. K+ was low, plan to replace. Pt with history of lung cancer, on chemo and XRT as outpatient. Pt remains full code at this time.      Nutrition History:  Food and Nutrient History: (11/13/24) Pt seen for follow up visit, feels overwhelmed with frequency of ONS, will decrease to once/day. Oral intakes varied 0-100% per nursing documentaion. Pt reports poor appetite with weight loss that started about 3 months ago. Pt does not have any N, V, D, or C, chewing and swallowing ok. Ambulation has become more difficult, agrees to take ONS while hospitalized.     No Known Allergies   GI Symptoms: None     Oral Problems: None          Anthropometrics:  Height: 175.3 cm (5' 9\")   Weight: 60.1 kg (132 lb 9.6 oz)   BMI (Calculated): 19.57  IBW/kg (Dietitian Calculated): 72.7 kg  Percent of IBW: 83 %       Weight History:     Weight         11/12/2024 2212             Weight: 60.1 kg (132 lb 9.6 oz)        No new weights available to assess for changes    Nutrition Focused Physical Exam Findings:       Edema  Edema: none  Physical Findings (Nutrition Deficiency/Toxicity)  Skin: Positive (L buttrocks, sacrum blanchable)    Nutrition Significant Labs:    Results from last 7 days   Lab Units 11/18/24  0525 11/17/24  0543 11/16/24  0546 11/15/24  0706   GLUCOSE mg/dL 103* 122* 117* 98  98   SODIUM mmol/L 128* 127* 128* 129*  129*   POTASSIUM mmol/L 3.2* 3.2* 3.1* 2.8*  2.8*   CHLORIDE mmol/L 87* 90* 92* 93*  93*   CO2 mmol/L 31 32 29 30  30   BUN mg/dL 5* " 4* 5* 8  8   CREATININE mg/dL 0.35* 0.38* 0.36* 0.41*  0.41*   EGFR mL/min/1.73m*2 >90 >90 >90 >90  >90   CALCIUM mg/dL 7.0* 7.1* 7.2* 7.3*  7.3*   MAGNESIUM mg/dL 1.67  --  1.67 1.37*     Lab Results   Component Value Date    HGBA1C 7.6 (H) 08/26/2024    HGBA1C 5.7 09/25/2020     Results from last 7 days   Lab Units 11/18/24  1126 11/18/24  0730 11/17/24  2108 11/17/24  1620 11/17/24  1106 11/17/24  0719 11/16/24  2111 11/16/24  1608   POCT GLUCOSE mg/dL 169* 99 96 126* 141* 120* 128* 145*     Lab Results   Component Value Date    ALBUMIN 1.9 (L) 11/18/2024      Lab Results   Component Value Date    CRP 33.57 (H) 11/13/2024           Nutrition Specific Medications:   Scheduled medications:  amiodarone, 400 mg, oral, BID   Followed by  [START ON 11/24/2024] amiodarone, 200 mg, oral, Daily  ascorbic acid, 500 mg, oral, Daily  aspirin, 81 mg, oral, Daily  cholecalciferol, 800 Units, oral, Daily  doxycycline, 100 mg, oral, q12h MASSIEL  fluticasone furoate-vilanteroL, 1 puff, inhalation, Daily  insulin lispro, 0-10 Units, subcutaneous, Before meals & nightly  losartan, 25 mg, oral, Daily  [Held by provider] metFORMIN, 500 mg, oral, BID  metoprolol succinate XL, 50 mg, oral, Daily  mupirocin, , Each Nostril, TID  oxygen, , inhalation, Continuous - Inhalation  pantoprazole, 40 mg, oral, Daily  rivaroxaban, 20 mg, oral, Daily with evening meal  rosuvastatin, 20 mg, oral, Daily  tamsulosin, 0.4 mg, oral, Daily  tiotropium, 2 puff, inhalation, Daily  zinc oxide, 1 Application, Topical, TID      Continuous medications:     PRN medications:  PRN medications: benzonatate, dextrose, dextrose, glucagon, glucagon, prochlorperazine     Nursing Data Per flowsheet:   Stool Appearance: Other (Comment) (11/16/24 0900)  Gastrointestinal  Gastrointestinal (WDL): Within Defined Limits  Bowel Sounds: All quadrants  Bowel Sounds (All Quadrants): Active, Present  Last BM Date: 11/18/24  Bowel Incontinence: No  Feeding assistance level:       Intake/Output Summary (Last 24 hours) at 11/18/2024 1328  Last data filed at 11/18/2024 1021  Gross per 24 hour   Intake 640 ml   Output 1500 ml   Net -860 ml      0-10 (Numeric) Pain Score: 0 - No pain   Dietary Orders (From admission, onward)       Start     Ordered    11/18/24 1158  Oral nutritional supplements  Until discontinued        Question Answer Comment   Deliver with Dinner strawberry   Select supplement: Glucerna Shake        11/18/24 1157    11/12/24 2219  May Participate in Room Service  ( ROOM SERVICE MAY PARTICIPATE)  Once        Question:  .  Answer:  Yes    11/12/24 2218 11/12/24 2216  Adult diet Regular, Consistent Carb; CCD 60 gm/meal  Diet effective now        Question Answer Comment   Diet type Regular    Diet type Consistent Carb    Carb diet selection: CCD 60 gm/meal        11/12/24 2220                     Estimated Needs:   Total Energy Estimated Needs (kCal): 1803 kCal  Method for Estimating Needs: 30 kcal/kg  Total Protein Estimated Needs (g): 72 g  Method for Estimating Needs: 1.2 g/kg     Method for Estimating Needs: 1 ml/kcal or per team       Nutrition Diagnosis   Malnutrition Diagnosis  Patient has Malnutrition Diagnosis: Yes  Diagnosis Status: Ongoing  Malnutrition Diagnosis: Severe malnutrition related to chronic disease or condition  As Evidenced by: significant weight loss of 20%/3 months, severe loss of muscle and adipose stores  Additional Assessment Information: ONS added for additional kcal and protein            Nutrition Interventions/Recommendations   Nutrition Prescription:  diet, fluids    Nutrition Interventions:   Interventions: Medical food supplement  Goal: Glucerna daily= 220 kcal, 10 g protein (per container)    Coordination of Care: n/a  Nutrition Education:   N/A    Recommendations:  Encourage oral intakes  Weights: 2-3 x/week  RFP, Mg daily; replete lytes prn  Consider appetite stimulant          Nutrition Monitoring and Evaluation   Food/Nutrient  Related History Monitoring  Monitoring and Evaluation Plan: Amount of food  Criteria: Pt will consume 50-75% of meals and ONS provided    Body Composition/Growth/Weight History  Monitoring and Evaluation Plan: Weight  Weight: Measured weight  Criteria: Monitor weights    Biochemical Data, Medical Tests and Procedures  Monitoring and Evaluation Plan: Electrolyte/renal panel, Glucose/endocrine profile  Criteria: Monitor    Time Spent (min): 45 minutes

## 2024-11-19 ENCOUNTER — APPOINTMENT (OUTPATIENT)
Dept: RADIATION ONCOLOGY | Facility: CLINIC | Age: 74
End: 2024-11-19
Payer: MEDICARE

## 2024-11-19 PROBLEM — J15.212 MRSA PNEUMONIA (MULTI): Status: ACTIVE | Noted: 2024-11-12

## 2024-11-19 LAB
ALBUMIN SERPL BCP-MCNC: 2 G/DL (ref 3.4–5)
ALP SERPL-CCNC: 85 U/L (ref 33–136)
ALT SERPL W P-5'-P-CCNC: 15 U/L (ref 10–52)
ANION GAP SERPL CALC-SCNC: 9 MMOL/L (ref 10–20)
AST SERPL W P-5'-P-CCNC: 20 U/L (ref 9–39)
BASOPHILS # BLD AUTO: 0.03 X10*3/UL (ref 0–0.1)
BASOPHILS NFR BLD AUTO: 0.2 %
BILIRUB SERPL-MCNC: 0.3 MG/DL (ref 0–1.2)
BUN SERPL-MCNC: 5 MG/DL (ref 6–23)
CALCIUM SERPL-MCNC: 7.4 MG/DL (ref 8.6–10.3)
CHLORIDE SERPL-SCNC: 89 MMOL/L (ref 98–107)
CO2 SERPL-SCNC: 32 MMOL/L (ref 21–32)
CREAT SERPL-MCNC: 0.4 MG/DL (ref 0.5–1.3)
EGFRCR SERPLBLD CKD-EPI 2021: >90 ML/MIN/1.73M*2
EOSINOPHIL # BLD AUTO: 0 X10*3/UL (ref 0–0.4)
EOSINOPHIL NFR BLD AUTO: 0 %
ERYTHROCYTE [DISTWIDTH] IN BLOOD BY AUTOMATED COUNT: 14.2 % (ref 11.5–14.5)
GLUCOSE BLD MANUAL STRIP-MCNC: 108 MG/DL (ref 74–99)
GLUCOSE BLD MANUAL STRIP-MCNC: 137 MG/DL (ref 74–99)
GLUCOSE BLD MANUAL STRIP-MCNC: 149 MG/DL (ref 74–99)
GLUCOSE BLD MANUAL STRIP-MCNC: 93 MG/DL (ref 74–99)
GLUCOSE SERPL-MCNC: 92 MG/DL (ref 74–99)
HCT VFR BLD AUTO: 23.4 % (ref 41–52)
HGB BLD-MCNC: 7.5 G/DL (ref 13.5–17.5)
IMM GRANULOCYTES # BLD AUTO: 0.25 X10*3/UL (ref 0–0.5)
IMM GRANULOCYTES NFR BLD AUTO: 2 % (ref 0–0.9)
LYMPHOCYTES # BLD AUTO: 0.24 X10*3/UL (ref 0.8–3)
LYMPHOCYTES NFR BLD AUTO: 1.9 %
MCH RBC QN AUTO: 29.1 PG (ref 26–34)
MCHC RBC AUTO-ENTMCNC: 32.1 G/DL (ref 32–36)
MCV RBC AUTO: 91 FL (ref 80–100)
MONOCYTES # BLD AUTO: 0.76 X10*3/UL (ref 0.05–0.8)
MONOCYTES NFR BLD AUTO: 6 %
NEUTROPHILS # BLD AUTO: 11.45 X10*3/UL (ref 1.6–5.5)
NEUTROPHILS NFR BLD AUTO: 89.9 %
NRBC BLD-RTO: 0 /100 WBCS (ref 0–0)
PLATELET # BLD AUTO: 169 X10*3/UL (ref 150–450)
POTASSIUM SERPL-SCNC: 3.4 MMOL/L (ref 3.5–5.3)
PROT SERPL-MCNC: 4.8 G/DL (ref 6.4–8.2)
RBC # BLD AUTO: 2.58 X10*6/UL (ref 4.5–5.9)
SODIUM SERPL-SCNC: 127 MMOL/L (ref 136–145)
WBC # BLD AUTO: 12.7 X10*3/UL (ref 4.4–11.3)

## 2024-11-19 PROCEDURE — 2500000001 HC RX 250 WO HCPCS SELF ADMINISTERED DRUGS (ALT 637 FOR MEDICARE OP): Performed by: NURSE PRACTITIONER

## 2024-11-19 PROCEDURE — S4991 NICOTINE PATCH NONLEGEND: HCPCS | Performed by: INTERNAL MEDICINE

## 2024-11-19 PROCEDURE — 2500000002 HC RX 250 W HCPCS SELF ADMINISTERED DRUGS (ALT 637 FOR MEDICARE OP, ALT 636 FOR OP/ED): Performed by: INTERNAL MEDICINE

## 2024-11-19 PROCEDURE — 2500000001 HC RX 250 WO HCPCS SELF ADMINISTERED DRUGS (ALT 637 FOR MEDICARE OP): Performed by: INTERNAL MEDICINE

## 2024-11-19 PROCEDURE — 2500000001 HC RX 250 WO HCPCS SELF ADMINISTERED DRUGS (ALT 637 FOR MEDICARE OP): Performed by: STUDENT IN AN ORGANIZED HEALTH CARE EDUCATION/TRAINING PROGRAM

## 2024-11-19 PROCEDURE — 94760 N-INVAS EAR/PLS OXIMETRY 1: CPT

## 2024-11-19 PROCEDURE — 2060000001 HC INTERMEDIATE ICU ROOM DAILY

## 2024-11-19 PROCEDURE — 85025 COMPLETE CBC W/AUTO DIFF WBC: CPT | Performed by: STUDENT IN AN ORGANIZED HEALTH CARE EDUCATION/TRAINING PROGRAM

## 2024-11-19 PROCEDURE — 99233 SBSQ HOSP IP/OBS HIGH 50: CPT

## 2024-11-19 PROCEDURE — 2500000002 HC RX 250 W HCPCS SELF ADMINISTERED DRUGS (ALT 637 FOR MEDICARE OP, ALT 636 FOR OP/ED): Performed by: STUDENT IN AN ORGANIZED HEALTH CARE EDUCATION/TRAINING PROGRAM

## 2024-11-19 PROCEDURE — 2500000002 HC RX 250 W HCPCS SELF ADMINISTERED DRUGS (ALT 637 FOR MEDICARE OP, ALT 636 FOR OP/ED)

## 2024-11-19 PROCEDURE — 82947 ASSAY GLUCOSE BLOOD QUANT: CPT

## 2024-11-19 PROCEDURE — 2500000002 HC RX 250 W HCPCS SELF ADMINISTERED DRUGS (ALT 637 FOR MEDICARE OP, ALT 636 FOR OP/ED): Performed by: NURSE PRACTITIONER

## 2024-11-19 PROCEDURE — 2500000005 HC RX 250 GENERAL PHARMACY W/O HCPCS: Performed by: STUDENT IN AN ORGANIZED HEALTH CARE EDUCATION/TRAINING PROGRAM

## 2024-11-19 PROCEDURE — 84075 ASSAY ALKALINE PHOSPHATASE: CPT | Performed by: STUDENT IN AN ORGANIZED HEALTH CARE EDUCATION/TRAINING PROGRAM

## 2024-11-19 PROCEDURE — 99232 SBSQ HOSP IP/OBS MODERATE 35: CPT

## 2024-11-19 RX ORDER — POTASSIUM CHLORIDE 20 MEQ/1
20 TABLET, EXTENDED RELEASE ORAL ONCE
Status: COMPLETED | OUTPATIENT
Start: 2024-11-19 | End: 2024-11-19

## 2024-11-19 RX ORDER — NICOTINE 7MG/24HR
1 PATCH, TRANSDERMAL 24 HOURS TRANSDERMAL DAILY
Status: DISCONTINUED | OUTPATIENT
Start: 2024-11-19 | End: 2024-11-27 | Stop reason: HOSPADM

## 2024-11-19 RX ORDER — NICOTINE 7MG/24HR
1 PATCH, TRANSDERMAL 24 HOURS TRANSDERMAL DAILY
Status: DISCONTINUED | OUTPATIENT
Start: 2024-11-19 | End: 2024-11-19

## 2024-11-19 ASSESSMENT — PAIN SCALES - GENERAL
PAINLEVEL_OUTOF10: 0 - NO PAIN
PAINLEVEL_OUTOF10: 0 - NO PAIN

## 2024-11-19 ASSESSMENT — COGNITIVE AND FUNCTIONAL STATUS - GENERAL
TOILETING: A LOT
DRESSING REGULAR UPPER BODY CLOTHING: A LOT
TURNING FROM BACK TO SIDE WHILE IN FLAT BAD: A LOT
EATING MEALS: A LOT
MOVING FROM LYING ON BACK TO SITTING ON SIDE OF FLAT BED WITH BEDRAILS: TOTAL
DAILY ACTIVITIY SCORE: 12
PERSONAL GROOMING: A LOT
MOVING TO AND FROM BED TO CHAIR: A LOT
STANDING UP FROM CHAIR USING ARMS: A LOT
MOBILITY SCORE: 11
WALKING IN HOSPITAL ROOM: A LOT
HELP NEEDED FOR BATHING: A LOT
DRESSING REGULAR LOWER BODY CLOTHING: A LOT
CLIMB 3 TO 5 STEPS WITH RAILING: A LOT

## 2024-11-19 ASSESSMENT — PAIN - FUNCTIONAL ASSESSMENT: PAIN_FUNCTIONAL_ASSESSMENT: 0-10

## 2024-11-19 NOTE — PROGRESS NOTES
Juan Carlos Cr is a 74 y.o. male on day 7 of admission presenting with Pneumonia due to gram-negative bacteria (Multi).    Subjective   Interval History: no fever, no new complaints,       Review of Systems    Objective   Range of Vitals (last 24 hours)  Heart Rate:  []   Temp:  [36.2 °C (97.2 °F)-37.4 °C (99.3 °F)]   Resp:  [19-26]   BP: (107-132)/(54-85)   SpO2:  [90 %-99 %]   Daily Weight  11/12/24 : 60.1 kg (132 lb 9.6 oz)    Body mass index is 19.58 kg/m².    Physical Exam  Constitutional:       Appearance: Normal appearance.   HENT:      Head: Normocephalic and atraumatic.      Mouth/Throat:      Mouth: Mucous membranes are moist.      Pharynx: Oropharynx is clear.   Eyes:      Pupils: Pupils are equal, round, and reactive to light.   Cardiovascular:      Rate and Rhythm: Normal rate and regular rhythm.      Heart sounds: Normal heart sounds.   Pulmonary:      Effort: Pulmonary effort is normal.      Breath sounds: Normal breath sounds.   Abdominal:      General: Abdomen is flat. Bowel sounds are normal.      Palpations: Abdomen is soft.   Musculoskeletal:      Cervical back: Normal range of motion.   Neurological:      Mental Status: He is alert.         Antibiotics  doxycycline - 100 mg, 100 mg  mupirocin - 2 %    Relevant Results  Labs  Results from last 72 hours   Lab Units 11/19/24  0542 11/18/24  0525 11/17/24  0543   WBC AUTO x10*3/uL 12.7* 10.2 8.5   HEMOGLOBIN g/dL 7.5* 7.5* 7.6*   HEMATOCRIT % 23.4* 23.7* 23.8*   PLATELETS AUTO x10*3/uL 169 177 175   NEUTROS PCT AUTO % 89.9 90.2 88.6   LYMPHS PCT AUTO % 1.9 2.3 2.8   MONOS PCT AUTO % 6.0 5.9 6.3   EOS PCT AUTO % 0.0 0.1 0.1     Results from last 72 hours   Lab Units 11/19/24  0542 11/18/24  0525 11/17/24  0543   SODIUM mmol/L 127* 128* 127*   POTASSIUM mmol/L 3.4* 3.2* 3.2*   CHLORIDE mmol/L 89* 87* 90*   CO2 mmol/L 32 31 32   BUN mg/dL 5* 5* 4*   CREATININE mg/dL 0.40* 0.35* 0.38*   GLUCOSE mg/dL 92 103* 122*   CALCIUM mg/dL 7.4* 7.0* 7.1*    ANION GAP mmol/L 9* 13 8*   EGFR mL/min/1.73m*2 >90 >90 >90     Results from last 72 hours   Lab Units 11/19/24  0542 11/18/24  0525 11/17/24  0543   ALK PHOS U/L 85 80 78   BILIRUBIN TOTAL mg/dL 0.3 0.3 0.3   PROTEIN TOTAL g/dL 4.8* 4.1* 4.9*   ALT U/L 15 14 15   AST U/L 20 16 16   ALBUMIN g/dL 2.0* 1.9* 2.0*     Estimated Creatinine Clearance: 125 mL/min (A) (by C-G formula based on SCr of 0.4 mg/dL (L)).  C-Reactive Protein   Date Value Ref Range Status   11/13/2024 33.57 (H) <1.00 mg/dL Final     Microbiology  Reviewed  Imaging  Reviewed        Assessment/Plan   Respiratory failure / COPD / pneumonia / abscess, MRSA screen is positive, procalcitonin 2.30, sputum with MRSA  NSCLC / Rt bronchus stent / on Taxol / Carboplatin / radiation     Recommendations :  Plan on oral Doxycycline x 3 weeks with discharge  Bronchial hygiene  Discussed with the medical team     I spent minutes in the professional and overall care of this patient.      Krysten Magallon MD

## 2024-11-19 NOTE — PROGRESS NOTES
11/19/24 1501   Discharge Planning   Expected Discharge Disposition SNF  (Spoke to [facility]) and [facility] ([phone]) who are reviewing case and will respond in am in [facility] if able to accept after confirming coverage of Chemo and radiation while at SNF.)     11/19/24 at 1522: Spouse and patient updated that we are awaiting response from the above facilities. Patient will require direct precert.

## 2024-11-19 NOTE — PROGRESS NOTES
"Juan Carlos Cr is a 74 y.o. male on day 7 of admission presenting with MRSA pneumonia (Multi).    Subjective   This morning pt is on 5L NC. He says he has been having increased cough and sputum production and feels \"crummy\" due to this.          Objective     Physical Exam  Constitutional: No acute distress. Pleasant in conversation. On 5L NC. Appears thin   Eyes: clear sclera, good eye contact   Head: atraumatic, normocephalic  Neck: no JVD   Lungs: mildly coarse lung sounds b/l   Heart: RRR, no murmurs, rubs, or gallops  Abdomen: non-tender to palpation, + BS  MSK: no edema, bruising, or tenderness to BLEs.   Neuro: moving all extremities. answering questions, following commands  Skin: warm and dry with no rash  Psych: appropriate mood and behavior     Last Recorded Vitals  Blood pressure 110/59, pulse 92, temperature 37 °C (98.6 °F), temperature source Temporal, resp. rate 24, height 1.753 m (5' 9\"), weight 60.1 kg (132 lb 9.6 oz), SpO2 90%.  Intake/Output last 3 Shifts:  I/O last 3 completed shifts:  In: 240 (4 mL/kg) [P.O.:240]  Out: 1775 (29.5 mL/kg) [Urine:1775 (0.8 mL/kg/hr)]  Weight: 60.1 kg     Relevant Results               This patient has a central line   Reason for the central line remaining today? Parenteral medication    This patient has a urinary catheter   Reason for the urinary catheter remaining today? critically ill patient who need accurate urinary output measurements    Scheduled medications  amiodarone, 400 mg, oral, BID   Followed by  [START ON 11/24/2024] amiodarone, 200 mg, oral, Daily  ascorbic acid, 500 mg, oral, Daily  aspirin, 81 mg, oral, Daily  cholecalciferol, 800 Units, oral, Daily  doxycycline, 100 mg, oral, q12h MASSIEL  fluticasone furoate-vilanteroL, 1 puff, inhalation, Daily  insulin lispro, 0-10 Units, subcutaneous, Before meals & nightly  losartan, 25 mg, oral, Daily  [Held by provider] metFORMIN, 500 mg, oral, BID  metoprolol succinate XL, 50 mg, oral, Daily  mupirocin, , " Each Nostril, TID  nicotine, 1 patch, transdermal, Daily  oxygen, , inhalation, Continuous - Inhalation  pantoprazole, 40 mg, oral, Daily  potassium chloride CR, 20 mEq, oral, Once  rivaroxaban, 20 mg, oral, Daily with evening meal  rosuvastatin, 20 mg, oral, Daily  tamsulosin, 0.4 mg, oral, Daily  tiotropium, 2 puff, inhalation, Daily  zinc oxide, 1 Application, Topical, TID      Continuous medications     PRN medications  PRN medications: benzonatate, dextrose, dextrose, glucagon, glucagon, prochlorperazine  ECG 12 lead    Result Date: 11/16/2024  Sinus tachycardia Otherwise normal ECG When compared with ECG of 12-NOV-2024 14:23, (unconfirmed) No significant change was found See ED provider note for full interpretation and clinical correlation Confirmed by Leila Luke ([id number]) on 11/16/2024 2:15:23 PM    ECG 12 lead    Result Date: 11/16/2024  Sinus tachycardia Otherwise normal ECG When compared with ECG of 01-NOV-2024 08:10, Vent. rate has increased BY  42 BPM Criteria for Septal infarct are no longer Present T wave amplitude has decreased in Lateral leads See ED provider note for full interpretation and clinical correlation Confirmed by Leila Luke ([id number]) on 11/16/2024 2:15:13 PM    Electrocardiogram, 12-lead PRN ACS symptoms    Result Date: 11/15/2024  Atrial fibrillation with rapid ventricular response with premature ventricular or aberrantly conducted complexes Nonspecific ST and T wave abnormality Abnormal ECG When compared with ECG of 12-NOV-2024 15:52, (unconfirmed) Atrial fibrillation has replaced Sinus rhythm ST now depressed in Lateral leads Nonspecific T wave abnormality now evident in Inferior leads Nonspecific T wave abnormality now evident in Lateral leads    CT angio chest for pulmonary embolism    Result Date: 11/12/2024  Interpreted By:  Bryce Estrada, STUDY: CT ANGIO CHEST FOR PULMONARY EMBOLISM;  11/12/2024 3:08 pm   INDICATION: Signs/Symptoms:History of lung cancer shortness of breath COPD  hypoxemic significant weight loss.   COMPARISON: 10/16/2024   ACCESSION NUMBER(S): [id number]   ORDERING CLINICIAN: LINUS BERNAL   TECHNIQUE: Helical data acquisition of the chest was obtained with  75 mL Omnipaque 350. Images were reformatted in axial, coronal, and sagittal planes.MIP reformatted images were also generated.   FINDINGS: LUNGS and AIRWAYS: Right upper lobe mass, difficult to differentiate from adjacent consolidation, grossly appears decreased in size from 9.1 cm 8.1 cm. Central necrosis has increased.   Previously, the right lung was collapsed. There is now aeration of the previously occluded right mainstem bronchus as well as the lobar branches of the middle and lower lobes. There is persistent occlusion of the upper lobe bronchus. The previously collapsed upper, middle, and lower lobes are now aerated, although there are diffusely scattered areas of predominantly peripheral consolidation. There are also peripheral areas of new consolidation scattered throughout the left lung. A new nodule in the left lung base measuring 23 mm is centrally cavitated.   Underlying mild pulmonary fibrosis. Trace right pleural effusion.   MEDIASTINUM and SUDHAKAR, LOWER NECK AND AXILLA: The visualized thyroid gland is grossly unremarkable.   A left paratracheal node is enlarged from 8 mm to 10 mm short axis. The right upper lobe mass infiltrates the mediastinum and is contiguous with adjacent lymphadenopathy which is difficult to measure discretely. Pretracheal node is roughly unchanged at 18 mm short axis. A 13 mm subcarinal node appears new.   Esophagus is not dilated.   HEART and VESSELS: The heart is normal in gross morphology and size.   No significant pericardial effusion.   Severe coronary atherosclerosis.   Thoracic aorta is severely atherosclerotic but otherwise patent without aneurysm. The great vessels are patent. Right IJ chest port catheter is still present. There is persistent narrowing of the SVC relating  to compression by adjacent tumor.   No pulmonary embolism is identified.   UPPER ABDOMEN: The visualized subdiaphragmatic structures demonstrate no acute findings on limited images.   CHEST WALL and OSSEOUS STRUCTURES: No suspicious osseous lesions. Multilevel degenerative changes of the thoracic spine.         1.  No pulmonary embolism identified. 2. Grossly right upper lobe mass is decreased in size with increased central necrosis. 3. Previous collapse of the right lung has resolved. There are however extensive new areas of bilateral airspace consolidation suggestive of multifocal pneumonia. A new cavitated nodule in the left lung base may be infectious or inflammatory although metastasis is not excluded. 4. Trace right pleural effusion. 5. Mediastinal lymphadenopathy has slightly progressed.     Signed by: Bryce Estrada 11/12/2024 3:45 PM Dictation workstation:   [id number]    ECG 12 Lead    Result Date: 11/1/2024  Normal sinus rhythm Possible Left atrial enlargement Septal infarct , age undetermined Abnormal ECG When compared with ECG of 23-SEP-2024 01:03, Incomplete left bundle branch block is no longer Present Confirmed by Lisa Ko ([id number]) on 11/1/2024 12:18:47 PM    Rad Onc CT Sim Images    Result Date: 10/30/2024  These images are not reportable by radiology and will not be interpreted by  Radiologists.   Results for orders placed or performed during the hospital encounter of 11/12/24 (from the past 24 hours)   POCT GLUCOSE   Result Value Ref Range    POCT Glucose 101 (H) 74 - 99 mg/dL   POCT GLUCOSE   Result Value Ref Range    POCT Glucose 141 (H) 74 - 99 mg/dL   Comprehensive Metabolic Panel   Result Value Ref Range    Glucose 92 74 - 99 mg/dL    Sodium 127 (L) 136 - 145 mmol/L    Potassium 3.4 (L) 3.5 - 5.3 mmol/L    Chloride 89 (L) 98 - 107 mmol/L    Bicarbonate 32 21 - 32 mmol/L    Anion Gap 9 (L) 10 - 20 mmol/L    Urea Nitrogen 5 (L) 6 - 23 mg/dL    Creatinine 0.40 (L) 0.50 - 1.30 mg/dL    eGFR  >90 >60 mL/min/1.73m*2    Calcium 7.4 (L) 8.6 - 10.3 mg/dL    Albumin 2.0 (L) 3.4 - 5.0 g/dL    Alkaline Phosphatase 85 33 - 136 U/L    Total Protein 4.8 (L) 6.4 - 8.2 g/dL    AST 20 9 - 39 U/L    Bilirubin, Total 0.3 0.0 - 1.2 mg/dL    ALT 15 10 - 52 U/L   CBC and Auto Differential   Result Value Ref Range    WBC 12.7 (H) 4.4 - 11.3 x10*3/uL    nRBC 0.0 0.0 - 0.0 /100 WBCs    RBC 2.58 (L) 4.50 - 5.90 x10*6/uL    Hemoglobin 7.5 (L) 13.5 - 17.5 g/dL    Hematocrit 23.4 (L) 41.0 - 52.0 %    MCV 91 80 - 100 fL    MCH 29.1 26.0 - 34.0 pg    MCHC 32.1 32.0 - 36.0 g/dL    RDW 14.2 11.5 - 14.5 %    Platelets 169 150 - 450 x10*3/uL    Neutrophils % 89.9 40.0 - 80.0 %    Immature Granulocytes %, Automated 2.0 (H) 0.0 - 0.9 %    Lymphocytes % 1.9 13.0 - 44.0 %    Monocytes % 6.0 2.0 - 10.0 %    Eosinophils % 0.0 0.0 - 6.0 %    Basophils % 0.2 0.0 - 2.0 %    Neutrophils Absolute 11.45 (H) 1.60 - 5.50 x10*3/uL    Immature Granulocytes Absolute, Automated 0.25 0.00 - 0.50 x10*3/uL    Lymphocytes Absolute 0.24 (L) 0.80 - 3.00 x10*3/uL    Monocytes Absolute 0.76 0.05 - 0.80 x10*3/uL    Eosinophils Absolute 0.00 0.00 - 0.40 x10*3/uL    Basophils Absolute 0.03 0.00 - 0.10 x10*3/uL   POCT GLUCOSE   Result Value Ref Range    POCT Glucose 93 74 - 99 mg/dL   POCT GLUCOSE   Result Value Ref Range    POCT Glucose 149 (H) 74 - 99 mg/dL              Assessment/Plan   Assessment & Plan  MRSA pneumonia (Multi)    CAD (coronary artery disease)    Hypertension    Atrial fibrillation (Multi)    Malignant neoplasm of upper lobe of right lung (Multi)    74m with squamous cell lung cancer diagnosed 8/2024 currently on chemoradiation, COPD, chronic respiratory failure on 3-4L home O2, recurrent R lung collapse with current R main bronchus stent, HFrEF EF 35-40%, afib on Xarelto, HTN, HLD, GERD, CAD, T2DM who was admitted for PNA. Pulm consulted for lung CA, COPD, hypoxia.      #Acute on chronic hypoxic respiratory failure, normally 3-4L NC, 5L NC now    #MRSA pneumonia, +nares and sputum   #COPD, stable  #HFrEF, stable  #hx R lung collapse s/p R main bronchus stent  -11/12 CTA chest reviewed, R main bronchus was patent. Other prior imaging reviewed. CXR 11/18 showed aeration of R lung, unlikely occluded.   -Continue doxycycline   -Continue Breo and Spiriva, consider prn nebs   -Pulmonary hygiene      Nichole Gómez DO PGY3 IM     Discussed with Dr. Delatorre     Inpatient consult to Pulmonology  Consult performed by: Nichole Gómez DO  Consult ordered by: Tiffanie Viera MD

## 2024-11-19 NOTE — PROGRESS NOTES
11/19/24 1119   Discharge Planning   Expected Discharge Disposition SNF  (Referral also sent to [facility] at spouse request, will await responses from facilities.)

## 2024-11-19 NOTE — PROGRESS NOTES
11/19/24 1111   Discharge Planning   Expected Discharge Disposition SNF  ([facility] unable to accept. Spouse provide next choices of [facility] and [facility]. [facility]. Refferals being sent, will need direct precert.)

## 2024-11-19 NOTE — PROGRESS NOTES
Juan Carlos Cr is a 74 y.o. male on day 7 of admission presenting with Pneumonia due to gram-negative bacteria (Multi).    Subjective   Juan Carlos Cr is a 74 yr old male with PMH of squamous cell lung cancer diagnosed 8/2024 currently on chemoradiation, right lung collapse s/p endobronchial ultrasound, bronchial stent 8/2024, COPD on home O2 4L, paroxysmal A-fib on Rivaroxaban (diagnosed 8/2024), history of MI, CAD with remote PCI 1997, HFrEF (EF 35-40% 8/2024), HTN, and DM, who was admitted on 11/12 for 3-4 day history of dyspnea on minimal exertion, productive cough, wheezing.     -Today at bedside, pt reports that he is having intermittent SOB with exertion, such as when he is talking.    -He notes that he still has a cough, bringing up clear mucus.   -He states he is on 4 L O2 at home.  -Denies chest pain, palpitations, abdominal pain, nausea, vomiting, diarrhea.        Objective     Physical Exam  Constitutional:       General: He is not in acute distress.     Appearance: He is ill-appearing. He is not toxic-appearing or diaphoretic.      Comments: Thin and frail appearing man. Laying in bed. Conversational dyspnea. NC in place. Noted to be coughing intermittently and bringing up clear sputum.    Cardiovascular:      Rate and Rhythm: Normal rate and regular rhythm.      Pulses: Normal pulses.      Heart sounds: Normal heart sounds. No murmur heard.  Pulmonary:      Comments: Mild dyspnea when talking. NC in place. Coughing intermittently, wet cough, with clear mucus.   Bilateral lung crackles, with mild wheezing heard after coughs. Decreased breath sounds on the RUL and RLL.   Chest:      Chest wall: No tenderness.   Abdominal:      General: Abdomen is flat. There is no distension.      Palpations: Abdomen is soft.      Tenderness: There is no abdominal tenderness. There is no guarding.   Musculoskeletal:      Right lower leg: Edema present.      Left lower leg: Edema present.      Comments: Loss of muscle  "mass in the neck, chest, abdomen.    Skin:     General: Skin is warm and dry.      Findings: No rash.      Comments: Right port, no surrounding erythema or drainage.   Mild bruising noted on left forearm near IV site. No erythema or drainage. Not warm to touch.    Neurological:      General: No focal deficit present.      Mental Status: He is alert and oriented to person, place, and time.      Motor: Weakness present.         Last Recorded Vitals  Blood pressure 121/63, pulse 88, temperature 36.2 °C (97.2 °F), temperature source Temporal, resp. rate 23, height 1.753 m (5' 9\"), weight 60.1 kg (132 lb 9.6 oz), SpO2 92%.  Intake/Output last 3 Shifts:  I/O last 3 completed shifts:  In: 240 (4 mL/kg) [P.O.:240]  Out: 1775 (29.5 mL/kg) [Urine:1775 (0.8 mL/kg/hr)]  Weight: 60.1 kg     Relevant Results  Scheduled medications  amiodarone, 400 mg, oral, BID   Followed by  [START ON 11/24/2024] amiodarone, 200 mg, oral, Daily  ascorbic acid, 500 mg, oral, Daily  aspirin, 81 mg, oral, Daily  cholecalciferol, 800 Units, oral, Daily  doxycycline, 100 mg, oral, q12h MASSIEL  fluticasone furoate-vilanteroL, 1 puff, inhalation, Daily  insulin lispro, 0-10 Units, subcutaneous, Before meals & nightly  losartan, 25 mg, oral, Daily  [Held by provider] metFORMIN, 500 mg, oral, BID  metoprolol succinate XL, 50 mg, oral, Daily  mupirocin, , Each Nostril, TID  nicotine, 1 patch, transdermal, Daily  oxygen, , inhalation, Continuous - Inhalation  pantoprazole, 40 mg, oral, Daily  rivaroxaban, 20 mg, oral, Daily with evening meal  rosuvastatin, 20 mg, oral, Daily  tamsulosin, 0.4 mg, oral, Daily  tiotropium, 2 puff, inhalation, Daily  zinc oxide, 1 Application, Topical, TID      Continuous medications     PRN medications  PRN medications: benzonatate, dextrose, dextrose, glucagon, glucagon, prochlorperazine    Results for orders placed or performed during the hospital encounter of 11/12/24 (from the past 24 hours)   POCT GLUCOSE   Result Value Ref " Range    POCT Glucose 169 (H) 74 - 99 mg/dL   POCT GLUCOSE   Result Value Ref Range    POCT Glucose 101 (H) 74 - 99 mg/dL   POCT GLUCOSE   Result Value Ref Range    POCT Glucose 141 (H) 74 - 99 mg/dL   Comprehensive Metabolic Panel   Result Value Ref Range    Glucose 92 74 - 99 mg/dL    Sodium 127 (L) 136 - 145 mmol/L    Potassium 3.4 (L) 3.5 - 5.3 mmol/L    Chloride 89 (L) 98 - 107 mmol/L    Bicarbonate 32 21 - 32 mmol/L    Anion Gap 9 (L) 10 - 20 mmol/L    Urea Nitrogen 5 (L) 6 - 23 mg/dL    Creatinine 0.40 (L) 0.50 - 1.30 mg/dL    eGFR >90 >60 mL/min/1.73m*2    Calcium 7.4 (L) 8.6 - 10.3 mg/dL    Albumin 2.0 (L) 3.4 - 5.0 g/dL    Alkaline Phosphatase 85 33 - 136 U/L    Total Protein 4.8 (L) 6.4 - 8.2 g/dL    AST 20 9 - 39 U/L    Bilirubin, Total 0.3 0.0 - 1.2 mg/dL    ALT 15 10 - 52 U/L   CBC and Auto Differential   Result Value Ref Range    WBC 12.7 (H) 4.4 - 11.3 x10*3/uL    nRBC 0.0 0.0 - 0.0 /100 WBCs    RBC 2.58 (L) 4.50 - 5.90 x10*6/uL    Hemoglobin 7.5 (L) 13.5 - 17.5 g/dL    Hematocrit 23.4 (L) 41.0 - 52.0 %    MCV 91 80 - 100 fL    MCH 29.1 26.0 - 34.0 pg    MCHC 32.1 32.0 - 36.0 g/dL    RDW 14.2 11.5 - 14.5 %    Platelets 169 150 - 450 x10*3/uL    Neutrophils % 89.9 40.0 - 80.0 %    Immature Granulocytes %, Automated 2.0 (H) 0.0 - 0.9 %    Lymphocytes % 1.9 13.0 - 44.0 %    Monocytes % 6.0 2.0 - 10.0 %    Eosinophils % 0.0 0.0 - 6.0 %    Basophils % 0.2 0.0 - 2.0 %    Neutrophils Absolute 11.45 (H) 1.60 - 5.50 x10*3/uL    Immature Granulocytes Absolute, Automated 0.25 0.00 - 0.50 x10*3/uL    Lymphocytes Absolute 0.24 (L) 0.80 - 3.00 x10*3/uL    Monocytes Absolute 0.76 0.05 - 0.80 x10*3/uL    Eosinophils Absolute 0.00 0.00 - 0.40 x10*3/uL    Basophils Absolute 0.03 0.00 - 0.10 x10*3/uL   POCT GLUCOSE   Result Value Ref Range    POCT Glucose 93 74 - 99 mg/dL       ECG 12 lead    Result Date: 11/16/2024  Sinus tachycardia Otherwise normal ECG When compared with ECG of 12-NOV-2024 14:23, (unconfirmed) No  significant change was found See ED provider note for full interpretation and clinical correlation Confirmed by Leila Luke ([id number]) on 11/16/2024 2:15:23 PM    ECG 12 lead    Result Date: 11/16/2024  Sinus tachycardia Otherwise normal ECG When compared with ECG of 01-NOV-2024 08:10, Vent. rate has increased BY  42 BPM Criteria for Septal infarct are no longer Present T wave amplitude has decreased in Lateral leads See ED provider note for full interpretation and clinical correlation Confirmed by Leila Luke ([id number]) on 11/16/2024 2:15:13 PM    Electrocardiogram, 12-lead PRN ACS symptoms    Result Date: 11/15/2024  Atrial fibrillation with rapid ventricular response with premature ventricular or aberrantly conducted complexes Nonspecific ST and T wave abnormality Abnormal ECG When compared with ECG of 12-NOV-2024 15:52, (unconfirmed) Atrial fibrillation has replaced Sinus rhythm ST now depressed in Lateral leads Nonspecific T wave abnormality now evident in Inferior leads Nonspecific T wave abnormality now evident in Lateral leads    CT angio chest for pulmonary embolism    Result Date: 11/12/2024  Interpreted By:  Bryce Estrada, STUDY: CT ANGIO CHEST FOR PULMONARY EMBOLISM;  11/12/2024 3:08 pm   INDICATION: Signs/Symptoms:History of lung cancer shortness of breath COPD hypoxemic significant weight loss.   COMPARISON: 10/16/2024   ACCESSION NUMBER(S): [id number]   ORDERING CLINICIAN: LINUS BERNAL   TECHNIQUE: Helical data acquisition of the chest was obtained with  75 mL Omnipaque 350. Images were reformatted in axial, coronal, and sagittal planes.MIP reformatted images were also generated.   FINDINGS: LUNGS and AIRWAYS: Right upper lobe mass, difficult to differentiate from adjacent consolidation, grossly appears decreased in size from 9.1 cm 8.1 cm. Central necrosis has increased.   Previously, the right lung was collapsed. There is now aeration of the previously occluded right mainstem bronchus as well as the  lobar branches of the middle and lower lobes. There is persistent occlusion of the upper lobe bronchus. The previously collapsed upper, middle, and lower lobes are now aerated, although there are diffusely scattered areas of predominantly peripheral consolidation. There are also peripheral areas of new consolidation scattered throughout the left lung. A new nodule in the left lung base measuring 23 mm is centrally cavitated.   Underlying mild pulmonary fibrosis. Trace right pleural effusion.   MEDIASTINUM and SUDHAKAR, LOWER NECK AND AXILLA: The visualized thyroid gland is grossly unremarkable.   A left paratracheal node is enlarged from 8 mm to 10 mm short axis. The right upper lobe mass infiltrates the mediastinum and is contiguous with adjacent lymphadenopathy which is difficult to measure discretely. Pretracheal node is roughly unchanged at 18 mm short axis. A 13 mm subcarinal node appears new.   Esophagus is not dilated.   HEART and VESSELS: The heart is normal in gross morphology and size.   No significant pericardial effusion.   Severe coronary atherosclerosis.   Thoracic aorta is severely atherosclerotic but otherwise patent without aneurysm. The great vessels are patent. Right IJ chest port catheter is still present. There is persistent narrowing of the SVC relating to compression by adjacent tumor.   No pulmonary embolism is identified.   UPPER ABDOMEN: The visualized subdiaphragmatic structures demonstrate no acute findings on limited images.   CHEST WALL and OSSEOUS STRUCTURES: No suspicious osseous lesions. Multilevel degenerative changes of the thoracic spine.         1.  No pulmonary embolism identified. 2. Grossly right upper lobe mass is decreased in size with increased central necrosis. 3. Previous collapse of the right lung has resolved. There are however extensive new areas of bilateral airspace consolidation suggestive of multifocal pneumonia. A new cavitated nodule in the left lung base may be  infectious or inflammatory although metastasis is not excluded. 4. Trace right pleural effusion. 5. Mediastinal lymphadenopathy has slightly progressed.     Signed by: Bryce Estrada 11/12/2024 3:45 PM Dictation workstation:   [id number]    ECG 12 Lead    Result Date: 11/1/2024  Normal sinus rhythm Possible Left atrial enlargement Septal infarct , age undetermined Abnormal ECG When compared with ECG of 23-SEP-2024 01:03, Incomplete left bundle branch block is no longer Present Confirmed by Lisa Ko ([id number]) on 11/1/2024 12:18:47 PM    Rad Onc CT Sim Images    Result Date: 10/30/2024  These images are not reportable by radiology and will not be interpreted by  Radiologists.           Assessment/Plan   Principal Problem:    Pneumonia due to gram-negative bacteria (Multi)  Active Problems:    CAD (coronary artery disease)    Hypertension    Atrial fibrillation (Multi)    Malignant neoplasm of upper lobe of right lung (Multi)    Multifocal pneumonia with MRSA positive, with history of COPD on home O2 4L  - 11/12 CT: extensive new areas of bilateral airspace consolidation suggestive of multifocal pneumonia, with trace right pleural effusion, slightly progressed mediastinal lymphadenopathy  - MRSA positive  - 11/12: MRSA positive nares swab  - 11/12: Strep pneumo and Legionella urine antigen negative  - 11/13: MRSA positive sputum  - 11/18 CXR pending radiology read.   - WBC up-trending today at 12.7, with absolute neutrophil 11.45  - ID consulted - recommend oral Doxy x3 weeks with discharge.   - Pulm consulted   - Completed 5 day course of Vancomycin and 3 days of Pip-tazo  - Doxycyline 100mg q12h oral  - Breo Elllipta 1 puff daily   - Spiriva Respimat 2 puffs daily   - NC 2-5L    2. Squamous cell lung cancer, on chemoradiation  - 11/12 CT: RUL with increased central necrosis, new cavitated nodule in left lung base  - Palliative care consulted.   - Per pulm note, pt was recommended by [facility] bronchoscopy team to have  repeat bronchoscopy in next 1-2 weeks. Pt states he is not aware of this.   - Currently on chemoradiation.   - Follow up with radiation oncology and oncology after discharge.     3. Atrial fibrillation with RVR   - Cardiology consulted   - Rivaroxaban 20mg daily oral   - Amiodarone 400mg BID x10 days PO, today is day 4.     - Amiodarone 200mg PO starting 11/24   - Cardiology outpatient follow up with Leslie LARKIN.     4. Urinary retention    - Failed 1x voiding trial on 11/17  - Urology consulted.   - Pending second voiding trial, likely Wed or Thurs.   - Tamsulosin Flomax 0.4 mg daily oral   - Urinary catheter in place    5. Hyponatremia    - 11/19 Na: 127   - Likely secondary to SIADH given pt's history of squamous cell lung cancer.     6. Hypokalemia   - 11/19 K: 3.4   - Cardio recommends keeping K > 4   - Repleted K with potassium chloride 20 mEq tablet    7. Normocytic anemia   - 11/19: hemoglobin 7.5, with MCV 91 and hematocrit 23.4   - Will continue to monitor.     8. HTN   - Cardiology consulted  - Losartan 25 mg daily oral   - Metoprolol succinate XL 50mg daily oral    9. HLD   - Rosuvastatin 20mg daily oral    10. CAD with history of cardiac stent   - Continue Aspirin 81mg daily    11. DM   - Hold Metformin    - ISS    12. GERD   - Pantoprazole 40mg daily oral    13. History of tobacco use   - Nicotine patch    Code status: full   PT/OT jamarcus Whitehead DO  PGY-1, Family Medicine  [facility]

## 2024-11-19 NOTE — PROGRESS NOTES
11/19/24 1501   Discharge Planning   Expected Discharge Disposition SNF  ([facility]. [facility] and [facility] to confirm sister Velia Byrne([phone]) will provide transport to Chemo 1 day/week and radiation 5 days/week.)

## 2024-11-19 NOTE — CARE PLAN
The patient's goals for the shift include      The clinical goals for the shift include Patient will have no complaints of SOB throughout shifft.

## 2024-11-20 ENCOUNTER — APPOINTMENT (OUTPATIENT)
Dept: PRIMARY CARE | Facility: CLINIC | Age: 74
End: 2024-11-20
Payer: MEDICARE

## 2024-11-20 ENCOUNTER — APPOINTMENT (OUTPATIENT)
Dept: RADIATION ONCOLOGY | Facility: CLINIC | Age: 74
End: 2024-11-20
Payer: MEDICARE

## 2024-11-20 ENCOUNTER — APPOINTMENT (OUTPATIENT)
Dept: HEMATOLOGY/ONCOLOGY | Facility: HOSPITAL | Age: 74
End: 2024-11-20
Payer: MEDICARE

## 2024-11-20 LAB
ALBUMIN SERPL BCP-MCNC: 2 G/DL (ref 3.4–5)
ALP SERPL-CCNC: 91 U/L (ref 33–136)
ALT SERPL W P-5'-P-CCNC: 15 U/L (ref 10–52)
ANION GAP SERPL CALC-SCNC: 10 MMOL/L (ref 10–20)
AST SERPL W P-5'-P-CCNC: 18 U/L (ref 9–39)
BASOPHILS # BLD AUTO: 0.02 X10*3/UL (ref 0–0.1)
BASOPHILS NFR BLD AUTO: 0.2 %
BILIRUB SERPL-MCNC: 0.3 MG/DL (ref 0–1.2)
BUN SERPL-MCNC: 6 MG/DL (ref 6–23)
CALCIUM SERPL-MCNC: 7.4 MG/DL (ref 8.6–10.3)
CHLORIDE SERPL-SCNC: 85 MMOL/L (ref 98–107)
CO2 SERPL-SCNC: 34 MMOL/L (ref 21–32)
CREAT SERPL-MCNC: 0.43 MG/DL (ref 0.5–1.3)
EGFRCR SERPLBLD CKD-EPI 2021: >90 ML/MIN/1.73M*2
EOSINOPHIL # BLD AUTO: 0.01 X10*3/UL (ref 0–0.4)
EOSINOPHIL NFR BLD AUTO: 0.1 %
ERYTHROCYTE [DISTWIDTH] IN BLOOD BY AUTOMATED COUNT: 14.1 % (ref 11.5–14.5)
GLUCOSE BLD MANUAL STRIP-MCNC: 109 MG/DL (ref 74–99)
GLUCOSE BLD MANUAL STRIP-MCNC: 126 MG/DL (ref 74–99)
GLUCOSE BLD MANUAL STRIP-MCNC: 145 MG/DL (ref 74–99)
GLUCOSE BLD MANUAL STRIP-MCNC: 75 MG/DL (ref 74–99)
GLUCOSE SERPL-MCNC: 82 MG/DL (ref 74–99)
HCT VFR BLD AUTO: 24.1 % (ref 41–52)
HGB BLD-MCNC: 7.4 G/DL (ref 13.5–17.5)
IMM GRANULOCYTES # BLD AUTO: 0.23 X10*3/UL (ref 0–0.5)
IMM GRANULOCYTES NFR BLD AUTO: 1.8 % (ref 0–0.9)
LYMPHOCYTES # BLD AUTO: 0.28 X10*3/UL (ref 0.8–3)
LYMPHOCYTES NFR BLD AUTO: 2.2 %
MCH RBC QN AUTO: 28.7 PG (ref 26–34)
MCHC RBC AUTO-ENTMCNC: 30.7 G/DL (ref 32–36)
MCV RBC AUTO: 93 FL (ref 80–100)
MONOCYTES # BLD AUTO: 0.85 X10*3/UL (ref 0.05–0.8)
MONOCYTES NFR BLD AUTO: 6.6 %
NEUTROPHILS # BLD AUTO: 11.53 X10*3/UL (ref 1.6–5.5)
NEUTROPHILS NFR BLD AUTO: 89.1 %
NRBC BLD-RTO: 0 /100 WBCS (ref 0–0)
PHOSPHATE SERPL-MCNC: 2.9 MG/DL (ref 2.5–4.9)
PLATELET # BLD AUTO: 143 X10*3/UL (ref 150–450)
POTASSIUM SERPL-SCNC: 4.3 MMOL/L (ref 3.5–5.3)
PROT SERPL-MCNC: 5 G/DL (ref 6.4–8.2)
RBC # BLD AUTO: 2.58 X10*6/UL (ref 4.5–5.9)
SODIUM SERPL-SCNC: 125 MMOL/L (ref 136–145)
WBC # BLD AUTO: 12.9 X10*3/UL (ref 4.4–11.3)

## 2024-11-20 PROCEDURE — 2500000002 HC RX 250 W HCPCS SELF ADMINISTERED DRUGS (ALT 637 FOR MEDICARE OP, ALT 636 FOR OP/ED): Performed by: STUDENT IN AN ORGANIZED HEALTH CARE EDUCATION/TRAINING PROGRAM

## 2024-11-20 PROCEDURE — 99232 SBSQ HOSP IP/OBS MODERATE 35: CPT

## 2024-11-20 PROCEDURE — 84100 ASSAY OF PHOSPHORUS: CPT | Performed by: FAMILY MEDICINE

## 2024-11-20 PROCEDURE — 2500000001 HC RX 250 WO HCPCS SELF ADMINISTERED DRUGS (ALT 637 FOR MEDICARE OP): Performed by: NURSE PRACTITIONER

## 2024-11-20 PROCEDURE — 2500000002 HC RX 250 W HCPCS SELF ADMINISTERED DRUGS (ALT 637 FOR MEDICARE OP, ALT 636 FOR OP/ED): Performed by: INTERNAL MEDICINE

## 2024-11-20 PROCEDURE — 36415 COLL VENOUS BLD VENIPUNCTURE: CPT | Performed by: STUDENT IN AN ORGANIZED HEALTH CARE EDUCATION/TRAINING PROGRAM

## 2024-11-20 PROCEDURE — S4991 NICOTINE PATCH NONLEGEND: HCPCS | Performed by: INTERNAL MEDICINE

## 2024-11-20 PROCEDURE — 2500000001 HC RX 250 WO HCPCS SELF ADMINISTERED DRUGS (ALT 637 FOR MEDICARE OP): Performed by: STUDENT IN AN ORGANIZED HEALTH CARE EDUCATION/TRAINING PROGRAM

## 2024-11-20 PROCEDURE — 2500000002 HC RX 250 W HCPCS SELF ADMINISTERED DRUGS (ALT 637 FOR MEDICARE OP, ALT 636 FOR OP/ED): Performed by: NURSE PRACTITIONER

## 2024-11-20 PROCEDURE — 2060000001 HC INTERMEDIATE ICU ROOM DAILY

## 2024-11-20 PROCEDURE — 80053 COMPREHEN METABOLIC PANEL: CPT | Performed by: STUDENT IN AN ORGANIZED HEALTH CARE EDUCATION/TRAINING PROGRAM

## 2024-11-20 PROCEDURE — 94760 N-INVAS EAR/PLS OXIMETRY 1: CPT

## 2024-11-20 PROCEDURE — 2500000005 HC RX 250 GENERAL PHARMACY W/O HCPCS: Performed by: STUDENT IN AN ORGANIZED HEALTH CARE EDUCATION/TRAINING PROGRAM

## 2024-11-20 PROCEDURE — 82947 ASSAY GLUCOSE BLOOD QUANT: CPT

## 2024-11-20 PROCEDURE — 85025 COMPLETE CBC W/AUTO DIFF WBC: CPT | Performed by: STUDENT IN AN ORGANIZED HEALTH CARE EDUCATION/TRAINING PROGRAM

## 2024-11-20 PROCEDURE — 99232 SBSQ HOSP IP/OBS MODERATE 35: CPT | Performed by: FAMILY MEDICINE

## 2024-11-20 PROCEDURE — 2500000001 HC RX 250 WO HCPCS SELF ADMINISTERED DRUGS (ALT 637 FOR MEDICARE OP): Performed by: INTERNAL MEDICINE

## 2024-11-20 RX ORDER — GUAIFENESIN 100 MG/5ML
200 SOLUTION ORAL EVERY 4 HOURS PRN
Status: COMPLETED | OUTPATIENT
Start: 2024-11-20 | End: 2024-11-20

## 2024-11-20 RX ORDER — IPRATROPIUM BROMIDE AND ALBUTEROL SULFATE 2.5; .5 MG/3ML; MG/3ML
3 SOLUTION RESPIRATORY (INHALATION)
Status: DISCONTINUED | OUTPATIENT
Start: 2024-11-20 | End: 2024-11-20

## 2024-11-20 RX ORDER — IPRATROPIUM BROMIDE AND ALBUTEROL SULFATE 2.5; .5 MG/3ML; MG/3ML
3 SOLUTION RESPIRATORY (INHALATION) EVERY 4 HOURS PRN
Status: DISCONTINUED | OUTPATIENT
Start: 2024-11-20 | End: 2024-11-22

## 2024-11-20 RX ORDER — ACETAMINOPHEN 325 MG/1
650 TABLET ORAL EVERY 6 HOURS PRN
Status: DISCONTINUED | OUTPATIENT
Start: 2024-11-20 | End: 2024-11-27 | Stop reason: HOSPADM

## 2024-11-20 ASSESSMENT — COGNITIVE AND FUNCTIONAL STATUS - GENERAL
EATING MEALS: A LOT
DRESSING REGULAR UPPER BODY CLOTHING: A LOT
DAILY ACTIVITIY SCORE: 12
CLIMB 3 TO 5 STEPS WITH RAILING: A LOT
WALKING IN HOSPITAL ROOM: A LITTLE
CLIMB 3 TO 5 STEPS WITH RAILING: A LOT
MOVING FROM LYING ON BACK TO SITTING ON SIDE OF FLAT BED WITH BEDRAILS: A LITTLE
TOILETING: A LOT
MOVING TO AND FROM BED TO CHAIR: A LOT
EATING MEALS: A LOT
HELP NEEDED FOR BATHING: A LOT
TURNING FROM BACK TO SIDE WHILE IN FLAT BAD: A LITTLE
MOVING FROM LYING ON BACK TO SITTING ON SIDE OF FLAT BED WITH BEDRAILS: A LITTLE
DRESSING REGULAR UPPER BODY CLOTHING: A LOT
DRESSING REGULAR LOWER BODY CLOTHING: A LOT
PERSONAL GROOMING: A LOT
DRESSING REGULAR LOWER BODY CLOTHING: A LOT
STANDING UP FROM CHAIR USING ARMS: A LITTLE
MOBILITY SCORE: 16
STANDING UP FROM CHAIR USING ARMS: A LITTLE
PERSONAL GROOMING: A LOT
WALKING IN HOSPITAL ROOM: A LITTLE
MOBILITY SCORE: 16
TOILETING: A LOT
HELP NEEDED FOR BATHING: A LOT
MOVING TO AND FROM BED TO CHAIR: A LOT
DAILY ACTIVITIY SCORE: 12
TURNING FROM BACK TO SIDE WHILE IN FLAT BAD: A LITTLE

## 2024-11-20 ASSESSMENT — PAIN SCALES - GENERAL
PAINLEVEL_OUTOF10: 0 - NO PAIN
PAINLEVEL_OUTOF10: 0 - NO PAIN

## 2024-11-20 ASSESSMENT — PAIN - FUNCTIONAL ASSESSMENT: PAIN_FUNCTIONAL_ASSESSMENT: 0-10

## 2024-11-20 NOTE — CARE PLAN
The clinical goals for the shift include Patient will not report SOB throughout shift.          Problem: Nutrition  Goal: BG  mg/dL  Outcome: Progressing     Problem: Pain - Adult  Goal: Verbalizes/displays adequate comfort level or baseline comfort level  Outcome: Progressing     Problem: Safety - Adult  Goal: Free from fall injury  Outcome: Progressing     Problem: Discharge Planning  Goal: Discharge to home or other facility with appropriate resources  Outcome: Progressing     Problem: Chronic Conditions and Co-morbidities  Goal: Patient's chronic conditions and co-morbidity symptoms are monitored and maintained or improved  Outcome: Progressing     Problem: Skin  Goal: Promote skin healing  Outcome: Progressing     Problem: Fall/Injury  Goal: Not fall by end of shift  Outcome: Progressing

## 2024-11-20 NOTE — PROGRESS NOTES
"Juan Carlos Cr is a 74 y.o. male on day 8 of admission presenting with MRSA pneumonia (Multi).    Subjective   This morning pt is on 5L NC. He desaturates to 80s SpO2 at times, otherwise in low 90s.   He feels the same. He says he has been up and about to the chair yesterday.          Objective     Physical Exam  Constitutional: No acute distress. On 5L NC. Appears thin   Eyes: clear sclera, good eye contact   Head: atraumatic, normocephalic  Lungs: mildly coarse lung sounds b/l   Heart: RRR, no murmurs, rubs, or gallops  Abdomen: non-tender to palpation, + BS  MSK: no edema, bruising, or tenderness to BLEs.   Neuro: moving all extremities. answering questions, following commands  Skin: warm and dry with no rash  Psych: appropriate mood and behavior     Last Recorded Vitals  Blood pressure 101/57, pulse 87, temperature 36.5 °C (97.7 °F), temperature source Temporal, resp. rate 18, height 1.753 m (5' 9\"), weight 60.1 kg (132 lb 9.6 oz), SpO2 98%.  Intake/Output last 3 Shifts:  I/O last 3 completed shifts:  In: 240 (4 mL/kg) [P.O.:240]  Out: 1800 (29.9 mL/kg) [Urine:1800 (0.8 mL/kg/hr)]  Weight: 60.1 kg     Relevant Results                 Scheduled medications  amiodarone, 400 mg, oral, BID   Followed by  [START ON 11/24/2024] amiodarone, 200 mg, oral, Daily  ascorbic acid, 500 mg, oral, Daily  aspirin, 81 mg, oral, Daily  cholecalciferol, 800 Units, oral, Daily  doxycycline, 100 mg, oral, q12h MASSIEL  fluticasone furoate-vilanteroL, 1 puff, inhalation, Daily  insulin lispro, 0-10 Units, subcutaneous, Before meals & nightly  losartan, 25 mg, oral, Daily  [Held by provider] metFORMIN, 500 mg, oral, BID  metoprolol succinate XL, 50 mg, oral, Daily  mupirocin, , Each Nostril, TID  nicotine, 1 patch, transdermal, Daily  oxygen, , inhalation, Continuous - Inhalation  pantoprazole, 40 mg, oral, Daily  rivaroxaban, 20 mg, oral, Daily with evening meal  rosuvastatin, 20 mg, oral, Daily  tamsulosin, 0.4 mg, oral, " Daily  tiotropium, 2 puff, inhalation, Daily  zinc oxide, 1 Application, Topical, TID      Continuous medications     PRN medications  PRN medications: benzonatate, dextrose, dextrose, glucagon, glucagon, guaiFENesin, ipratropium-albuteroL, prochlorperazine  XR chest 1 view    Result Date: 11/20/2024  Interpreted By:  Tanya Caballero, STUDY: XR CHEST 1 VIEW; ;  11/18/2024 2:02 pm   INDICATION: Signs/Symptoms:dyspnea.     COMPARISON: Chest radiograph dated 10/18/2024   ACCESSION NUMBER(S): [id number]   ORDERING CLINICIAN: LUIS HORTA   FINDINGS: Right chest wall [id number] is noted with its tip projecting over the expected region of distal SVC and is similar compared to prior radiograph. Cardiac silhouette is similar compared to immediate prior radiograph. There is persistent evidence of near complete opacification of right upper lung zone. There is interval improvement in airspace opacities in the right mid to lower lung zones compared to immediate prior radiograph with improved aeration in the right lower lung zone. However there is interval increase in airspace infiltrates in the left mid to lower lung zone, new compared to prior radiograph dated 10/18/2024 and appears to be slightly increased compared to immediate prior CT examination dated 11/12/2024 (within limitation of differences in technique). There is small right thoracic pleural effusion. No large pneumothorax within limits of portable technique. No acute osseous findings.       1. Findings concerning for interval worsening of airspace opacities in the left mid to lower lung zone compared to prior radiograph dated 10/18/2024 and immediate prior CT chest dated 11/12/2024 (within limitation of differences in technique). 2. Redemonstration of right lung mass with interval improved aeration in right lung base compared to prior radiograph as described above.       MACRO: None   Signed by: Tanya Caballero 11/20/2024 8:45 AM Dictation workstation:    [id number]    ECG 12 lead    Result Date: 11/16/2024  Sinus tachycardia Otherwise normal ECG When compared with ECG of 12-NOV-2024 14:23, (unconfirmed) No significant change was found See ED provider note for full interpretation and clinical correlation Confirmed by Leila Luke ([id number]) on 11/16/2024 2:15:23 PM    ECG 12 lead    Result Date: 11/16/2024  Sinus tachycardia Otherwise normal ECG When compared with ECG of 01-NOV-2024 08:10, Vent. rate has increased BY  42 BPM Criteria for Septal infarct are no longer Present T wave amplitude has decreased in Lateral leads See ED provider note for full interpretation and clinical correlation Confirmed by Leila Luke ([id number]) on 11/16/2024 2:15:13 PM    Electrocardiogram, 12-lead PRN ACS symptoms    Result Date: 11/15/2024  Atrial fibrillation with rapid ventricular response with premature ventricular or aberrantly conducted complexes Nonspecific ST and T wave abnormality Abnormal ECG When compared with ECG of 12-NOV-2024 15:52, (unconfirmed) Atrial fibrillation has replaced Sinus rhythm ST now depressed in Lateral leads Nonspecific T wave abnormality now evident in Inferior leads Nonspecific T wave abnormality now evident in Lateral leads    CT angio chest for pulmonary embolism    Result Date: 11/12/2024  Interpreted By:  Bryce Estrada, STUDY: CT ANGIO CHEST FOR PULMONARY EMBOLISM;  11/12/2024 3:08 pm   INDICATION: Signs/Symptoms:History of lung cancer shortness of breath COPD hypoxemic significant weight loss.   COMPARISON: 10/16/2024   ACCESSION NUMBER(S): [id number]   ORDERING CLINICIAN: LINUS BERNAL   TECHNIQUE: Helical data acquisition of the chest was obtained with  75 mL Omnipaque 350. Images were reformatted in axial, coronal, and sagittal planes.MIP reformatted images were also generated.   FINDINGS: LUNGS and AIRWAYS: Right upper lobe mass, difficult to differentiate from adjacent consolidation, grossly appears decreased in size from 9.1 cm 8.1 cm. Central  necrosis has increased.   Previously, the right lung was collapsed. There is now aeration of the previously occluded right mainstem bronchus as well as the lobar branches of the middle and lower lobes. There is persistent occlusion of the upper lobe bronchus. The previously collapsed upper, middle, and lower lobes are now aerated, although there are diffusely scattered areas of predominantly peripheral consolidation. There are also peripheral areas of new consolidation scattered throughout the left lung. A new nodule in the left lung base measuring 23 mm is centrally cavitated.   Underlying mild pulmonary fibrosis. Trace right pleural effusion.   MEDIASTINUM and SUDHAKAR, LOWER NECK AND AXILLA: The visualized thyroid gland is grossly unremarkable.   A left paratracheal node is enlarged from 8 mm to 10 mm short axis. The right upper lobe mass infiltrates the mediastinum and is contiguous with adjacent lymphadenopathy which is difficult to measure discretely. Pretracheal node is roughly unchanged at 18 mm short axis. A 13 mm subcarinal node appears new.   Esophagus is not dilated.   HEART and VESSELS: The heart is normal in gross morphology and size.   No significant pericardial effusion.   Severe coronary atherosclerosis.   Thoracic aorta is severely atherosclerotic but otherwise patent without aneurysm. The great vessels are patent. Right IJ chest port catheter is still present. There is persistent narrowing of the SVC relating to compression by adjacent tumor.   No pulmonary embolism is identified.   UPPER ABDOMEN: The visualized subdiaphragmatic structures demonstrate no acute findings on limited images.   CHEST WALL and OSSEOUS STRUCTURES: No suspicious osseous lesions. Multilevel degenerative changes of the thoracic spine.         1.  No pulmonary embolism identified. 2. Grossly right upper lobe mass is decreased in size with increased central necrosis. 3. Previous collapse of the right lung has resolved. There are  however extensive new areas of bilateral airspace consolidation suggestive of multifocal pneumonia. A new cavitated nodule in the left lung base may be infectious or inflammatory although metastasis is not excluded. 4. Trace right pleural effusion. 5. Mediastinal lymphadenopathy has slightly progressed.     Signed by: Bryce Estrada 11/12/2024 3:45 PM Dictation workstation:   [id number]    ECG 12 Lead    Result Date: 11/1/2024  Normal sinus rhythm Possible Left atrial enlargement Septal infarct , age undetermined Abnormal ECG When compared with ECG of 23-SEP-2024 01:03, Incomplete left bundle branch block is no longer Present Confirmed by Lisa Ko ([id number]) on 11/1/2024 12:18:47 PM    Rad Onc CT Sim Images    Result Date: 10/30/2024  These images are not reportable by radiology and will not be interpreted by  Radiologists.   Results for orders placed or performed during the hospital encounter of 11/12/24 (from the past 24 hours)   POCT GLUCOSE   Result Value Ref Range    POCT Glucose 137 (H) 74 - 99 mg/dL   POCT GLUCOSE   Result Value Ref Range    POCT Glucose 108 (H) 74 - 99 mg/dL   Comprehensive Metabolic Panel   Result Value Ref Range    Glucose 82 74 - 99 mg/dL    Sodium 125 (L) 136 - 145 mmol/L    Potassium 4.3 3.5 - 5.3 mmol/L    Chloride 85 (L) 98 - 107 mmol/L    Bicarbonate 34 (H) 21 - 32 mmol/L    Anion Gap 10 10 - 20 mmol/L    Urea Nitrogen 6 6 - 23 mg/dL    Creatinine 0.43 (L) 0.50 - 1.30 mg/dL    eGFR >90 >60 mL/min/1.73m*2    Calcium 7.4 (L) 8.6 - 10.3 mg/dL    Albumin 2.0 (L) 3.4 - 5.0 g/dL    Alkaline Phosphatase 91 33 - 136 U/L    Total Protein 5.0 (L) 6.4 - 8.2 g/dL    AST 18 9 - 39 U/L    Bilirubin, Total 0.3 0.0 - 1.2 mg/dL    ALT 15 10 - 52 U/L   CBC and Auto Differential   Result Value Ref Range    WBC 12.9 (H) 4.4 - 11.3 x10*3/uL    nRBC 0.0 0.0 - 0.0 /100 WBCs    RBC 2.58 (L) 4.50 - 5.90 x10*6/uL    Hemoglobin 7.4 (L) 13.5 - 17.5 g/dL    Hematocrit 24.1 (L) 41.0 - 52.0 %    MCV 93 80 - 100  fL    MCH 28.7 26.0 - 34.0 pg    MCHC 30.7 (L) 32.0 - 36.0 g/dL    RDW 14.1 11.5 - 14.5 %    Platelets 143 (L) 150 - 450 x10*3/uL    Neutrophils % 89.1 40.0 - 80.0 %    Immature Granulocytes %, Automated 1.8 (H) 0.0 - 0.9 %    Lymphocytes % 2.2 13.0 - 44.0 %    Monocytes % 6.6 2.0 - 10.0 %    Eosinophils % 0.1 0.0 - 6.0 %    Basophils % 0.2 0.0 - 2.0 %    Neutrophils Absolute 11.53 (H) 1.60 - 5.50 x10*3/uL    Immature Granulocytes Absolute, Automated 0.23 0.00 - 0.50 x10*3/uL    Lymphocytes Absolute 0.28 (L) 0.80 - 3.00 x10*3/uL    Monocytes Absolute 0.85 (H) 0.05 - 0.80 x10*3/uL    Eosinophils Absolute 0.01 0.00 - 0.40 x10*3/uL    Basophils Absolute 0.02 0.00 - 0.10 x10*3/uL   Phosphorus   Result Value Ref Range    Phosphorus 2.9 2.5 - 4.9 mg/dL   POCT GLUCOSE   Result Value Ref Range    POCT Glucose 75 74 - 99 mg/dL   POCT GLUCOSE   Result Value Ref Range    POCT Glucose 145 (H) 74 - 99 mg/dL            Assessment/Plan   Assessment & Plan  MRSA pneumonia (Multi)    CAD (coronary artery disease)    Hypertension    Atrial fibrillation (Multi)    Malignant neoplasm of upper lobe of right lung (Multi)      74m with squamous cell lung cancer diagnosed 8/2024 currently on chemoradiation, COPD, chronic respiratory failure on 3-4L home O2, recurrent R lung collapse with current R main bronchus stent, HFrEF EF 35-40%, afib on Xarelto, HTN, HLD, GERD, CAD, T2DM who was admitted for PNA. Pulm consulted for lung CA, COPD, hypoxia.      #Acute on chronic hypoxic respiratory failure, normally 3-4L NC, 5L NC now   #MRSA pneumonia, +nares and sputum   #COPD, stable  #HFrEF, stable  #hx R lung collapse s/p R main bronchus stent  -11/12 CTA chest reviewed, R main bronchus was patent. Other prior imaging reviewed. CXR 11/18 showed aeration of R lung, unlikely occluded as of then   -Continue doxycycline   -Continue Breo and Spiriva, prn nebs   -Pulmonary hygiene      Nichole Gómez DO PGY3 IM    Discussed with Dr. Delatorre

## 2024-11-20 NOTE — PROGRESS NOTES
"Occupational Therapy                 Therapy Communication Note    Patient Name: Juan Carlos Cr  MRN: [id number]  Department: [address]  Room: [address] [place], [place] [address]  Today's Date: 11/20/2024     Discipline: Occupational Therapy    Missed Visit Reason: Patient refused (Pt refused treatment, insisted, \"I'm just not up to that this afternoon.\" Reluctantly agreeable to consider treatment tomorrow. Communicated with nurse. No OT treatment.)    Missed Time: Attempt @ 1316    Comment:  "

## 2024-11-20 NOTE — PROGRESS NOTES
"Physical Therapy                 Therapy Communication Note    Patient Name: Juan Carlos Cr  MRN: [id number]  Department: [address]  Room: [address] [place], [place] [address]  Today's Date: 11/20/2024     Discipline: Physical Therapy    Missed Visit Reason: Missed Visit Reason: Patient refused (\"I just did all that, I walked in the room, bathroom and sat in the chair with the nurse, I'd like to rest now\" , Nursing assistance arrived and stated \"yes he did all that\". encouraged the continuation of functional mobility with nrusing/patient. no tx.)    Missed Time: [id number]    Comment:  "

## 2024-11-20 NOTE — PROGRESS NOTES
11/20/24 1540   Discharge Planning   Living Arrangements Spouse/significant other   Support Systems Spouse/significant other;Children   Assistance Needed Patient is A&Ox3,  independent in ADL's, uses no assistive devices, O2 at 2-3L (unknown company)   Type of Residence Private residence   Number of Stairs to Enter Residence 2   Number of Stairs Within Residence 13   Do you have animals or pets at home? Yes   Type of Animals or Pets 1 cat   Who is requesting discharge planning? Provider   Home or Post Acute Services Post acute facilities (Rehab/SNF/etc)   Type of Post Acute Facility Services Skilled nursing   Expected Discharge Disposition SNF  ([facility] rehab willing to accept, direct precert submitted 11/20 and pending, currently on 5L here and 2-3 baseline)   Does the patient need discharge transport arranged? Yes   RoundTrip coordination needed? Yes   Has discharge transport been arranged? No   Patient Choice   Provider Choice list and CMS website (https://medicare.gov/care-compare#search) for post-acute Quality and Resource Measure Data were provided and reviewed with: Family     11/20/24 @ 1620: Precert Status: Approved  Greg Fraser. ID: [id number]  Dates: 11/21/2024 - 11/25/2024

## 2024-11-20 NOTE — PROGRESS NOTES
Juan Carlos Cr is a 74 y.o. male on day 8 of admission presenting with MRSA pneumonia (Multi).    Subjective   Interval History: no fever, no new complaints,       Review of Systems    Objective   Range of Vitals (last 24 hours)  Heart Rate:  []   Temp:  [36.5 °C (97.7 °F)-37.4 °C (99.4 °F)]   Resp:  [18-26]   BP: ()/(55-64)   SpO2:  [90 %-99 %]   Daily Weight  11/12/24 : 60.1 kg (132 lb 9.6 oz)    Body mass index is 19.58 kg/m².    Physical Exam  Constitutional:       Appearance: Normal appearance.   HENT:      Head: Normocephalic and atraumatic.      Mouth/Throat:      Mouth: Mucous membranes are moist.      Pharynx: Oropharynx is clear.   Eyes:      Pupils: Pupils are equal, round, and reactive to light.   Cardiovascular:      Rate and Rhythm: Normal rate and regular rhythm.      Heart sounds: Normal heart sounds.   Pulmonary:      Effort: Pulmonary effort is normal.      Breath sounds: Normal breath sounds.   Abdominal:      General: Abdomen is flat. Bowel sounds are normal.      Palpations: Abdomen is soft.   Musculoskeletal:      Cervical back: Normal range of motion.   Neurological:      Mental Status: He is alert.         Antibiotics  doxycycline - 100 mg, 100 mg  mupirocin - 2 %    Relevant Results  Labs  Results from last 72 hours   Lab Units 11/20/24  0601 11/19/24  0542 11/18/24  0525   WBC AUTO x10*3/uL 12.9* 12.7* 10.2   HEMOGLOBIN g/dL 7.4* 7.5* 7.5*   HEMATOCRIT % 24.1* 23.4* 23.7*   PLATELETS AUTO x10*3/uL 143* 169 177   NEUTROS PCT AUTO % 89.1 89.9 90.2   LYMPHS PCT AUTO % 2.2 1.9 2.3   MONOS PCT AUTO % 6.6 6.0 5.9   EOS PCT AUTO % 0.1 0.0 0.1     Results from last 72 hours   Lab Units 11/20/24  0601 11/19/24  0542 11/18/24  0525   SODIUM mmol/L 125* 127* 128*   POTASSIUM mmol/L 4.3 3.4* 3.2*   CHLORIDE mmol/L 85* 89* 87*   CO2 mmol/L 34* 32 31   BUN mg/dL 6 5* 5*   CREATININE mg/dL 0.43* 0.40* 0.35*   GLUCOSE mg/dL 82 92 103*   CALCIUM mg/dL 7.4* 7.4* 7.0*   ANION GAP mmol/L 10 9* 13    EGFR mL/min/1.73m*2 >90 >90 >90   PHOSPHORUS mg/dL 2.9  --   --      Results from last 72 hours   Lab Units 11/20/24  0601 11/19/24  0542 11/18/24  0525   ALK PHOS U/L 91 85 80   BILIRUBIN TOTAL mg/dL 0.3 0.3 0.3   PROTEIN TOTAL g/dL 5.0* 4.8* 4.1*   ALT U/L 15 15 14   AST U/L 18 20 16   ALBUMIN g/dL 2.0* 2.0* 1.9*     Estimated Creatinine Clearance: 125 mL/min (A) (by C-G formula based on SCr of 0.43 mg/dL (L)).  C-Reactive Protein   Date Value Ref Range Status   11/13/2024 33.57 (H) <1.00 mg/dL Final     Microbiology  Reviewed  Imaging  Reviewed        Assessment/Plan   Respiratory failure / COPD / pneumonia / abscess, MRSA screen is positive, procalcitonin 2.30, sputum with MRSA  NSCLC / Rt bronchus stent / on Taxol / Carboplatin / radiation     Recommendations :  Plan on oral Doxycycline x 3 weeks with discharge  Discussed with the medical team     I spent minutes in the professional and overall care of this patient.      Krysten Magallon MD

## 2024-11-20 NOTE — PROGRESS NOTES
Juan Carlos Cr is a 74 y.o. male on day 8 of admission presenting with MRSA pneumonia (Multi).    Subjective   Juan Carlos Cr is a 74 yr old male with PMH of squamous cell lung cancer diagnosed 8/2024 currently on chemoradiation, right lung collapse s/p endobronchial ultrasound, bronchial stent 8/2024, COPD on home O2 4L, paroxysmal A-fib on Rivaroxaban (diagnosed 8/2024), history of MI, CAD with remote PCI 1997, HFrEF (EF 35-40% 8/2024), HTN, and DM, who was admitted on 11/12 for 3-4 day history of dyspnea on minimal exertion, productive cough, wheezing.     -Today at bedside, pt reports that his SOB has improved compared to yesterday. He states he is no longer short of breath while talking.   -He also reports improvement in his cough, stating that it has decreased in frequency, along with decreased sputum production today.   -Pt likes the cough medicine because it is helping him.   -Nurse states that he received Tessalon last night and Robitussin at 2:30am. Both are PRN medications.   -He also notes that his urinary catheter was just removed, but he has not yet voided.   -He denies abdominal pain, nausea, vomiting.   -He had a BM this morning. No diarrhea.   -He denies chest pain, palpitations, leg swelling.   -Of note, it is noted that pt's blood pressure is low at 99/55 this morning. Chart review indicates he was given Metoprolol, but not Losartan.   -Pt denies lightheadedness, dizziness, changes in vision.        Objective     Physical Exam  Constitutional:       General: He is not in acute distress.     Appearance: He is ill-appearing. He is not toxic-appearing or diaphoretic.      Comments: Thin and frail appearing man. Laying in bed. NC in place. Attempting to call his wife. Having difficulty trying to sit up in bed due to weakness.    Cardiovascular:      Rate and Rhythm: Normal rate and regular rhythm.      Pulses: Normal pulses.      Heart sounds: Normal heart sounds. No murmur heard.  Pulmonary:       "Effort: No respiratory distress.      Breath sounds: No stridor. No wheezing.      Comments: NC in place. No conversational dyspnea. Coughing intermittently.   Bilateral lung crackles, worse on right. Decreased breath sounds on the RUL and RLL.   Chest:      Chest wall: No tenderness.   Abdominal:      General: Abdomen is flat. Bowel sounds are normal. There is no distension.      Palpations: Abdomen is soft.      Tenderness: There is no abdominal tenderness. There is no guarding.   Musculoskeletal:      Right lower leg: No edema.      Left lower leg: No edema.      Comments: Loss of muscle mass in the neck, chest, abdomen.    Skin:     General: Skin is warm and dry.      Findings: No rash.      Comments: Right port, no surrounding erythema or drainage.   Mild bruising noted on left forearm near IV site. No erythema or drainage. Not warm to touch.   2x2cm raised nodule noted on the back, overlying the thoracic spine, between bilateral scapula. No tenderness with palpation. Mildly erythematous. Not warm to touch. No drainage.    Neurological:      General: No focal deficit present.      Mental Status: He is alert and oriented to person, place, and time.      Motor: Weakness present.         Last Recorded Vitals  Blood pressure 99/55, pulse 87, temperature 36.6 °C (97.9 °F), temperature source Temporal, resp. rate 18, height 1.753 m (5' 9\"), weight 60.1 kg (132 lb 9.6 oz), SpO2 98%.  Intake/Output last 3 Shifts:  I/O last 3 completed shifts:  In: 240 (4 mL/kg) [P.O.:240]  Out: 1800 (29.9 mL/kg) [Urine:1800 (0.8 mL/kg/hr)]  Weight: 60.1 kg     Relevant Results  Scheduled medications  amiodarone, 400 mg, oral, BID   Followed by  [START ON 11/24/2024] amiodarone, 200 mg, oral, Daily  ascorbic acid, 500 mg, oral, Daily  aspirin, 81 mg, oral, Daily  cholecalciferol, 800 Units, oral, Daily  doxycycline, 100 mg, oral, q12h MASSIEL  fluticasone furoate-vilanteroL, 1 puff, inhalation, Daily  insulin lispro, 0-10 Units, " subcutaneous, Before meals & nightly  losartan, 25 mg, oral, Daily  [Held by provider] metFORMIN, 500 mg, oral, BID  metoprolol succinate XL, 50 mg, oral, Daily  mupirocin, , Each Nostril, TID  nicotine, 1 patch, transdermal, Daily  oxygen, , inhalation, Continuous - Inhalation  pantoprazole, 40 mg, oral, Daily  rivaroxaban, 20 mg, oral, Daily with evening meal  rosuvastatin, 20 mg, oral, Daily  tamsulosin, 0.4 mg, oral, Daily  tiotropium, 2 puff, inhalation, Daily  zinc oxide, 1 Application, Topical, TID      Continuous medications     PRN medications  PRN medications: benzonatate, dextrose, dextrose, glucagon, glucagon, guaiFENesin, prochlorperazine    Results for orders placed or performed during the hospital encounter of 11/12/24 (from the past 24 hours)   POCT GLUCOSE   Result Value Ref Range    POCT Glucose 149 (H) 74 - 99 mg/dL   POCT GLUCOSE   Result Value Ref Range    POCT Glucose 137 (H) 74 - 99 mg/dL   POCT GLUCOSE   Result Value Ref Range    POCT Glucose 108 (H) 74 - 99 mg/dL   Comprehensive Metabolic Panel   Result Value Ref Range    Glucose 82 74 - 99 mg/dL    Sodium 125 (L) 136 - 145 mmol/L    Potassium 4.3 3.5 - 5.3 mmol/L    Chloride 85 (L) 98 - 107 mmol/L    Bicarbonate 34 (H) 21 - 32 mmol/L    Anion Gap 10 10 - 20 mmol/L    Urea Nitrogen 6 6 - 23 mg/dL    Creatinine 0.43 (L) 0.50 - 1.30 mg/dL    eGFR >90 >60 mL/min/1.73m*2    Calcium 7.4 (L) 8.6 - 10.3 mg/dL    Albumin 2.0 (L) 3.4 - 5.0 g/dL    Alkaline Phosphatase 91 33 - 136 U/L    Total Protein 5.0 (L) 6.4 - 8.2 g/dL    AST 18 9 - 39 U/L    Bilirubin, Total 0.3 0.0 - 1.2 mg/dL    ALT 15 10 - 52 U/L   CBC and Auto Differential   Result Value Ref Range    WBC 12.9 (H) 4.4 - 11.3 x10*3/uL    nRBC 0.0 0.0 - 0.0 /100 WBCs    RBC 2.58 (L) 4.50 - 5.90 x10*6/uL    Hemoglobin 7.4 (L) 13.5 - 17.5 g/dL    Hematocrit 24.1 (L) 41.0 - 52.0 %    MCV 93 80 - 100 fL    MCH 28.7 26.0 - 34.0 pg    MCHC 30.7 (L) 32.0 - 36.0 g/dL    RDW 14.1 11.5 - 14.5 %     Platelets 143 (L) 150 - 450 x10*3/uL    Neutrophils % 89.1 40.0 - 80.0 %    Immature Granulocytes %, Automated 1.8 (H) 0.0 - 0.9 %    Lymphocytes % 2.2 13.0 - 44.0 %    Monocytes % 6.6 2.0 - 10.0 %    Eosinophils % 0.1 0.0 - 6.0 %    Basophils % 0.2 0.0 - 2.0 %    Neutrophils Absolute 11.53 (H) 1.60 - 5.50 x10*3/uL    Immature Granulocytes Absolute, Automated 0.23 0.00 - 0.50 x10*3/uL    Lymphocytes Absolute 0.28 (L) 0.80 - 3.00 x10*3/uL    Monocytes Absolute 0.85 (H) 0.05 - 0.80 x10*3/uL    Eosinophils Absolute 0.01 0.00 - 0.40 x10*3/uL    Basophils Absolute 0.02 0.00 - 0.10 x10*3/uL   Phosphorus   Result Value Ref Range    Phosphorus 2.9 2.5 - 4.9 mg/dL   POCT GLUCOSE   Result Value Ref Range    POCT Glucose 75 74 - 99 mg/dL       ECG 12 lead    Result Date: 11/16/2024  Sinus tachycardia Otherwise normal ECG When compared with ECG of 12-NOV-2024 14:23, (unconfirmed) No significant change was found See ED provider note for full interpretation and clinical correlation Confirmed by Leila Luke ([id number]) on 11/16/2024 2:15:23 PM    ECG 12 lead    Result Date: 11/16/2024  Sinus tachycardia Otherwise normal ECG When compared with ECG of 01-NOV-2024 08:10, Vent. rate has increased BY  42 BPM Criteria for Septal infarct are no longer Present T wave amplitude has decreased in Lateral leads See ED provider note for full interpretation and clinical correlation Confirmed by Leila Luke ([id number]) on 11/16/2024 2:15:13 PM    Electrocardiogram, 12-lead PRN ACS symptoms    Result Date: 11/15/2024  Atrial fibrillation with rapid ventricular response with premature ventricular or aberrantly conducted complexes Nonspecific ST and T wave abnormality Abnormal ECG When compared with ECG of 12-NOV-2024 15:52, (unconfirmed) Atrial fibrillation has replaced Sinus rhythm ST now depressed in Lateral leads Nonspecific T wave abnormality now evident in Inferior leads Nonspecific T wave abnormality now evident in Lateral leads    CT angio chest  for pulmonary embolism    Result Date: 11/12/2024  Interpreted By:  Bryce Estrada, STUDY: CT ANGIO CHEST FOR PULMONARY EMBOLISM;  11/12/2024 3:08 pm   INDICATION: Signs/Symptoms:History of lung cancer shortness of breath COPD hypoxemic significant weight loss.   COMPARISON: 10/16/2024   ACCESSION NUMBER(S): [id number]   ORDERING CLINICIAN: LINUS BERNAL   TECHNIQUE: Helical data acquisition of the chest was obtained with  75 mL Omnipaque 350. Images were reformatted in axial, coronal, and sagittal planes.MIP reformatted images were also generated.   FINDINGS: LUNGS and AIRWAYS: Right upper lobe mass, difficult to differentiate from adjacent consolidation, grossly appears decreased in size from 9.1 cm 8.1 cm. Central necrosis has increased.   Previously, the right lung was collapsed. There is now aeration of the previously occluded right mainstem bronchus as well as the lobar branches of the middle and lower lobes. There is persistent occlusion of the upper lobe bronchus. The previously collapsed upper, middle, and lower lobes are now aerated, although there are diffusely scattered areas of predominantly peripheral consolidation. There are also peripheral areas of new consolidation scattered throughout the left lung. A new nodule in the left lung base measuring 23 mm is centrally cavitated.   Underlying mild pulmonary fibrosis. Trace right pleural effusion.   MEDIASTINUM and SUDHAKAR, LOWER NECK AND AXILLA: The visualized thyroid gland is grossly unremarkable.   A left paratracheal node is enlarged from 8 mm to 10 mm short axis. The right upper lobe mass infiltrates the mediastinum and is contiguous with adjacent lymphadenopathy which is difficult to measure discretely. Pretracheal node is roughly unchanged at 18 mm short axis. A 13 mm subcarinal node appears new.   Esophagus is not dilated.   HEART and VESSELS: The heart is normal in gross morphology and size.   No significant pericardial effusion.   Severe coronary  atherosclerosis.   Thoracic aorta is severely atherosclerotic but otherwise patent without aneurysm. The great vessels are patent. Right IJ chest port catheter is still present. There is persistent narrowing of the SVC relating to compression by adjacent tumor.   No pulmonary embolism is identified.   UPPER ABDOMEN: The visualized subdiaphragmatic structures demonstrate no acute findings on limited images.   CHEST WALL and OSSEOUS STRUCTURES: No suspicious osseous lesions. Multilevel degenerative changes of the thoracic spine.         1.  No pulmonary embolism identified. 2. Grossly right upper lobe mass is decreased in size with increased central necrosis. 3. Previous collapse of the right lung has resolved. There are however extensive new areas of bilateral airspace consolidation suggestive of multifocal pneumonia. A new cavitated nodule in the left lung base may be infectious or inflammatory although metastasis is not excluded. 4. Trace right pleural effusion. 5. Mediastinal lymphadenopathy has slightly progressed.     Signed by: Bryce Estrada 11/12/2024 3:45 PM Dictation workstation:   [id number]    ECG 12 Lead    Result Date: 11/1/2024  Normal sinus rhythm Possible Left atrial enlargement Septal infarct , age undetermined Abnormal ECG When compared with ECG of 23-SEP-2024 01:03, Incomplete left bundle branch block is no longer Present Confirmed by Lisa Ko ([id number]) on 11/1/2024 12:18:47 PM    Rad Onc CT Sim Images    Result Date: 10/30/2024  These images are not reportable by radiology and will not be interpreted by  Radiologists.           Assessment/Plan   Principal Problem:    MRSA pneumonia (Multi)  Active Problems:    CAD (coronary artery disease)    Hypertension    Atrial fibrillation (Multi)    Malignant neoplasm of upper lobe of right lung (Multi)    Acute on chronic respiratory failure 2/2 multifocal pneumonia, MRSA positive, COPD on home O2 4L  - CT 11/12 reviewed - multifocal pneumonia, trace  right pleural effusion  - MRSA positive - nares and sputum   - 11/18 CXR reviewed - worsening of airspace opacities in left mid to lower lung, right lung mass with improved aeration in right lung base  - WBC up-trending today at 12.9, compared to 12.7 yesterday.   - Absolute neutrophil 11.53  - Completed 5 day course of Vancomycin and 3 days of Pip-tazo  - Doxycyline 100mg q12h oral for 3 weeks, today is day 4  - Breo Elllipta 1 puff daily   - Spiriva Respimat 2 puffs daily   - Tessalon 100mg TID PRN for cough  - Robitussin 200mg q4h PRN for congestion   - NC 2-5L  - ID consulted    - Pulm consulted   - Care transitions team working with pt and family - SNF referrals  - Noted that pt's bicarb is 34 today. Likely due to respiratory acidosis with metabolic compensation 2/2 to obstructive disease retaining CO2. Will continue to monitor.     2. Squamous cell lung cancer, on chemoradiation  - 11/12 CT: RUL with increased central necrosis, new cavitated nodule in left lung base  - 11/18 CXR: right lung mass with improved aeration in right lung base  - Currently on chemoradiation.   - Palliative care consulted.   - Follow up with radiation oncology and oncology after discharge.   - Chart review indicates that pt is due for repeat bronchoscopy this week or next week.   - Awaiting SNF confirmation     3. Atrial fibrillation with RVR   - Cardiology consulted   - Rivaroxaban 20mg daily oral   - Amiodarone 400mg BID x10 days PO, today is day 5.     - Amiodarone 200mg PO starting 11/24   - Cardiology outpatient follow up with Leslie LARKIN.     - Pt has apt with Dr. Ko on 1/10/2025    4. Urinary retention    - Failed 1x voiding trial on 11/17  - Second voiding trial today, with removal of urinary catheter  - Urology consulted.    - Tamsulosin Flomax 0.4 mg daily oral    5. Hyponatremia    - Today Na+ 125   - Likely secondary to SIADH given pt's history of squamous cell lung cancer.     6. Hypokalemia   - Resolved  - Today K+  4.3   - Cardio recommends keeping K > 4   - Replete if necessary tomorrow    7. Normocytic anemia   - Hemoglobin 7.4, with MCV 93 and hematocrit 24.1   - Will continue to monitor.     8. HTN   - BP 99/55 mmHg this morning. Pt was given Metoprolol, but Losartan was held.  - Cardiology consulted  - Losartan 25 mg daily oral   - Metoprolol succinate XL 50mg daily oral    9. HLD   - Rosuvastatin 20mg daily oral    10. CAD with history of cardiac stent   - Continue Aspirin 81mg daily    11. DM   - Hold Metformin    - ISS    12. GERD   - Pantoprazole 40mg daily oral    13. History of tobacco use   - Nicotine patch    Code status: full   PT/OT jamarcus Whitehead,   PGY-1, Family Medicine  [facility]        I saw and evaluated the patient. I personally obtained the key and critical portions of the history and physical exam or was physically present for key and critical portions performed by the resident. I reviewed the resident's documentation and discussed the patient with the resident. I agree with the resident's medical decision making as documented in the note.

## 2024-11-21 ENCOUNTER — APPOINTMENT (OUTPATIENT)
Dept: RADIOLOGY | Facility: HOSPITAL | Age: 74
End: 2024-11-21
Payer: MEDICARE

## 2024-11-21 ENCOUNTER — HOSPITAL ENCOUNTER (OUTPATIENT)
Dept: RADIATION ONCOLOGY | Facility: CLINIC | Age: 74
Setting detail: RADIATION/ONCOLOGY SERIES
Discharge: HOME | End: 2024-11-21
Payer: MEDICARE

## 2024-11-21 PROBLEM — J96.21 ACUTE ON CHRONIC HYPOXIC RESPIRATORY FAILURE (MULTI): Status: ACTIVE | Noted: 2024-11-21

## 2024-11-21 LAB
ALBUMIN SERPL BCP-MCNC: 1.7 G/DL (ref 3.4–5)
ANION GAP SERPL CALC-SCNC: 12 MMOL/L (ref 10–20)
BUN SERPL-MCNC: 6 MG/DL (ref 6–23)
CALCIUM SERPL-MCNC: 6.9 MG/DL (ref 8.6–10.3)
CHLORIDE SERPL-SCNC: 84 MMOL/L (ref 98–107)
CHLORIDE UR-SCNC: 25 MMOL/L
CHLORIDE/CREATININE (MMOL/G) IN URINE: 38 MMOL/G CREAT (ref 23–275)
CO2 SERPL-SCNC: 32 MMOL/L (ref 21–32)
CREAT SERPL-MCNC: 0.45 MG/DL (ref 0.5–1.3)
CREAT UR-MCNC: 66 MG/DL (ref 20–370)
EGFRCR SERPLBLD CKD-EPI 2021: >90 ML/MIN/1.73M*2
ERYTHROCYTE [DISTWIDTH] IN BLOOD BY AUTOMATED COUNT: 14.2 % (ref 11.5–14.5)
GLUCOSE BLD MANUAL STRIP-MCNC: 104 MG/DL (ref 74–99)
GLUCOSE BLD MANUAL STRIP-MCNC: 117 MG/DL (ref 74–99)
GLUCOSE BLD MANUAL STRIP-MCNC: 93 MG/DL (ref 74–99)
GLUCOSE BLD MANUAL STRIP-MCNC: 95 MG/DL (ref 74–99)
GLUCOSE SERPL-MCNC: 110 MG/DL (ref 74–99)
HCT VFR BLD AUTO: 22.4 % (ref 41–52)
HGB BLD-MCNC: 7.2 G/DL (ref 13.5–17.5)
MCH RBC QN AUTO: 29.4 PG (ref 26–34)
MCHC RBC AUTO-ENTMCNC: 32.1 G/DL (ref 32–36)
MCV RBC AUTO: 91 FL (ref 80–100)
NRBC BLD-RTO: 0 /100 WBCS (ref 0–0)
OSMOLALITY SERPL: 246 MOSM/KG (ref 280–300)
OSMOLALITY UR: 338 MOSM/KG (ref 200–1200)
PHOSPHATE SERPL-MCNC: 3 MG/DL (ref 2.5–4.9)
PLATELET # BLD AUTO: 141 X10*3/UL (ref 150–450)
POTASSIUM SERPL-SCNC: 3.5 MMOL/L (ref 3.5–5.3)
POTASSIUM UR-SCNC: 37 MMOL/L
POTASSIUM/CREAT UR-RTO: 56 MMOL/G CREAT
RBC # BLD AUTO: 2.45 X10*6/UL (ref 4.5–5.9)
SODIUM SERPL-SCNC: 124 MMOL/L (ref 136–145)
SODIUM UR-SCNC: 11 MMOL/L
SODIUM/CREAT UR-RTO: 17 MMOL/G CREAT
WBC # BLD AUTO: 12 X10*3/UL (ref 4.4–11.3)

## 2024-11-21 PROCEDURE — 2500000002 HC RX 250 W HCPCS SELF ADMINISTERED DRUGS (ALT 637 FOR MEDICARE OP, ALT 636 FOR OP/ED): Performed by: INTERNAL MEDICINE

## 2024-11-21 PROCEDURE — 2500000005 HC RX 250 GENERAL PHARMACY W/O HCPCS: Performed by: FAMILY MEDICINE

## 2024-11-21 PROCEDURE — 94760 N-INVAS EAR/PLS OXIMETRY 1: CPT

## 2024-11-21 PROCEDURE — 2500000001 HC RX 250 WO HCPCS SELF ADMINISTERED DRUGS (ALT 637 FOR MEDICARE OP): Performed by: NURSE PRACTITIONER

## 2024-11-21 PROCEDURE — 2500000002 HC RX 250 W HCPCS SELF ADMINISTERED DRUGS (ALT 637 FOR MEDICARE OP, ALT 636 FOR OP/ED): Performed by: NURSE PRACTITIONER

## 2024-11-21 PROCEDURE — 82947 ASSAY GLUCOSE BLOOD QUANT: CPT

## 2024-11-21 PROCEDURE — 2500000001 HC RX 250 WO HCPCS SELF ADMINISTERED DRUGS (ALT 637 FOR MEDICARE OP): Performed by: INTERNAL MEDICINE

## 2024-11-21 PROCEDURE — 5A0945A ASSISTANCE WITH RESPIRATORY VENTILATION, 24-96 CONSECUTIVE HOURS, HIGH NASAL FLOW/VELOCITY: ICD-10-PCS | Performed by: FAMILY MEDICINE

## 2024-11-21 PROCEDURE — 83930 ASSAY OF BLOOD OSMOLALITY: CPT | Mod: GEALAB | Performed by: FAMILY MEDICINE

## 2024-11-21 PROCEDURE — 2500000001 HC RX 250 WO HCPCS SELF ADMINISTERED DRUGS (ALT 637 FOR MEDICARE OP): Performed by: STUDENT IN AN ORGANIZED HEALTH CARE EDUCATION/TRAINING PROGRAM

## 2024-11-21 PROCEDURE — 82436 ASSAY OF URINE CHLORIDE: CPT | Mod: GEALAB | Performed by: FAMILY MEDICINE

## 2024-11-21 PROCEDURE — S4991 NICOTINE PATCH NONLEGEND: HCPCS | Performed by: INTERNAL MEDICINE

## 2024-11-21 PROCEDURE — 2500000002 HC RX 250 W HCPCS SELF ADMINISTERED DRUGS (ALT 637 FOR MEDICARE OP, ALT 636 FOR OP/ED): Performed by: STUDENT IN AN ORGANIZED HEALTH CARE EDUCATION/TRAINING PROGRAM

## 2024-11-21 PROCEDURE — 83935 ASSAY OF URINE OSMOLALITY: CPT | Mod: GEALAB | Performed by: FAMILY MEDICINE

## 2024-11-21 PROCEDURE — 99233 SBSQ HOSP IP/OBS HIGH 50: CPT | Performed by: INTERNAL MEDICINE

## 2024-11-21 PROCEDURE — 84132 ASSAY OF SERUM POTASSIUM: CPT | Performed by: FAMILY MEDICINE

## 2024-11-21 PROCEDURE — 2060000001 HC INTERMEDIATE ICU ROOM DAILY

## 2024-11-21 PROCEDURE — 99233 SBSQ HOSP IP/OBS HIGH 50: CPT

## 2024-11-21 PROCEDURE — 85027 COMPLETE CBC AUTOMATED: CPT | Performed by: FAMILY MEDICINE

## 2024-11-21 PROCEDURE — 71046 X-RAY EXAM CHEST 2 VIEWS: CPT

## 2024-11-21 PROCEDURE — 2500000005 HC RX 250 GENERAL PHARMACY W/O HCPCS: Performed by: STUDENT IN AN ORGANIZED HEALTH CARE EDUCATION/TRAINING PROGRAM

## 2024-11-21 PROCEDURE — 2500000005 HC RX 250 GENERAL PHARMACY W/O HCPCS: Performed by: INTERNAL MEDICINE

## 2024-11-21 PROCEDURE — 71046 X-RAY EXAM CHEST 2 VIEWS: CPT | Mod: FOREIGN READ | Performed by: RADIOLOGY

## 2024-11-21 PROCEDURE — 2500000001 HC RX 250 WO HCPCS SELF ADMINISTERED DRUGS (ALT 637 FOR MEDICARE OP)

## 2024-11-21 RX ORDER — ALPRAZOLAM 0.5 MG/1
0.25 TABLET ORAL ONCE
Status: COMPLETED | OUTPATIENT
Start: 2024-11-21 | End: 2024-11-21

## 2024-11-21 RX ORDER — GUAIFENESIN/DEXTROMETHORPHAN 100-10MG/5
5 SYRUP ORAL EVERY 4 HOURS PRN
Status: DISCONTINUED | OUTPATIENT
Start: 2024-11-21 | End: 2024-11-27 | Stop reason: HOSPADM

## 2024-11-21 RX ORDER — GUAIFENESIN 100 MG/5ML
200 SOLUTION ORAL EVERY 4 HOURS PRN
Status: DISCONTINUED | OUTPATIENT
Start: 2024-11-21 | End: 2024-11-27

## 2024-11-21 RX ORDER — POTASSIUM CHLORIDE 1.5 G/1.58G
20 POWDER, FOR SOLUTION ORAL ONCE
Status: COMPLETED | OUTPATIENT
Start: 2024-11-21 | End: 2024-11-21

## 2024-11-21 ASSESSMENT — PAIN SCALES - GENERAL
PAINLEVEL_OUTOF10: 0 - NO PAIN

## 2024-11-21 ASSESSMENT — PAIN - FUNCTIONAL ASSESSMENT
PAIN_FUNCTIONAL_ASSESSMENT: 0-10

## 2024-11-21 NOTE — PROGRESS NOTES
"Juan Carlos Cr is a 74 y.o. male on day 9 of admission presenting with MRSA pneumonia (Multi).    Subjective   O2 requirements have increased. Currently on 10L/min. Denies any worsening shortness of breath. CXR from today appears unchanged from prior.       Objective   GEN: breathing comfortably in recliner  ENT: wearing O2 nasal cannula  CV: RRR, no m/g/r  LUNGS: good effort, absent breath sounds in RUL, breath sounds present in RLL. Good air exchange throughout left lung  EXT: no edema, cyanosis, clubbing      Physical Exam    Last Recorded Vitals  Blood pressure 92/52, pulse 88, temperature 36.6 °C (97.9 °F), temperature source Temporal, resp. rate 20, height 1.753 m (5' 9\"), weight 63.5 kg (139 lb 15.9 oz), SpO2 (!) 89%.  Intake/Output last 3 Shifts:  I/O last 3 completed shifts:  In: 720 (11.3 mL/kg) [P.O.:720]  Out: 1975 (31.1 mL/kg) [Urine:1975 (0.9 mL/kg/hr)]  Weight: 63.5 kg     Relevant Results  Scheduled medications  amiodarone, 400 mg, oral, BID   Followed by  [START ON 11/24/2024] amiodarone, 200 mg, oral, Daily  ascorbic acid, 500 mg, oral, Daily  aspirin, 81 mg, oral, Daily  cholecalciferol, 800 Units, oral, Daily  doxycycline, 100 mg, oral, q12h MASSIEL  fluticasone furoate-vilanteroL, 1 puff, inhalation, Daily  insulin lispro, 0-10 Units, subcutaneous, Before meals & nightly  losartan, 25 mg, oral, Daily  [Held by provider] metFORMIN, 500 mg, oral, BID  metoprolol succinate XL, 50 mg, oral, Daily  mupirocin, , Each Nostril, TID  nicotine, 1 patch, transdermal, Daily  oxygen, , inhalation, Continuous - Inhalation  pantoprazole, 40 mg, oral, Daily  potassium chloride, 20 mEq, oral, Once  rivaroxaban, 20 mg, oral, Daily with evening meal  rosuvastatin, 20 mg, oral, Daily  tamsulosin, 0.4 mg, oral, Daily  tiotropium, 2 puff, inhalation, Daily  zinc oxide, 1 Application, Topical, TID      Continuous medications     PRN medications  PRN medications: acetaminophen, benzonatate, dextromethorphan-guaifenesin, " dextrose, dextrose, glucagon, glucagon, ipratropium-albuteroL, prochlorperazine\    Results for orders placed or performed during the hospital encounter of 11/12/24 (from the past 24 hours)   POCT GLUCOSE   Result Value Ref Range    POCT Glucose 126 (H) 74 - 99 mg/dL   POCT GLUCOSE   Result Value Ref Range    POCT Glucose 109 (H) 74 - 99 mg/dL   POCT GLUCOSE   Result Value Ref Range    POCT Glucose 93 74 - 99 mg/dL   Renal Function Panel   Result Value Ref Range    Glucose 110 (H) 74 - 99 mg/dL    Sodium 124 (L) 136 - 145 mmol/L    Potassium 3.5 3.5 - 5.3 mmol/L    Chloride 84 (L) 98 - 107 mmol/L    Bicarbonate 32 21 - 32 mmol/L    Anion Gap 12 10 - 20 mmol/L    Urea Nitrogen 6 6 - 23 mg/dL    Creatinine 0.45 (L) 0.50 - 1.30 mg/dL    eGFR >90 >60 mL/min/1.73m*2    Calcium 6.9 (L) 8.6 - 10.3 mg/dL    Phosphorus 3.0 2.5 - 4.9 mg/dL    Albumin 1.7 (L) 3.4 - 5.0 g/dL   CBC   Result Value Ref Range    WBC 12.0 (H) 4.4 - 11.3 x10*3/uL    nRBC 0.0 0.0 - 0.0 /100 WBCs    RBC 2.45 (L) 4.50 - 5.90 x10*6/uL    Hemoglobin 7.2 (L) 13.5 - 17.5 g/dL    Hematocrit 22.4 (L) 41.0 - 52.0 %    MCV 91 80 - 100 fL    MCH 29.4 26.0 - 34.0 pg    MCHC 32.1 32.0 - 36.0 g/dL    RDW 14.2 11.5 - 14.5 %    Platelets 141 (L) 150 - 450 x10*3/uL   POCT GLUCOSE   Result Value Ref Range    POCT Glucose 104 (H) 74 - 99 mg/dL                      CXR 11/21/2024: reviewed independently by me and appears unchanged compared to 11/18/2024. RUL dense opacification. RLL aerated.         Assessment/Plan   Assessment & Plan  MRSA pneumonia (Multi)    Malignant neoplasm of upper lobe of right lung (Multi)    Acute on chronic hypoxic respiratory failure (Multi)    Summary:  74M with right squamous cell CA who underwent tumor debulking and stenting of right mainstem at [facility] on 8/29/2024, then again on 10/18/2024. Stent was removed on 9/25/2024 after it had occluded. He also has history of COPD, chronic hypoxic respiratory failure, HFrEF, Afib. This admission he is  growing MRSA on his sputum. Upon review of his CT imaging, his right lower lobe is aerated. The airway stent also appears to be patent. He still has significant tumor burden in his right upper lobe. O2 requirements increased today. CXR appears unchanged and RLL appears aerated.    Recommendations:  -Continue ABX  -Continue O2 supplementations to keep SpO2 89-92%. Currently on 10L/min  -Discussing with interventional pulmonary group about logistics of repeat bronchoscopy for inspection +/- stent exchange which would be due this week or next  -Bronchopulmonary hygiene  -Discussed with Dr. Mateo Delatorre,   Pulmonary & Critical Care  11/21/2024 1:41 PM

## 2024-11-21 NOTE — CARE PLAN
The clinical goals for the shift include patient will maintain normal vital signs throughout the shift.       Problem: Nutrition  Goal: Lab values WNL  Outcome: Progressing     Problem: Nutrition  Goal: Electrolytes WNL  Outcome: Progressing     Problem: Nutrition  Goal: Promote healing  Outcome: Progressing     Problem: Discharge Planning  Goal: Discharge to home or other facility with appropriate resources  Outcome: Progressing     Problem: Safety - Adult  Goal: Free from fall injury  Outcome: Progressing     Problem: Skin  Goal: Promote/optimize nutrition  Outcome: Progressing  Flowsheets (Taken 11/20/2024 2125)  Promote/optimize nutrition:   Monitor/record intake including meals   Offer water/supplements/favorite foods

## 2024-11-21 NOTE — PROGRESS NOTES
11/21/24 1207   Discharge Planning   Expected Discharge Disposition SNF  ([facility], auth obtained, waleert good 11/21-11/25, not ready today, 02 up to 8L, facility updated.)

## 2024-11-21 NOTE — PROGRESS NOTES
Juan Carlos Cr is a 74 y.o. male on day 9 of admission presenting with MRSA pneumonia (Multi).    Subjective   Interval History: no fever, the hx is limited       Review of Systems    Objective   Range of Vitals (last 24 hours)  Heart Rate:  []   Temp:  [36.6 °C (97.9 °F)-37.7 °C (99.9 °F)]   Resp:  [20-42]   BP: ()/(52-62)   Weight:  [63.5 kg (139 lb 15.9 oz)]   SpO2:  [91 %-100 %]   Daily Weight  11/21/24 : 63.5 kg (139 lb 15.9 oz)    Body mass index is 20.67 kg/m².    Physical Exam  Constitutional:       Appearance: Normal appearance.   HENT:      Head: Normocephalic and atraumatic.      Mouth/Throat:      Mouth: Mucous membranes are moist.      Pharynx: Oropharynx is clear.   Eyes:      Pupils: Pupils are equal, round, and reactive to light.   Cardiovascular:      Rate and Rhythm: Normal rate and regular rhythm.      Heart sounds: Normal heart sounds.   Pulmonary:      Effort: Pulmonary effort is normal.      Breath sounds: Normal breath sounds.   Abdominal:      General: Abdomen is flat. Bowel sounds are normal.      Palpations: Abdomen is soft.   Musculoskeletal:      Cervical back: Normal range of motion.   Neurological:      Mental Status: He is alert.         Antibiotics  doxycycline - 100 mg, 100 mg  mupirocin - 2 %    Relevant Results  Labs  Results from last 72 hours   Lab Units 11/21/24  0849 11/20/24  0601 11/19/24  0542   WBC AUTO x10*3/uL 12.0* 12.9* 12.7*   HEMOGLOBIN g/dL 7.2* 7.4* 7.5*   HEMATOCRIT % 22.4* 24.1* 23.4*   PLATELETS AUTO x10*3/uL 141* 143* 169   NEUTROS PCT AUTO %  --  89.1 89.9   LYMPHS PCT AUTO %  --  2.2 1.9   MONOS PCT AUTO %  --  6.6 6.0   EOS PCT AUTO %  --  0.1 0.0     Results from last 72 hours   Lab Units 11/21/24  0849 11/20/24  0601 11/19/24  0542   SODIUM mmol/L 124* 125* 127*   POTASSIUM mmol/L 3.5 4.3 3.4*   CHLORIDE mmol/L 84* 85* 89*   CO2 mmol/L 32 34* 32   BUN mg/dL 6 6 5*   CREATININE mg/dL 0.45* 0.43* 0.40*   GLUCOSE mg/dL 110* 82 92   CALCIUM mg/dL  6.9* 7.4* 7.4*   ANION GAP mmol/L 12 10 9*   EGFR mL/min/1.73m*2 >90 >90 >90   PHOSPHORUS mg/dL 3.0 2.9  --      Results from last 72 hours   Lab Units 11/21/24  0849 11/20/24  0601 11/19/24  0542   ALK PHOS U/L  --  91 85   BILIRUBIN TOTAL mg/dL  --  0.3 0.3   PROTEIN TOTAL g/dL  --  5.0* 4.8*   ALT U/L  --  15 15   AST U/L  --  18 20   ALBUMIN g/dL 1.7* 2.0* 2.0*     Estimated Creatinine Clearance: 125 mL/min (A) (by C-G formula based on SCr of 0.45 mg/dL (L)).  C-Reactive Protein   Date Value Ref Range Status   11/13/2024 33.57 (H) <1.00 mg/dL Final     Microbiology  Reviewed  Imaging  Reviewed        Assessment/Plan   Respiratory failure / COPD / pneumonia / abscess, MRSA screen is positive, procalcitonin 2.30, sputum with MRSA, the repeat cxr was reviewed  NSCLC / Rt bronchus stent / on Taxol / Carboplatin / radiation     Recommendations :  Plan on oral Doxycycline x 3 weeks with discharge  Needs chest PT  Discussed with the medical team     I spent minutes in the professional and overall care of this patient.      Krysten Magallon MD

## 2024-11-21 NOTE — PROGRESS NOTES
"Nutrition Progress Note  Nutrition Follow Up Note  Patient has Malnutrition Diagnosis: Yes  Diagnosis Status: Ongoing  Malnutrition Diagnosis: Severe malnutrition related to chronic disease or condition  As Evidenced by: significant weight loss of 20%/3 months, severe loss of muscle and adipose stores  Additional Assessment Information: ONS added for additional kcal and protein  Nutrition Assessment         Nutrition History:  Food and Nutrient History: Chart reviewed for follow up, pt with increased oxygen demand today; oral intakes varied.  Energy Intake: Fair 50-75 %  No Known Allergies   GI Symptoms: None     Oral Problems: None          Anthropometrics:  Height: 175.3 cm (5' 9\")   Weight: 63.5 kg (139 lb 15.9 oz)   BMI (Calculated): 20.66  IBW/kg (Dietitian Calculated): 72.7 kg  Percent of IBW: 87 %       Weight History:     Weight         11/12/2024  2212 11/21/2024  0432          Weight: 60.1 kg (132 lb 9.6 oz) 63.5 kg (139 lb 15.9 oz)              Nutrition Focused Physical Exam Findings:       Edema  Edema: none  Physical Findings (Nutrition Deficiency/Toxicity)  Skin: Positive (L burrock PI)    Nutrition Significant Labs:    Results from last 7 days   Lab Units 11/21/24  0849 11/20/24  0601 11/19/24  0542 11/18/24  0525 11/17/24  0543 11/16/24  0546 11/15/24  0706   GLUCOSE mg/dL 110* 82 92 103*   < > 117* 98  98   SODIUM mmol/L 124* 125* 127* 128*   < > 128* 129*  129*   POTASSIUM mmol/L 3.5 4.3 3.4* 3.2*   < > 3.1* 2.8*  2.8*   CHLORIDE mmol/L 84* 85* 89* 87*   < > 92* 93*  93*   CO2 mmol/L 32 34* 32 31   < > 29 30  30   BUN mg/dL 6 6 5* 5*   < > 5* 8  8   CREATININE mg/dL 0.45* 0.43* 0.40* 0.35*   < > 0.36* 0.41*  0.41*   EGFR mL/min/1.73m*2 >90 >90 >90 >90   < > >90 >90  >90   CALCIUM mg/dL 6.9* 7.4* 7.4* 7.0*   < > 7.2* 7.3*  7.3*   PHOSPHORUS mg/dL 3.0 2.9  --   --   --   --   --    MAGNESIUM mg/dL  --   --   --  1.67  --  1.67 1.37*    < > = values in this interval not displayed.     Lab " Results   Component Value Date    HGBA1C 7.6 (H) 08/26/2024    HGBA1C 5.7 09/25/2020     Results from last 7 days   Lab Units 11/21/24  1110 11/21/24  0708 11/20/24 2014 11/20/24  1605 11/20/24  1119 11/20/24  0736 11/19/24  1953 11/19/24  1616   POCT GLUCOSE mg/dL 104* 93 109* 126* 145* 75 108* 137*     Lab Results   Component Value Date    ALBUMIN 1.7 (L) 11/21/2024      Lab Results   Component Value Date    CRP 33.57 (H) 11/13/2024           Nutrition Specific Medications:   Scheduled medications:  amiodarone, 400 mg, oral, BID   Followed by  [START ON 11/24/2024] amiodarone, 200 mg, oral, Daily  ascorbic acid, 500 mg, oral, Daily  aspirin, 81 mg, oral, Daily  cholecalciferol, 800 Units, oral, Daily  doxycycline, 100 mg, oral, q12h MASSIEL  fluticasone furoate-vilanteroL, 1 puff, inhalation, Daily  insulin lispro, 0-10 Units, subcutaneous, Before meals & nightly  losartan, 25 mg, oral, Daily  [Held by provider] metFORMIN, 500 mg, oral, BID  metoprolol succinate XL, 50 mg, oral, Daily  mupirocin, , Each Nostril, TID  nicotine, 1 patch, transdermal, Daily  oxygen, , inhalation, Continuous - Inhalation  pantoprazole, 40 mg, oral, Daily  rivaroxaban, 20 mg, oral, Daily with evening meal  rosuvastatin, 20 mg, oral, Daily  tamsulosin, 0.4 mg, oral, Daily  tiotropium, 2 puff, inhalation, Daily  zinc oxide, 1 Application, Topical, TID      Continuous medications:     PRN medications:  PRN medications: acetaminophen, benzonatate, dextromethorphan-guaifenesin, dextrose, dextrose, glucagon, glucagon, guaiFENesin, ipratropium-albuteroL, prochlorperazine     Nursing Data Per flowsheet:   Stool Appearance: Other (Comment) (11/16/24 0900)  Gastrointestinal  Gastrointestinal (WDL): Within Defined Limits  Bowel Sounds: All quadrants  Bowel Sounds (All Quadrants): Active  Bowel Incontinence: No  Feeding assistance level:      Intake/Output Summary (Last 24 hours) at 11/21/2024 1408  Last data filed at 11/21/2024 1049  Gross per 24  hour   Intake 360 ml   Output 1425 ml   Net -1065 ml      0-10 (Numeric) Pain Score: 0 - No pain   Dietary Orders (From admission, onward)       Start     Ordered    11/18/24 1158  Oral nutritional supplements  Until discontinued        Question Answer Comment   Deliver with Dinner strawberry   Select supplement: Glucerna Shake        11/18/24 1157    11/12/24 2219  May Participate in Room Service  ( ROOM SERVICE MAY PARTICIPATE)  Once        Question:  .  Answer:  Yes    11/12/24 2218 11/12/24 2216  Adult diet Regular, Consistent Carb; CCD 60 gm/meal  Diet effective now        Question Answer Comment   Diet type Regular    Diet type Consistent Carb    Carb diet selection: CCD 60 gm/meal        11/12/24 2220                     Estimated Needs:   Total Energy Estimated Needs (kCal): 1803 kCal  Method for Estimating Needs: 30 kcal/kg  Total Protein Estimated Needs (g): 72 g  Method for Estimating Needs: 1.2 g/kg     Method for Estimating Needs: 1 ml/kcal or per team       Nutrition Diagnosis   Malnutrition Diagnosis  Patient has Malnutrition Diagnosis: Yes  Diagnosis Status: Ongoing  Malnutrition Diagnosis: Severe malnutrition related to chronic disease or condition  As Evidenced by: significant weight loss of 20%/3 months, severe loss of muscle and adipose stores  Additional Assessment Information: ONS added for additional kcal and protein            Nutrition Interventions/Recommendations   Nutrition Prescription:  diet, fluids    Nutrition Interventions:   Interventions: Medical food supplement  Goal: Glucerna daily= 220 kcal, 10 g protein (per container)    Coordination of Care: n/a  Nutrition Education:   N/A    Recommendations:  Continue with current diet order and ONS order  Weights: 2-3 x/week        Nutrition Monitoring and Evaluation   Food/Nutrient Related History Monitoring  Monitoring and Evaluation Plan: Amount of food  Criteria: Pt will consume 50-75% of meals and ONS provided    Body  Composition/Growth/Weight History  Monitoring and Evaluation Plan: Weight  Weight: Measured weight  Criteria: Monitor weights    Biochemical Data, Medical Tests and Procedures  Monitoring and Evaluation Plan: Electrolyte/renal panel, Glucose/endocrine profile  Criteria: Monitor       Time Spent (min): 45 minutes

## 2024-11-21 NOTE — PROGRESS NOTES
Juan Carlos Cr is a 74 y.o. male on day 9 of admission presenting with MRSA pneumonia (Multi).    Subjective   Juan Carlos Cr is a 74 yr old male with PMH of squamous cell lung cancer diagnosed 8/2024 currently on chemoradiation, right lung collapse s/p endobronchial ultrasound, bronchial stent 8/2024, COPD on home O2 4L, paroxysmal A-fib on Rivaroxaban (diagnosed 8/2024), history of MI, CAD with remote PCI 1997, HFrEF (EF 35-40% 8/2024), HTN, and DM, who was admitted on 11/12 for 3-4 day history of dyspnea on minimal exertion, productive cough, wheezing.     - Today at bedside, pt reports he is still coughing and bringing up clear mucus, no improvement from yesterday.   - Complains that his SOB is still the same.   - Nurse reports that he desated a few times last night, each time after coughing episode.  - Using incentive spirometer every hour.    - Denies lightheadedness, dizziness, chest pain, palpitations,, nausea, vomiting, diarrhea, constipation.  -States he still has not been able to void urine.    All pertinent positive symptoms are included in the history of present illness.  All other systems have been reviewed and are negative and noncontributory to this patient's current ailments.       Objective     Physical Exam  Constitutional:       General: He is not in acute distress.     Appearance: He is ill-appearing. He is not toxic-appearing or diaphoretic.      Comments: Thin and frail appearing man. Sitting in chair with legs extended. NC in place.   Cardiovascular:      Rate and Rhythm: Normal rate and regular rhythm.      Pulses: Normal pulses.      Heart sounds: Normal heart sounds. No murmur heard.  Pulmonary:      Effort: No respiratory distress.      Breath sounds: No stridor. Wheezing present.      Comments: NC in place. Coughing intermittently, raspy and wet. Pulse ox dropped to 76% after coughing episode. Improved to 100% with non-rebreather.   Mild conversational dyspnea.   Bilateral lung  "crackles, worse on right. Decreased breath sounds on the RUL and RLL. Wheezing noted with coughs.   Chest:      Chest wall: No tenderness.   Abdominal:      General: Abdomen is flat. Bowel sounds are normal. There is no distension.      Palpations: Abdomen is soft.      Tenderness: There is no abdominal tenderness. There is no guarding.   Musculoskeletal:      Right lower leg: No edema.      Left lower leg: No edema.      Comments: Loss of muscle mass in the neck, chest, abdomen.    Skin:     General: Skin is warm and dry.      Findings: No rash.      Comments: Right port, no surrounding erythema or drainage.   2x2cm raised nodule noted on the back, overlying the thoracic spine, between bilateral scapula. No tenderness with palpation. Mildly erythematous. Not warm to touch. No drainage.    Neurological:      General: No focal deficit present.      Mental Status: He is alert and oriented to person, place, and time.      Motor: Weakness present.         Last Recorded Vitals  Blood pressure 122/57, pulse 86, temperature 36.6 °C (97.9 °F), temperature source Temporal, resp. rate 22, height 1.753 m (5' 9\"), weight 63.5 kg (139 lb 15.9 oz), SpO2 100%.  Intake/Output last 3 Shifts:  I/O last 3 completed shifts:  In: 720 (11.3 mL/kg) [P.O.:720]  Out: 1975 (31.1 mL/kg) [Urine:1975 (0.9 mL/kg/hr)]  Weight: 63.5 kg     Relevant Results  Scheduled medications  amiodarone, 400 mg, oral, BID   Followed by  [START ON 11/24/2024] amiodarone, 200 mg, oral, Daily  ascorbic acid, 500 mg, oral, Daily  aspirin, 81 mg, oral, Daily  cholecalciferol, 800 Units, oral, Daily  doxycycline, 100 mg, oral, q12h MASSIEL  fluticasone furoate-vilanteroL, 1 puff, inhalation, Daily  insulin lispro, 0-10 Units, subcutaneous, Before meals & nightly  losartan, 25 mg, oral, Daily  [Held by provider] metFORMIN, 500 mg, oral, BID  metoprolol succinate XL, 50 mg, oral, Daily  mupirocin, , Each Nostril, TID  nicotine, 1 patch, transdermal, Daily  oxygen, , " inhalation, Continuous - Inhalation  pantoprazole, 40 mg, oral, Daily  rivaroxaban, 20 mg, oral, Daily with evening meal  rosuvastatin, 20 mg, oral, Daily  tamsulosin, 0.4 mg, oral, Daily  tiotropium, 2 puff, inhalation, Daily  zinc oxide, 1 Application, Topical, TID      Continuous medications     PRN medications  PRN medications: acetaminophen, benzonatate, dextromethorphan-guaifenesin, dextrose, dextrose, glucagon, glucagon, ipratropium-albuteroL, prochlorperazine    Results for orders placed or performed during the hospital encounter of 11/12/24 (from the past 24 hours)   POCT GLUCOSE   Result Value Ref Range    POCT Glucose 145 (H) 74 - 99 mg/dL   POCT GLUCOSE   Result Value Ref Range    POCT Glucose 126 (H) 74 - 99 mg/dL   POCT GLUCOSE   Result Value Ref Range    POCT Glucose 109 (H) 74 - 99 mg/dL   POCT GLUCOSE   Result Value Ref Range    POCT Glucose 93 74 - 99 mg/dL       ECG 12 lead    Result Date: 11/16/2024  Sinus tachycardia Otherwise normal ECG When compared with ECG of 12-NOV-2024 14:23, (unconfirmed) No significant change was found See ED provider note for full interpretation and clinical correlation Confirmed by Leila Luke ([id number]) on 11/16/2024 2:15:23 PM    ECG 12 lead    Result Date: 11/16/2024  Sinus tachycardia Otherwise normal ECG When compared with ECG of 01-NOV-2024 08:10, Vent. rate has increased BY  42 BPM Criteria for Septal infarct are no longer Present T wave amplitude has decreased in Lateral leads See ED provider note for full interpretation and clinical correlation Confirmed by Leila Luke ([id number]) on 11/16/2024 2:15:13 PM    Electrocardiogram, 12-lead PRN ACS symptoms    Result Date: 11/15/2024  Atrial fibrillation with rapid ventricular response with premature ventricular or aberrantly conducted complexes Nonspecific ST and T wave abnormality Abnormal ECG When compared with ECG of 12-NOV-2024 15:52, (unconfirmed) Atrial fibrillation has replaced Sinus rhythm ST now depressed in  Lateral leads Nonspecific T wave abnormality now evident in Inferior leads Nonspecific T wave abnormality now evident in Lateral leads    CT angio chest for pulmonary embolism    Result Date: 11/12/2024  Interpreted By:  Bryce Estrada, STUDY: CT ANGIO CHEST FOR PULMONARY EMBOLISM;  11/12/2024 3:08 pm   INDICATION: Signs/Symptoms:History of lung cancer shortness of breath COPD hypoxemic significant weight loss.   COMPARISON: 10/16/2024   ACCESSION NUMBER(S): [id number]   ORDERING CLINICIAN: LINUS BERNAL   TECHNIQUE: Helical data acquisition of the chest was obtained with  75 mL Omnipaque 350. Images were reformatted in axial, coronal, and sagittal planes.MIP reformatted images were also generated.   FINDINGS: LUNGS and AIRWAYS: Right upper lobe mass, difficult to differentiate from adjacent consolidation, grossly appears decreased in size from 9.1 cm 8.1 cm. Central necrosis has increased.   Previously, the right lung was collapsed. There is now aeration of the previously occluded right mainstem bronchus as well as the lobar branches of the middle and lower lobes. There is persistent occlusion of the upper lobe bronchus. The previously collapsed upper, middle, and lower lobes are now aerated, although there are diffusely scattered areas of predominantly peripheral consolidation. There are also peripheral areas of new consolidation scattered throughout the left lung. A new nodule in the left lung base measuring 23 mm is centrally cavitated.   Underlying mild pulmonary fibrosis. Trace right pleural effusion.   MEDIASTINUM and SUDHAKAR, LOWER NECK AND AXILLA: The visualized thyroid gland is grossly unremarkable.   A left paratracheal node is enlarged from 8 mm to 10 mm short axis. The right upper lobe mass infiltrates the mediastinum and is contiguous with adjacent lymphadenopathy which is difficult to measure discretely. Pretracheal node is roughly unchanged at 18 mm short axis. A 13 mm subcarinal node appears new.    Esophagus is not dilated.   HEART and VESSELS: The heart is normal in gross morphology and size.   No significant pericardial effusion.   Severe coronary atherosclerosis.   Thoracic aorta is severely atherosclerotic but otherwise patent without aneurysm. The great vessels are patent. Right IJ chest port catheter is still present. There is persistent narrowing of the SVC relating to compression by adjacent tumor.   No pulmonary embolism is identified.   UPPER ABDOMEN: The visualized subdiaphragmatic structures demonstrate no acute findings on limited images.   CHEST WALL and OSSEOUS STRUCTURES: No suspicious osseous lesions. Multilevel degenerative changes of the thoracic spine.         1.  No pulmonary embolism identified. 2. Grossly right upper lobe mass is decreased in size with increased central necrosis. 3. Previous collapse of the right lung has resolved. There are however extensive new areas of bilateral airspace consolidation suggestive of multifocal pneumonia. A new cavitated nodule in the left lung base may be infectious or inflammatory although metastasis is not excluded. 4. Trace right pleural effusion. 5. Mediastinal lymphadenopathy has slightly progressed.     Signed by: Bryce Estrada 11/12/2024 3:45 PM Dictation workstation:   [id number]    ECG 12 Lead    Result Date: 11/1/2024  Normal sinus rhythm Possible Left atrial enlargement Septal infarct , age undetermined Abnormal ECG When compared with ECG of 23-SEP-2024 01:03, Incomplete left bundle branch block is no longer Present Confirmed by Lisa Ko ([id number]) on 11/1/2024 12:18:47 PM    Rad Onc CT Sim Images    Result Date: 10/30/2024  These images are not reportable by radiology and will not be interpreted by  Radiologists.           Assessment/Plan   Principal Problem:    MRSA pneumonia (Multi)  Active Problems:    CAD (coronary artery disease)    Hypertension    Atrial fibrillation (Multi)    Malignant neoplasm of upper lobe of right lung  (Multi)    Juan Carlos Cr is a 74 yr old male with PMH of squamous cell lung cancer diagnosed 8/2024 currently on chemoradiation, right lung collapse s/p endobronchial ultrasound, bronchial stent 8/2024, COPD on home O2 4L, paroxysmal A-fib on Rivaroxaban (diagnosed 8/2024), history of MI, CAD with remote PCI 1997, HFrEF (EF 35-40% 8/2024), HTN, and DM, who was admitted on 11/12 for 3-4 day history of dyspnea on minimal exertion, productive cough, wheezing.     #Acute on chronic respiratory failure 2/2 multifocal pneumonia, MRSA positive  #COPD on home O2 4L  - CT 11/12 - multifocal pneumonia, trace right pleural effusion  - MRSA positive - nares and sputum   - 11/18 CXR - worsening of airspace opacities in left mid to lower lung, right lung mass with improved aeration in right lung base  - WBC 12  - Completed 5 day course of Vancomycin and 3 days of Pip-tazo  - Doxycyline 100mg q12h oral for 3 weeks, today is day 5  - Breo Elllipta and Spiriva Respimat  - Tessalon and Robitussin PRN for cough/congestion  - NC requirements increased to 5-15L. Non-rebreather if pt desats after coughing episodes.   - Repeat CXR today due to increased O2 requirement.   - ID consulted    - Pulm consulted   - Multiple desat episodes overnight. Suspect that pt is having mucus plugging after coughing episodes, causing pulse ox to drop.   - Pt may benefit from chest PT with respiratory therapist.    #Squamous cell lung cancer, on chemoradiation  - 11/12 CT: RUL with increased central necrosis, new cavitated nodule in left lung base  - 11/18 CXR: right lung mass with improved aeration in right lung base  - Currently on chemoradiation.   - Palliative care consulted.   - Follow up with radiation oncology and oncology after discharge for repeat bronchoscopy outpatient.     #Atrial fibrillation, resolved RVR   - Cardiology consulted   - Rivaroxaban 20mg daily oral   - Amiodarone 400mg BID x10 days PO, today is day 6.     - Amiodarone 200mg PO  starting 11/24   - [facility]     - Pt has apt with Dr. Ko on 1/10/2025    #Urinary retention    - Failed 2x voiding trial on 11/17 and 11/20  - Urology consulted. Per note, may be discharged with german and follow up for voiding trial with urology outpatient.    - Tamsulosin Flomax 0.4 mg daily oral    #Hyponatremia    - Na 124   - Likely secondary to SIADH given pt's history of squamous cell lung cancer.    - Ordered urine osmolality, urine electrolytes, serum osmolality    #Hypokalemia  - K 3.5   - Cardio recommends keeping K > 4   - Repleted today with 20mEq    #Normocytic anemia   - Hemoglobin down trending, 7.2 today. Hematocrit 22.4, with MCV 91.    - Will continue to monitor.     #HTN, with HFrEF (EF 35-40% 8/2024)  - Blood pressures stable in past 12 hours.   - Cardiology consulted  - Losartan 25 mg daily oral   - Metoprolol succinate XL 50mg daily oral    #Cyst on back    - Nodule on back noted during physical exam    - Noted to be cyst on CT scan     #HLD   - Rosuvastatin 20mg daily oral    #CAD with history of cardiac stent   - Continue Aspirin 81mg daily    #DM   - Hold Metformin    - ISS    #GERD   - Pantoprazole 40mg daily oral    #History of tobacco use   - Nicotine patch    Code status: full   PT/OT consult    Dispo: awaiting SNF precert at [facility]    Ez Whitehead, DO  PGY-1, Family Medicine  [facility]

## 2024-11-21 NOTE — PROGRESS NOTES
"Physical Therapy                 Therapy Communication Note    Patient Name: Juan Carlos Cr  MRN: [id number]  Department: [address]  Room: [address] [place], [place] [address]  Today's Date: 11/21/2024     Discipline: Physical Therapy    Missed Visit Reason: Missed Visit Reason: Parent refused (p.t. up in chair, shook head no to therapy, per nursing asistant, he just got up to the bathroom and back and he desated andneeded incresed 02 and hes been needeing more 02 today\". no tx, nursing aware)    Missed Time: [id number]    Comment:  "

## 2024-11-22 ENCOUNTER — APPOINTMENT (OUTPATIENT)
Dept: RADIATION ONCOLOGY | Facility: CLINIC | Age: 74
End: 2024-11-22
Payer: MEDICARE

## 2024-11-22 LAB
ALBUMIN SERPL BCP-MCNC: 1.8 G/DL (ref 3.4–5)
ALP SERPL-CCNC: 89 U/L (ref 33–136)
ALT SERPL W P-5'-P-CCNC: 10 U/L (ref 10–52)
ANION GAP SERPL CALC-SCNC: 7 MMOL/L (ref 10–20)
ANION GAP SERPL CALC-SCNC: 9 MMOL/L (ref 10–20)
AST SERPL W P-5'-P-CCNC: 15 U/L (ref 9–39)
BILIRUB SERPL-MCNC: 0.3 MG/DL (ref 0–1.2)
BUN SERPL-MCNC: 6 MG/DL (ref 6–23)
BUN SERPL-MCNC: 8 MG/DL (ref 6–23)
CALCIUM SERPL-MCNC: 7.4 MG/DL (ref 8.6–10.3)
CALCIUM SERPL-MCNC: 7.5 MG/DL (ref 8.6–10.3)
CHLORIDE SERPL-SCNC: 84 MMOL/L (ref 98–107)
CHLORIDE SERPL-SCNC: 84 MMOL/L (ref 98–107)
CO2 SERPL-SCNC: 33 MMOL/L (ref 21–32)
CO2 SERPL-SCNC: 35 MMOL/L (ref 21–32)
CREAT SERPL-MCNC: 0.45 MG/DL (ref 0.5–1.3)
CREAT SERPL-MCNC: 0.49 MG/DL (ref 0.5–1.3)
EGFRCR SERPLBLD CKD-EPI 2021: >90 ML/MIN/1.73M*2
EGFRCR SERPLBLD CKD-EPI 2021: >90 ML/MIN/1.73M*2
ERYTHROCYTE [DISTWIDTH] IN BLOOD BY AUTOMATED COUNT: 14.3 % (ref 11.5–14.5)
GLUCOSE BLD MANUAL STRIP-MCNC: 103 MG/DL (ref 74–99)
GLUCOSE BLD MANUAL STRIP-MCNC: 115 MG/DL (ref 74–99)
GLUCOSE BLD MANUAL STRIP-MCNC: 176 MG/DL (ref 74–99)
GLUCOSE BLD MANUAL STRIP-MCNC: 81 MG/DL (ref 74–99)
GLUCOSE SERPL-MCNC: 107 MG/DL (ref 74–99)
GLUCOSE SERPL-MCNC: 77 MG/DL (ref 74–99)
HCT VFR BLD AUTO: 23.3 % (ref 41–52)
HGB BLD-MCNC: 7.3 G/DL (ref 13.5–17.5)
MAGNESIUM SERPL-MCNC: 1.62 MG/DL (ref 1.6–2.4)
MCH RBC QN AUTO: 28.3 PG (ref 26–34)
MCHC RBC AUTO-ENTMCNC: 31.3 G/DL (ref 32–36)
MCV RBC AUTO: 90 FL (ref 80–100)
NRBC BLD-RTO: 0 /100 WBCS (ref 0–0)
PLATELET # BLD AUTO: 137 X10*3/UL (ref 150–450)
POTASSIUM SERPL-SCNC: 3.7 MMOL/L (ref 3.5–5.3)
POTASSIUM SERPL-SCNC: 3.7 MMOL/L (ref 3.5–5.3)
PROT SERPL-MCNC: 4.9 G/DL (ref 6.4–8.2)
RBC # BLD AUTO: 2.58 X10*6/UL (ref 4.5–5.9)
SODIUM SERPL-SCNC: 122 MMOL/L (ref 136–145)
SODIUM SERPL-SCNC: 122 MMOL/L (ref 136–145)
WBC # BLD AUTO: 11.9 X10*3/UL (ref 4.4–11.3)

## 2024-11-22 PROCEDURE — 94667 MNPJ CHEST WALL 1ST: CPT

## 2024-11-22 PROCEDURE — 83735 ASSAY OF MAGNESIUM: CPT

## 2024-11-22 PROCEDURE — 82374 ASSAY BLOOD CARBON DIOXIDE: CPT | Performed by: FAMILY MEDICINE

## 2024-11-22 PROCEDURE — 94760 N-INVAS EAR/PLS OXIMETRY 1: CPT

## 2024-11-22 PROCEDURE — 99233 SBSQ HOSP IP/OBS HIGH 50: CPT | Performed by: FAMILY MEDICINE

## 2024-11-22 PROCEDURE — 2500000002 HC RX 250 W HCPCS SELF ADMINISTERED DRUGS (ALT 637 FOR MEDICARE OP, ALT 636 FOR OP/ED): Performed by: INTERNAL MEDICINE

## 2024-11-22 PROCEDURE — 2500000002 HC RX 250 W HCPCS SELF ADMINISTERED DRUGS (ALT 637 FOR MEDICARE OP, ALT 636 FOR OP/ED): Performed by: NURSE PRACTITIONER

## 2024-11-22 PROCEDURE — 94668 MNPJ CHEST WALL SBSQ: CPT

## 2024-11-22 PROCEDURE — 36415 COLL VENOUS BLD VENIPUNCTURE: CPT | Performed by: FAMILY MEDICINE

## 2024-11-22 PROCEDURE — 99233 SBSQ HOSP IP/OBS HIGH 50: CPT

## 2024-11-22 PROCEDURE — 2500000001 HC RX 250 WO HCPCS SELF ADMINISTERED DRUGS (ALT 637 FOR MEDICARE OP): Performed by: INTERNAL MEDICINE

## 2024-11-22 PROCEDURE — 2500000001 HC RX 250 WO HCPCS SELF ADMINISTERED DRUGS (ALT 637 FOR MEDICARE OP)

## 2024-11-22 PROCEDURE — 85027 COMPLETE CBC AUTOMATED: CPT

## 2024-11-22 PROCEDURE — 2500000004 HC RX 250 GENERAL PHARMACY W/ HCPCS (ALT 636 FOR OP/ED): Performed by: FAMILY MEDICINE

## 2024-11-22 PROCEDURE — 80053 COMPREHEN METABOLIC PANEL: CPT

## 2024-11-22 PROCEDURE — 2500000005 HC RX 250 GENERAL PHARMACY W/O HCPCS: Performed by: FAMILY MEDICINE

## 2024-11-22 PROCEDURE — 2500000002 HC RX 250 W HCPCS SELF ADMINISTERED DRUGS (ALT 637 FOR MEDICARE OP, ALT 636 FOR OP/ED): Performed by: FAMILY MEDICINE

## 2024-11-22 PROCEDURE — 94640 AIRWAY INHALATION TREATMENT: CPT

## 2024-11-22 PROCEDURE — 2060000001 HC INTERMEDIATE ICU ROOM DAILY

## 2024-11-22 PROCEDURE — 94669 MECHANICAL CHEST WALL OSCILL: CPT

## 2024-11-22 PROCEDURE — 82947 ASSAY GLUCOSE BLOOD QUANT: CPT

## 2024-11-22 PROCEDURE — 2500000001 HC RX 250 WO HCPCS SELF ADMINISTERED DRUGS (ALT 637 FOR MEDICARE OP): Performed by: NURSE PRACTITIONER

## 2024-11-22 PROCEDURE — 2500000005 HC RX 250 GENERAL PHARMACY W/O HCPCS: Performed by: INTERNAL MEDICINE

## 2024-11-22 PROCEDURE — 9420000001 HC RT PATIENT EDUCATION 5 MIN

## 2024-11-22 PROCEDURE — 2500000002 HC RX 250 W HCPCS SELF ADMINISTERED DRUGS (ALT 637 FOR MEDICARE OP, ALT 636 FOR OP/ED): Performed by: STUDENT IN AN ORGANIZED HEALTH CARE EDUCATION/TRAINING PROGRAM

## 2024-11-22 PROCEDURE — 2500000001 HC RX 250 WO HCPCS SELF ADMINISTERED DRUGS (ALT 637 FOR MEDICARE OP): Performed by: STUDENT IN AN ORGANIZED HEALTH CARE EDUCATION/TRAINING PROGRAM

## 2024-11-22 PROCEDURE — S4991 NICOTINE PATCH NONLEGEND: HCPCS | Performed by: INTERNAL MEDICINE

## 2024-11-22 RX ORDER — SODIUM CHLORIDE 9 MG/ML
75 INJECTION, SOLUTION INTRAVENOUS CONTINUOUS
Status: DISCONTINUED | OUTPATIENT
Start: 2024-11-22 | End: 2024-11-23

## 2024-11-22 RX ORDER — IPRATROPIUM BROMIDE AND ALBUTEROL SULFATE 2.5; .5 MG/3ML; MG/3ML
3 SOLUTION RESPIRATORY (INHALATION) EVERY 2 HOUR PRN
Status: DISCONTINUED | OUTPATIENT
Start: 2024-11-22 | End: 2024-11-27 | Stop reason: HOSPADM

## 2024-11-22 RX ORDER — IPRATROPIUM BROMIDE AND ALBUTEROL SULFATE 2.5; .5 MG/3ML; MG/3ML
3 SOLUTION RESPIRATORY (INHALATION)
Status: DISCONTINUED | OUTPATIENT
Start: 2024-11-22 | End: 2024-11-25

## 2024-11-22 ASSESSMENT — COGNITIVE AND FUNCTIONAL STATUS - GENERAL
DRESSING REGULAR LOWER BODY CLOTHING: A LITTLE
CLIMB 3 TO 5 STEPS WITH RAILING: A LITTLE
TOILETING: A LITTLE
STANDING UP FROM CHAIR USING ARMS: A LITTLE
TURNING FROM BACK TO SIDE WHILE IN FLAT BAD: A LITTLE
DAILY ACTIVITIY SCORE: 20
MOVING TO AND FROM BED TO CHAIR: A LITTLE
MOBILITY SCORE: 19
DRESSING REGULAR UPPER BODY CLOTHING: A LITTLE
WALKING IN HOSPITAL ROOM: A LITTLE
HELP NEEDED FOR BATHING: A LITTLE

## 2024-11-22 ASSESSMENT — PAIN SCALES - GENERAL
PAINLEVEL_OUTOF10: 0 - NO PAIN
PAINLEVEL_OUTOF10: 0 - NO PAIN

## 2024-11-22 ASSESSMENT — PAIN - FUNCTIONAL ASSESSMENT
PAIN_FUNCTIONAL_ASSESSMENT: 0-10
PAIN_FUNCTIONAL_ASSESSMENT: 0-10

## 2024-11-22 NOTE — PROGRESS NOTES
11/22/24 1445   Discharge Planning   Living Arrangements Spouse/significant other   Support Systems Spouse/significant other;Children   Assistance Needed Patient is A&Ox3,  independent in ADL's, uses no assistive devices, O2 at 2-3L (unknown company), getting chemo and radiation for cancer   Type of Residence Private residence   Number of Stairs to Enter Residence 2   Number of Stairs Within Residence 13   Type of Animals or Pets 1 cat   Who is requesting discharge planning? Provider   Home or Post Acute Services Post acute facilities (Rehab/SNF/etc)   Type of Post Acute Facility Services Skilled nursing   Expected Discharge Disposition SNF  ([facility] for rehab, auth obtained, precert good thru 11/25, not medically ready today, placed on airvo overnight, currently on 30/57. [facility] and [facility] updated.)   Does the patient need discharge transport arranged? Yes   RoundTrip coordination needed? Yes   Has discharge transport been arranged? No   Patient Choice   Provider Choice list and CMS website (https://medicare.gov/care-compare#search) for post-acute Quality and Resource Measure Data were provided and reviewed with: Patient;Family   Intensity of Service   Intensity of Service 0-30 min

## 2024-11-22 NOTE — PROGRESS NOTES
Juan Carlos Cr is a 74 y.o. male on day 10 of admission presenting with MRSA pneumonia (Multi).    Subjective   Patient reports continued dyspnea and overnight O2 was increased from 8 L a minute to HVNI    Objective     Physical Exam  Constitutional:       General: He is not in acute distress.     Appearance: He is ill-appearing. He is not toxic-appearing or diaphoretic.      Comments: Thin and frail appearing man. Laying in bed. NC in place. Attempting to call his wife. Having difficulty trying to sit up in bed due to weakness.    Cardiovascular:      Rate and Rhythm: Normal rate and regular rhythm.      Pulses: Normal pulses.      Heart sounds: Normal heart sounds. No murmur heard.  Pulmonary:      Effort: No respiratory distress.      Breath sounds: No stridor. No wheezing.      Comments: NC in place. No conversational dyspnea. Coughing intermittently.   Bilateral lung crackles, worse on right. Decreased breath sounds on the RUL and RLL.   Chest:      Chest wall: No tenderness.   Abdominal:      General: Abdomen is flat. Bowel sounds are normal. There is no distension.      Palpations: Abdomen is soft.      Tenderness: There is no abdominal tenderness. There is no guarding.   Musculoskeletal:      Right lower leg: No edema.      Left lower leg: No edema.      Comments: Loss of muscle mass in the neck, chest, abdomen.    Skin:     General: Skin is warm and dry.      Findings: No rash.      Comments: Right port, no surrounding erythema or drainage.   Mild bruising noted on left forearm near IV site. No erythema or drainage. Not warm to touch.   2x2cm raised nodule noted on the back, overlying the thoracic spine, between bilateral scapula. No tenderness with palpation. Mildly erythematous. Not warm to touch. No drainage.    Neurological:      General: No focal deficit present.      Mental Status: He is alert and oriented to person, place, and time.      Motor: Weakness present.         Last Recorded Vitals  Blood  "pressure 105/65, pulse 87, temperature 37 °C (98.6 °F), temperature source Temporal, resp. rate 20, height 1.753 m (5' 9\"), weight 65.9 kg (145 lb 4.5 oz), SpO2 96%.  Intake/Output last 3 Shifts:  I/O last 3 completed shifts:  In: 480 (7.3 mL/kg) [P.O.:480]  Out: 1375 (20.9 mL/kg) [Urine:1375 (0.6 mL/kg/hr)]  Weight: 65.9 kg     Relevant Results  Scheduled medications  amiodarone, 400 mg, oral, BID   Followed by  [START ON 11/24/2024] amiodarone, 200 mg, oral, Daily  ascorbic acid, 500 mg, oral, Daily  aspirin, 81 mg, oral, Daily  cholecalciferol, 800 Units, oral, Daily  doxycycline, 100 mg, oral, q12h MASSIEL  fluticasone furoate-vilanteroL, 1 puff, inhalation, Daily  insulin lispro, 0-10 Units, subcutaneous, Before meals & nightly  losartan, 25 mg, oral, Daily  [Held by provider] metFORMIN, 500 mg, oral, BID  metoprolol succinate XL, 50 mg, oral, Daily  mupirocin, , Each Nostril, TID  nicotine, 1 patch, transdermal, Daily  oxygen, , inhalation, Continuous - Inhalation  oxygen, , inhalation, Continuous - Inhalation  pantoprazole, 40 mg, oral, Daily  rivaroxaban, 20 mg, oral, Daily with evening meal  rosuvastatin, 20 mg, oral, Daily  tamsulosin, 0.4 mg, oral, Daily  tiotropium, 2 puff, inhalation, Daily  zinc oxide, 1 Application, Topical, TID      Continuous medications     PRN medications  PRN medications: acetaminophen, benzonatate, dextromethorphan-guaifenesin, dextrose, dextrose, glucagon, glucagon, guaiFENesin, ipratropium-albuteroL, prochlorperazine    Results for orders placed or performed during the hospital encounter of 11/12/24 (from the past 24 hours)   POCT GLUCOSE   Result Value Ref Range    POCT Glucose 104 (H) 74 - 99 mg/dL   Urine electrolytes   Result Value Ref Range    Sodium, Urine Random 11 mmol/L    Sodium/Creatinine Ratio 17 Not established. mmol/g Creat    Potassium, Urine Random 37 mmol/L    Potassium/Creatinine Ratio 56 Not established mmol/g Creat    Chloride, Urine Random 25 mmol/L    " Chloride/Creatinine Ratio 38 23 - 275 mmol/g creat    Creatinine, Urine Random 66.0 20.0 - 370.0 mg/dL   Osmolality, urine   Result Value Ref Range    Osmolality, Urine Random 338 200 - 1,200 mOsm/kg   POCT GLUCOSE   Result Value Ref Range    POCT Glucose 95 74 - 99 mg/dL   POCT GLUCOSE   Result Value Ref Range    POCT Glucose 117 (H) 74 - 99 mg/dL   CBC   Result Value Ref Range    WBC 11.9 (H) 4.4 - 11.3 x10*3/uL    nRBC 0.0 0.0 - 0.0 /100 WBCs    RBC 2.58 (L) 4.50 - 5.90 x10*6/uL    Hemoglobin 7.3 (L) 13.5 - 17.5 g/dL    Hematocrit 23.3 (L) 41.0 - 52.0 %    MCV 90 80 - 100 fL    MCH 28.3 26.0 - 34.0 pg    MCHC 31.3 (L) 32.0 - 36.0 g/dL    RDW 14.3 11.5 - 14.5 %    Platelets 137 (L) 150 - 450 x10*3/uL   Comprehensive metabolic panel   Result Value Ref Range    Glucose 77 74 - 99 mg/dL    Sodium 122 (L) 136 - 145 mmol/L    Potassium 3.7 3.5 - 5.3 mmol/L    Chloride 84 (L) 98 - 107 mmol/L    Bicarbonate 33 (H) 21 - 32 mmol/L    Anion Gap 9 (L) 10 - 20 mmol/L    Urea Nitrogen 6 6 - 23 mg/dL    Creatinine 0.45 (L) 0.50 - 1.30 mg/dL    eGFR >90 >60 mL/min/1.73m*2    Calcium 7.5 (L) 8.6 - 10.3 mg/dL    Albumin 1.8 (L) 3.4 - 5.0 g/dL    Alkaline Phosphatase 89 33 - 136 U/L    Total Protein 4.9 (L) 6.4 - 8.2 g/dL    AST 15 9 - 39 U/L    Bilirubin, Total 0.3 0.0 - 1.2 mg/dL    ALT 10 10 - 52 U/L   Magnesium   Result Value Ref Range    Magnesium 1.62 1.60 - 2.40 mg/dL   POCT GLUCOSE   Result Value Ref Range    POCT Glucose 81 74 - 99 mg/dL       ECG 12 lead    Result Date: 11/16/2024  Sinus tachycardia Otherwise normal ECG When compared with ECG of 12-NOV-2024 14:23, (unconfirmed) No significant change was found See ED provider note for full interpretation and clinical correlation Confirmed by Leila Luke ([id number]) on 11/16/2024 2:15:23 PM    ECG 12 lead    Result Date: 11/16/2024  Sinus tachycardia Otherwise normal ECG When compared with ECG of 01-NOV-2024 08:10, Vent. rate has increased BY  42 BPM Criteria for Septal infarct  are no longer Present T wave amplitude has decreased in Lateral leads See ED provider note for full interpretation and clinical correlation Confirmed by Leila Luke ([id number]) on 11/16/2024 2:15:13 PM    Electrocardiogram, 12-lead PRN ACS symptoms    Result Date: 11/15/2024  Atrial fibrillation with rapid ventricular response with premature ventricular or aberrantly conducted complexes Nonspecific ST and T wave abnormality Abnormal ECG When compared with ECG of 12-NOV-2024 15:52, (unconfirmed) Atrial fibrillation has replaced Sinus rhythm ST now depressed in Lateral leads Nonspecific T wave abnormality now evident in Inferior leads Nonspecific T wave abnormality now evident in Lateral leads    CT angio chest for pulmonary embolism    Result Date: 11/12/2024  Interpreted By:  Bryce Estrada, STUDY: CT ANGIO CHEST FOR PULMONARY EMBOLISM;  11/12/2024 3:08 pm   INDICATION: Signs/Symptoms:History of lung cancer shortness of breath COPD hypoxemic significant weight loss.   COMPARISON: 10/16/2024   ACCESSION NUMBER(S): [id number]   ORDERING CLINICIAN: LINUS BERNAL   TECHNIQUE: Helical data acquisition of the chest was obtained with  75 mL Omnipaque 350. Images were reformatted in axial, coronal, and sagittal planes.MIP reformatted images were also generated.   FINDINGS: LUNGS and AIRWAYS: Right upper lobe mass, difficult to differentiate from adjacent consolidation, grossly appears decreased in size from 9.1 cm 8.1 cm. Central necrosis has increased.   Previously, the right lung was collapsed. There is now aeration of the previously occluded right mainstem bronchus as well as the lobar branches of the middle and lower lobes. There is persistent occlusion of the upper lobe bronchus. The previously collapsed upper, middle, and lower lobes are now aerated, although there are diffusely scattered areas of predominantly peripheral consolidation. There are also peripheral areas of new consolidation scattered throughout the left  lung. A new nodule in the left lung base measuring 23 mm is centrally cavitated.   Underlying mild pulmonary fibrosis. Trace right pleural effusion.   MEDIASTINUM and SUDHAKAR, LOWER NECK AND AXILLA: The visualized thyroid gland is grossly unremarkable.   A left paratracheal node is enlarged from 8 mm to 10 mm short axis. The right upper lobe mass infiltrates the mediastinum and is contiguous with adjacent lymphadenopathy which is difficult to measure discretely. Pretracheal node is roughly unchanged at 18 mm short axis. A 13 mm subcarinal node appears new.   Esophagus is not dilated.   HEART and VESSELS: The heart is normal in gross morphology and size.   No significant pericardial effusion.   Severe coronary atherosclerosis.   Thoracic aorta is severely atherosclerotic but otherwise patent without aneurysm. The great vessels are patent. Right IJ chest port catheter is still present. There is persistent narrowing of the SVC relating to compression by adjacent tumor.   No pulmonary embolism is identified.   UPPER ABDOMEN: The visualized subdiaphragmatic structures demonstrate no acute findings on limited images.   CHEST WALL and OSSEOUS STRUCTURES: No suspicious osseous lesions. Multilevel degenerative changes of the thoracic spine.         1.  No pulmonary embolism identified. 2. Grossly right upper lobe mass is decreased in size with increased central necrosis. 3. Previous collapse of the right lung has resolved. There are however extensive new areas of bilateral airspace consolidation suggestive of multifocal pneumonia. A new cavitated nodule in the left lung base may be infectious or inflammatory although metastasis is not excluded. 4. Trace right pleural effusion. 5. Mediastinal lymphadenopathy has slightly progressed.     Signed by: Bryce Estrada 11/12/2024 3:45 PM Dictation workstation:   [id number]    ECG 12 Lead    Result Date: 11/1/2024  Normal sinus rhythm Possible Left atrial enlargement Septal infarct , age  undetermined Abnormal ECG When compared with ECG of 23-SEP-2024 01:03, Incomplete left bundle branch block is no longer Present Confirmed by Lisa Ko ([id number]) on 11/1/2024 12:18:47 PM    Rad Onc CT Sim Images    Result Date: 10/30/2024  These images are not reportable by radiology and will not be interpreted by  Radiologists.           Assessment/Plan   Juan Carlos Cr is a 74 yr old male with PMH of squamous cell lung cancer diagnosed 8/2024 currently on chemoradiation, right lung collapse s/p endobronchial ultrasound, bronchial stent 8/2024, COPD on home O2 4L, paroxysmal A-fib on Rivaroxaban (diagnosed 8/2024), history of MI, CAD with remote PCI 1997, HFrEF (EF 35-40% 8/2024), HTN, and DM, who was admitted on 11/12 for 3-4 day history of dyspnea on minimal exertion, productive cough, wheezing found to have acute on chronic hypoxic respiratory failure due to multifocal MRSA pneumonia    Acute on chronic respiratory hypoxic failure 2/2 multifocal pneumonia, MRSA positive, COPD on home O2 4L  CT 11/12 reviewed - multifocal pneumonia, trace right pleural effusion  MRSA screen positive   11/18 CXR reviewed - worsening of airspace opacities in left mid to lower lung, right lung mass with improved aeration in right lung base  O2 demand increased overnight continue HVNI and wean as tolerated  ID and pulmonology following  Completed 5 day course of Vancomycin and 3 days of Pip-tazo  Doxycyline 100mg q12h oral for 3 weeks (per ID), today is day 5  Breo Elllipta 1 puff daily   Spiriva Respimat 2 puffs daily   Tessalon 100mg TID PRN for cough  Robitussin 200mg q4h PRN for congestion      Squamous cell lung cancer, on chemoradiation  11/12 CT: RUL with increased central necrosis, new cavitated nodule in left lung base  11/18 CXR: right lung mass with improved aeration in right lung base  Currently on chemoradiation.   Palliative care consulted.   Follow up with radiation oncology and oncology after discharge.     Atrial  fibrillation with RVR  Cardiology consulted  Rivaroxaban 20mg daily oral  Amiodarone 400mg BID x10 days PO, today is day 6   Amiodarone 200mg PO starting 11/24  Cardiology outpatient follow up with Leslie LARKIN.   Pt has apt with Dr. Ko on 1/10/2025    Urinary retention   Failed 1x voiding trial on 11/17  Second voiding trial today, with removal of urinary catheter  Urology consulted.   Continue Tamsulosin Flomax 0.4 mg daily oral    Hyponatremia  Continues to trend down  Urine osm 338, FENa 0.06  Will start 1.5 L fluid restriction  Will start normal saline at 75 cc an hour  Will consult nephrology    Hypokalemia  Resolved    Normocytic anemia  Dropped on admission however appears stable  Signs of blood loss    HTN  Blood pressure low  Holding losartan and metoprolol    HLD  Rosuvastatin 20mg daily oral    CAD with history of cardiac stent  Continue Aspirin 81mg daily     DM  Hold Metformin   ISS     GERD  Pantoprazole 40mg daily oral    History of tobacco use  Nicotine patch    Code status: full   PT/OT eval

## 2024-11-22 NOTE — PROGRESS NOTES
"Physical Therapy                 Therapy Communication Note    Patient Name: Juan Carlos Cr  MRN: [id number]  Department: [address]  Room: [address] [place], [place] [address]  Today's Date: 11/22/2024     Discipline: Physical Therapy    Missed Visit Reason: Missed Visit Reason: Patient refused (\"I just feel wheezy, respiratory was just here and did some vibration, Im tired, I can sit up If i need to later, no therapy now\" no tx, nursing aware)    Missed Time: Attempt 1130    Comment: patient currently on Airvo, sats resting per monitor 99%  "

## 2024-11-22 NOTE — PROGRESS NOTES
Juan Carlos Cr is a 74 y.o. male on day 10 of admission presenting with MRSA pneumonia (Multi).    Subjective   Interval History: no fever, unable to provide a hx        Review of Systems    Objective   Range of Vitals (last 24 hours)  Heart Rate:  [86-89]   Temp:  [36.8 °C (98.3 °F)-37 °C (98.6 °F)]   Resp:  [20-27]   BP: (105-110)/(55-65)   Weight:  [65.9 kg (145 lb 4.5 oz)]   SpO2:  [94 %-100 %]   Daily Weight  11/22/24 : 65.9 kg (145 lb 4.5 oz)    Body mass index is 21.45 kg/m².    Physical Exam  Constitutional:       Appearance: Normal appearance.   HENT:      Head: Normocephalic and atraumatic.      Mouth/Throat:      Mouth: Mucous membranes are moist.      Pharynx: Oropharynx is clear.   Eyes:      Pupils: Pupils are equal, round, and reactive to light.   Cardiovascular:      Rate and Rhythm: Normal rate and regular rhythm.      Heart sounds: Normal heart sounds.   Pulmonary:      Effort: Pulmonary effort is normal.      Breath sounds: Normal breath sounds.   Abdominal:      General: Abdomen is flat. Bowel sounds are normal.      Palpations: Abdomen is soft.   Musculoskeletal:      Cervical back: Normal range of motion.         Antibiotics  doxycycline - 100 mg, 100 mg  mupirocin - 2 %    Relevant Results  Labs  Results from last 72 hours   Lab Units 11/22/24  0559 11/21/24  0849 11/20/24  0601   WBC AUTO x10*3/uL 11.9* 12.0* 12.9*   HEMOGLOBIN g/dL 7.3* 7.2* 7.4*   HEMATOCRIT % 23.3* 22.4* 24.1*   PLATELETS AUTO x10*3/uL 137* 141* 143*   NEUTROS PCT AUTO %  --   --  89.1   LYMPHS PCT AUTO %  --   --  2.2   MONOS PCT AUTO %  --   --  6.6   EOS PCT AUTO %  --   --  0.1     Results from last 72 hours   Lab Units 11/22/24  0559 11/21/24  0849 11/20/24  0601   SODIUM mmol/L 122* 124* 125*   POTASSIUM mmol/L 3.7 3.5 4.3   CHLORIDE mmol/L 84* 84* 85*   CO2 mmol/L 33* 32 34*   BUN mg/dL 6 6 6   CREATININE mg/dL 0.45* 0.45* 0.43*   GLUCOSE mg/dL 77 110* 82   CALCIUM mg/dL 7.5* 6.9* 7.4*   ANION GAP mmol/L 9* 12 10    EGFR mL/min/1.73m*2 >90 >90 >90   PHOSPHORUS mg/dL  --  3.0 2.9     Results from last 72 hours   Lab Units 11/22/24  0559 11/21/24  0849 11/20/24  0601   ALK PHOS U/L 89  --  91   BILIRUBIN TOTAL mg/dL 0.3  --  0.3   PROTEIN TOTAL g/dL 4.9*  --  5.0*   ALT U/L 10  --  15   AST U/L 15  --  18   ALBUMIN g/dL 1.8* 1.7* 2.0*     Estimated Creatinine Clearance: 125 mL/min (A) (by C-G formula based on SCr of 0.45 mg/dL (L)).  C-Reactive Protein   Date Value Ref Range Status   11/13/2024 33.57 (H) <1.00 mg/dL Final     Microbiology  Reviewed  Imaging  Reviewed        Assessment/Plan   Respiratory failure / COPD / pneumonia / abscess, MRSA screen is positive, procalcitonin 2.30, sputum with MRSA, the repeat cxr was reviewed  NSCLC / Rt bronchus stent / on Taxol / Carboplatin / radiation     Recommendations :  Plan on oral Doxycycline x 3 weeks with discharge  Discussed with the medical team     I spent minutes in the professional and overall care of this patient.      Krysten Magallon MD

## 2024-11-22 NOTE — CARE PLAN
The patient's goals for the shift include      The clinical goals for the shift include Pt will keep airvo on through the end of shift.    Pt was able to keep airvo on throughout shift.

## 2024-11-22 NOTE — PROGRESS NOTES
Occupational Therapy                 Therapy Communication Note    Patient Name: Juan Carlos Cr  MRN: [id number]  Department: [address]  Room: [address] [place] [address]  Today's Date: 11/22/2024     Discipline: Occupational Therapy    Missed Visit Reason: Missed Visit Reason: Patient refused (per PT, pt reufsing therapies today due to not feeling well. RN aware)    Missed Time: Attempt

## 2024-11-22 NOTE — CARE PLAN
The patient's goals for the shift include      The clinical goals for the shift include Pt will maintain O2 sats without increase in O2 supplementation this shift.    Over the shift, the patient did make progress toward the following goals. Maintained sats, monitored and medicated as ordered this shift.

## 2024-11-22 NOTE — PROGRESS NOTES
"Juan Carlos Cr is a 74 y.o. male on day 10 of admission presenting with MRSA pneumonia (Multi).    Subjective   Pt is on Airvo in the morning, 30L 50%.   He still feels \"cruddy\" and has a productive cough.   Has been up in a chair.          Objective     Physical Exam  Vitals reviewed  Constitutional: No acute distress. On Airvo. Appears thin   Eyes: clear sclera, good eye contact   Head: atraumatic, normocephalic  Lungs: mildly coarse lung sounds b/l   Heart: RRR, no murmurs, rubs, or gallops  Abdomen: non-tender to palpation, + BS  MSK: no edema, bruising, or tenderness to BLEs.   Neuro: moving all extremities. answering questions, following commands  Skin: warm and dry with no rash  Psych: appropriate mood and behavior     Last Recorded Vitals  Blood pressure 107/63, pulse 89, temperature 37 °C (98.6 °F), resp. rate 24, height 1.753 m (5' 9\"), weight 65.9 kg (145 lb 4.5 oz), SpO2 93%.  Intake/Output last 3 Shifts:  I/O last 3 completed shifts:  In: 480 (7.3 mL/kg) [P.O.:480]  Out: 1375 (20.9 mL/kg) [Urine:1375 (0.6 mL/kg/hr)]  Weight: 65.9 kg     Relevant Results               This patient has a central line   Reason for the central line remaining today? Parenteral medication    This patient has a urinary catheter   Reason for the urinary catheter remaining today? critically ill patient who need accurate urinary output measurements       Scheduled medications  amiodarone, 400 mg, oral, BID   Followed by  [START ON 11/24/2024] amiodarone, 200 mg, oral, Daily  ascorbic acid, 500 mg, oral, Daily  aspirin, 81 mg, oral, Daily  cholecalciferol, 800 Units, oral, Daily  doxycycline, 100 mg, oral, q12h MASSIEL  fluticasone furoate-vilanteroL, 1 puff, inhalation, Daily  insulin lispro, 0-10 Units, subcutaneous, Before meals & nightly  ipratropium-albuteroL, 3 mL, nebulization, q6h  [Held by provider] losartan, 25 mg, oral, Daily  [Held by provider] metFORMIN, 500 mg, oral, BID  metoprolol succinate XL, 50 mg, oral, " Daily  mupirocin, , Each Nostril, TID  nicotine, 1 patch, transdermal, Daily  oxygen, , inhalation, Continuous - Inhalation  oxygen, , inhalation, Continuous - Inhalation  pantoprazole, 40 mg, oral, Daily  rivaroxaban, 20 mg, oral, Daily with evening meal  rosuvastatin, 20 mg, oral, Daily  tamsulosin, 0.4 mg, oral, Daily  tiotropium, 2 puff, inhalation, Daily  zinc oxide, 1 Application, Topical, TID      Continuous medications  sodium chloride 0.9%, 75 mL/hr, Last Rate: 75 mL/hr (11/22/24 1021)      PRN medications  PRN medications: acetaminophen, benzonatate, dextromethorphan-guaifenesin, dextrose, dextrose, glucagon, glucagon, guaiFENesin, ipratropium-albuteroL, prochlorperazine  XR chest 2 views    Result Date: 11/21/2024  STUDY: Chest Radiographs;  11/21/2024 11:33 AM. INDICATION: Worsening hypoxia. COMPARISON: Chest radiograph and CTA chest 8/26/2024. ACCESSION NUMBER(S): [id number] ORDERING CLINICIAN: MU HUGGINS TECHNIQUE:  Frontal and lateral chest. FINDINGS: CARDIOMEDIASTINAL SILHOUETTE: Cardiomediastinal silhouette is normal in size and configuration. Right IJ infusion catheter remains in place within the mid SVC.  LUNGS: Consolidation with central cavitation involving the right upper lobe again noted.  Patchy airspace opacification throughout the remainder of both lungs again seen.  With mild interval improvement.  Persistent small right pleural effusion.  ABDOMEN: No remarkable upper abdominal findings.  BONES: No acute osseous changes.    1.Mild improvement in the bilateral pneumonia. 2.Stable right upper lobe lung mass. Signed by Dominguez Sellers MD    XR chest 1 view    Result Date: 11/20/2024  Interpreted By:  Tanya Caballero, STUDY: XR CHEST 1 VIEW; ;  11/18/2024 2:02 pm   INDICATION: Signs/Symptoms:dyspnea.     COMPARISON: Chest radiograph dated 10/18/2024   ACCESSION NUMBER(S): [id number]   ORDERING CLINICIAN: LUIS HORTA   FINDINGS: Right chest wall Vtgpef-S-Gral is noted with its tip  projecting over the expected region of distal SVC and is similar compared to prior radiograph. Cardiac silhouette is similar compared to immediate prior radiograph. There is persistent evidence of near complete opacification of right upper lung zone. There is interval improvement in airspace opacities in the right mid to lower lung zones compared to immediate prior radiograph with improved aeration in the right lower lung zone. However there is interval increase in airspace infiltrates in the left mid to lower lung zone, new compared to prior radiograph dated 10/18/2024 and appears to be slightly increased compared to immediate prior CT examination dated 11/12/2024 (within limitation of differences in technique). There is small right thoracic pleural effusion. No large pneumothorax within limits of portable technique. No acute osseous findings.       1. Findings concerning for interval worsening of airspace opacities in the left mid to lower lung zone compared to prior radiograph dated 10/18/2024 and immediate prior CT chest dated 11/12/2024 (within limitation of differences in technique). 2. Redemonstration of right lung mass with interval improved aeration in right lung base compared to prior radiograph as described above.       MACRO: None   Signed by: Tanya Caballero 11/20/2024 8:45 AM Dictation workstation:   [id number]    ECG 12 lead    Result Date: 11/16/2024  Sinus tachycardia Otherwise normal ECG When compared with ECG of 12-NOV-2024 14:23, (unconfirmed) No significant change was found See ED provider note for full interpretation and clinical correlation Confirmed by Leila Luke ([id number]) on 11/16/2024 2:15:23 PM    ECG 12 lead    Result Date: 11/16/2024  Sinus tachycardia Otherwise normal ECG When compared with ECG of 01-NOV-2024 08:10, Vent. rate has increased BY  42 BPM Criteria for Septal infarct are no longer Present T wave amplitude has decreased in Lateral leads See ED provider note for full  interpretation and clinical correlation Confirmed by Leila Luke ([id number]) on 11/16/2024 2:15:13 PM    Electrocardiogram, 12-lead PRN ACS symptoms    Result Date: 11/15/2024  Atrial fibrillation with rapid ventricular response with premature ventricular or aberrantly conducted complexes Nonspecific ST and T wave abnormality Abnormal ECG When compared with ECG of 12-NOV-2024 15:52, (unconfirmed) Atrial fibrillation has replaced Sinus rhythm ST now depressed in Lateral leads Nonspecific T wave abnormality now evident in Inferior leads Nonspecific T wave abnormality now evident in Lateral leads    CT angio chest for pulmonary embolism    Result Date: 11/12/2024  Interpreted By:  Bryce Estrada, STUDY: CT ANGIO CHEST FOR PULMONARY EMBOLISM;  11/12/2024 3:08 pm   INDICATION: Signs/Symptoms:History of lung cancer shortness of breath COPD hypoxemic significant weight loss.   COMPARISON: 10/16/2024   ACCESSION NUMBER(S): [id number]   ORDERING CLINICIAN: LINUS BERNAL   TECHNIQUE: Helical data acquisition of the chest was obtained with  75 mL Omnipaque 350. Images were reformatted in axial, coronal, and sagittal planes.MIP reformatted images were also generated.   FINDINGS: LUNGS and AIRWAYS: Right upper lobe mass, difficult to differentiate from adjacent consolidation, grossly appears decreased in size from 9.1 cm 8.1 cm. Central necrosis has increased.   Previously, the right lung was collapsed. There is now aeration of the previously occluded right mainstem bronchus as well as the lobar branches of the middle and lower lobes. There is persistent occlusion of the upper lobe bronchus. The previously collapsed upper, middle, and lower lobes are now aerated, although there are diffusely scattered areas of predominantly peripheral consolidation. There are also peripheral areas of new consolidation scattered throughout the left lung. A new nodule in the left lung base measuring 23 mm is centrally cavitated.   Underlying mild  pulmonary fibrosis. Trace right pleural effusion.   MEDIASTINUM and SUDHAKAR, LOWER NECK AND AXILLA: The visualized thyroid gland is grossly unremarkable.   A left paratracheal node is enlarged from 8 mm to 10 mm short axis. The right upper lobe mass infiltrates the mediastinum and is contiguous with adjacent lymphadenopathy which is difficult to measure discretely. Pretracheal node is roughly unchanged at 18 mm short axis. A 13 mm subcarinal node appears new.   Esophagus is not dilated.   HEART and VESSELS: The heart is normal in gross morphology and size.   No significant pericardial effusion.   Severe coronary atherosclerosis.   Thoracic aorta is severely atherosclerotic but otherwise patent without aneurysm. The great vessels are patent. Right IJ chest port catheter is still present. There is persistent narrowing of the SVC relating to compression by adjacent tumor.   No pulmonary embolism is identified.   UPPER ABDOMEN: The visualized subdiaphragmatic structures demonstrate no acute findings on limited images.   CHEST WALL and OSSEOUS STRUCTURES: No suspicious osseous lesions. Multilevel degenerative changes of the thoracic spine.         1.  No pulmonary embolism identified. 2. Grossly right upper lobe mass is decreased in size with increased central necrosis. 3. Previous collapse of the right lung has resolved. There are however extensive new areas of bilateral airspace consolidation suggestive of multifocal pneumonia. A new cavitated nodule in the left lung base may be infectious or inflammatory although metastasis is not excluded. 4. Trace right pleural effusion. 5. Mediastinal lymphadenopathy has slightly progressed.     Signed by: Bryce Estrada 11/12/2024 3:45 PM Dictation workstation:   [id number]    ECG 12 Lead    Result Date: 11/1/2024  Normal sinus rhythm Possible Left atrial enlargement Septal infarct , age undetermined Abnormal ECG When compared with ECG of 23-SEP-2024 01:03, Incomplete left bundle  branch block is no longer Present Confirmed by Lisa Ko ([id number]) on 11/1/2024 12:18:47 PM    Rad Onc CT Sim Images    Result Date: 10/30/2024  These images are not reportable by radiology and will not be interpreted by  Radiologists.   Results for orders placed or performed during the hospital encounter of 11/12/24 (from the past 24 hours)   POCT GLUCOSE   Result Value Ref Range    POCT Glucose 95 74 - 99 mg/dL   POCT GLUCOSE   Result Value Ref Range    POCT Glucose 117 (H) 74 - 99 mg/dL   CBC   Result Value Ref Range    WBC 11.9 (H) 4.4 - 11.3 x10*3/uL    nRBC 0.0 0.0 - 0.0 /100 WBCs    RBC 2.58 (L) 4.50 - 5.90 x10*6/uL    Hemoglobin 7.3 (L) 13.5 - 17.5 g/dL    Hematocrit 23.3 (L) 41.0 - 52.0 %    MCV 90 80 - 100 fL    MCH 28.3 26.0 - 34.0 pg    MCHC 31.3 (L) 32.0 - 36.0 g/dL    RDW 14.3 11.5 - 14.5 %    Platelets 137 (L) 150 - 450 x10*3/uL   Comprehensive metabolic panel   Result Value Ref Range    Glucose 77 74 - 99 mg/dL    Sodium 122 (L) 136 - 145 mmol/L    Potassium 3.7 3.5 - 5.3 mmol/L    Chloride 84 (L) 98 - 107 mmol/L    Bicarbonate 33 (H) 21 - 32 mmol/L    Anion Gap 9 (L) 10 - 20 mmol/L    Urea Nitrogen 6 6 - 23 mg/dL    Creatinine 0.45 (L) 0.50 - 1.30 mg/dL    eGFR >90 >60 mL/min/1.73m*2    Calcium 7.5 (L) 8.6 - 10.3 mg/dL    Albumin 1.8 (L) 3.4 - 5.0 g/dL    Alkaline Phosphatase 89 33 - 136 U/L    Total Protein 4.9 (L) 6.4 - 8.2 g/dL    AST 15 9 - 39 U/L    Bilirubin, Total 0.3 0.0 - 1.2 mg/dL    ALT 10 10 - 52 U/L   Magnesium   Result Value Ref Range    Magnesium 1.62 1.60 - 2.40 mg/dL   POCT GLUCOSE   Result Value Ref Range    POCT Glucose 81 74 - 99 mg/dL   POCT GLUCOSE   Result Value Ref Range    POCT Glucose 115 (H) 74 - 99 mg/dL       Assessment/Plan   Assessment & Plan  MRSA pneumonia (Multi)    Malignant neoplasm of upper lobe of right lung (Multi)    Acute on chronic hypoxic respiratory failure (Multi)      74m with squamous cell lung cancer diagnosed 8/2024 currently on chemoradiation,  COPD, chronic respiratory failure on 3-4L home O2, recurrent R lung collapse with current R main bronchus stent, HFrEF EF 35-40%, afib on Xarelto, HTN, HLD, GERD, CAD, T2DM who was admitted for PNA. Pulm consulted for lung CA, COPD, hypoxia.      #Acute on chronic hypoxic respiratory failure, normally 3-4L NC, Airvo 30L 50%  #MRSA pneumonia, +nares and sputum   #COPD, stable  #HFrEF, stable  #hx R lung collapse s/p R main bronchus stent  -11/12 CTA chest reviewed, R main bronchus was patent. Other prior imaging reviewed. CXR 11/18 showed aeration of R lung, unlikely occluded as of then.   -Discussed with interventional pulmonary group about logistics of repeat bronchoscopy. Will hold off on repeat bronchoscopy for now, may do in next week or two.   -Continue abx per ID   -Continue Breo and Spiriva, prn nebs   -Pulmonary hygiene. Would recommend trying vest and/or IPV      Nichole Gómez, DO PGY3 IM     Discussed with Dr. Delatorre

## 2024-11-23 LAB
ABO GROUP (TYPE) IN BLOOD: NORMAL
ABO GROUP (TYPE) IN BLOOD: NORMAL
ALBUMIN SERPL BCP-MCNC: 1.7 G/DL (ref 3.4–5)
ANION GAP SERPL CALC-SCNC: 9 MMOL/L (ref 10–20)
ANTIBODY SCREEN: NORMAL
BLOOD EXPIRATION DATE: NORMAL
BUN SERPL-MCNC: 8 MG/DL (ref 6–23)
CALCIUM SERPL-MCNC: 7.1 MG/DL (ref 8.6–10.3)
CHLORIDE SERPL-SCNC: 87 MMOL/L (ref 98–107)
CO2 SERPL-SCNC: 31 MMOL/L (ref 21–32)
CREAT SERPL-MCNC: 0.52 MG/DL (ref 0.5–1.3)
DISPENSE STATUS: NORMAL
EGFRCR SERPLBLD CKD-EPI 2021: >90 ML/MIN/1.73M*2
ERYTHROCYTE [DISTWIDTH] IN BLOOD BY AUTOMATED COUNT: 14.4 % (ref 11.5–14.5)
GLUCOSE BLD MANUAL STRIP-MCNC: 134 MG/DL (ref 74–99)
GLUCOSE BLD MANUAL STRIP-MCNC: 136 MG/DL (ref 74–99)
GLUCOSE BLD MANUAL STRIP-MCNC: 158 MG/DL (ref 74–99)
GLUCOSE BLD MANUAL STRIP-MCNC: 99 MG/DL (ref 74–99)
GLUCOSE SERPL-MCNC: 95 MG/DL (ref 74–99)
HCT VFR BLD AUTO: 21.8 % (ref 41–52)
HCT VFR BLD AUTO: 23.2 % (ref 41–52)
HGB BLD-MCNC: 6.8 G/DL (ref 13.5–17.5)
HGB BLD-MCNC: 7.6 G/DL (ref 13.5–17.5)
MCH RBC QN AUTO: 28.3 PG (ref 26–34)
MCHC RBC AUTO-ENTMCNC: 31.2 G/DL (ref 32–36)
MCV RBC AUTO: 91 FL (ref 80–100)
NRBC BLD-RTO: 0 /100 WBCS (ref 0–0)
PHOSPHATE SERPL-MCNC: 2.9 MG/DL (ref 2.5–4.9)
PLATELET # BLD AUTO: 105 X10*3/UL (ref 150–450)
POTASSIUM SERPL-SCNC: 3.4 MMOL/L (ref 3.5–5.3)
PRODUCT BLOOD TYPE: 6200
PRODUCT CODE: NORMAL
RBC # BLD AUTO: 2.4 X10*6/UL (ref 4.5–5.9)
RH FACTOR (ANTIGEN D): NORMAL
RH FACTOR (ANTIGEN D): NORMAL
SODIUM SERPL-SCNC: 124 MMOL/L (ref 136–145)
UNIT ABO: NORMAL
UNIT NUMBER: NORMAL
UNIT RH: NORMAL
UNIT VOLUME: 350
WBC # BLD AUTO: 9.2 X10*3/UL (ref 4.4–11.3)
XM INTEP: NORMAL

## 2024-11-23 PROCEDURE — 94760 N-INVAS EAR/PLS OXIMETRY 1: CPT

## 2024-11-23 PROCEDURE — 80069 RENAL FUNCTION PANEL: CPT | Performed by: FAMILY MEDICINE

## 2024-11-23 PROCEDURE — 36416 COLLJ CAPILLARY BLOOD SPEC: CPT | Performed by: FAMILY MEDICINE

## 2024-11-23 PROCEDURE — 86923 COMPATIBILITY TEST ELECTRIC: CPT

## 2024-11-23 PROCEDURE — 85014 HEMATOCRIT: CPT | Performed by: FAMILY MEDICINE

## 2024-11-23 PROCEDURE — 2500000005 HC RX 250 GENERAL PHARMACY W/O HCPCS: Performed by: FAMILY MEDICINE

## 2024-11-23 PROCEDURE — 94668 MNPJ CHEST WALL SBSQ: CPT

## 2024-11-23 PROCEDURE — 97110 THERAPEUTIC EXERCISES: CPT | Mod: GP,CQ

## 2024-11-23 PROCEDURE — 2060000001 HC INTERMEDIATE ICU ROOM DAILY

## 2024-11-23 PROCEDURE — 2500000002 HC RX 250 W HCPCS SELF ADMINISTERED DRUGS (ALT 637 FOR MEDICARE OP, ALT 636 FOR OP/ED): Performed by: INTERNAL MEDICINE

## 2024-11-23 PROCEDURE — 99233 SBSQ HOSP IP/OBS HIGH 50: CPT | Performed by: INTERNAL MEDICINE

## 2024-11-23 PROCEDURE — 2500000001 HC RX 250 WO HCPCS SELF ADMINISTERED DRUGS (ALT 637 FOR MEDICARE OP): Performed by: INTERNAL MEDICINE

## 2024-11-23 PROCEDURE — 36416 COLLJ CAPILLARY BLOOD SPEC: CPT | Performed by: INTERNAL MEDICINE

## 2024-11-23 PROCEDURE — 82947 ASSAY GLUCOSE BLOOD QUANT: CPT

## 2024-11-23 PROCEDURE — P9016 RBC LEUKOCYTES REDUCED: HCPCS

## 2024-11-23 PROCEDURE — 36415 COLL VENOUS BLD VENIPUNCTURE: CPT | Performed by: FAMILY MEDICINE

## 2024-11-23 PROCEDURE — 2500000002 HC RX 250 W HCPCS SELF ADMINISTERED DRUGS (ALT 637 FOR MEDICARE OP, ALT 636 FOR OP/ED): Performed by: NURSE PRACTITIONER

## 2024-11-23 PROCEDURE — 99232 SBSQ HOSP IP/OBS MODERATE 35: CPT

## 2024-11-23 PROCEDURE — 85027 COMPLETE CBC AUTOMATED: CPT | Performed by: FAMILY MEDICINE

## 2024-11-23 PROCEDURE — S4991 NICOTINE PATCH NONLEGEND: HCPCS | Performed by: INTERNAL MEDICINE

## 2024-11-23 PROCEDURE — 2500000001 HC RX 250 WO HCPCS SELF ADMINISTERED DRUGS (ALT 637 FOR MEDICARE OP): Performed by: STUDENT IN AN ORGANIZED HEALTH CARE EDUCATION/TRAINING PROGRAM

## 2024-11-23 PROCEDURE — 2500000002 HC RX 250 W HCPCS SELF ADMINISTERED DRUGS (ALT 637 FOR MEDICARE OP, ALT 636 FOR OP/ED): Performed by: STUDENT IN AN ORGANIZED HEALTH CARE EDUCATION/TRAINING PROGRAM

## 2024-11-23 PROCEDURE — 2500000001 HC RX 250 WO HCPCS SELF ADMINISTERED DRUGS (ALT 637 FOR MEDICARE OP)

## 2024-11-23 PROCEDURE — 2500000002 HC RX 250 W HCPCS SELF ADMINISTERED DRUGS (ALT 637 FOR MEDICARE OP, ALT 636 FOR OP/ED): Performed by: FAMILY MEDICINE

## 2024-11-23 PROCEDURE — 86901 BLOOD TYPING SEROLOGIC RH(D): CPT | Performed by: INTERNAL MEDICINE

## 2024-11-23 PROCEDURE — 94669 MECHANICAL CHEST WALL OSCILL: CPT

## 2024-11-23 PROCEDURE — 94640 AIRWAY INHALATION TREATMENT: CPT

## 2024-11-23 PROCEDURE — 2500000002 HC RX 250 W HCPCS SELF ADMINISTERED DRUGS (ALT 637 FOR MEDICARE OP, ALT 636 FOR OP/ED)

## 2024-11-23 PROCEDURE — 36430 TRANSFUSION BLD/BLD COMPNT: CPT

## 2024-11-23 PROCEDURE — 97530 THERAPEUTIC ACTIVITIES: CPT | Mod: GP,CQ

## 2024-11-23 PROCEDURE — 2500000001 HC RX 250 WO HCPCS SELF ADMINISTERED DRUGS (ALT 637 FOR MEDICARE OP): Performed by: NURSE PRACTITIONER

## 2024-11-23 RX ORDER — POTASSIUM CHLORIDE 20 MEQ/1
40 TABLET, EXTENDED RELEASE ORAL ONCE
Status: COMPLETED | OUTPATIENT
Start: 2024-11-23 | End: 2024-11-23

## 2024-11-23 RX ORDER — POTASSIUM CHLORIDE 20 MEQ/1
20 TABLET, EXTENDED RELEASE ORAL ONCE
Status: COMPLETED | OUTPATIENT
Start: 2024-11-23 | End: 2024-11-23

## 2024-11-23 ASSESSMENT — COGNITIVE AND FUNCTIONAL STATUS - GENERAL
HELP NEEDED FOR BATHING: A LITTLE
DAILY ACTIVITIY SCORE: 20
CLIMB 3 TO 5 STEPS WITH RAILING: TOTAL
MOBILITY SCORE: 16
MOVING FROM LYING ON BACK TO SITTING ON SIDE OF FLAT BED WITH BEDRAILS: A LITTLE
MOBILITY SCORE: 19
TOILETING: A LITTLE
TURNING FROM BACK TO SIDE WHILE IN FLAT BAD: A LITTLE
STANDING UP FROM CHAIR USING ARMS: A LITTLE
WALKING IN HOSPITAL ROOM: A LITTLE
MOVING TO AND FROM BED TO CHAIR: A LITTLE
WALKING IN HOSPITAL ROOM: A LITTLE
DRESSING REGULAR UPPER BODY CLOTHING: A LITTLE
STANDING UP FROM CHAIR USING ARMS: A LITTLE
MOVING TO AND FROM BED TO CHAIR: A LITTLE
TURNING FROM BACK TO SIDE WHILE IN FLAT BAD: A LITTLE
CLIMB 3 TO 5 STEPS WITH RAILING: A LITTLE
DRESSING REGULAR LOWER BODY CLOTHING: A LITTLE

## 2024-11-23 ASSESSMENT — PAIN - FUNCTIONAL ASSESSMENT
PAIN_FUNCTIONAL_ASSESSMENT: 0-10
PAIN_FUNCTIONAL_ASSESSMENT: 0-10

## 2024-11-23 ASSESSMENT — PAIN SCALES - GENERAL
PAINLEVEL_OUTOF10: 0 - NO PAIN

## 2024-11-23 NOTE — PROGRESS NOTES
Juan Carlos Cr is a 74 y.o. male on day 11 of admission presenting with MRSA pneumonia (Multi).    Subjective   Juan Carlos Cr is a 74 yr old male with PMH of squamous cell lung cancer diagnosed 8/2024 currently on chemoradiation, right lung collapse s/p endobronchial ultrasound, bronchial stent 8/2024, COPD on home O2 4L, paroxysmal A-fib on Rivaroxaban (diagnosed 8/2024), history of MI, CAD with remote PCI 1997, HFrEF (EF 35-40% 8/2024), HTN, and DM, who was admitted on 11/12 for 3-4 day history of dyspnea on minimal exertion, productive cough, wheezing.     - Today at bedside, pt reports that his symptoms are still the same. He is coughing and bringing up some clear mucus.   - States his SOB is pretty much the same.  - Denies chest pain, palpitations.   - States that he does not feel like eating or drinking. Two Glucerna bottles unopened on his desk. States he does not want to drink them.     All pertinent positive symptoms are included in the history of present illness.  All other systems have been reviewed and are negative and noncontributory to this patient's current ailments.       Objective     Physical Exam  Constitutional:       General: He is not in acute distress.     Appearance: He is ill-appearing. He is not toxic-appearing or diaphoretic.      Comments: Thin and frail appearing man. Laying in bed. NC in place.   Cardiovascular:      Rate and Rhythm: Normal rate and regular rhythm.      Pulses: Normal pulses.      Heart sounds: Normal heart sounds. No murmur heard.  Pulmonary:      Effort: No respiratory distress.      Breath sounds: No stridor. Wheezing present.      Comments: NC in place. No coughing noted during physical exam.   No conversational dyspnea.   Bilateral lung crackles, worse on right. Decreased breath sounds on the RUL and RLL. Wheezing noted with coughs.   Chest:      Chest wall: No tenderness.   Abdominal:      General: Abdomen is flat. Bowel sounds are normal. There is no  "distension.      Palpations: Abdomen is soft.      Tenderness: There is no abdominal tenderness. There is no guarding.   Musculoskeletal:      Right lower leg: No edema.      Left lower leg: No edema.      Comments: Loss of muscle mass in the neck, chest, abdomen.    Skin:     General: Skin is warm and dry.      Findings: No rash.      Comments: Right port, no surrounding erythema or drainage.   2x2cm raised nodule noted on the back, overlying the thoracic spine, between bilateral scapula. No tenderness with palpation. Mildly erythematous. Not warm to touch. No drainage.   Bruising noted on bilateral arms. No bleeding, drainage.    Neurological:      General: No focal deficit present.      Mental Status: He is alert and oriented to person, place, and time.      Motor: Weakness present.         Last Recorded Vitals  Blood pressure 102/55, pulse 85, temperature 36.9 °C (98.4 °F), temperature source Temporal, resp. rate 22, height 1.753 m (5' 9\"), weight 66.1 kg (145 lb 11.6 oz), SpO2 95%.  Intake/Output last 3 Shifts:  I/O last 3 completed shifts:  In: 1680 (25.4 mL/kg) [P.O.:180; I.V.:1500 (22.7 mL/kg)]  Out: 900 (13.6 mL/kg) [Urine:900 (0.4 mL/kg/hr)]  Weight: 66.1 kg     Relevant Results  Scheduled medications  amiodarone, 400 mg, oral, BID   Followed by  [START ON 11/24/2024] amiodarone, 200 mg, oral, Daily  ascorbic acid, 500 mg, oral, Daily  aspirin, 81 mg, oral, Daily  cholecalciferol, 800 Units, oral, Daily  doxycycline, 100 mg, oral, q12h MASSIEL  fluticasone furoate-vilanteroL, 1 puff, inhalation, Daily  insulin lispro, 0-10 Units, subcutaneous, Before meals & nightly  ipratropium-albuteroL, 3 mL, nebulization, q6h  [Held by provider] losartan, 25 mg, oral, Daily  [Held by provider] metFORMIN, 500 mg, oral, BID  metoprolol succinate XL, 50 mg, oral, Daily  mupirocin, , Each Nostril, TID  nicotine, 1 patch, transdermal, Daily  oxygen, , inhalation, Continuous - Inhalation  oxygen, , inhalation, Continuous - " Inhalation  pantoprazole, 40 mg, oral, Daily  rivaroxaban, 20 mg, oral, Daily with evening meal  rosuvastatin, 20 mg, oral, Daily  tamsulosin, 0.4 mg, oral, Daily  tiotropium, 2 puff, inhalation, Daily  zinc oxide, 1 Application, Topical, TID      Continuous medications  sodium chloride 0.9%, 75 mL/hr, Last Rate: 75 mL/hr (11/22/24 2015)  sodium chloride 0.9%, 75 mL/hr      PRN medications  PRN medications: acetaminophen, benzonatate, dextromethorphan-guaifenesin, dextrose, dextrose, glucagon, glucagon, guaiFENesin, ipratropium-albuteroL, prochlorperazine    Results for orders placed or performed during the hospital encounter of 11/12/24 (from the past 24 hours)   POCT GLUCOSE   Result Value Ref Range    POCT Glucose 115 (H) 74 - 99 mg/dL   Basic metabolic panel   Result Value Ref Range    Glucose 107 (H) 74 - 99 mg/dL    Sodium 122 (L) 136 - 145 mmol/L    Potassium 3.7 3.5 - 5.3 mmol/L    Chloride 84 (L) 98 - 107 mmol/L    Bicarbonate 35 (H) 21 - 32 mmol/L    Anion Gap 7 (L) 10 - 20 mmol/L    Urea Nitrogen 8 6 - 23 mg/dL    Creatinine 0.49 (L) 0.50 - 1.30 mg/dL    eGFR >90 >60 mL/min/1.73m*2    Calcium 7.4 (L) 8.6 - 10.3 mg/dL   POCT GLUCOSE   Result Value Ref Range    POCT Glucose 103 (H) 74 - 99 mg/dL   POCT GLUCOSE   Result Value Ref Range    POCT Glucose 176 (H) 74 - 99 mg/dL   Renal Function Panel   Result Value Ref Range    Glucose 95 74 - 99 mg/dL    Sodium 124 (L) 136 - 145 mmol/L    Potassium 3.4 (L) 3.5 - 5.3 mmol/L    Chloride 87 (L) 98 - 107 mmol/L    Bicarbonate 31 21 - 32 mmol/L    Anion Gap 9 (L) 10 - 20 mmol/L    Urea Nitrogen 8 6 - 23 mg/dL    Creatinine 0.52 0.50 - 1.30 mg/dL    eGFR >90 >60 mL/min/1.73m*2    Calcium 7.1 (L) 8.6 - 10.3 mg/dL    Phosphorus 2.9 2.5 - 4.9 mg/dL    Albumin 1.7 (L) 3.4 - 5.0 g/dL   CBC   Result Value Ref Range    WBC 9.2 4.4 - 11.3 x10*3/uL    nRBC 0.0 0.0 - 0.0 /100 WBCs    RBC 2.40 (L) 4.50 - 5.90 x10*6/uL    Hemoglobin 6.8 (L) 13.5 - 17.5 g/dL    Hematocrit 21.8 (L)  41.0 - 52.0 %    MCV 91 80 - 100 fL    MCH 28.3 26.0 - 34.0 pg    MCHC 31.2 (L) 32.0 - 36.0 g/dL    RDW 14.4 11.5 - 14.5 %    Platelets 105 (L) 150 - 450 x10*3/uL   Type and screen   Result Value Ref Range    ABO TYPE A     Rh TYPE POS    POCT GLUCOSE   Result Value Ref Range    POCT Glucose 99 74 - 99 mg/dL   VERIFY ABO/Rh Group Test   Result Value Ref Range    ABO TYPE A     Rh TYPE POS        ECG 12 lead    Result Date: 11/16/2024  Sinus tachycardia Otherwise normal ECG When compared with ECG of 12-NOV-2024 14:23, (unconfirmed) No significant change was found See ED provider note for full interpretation and clinical correlation Confirmed by Leila Luke ([id number]) on 11/16/2024 2:15:23 PM    ECG 12 lead    Result Date: 11/16/2024  Sinus tachycardia Otherwise normal ECG When compared with ECG of 01-NOV-2024 08:10, Vent. rate has increased BY  42 BPM Criteria for Septal infarct are no longer Present T wave amplitude has decreased in Lateral leads See ED provider note for full interpretation and clinical correlation Confirmed by Leila Luke ([id number]) on 11/16/2024 2:15:13 PM    Electrocardiogram, 12-lead PRN ACS symptoms    Result Date: 11/15/2024  Atrial fibrillation with rapid ventricular response with premature ventricular or aberrantly conducted complexes Nonspecific ST and T wave abnormality Abnormal ECG When compared with ECG of 12-NOV-2024 15:52, (unconfirmed) Atrial fibrillation has replaced Sinus rhythm ST now depressed in Lateral leads Nonspecific T wave abnormality now evident in Inferior leads Nonspecific T wave abnormality now evident in Lateral leads    CT angio chest for pulmonary embolism    Result Date: 11/12/2024  Interpreted By:  Bryce Estrada, STUDY: CT ANGIO CHEST FOR PULMONARY EMBOLISM;  11/12/2024 3:08 pm   INDICATION: Signs/Symptoms:History of lung cancer shortness of breath COPD hypoxemic significant weight loss.   COMPARISON: 10/16/2024   ACCESSION NUMBER(S): [id number]   ORDERING CLINICIAN:  LINUS BERNAL   TECHNIQUE: Helical data acquisition of the chest was obtained with  75 mL Omnipaque 350. Images were reformatted in axial, coronal, and sagittal planes.MIP reformatted images were also generated.   FINDINGS: LUNGS and AIRWAYS: Right upper lobe mass, difficult to differentiate from adjacent consolidation, grossly appears decreased in size from 9.1 cm 8.1 cm. Central necrosis has increased.   Previously, the right lung was collapsed. There is now aeration of the previously occluded right mainstem bronchus as well as the lobar branches of the middle and lower lobes. There is persistent occlusion of the upper lobe bronchus. The previously collapsed upper, middle, and lower lobes are now aerated, although there are diffusely scattered areas of predominantly peripheral consolidation. There are also peripheral areas of new consolidation scattered throughout the left lung. A new nodule in the left lung base measuring 23 mm is centrally cavitated.   Underlying mild pulmonary fibrosis. Trace right pleural effusion.   MEDIASTINUM and SUDHAKAR, LOWER NECK AND AXILLA: The visualized thyroid gland is grossly unremarkable.   A left paratracheal node is enlarged from 8 mm to 10 mm short axis. The right upper lobe mass infiltrates the mediastinum and is contiguous with adjacent lymphadenopathy which is difficult to measure discretely. Pretracheal node is roughly unchanged at 18 mm short axis. A 13 mm subcarinal node appears new.   Esophagus is not dilated.   HEART and VESSELS: The heart is normal in gross morphology and size.   No significant pericardial effusion.   Severe coronary atherosclerosis.   Thoracic aorta is severely atherosclerotic but otherwise patent without aneurysm. The great vessels are patent. Right IJ chest port catheter is still present. There is persistent narrowing of the SVC relating to compression by adjacent tumor.   No pulmonary embolism is identified.   UPPER ABDOMEN: The visualized  subdiaphragmatic structures demonstrate no acute findings on limited images.   CHEST WALL and OSSEOUS STRUCTURES: No suspicious osseous lesions. Multilevel degenerative changes of the thoracic spine.         1.  No pulmonary embolism identified. 2. Grossly right upper lobe mass is decreased in size with increased central necrosis. 3. Previous collapse of the right lung has resolved. There are however extensive new areas of bilateral airspace consolidation suggestive of multifocal pneumonia. A new cavitated nodule in the left lung base may be infectious or inflammatory although metastasis is not excluded. 4. Trace right pleural effusion. 5. Mediastinal lymphadenopathy has slightly progressed.     Signed by: Bryce Estrada 11/12/2024 3:45 PM Dictation workstation:   [id number]    ECG 12 Lead    Result Date: 11/1/2024  Normal sinus rhythm Possible Left atrial enlargement Septal infarct , age undetermined Abnormal ECG When compared with ECG of 23-SEP-2024 01:03, Incomplete left bundle branch block is no longer Present Confirmed by Lisa Ko ([id number]) on 11/1/2024 12:18:47 PM    Rad Onc CT Sim Images    Result Date: 10/30/2024  These images are not reportable by radiology and will not be interpreted by  Radiologists.           Assessment/Plan   Principal Problem:    MRSA pneumonia (Multi)  Active Problems:    CAD (coronary artery disease)    Hypertension    Atrial fibrillation (Multi)    Malignant neoplasm of upper lobe of right lung (Multi)    Acute on chronic hypoxic respiratory failure (Multi)    Juan Carlos Cr is a 74 yr old male with PMH of squamous cell lung cancer diagnosed 8/2024 currently on chemoradiation, right lung collapse s/p endobronchial ultrasound, bronchial stent 8/2024, COPD on home O2 4L, paroxysmal A-fib on Rivaroxaban (diagnosed 8/2024), history of MI, CAD with remote PCI 1997, HFrEF (EF 35-40% 8/2024), HTN, and DM, who was admitted on 11/12 for 3-4 day history of dyspnea on minimal exertion,  productive cough, wheezing.     #Acute on chronic respiratory failure 2/2 multifocal pneumonia, MRSA positive  #COPD on home O2 4L  - CT 11/12 - multifocal pneumonia, trace right pleural effusion  - MRSA positive - nares and sputum   - 11/18 CXR - worsening of airspace opacities in left mid to lower lung, right lung mass with improved aeration in right lung base  - 11/21 CXR - mild improvement in b/l pneumonia, with stable RUL lung mass  - WBC down trending - 9.2 today  - Completed 5 day course of Vancomycin and 3 days of Pip-tazo  - Doxycyline 100mg q12h PO for 3 weeks, today is day 7  - Breo Elllipta and Spiriva Respimat  - Tessalon and Robitussin PRN for cough/congestion  - NC requirements increased from 3-5L, to 5-15L on 11/21, to high flow Airvo 30L with FiO2 40% on 11/22. Pt is on Airvo 30L with FiO2 40% today.   - Suspected mucus plugging because pt was having desat events after coughing episodes. Chest PT completed. Pulm recommended vest and/or IPV.  - Pulm team discussed with interventional pulmonology to hold off on repeat bronchoscopy, may repeat in next 1-2 weeks.   - ID consulted    - Pulm consulted     #Squamous cell lung cancer, on chemoradiation  - 11/12 CT: RUL with increased central necrosis, new cavitated nodule in left lung base  - 11/18 CXR: right lung mass with improved aeration in right lung base  - Currently on chemoradiation.   - Palliative care consulted.   - Pulm team discussed with interventional pulmonology to hold off on repeat bronchoscopy, may repeat in next 1-2 weeks.   - Follow up with radiation oncology and oncology after discharge.     #Normocytic anemia   - Hemoglobin down trending, 6.8 today. Hematocrit 21.8, with MCV 91.    - Transfuse 1 unit RBC.    - Pt is blood type A, Rh pos, antibody neg    #Hyponatremia    - Likely secondary to SIADH given pt's history of squamous cell lung cancer.   - Na 124 today.   - Consulted nephrology   - Serum osmolality low at 246, urine  osmolality 338, and urine electrolytes WNL   - 1.5 L fluid restriction given low serum osmolality   - Urea 30g PO - one dose given today    #Hypokalemia  - K+ 3.4   - Cardio recommends keeping K > 4   - Repleted today with 60mEq Klor-Con    #Severe malnutrition 2/2 chronic lung disease   - Nutrition consulted - recommend Glucerna 220kcal, 10g protein   - Continue with current diet order - adult regular diet with 60g carb, with 1.5 L fluid restriction    - Repeat weight 2-3x weekly   - Nurses have been continuing to encourage pt to eat and order from menu    #Atrial fibrillation, resolved RVR   - Cardiology consulted   - Rivaroxaban 20mg daily oral   - Amiodarone 400mg BID x10 days PO, today is last day.     - Amiodarone 200mg PO starting 11/24   - Cardiology outpatient follow up with [facility].     - Pt has apt with Dr. Ko on 1/10/2025    #HTN, with HFrEF (EF 35-40% 8/2024)  - Blood pressures low in past 12 hours, likely 2/2 to decreased PO intake.   - Cardiology consulted  - Hold Losartan 25 mg daily oral   - Hold Metoprolol succinate XL 50mg daily oral  - Continue to monitor blood pressures    #Urinary retention    - Failed 2x voiding trial on 11/17 and 11/20  - Urology consulted. Per note, may be discharged with german and follow up for voiding trial with urology outpatient.    - Tamsulosin Flomax 0.4 mg daily oral    #Cyst on back    - Nodule on back noted during physical exam    - Noted to be cyst on CT scan     #HLD   - Rosuvastatin 20mg daily oral    #CAD with history of cardiac stent   - Continue Aspirin 81mg daily    #DM   - Hold Metformin    - ISS    #GERD   - Pantoprazole 40mg daily oral    #History of tobacco use   - Nicotine patch    Code status: full   PT/OT consulted - pt noted to be repeatedly refusing therapy    Dispo: SNF precert completed for [facility], pt's O2 requirements continue to increase, [facility] notified, RBC transfusion today.     Ez Whitehead, DO  PGY-1, Family  Medicine  [facility]

## 2024-11-23 NOTE — PROGRESS NOTES
Physical Therapy    Physical Therapy Treatment    Patient Name: Juan Carlos Cr  MRN: [id number]  Department: [facility] BLOOD BANK LAB  Room: 248/[facility]  Today's Date: 11/23/2024  Time Calculation  Start Time: 1427  Stop Time: 1450  Time Calculation (min): 23 min         Assessment/Plan   PT Assessment  End of Session Communication: Bedside nurse  Assessment Comment: .  End of Session Patient Position: Bed, 3 rail up, Alarm on     PT Plan  Treatment/Interventions: Transfer training, Gait training, Stair training, Balance training, Bed mobility, Endurance training, Therapeutic exercise  PT Plan: Ongoing PT  PT Frequency: 3 times per week  PT Discharge Recommendations: Moderate intensity level of continued care  Equipment Recommended upon Discharge: Wheeled walker  PT Recommended Transfer Status: Assist x1  PT - OK to Discharge: Yes (per PT POC)      General Visit Information:   PT  Visit  PT Received On: 11/23/24  General  Prior to Session Communication: Bedside nurse  Patient Position Received: Bed, 3 rail up, Alarm on  General Comment: pt supine in bed upon entering, Airvo, tele, pt fatigues quickly with mobility requiring 3 minutes to recover after ambulating to chair    Subjective   Precautions:  Precautions  Medical Precautions: Fall precautions, Oxygen therapy device and L/min    Vital Signs (Past 2hrs)        Date/Time Vitals Session Patient Position Pulse Resp SpO2 BP MAP (mmHg)    11/23/24 1407 --  --  85  27  96 %  103/56  70                         Objective   Pain:  Pain Assessment  0-10 (Numeric) Pain Score: 0 - No pain  Cognition:  Cognition  Orientation Level: Oriented X4       Treatments:  Therapeutic Exercise  Therapeutic Exercise Performed: Yes  Therapeutic Exercise Activity 1: supine AP, heel slides, sitting LAQ, and sitting marches x15 reps AROM    Bed Mobility 1  Bed Mobility 1: Supine to sitting, Sitting to supine  Level of Assistance 1: Close supervision  Bed Mobility Comments 1: cues for line  management and sequencing of tasks    Ambulation/Gait Training 1  Surface 1: Level tile  Device 1: Rolling walker  Assistance 1: Contact guard  Comments/Distance (ft) 1: 3' to chair, cues for steady breathing technique  Transfer 1  Technique 1: Sit to stand, Stand to sit  Transfer Device 1: Walker  Transfer Level of Assistance 1: Contact guard         Outcome Measures:  [facility] Basic Mobility  Turning from your back to your side while in a flat bed without using bedrails: A little  Moving from lying on your back to sitting on the side of a flat bed without using bedrails: A little  Moving to and from bed to chair (including a wheelchair): A little  Standing up from a chair using your arms (e.g. wheelchair or bedside chair): A little  To walk in hospital room: A little  Climbing 3-5 steps with railing: Total  Basic Mobility - Total Score: 16    Education Documentation  Mobility Training, taught by Thanh Yin PTA at 11/23/2024  3:22 PM.  Learner: Patient  Readiness: Acceptance  Method: Explanation  Response: Verbalizes Understanding    Education Comments  No comments found.        OP EDUCATION:       Encounter Problems       Encounter Problems (Active)       Balance       STG - Maintains dynamic standing balance without upper extremity support x 5' with dual task supervision  (Progressing)       Start:  11/18/24    Expected End:  12/02/24       INTERVENTIONS:  1. Practice standing with minimal support.  2. Educate patient about standing tolerance.  3. Educate patient about independence with gait, transfers, and ADL's.  4. Educate patient about use of assistive device.  5. Educate patient about self-directed care.            Mobility       STG - Patient will ambulate with 2WW 25' SBA  (Progressing)       Start:  11/18/24    Expected End:  12/02/24            STG - Patient will ascend and descend two stairs with LRD CGA  (Progressing)       Start:  11/18/24    Expected End:  12/02/24               PT Transfers        STG - Patient will perform bed mobility independently  (Progressing)       Start:  11/18/24    Expected End:  12/02/24            STG - Patient will transfer sit to and from stand mod I  (Progressing)       Start:  11/18/24    Expected End:  12/02/24               Pain - Adult

## 2024-11-23 NOTE — CARE PLAN
The patient's goals for the shift include  to get some rest.    The clinical goals for the shift include Pt will have lower Fio2 needs.

## 2024-11-23 NOTE — PROGRESS NOTES
Juan Carlos Cr is a 74 y.o. male on day 11 of admission presenting with MRSA pneumonia (Multi).    Subjective   Interval History: no fever, no new complaints        Review of Systems    Objective   Range of Vitals (last 24 hours)  Heart Rate:  [80-90]   Temp:  [36.2 °C (97.2 °F)-37.1 °C (98.8 °F)]   Resp:  [17-28]   BP: ()/(51-65)   Weight:  [66.1 kg (145 lb 11.6 oz)]   SpO2:  [87 %-98 %]   Daily Weight  11/23/24 : 66.1 kg (145 lb 11.6 oz)    Body mass index is 21.52 kg/m².    Physical Exam  Constitutional:       Appearance: Normal appearance.   HENT:      Head: Normocephalic and atraumatic.      Mouth/Throat:      Mouth: Mucous membranes are moist.      Pharynx: Oropharynx is clear.   Eyes:      Pupils: Pupils are equal, round, and reactive to light.   Cardiovascular:      Rate and Rhythm: Normal rate and regular rhythm.      Heart sounds: Normal heart sounds.   Pulmonary:      Effort: Pulmonary effort is normal.      Breath sounds: Normal breath sounds.   Abdominal:      General: Abdomen is flat. Bowel sounds are normal.      Palpations: Abdomen is soft.   Musculoskeletal:      Cervical back: Normal range of motion.         Antibiotics  doxycycline - 100 mg, 100 mg  mupirocin - 2 %    Relevant Results  Labs  Results from last 72 hours   Lab Units 11/23/24  0502 11/22/24  0559 11/21/24  0849   WBC AUTO x10*3/uL 9.2 11.9* 12.0*   HEMOGLOBIN g/dL 6.8* 7.3* 7.2*   HEMATOCRIT % 21.8* 23.3* 22.4*   PLATELETS AUTO x10*3/uL 105* 137* 141*     Results from last 72 hours   Lab Units 11/23/24  0502 11/22/24  1506 11/22/24  0559 11/21/24  0849   SODIUM mmol/L 124* 122* 122* 124*   POTASSIUM mmol/L 3.4* 3.7 3.7 3.5   CHLORIDE mmol/L 87* 84* 84* 84*   CO2 mmol/L 31 35* 33* 32   BUN mg/dL 8 8 6 6   CREATININE mg/dL 0.52 0.49* 0.45* 0.45*   GLUCOSE mg/dL 95 107* 77 110*   CALCIUM mg/dL 7.1* 7.4* 7.5* 6.9*   ANION GAP mmol/L 9* 7* 9* 12   EGFR mL/min/1.73m*2 >90 >90 >90 >90   PHOSPHORUS mg/dL 2.9  --   --  3.0     Results  from last 72 hours   Lab Units 11/23/24  0502 11/22/24  0559 11/21/24  0849   ALK PHOS U/L  --  89  --    BILIRUBIN TOTAL mg/dL  --  0.3  --    PROTEIN TOTAL g/dL  --  4.9*  --    ALT U/L  --  10  --    AST U/L  --  15  --    ALBUMIN g/dL 1.7* 1.8* 1.7*     Estimated Creatinine Clearance: 116.5 mL/min (by C-G formula based on SCr of 0.52 mg/dL).  C-Reactive Protein   Date Value Ref Range Status   11/13/2024 33.57 (H) <1.00 mg/dL Final     Microbiology  Reviewed  Imaging  Reviewed        Assessment/Plan   Respiratory failure / COPD / pneumonia / abscess, MRSA screen is positive, procalcitonin 2.30, sputum with MRSA, the repeat cxr was reviewed  NSCLC / Rt bronchus stent / on Taxol / Carboplatin / radiation     Recommendations :  Plan on oral Doxycycline x 3 weeks with discharge  He was advised to use his incentive spirometer   Discussed with the medical team     I spent minutes in the professional and overall care of this patient.      Krysten Magallon MD

## 2024-11-23 NOTE — PROGRESS NOTES
"Juan Carlos Cr is a 74 y.o. male on day 11 of admission presenting with MRSA pneumonia (Multi).    Subjective   Remains on 30L/min and 50% FiO2. No new respiratory complaints. Getting blood transfusion.       Objective   GEN: breathing comfortably  ENT: wearing HHNFC  CV: RRR, no m/g/r  LUNGS: moderate effort, absent breath sounds RUL zone, otherwise has breath sounds in other lung fields  EXT: no edema, cyanosis, clubbing    Physical Exam    Last Recorded Vitals  Blood pressure 111/70, pulse 82, temperature 37.1 °C (98.8 °F), temperature source Temporal, resp. rate 20, height 1.753 m (5' 9\"), weight 66.1 kg (145 lb 11.6 oz), SpO2 95%.  Intake/Output last 3 Shifts:  I/O last 3 completed shifts:  In: 1680 (25.4 mL/kg) [P.O.:180; I.V.:1500 (22.7 mL/kg)]  Out: 900 (13.6 mL/kg) [Urine:900 (0.4 mL/kg/hr)]  Weight: 66.1 kg     Relevant Results  Scheduled medications  amiodarone, 400 mg, oral, BID   Followed by  [START ON 11/24/2024] amiodarone, 200 mg, oral, Daily  ascorbic acid, 500 mg, oral, Daily  aspirin, 81 mg, oral, Daily  cholecalciferol, 800 Units, oral, Daily  doxycycline, 100 mg, oral, q12h MASSIEL  fluticasone furoate-vilanteroL, 1 puff, inhalation, Daily  insulin lispro, 0-10 Units, subcutaneous, Before meals & nightly  ipratropium-albuteroL, 3 mL, nebulization, q6h  [Held by provider] losartan, 25 mg, oral, Daily  [Held by provider] metFORMIN, 500 mg, oral, BID  metoprolol succinate XL, 50 mg, oral, Daily  mupirocin, , Each Nostril, TID  nicotine, 1 patch, transdermal, Daily  oxygen, , inhalation, Continuous - Inhalation  oxygen, , inhalation, Continuous - Inhalation  pantoprazole, 40 mg, oral, Daily  rivaroxaban, 20 mg, oral, Daily with evening meal  rosuvastatin, 20 mg, oral, Daily  tamsulosin, 0.4 mg, oral, Daily  tiotropium, 2 puff, inhalation, Daily  urea, 30 g, oral, Once  zinc oxide, 1 Application, Topical, TID      Continuous medications     PRN medications  PRN medications: acetaminophen, benzonatate, " dextromethorphan-guaifenesin, dextrose, dextrose, glucagon, glucagon, guaiFENesin, ipratropium-albuteroL, prochlorperazine    Results for orders placed or performed during the hospital encounter of 11/12/24 (from the past 24 hours)   Basic metabolic panel   Result Value Ref Range    Glucose 107 (H) 74 - 99 mg/dL    Sodium 122 (L) 136 - 145 mmol/L    Potassium 3.7 3.5 - 5.3 mmol/L    Chloride 84 (L) 98 - 107 mmol/L    Bicarbonate 35 (H) 21 - 32 mmol/L    Anion Gap 7 (L) 10 - 20 mmol/L    Urea Nitrogen 8 6 - 23 mg/dL    Creatinine 0.49 (L) 0.50 - 1.30 mg/dL    eGFR >90 >60 mL/min/1.73m*2    Calcium 7.4 (L) 8.6 - 10.3 mg/dL   POCT GLUCOSE   Result Value Ref Range    POCT Glucose 103 (H) 74 - 99 mg/dL   POCT GLUCOSE   Result Value Ref Range    POCT Glucose 176 (H) 74 - 99 mg/dL   Renal Function Panel   Result Value Ref Range    Glucose 95 74 - 99 mg/dL    Sodium 124 (L) 136 - 145 mmol/L    Potassium 3.4 (L) 3.5 - 5.3 mmol/L    Chloride 87 (L) 98 - 107 mmol/L    Bicarbonate 31 21 - 32 mmol/L    Anion Gap 9 (L) 10 - 20 mmol/L    Urea Nitrogen 8 6 - 23 mg/dL    Creatinine 0.52 0.50 - 1.30 mg/dL    eGFR >90 >60 mL/min/1.73m*2    Calcium 7.1 (L) 8.6 - 10.3 mg/dL    Phosphorus 2.9 2.5 - 4.9 mg/dL    Albumin 1.7 (L) 3.4 - 5.0 g/dL   CBC   Result Value Ref Range    WBC 9.2 4.4 - 11.3 x10*3/uL    nRBC 0.0 0.0 - 0.0 /100 WBCs    RBC 2.40 (L) 4.50 - 5.90 x10*6/uL    Hemoglobin 6.8 (L) 13.5 - 17.5 g/dL    Hematocrit 21.8 (L) 41.0 - 52.0 %    MCV 91 80 - 100 fL    MCH 28.3 26.0 - 34.0 pg    MCHC 31.2 (L) 32.0 - 36.0 g/dL    RDW 14.4 11.5 - 14.5 %    Platelets 105 (L) 150 - 450 x10*3/uL   Prepare RBC: 1 Units   Result Value Ref Range    PRODUCT CODE F6160M66     Unit Number [id number]     Unit ABO A     Unit RH POS     XM INTEP COMP     Dispense Status TR     Blood Expiration Date 11/24/2024 11:59:00 PM EST     PRODUCT BLOOD TYPE 6200     UNIT VOLUME 350    Type and screen   Result Value Ref Range    ABO TYPE A     Rh TYPE POS      ANTIBODY SCREEN NEG    POCT GLUCOSE   Result Value Ref Range    POCT Glucose 99 74 - 99 mg/dL   VERIFY ABO/Rh Group Test   Result Value Ref Range    ABO TYPE A     Rh TYPE POS    POCT GLUCOSE   Result Value Ref Range    POCT Glucose 136 (H) 74 - 99 mg/dL                                     Assessment/Plan   Assessment & Plan  MRSA pneumonia (Multi)    Malignant neoplasm of upper lobe of right lung (Multi)    Acute on chronic hypoxic respiratory failure (Multi)    Summary:  74M with right squamous cell CA who underwent tumor debulking and stenting of right mainstem at [facility] [place] on 8/29/2024, then again on 10/18/2024. Stent was removed on 9/25/2024 after it had occluded. He also has history of COPD, chronic hypoxic respiratory failure, HFrEF, Afib. This admission he is growing MRSA on his sputum. Upon review of his CT imaging, his right lower lobe is aerated. The airway stent also appears to be patent. He still has significant tumor burden in his right upper lobe. O2 requirements remain stable.      Recommendations:  -Continue ABX  -Continue O2 supplementations to keep SpO2 89-92%. Currently on 30L/min and 50% FiO2  -Bronchoscopy for inspection +/- stent exchange can be postponed a few weeks after discussing with [facility] [place] interventional pulmonology group  -Bronchopulmonary hygiene       Edward Delatorre,   Pulmonary & Critical Care  11/23/2024 1:59 PM

## 2024-11-24 ENCOUNTER — APPOINTMENT (OUTPATIENT)
Dept: RADIATION ONCOLOGY | Facility: CLINIC | Age: 74
End: 2024-11-24
Payer: MEDICARE

## 2024-11-24 VITALS
OXYGEN SATURATION: 99 % | WEIGHT: 144 LBS | BODY MASS INDEX: 21.33 KG/M2 | RESPIRATION RATE: 21 BRPM | TEMPERATURE: 98.7 F | SYSTOLIC BLOOD PRESSURE: 111 MMHG | HEART RATE: 85 BPM | HEIGHT: 69 IN | DIASTOLIC BLOOD PRESSURE: 55 MMHG

## 2024-11-24 LAB
ANION GAP SERPL CALC-SCNC: 10 MMOL/L (ref 10–20)
ANION GAP SERPL CALC-SCNC: 9 MMOL/L (ref 10–20)
BUN SERPL-MCNC: 26 MG/DL (ref 6–23)
BUN SERPL-MCNC: 28 MG/DL (ref 6–23)
CALCIUM SERPL-MCNC: 7.6 MG/DL (ref 8.6–10.3)
CALCIUM SERPL-MCNC: 7.7 MG/DL (ref 8.6–10.3)
CHLORIDE SERPL-SCNC: 89 MMOL/L (ref 98–107)
CHLORIDE SERPL-SCNC: 89 MMOL/L (ref 98–107)
CO2 SERPL-SCNC: 29 MMOL/L (ref 21–32)
CO2 SERPL-SCNC: 31 MMOL/L (ref 21–32)
CORTIS AM PEAK SERPL-MSCNC: 29.8 UG/DL (ref 5–20)
CREAT SERPL-MCNC: 0.63 MG/DL (ref 0.5–1.3)
CREAT SERPL-MCNC: 0.66 MG/DL (ref 0.5–1.3)
CREAT UR-MCNC: 42.5 MG/DL (ref 20–370)
EGFRCR SERPLBLD CKD-EPI 2021: >90 ML/MIN/1.73M*2
EGFRCR SERPLBLD CKD-EPI 2021: >90 ML/MIN/1.73M*2
ERYTHROCYTE [DISTWIDTH] IN BLOOD BY AUTOMATED COUNT: 14.6 % (ref 11.5–14.5)
GLUCOSE BLD MANUAL STRIP-MCNC: 113 MG/DL (ref 74–99)
GLUCOSE BLD MANUAL STRIP-MCNC: 124 MG/DL (ref 74–99)
GLUCOSE BLD MANUAL STRIP-MCNC: 156 MG/DL (ref 74–99)
GLUCOSE BLD MANUAL STRIP-MCNC: 165 MG/DL (ref 74–99)
GLUCOSE SERPL-MCNC: 116 MG/DL (ref 74–99)
GLUCOSE SERPL-MCNC: 168 MG/DL (ref 74–99)
HCT VFR BLD AUTO: 26.6 % (ref 41–52)
HGB BLD-MCNC: 8.2 G/DL (ref 13.5–17.5)
MAGNESIUM SERPL-MCNC: 1.71 MG/DL (ref 1.6–2.4)
MCH RBC QN AUTO: 29.3 PG (ref 26–34)
MCHC RBC AUTO-ENTMCNC: 30.8 G/DL (ref 32–36)
MCV RBC AUTO: 95 FL (ref 80–100)
NRBC BLD-RTO: 0 /100 WBCS (ref 0–0)
OSMOLALITY SERPL: 267 MOSM/KG (ref 280–300)
PLATELET # BLD AUTO: 87 X10*3/UL (ref 150–450)
POTASSIUM SERPL-SCNC: 4 MMOL/L (ref 3.5–5.3)
POTASSIUM SERPL-SCNC: 4.4 MMOL/L (ref 3.5–5.3)
RBC # BLD AUTO: 2.8 X10*6/UL (ref 4.5–5.9)
SODIUM SERPL-SCNC: 123 MMOL/L (ref 136–145)
SODIUM SERPL-SCNC: 126 MMOL/L (ref 136–145)
SODIUM UR-SCNC: <10 MMOL/L
SODIUM/CREAT UR-RTO: NORMAL
WBC # BLD AUTO: 8.5 X10*3/UL (ref 4.4–11.3)

## 2024-11-24 PROCEDURE — 2500000001 HC RX 250 WO HCPCS SELF ADMINISTERED DRUGS (ALT 637 FOR MEDICARE OP): Performed by: NURSE PRACTITIONER

## 2024-11-24 PROCEDURE — 2500000002 HC RX 250 W HCPCS SELF ADMINISTERED DRUGS (ALT 637 FOR MEDICARE OP, ALT 636 FOR OP/ED): Performed by: INTERNAL MEDICINE

## 2024-11-24 PROCEDURE — 94668 MNPJ CHEST WALL SBSQ: CPT

## 2024-11-24 PROCEDURE — 82436 ASSAY OF URINE CHLORIDE: CPT | Mod: GEALAB | Performed by: FAMILY MEDICINE

## 2024-11-24 PROCEDURE — 94760 N-INVAS EAR/PLS OXIMETRY 1: CPT

## 2024-11-24 PROCEDURE — 2500000005 HC RX 250 GENERAL PHARMACY W/O HCPCS: Performed by: FAMILY MEDICINE

## 2024-11-24 PROCEDURE — 99233 SBSQ HOSP IP/OBS HIGH 50: CPT | Performed by: INTERNAL MEDICINE

## 2024-11-24 PROCEDURE — 80048 BASIC METABOLIC PNL TOTAL CA: CPT

## 2024-11-24 PROCEDURE — 2500000001 HC RX 250 WO HCPCS SELF ADMINISTERED DRUGS (ALT 637 FOR MEDICARE OP): Performed by: STUDENT IN AN ORGANIZED HEALTH CARE EDUCATION/TRAINING PROGRAM

## 2024-11-24 PROCEDURE — 82570 ASSAY OF URINE CREATININE: CPT | Mod: GEALAB | Performed by: INTERNAL MEDICINE

## 2024-11-24 PROCEDURE — 83930 ASSAY OF BLOOD OSMOLALITY: CPT | Mod: GEALAB | Performed by: INTERNAL MEDICINE

## 2024-11-24 PROCEDURE — 2500000002 HC RX 250 W HCPCS SELF ADMINISTERED DRUGS (ALT 637 FOR MEDICARE OP, ALT 636 FOR OP/ED): Performed by: FAMILY MEDICINE

## 2024-11-24 PROCEDURE — 99233 SBSQ HOSP IP/OBS HIGH 50: CPT

## 2024-11-24 PROCEDURE — 2500000002 HC RX 250 W HCPCS SELF ADMINISTERED DRUGS (ALT 637 FOR MEDICARE OP, ALT 636 FOR OP/ED): Performed by: NURSE PRACTITIONER

## 2024-11-24 PROCEDURE — 85027 COMPLETE CBC AUTOMATED: CPT

## 2024-11-24 PROCEDURE — 94640 AIRWAY INHALATION TREATMENT: CPT

## 2024-11-24 PROCEDURE — 2500000001 HC RX 250 WO HCPCS SELF ADMINISTERED DRUGS (ALT 637 FOR MEDICARE OP): Performed by: INTERNAL MEDICINE

## 2024-11-24 PROCEDURE — 80048 BASIC METABOLIC PNL TOTAL CA: CPT | Performed by: FAMILY MEDICINE

## 2024-11-24 PROCEDURE — 2500000002 HC RX 250 W HCPCS SELF ADMINISTERED DRUGS (ALT 637 FOR MEDICARE OP, ALT 636 FOR OP/ED)

## 2024-11-24 PROCEDURE — 82947 ASSAY GLUCOSE BLOOD QUANT: CPT

## 2024-11-24 PROCEDURE — 2060000001 HC INTERMEDIATE ICU ROOM DAILY

## 2024-11-24 PROCEDURE — S4991 NICOTINE PATCH NONLEGEND: HCPCS | Performed by: INTERNAL MEDICINE

## 2024-11-24 PROCEDURE — 83735 ASSAY OF MAGNESIUM: CPT

## 2024-11-24 PROCEDURE — 36415 COLL VENOUS BLD VENIPUNCTURE: CPT

## 2024-11-24 PROCEDURE — 2500000002 HC RX 250 W HCPCS SELF ADMINISTERED DRUGS (ALT 637 FOR MEDICARE OP, ALT 636 FOR OP/ED): Performed by: STUDENT IN AN ORGANIZED HEALTH CARE EDUCATION/TRAINING PROGRAM

## 2024-11-24 PROCEDURE — 82533 TOTAL CORTISOL: CPT | Mod: GEALAB | Performed by: INTERNAL MEDICINE

## 2024-11-24 RX ORDER — POTASSIUM CHLORIDE 750 MG/1
10 TABLET, FILM COATED, EXTENDED RELEASE ORAL DAILY
Status: DISCONTINUED | OUTPATIENT
Start: 2024-11-24 | End: 2024-11-27

## 2024-11-24 ASSESSMENT — COGNITIVE AND FUNCTIONAL STATUS - GENERAL
TURNING FROM BACK TO SIDE WHILE IN FLAT BAD: A LITTLE
DRESSING REGULAR LOWER BODY CLOTHING: A LITTLE
DRESSING REGULAR UPPER BODY CLOTHING: A LITTLE
MOVING TO AND FROM BED TO CHAIR: A LITTLE
CLIMB 3 TO 5 STEPS WITH RAILING: A LITTLE
DAILY ACTIVITIY SCORE: 20
WALKING IN HOSPITAL ROOM: A LITTLE
STANDING UP FROM CHAIR USING ARMS: A LITTLE
DRESSING REGULAR LOWER BODY CLOTHING: A LITTLE
TURNING FROM BACK TO SIDE WHILE IN FLAT BAD: A LITTLE
MOVING TO AND FROM BED TO CHAIR: A LITTLE
CLIMB 3 TO 5 STEPS WITH RAILING: A LITTLE
TOILETING: A LITTLE
MOBILITY SCORE: 19
STANDING UP FROM CHAIR USING ARMS: A LITTLE
HELP NEEDED FOR BATHING: A LITTLE
TOILETING: A LITTLE
MOBILITY SCORE: 19
DRESSING REGULAR UPPER BODY CLOTHING: A LITTLE
DAILY ACTIVITIY SCORE: 21
WALKING IN HOSPITAL ROOM: A LITTLE

## 2024-11-24 ASSESSMENT — PAIN SCALES - GENERAL
PAINLEVEL_OUTOF10: 0 - NO PAIN
PAINLEVEL_OUTOF10: 0 - NO PAIN

## 2024-11-24 ASSESSMENT — PAIN - FUNCTIONAL ASSESSMENT: PAIN_FUNCTIONAL_ASSESSMENT: 0-10

## 2024-11-24 NOTE — CONSULTS
Reason For Consult          History Of Present Illness  Juan Carlos Cr is a 74 yr old male with PMH of squamous cell lung cancer diagnosed 8/2024 currently on chemoradiation, right lung collapse s/p endobronchial ultrasound, bronchial stent 8/2024, COPD on home O2 4L, paroxysmal A-fib on Rivaroxaban (diagnosed 8/2024), history of MI, CAD with remote PCI 1997, HFrEF (EF 35-40% 8/2024), HTN, and DM, who was admitted on 11/12 for 3-4 day history of dyspnea on minimal exertion, productive cough, wheezing found to have acute on chronic hypoxic respiratory failure due to multifocal MRSA pneumonia.    I was asked to evaluate for his low sodium.  He had chronic hyponatremia.  Sodium has been in the low 130s and high 120s since October of this year.  Prior to that his sodium has been in the 130s.  During this hospitalization initially his sodium is 124 improved to 130 but lately has been fluctuating around 1 22-1 24 which found this consultation.  Specifically sodium is 122 on the 22nd.  Increased to 124 yesterday.  Given 1 dose of urea.  BMP from this morning is pending.      Past Medical History  He has a past medical history of Acute myocardial infarction, unspecified (05/17/2013), Atrial fib/flutter, transient (Multi), COPD (chronic obstructive pulmonary disease) (Multi), Coronary artery disease, Diabetes mellitus (Multi), Diabetes mellitus (Multi), Encounter for screening for malignant neoplasm of prostate (09/02/2015), GERD (gastroesophageal reflux disease), Hyperlipidemia, Hypertension, Lung cancer (Multi), and Personal history of other diseases of the nervous system and sense organs (09/13/2017).    Surgical History  He has a past surgical history that includes Coronary angioplasty with stent (05/17/2013); Cataract extraction (09/14/2018); and Cataract extraction (06/27/2014).     Social History  He reports that he has quit smoking. His smoking use included cigarettes. He has been exposed to tobacco smoke. He has  never used smokeless tobacco. He reports that he does not currently use alcohol. He reports that he does not use drugs.    Family History  Family History   Problem Relation Name Age of Onset    Heart attack Mother      Lung cancer Brother          Allergies  Patient has no known allergies.         Physical Exam  Elderly male currently in no distress.  Thin cachectic.  Lungs with wheezing.  Crackles noted.  No significant edema.           I&O 24HR    Intake/Output Summary (Last 24 hours) at 11/24/2024 0832  Last data filed at 11/23/2024 2150  Gross per 24 hour   Intake 563.75 ml   Output 650 ml   Net -86.25 ml       Vitals 24HR  Heart Rate:  [78-91]   Temp:  [36.5 °C (97.7 °F)-37.1 °C (98.8 °F)]   Resp:  [20-28]   BP: (101-113)/(55-70)   Weight:  [65.3 kg (144 lb)]   SpO2:  [90 %-99 %]         Relevant Results  Results for orders placed or performed during the hospital encounter of 11/12/24 (from the past 24 hours)   POCT GLUCOSE   Result Value Ref Range    POCT Glucose 136 (H) 74 - 99 mg/dL   Hemoglobin and hematocrit, blood   Result Value Ref Range    Hemoglobin 7.6 (L) 13.5 - 17.5 g/dL    Hematocrit 23.2 (L) 41.0 - 52.0 %   POCT GLUCOSE   Result Value Ref Range    POCT Glucose 158 (H) 74 - 99 mg/dL   POCT GLUCOSE   Result Value Ref Range    POCT Glucose 134 (H) 74 - 99 mg/dL   POCT GLUCOSE   Result Value Ref Range    POCT Glucose 113 (H) 74 - 99 mg/dL         Assessment/Plan           Assessment & Plan  MRSA pneumonia (Multi)    CAD (coronary artery disease)    Hypertension    Atrial fibrillation (Multi)    Malignant neoplasm of upper lobe of right lung (Multi)    Acute on chronic hypoxic respiratory failure (Multi)        Pleasant 74-year-old male with history of lung cancer with hyponatremia in setting of pneumonia.    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  He received IV fluids and his sodium dropped.  I stopped his IV fluids yesterday and gave him  1 dose of urea yesterday.  Tolerated well.  Will get repeat BMP  today and will decide.  Check cortisol, uric acid for completion.    Will follow    Shahab Lakhani MD

## 2024-11-24 NOTE — PROGRESS NOTES
Juan Carlos Cr is a 74 y.o. male on day 12 of admission presenting with MRSA pneumonia (Multi).    Subjective   Interval History: no fever, no new complaints, no chest pain        Review of Systems    Objective   Range of Vitals (last 24 hours)  Heart Rate:  [80-91]   Temp:  [36.3 °C (97.3 °F)-37.1 °C (98.8 °F)]   Resp:  [20-27]   BP: (101-113)/(55-70)   Weight:  [65.3 kg (144 lb)]   SpO2:  [93 %-99 %]   Daily Weight  11/24/24 : 65.3 kg (144 lb)    Body mass index is 21.27 kg/m².    Physical Exam  Constitutional:       Appearance: Normal appearance.   HENT:      Head: Normocephalic and atraumatic.      Mouth/Throat:      Mouth: Mucous membranes are moist.      Pharynx: Oropharynx is clear.   Eyes:      Pupils: Pupils are equal, round, and reactive to light.   Cardiovascular:      Rate and Rhythm: Normal rate and regular rhythm.      Heart sounds: Normal heart sounds.   Pulmonary:      Effort: Pulmonary effort is normal.      Breath sounds: Normal breath sounds.   Abdominal:      General: Abdomen is flat. Bowel sounds are normal.      Palpations: Abdomen is soft.   Musculoskeletal:      Cervical back: Normal range of motion.         Antibiotics  doxycycline - 100 mg, 100 mg  mupirocin - 2 %    Relevant Results  Labs  Results from last 72 hours   Lab Units 11/24/24  0844 11/23/24  1327 11/23/24  0502 11/22/24  0559   WBC AUTO x10*3/uL 8.5  --  9.2 11.9*   HEMOGLOBIN g/dL 8.2* 7.6* 6.8* 7.3*   HEMATOCRIT % 26.6* 23.2* 21.8* 23.3*   PLATELETS AUTO x10*3/uL 87*  --  105* 137*     Results from last 72 hours   Lab Units 11/24/24  0848 11/23/24  0502 11/22/24  1506   SODIUM mmol/L 123* 124* 122*   POTASSIUM mmol/L 4.0 3.4* 3.7   CHLORIDE mmol/L 89* 87* 84*   CO2 mmol/L 29 31 35*   BUN mg/dL 28* 8 8   CREATININE mg/dL 0.63 0.52 0.49*   GLUCOSE mg/dL 116* 95 107*   CALCIUM mg/dL 7.6* 7.1* 7.4*   ANION GAP mmol/L 9* 9* 7*   EGFR mL/min/1.73m*2 >90 >90 >90   PHOSPHORUS mg/dL  --  2.9  --      Results from last 72 hours   Lab  Units 11/23/24  0502 11/22/24  0559   ALK PHOS U/L  --  89   BILIRUBIN TOTAL mg/dL  --  0.3   PROTEIN TOTAL g/dL  --  4.9*   ALT U/L  --  10   AST U/L  --  15   ALBUMIN g/dL 1.7* 1.8*     Estimated Creatinine Clearance: 95 mL/min (by C-G formula based on SCr of 0.63 mg/dL).  C-Reactive Protein   Date Value Ref Range Status   11/13/2024 33.57 (H) <1.00 mg/dL Final     Microbiology  Reviewed  Imaging  Reviewed        Assessment/Plan   Respiratory failure / COPD / pneumonia / abscess, MRSA screen is positive, procalcitonin 2.30, sputum with MRSA, the repeat cxr was reviewed  NSCLC / Rt bronchus stent / on Taxol / Carboplatin / radiation     Recommendations :  Plan on oral Doxycycline x 3 weeks with discharge  Bronchial hygiene  Discussed with the medical team     I spent minutes in the professional and overall care of this patient.      Krysten Magallon MD

## 2024-11-24 NOTE — PROGRESS NOTES
Juan Carlos Cr is a 74 y.o. male on day 12 of admission presenting with MRSA pneumonia (Multi).    Subjective   Juan Carlos Cr is a 74 yr old male with PMH of squamous cell lung cancer diagnosed 8/2024 currently on chemoradiation, right lung collapse s/p endobronchial ultrasound, bronchial stent 8/2024, COPD on home O2 4L, paroxysmal A-fib on Rivaroxaban (diagnosed 8/2024), history of MI, CAD with remote PCI 1997, HFrEF (EF 35-40% 8/2024), HTN, and DM, who was admitted on 11/12 for 3-4 day history of dyspnea on minimal exertion, productive cough, wheezing.     - Today at bedside, pt reports that his symptoms are still the same. He is coughing, with production of clear mucus. Intermittent SOB with exertion. Notes wheezing when he coughs.    - He states that he drank one Glucerna and requested I keep another one by his bedside which I did.   - Denies chest pain, palpitations, abdominal pain, nausea, vomiting, diarrhea, constipation.     All pertinent positive symptoms are included in the history of present illness.  All other systems have been reviewed and are negative and noncontributory to this patient's current ailments.       Objective     Physical Exam  Constitutional:       General: He is not in acute distress.     Appearance: He is ill-appearing. He is not toxic-appearing or diaphoretic.      Comments: Thin and frail appearing man. Laying in bed. NC in place.   Cardiovascular:      Rate and Rhythm: Normal rate and regular rhythm.      Pulses: Normal pulses.      Heart sounds: Normal heart sounds. No murmur heard.  Pulmonary:      Effort: No respiratory distress.      Breath sounds: No stridor. Wheezing present.      Comments: Airvo in place. No coughing noted during physical exam.   No conversational dyspnea.   Bilateral lung crackles, worse on right. Wheezing noted with coughs.   Chest:      Chest wall: No tenderness.   Abdominal:      General: Abdomen is flat. Bowel sounds are normal. There is no  "distension.      Palpations: Abdomen is soft.      Tenderness: There is no abdominal tenderness. There is no guarding.   Musculoskeletal:      Right lower leg: No edema.      Left lower leg: No edema.      Comments: Loss of muscle mass in the neck, chest, abdomen.    Skin:     General: Skin is warm and dry.      Findings: No rash.      Comments: Right port, no surrounding erythema or drainage.   2x2cm raised nodule noted on the back, overlying the thoracic spine, between bilateral scapula. No tenderness with palpation. Mildly erythematous. Not warm to touch. No drainage.   Bruising noted on bilateral arms. No bleeding, drainage.    Neurological:      General: No focal deficit present.      Mental Status: He is alert and oriented to person, place, and time.      Motor: Weakness present.         Last Recorded Vitals  Blood pressure 111/65, pulse 86, temperature 36.6 °C (97.9 °F), temperature source Temporal, resp. rate 20, height 1.753 m (5' 9\"), weight 65.3 kg (144 lb), SpO2 93%.  Intake/Output last 3 Shifts:  I/O last 3 completed shifts:  In: 1673.8 (25.6 mL/kg) [P.O.:60; I.V.:1263.8 (19.3 mL/kg); Blood:350]  Out: 1150 (17.6 mL/kg) [Urine:1150 (0.5 mL/kg/hr)]  Weight: 65.3 kg     Relevant Results  Scheduled medications  amiodarone, 200 mg, oral, Daily  ascorbic acid, 500 mg, oral, Daily  aspirin, 81 mg, oral, Daily  cholecalciferol, 800 Units, oral, Daily  doxycycline, 100 mg, oral, q12h MASSIEL  fluticasone furoate-vilanteroL, 1 puff, inhalation, Daily  insulin lispro, 0-10 Units, subcutaneous, Before meals & nightly  ipratropium-albuteroL, 3 mL, nebulization, q6h  [Held by provider] losartan, 25 mg, oral, Daily  [Held by provider] metFORMIN, 500 mg, oral, BID  metoprolol succinate XL, 50 mg, oral, Daily  mupirocin, , Each Nostril, TID  nicotine, 1 patch, transdermal, Daily  oxygen, , inhalation, Continuous - Inhalation  oxygen, , inhalation, Continuous - Inhalation  pantoprazole, 40 mg, oral, Daily  rivaroxaban, 20 " mg, oral, Daily with evening meal  rosuvastatin, 20 mg, oral, Daily  tamsulosin, 0.4 mg, oral, Daily  tiotropium, 2 puff, inhalation, Daily  zinc oxide, 1 Application, Topical, TID      Continuous medications       PRN medications  PRN medications: acetaminophen, benzonatate, dextromethorphan-guaifenesin, dextrose, dextrose, glucagon, glucagon, guaiFENesin, ipratropium-albuteroL, prochlorperazine    Results for orders placed or performed during the hospital encounter of 11/12/24 (from the past 24 hours)   POCT GLUCOSE   Result Value Ref Range    POCT Glucose 136 (H) 74 - 99 mg/dL   Hemoglobin and hematocrit, blood   Result Value Ref Range    Hemoglobin 7.6 (L) 13.5 - 17.5 g/dL    Hematocrit 23.2 (L) 41.0 - 52.0 %   POCT GLUCOSE   Result Value Ref Range    POCT Glucose 158 (H) 74 - 99 mg/dL   POCT GLUCOSE   Result Value Ref Range    POCT Glucose 134 (H) 74 - 99 mg/dL   POCT GLUCOSE   Result Value Ref Range    POCT Glucose 113 (H) 74 - 99 mg/dL       ECG 12 lead    Result Date: 11/16/2024  Sinus tachycardia Otherwise normal ECG When compared with ECG of 12-NOV-2024 14:23, (unconfirmed) No significant change was found See ED provider note for full interpretation and clinical correlation Confirmed by Leila Luke ([id number]) on 11/16/2024 2:15:23 PM    ECG 12 lead    Result Date: 11/16/2024  Sinus tachycardia Otherwise normal ECG When compared with ECG of 01-NOV-2024 08:10, Vent. rate has increased BY  42 BPM Criteria for Septal infarct are no longer Present T wave amplitude has decreased in Lateral leads See ED provider note for full interpretation and clinical correlation Confirmed by Leila Luke ([id number]) on 11/16/2024 2:15:13 PM    Electrocardiogram, 12-lead PRN ACS symptoms    Result Date: 11/15/2024  Atrial fibrillation with rapid ventricular response with premature ventricular or aberrantly conducted complexes Nonspecific ST and T wave abnormality Abnormal ECG When compared with ECG of 12-NOV-2024 15:52, (unconfirmed)  Atrial fibrillation has replaced Sinus rhythm ST now depressed in Lateral leads Nonspecific T wave abnormality now evident in Inferior leads Nonspecific T wave abnormality now evident in Lateral leads    CT angio chest for pulmonary embolism    Result Date: 11/12/2024  Interpreted By:  Bryce Estrada, STUDY: CT ANGIO CHEST FOR PULMONARY EMBOLISM;  11/12/2024 3:08 pm   INDICATION: Signs/Symptoms:History of lung cancer shortness of breath COPD hypoxemic significant weight loss.   COMPARISON: 10/16/2024   ACCESSION NUMBER(S): [id number]   ORDERING CLINICIAN: LINUS BERNAL   TECHNIQUE: Helical data acquisition of the chest was obtained with  75 mL Omnipaque 350. Images were reformatted in axial, coronal, and sagittal planes.MIP reformatted images were also generated.   FINDINGS: LUNGS and AIRWAYS: Right upper lobe mass, difficult to differentiate from adjacent consolidation, grossly appears decreased in size from 9.1 cm 8.1 cm. Central necrosis has increased.   Previously, the right lung was collapsed. There is now aeration of the previously occluded right mainstem bronchus as well as the lobar branches of the middle and lower lobes. There is persistent occlusion of the upper lobe bronchus. The previously collapsed upper, middle, and lower lobes are now aerated, although there are diffusely scattered areas of predominantly peripheral consolidation. There are also peripheral areas of new consolidation scattered throughout the left lung. A new nodule in the left lung base measuring 23 mm is centrally cavitated.   Underlying mild pulmonary fibrosis. Trace right pleural effusion.   MEDIASTINUM and SUDHAKAR, LOWER NECK AND AXILLA: The visualized thyroid gland is grossly unremarkable.   A left paratracheal node is enlarged from 8 mm to 10 mm short axis. The right upper lobe mass infiltrates the mediastinum and is contiguous with adjacent lymphadenopathy which is difficult to measure discretely. Pretracheal node is roughly  unchanged at 18 mm short axis. A 13 mm subcarinal node appears new.   Esophagus is not dilated.   HEART and VESSELS: The heart is normal in gross morphology and size.   No significant pericardial effusion.   Severe coronary atherosclerosis.   Thoracic aorta is severely atherosclerotic but otherwise patent without aneurysm. The great vessels are patent. Right IJ chest port catheter is still present. There is persistent narrowing of the SVC relating to compression by adjacent tumor.   No pulmonary embolism is identified.   UPPER ABDOMEN: The visualized subdiaphragmatic structures demonstrate no acute findings on limited images.   CHEST WALL and OSSEOUS STRUCTURES: No suspicious osseous lesions. Multilevel degenerative changes of the thoracic spine.         1.  No pulmonary embolism identified. 2. Grossly right upper lobe mass is decreased in size with increased central necrosis. 3. Previous collapse of the right lung has resolved. There are however extensive new areas of bilateral airspace consolidation suggestive of multifocal pneumonia. A new cavitated nodule in the left lung base may be infectious or inflammatory although metastasis is not excluded. 4. Trace right pleural effusion. 5. Mediastinal lymphadenopathy has slightly progressed.     Signed by: Bryce Estrada 11/12/2024 3:45 PM Dictation workstation:   [id number]    ECG 12 Lead    Result Date: 11/1/2024  Normal sinus rhythm Possible Left atrial enlargement Septal infarct , age undetermined Abnormal ECG When compared with ECG of 23-SEP-2024 01:03, Incomplete left bundle branch block is no longer Present Confirmed by Lisa Ko ([id number]) on 11/1/2024 12:18:47 PM    Rad Onc CT Sim Images    Result Date: 10/30/2024  These images are not reportable by radiology and will not be interpreted by  Radiologists.           Assessment/Plan   Principal Problem:    MRSA pneumonia (Multi)  Active Problems:    CAD (coronary artery disease)    Hypertension    Atrial  fibrillation (Multi)    Malignant neoplasm of upper lobe of right lung (Multi)    Acute on chronic hypoxic respiratory failure (Multi)    Juan Carlos Cr is a 74 yr old male with PMH of squamous cell lung cancer diagnosed 8/2024 currently on chemoradiation, right lung collapse s/p endobronchial ultrasound, bronchial stent 8/2024, COPD on home O2 4L, paroxysmal A-fib on Rivaroxaban (diagnosed 8/2024), history of MI, CAD with remote PCI 1997, HFrEF (EF 35-40% 8/2024), HTN, and DM, who was admitted on 11/12 for 3-4 day history of dyspnea on minimal exertion, productive cough, wheezing.     #Acute on chronic respiratory failure 2/2 multifocal pneumonia, MRSA positive  #COPD on home O2 4L  - CT 11/12 - multifocal pneumonia, trace right pleural effusion  - MRSA positive - nares and sputum   - 11/18 CXR - worsening of airspace opacities in left mid to lower lung, right lung mass with improved aeration in right lung base  - 11/21 CXR - mild improvement in b/l pneumonia, with stable RUL lung mass  - WBC down trending, at 8.5 today.   - Doxycyline 100mg q12h PO for 3 weeks, today is day 8  - Breo Elllipta and Spiriva Respimat  - Tessalon and Robitussin PRN for cough/congestion  - Airvo 30L with FiO2 50%. Wean as tolerated.    - Pulm team discussed with interventional pulmonology to hold off on repeat bronchoscopy as the pt's bronchial stent is patent, may repeat in next few weeks.  - Will continue to monitor the pt's O2 requirements. His bronchial stent is patent and his CXR demonstrates improving pneumonia. Chest PT will help with mucus plugging. Considered steroids, but risks outweigh benefits - steroids may suppress immune system, which we do not want given his pneumonia with resistant bacteria and current lung cancer.   - ID consulted    - Pulm consulted   - Continue chest PT.     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    - Nephro consulted.     - Urea 30g PO - one dose given 11/23  - Have  ordered repeat serum osmolality, urine electrolytes, as well as random urine sodium and AM cortisol. Results pending.   - Per nephro note, pt tolerated stopping NS yesterday with urea x1 dose.     #Normocytic anemia   - 1 unit RBCs transfused 11/23 given pt's hemoglobin was 6.8.   - Repeat hemoglobin after transfusion: 7.6  - Hemoglobin today:    - Pt is blood type A, Rh pos, antibody neg    #Squamous cell lung cancer, on chemoradiation  - 11/12 CT: RUL with increased central necrosis, new cavitated nodule in left lung base  - 11/18 CXR: right lung mass with improved aeration in right lung base  - Currently on chemoradiation.   - Palliative care consulted.   - Pulm team discussed with interventional pulmonology to hold off on repeat bronchoscopy as the pt's bronchial stent is patent, may repeat in next few weeks  - Follow up with radiation oncology and oncology after discharge.     #Hypokalemia, resolved  - K+ 4.0 today   - Cardio recommends keeping K > 4  - Ordered Klor-Con 10 mEqs daily as the pt's potassium is noted to be below 4 in the past few days. Will replete daily instead of every few days when it drops.    #Severe malnutrition 2/2 chronic lung disease   - Nutrition consulted - recommend Glucerna 220kcal, 10g protein  - Continue with current diet order - adult regular diet with 60g carb, with 1.5 L fluid restriction    - Repeat weight 2-3x weekly   - Nurses have been continuing to encourage pt to eat and order from menu    #Atrial fibrillation, resolved RVR   - Cardiology consulted   - Rivaroxaban 20mg daily oral   - Pt completed Amiodarone 400mg BID x10 days PO    - Start Amiodarone 200mg PO starting today 11/24   - Cardiology outpatient follow up with Leslie LARKIN.     - Pt has apt with Dr. Ko on 1/10/2025    #HTN, with HFrEF (EF 35-40% 8/2024)  - Blood pressures soft in past 12 hours, likely 2/2 to decreased PO intake.   - Cardiology consulted  - Hold Losartan 25 mg daily oral   - Continue Metoprolol  succinate XL 50mg daily oral  - Continue to monitor blood pressures    #Urinary retention    - Failed 2x voiding trial on 11/17 and 11/20  - Urology consulted. Per note, may be discharged with german and follow up for voiding trial with urology outpatient.    - Tamsulosin Flomax 0.4 mg daily oral    #Cyst on back    - Nodule on back noted during physical exam    - Noted to be cyst on CT scan     #HLD   - Rosuvastatin 20mg daily oral    #CAD with history of cardiac stent   - Continue Aspirin 81mg daily    #DM   - Hold Metformin    - ISS    #GERD   - Pantoprazole 40mg daily oral    #History of tobacco use   - Nicotine patch    Code status: full   PT/OT consulted    Dispo: SNF precert completed for [facility]. Pending improved oxygen status and requirements.     Ez Whitehead DO  PGY-1, Family Medicine  [facility]

## 2024-11-24 NOTE — CARE PLAN
The clinical goals for the shift include Patient will have no complaints of SOB throughout shift.      Problem: Nutrition  Goal: Oral intake greater than 50%  Outcome: Progressing     Problem: Pain - Adult  Goal: Verbalizes/displays adequate comfort level or baseline comfort level  Outcome: Progressing     Problem: Safety - Adult  Goal: Free from fall injury  Outcome: Progressing     Problem: Discharge Planning  Goal: Discharge to home or other facility with appropriate resources  Outcome: Progressing     Problem: Diabetes  Goal: Achieve decreasing blood glucose levels by end of shift  Outcome: Progressing

## 2024-11-24 NOTE — PROGRESS NOTES
"Juan Carlos Cr is a 74 y.o. male on day 12 of admission presenting with MRSA pneumonia (Multi).    Subjective   Breathing remains the same. Still \"crummy.\"       Objective   GEN: resting comfortably in bed  ENT: wearing Aivo nasal cannula  CV: RRR, no m/g/r  LUNGS: moderate effort, diminished breath sounds in right upper lobe  EXT: no edema, cyanosis, clubbing    Physical Exam    Last Recorded Vitals  Blood pressure 101/55, pulse 86, temperature 36 °C (96.8 °F), temperature source Temporal, resp. rate 20, height 1.753 m (5' 9\"), weight 65.3 kg (144 lb), SpO2 93%.  Intake/Output last 3 Shifts:  I/O last 3 completed shifts:  In: 1673.8 (25.6 mL/kg) [P.O.:60; I.V.:1263.8 (19.3 mL/kg); Blood:350]  Out: 1150 (17.6 mL/kg) [Urine:1150 (0.5 mL/kg/hr)]  Weight: 65.3 kg     Relevant Results  Scheduled medications  amiodarone, 200 mg, oral, Daily  ascorbic acid, 500 mg, oral, Daily  aspirin, 81 mg, oral, Daily  cholecalciferol, 800 Units, oral, Daily  doxycycline, 100 mg, oral, q12h MASSIEL  fluticasone furoate-vilanteroL, 1 puff, inhalation, Daily  insulin lispro, 0-10 Units, subcutaneous, Before meals & nightly  ipratropium-albuteroL, 3 mL, nebulization, q6h  [Held by provider] losartan, 25 mg, oral, Daily  [Held by provider] metFORMIN, 500 mg, oral, BID  metoprolol succinate XL, 50 mg, oral, Daily  mupirocin, , Each Nostril, TID  nicotine, 1 patch, transdermal, Daily  oxygen, , inhalation, Continuous - Inhalation  oxygen, , inhalation, Continuous - Inhalation  pantoprazole, 40 mg, oral, Daily  rivaroxaban, 20 mg, oral, Daily with evening meal  rosuvastatin, 20 mg, oral, Daily  tamsulosin, 0.4 mg, oral, Daily  tiotropium, 2 puff, inhalation, Daily  zinc oxide, 1 Application, Topical, TID      Continuous medications     PRN medications  PRN medications: acetaminophen, benzonatate, dextromethorphan-guaifenesin, dextrose, dextrose, glucagon, glucagon, guaiFENesin, ipratropium-albuteroL, prochlorperazine    Results for orders " placed or performed during the hospital encounter of 11/12/24 (from the past 24 hours)   POCT GLUCOSE   Result Value Ref Range    POCT Glucose 158 (H) 74 - 99 mg/dL   POCT GLUCOSE   Result Value Ref Range    POCT Glucose 134 (H) 74 - 99 mg/dL   POCT GLUCOSE   Result Value Ref Range    POCT Glucose 113 (H) 74 - 99 mg/dL   CBC   Result Value Ref Range    WBC 8.5 4.4 - 11.3 x10*3/uL    nRBC 0.0 0.0 - 0.0 /100 WBCs    RBC 2.80 (L) 4.50 - 5.90 x10*6/uL    Hemoglobin 8.2 (L) 13.5 - 17.5 g/dL    Hematocrit 26.6 (L) 41.0 - 52.0 %    MCV 95 80 - 100 fL    MCH 29.3 26.0 - 34.0 pg    MCHC 30.8 (L) 32.0 - 36.0 g/dL    RDW 14.6 (H) 11.5 - 14.5 %    Platelets 87 (L) 150 - 450 x10*3/uL   Basic metabolic panel   Result Value Ref Range    Glucose 116 (H) 74 - 99 mg/dL    Sodium 123 (L) 136 - 145 mmol/L    Potassium 4.0 3.5 - 5.3 mmol/L    Chloride 89 (L) 98 - 107 mmol/L    Bicarbonate 29 21 - 32 mmol/L    Anion Gap 9 (L) 10 - 20 mmol/L    Urea Nitrogen 28 (H) 6 - 23 mg/dL    Creatinine 0.63 0.50 - 1.30 mg/dL    eGFR >90 >60 mL/min/1.73m*2    Calcium 7.6 (L) 8.6 - 10.3 mg/dL   Magnesium   Result Value Ref Range    Magnesium 1.71 1.60 - 2.40 mg/dL   POCT GLUCOSE   Result Value Ref Range    POCT Glucose 124 (H) 74 - 99 mg/dL                           Assessment/Plan   Assessment & Plan  MRSA pneumonia (Multi)    Malignant neoplasm of upper lobe of right lung (Multi)    Acute on chronic hypoxic respiratory failure (Multi)    Summary:  74M with right squamous cell CA who underwent tumor debulking and stenting of right mainstem at [facility] on 8/29/2024, then again on 10/18/2024. Stent was removed on 9/25/2024 after it had occluded. He also has history of COPD, chronic hypoxic respiratory failure, HFrEF, Afib. This admission he is growing MRSA on his sputum. Upon review of his CT imaging, his right lower lobe is aerated. The airway stent also appears to be patent. He still has significant tumor burden in his right upper lobe. O2 requirements  remain stable.      Recommendations:  -Continue ABX as per ID for MRSA pneumonia  -Continue O2 supplementations to keep SpO2 89-92%. Currently on 30L/min and 50% FiO2. Can likely be transitioned to nasal cannula.  -Bronchoscopy for inspection +/- stent exchange can be postponed a few weeks after discussing with [facility] interventional pulmonology group  -Bronchopulmonary hygiene  -Discussed with family at bedside    Edward Delatorre DO  Pulmonary & Critical Care  11/24/2024 3:01 PM

## 2024-11-25 ENCOUNTER — HOSPITAL ENCOUNTER (OUTPATIENT)
Dept: RADIATION ONCOLOGY | Facility: CLINIC | Age: 74
Setting detail: RADIATION/ONCOLOGY SERIES
End: 2024-11-25
Payer: MEDICARE

## 2024-11-25 ENCOUNTER — APPOINTMENT (OUTPATIENT)
Dept: RADIOLOGY | Facility: HOSPITAL | Age: 74
End: 2024-11-25
Payer: MEDICARE

## 2024-11-25 LAB
ANION GAP SERPL CALC-SCNC: 13 MMOL/L (ref 10–20)
BNP SERPL-MCNC: 206 PG/ML (ref 0–99)
BUN SERPL-MCNC: 22 MG/DL (ref 6–23)
CALCIUM SERPL-MCNC: 7.6 MG/DL (ref 8.6–10.3)
CHLORIDE SERPL-SCNC: 91 MMOL/L (ref 98–107)
CHLORIDE UR-SCNC: <15 MMOL/L
CHLORIDE/CREATININE (MMOL/G) IN URINE: NORMAL
CO2 SERPL-SCNC: 28 MMOL/L (ref 21–32)
CREAT SERPL-MCNC: 0.62 MG/DL (ref 0.5–1.3)
CREAT UR-MCNC: 42.8 MG/DL (ref 20–370)
EGFRCR SERPLBLD CKD-EPI 2021: >90 ML/MIN/1.73M*2
ERYTHROCYTE [DISTWIDTH] IN BLOOD BY AUTOMATED COUNT: 14.6 % (ref 11.5–14.5)
GLUCOSE BLD MANUAL STRIP-MCNC: 106 MG/DL (ref 74–99)
GLUCOSE BLD MANUAL STRIP-MCNC: 127 MG/DL (ref 74–99)
GLUCOSE BLD MANUAL STRIP-MCNC: 89 MG/DL (ref 74–99)
GLUCOSE BLD MANUAL STRIP-MCNC: 89 MG/DL (ref 74–99)
GLUCOSE SERPL-MCNC: 82 MG/DL (ref 74–99)
HCT VFR BLD AUTO: 26.6 % (ref 41–52)
HGB BLD-MCNC: 8.2 G/DL (ref 13.5–17.5)
MCH RBC QN AUTO: 28.9 PG (ref 26–34)
MCHC RBC AUTO-ENTMCNC: 30.8 G/DL (ref 32–36)
MCV RBC AUTO: 94 FL (ref 80–100)
NRBC BLD-RTO: 0 /100 WBCS (ref 0–0)
PLATELET # BLD AUTO: 75 X10*3/UL (ref 150–450)
POTASSIUM SERPL-SCNC: 4.5 MMOL/L (ref 3.5–5.3)
POTASSIUM UR-SCNC: 11 MMOL/L
POTASSIUM/CREAT UR-RTO: 26 MMOL/G CREAT
RBC # BLD AUTO: 2.84 X10*6/UL (ref 4.5–5.9)
SODIUM SERPL-SCNC: 127 MMOL/L (ref 136–145)
SODIUM UR-SCNC: <10 MMOL/L
SODIUM/CREAT UR-RTO: NORMAL
URATE SERPL-MCNC: 2.1 MG/DL (ref 4–7.5)
WBC # BLD AUTO: 8.5 X10*3/UL (ref 4.4–11.3)

## 2024-11-25 PROCEDURE — 94668 MNPJ CHEST WALL SBSQ: CPT

## 2024-11-25 PROCEDURE — 2500000002 HC RX 250 W HCPCS SELF ADMINISTERED DRUGS (ALT 637 FOR MEDICARE OP, ALT 636 FOR OP/ED): Performed by: INTERNAL MEDICINE

## 2024-11-25 PROCEDURE — 99233 SBSQ HOSP IP/OBS HIGH 50: CPT

## 2024-11-25 PROCEDURE — 2500000001 HC RX 250 WO HCPCS SELF ADMINISTERED DRUGS (ALT 637 FOR MEDICARE OP): Performed by: INTERNAL MEDICINE

## 2024-11-25 PROCEDURE — 82947 ASSAY GLUCOSE BLOOD QUANT: CPT

## 2024-11-25 PROCEDURE — 83880 ASSAY OF NATRIURETIC PEPTIDE: CPT

## 2024-11-25 PROCEDURE — 80048 BASIC METABOLIC PNL TOTAL CA: CPT

## 2024-11-25 PROCEDURE — 85027 COMPLETE CBC AUTOMATED: CPT

## 2024-11-25 PROCEDURE — 2500000002 HC RX 250 W HCPCS SELF ADMINISTERED DRUGS (ALT 637 FOR MEDICARE OP, ALT 636 FOR OP/ED): Performed by: STUDENT IN AN ORGANIZED HEALTH CARE EDUCATION/TRAINING PROGRAM

## 2024-11-25 PROCEDURE — 71250 CT THORAX DX C-: CPT | Performed by: STUDENT IN AN ORGANIZED HEALTH CARE EDUCATION/TRAINING PROGRAM

## 2024-11-25 PROCEDURE — 2500000002 HC RX 250 W HCPCS SELF ADMINISTERED DRUGS (ALT 637 FOR MEDICARE OP, ALT 636 FOR OP/ED): Performed by: FAMILY MEDICINE

## 2024-11-25 PROCEDURE — 71250 CT THORAX DX C-: CPT

## 2024-11-25 PROCEDURE — 2500000005 HC RX 250 GENERAL PHARMACY W/O HCPCS: Performed by: FAMILY MEDICINE

## 2024-11-25 PROCEDURE — 2500000001 HC RX 250 WO HCPCS SELF ADMINISTERED DRUGS (ALT 637 FOR MEDICARE OP): Performed by: NURSE PRACTITIONER

## 2024-11-25 PROCEDURE — 94760 N-INVAS EAR/PLS OXIMETRY 1: CPT

## 2024-11-25 PROCEDURE — 99233 SBSQ HOSP IP/OBS HIGH 50: CPT | Performed by: INTERNAL MEDICINE

## 2024-11-25 PROCEDURE — 2060000001 HC INTERMEDIATE ICU ROOM DAILY

## 2024-11-25 PROCEDURE — S4991 NICOTINE PATCH NONLEGEND: HCPCS | Performed by: INTERNAL MEDICINE

## 2024-11-25 PROCEDURE — 2500000002 HC RX 250 W HCPCS SELF ADMINISTERED DRUGS (ALT 637 FOR MEDICARE OP, ALT 636 FOR OP/ED): Performed by: NURSE PRACTITIONER

## 2024-11-25 PROCEDURE — 2500000001 HC RX 250 WO HCPCS SELF ADMINISTERED DRUGS (ALT 637 FOR MEDICARE OP): Performed by: STUDENT IN AN ORGANIZED HEALTH CARE EDUCATION/TRAINING PROGRAM

## 2024-11-25 PROCEDURE — 2500000005 HC RX 250 GENERAL PHARMACY W/O HCPCS: Performed by: INTERNAL MEDICINE

## 2024-11-25 PROCEDURE — 94640 AIRWAY INHALATION TREATMENT: CPT

## 2024-11-25 PROCEDURE — 2500000004 HC RX 250 GENERAL PHARMACY W/ HCPCS (ALT 636 FOR OP/ED): Performed by: INTERNAL MEDICINE

## 2024-11-25 PROCEDURE — 94669 MECHANICAL CHEST WALL OSCILL: CPT

## 2024-11-25 PROCEDURE — 84550 ASSAY OF BLOOD/URIC ACID: CPT | Performed by: INTERNAL MEDICINE

## 2024-11-25 PROCEDURE — 2500000002 HC RX 250 W HCPCS SELF ADMINISTERED DRUGS (ALT 637 FOR MEDICARE OP, ALT 636 FOR OP/ED)

## 2024-11-25 RX ORDER — FUROSEMIDE 10 MG/ML
40 INJECTION INTRAMUSCULAR; INTRAVENOUS ONCE
Status: COMPLETED | OUTPATIENT
Start: 2024-11-25 | End: 2024-11-25

## 2024-11-25 RX ORDER — IPRATROPIUM BROMIDE AND ALBUTEROL SULFATE 2.5; .5 MG/3ML; MG/3ML
3 SOLUTION RESPIRATORY (INHALATION)
Status: DISCONTINUED | OUTPATIENT
Start: 2024-11-25 | End: 2024-11-26

## 2024-11-25 RX ORDER — ISOSORBIDE MONONITRATE 30 MG/1
30 TABLET, EXTENDED RELEASE ORAL DAILY
Status: DISCONTINUED | OUTPATIENT
Start: 2024-11-25 | End: 2024-11-27

## 2024-11-25 ASSESSMENT — COGNITIVE AND FUNCTIONAL STATUS - GENERAL
TOILETING: A LITTLE
TURNING FROM BACK TO SIDE WHILE IN FLAT BAD: A LITTLE
DAILY ACTIVITIY SCORE: 21
DRESSING REGULAR UPPER BODY CLOTHING: A LITTLE
TOILETING: A LITTLE
MOBILITY SCORE: 19
MOBILITY SCORE: 19
DAILY ACTIVITIY SCORE: 21
CLIMB 3 TO 5 STEPS WITH RAILING: A LITTLE
WALKING IN HOSPITAL ROOM: A LITTLE
MOVING TO AND FROM BED TO CHAIR: A LITTLE
STANDING UP FROM CHAIR USING ARMS: A LITTLE
WALKING IN HOSPITAL ROOM: A LITTLE
DRESSING REGULAR UPPER BODY CLOTHING: A LITTLE
DRESSING REGULAR LOWER BODY CLOTHING: A LITTLE
MOVING TO AND FROM BED TO CHAIR: A LITTLE
CLIMB 3 TO 5 STEPS WITH RAILING: A LITTLE
DRESSING REGULAR LOWER BODY CLOTHING: A LITTLE
STANDING UP FROM CHAIR USING ARMS: A LITTLE
TURNING FROM BACK TO SIDE WHILE IN FLAT BAD: A LITTLE

## 2024-11-25 ASSESSMENT — PAIN SCALES - WONG BAKER: WONGBAKER_NUMERICALRESPONSE: NO HURT

## 2024-11-25 ASSESSMENT — PAIN SCALES - GENERAL
PAINLEVEL_OUTOF10: 0 - NO PAIN
PAINLEVEL_OUTOF10: 0 - NO PAIN

## 2024-11-25 NOTE — PROGRESS NOTES
"We are following patient for hyponatremia.  Serum sodium has been chronically low and somewhat labile here in the hospital stay.  Was in the 130s and upper 120s initially but did drop down to 122 few days ago.  Was treated with IV fluids without significant improvement.  Was given urea over the past couple of days with improvement up to 127 today.    Patient is new to me.  Is a 74-year-old male with multiple comorbidities who is here for respiratory failure related to history of lung cancer as well as MRSA pneumonia.  He continues to have respiratory work quiring high flow nasal cannula.  I reviewed the most recent chest x-ray and shows large mass in the upper right lung field and some atelectasis.  Does seem to have some extracellular volume normal on his chest x-ray.  BNP has been in the mid 200s.  He has not been treated with any Lasix.  Urine sodium has been less than 10    /57   Pulse 86   Temp 36.3 °C (97.4 °F)   Resp 23   Ht 1.753 m (5' 9\")   Wt 64.9 kg (143 lb 1.3 oz)   SpO2 97%   BMI 21.13 kg/m²   On nasal cannula with some respiratory distress with conversation.  No edema.      Assessment and plan  1.  Hyponatremia.  Low urine sodium.  He has not responded to extracellular volume resuscitation.  This may be related to heart failure.  I will add isosorbide and give a dose of Lasix today.  Discussed with Dr. Vanegas  "

## 2024-11-25 NOTE — CARE PLAN
The clinical goals for the shift include Patient SpO2 will remain greater than 92% througout the shift.      Problem: Nutrition  Goal: Adequate PO fluid intake  Outcome: Progressing     Problem: Pain - Adult  Goal: Verbalizes/displays adequate comfort level or baseline comfort level  Outcome: Progressing     Problem: Safety - Adult  Goal: Free from fall injury  Outcome: Progressing     Problem: Discharge Planning  Goal: Discharge to home or other facility with appropriate resources  Outcome: Progressing     Problem: Chronic Conditions and Co-morbidities  Goal: Patient's chronic conditions and co-morbidity symptoms are monitored and maintained or improved  Outcome: Progressing     Problem: Fall/Injury  Goal: Not fall by end of shift  Outcome: Progressing     Problem: Diabetes  Goal: Achieve decreasing blood glucose levels by end of shift  Outcome: Progressing

## 2024-11-25 NOTE — PROGRESS NOTES
Department of Medicine  Division of Pulmonary, Critical Care, and Sleep Medicine    Juan Carlos Cr is a 74 y.o. male on day 13 of admission presenting with MRSA pneumonia (Multi).    Subjective   Stable, still requiring Airvo 40 L / 60%, no events overnight.       Objective     Vital Signs      11/24/2024     7:37 PM 11/24/2024     8:00 PM 11/25/2024     5:15 AM 11/25/2024     9:14 AM 11/25/2024     9:19 AM 11/25/2024    11:00 AM 11/25/2024    11:26 AM   Vitals   Systolic 96 111 115 117 117 117 106   Diastolic 50 55 60 57 57 61 58   BP Location      Right arm    Heart Rate 88 85 85 86 86 84 83   Temp 37.1 °C (98.7 °F)  36.3 °C (97.4 °F)   36.7 °C (98.1 °F)    Resp 30 21 23   29    Weight (lb)   143.08       BMI   21.13 kg/m2       BSA (m2)   1.78 m2            Physical exam  Constitutional: Chronically ill looking, awake and alert.  HEENT: Normocephalic and atraumatic.  Cardiovascular: Normal rate and regular rhythm.  Pulmonary: Coarse breath sounds bilaterally, no wheezing.  Musculoskeletal: No edema, no cyanosis.  Neurological: Awake, alert and oriented x3.  Psychiatric: Normal behavior, mood and affect.    Labs:  Lab Results   Component Value Date    WBC 8.5 11/25/2024    HGB 8.2 (L) 11/25/2024    HCT 26.6 (L) 11/25/2024    MCV 94 11/25/2024    PLT 75 (L) 11/25/2024      Lab Results   Component Value Date    GLUCOSE 82 11/25/2024    CALCIUM 7.6 (L) 11/25/2024     (L) 11/25/2024    K 4.5 11/25/2024    CO2 28 11/25/2024    CL 91 (L) 11/25/2024    BUN 22 11/25/2024    CREATININE 0.62 11/25/2024      Lab Results   Component Value Date    ALT 10 11/22/2024    AST 15 11/22/2024    ALKPHOS 89 11/22/2024    BILITOT 0.3 11/22/2024        Oxygen Therapy  SpO2: 97 %  Medical Gas Therapy: Supplemental oxygen  Medical Gas Delivery Method: High flow nasal cannula  FiO2 (%):  [50 %-60 %] 60 %    Intake/Output last 3 Shifts:  I/O last 3 completed shifts:  In: 60 (0.9 mL/kg) [P.O.:60]  Out: 1500 (23.1 mL/kg) [Urine:1500  (0.6 mL/kg/hr)]  Weight: 64.9 kg       Medications   Scheduled medications  amiodarone, 200 mg, oral, Daily  ascorbic acid, 500 mg, oral, Daily  aspirin, 81 mg, oral, Daily  cholecalciferol, 800 Units, oral, Daily  doxycycline, 100 mg, oral, q12h MASSIEL  fluticasone furoate-vilanteroL, 1 puff, inhalation, Daily  insulin lispro, 0-10 Units, subcutaneous, Before meals & nightly  ipratropium-albuteroL, 3 mL, nebulization, 4x daily  isosorbide mononitrate ER, 30 mg, oral, Daily  [Held by provider] losartan, 25 mg, oral, Daily  [Held by provider] metFORMIN, 500 mg, oral, BID  [Held by provider] metoprolol succinate XL, 50 mg, oral, Daily  mupirocin, , Each Nostril, TID  nicotine, 1 patch, transdermal, Daily  oxygen, , inhalation, Continuous - Inhalation  oxygen, , inhalation, Continuous - Inhalation  pantoprazole, 40 mg, oral, Daily  potassium chloride CR, 10 mEq, oral, Daily  rivaroxaban, 20 mg, oral, Daily with evening meal  rosuvastatin, 20 mg, oral, Daily  tamsulosin, 0.4 mg, oral, Daily  tiotropium, 2 puff, inhalation, Daily  zinc oxide, 1 Application, Topical, TID      Continuous medications     PRN medications  PRN medications: acetaminophen, benzonatate, dextromethorphan-guaifenesin, dextrose, dextrose, glucagon, glucagon, guaiFENesin, ipratropium-albuteroL, prochlorperazine     Allergies  Patient has no known allergies.    Chest Radiograph   No results found for this or any previous visit from the past 10 days.       XR chest 2 views 11/21/2024    Narrative  STUDY:  Chest Radiographs;  11/21/2024 11:33 AM.  INDICATION:  Worsening hypoxia.  COMPARISON:  Chest radiograph and CTA chest 8/26/2024.  ACCESSION NUMBER(S):  [id number]  ORDERING CLINICIAN:  MU HUGGINS  TECHNIQUE:  Frontal and lateral chest.  FINDINGS:  CARDIOMEDIASTINAL SILHOUETTE:  Cardiomediastinal silhouette is normal in size and configuration.  Right IJ infusion catheter remains in place within the mid SVC.    LUNGS:  Consolidation with central  cavitation involving the right upper lobe  again noted.  Patchy airspace opacification throughout the remainder  of both lungs again seen.  With mild interval improvement.  Persistent  small right pleural effusion.    ABDOMEN:  No remarkable upper abdominal findings.    BONES:  No acute osseous changes.    Impression  1.Mild improvement in the bilateral pneumonia.  2.Stable right upper lobe lung mass.  Signed by Dominguez Sellers MD      XR chest 1 view 11/18/2024    Narrative  Interpreted By:  Tanya Caballero,  STUDY:  XR CHEST 1 VIEW; ;  11/18/2024 2:02 pm    INDICATION:  Signs/Symptoms:dyspnea.      COMPARISON:  Chest radiograph dated 10/18/2024    ACCESSION NUMBER(S):  [id number]    ORDERING CLINICIAN:  LUIS HORTA    FINDINGS:  Right chest wall [id number] is noted with its tip projecting over  the expected region of distal SVC and is similar compared to prior  radiograph. Cardiac silhouette is similar compared to immediate prior  radiograph. There is persistent evidence of near complete  opacification of right upper lung zone. There is interval improvement  in airspace opacities in the right mid to lower lung zones compared  to immediate prior radiograph with improved aeration in the right  lower lung zone. However there is interval increase in airspace  infiltrates in the left mid to lower lung zone, new compared to prior  radiograph dated 10/18/2024 and appears to be slightly increased  compared to immediate prior CT examination dated 11/12/2024 (within  limitation of differences in technique). There is small right  thoracic pleural effusion. No large pneumothorax within limits of  portable technique. No acute osseous findings.    Impression  1. Findings concerning for interval worsening of airspace opacities  in the left mid to lower lung zone compared to prior radiograph dated  10/18/2024 and immediate prior CT chest dated 11/12/2024 (within  limitation of differences in technique).  2. Redemonstration of  right lung mass with interval improved aeration  in right lung base compared to prior radiograph as described above.        MACRO:  None    Signed by: Tanya Caballero 11/20/2024 8:45 AM  Dictation workstation:   [id number]         Assessment and Plan / Recommendations   Assessment/Plan     74-year-old man with history of squamous cell lung cancer post right mainstem stent, COPD admitted with respiratory failure    1.  Acute hypoxic respiratory failure due to pneumonia, COPD and lung cancer  2.  MRSA pneumonia  3.  Lung cancer  4.  COPD exacerbation    Still requiring Airvo 40 L / 60%  -Continue doxycycline, ID following  -Continue bronchial hygiene with incentive spirometry, Acapella, wean O2 as tolerated  -Case discussed with [place] at [facility] [place], no need for bronch/intervention at this time    Darlene Araiza MD

## 2024-11-25 NOTE — PROGRESS NOTES
Juan Carlos Cr is a 74 y.o. male on day 13 of admission presenting with MRSA pneumonia (Multi).    Subjective   Interval History: no fever, no new complaints        Review of Systems    Objective   Range of Vitals (last 24 hours)  Heart Rate:  [83-88]   Temp:  [36 °C (96.8 °F)-37.1 °C (98.7 °F)]   Resp:  [21-30]   BP: ()/(50-61)   Weight:  [64.9 kg (143 lb 1.3 oz)]   SpO2:  [96 %-100 %]   Daily Weight  11/25/24 : 64.9 kg (143 lb 1.3 oz)    Body mass index is 21.13 kg/m².    Physical Exam  Constitutional:       Appearance: Normal appearance.   HENT:      Head: Normocephalic and atraumatic.      Mouth/Throat:      Mouth: Mucous membranes are moist.      Pharynx: Oropharynx is clear.   Eyes:      Pupils: Pupils are equal, round, and reactive to light.   Cardiovascular:      Rate and Rhythm: Normal rate and regular rhythm.      Heart sounds: Normal heart sounds.   Pulmonary:      Effort: Pulmonary effort is normal.      Breath sounds: Normal breath sounds.   Abdominal:      General: Abdomen is flat. Bowel sounds are normal.      Palpations: Abdomen is soft.   Musculoskeletal:      Cervical back: Normal range of motion.         Antibiotics  doxycycline - 100 mg, 100 mg  mupirocin - 2 %    Relevant Results  Labs  Results from last 72 hours   Lab Units 11/25/24  0537 11/24/24  0844 11/23/24  1327 11/23/24  0502   WBC AUTO x10*3/uL 8.5 8.5  --  9.2   HEMOGLOBIN g/dL 8.2* 8.2* 7.6* 6.8*   HEMATOCRIT % 26.6* 26.6* 23.2* 21.8*   PLATELETS AUTO x10*3/uL 75* 87*  --  105*     Results from last 72 hours   Lab Units 11/25/24  0537 11/24/24  1839 11/24/24  0848 11/23/24  0502   SODIUM mmol/L 127* 126* 123* 124*   POTASSIUM mmol/L 4.5 4.4 4.0 3.4*   CHLORIDE mmol/L 91* 89* 89* 87*   CO2 mmol/L 28 31 29 31   BUN mg/dL 22 26* 28* 8   CREATININE mg/dL 0.62 0.66 0.63 0.52   GLUCOSE mg/dL 82 168* 116* 95   CALCIUM mg/dL 7.6* 7.7* 7.6* 7.1*   ANION GAP mmol/L 13 10 9* 9*   EGFR mL/min/1.73m*2 >90 >90 >90 >90   PHOSPHORUS mg/dL  --    --   --  2.9     Results from last 72 hours   Lab Units 11/23/24  0502   ALBUMIN g/dL 1.7*     Estimated Creatinine Clearance: 96 mL/min (by C-G formula based on SCr of 0.62 mg/dL).  C-Reactive Protein   Date Value Ref Range Status   11/13/2024 33.57 (H) <1.00 mg/dL Final     Microbiology  Reviewed  Imaging  Reviewed        Assessment/Plan   Respiratory failure / COPD / pneumonia / abscess, MRSA screen is positive, procalcitonin 2.30, sputum with MRSA, the repeat cxr was reviewed  NSCLC / Rt bronchus stent / on Taxol / Carboplatin / radiation     Recommendations :  Plan on oral Doxycycline x 3 weeks with discharge  Increase the activity   Discussed with the medical team     I spent minutes in the professional and overall care of this patient.      Krysten Magallon MD

## 2024-11-25 NOTE — PROGRESS NOTES
Occupational Therapy                 Therapy Communication Note    Patient Name: Juan Carlos Cr  MRN: [id number]  Department: [address]  Room: [address] [place] [address]  Today's Date: 11/25/2024     Discipline: Occupational Therapy    Missed Visit Reason: Missed Visit Reason: Patient refused (Pt refused c/o continuing to feel poorly and unable to breathe with any effort. Nurse reports pt very intolerant of activity on airvo support with rebreather, in agreement with holding treatment today.)    Missed Time: Attempt @ 1140

## 2024-11-25 NOTE — PROGRESS NOTES
11/25/24 1255   Discharge Planning   Living Arrangements Spouse/significant other   Support Systems Spouse/significant other   Assistance Needed Patient is A&Ox3,  independent in ADL's, uses no assistive devices, O2 at 2-3L (unknown company), getting chemo and radiation for cancer   Type of Residence Private residence   Number of Stairs to Enter Residence 2   Number of Stairs Within Residence 13   Do you have animals or pets at home? Yes   Type of Animals or Pets 1 cat   Who is requesting discharge planning? Provider   Home or Post Acute Services Post acute facilities (Rehab/SNF/etc)   Type of Post Acute Facility Services Skilled nursing   Expected Discharge Disposition SNF  (Plan to go to [facility] for Rehab SNF, auth expires today, will need new precert. Currently on airvo 40/60.)   Does the patient need discharge transport arranged? Yes   RoundTrip coordination needed? Yes   Has discharge transport been arranged? No   Patient Choice   Provider Choice list and CMS website (https://medicare.gov/care-compare#search) for post-acute Quality and Resource Measure Data were provided and reviewed with: Patient;Family   Intensity of Service   Intensity of Service 0-30 min

## 2024-11-25 NOTE — PROGRESS NOTES
Physical Therapy                 Therapy Communication Note    Patient Name: Juan Carlos Cr  MRN: [id number]  Department: [address]  Room: [address] [place], [place] [address]  Today's Date: 11/25/2024     Discipline: Physical Therapy    Missed Visit Reason:  Per RN patient is requiring increased O2 and BP running low. Requested to hold PT tx at this time.     Missed Time: Attempt

## 2024-11-25 NOTE — PROGRESS NOTES
Juan Carlos Cr is a 74 y.o. male on day 13 of admission presenting with MRSA pneumonia (Multi).    Subjective   - Today at bedside, pt reports that his symptoms are same as yesterday. Notes productive cough with clear mucus, SOB especially with exertion, and wheezing with cough.   - He states he had 2 small meals yesterday.  - Notes he has not had bowel movement in past 2 days.   - No chest pain, palpitations, abdominal pain, nausea, vomiting, diarrhea.         Objective     Physical Exam  Constitutional:       General: He is not in acute distress.     Appearance: He is ill-appearing. He is not toxic-appearing or diaphoretic.      Comments: Thin and frail appearing man. Laying in bed. Airvo in place. Buchanan bag noted, yellow urine.    Cardiovascular:      Rate and Rhythm: Normal rate and regular rhythm.      Pulses: Normal pulses.      Heart sounds: Normal heart sounds. No murmur heard.  Pulmonary:      Effort: No respiratory distress.      Breath sounds: No stridor. Wheezing present.      Comments: Airvo in place.   Improved air movement bilaterally compared to yesterday, but still decreased. Diffuse crackles, more prominent in right lobe.   Pt desated to 69% on Airvo during my physical exam when asked to sit up. With rest and deep breathing, improved to 99%.   Mild conversational dyspnea.   No coughing noted during physical exam.   Chest:      Chest wall: No tenderness.   Abdominal:      General: Abdomen is flat. Bowel sounds are normal. There is no distension.      Palpations: Abdomen is soft.      Tenderness: There is no abdominal tenderness. There is no guarding.   Musculoskeletal:      Right lower leg: No edema.      Left lower leg: No edema.      Comments: Loss of muscle mass in the neck, chest, abdomen.    Skin:     General: Skin is warm and dry.      Findings: No rash.      Comments: Right port, no surrounding erythema or drainage.   2x2cm raised nodule noted on the back, overlying the thoracic spine,  "between bilateral scapula. No tenderness with palpation. Mildly erythematous. Not warm to touch. No drainage.   Bruising noted on bilateral arms. No bleeding, drainage.    Neurological:      General: No focal deficit present.      Mental Status: He is alert and oriented to person, place, and time.      Motor: Weakness present.         Last Recorded Vitals  Blood pressure 115/60, pulse 85, temperature 36.3 °C (97.4 °F), resp. rate 23, height 1.753 m (5' 9\"), weight 64.9 kg (143 lb 1.3 oz), SpO2 97%.  Intake/Output last 3 Shifts:  I/O last 3 completed shifts:  In: 60 (0.9 mL/kg) [P.O.:60]  Out: 1500 (23.1 mL/kg) [Urine:1500 (0.6 mL/kg/hr)]  Weight: 64.9 kg     Relevant Results  Scheduled medications  amiodarone, 200 mg, oral, Daily  ascorbic acid, 500 mg, oral, Daily  aspirin, 81 mg, oral, Daily  cholecalciferol, 800 Units, oral, Daily  doxycycline, 100 mg, oral, q12h MASSIEL  fluticasone furoate-vilanteroL, 1 puff, inhalation, Daily  insulin lispro, 0-10 Units, subcutaneous, Before meals & nightly  ipratropium-albuteroL, 3 mL, nebulization, 4x daily  [Held by provider] losartan, 25 mg, oral, Daily  [Held by provider] metFORMIN, 500 mg, oral, BID  metoprolol succinate XL, 50 mg, oral, Daily  mupirocin, , Each Nostril, TID  nicotine, 1 patch, transdermal, Daily  oxygen, , inhalation, Continuous - Inhalation  oxygen, , inhalation, Continuous - Inhalation  pantoprazole, 40 mg, oral, Daily  potassium chloride CR, 10 mEq, oral, Daily  rivaroxaban, 20 mg, oral, Daily with evening meal  rosuvastatin, 20 mg, oral, Daily  tamsulosin, 0.4 mg, oral, Daily  tiotropium, 2 puff, inhalation, Daily  zinc oxide, 1 Application, Topical, TID      Continuous medications       PRN medications  PRN medications: acetaminophen, benzonatate, dextromethorphan-guaifenesin, dextrose, dextrose, glucagon, glucagon, guaiFENesin, ipratropium-albuteroL, prochlorperazine    Results for orders placed or performed during the hospital encounter of 11/12/24 " (from the past 24 hours)   CBC   Result Value Ref Range    WBC 8.5 4.4 - 11.3 x10*3/uL    nRBC 0.0 0.0 - 0.0 /100 WBCs    RBC 2.80 (L) 4.50 - 5.90 x10*6/uL    Hemoglobin 8.2 (L) 13.5 - 17.5 g/dL    Hematocrit 26.6 (L) 41.0 - 52.0 %    MCV 95 80 - 100 fL    MCH 29.3 26.0 - 34.0 pg    MCHC 30.8 (L) 32.0 - 36.0 g/dL    RDW 14.6 (H) 11.5 - 14.5 %    Platelets 87 (L) 150 - 450 x10*3/uL   Basic metabolic panel   Result Value Ref Range    Glucose 116 (H) 74 - 99 mg/dL    Sodium 123 (L) 136 - 145 mmol/L    Potassium 4.0 3.5 - 5.3 mmol/L    Chloride 89 (L) 98 - 107 mmol/L    Bicarbonate 29 21 - 32 mmol/L    Anion Gap 9 (L) 10 - 20 mmol/L    Urea Nitrogen 28 (H) 6 - 23 mg/dL    Creatinine 0.63 0.50 - 1.30 mg/dL    eGFR >90 >60 mL/min/1.73m*2    Calcium 7.6 (L) 8.6 - 10.3 mg/dL   Magnesium   Result Value Ref Range    Magnesium 1.71 1.60 - 2.40 mg/dL   Cortisol AM   Result Value Ref Range    Cortisol  A.M. 29.8 (H) 5.0 - 20.0 ug/dL   Osmolality   Result Value Ref Range    Osmolality, Serum 267 (L) 280 - 300 mOsm/kg   Sodium, Urine Random   Result Value Ref Range    Sodium, Urine Random <10 mmol/L    Creatinine, Urine Random 42.5 20.0 - 370.0 mg/dL    Sodium/Creatinine Ratio     Urine electrolytes   Result Value Ref Range    Sodium, Urine Random <10 mmol/L    Sodium/Creatinine Ratio      Potassium, Urine Random 11 mmol/L    Potassium/Creatinine Ratio 26 Not established mmol/g Creat    Chloride, Urine Random <15 mmol/L    Chloride/Creatinine Ratio      Creatinine, Urine Random 42.8 20.0 - 370.0 mg/dL   POCT GLUCOSE   Result Value Ref Range    POCT Glucose 124 (H) 74 - 99 mg/dL   POCT GLUCOSE   Result Value Ref Range    POCT Glucose 156 (H) 74 - 99 mg/dL   Basic Metabolic Panel   Result Value Ref Range    Glucose 168 (H) 74 - 99 mg/dL    Sodium 126 (L) 136 - 145 mmol/L    Potassium 4.4 3.5 - 5.3 mmol/L    Chloride 89 (L) 98 - 107 mmol/L    Bicarbonate 31 21 - 32 mmol/L    Anion Gap 10 10 - 20 mmol/L    Urea Nitrogen 26 (H) 6 - 23  mg/dL    Creatinine 0.66 0.50 - 1.30 mg/dL    eGFR >90 >60 mL/min/1.73m*2    Calcium 7.7 (L) 8.6 - 10.3 mg/dL   POCT GLUCOSE   Result Value Ref Range    POCT Glucose 165 (H) 74 - 99 mg/dL   Uric Acid   Result Value Ref Range    Uric Acid 2.1 (L) 4.0 - 7.5 mg/dL   CBC   Result Value Ref Range    WBC 8.5 4.4 - 11.3 x10*3/uL    nRBC 0.0 0.0 - 0.0 /100 WBCs    RBC 2.84 (L) 4.50 - 5.90 x10*6/uL    Hemoglobin 8.2 (L) 13.5 - 17.5 g/dL    Hematocrit 26.6 (L) 41.0 - 52.0 %    MCV 94 80 - 100 fL    MCH 28.9 26.0 - 34.0 pg    MCHC 30.8 (L) 32.0 - 36.0 g/dL    RDW 14.6 (H) 11.5 - 14.5 %    Platelets 75 (L) 150 - 450 x10*3/uL   Basic metabolic panel   Result Value Ref Range    Glucose 82 74 - 99 mg/dL    Sodium 127 (L) 136 - 145 mmol/L    Potassium 4.5 3.5 - 5.3 mmol/L    Chloride 91 (L) 98 - 107 mmol/L    Bicarbonate 28 21 - 32 mmol/L    Anion Gap 13 10 - 20 mmol/L    Urea Nitrogen 22 6 - 23 mg/dL    Creatinine 0.62 0.50 - 1.30 mg/dL    eGFR >90 >60 mL/min/1.73m*2    Calcium 7.6 (L) 8.6 - 10.3 mg/dL   POCT GLUCOSE   Result Value Ref Range    POCT Glucose 89 74 - 99 mg/dL       ECG 12 lead    Result Date: 11/16/2024  Sinus tachycardia Otherwise normal ECG When compared with ECG of 12-NOV-2024 14:23, (unconfirmed) No significant change was found See ED provider note for full interpretation and clinical correlation Confirmed by Leila Luke ([id number]) on 11/16/2024 2:15:23 PM    ECG 12 lead    Result Date: 11/16/2024  Sinus tachycardia Otherwise normal ECG When compared with ECG of 01-NOV-2024 08:10, Vent. rate has increased BY  42 BPM Criteria for Septal infarct are no longer Present T wave amplitude has decreased in Lateral leads See ED provider note for full interpretation and clinical correlation Confirmed by Leila Luke ([id number]) on 11/16/2024 2:15:13 PM    Electrocardiogram, 12-lead PRN ACS symptoms    Result Date: 11/15/2024  Atrial fibrillation with rapid ventricular response with premature ventricular or aberrantly conducted  complexes Nonspecific ST and T wave abnormality Abnormal ECG When compared with ECG of 12-NOV-2024 15:52, (unconfirmed) Atrial fibrillation has replaced Sinus rhythm ST now depressed in Lateral leads Nonspecific T wave abnormality now evident in Inferior leads Nonspecific T wave abnormality now evident in Lateral leads    CT angio chest for pulmonary embolism    Result Date: 11/12/2024  Interpreted By:  Bryce Estrada, STUDY: CT ANGIO CHEST FOR PULMONARY EMBOLISM;  11/12/2024 3:08 pm   INDICATION: Signs/Symptoms:History of lung cancer shortness of breath COPD hypoxemic significant weight loss.   COMPARISON: 10/16/2024   ACCESSION NUMBER(S): [id number]   ORDERING CLINICIAN: LINUS BERNAL   TECHNIQUE: Helical data acquisition of the chest was obtained with  75 mL Omnipaque 350. Images were reformatted in axial, coronal, and sagittal planes.MIP reformatted images were also generated.   FINDINGS: LUNGS and AIRWAYS: Right upper lobe mass, difficult to differentiate from adjacent consolidation, grossly appears decreased in size from 9.1 cm 8.1 cm. Central necrosis has increased.   Previously, the right lung was collapsed. There is now aeration of the previously occluded right mainstem bronchus as well as the lobar branches of the middle and lower lobes. There is persistent occlusion of the upper lobe bronchus. The previously collapsed upper, middle, and lower lobes are now aerated, although there are diffusely scattered areas of predominantly peripheral consolidation. There are also peripheral areas of new consolidation scattered throughout the left lung. A new nodule in the left lung base measuring 23 mm is centrally cavitated.   Underlying mild pulmonary fibrosis. Trace right pleural effusion.   MEDIASTINUM and SUDHAKAR, LOWER NECK AND AXILLA: The visualized thyroid gland is grossly unremarkable.   A left paratracheal node is enlarged from 8 mm to 10 mm short axis. The right upper lobe mass infiltrates the mediastinum and  is contiguous with adjacent lymphadenopathy which is difficult to measure discretely. Pretracheal node is roughly unchanged at 18 mm short axis. A 13 mm subcarinal node appears new.   Esophagus is not dilated.   HEART and VESSELS: The heart is normal in gross morphology and size.   No significant pericardial effusion.   Severe coronary atherosclerosis.   Thoracic aorta is severely atherosclerotic but otherwise patent without aneurysm. The great vessels are patent. Right IJ chest port catheter is still present. There is persistent narrowing of the SVC relating to compression by adjacent tumor.   No pulmonary embolism is identified.   UPPER ABDOMEN: The visualized subdiaphragmatic structures demonstrate no acute findings on limited images.   CHEST WALL and OSSEOUS STRUCTURES: No suspicious osseous lesions. Multilevel degenerative changes of the thoracic spine.         1.  No pulmonary embolism identified. 2. Grossly right upper lobe mass is decreased in size with increased central necrosis. 3. Previous collapse of the right lung has resolved. There are however extensive new areas of bilateral airspace consolidation suggestive of multifocal pneumonia. A new cavitated nodule in the left lung base may be infectious or inflammatory although metastasis is not excluded. 4. Trace right pleural effusion. 5. Mediastinal lymphadenopathy has slightly progressed.     Signed by: Bryce Estrada 11/12/2024 3:45 PM Dictation workstation:   [id number]    ECG 12 Lead    Result Date: 11/1/2024  Normal sinus rhythm Possible Left atrial enlargement Septal infarct , age undetermined Abnormal ECG When compared with ECG of 23-SEP-2024 01:03, Incomplete left bundle branch block is no longer Present Confirmed by Lisa Ko ([id number]) on 11/1/2024 12:18:47 PM    Rad Onc CT Sim Images    Result Date: 10/30/2024  These images are not reportable by radiology and will not be interpreted by  Radiologists.           Assessment/Plan   Principal  Problem:    MRSA pneumonia (Multi)  Active Problems:    CAD (coronary artery disease)    Hypertension    Atrial fibrillation (Multi)    Malignant neoplasm of upper lobe of right lung (Multi)    Acute on chronic hypoxic respiratory failure (Multi)    Juan Carlos Cr is a 74 yr old male with PMH of squamous cell lung cancer diagnosed 8/2024 currently on chemoradiation, right lung collapse s/p endobronchial ultrasound, bronchial stent 8/2024, COPD on home O2 4L, paroxysmal A-fib on Rivaroxaban (diagnosed 8/2024), history of MI, CAD with remote PCI 1997, HFrEF (EF 35-40% 8/2024), HTN, and DM, who was admitted on 11/12 for 3-4 day history of dyspnea on minimal exertion, productive cough, wheezing.     #Acute on chronic respiratory failure 2/2 multifocal pneumonia, MRSA positive  #COPD on home O2 4L  - Evidenced by CT chest 11/12 and CXR 11/18. Noted improvement of pneumonia in CXR 11/21, right lung mass stable   - Doxycyline 100mg q12h PO for 3 weeks, today is day 9  - Breo Elllipta and Spiriva Respimat  - Airvo 40L with FiO2 60%.   - Given increasing O2 requirements and continued SOB despite improvement of pneumonia on CXR, I will order non-contrast CT chest to rule out worsening pleural effusion. CT scan on 11/12 demonstrated trace right pleural effusion. Pt has history of HFrEF and may be in exacerbation, leading to volume overload.   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 Gave pt Isosorbide mononitrate 30mg and Furosemide 40mg IV.   - After Lasix dose, BP noted to be 88/60 on tele, then 90/42 manual  - Hold Losartan and Metoprolol. Pt already given Metoprolol x1 this morning.   - Blood pressure checks every hour  - Nurse updated. Please let medicine team know if pt has change in mentation.  - Elevated AM cortisol, low serum osmolality. Random urine sodium and urine electrolytes WNL.   -  Ordered BNP - 206. Decreased compared to 231 thirteen days ago. Low suspicion for acute on chronic heart failure.      #Normocytic anemia, stable   - S/p 1 unit RBC transfusion 11/23    - Pt is blood type A, Rh pos, antibody neg    #Squamous cell lung cancer, on chemoradiation  - Evidenced by 11/12 CT and 11/18 CXR   - Palliative care consulted.   - Pulm consulted.  - Follow up with radiation oncology and oncology after discharge.    #Hypokalemia, resolved   - Cardio recommends keeping K > 4  - Klor-Con 10 mEqs daily    #Severe malnutrition 2/2 chronic lung disease   - Nutrition consulted - Glucerna, weights 2-3x weekly   - Nurses have been continuing to encourage pt to eat and order from menu    #Atrial fibrillation, resolved RVR   - Rivaroxaban 20mg daily oral  - Pt completed Amiodarone 400mg BID x10 days PO, started on Amiodarone 200mg PO 11/24   - Cardiology outpatient follow up with Leslie LARKIN.     - Pt has apt with Dr. Ko on 1/10/2025    #Urinary retention, likely 2/2 immobility   - Failed 2x voiding trial on 11/17 and 11/20  - Urology consulted, rec discharge with german and follow up outpatient for voiding trial.    - Tamsulosin 0.4 mg daily oral    #Cyst on back, stable   - Evidenced by CT    #HLD   - Rosuvastatin 20mg daily oral    #CAD with history of cardiac stent   - Continue Aspirin 81mg daily    #DM   - Hold Metformin    - ISS    #GERD   - Pantoprazole 40mg daily oral    #History of tobacco use   - Nicotine patch    Code status: full   PT/OT consulted    Dispo:     Ez Whitehead DO  PGY-1, Family Medicine  [facility]

## 2024-11-26 ENCOUNTER — HOSPITAL ENCOUNTER (OUTPATIENT)
Dept: RADIATION ONCOLOGY | Facility: CLINIC | Age: 74
Setting detail: RADIATION/ONCOLOGY SERIES
End: 2024-11-26
Payer: MEDICARE

## 2024-11-26 PROBLEM — R33.9 URINARY RETENTION: Status: ACTIVE | Noted: 2024-01-01

## 2024-11-26 PROBLEM — E87.1 HYPONATREMIA: Status: ACTIVE | Noted: 2024-01-01

## 2024-11-26 PROBLEM — J44.9 CHRONIC OBSTRUCTIVE PULMONARY DISEASE, UNSPECIFIED: Status: ACTIVE | Noted: 2024-11-26

## 2024-11-26 PROBLEM — E11.9 DM (DIABETES MELLITUS) (MULTI): Status: ACTIVE | Noted: 2024-01-01

## 2024-11-26 PROBLEM — E46 MALNUTRITION (MULTI): Status: ACTIVE | Noted: 2024-01-01

## 2024-11-26 PROBLEM — Z87.891 HISTORY OF TOBACCO USE: Status: ACTIVE | Noted: 2024-01-01

## 2024-11-26 PROBLEM — K21.9 GASTROESOPHAGEAL REFLUX DISEASE WITHOUT ESOPHAGITIS: Status: ACTIVE | Noted: 2024-11-26

## 2024-11-26 PROBLEM — D64.9 NORMOCYTIC ANEMIA, NOT DUE TO BLOOD LOSS: Status: ACTIVE | Noted: 2024-01-01

## 2024-11-26 LAB
ANION GAP SERPL CALC-SCNC: 11 MMOL/L (ref 10–20)
BUN SERPL-MCNC: 26 MG/DL (ref 6–23)
CALCIUM SERPL-MCNC: 7.8 MG/DL (ref 8.6–10.3)
CHLORIDE SERPL-SCNC: 92 MMOL/L (ref 98–107)
CO2 SERPL-SCNC: 30 MMOL/L (ref 21–32)
CREAT SERPL-MCNC: 0.79 MG/DL (ref 0.5–1.3)
EGFRCR SERPLBLD CKD-EPI 2021: >90 ML/MIN/1.73M*2
ERYTHROCYTE [DISTWIDTH] IN BLOOD BY AUTOMATED COUNT: 14.6 % (ref 11.5–14.5)
GLUCOSE BLD MANUAL STRIP-MCNC: 112 MG/DL (ref 74–99)
GLUCOSE BLD MANUAL STRIP-MCNC: 135 MG/DL (ref 74–99)
GLUCOSE BLD MANUAL STRIP-MCNC: 149 MG/DL (ref 74–99)
GLUCOSE BLD MANUAL STRIP-MCNC: 175 MG/DL (ref 74–99)
GLUCOSE SERPL-MCNC: 129 MG/DL (ref 74–99)
HCT VFR BLD AUTO: 27 % (ref 41–52)
HGB BLD-MCNC: 8.4 G/DL (ref 13.5–17.5)
MCH RBC QN AUTO: 29.2 PG (ref 26–34)
MCHC RBC AUTO-ENTMCNC: 31.1 G/DL (ref 32–36)
MCV RBC AUTO: 94 FL (ref 80–100)
NRBC BLD-RTO: 0 /100 WBCS (ref 0–0)
PLATELET # BLD AUTO: 59 X10*3/UL (ref 150–450)
POTASSIUM SERPL-SCNC: 4.6 MMOL/L (ref 3.5–5.3)
RBC # BLD AUTO: 2.88 X10*6/UL (ref 4.5–5.9)
SODIUM SERPL-SCNC: 128 MMOL/L (ref 136–145)
WBC # BLD AUTO: 8.5 X10*3/UL (ref 4.4–11.3)

## 2024-11-26 PROCEDURE — 2500000002 HC RX 250 W HCPCS SELF ADMINISTERED DRUGS (ALT 637 FOR MEDICARE OP, ALT 636 FOR OP/ED): Performed by: INTERNAL MEDICINE

## 2024-11-26 PROCEDURE — 2500000004 HC RX 250 GENERAL PHARMACY W/ HCPCS (ALT 636 FOR OP/ED)

## 2024-11-26 PROCEDURE — 82374 ASSAY BLOOD CARBON DIOXIDE: CPT

## 2024-11-26 PROCEDURE — 2500000001 HC RX 250 WO HCPCS SELF ADMINISTERED DRUGS (ALT 637 FOR MEDICARE OP): Performed by: STUDENT IN AN ORGANIZED HEALTH CARE EDUCATION/TRAINING PROGRAM

## 2024-11-26 PROCEDURE — 99233 SBSQ HOSP IP/OBS HIGH 50: CPT

## 2024-11-26 PROCEDURE — 2500000005 HC RX 250 GENERAL PHARMACY W/O HCPCS: Performed by: FAMILY MEDICINE

## 2024-11-26 PROCEDURE — 2500000005 HC RX 250 GENERAL PHARMACY W/O HCPCS

## 2024-11-26 PROCEDURE — 2500000002 HC RX 250 W HCPCS SELF ADMINISTERED DRUGS (ALT 637 FOR MEDICARE OP, ALT 636 FOR OP/ED): Performed by: STUDENT IN AN ORGANIZED HEALTH CARE EDUCATION/TRAINING PROGRAM

## 2024-11-26 PROCEDURE — 99233 SBSQ HOSP IP/OBS HIGH 50: CPT | Performed by: INTERNAL MEDICINE

## 2024-11-26 PROCEDURE — 85027 COMPLETE CBC AUTOMATED: CPT

## 2024-11-26 PROCEDURE — 94668 MNPJ CHEST WALL SBSQ: CPT

## 2024-11-26 PROCEDURE — 2500000005 HC RX 250 GENERAL PHARMACY W/O HCPCS: Performed by: INTERNAL MEDICINE

## 2024-11-26 PROCEDURE — 2500000001 HC RX 250 WO HCPCS SELF ADMINISTERED DRUGS (ALT 637 FOR MEDICARE OP): Performed by: INTERNAL MEDICINE

## 2024-11-26 PROCEDURE — 2500000002 HC RX 250 W HCPCS SELF ADMINISTERED DRUGS (ALT 637 FOR MEDICARE OP, ALT 636 FOR OP/ED): Performed by: FAMILY MEDICINE

## 2024-11-26 PROCEDURE — 2060000001 HC INTERMEDIATE ICU ROOM DAILY

## 2024-11-26 PROCEDURE — 94640 AIRWAY INHALATION TREATMENT: CPT

## 2024-11-26 PROCEDURE — 99497 ADVNCD CARE PLAN 30 MIN: CPT

## 2024-11-26 PROCEDURE — S4991 NICOTINE PATCH NONLEGEND: HCPCS | Performed by: INTERNAL MEDICINE

## 2024-11-26 PROCEDURE — 94760 N-INVAS EAR/PLS OXIMETRY 1: CPT

## 2024-11-26 PROCEDURE — 82947 ASSAY GLUCOSE BLOOD QUANT: CPT

## 2024-11-26 PROCEDURE — 87077 CULTURE AEROBIC IDENTIFY: CPT | Mod: GEALAB

## 2024-11-26 PROCEDURE — 2500000002 HC RX 250 W HCPCS SELF ADMINISTERED DRUGS (ALT 637 FOR MEDICARE OP, ALT 636 FOR OP/ED): Performed by: NURSE PRACTITIONER

## 2024-11-26 PROCEDURE — 94669 MECHANICAL CHEST WALL OSCILL: CPT

## 2024-11-26 RX ORDER — IPRATROPIUM BROMIDE AND ALBUTEROL SULFATE 2.5; .5 MG/3ML; MG/3ML
3 SOLUTION RESPIRATORY (INHALATION)
Status: DISCONTINUED | OUTPATIENT
Start: 2024-11-26 | End: 2024-11-27 | Stop reason: HOSPADM

## 2024-11-26 RX ORDER — IPRATROPIUM BROMIDE AND ALBUTEROL SULFATE 2.5; .5 MG/3ML; MG/3ML
3 SOLUTION RESPIRATORY (INHALATION)
Status: DISCONTINUED | OUTPATIENT
Start: 2024-11-26 | End: 2024-11-26

## 2024-11-26 RX ORDER — VANCOMYCIN HYDROCHLORIDE 1 G/20ML
INJECTION, POWDER, LYOPHILIZED, FOR SOLUTION INTRAVENOUS DAILY PRN
Status: DISCONTINUED | OUTPATIENT
Start: 2024-11-26 | End: 2024-11-27

## 2024-11-26 RX ORDER — SODIUM CHLORIDE FOR INHALATION 3 %
3 VIAL, NEBULIZER (ML) INHALATION EVERY 6 HOURS SCHEDULED
Status: DISCONTINUED | OUTPATIENT
Start: 2024-11-26 | End: 2024-11-27

## 2024-11-26 ASSESSMENT — COGNITIVE AND FUNCTIONAL STATUS - GENERAL
CLIMB 3 TO 5 STEPS WITH RAILING: A LITTLE
STANDING UP FROM CHAIR USING ARMS: A LITTLE
DRESSING REGULAR UPPER BODY CLOTHING: A LITTLE
TURNING FROM BACK TO SIDE WHILE IN FLAT BAD: A LITTLE
DAILY ACTIVITIY SCORE: 21
TOILETING: A LITTLE
MOBILITY SCORE: 19
DRESSING REGULAR LOWER BODY CLOTHING: A LITTLE
DAILY ACTIVITIY SCORE: 21
CLIMB 3 TO 5 STEPS WITH RAILING: A LITTLE
DRESSING REGULAR UPPER BODY CLOTHING: A LITTLE
STANDING UP FROM CHAIR USING ARMS: A LITTLE
TURNING FROM BACK TO SIDE WHILE IN FLAT BAD: A LITTLE
DRESSING REGULAR LOWER BODY CLOTHING: A LITTLE
MOVING TO AND FROM BED TO CHAIR: A LITTLE
WALKING IN HOSPITAL ROOM: A LITTLE
WALKING IN HOSPITAL ROOM: A LITTLE
TOILETING: A LITTLE
MOBILITY SCORE: 19
MOVING TO AND FROM BED TO CHAIR: A LITTLE

## 2024-11-26 ASSESSMENT — PAIN SCALES - GENERAL
PAINLEVEL_OUTOF10: 0 - NO PAIN
PAINLEVEL_OUTOF10: 0 - NO PAIN

## 2024-11-26 NOTE — PROGRESS NOTES
"Physical Therapy                 Therapy Communication Note    Patient Name: Juan Carlos Cr  MRN: [id number]  Department: [address]  Room: [address] [place], [place] [address]  Today's Date: 11/26/2024     Discipline: Physical Therapy    Missed Visit Reason: Missed Visit Reason: Patient refused (\"I am too tired and SOB right now\" no tx, po2 reading 93% on Airvo completed 5 ankle pumps,quad sets and brought knees up for mild trunk rotation,  encouraged to continue when able and to sit EOB with nursing when able)    Missed Time: Attempt 1135    Comment:  "

## 2024-11-26 NOTE — PROGRESS NOTES
Vancomycin Dosing by Pharmacy- INITIAL    Juan Carlos Cr is a 74 y.o. year old male who Pharmacy has been consulted for vancomycin dosing for pneumonia. Based on the patient's indication and renal status this patient will be dosed based on a goal AUC of 400-600.     Renal function is currently stable.    Visit Vitals  /56   Pulse 83   Temp 35.8 °C (96.4 °F)   Resp 20        Lab Results   Component Value Date    CREATININE 0.79 2024    CREATININE 0.62 2024    CREATININE 0.66 2024    CREATININE 0.63 2024        Patient weight is as follows:   Vitals:    24 0400   Weight: 64.7 kg (142 lb 11.2 oz)       Cultures:  Susceptibility data for the encounter in last 14 days.  Collected Specimen Info Organism Clindamycin Erythromycin Oxacillin Tetracycline Trimethoprim/Sulfamethoxazole Vancomycin   24 Fluid from SPUTUM Methicillin Resistant Staphylococcus aureus (MRSA)  S  R  R  S  S  S         I/O last 3 completed shifts:  In: 680 (10.5 mL/kg) [P.O.:680]  Out: 1000 (15.4 mL/kg) [Urine:1000 (0.4 mL/kg/hr)]  Weight: 64.7 kg   I/O during current shift:  I/O this shift:  In: -   Out: 525 [Urine:525]    Temp (24hrs), Av.2 °C (97.1 °F), Min:35.8 °C (96.4 °F), Max:36.5 °C (97.7 °F)         Assessment/Plan     Patient will not be given a loading dose.  Will initiate vancomycin maintenance,  1750 mg every 24 hours.    This dosing regimen is predicted by [employer] to result in the following pharmacokinetic parameters:    Loading dose: N/A  Regimen: 1750 mg IV every 24 hours.  Start time: 14:35 on 2024  Exposure target: AUC24 (range)400-600 mg/L.hr   ZIL21-00: 449 mg/L.hr  AUC24,ss: 543 mg/L.hr  Probability of AUC24 > 400: 82 %  Ctrough,ss: 14.1 mg/L  Probability of Ctrough,ss > 20: 22 %  Follow-up level will be ordered on  at 0500 unless clinically indicated sooner.  Will continue to monitor renal function daily while on vancomycin and order serum creatinine at least every 48  hours if not already ordered.  Follow for continued vancomycin needs, clinical response, and signs/symptoms of toxicity.       Monserrat Fontenot, PharmD

## 2024-11-26 NOTE — PROGRESS NOTES
Juan Carlos Cr is a 74 y.o. male on day 14 of admission presenting with MRSA pneumonia (Multi).    Subjective   - Today at bedside, pt reports that he still feels the same as yesterday. Still SOB with minimal exertion, such as sitting up in bed. Coughing up clear mucus. Mild wheezing noted.   - No leg swelling.   - Pt denies chest pain, palpitations, lightheadedness, dizziness, fevers, chills, abdominal pain, nausea, vomiting, diarrhea, constipation.       Objective     Physical Exam  Constitutional:       General: He is not in acute distress.     Appearance: He is ill-appearing. He is not toxic-appearing or diaphoretic.      Comments: Thin and frail appearing man. Conversational dyspnea. Laying in bed. Airvo in place. Buchanan bag noted, yellow urine.    Cardiovascular:      Rate and Rhythm: Normal rate and regular rhythm.      Pulses: Normal pulses.      Heart sounds: Normal heart sounds. No murmur heard.  Pulmonary:      Effort: No respiratory distress.      Breath sounds: No stridor. Wheezing present.      Comments: Airvo in place.   Decreased air movement bilaterally. Diffuse crackles, more prominent in right lobe.   Conversational dyspnea, worsened since yesterday.   No coughing noted during physical exam.   Chest:      Chest wall: No tenderness.   Abdominal:      General: Abdomen is flat. Bowel sounds are normal. There is no distension.      Palpations: Abdomen is soft.      Tenderness: There is no abdominal tenderness. There is no guarding.   Musculoskeletal:      Right lower leg: No edema.      Left lower leg: No edema.      Comments: Loss of muscle mass in the neck, chest, abdomen.    Skin:     General: Skin is warm and dry.      Findings: No rash.      Comments: Right port, no surrounding erythema or drainage.   2x2cm raised nodule noted on the back, overlying the thoracic spine, between bilateral scapula. No tenderness with palpation. Mildly erythematous. Not warm to touch. No drainage.   Bruising noted  "on bilateral arms. No bleeding, drainage.    Neurological:      General: No focal deficit present.      Mental Status: He is alert and oriented to person, place, and time.      Motor: Weakness present.         Last Recorded Vitals  Blood pressure 110/65, pulse 85, temperature 35.8 °C (96.4 °F), resp. rate 24, height 1.753 m (5' 9\"), weight 64.7 kg (142 lb 11.2 oz), SpO2 96%.  Intake/Output last 3 Shifts:  I/O last 3 completed shifts:  In: 680 (10.5 mL/kg) [P.O.:680]  Out: 1000 (15.4 mL/kg) [Urine:1000 (0.4 mL/kg/hr)]  Weight: 64.7 kg     Relevant Results  Scheduled medications  amiodarone, 200 mg, oral, Daily  ascorbic acid, 500 mg, oral, Daily  aspirin, 81 mg, oral, Daily  cholecalciferol, 800 Units, oral, Daily  doxycycline, 100 mg, oral, q12h MASSIEL  fluticasone furoate-vilanteroL, 1 puff, inhalation, Daily  insulin lispro, 0-10 Units, subcutaneous, Before meals & nightly  ipratropium-albuteroL, 3 mL, nebulization, 4x daily  isosorbide mononitrate ER, 30 mg, oral, Daily  [Held by provider] losartan, 25 mg, oral, Daily  [Held by provider] metFORMIN, 500 mg, oral, BID  [Held by provider] metoprolol succinate XL, 50 mg, oral, Daily  mupirocin, , Each Nostril, TID  nicotine, 1 patch, transdermal, Daily  oxygen, , inhalation, Continuous - Inhalation  oxygen, , inhalation, Continuous - Inhalation  pantoprazole, 40 mg, oral, Daily  potassium chloride CR, 10 mEq, oral, Daily  rivaroxaban, 20 mg, oral, Daily with evening meal  rosuvastatin, 20 mg, oral, Daily  tamsulosin, 0.4 mg, oral, Daily  tiotropium, 2 puff, inhalation, Daily  zinc oxide, 1 Application, Topical, TID      Continuous medications       PRN medications  PRN medications: acetaminophen, benzonatate, dextromethorphan-guaifenesin, dextrose, dextrose, glucagon, glucagon, guaiFENesin, ipratropium-albuteroL, prochlorperazine    Results for orders placed or performed during the hospital encounter of 11/12/24 (from the past 24 hours)   POCT GLUCOSE   Result Value Ref " Range    POCT Glucose 89 74 - 99 mg/dL   POCT GLUCOSE   Result Value Ref Range    POCT Glucose 106 (H) 74 - 99 mg/dL   POCT GLUCOSE   Result Value Ref Range    POCT Glucose 127 (H) 74 - 99 mg/dL   CBC   Result Value Ref Range    WBC 8.5 4.4 - 11.3 x10*3/uL    nRBC 0.0 0.0 - 0.0 /100 WBCs    RBC 2.88 (L) 4.50 - 5.90 x10*6/uL    Hemoglobin 8.4 (L) 13.5 - 17.5 g/dL    Hematocrit 27.0 (L) 41.0 - 52.0 %    MCV 94 80 - 100 fL    MCH 29.2 26.0 - 34.0 pg    MCHC 31.1 (L) 32.0 - 36.0 g/dL    RDW 14.6 (H) 11.5 - 14.5 %    Platelets 59 (L) 150 - 450 x10*3/uL   Basic metabolic panel   Result Value Ref Range    Glucose 129 (H) 74 - 99 mg/dL    Sodium 128 (L) 136 - 145 mmol/L    Potassium 4.6 3.5 - 5.3 mmol/L    Chloride 92 (L) 98 - 107 mmol/L    Bicarbonate 30 21 - 32 mmol/L    Anion Gap 11 10 - 20 mmol/L    Urea Nitrogen 26 (H) 6 - 23 mg/dL    Creatinine 0.79 0.50 - 1.30 mg/dL    eGFR >90 >60 mL/min/1.73m*2    Calcium 7.8 (L) 8.6 - 10.3 mg/dL   POCT GLUCOSE   Result Value Ref Range    POCT Glucose 112 (H) 74 - 99 mg/dL       ECG 12 lead    Result Date: 11/16/2024  Sinus tachycardia Otherwise normal ECG When compared with ECG of 12-NOV-2024 14:23, (unconfirmed) No significant change was found See ED provider note for full interpretation and clinical correlation Confirmed by Leila Luke ([id number]) on 11/16/2024 2:15:23 PM    ECG 12 lead    Result Date: 11/16/2024  Sinus tachycardia Otherwise normal ECG When compared with ECG of 01-NOV-2024 08:10, Vent. rate has increased BY  42 BPM Criteria for Septal infarct are no longer Present T wave amplitude has decreased in Lateral leads See ED provider note for full interpretation and clinical correlation Confirmed by Leila Luke ([id number]) on 11/16/2024 2:15:13 PM    Electrocardiogram, 12-lead PRN ACS symptoms    Result Date: 11/15/2024  Atrial fibrillation with rapid ventricular response with premature ventricular or aberrantly conducted complexes Nonspecific ST and T wave abnormality  Abnormal ECG When compared with ECG of 12-NOV-2024 15:52, (unconfirmed) Atrial fibrillation has replaced Sinus rhythm ST now depressed in Lateral leads Nonspecific T wave abnormality now evident in Inferior leads Nonspecific T wave abnormality now evident in Lateral leads    CT angio chest for pulmonary embolism    Result Date: 11/12/2024  Interpreted By:  Bryce Estrada, STUDY: CT ANGIO CHEST FOR PULMONARY EMBOLISM;  11/12/2024 3:08 pm   INDICATION: Signs/Symptoms:History of lung cancer shortness of breath COPD hypoxemic significant weight loss.   COMPARISON: 10/16/2024   ACCESSION NUMBER(S): [id number]   ORDERING CLINICIAN: LINUS BERNAL   TECHNIQUE: Helical data acquisition of the chest was obtained with  75 mL Omnipaque 350. Images were reformatted in axial, coronal, and sagittal planes.MIP reformatted images were also generated.   FINDINGS: LUNGS and AIRWAYS: Right upper lobe mass, difficult to differentiate from adjacent consolidation, grossly appears decreased in size from 9.1 cm 8.1 cm. Central necrosis has increased.   Previously, the right lung was collapsed. There is now aeration of the previously occluded right mainstem bronchus as well as the lobar branches of the middle and lower lobes. There is persistent occlusion of the upper lobe bronchus. The previously collapsed upper, middle, and lower lobes are now aerated, although there are diffusely scattered areas of predominantly peripheral consolidation. There are also peripheral areas of new consolidation scattered throughout the left lung. A new nodule in the left lung base measuring 23 mm is centrally cavitated.   Underlying mild pulmonary fibrosis. Trace right pleural effusion.   MEDIASTINUM and SUDHAKAR, LOWER NECK AND AXILLA: The visualized thyroid gland is grossly unremarkable.   A left paratracheal node is enlarged from 8 mm to 10 mm short axis. The right upper lobe mass infiltrates the mediastinum and is contiguous with adjacent lymphadenopathy which  is difficult to measure discretely. Pretracheal node is roughly unchanged at 18 mm short axis. A 13 mm subcarinal node appears new.   Esophagus is not dilated.   HEART and VESSELS: The heart is normal in gross morphology and size.   No significant pericardial effusion.   Severe coronary atherosclerosis.   Thoracic aorta is severely atherosclerotic but otherwise patent without aneurysm. The great vessels are patent. Right IJ chest port catheter is still present. There is persistent narrowing of the SVC relating to compression by adjacent tumor.   No pulmonary embolism is identified.   UPPER ABDOMEN: The visualized subdiaphragmatic structures demonstrate no acute findings on limited images.   CHEST WALL and OSSEOUS STRUCTURES: No suspicious osseous lesions. Multilevel degenerative changes of the thoracic spine.         1.  No pulmonary embolism identified. 2. Grossly right upper lobe mass is decreased in size with increased central necrosis. 3. Previous collapse of the right lung has resolved. There are however extensive new areas of bilateral airspace consolidation suggestive of multifocal pneumonia. A new cavitated nodule in the left lung base may be infectious or inflammatory although metastasis is not excluded. 4. Trace right pleural effusion. 5. Mediastinal lymphadenopathy has slightly progressed.     Signed by: Bryce Estrada 11/12/2024 3:45 PM Dictation workstation:   [id number]    ECG 12 Lead    Result Date: 11/1/2024  Normal sinus rhythm Possible Left atrial enlargement Septal infarct , age undetermined Abnormal ECG When compared with ECG of 23-SEP-2024 01:03, Incomplete left bundle branch block is no longer Present Confirmed by Lisa Ko ([id number]) on 11/1/2024 12:18:47 PM    Rad Onc CT Sim Images    Result Date: 10/30/2024  These images are not reportable by radiology and will not be interpreted by  Radiologists.           Assessment/Plan   Principal Problem:    MRSA pneumonia (Multi)  Active Problems:     CAD (coronary artery disease)    Hypertension    Atrial fibrillation (Multi)    Malignant neoplasm of upper lobe of right lung (Multi)    Acute on chronic hypoxic respiratory failure (Multi)    Juan Carlos Cr is a 74 yr old male with PMH of squamous cell lung cancer diagnosed 8/2024 currently on chemoradiation, right lung collapse s/p endobronchial ultrasound, bronchial stent 8/2024, COPD on home O2 4L, paroxysmal A-fib on Rivaroxaban (diagnosed 8/2024), history of MI, CAD with remote PCI 1997, HFrEF (EF 35-40% 8/2024), HTN, and DM, who was admitted on 11/12 for 3-4 day history of dyspnea on minimal exertion, productive cough, wheezing.     #Acute on chronic respiratory failure 2/2  #MRSA positive multifocal pneumonia  #COPD on home O2 4L, now on Airvo 40L FiO2 70%  #Squamous cell lung cancer, on chemoradiation   - CT 11/25 demonstrates increased bilateral pleural effusions, with subtotal right lung collapse.  - Pulm consulted and recommend Zosyn and Vanco, with sputum culture.   - Breo Elllipta and Spiriva Respimat  - Pt's O2 requirements changing, currently Airvo 40L with FiO2 70%.   - ID consulted    - Palliative care consulted again today. Spoke with pt and he is ready to speak with palliative team.   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 Lasix x1 given 11/25, with drop in BP.   - Hold Losartan and Metoprolol.   - Low suspicion for acute on chronic heart failure as no drastic change in BNP ( 231 --> 206), however TTE limited ordered    #Normocytic anemia, stable   - S/p 1 unit RBC transfusion 11/23    - Pt is blood type A, Rh pos, antibody neg    #Hypokalemia, resolved   - Cardio recommends keeping K > 4  - Hold Klor-Con 10 mEqs as K+ WNL    #Severe malnutrition 2/2 chronic lung disease   - Nutrition consulted - Glucerna, weights 2-3x weekly  - Regular  diet ordered. Due to low intake, low concern for rising blood glucose despite history of DM   - Nurses have been continuing to encourage pt to eat and order from menu    #Atrial fibrillation, resolved RVR   - Rivaroxaban 20mg daily oral  - Pt completed Amiodarone 400mg BID x10 days PO, started on Amiodarone 200mg PO 11/24   - Cardiology outpatient follow up with Leslie LARKIN.     - Pt has apt with Dr. Ko on 1/10/2025    #Urinary retention, likely 2/2 immobility   - Failed 2x voiding trial on 11/17 and 11/20  - Urology consulted, rec discharge with german and follow up outpatient for voiding trial.    - Tamsulosin 0.4 mg daily oral    #Cyst on back, stable   - Evidenced by CT    #HLD   - Rosuvastatin 20mg daily oral    #CAD with history of cardiac stent   - Continue Aspirin 81mg daily    #DM   - Hold Metformin    - ISS    #GERD   - Pantoprazole 40mg daily oral    #History of tobacco use   - Nicotine patch    Code status: full   PT/OT consulted    Dispo: Monitor clinically, on Airvo, awaiting pulm recs.     Ez Whitehead,   PGY-1, Family Medicine  [facility]

## 2024-11-26 NOTE — PROGRESS NOTES
"Nutrition Progress Note  Nutrition Follow Up Note  Patient has Malnutrition Diagnosis: Yes  Diagnosis Status: Ongoing  Malnutrition Diagnosis: Severe malnutrition related to chronic disease or condition  As Evidenced by: significant weight loss of 20%/3 months, severe loss of muscle and adipose stores  Additional Assessment Information: ONS added for additional kcal and protein  Nutrition Assessment     Pt not medically ready for discharge, remains on airvo, GOC to be discussed- palliative care re-consulted.     Nutrition History:  Food and Nutrient History: (11/26/24) Pt seen for follow up visit; pt reports he is taking his Glucerna ONS. Pt eating 100% of meals, however, pt only ordering oatmeal/coffee at  breakfast; ice cream/ginger ale for lunch. Noted diet liberalized to Regular. (11/21/24) Chart reviewed for follow up, pt with increased oxygen demand today; oral intakes varied.  Energy Intake: Fair 50-75 %  No Known Allergies   GI Symptoms: None     Oral Problems: None          Anthropometrics:  Height: 175.3 cm (5' 9\")   Weight: 64.7 kg (142 lb 11.2 oz)   BMI (Calculated): 21.06  IBW/kg (Dietitian Calculated): 72.7 kg  Percent of IBW: 89 %       Weight History:     Weight         11/22/2024  0547 11/23/2024  0518 11/24/2024  0400 11/25/2024  0515 11/26/2024  0400    Weight: 65.9 kg (145 lb 4.5 oz) 66.1 kg (145 lb 11.6 oz) 65.3 kg (144 lb) 64.9 kg (143 lb 1.3 oz) 64.7 kg (142 lb 11.2 oz)            Nutrition Focused Physical Exam Findings:     Edema  Edema: +1 trace  Edema Location: bilat LE  Physical Findings (Nutrition Deficiency/Toxicity)  Skin: Positive (L buttock PI, skin tear R arm)    Nutrition Significant Labs:    Results from last 7 days   Lab Units 11/26/24  0552 11/25/24  0537 11/24/24  1839 11/24/24  0848 11/23/24  0502 11/22/24  1506 11/22/24  0559 11/21/24  0849 11/20/24  0601   GLUCOSE mg/dL 129* 82 168* 116* 95   < > 77 110* 82   SODIUM mmol/L 128* 127* 126* 123* 124*   < > 122* 124* 125* "   POTASSIUM mmol/L 4.6 4.5 4.4 4.0 3.4*   < > 3.7 3.5 4.3   CHLORIDE mmol/L 92* 91* 89* 89* 87*   < > 84* 84* 85*   CO2 mmol/L 30 28 31 29 31   < > 33* 32 34*   BUN mg/dL 26* 22 26* 28* 8   < > 6 6 6   CREATININE mg/dL 0.79 0.62 0.66 0.63 0.52   < > 0.45* 0.45* 0.43*   EGFR mL/min/1.73m*2 >90 >90 >90 >90 >90   < > >90 >90 >90   CALCIUM mg/dL 7.8* 7.6* 7.7* 7.6* 7.1*   < > 7.5* 6.9* 7.4*   PHOSPHORUS mg/dL  --   --   --   --  2.9  --   --  3.0 2.9   MAGNESIUM mg/dL  --   --   --  1.71  --   --  1.62  --   --     < > = values in this interval not displayed.     Lab Results   Component Value Date    HGBA1C 7.6 (H) 08/26/2024    HGBA1C 5.7 09/25/2020     Results from last 7 days   Lab Units 11/26/24  1120 11/26/24  0731 11/25/24 2001 11/25/24  1632 11/25/24  1127 11/25/24  0728 11/24/24  1936 11/24/24  1618   POCT GLUCOSE mg/dL 135* 112* 127* 106* 89 89 165* 156*     Lab Results   Component Value Date    ALBUMIN 1.7 (L) 11/23/2024      Lab Results   Component Value Date    CRP 33.57 (H) 11/13/2024           Nutrition Specific Medications:   Scheduled medications:  amiodarone, 200 mg, oral, Daily  ascorbic acid, 500 mg, oral, Daily  aspirin, 81 mg, oral, Daily  cholecalciferol, 800 Units, oral, Daily  [Held by provider] doxycycline, 100 mg, oral, q12h MASSIEL  fluticasone furoate-vilanteroL, 1 puff, inhalation, Daily  insulin lispro, 0-10 Units, subcutaneous, Before meals & nightly  ipratropium-albuteroL, 3 mL, nebulization, TID  isosorbide mononitrate ER, 30 mg, oral, Daily  [Held by provider] losartan, 25 mg, oral, Daily  [Held by provider] metFORMIN, 500 mg, oral, BID  [Held by provider] metoprolol succinate XL, 50 mg, oral, Daily  mupirocin, , Each Nostril, TID  nicotine, 1 patch, transdermal, Daily  oxygen, , inhalation, Continuous - Inhalation  oxygen, , inhalation, Continuous - Inhalation  pantoprazole, 40 mg, oral, Daily  piperacillin-tazobactam, 3.375 g, intravenous, q6h  [Held by provider] potassium chloride CR,  10 mEq, oral, Daily  rivaroxaban, 20 mg, oral, Daily with evening meal  rosuvastatin, 20 mg, oral, Daily  sodium chloride, 3 mL, nebulization, q6h MASSIEL  tamsulosin, 0.4 mg, oral, Daily  tiotropium, 2 puff, inhalation, Daily  vancomycin, 1,750 mg, intravenous, q24h  zinc oxide, 1 Application, Topical, TID      Continuous medications:     PRN medications:  PRN medications: acetaminophen, benzonatate, dextromethorphan-guaifenesin, dextrose, dextrose, glucagon, glucagon, guaiFENesin, ipratropium-albuteroL, prochlorperazine, vancomycin     Nursing Data Per flowsheet:   Stool Appearance: Other (Comment) (11/16/24 0900)  Gastrointestinal  Gastrointestinal (WDL): Within Defined Limits  Bowel Sounds: All quadrants  Bowel Sounds (All Quadrants): Active  Bowel Incontinence: No  Feeding assistance level:      Intake/Output Summary (Last 24 hours) at 11/26/2024 1524  Last data filed at 11/26/2024 1003  Gross per 24 hour   Intake 200 ml   Output 1025 ml   Net -825 ml      0-10 (Numeric) Pain Score: 0 - No pain  Vargas-Baker FACES Pain Rating: No hurt   Dietary Orders (From admission, onward)       Start     Ordered    11/26/24 1239  Adult diet Regular  Diet effective now        Question:  Diet type  Answer:  Regular    11/26/24 1244    11/18/24 1158  Oral nutritional supplements  Until discontinued        Question Answer Comment   Deliver with Dinner strawberry   Select supplement: Glucerna Shake        11/18/24 1157    11/12/24 2219  May Participate in Room Service  ( ROOM SERVICE MAY PARTICIPATE)  Once        Question:  .  Answer:  Yes    11/12/24 2218                     Estimated Needs:   Total Energy Estimated Needs (kCal): 1803 kCal  Method for Estimating Needs: 30 kcal/kg  Total Protein Estimated Needs (g): 72 g  Method for Estimating Needs: 1.2 g/kg     Method for Estimating Needs: 1 ml/kcal or per team       Nutrition Diagnosis   Malnutrition Diagnosis  Patient has Malnutrition Diagnosis: Yes  Diagnosis Status:  Ongoing  Malnutrition Diagnosis: Severe malnutrition related to chronic disease or condition  As Evidenced by: significant weight loss of 20%/3 months, severe loss of muscle and adipose stores  Additional Assessment Information: ONS added for additional kcal and protein     Albumin level low at 1.7 - noted elevated CRP; would not anticipate improvement in albumin level until inflammatory response subsides       Nutrition Interventions/Recommendations   Nutrition Prescription:  diet, fluids    Nutrition Interventions:   Interventions: Meals and snacks  Medical Food Supplement: Commercial beverage, Commercial food  Goal: Glucerna daily= 220 kcal, 10 g protein; Magic cup 2 x/day= 580 kcal, 18 g protein    Coordination of Care: NATTY Trujillo  Nutrition Education:   N/A    Recommendations:  Encourage oral intakes  GOC per palliative care          Nutrition Monitoring and Evaluation   Food/Nutrient Related History Monitoring  Monitoring and Evaluation Plan: Amount of food  Criteria: Pt will consume 50-75% of meals and ONS provided    Body Composition/Growth/Weight History  Monitoring and Evaluation Plan: Weight  Weight: Measured weight  Criteria: Monitor weights    Biochemical Data, Medical Tests and Procedures  Monitoring and Evaluation Plan: Electrolyte/renal panel, Glucose/endocrine profile  Criteria: Monitor      Time Spent (min): 45 minutes

## 2024-11-26 NOTE — PROGRESS NOTES
Juan Carlos Cr is a 74 y.o. male on day 14 of admission presenting with MRSA pneumonia (Multi).      Subjective   Added isosorbide and Lasix yesterday d/t concern for CHF related hypoNa. Minimal response in terms of UOP and improvement in Na - made 500 of urine and Na up marginally to 128. Uric acid yesterday low       Objective   Same       Vitals 24HR  Heart Rate:  [83-86]   Temp:  [35.8 °C (96.4 °F)-36.7 °C (98.1 °F)]   Resp:  [24-29]   BP: (106-117)/(57-65)   Weight:  [64.7 kg (142 lb 11.2 oz)]   SpO2:  [96 %]       Intake/Output last 3 Shifts:    Intake/Output Summary (Last 24 hours) at 11/26/2024 0754  Last data filed at 11/25/2024 1830  Gross per 24 hour   Intake 680 ml   Output 500 ml   Net 180 ml       Physical Exam    Relevant Results               Assessment/Plan      Assessment and plan  1.  Hyponatremia.  Minimal response to CHF directed tx suggesting this is mostly SIADH    - No suggestions today  - Cont to monitor volume status and labs  - Cont current FR  Assessment & Plan  MRSA pneumonia (Multi)    CAD (coronary artery disease)    Hypertension    Atrial fibrillation (Multi)    Malignant neoplasm of upper lobe of right lung (Multi)    Acute on chronic hypoxic respiratory failure (Multi)                    Gabbi Rios MD

## 2024-11-26 NOTE — PROGRESS NOTES
24 1340   Discharge Planning   Expected Discharge Disposition Othe  (TBD: Plan had been to go to [facility] for Rehab for SNF, precert  . Patient remains not medically ready , on airvo . Palliative care reconsulted for [facility].)

## 2024-11-26 NOTE — PROGRESS NOTES
Juan Carlos Cr is a 74 y.o. male on day 14 of admission presenting with Acute on chronic hypoxic respiratory failure (Multi).    Advance Care Planning    Subjective   Author met with patient, his wife Cindy, and son Maynor on 11/15/24 for goals of care but patient declined to discuss his wishes, treatment limitations, and/or to complete advance directives at that time. Kindly refer to consult note for details. Author was re-consulted to discuss goals of care as patient has remained in the hospital (total of 14 days), remains on AIRVO, and has had little clinical improvement since admission. Repeat chest CT on 11/25/24 revealed increased right pleural effusion with subtotal right lung collapse obscuring known right upper lobe mass, increased now moderate left pleural effusion obscuring known left basilar cavitary lesion, and worsening infection versus exacerbation of interstitial lung disease. ID and pulmonology are following, no plan for thoracentesis at this time, will broaden antibiotics. Patient was agreeable to discuss goals and treatment limitations with author today. Patient's wife Cindy called and was unable to come to the hospital but agreed to participate in the voluntary ACP meeting via speaker phone in the patient's room. 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 Patient and Cindy expressing understanding of severity of illness. Author and Cindy actively listened to patient sharing his perspective on his current quality of life, disease course/progression, and  "symptom and treatment burden. Patient expressing that goals are still survival and time based and that he would like to continue with current treatment plan at this time. Counseling was provided on benefit versus burden of CPR in setting of patient's overall health status and poor prognosis and patient able to express that he would like to be a DNR/DNI. Patient does NOT want to be intubated if his respiratory status worsens and intubation becomes necessary. Cindy in agreement to support patient's wishes, will update her three sons on patient's request for code status change. Patient and Cindy remain hopeful patient will improve with continued hospital care but are open to re-discussing goals and possible transition to comfort based plan of care if patient declines. All questions and concerns answered during encounter. Support and empathy provided.     Objective     Physical Exam  Constitutional:       Appearance: He is ill-appearing.      Comments: Frail, cachectic; patient appears tired on exam    HENT:      Head: Normocephalic and atraumatic.      Mouth/Throat:      Mouth: Mucous membranes are moist.   Eyes:      Conjunctiva/sclera: Conjunctivae normal.   Pulmonary:      Comments: Conversational dyspnea   Musculoskeletal:      Comments: Generalized weakness   Neurological:      Mental Status: He is oriented to person, place, and time.   Psychiatric:         Behavior: Behavior is cooperative.       Last Recorded Vitals  Blood pressure (!) 103/49, pulse 87, temperature 36.9 °C (98.4 °F), temperature source Temporal, resp. rate (!) 32, height 1.753 m (5' 9\"), weight 64.7 kg (142 lb 11.2 oz), SpO2 92%.  Intake/Output last 3 Shifts:  I/O last 3 completed shifts:  In: 680 (10.5 mL/kg) [P.O.:680]  Out: 1000 (15.4 mL/kg) [Urine:1000 (0.4 mL/kg/hr)]  Weight: 64.7 kg     Relevant Results  Scheduled medications  amiodarone, 200 mg, oral, Daily  ascorbic acid, 500 mg, oral, Daily  aspirin, 81 mg, oral, " Daily  cholecalciferol, 800 Units, oral, Daily  [Held by provider] doxycycline, 100 mg, oral, q12h MASSIEL  fluticasone furoate-vilanteroL, 1 puff, inhalation, Daily  insulin lispro, 0-10 Units, subcutaneous, Before meals & nightly  ipratropium-albuteroL, 3 mL, nebulization, TID  isosorbide mononitrate ER, 30 mg, oral, Daily  [Held by provider] losartan, 25 mg, oral, Daily  [Held by provider] metFORMIN, 500 mg, oral, BID  [Held by provider] metoprolol succinate XL, 50 mg, oral, Daily  mupirocin, , Each Nostril, TID  nicotine, 1 patch, transdermal, Daily  oxygen, , inhalation, Continuous - Inhalation  oxygen, , inhalation, Continuous - Inhalation  pantoprazole, 40 mg, oral, Daily  piperacillin-tazobactam, 3.375 g, intravenous, q6h  [Held by provider] potassium chloride CR, 10 mEq, oral, Daily  rivaroxaban, 20 mg, oral, Daily with evening meal  rosuvastatin, 20 mg, oral, Daily  sodium chloride, 3 mL, nebulization, q6h MASSIEL  tamsulosin, 0.4 mg, oral, Daily  tiotropium, 2 puff, inhalation, Daily  vancomycin, 1,750 mg, intravenous, q24h  zinc oxide, 1 Application, Topical, TID      PRN medications  PRN medications: acetaminophen, benzonatate, dextromethorphan-guaifenesin, dextrose, dextrose, glucagon, glucagon, guaiFENesin, ipratropium-albuteroL, prochlorperazine, vancomycin     Results for orders placed or performed during the hospital encounter of 11/12/24 (from the past 24 hours)   POCT GLUCOSE   Result Value Ref Range    POCT Glucose 127 (H) 74 - 99 mg/dL   CBC   Result Value Ref Range    WBC 8.5 4.4 - 11.3 x10*3/uL    nRBC 0.0 0.0 - 0.0 /100 WBCs    RBC 2.88 (L) 4.50 - 5.90 x10*6/uL    Hemoglobin 8.4 (L) 13.5 - 17.5 g/dL    Hematocrit 27.0 (L) 41.0 - 52.0 %    MCV 94 80 - 100 fL    MCH 29.2 26.0 - 34.0 pg    MCHC 31.1 (L) 32.0 - 36.0 g/dL    RDW 14.6 (H) 11.5 - 14.5 %    Platelets 59 (L) 150 - 450 x10*3/uL   Basic metabolic panel   Result Value Ref Range    Glucose 129 (H) 74 - 99 mg/dL    Sodium 128 (L) 136 - 145 mmol/L     Potassium 4.6 3.5 - 5.3 mmol/L    Chloride 92 (L) 98 - 107 mmol/L    Bicarbonate 30 21 - 32 mmol/L    Anion Gap 11 10 - 20 mmol/L    Urea Nitrogen 26 (H) 6 - 23 mg/dL    Creatinine 0.79 0.50 - 1.30 mg/dL    eGFR >90 >60 mL/min/1.73m*2    Calcium 7.8 (L) 8.6 - 10.3 mg/dL   POCT GLUCOSE   Result Value Ref Range    POCT Glucose 112 (H) 74 - 99 mg/dL   POCT GLUCOSE   Result Value Ref Range    POCT Glucose 135 (H) 74 - 99 mg/dL   POCT GLUCOSE   Result Value Ref Range    POCT Glucose 149 (H) 74 - 99 mg/dL      CT chest wo IV contrast    Result Date: 11/25/2024  Interpreted By:  Eri Tello, STUDY: CT CHEST WO IV CONTRAST;  11/25/2024 1:43 pm   INDICATION: Signs/Symptoms:suspect worsening pleural effusion, increasing O2 requirements.   COMPARISON: CT chest 11/12/2024.   ACCESSION NUMBER(S): [id number]   ORDERING CLINICIAN: KINZA LIZARRAGA   TECHNIQUE: Helical data acquisition of the chest was obtained without IV contrast material.  Images were reformatted in axial, coronal, and sagittal planes.   FINDINGS: LINE AND DEVICES: Tip of right IJ Port-A-Cath in the inferior SVC.   LUNGS AND AIRWAYS: There is diffuse bronchial wall thickening. Small amount of secretions are present within the distal trachea and bronchus intermedius. There is increased now large right pleural effusion causing complete right upper lobe and right lower lobe and near complete right middle lobe consolidation. The known central right upper lobe mass is obscured by the surrounding consolidation.   There is increase now small to moderate left pleural effusion with increased consolidative and ground-glass opacities throughout the left lung. The previously described cavitary lesion in the left lung base is obscured by adjacent consolidation. No pneumothorax. There is severe background interstitial lung disease with intermixed ground-glass opacities.   MEDIASTINUM AND SUDHAKAR, LOWER NECK AND AXILLA: The visualized thyroid gland is within normal  limits.   Grossly stable mediastinal lymphadenopathy. For example, 18 mm precarinal node (series 202, image 109).   Esophagus appears within normal limits.   HEART AND VESSELS: Moderate atherosclerotic calcifications of the thoracic aorta without aneurysmal dilation.   Main pulmonary artery is normal in caliber.   Severe trivessel coronary artery calcifications. The study is not optimized for evaluation of coronary arteries.   The cardiac chambers are not enlarged.   Stable trace pericardial effusion.   UPPER ABDOMEN: No acute abnormalities.   CHEST WALL AND OSSEOUS STRUCTURES: Increased now moderate chest wall edema. Remote left 11th posterior rib fracture. Degenerative disc disease with concurrent diffuse idiopathic skeletal hyperostosis.       Increased right pleural effusion with subtotal right lung collapse obscuring known right upper lobe mass.   Increased now moderate left pleural effusion obscuring known left basilar cavitary lesion.   Worsening infection versus exacerbation of interstitial lung disease.   MACRO: None   Signed by: Eri Tello 11/25/2024 4:13 PM Dictation workstation:   [id number]      Assessment/Plan   - Continue supportive care through primary team     #Goals of Care:  #Complex Medical Decision Making:  #Advance Care Planning:  - goals remain survival and time based: patient wishes to continue with current treatment plan including hospital care   - patient requesting code status change to DNR/DNI: patient does NOT want to be intubated if his respiratory status declines and intubation becomes necessary       -- patient's wife Cindy in agreement to support patient's wishes      -- patient and Cindy remain hopeful patient will improve with continued hospital care but are open to re-discussing goals of care and comfort care as needed or if patient declines      Plan of Care discussed with: Updated Dr. Viera and bedside RN on goals of care discussion and code status change to DNR/DNI via  face-to-face encounter and epic secure chat.     Thank you for asking Palliative Care to assist with care of this patient.  Will continue to follow.   Please contact us for additional questions or concerns.     Signature and billing  Medical complexity was high level due to complexity of problems including acute on chronic hypoxic respiratory failure requiring AIRVO secondary to lung cancer, COPD, and MRSA pneumonia posing threat to life or function, extensive data review, interviewing patient/family, discussion with primary team and bedside RN, coordination of care, and high risk of management/treatment including patient's decision to change code status to DNR/DNI. I spent an additional 25 minutes in advance care planning.      SIGNATURE: AMISHA Rivera  PAGER/CONTACT:  Contact information:  Palliative Medicine  Contact Via Epic Secure chat

## 2024-11-26 NOTE — PROGRESS NOTES
Department of Medicine  Division of Pulmonary, Critical Care, and Sleep Medicine    Juan Carlos Cr is a 74 y.o. male on day 14 of admission presenting with MRSA pneumonia (Multi).    Subjective   Hypoxia worsening, on 30 L / 100% Airvo today.       Objective     Vital Signs      11/25/2024     9:19 AM 11/25/2024    11:00 AM 11/25/2024    11:26 AM 11/25/2024     9:21 PM 11/26/2024     4:00 AM 11/26/2024     8:00 AM 11/26/2024     9:00 AM   Vitals   Systolic 117 117 106  110 103 107   Diastolic 57 61 58  65 60 56   BP Location  Right arm  Left arm      Heart Rate 86 84 83  85 84 83   Temp  36.7 °C (98.1 °F)  36.5 °C (97.7 °F) 35.8 °C (96.4 °F)     Resp  29   24 23 20   Weight (lb)     142.7     BMI     21.07 kg/m2     BSA (m2)     1.77 m2          Physical exam  Constitutional: Chronically ill looking, awake and alert.  HEENT: Normocephalic and atraumatic.  Cardiovascular: Normal rate and regular rhythm.  Pulmonary: Coarse breath sounds bilaterally, no wheezing.  Musculoskeletal: No edema, no cyanosis.  Neurological: Awake, alert and oriented x3.  Psychiatric: Normal behavior, mood and affect.    Labs:  Lab Results   Component Value Date    WBC 8.5 11/26/2024    HGB 8.4 (L) 11/26/2024    HCT 27.0 (L) 11/26/2024    MCV 94 11/26/2024    PLT 59 (L) 11/26/2024      Lab Results   Component Value Date    GLUCOSE 129 (H) 11/26/2024    CALCIUM 7.8 (L) 11/26/2024     (L) 11/26/2024    K 4.6 11/26/2024    CO2 30 11/26/2024    CL 92 (L) 11/26/2024    BUN 26 (H) 11/26/2024    CREATININE 0.79 11/26/2024      Lab Results   Component Value Date    ALT 10 11/22/2024    AST 15 11/22/2024    ALKPHOS 89 11/22/2024    BILITOT 0.3 11/22/2024        Oxygen Therapy  SpO2: 97 %  Medical Gas Therapy: Supplemental oxygen  Medical Gas Delivery Method: High flow nasal cannula  FiO2 (%):  [50 %-90 %] 70 %    Intake/Output last 3 Shifts:  I/O last 3 completed shifts:  In: 680 (10.5 mL/kg) [P.O.:680]  Out: 1000 (15.4 mL/kg) [Urine:1000  (0.4 mL/kg/hr)]  Weight: 64.7 kg       Medications   Scheduled medications  amiodarone, 200 mg, oral, Daily  ascorbic acid, 500 mg, oral, Daily  aspirin, 81 mg, oral, Daily  cholecalciferol, 800 Units, oral, Daily  doxycycline, 100 mg, oral, q12h MASSIEL  fluticasone furoate-vilanteroL, 1 puff, inhalation, Daily  insulin lispro, 0-10 Units, subcutaneous, Before meals & nightly  ipratropium-albuteroL, 3 mL, nebulization, TID  isosorbide mononitrate ER, 30 mg, oral, Daily  [Held by provider] losartan, 25 mg, oral, Daily  [Held by provider] metFORMIN, 500 mg, oral, BID  [Held by provider] metoprolol succinate XL, 50 mg, oral, Daily  mupirocin, , Each Nostril, TID  nicotine, 1 patch, transdermal, Daily  oxygen, , inhalation, Continuous - Inhalation  oxygen, , inhalation, Continuous - Inhalation  pantoprazole, 40 mg, oral, Daily  [Held by provider] potassium chloride CR, 10 mEq, oral, Daily  rivaroxaban, 20 mg, oral, Daily with evening meal  rosuvastatin, 20 mg, oral, Daily  tamsulosin, 0.4 mg, oral, Daily  tiotropium, 2 puff, inhalation, Daily  zinc oxide, 1 Application, Topical, TID      Continuous medications     PRN medications  PRN medications: acetaminophen, benzonatate, dextromethorphan-guaifenesin, dextrose, dextrose, glucagon, glucagon, guaiFENesin, ipratropium-albuteroL, prochlorperazine     Allergies  Patient has no known allergies.    Chest Radiograph   No results found for this or any previous visit from the past 10 days.       XR chest 2 views 11/21/2024    Narrative  STUDY:  Chest Radiographs;  11/21/2024 11:33 AM.  INDICATION:  Worsening hypoxia.  COMPARISON:  Chest radiograph and CTA chest 8/26/2024.  ACCESSION NUMBER(S):  [id number]  ORDERING CLINICIAN:  MU HUGGINS  TECHNIQUE:  Frontal and lateral chest.  FINDINGS:  CARDIOMEDIASTINAL SILHOUETTE:  Cardiomediastinal silhouette is normal in size and configuration.  Right IJ infusion catheter remains in place within the mid  SVC.    LUNGS:  Consolidation with central cavitation involving the right upper lobe  again noted.  Patchy airspace opacification throughout the remainder  of both lungs again seen.  With mild interval improvement.  Persistent  small right pleural effusion.    ABDOMEN:  No remarkable upper abdominal findings.    BONES:  No acute osseous changes.    Impression  1.Mild improvement in the bilateral pneumonia.  2.Stable right upper lobe lung mass.  Signed by Dominguez Sellers MD      XR chest 1 view 11/18/2024    Narrative  Interpreted By:  Tanya Caballero,  STUDY:  XR CHEST 1 VIEW; ;  11/18/2024 2:02 pm    INDICATION:  Signs/Symptoms:dyspnea.      COMPARISON:  Chest radiograph dated 10/18/2024    ACCESSION NUMBER(S):  [id number]    ORDERING CLINICIAN:  LUIS HORTA    FINDINGS:  Right chest wall [id number] is noted with its tip projecting over  the expected region of distal SVC and is similar compared to prior  radiograph. Cardiac silhouette is similar compared to immediate prior  radiograph. There is persistent evidence of near complete  opacification of right upper lung zone. There is interval improvement  in airspace opacities in the right mid to lower lung zones compared  to immediate prior radiograph with improved aeration in the right  lower lung zone. However there is interval increase in airspace  infiltrates in the left mid to lower lung zone, new compared to prior  radiograph dated 10/18/2024 and appears to be slightly increased  compared to immediate prior CT examination dated 11/12/2024 (within  limitation of differences in technique). There is small right  thoracic pleural effusion. No large pneumothorax within limits of  portable technique. No acute osseous findings.    Impression  1. Findings concerning for interval worsening of airspace opacities  in the left mid to lower lung zone compared to prior radiograph dated  10/18/2024 and immediate prior CT chest dated 11/12/2024 (within  limitation of  differences in technique).  2. Redemonstration of right lung mass with interval improved aeration  in right lung base compared to prior radiograph as described above.        MACRO:  None    Signed by: Tanya Caballero 11/20/2024 8:45 AM  Dictation workstation:   [id number]         Assessment and Plan / Recommendations   Assessment/Plan     74-year-old man with history of squamous cell lung cancer post right mainstem stent, COPD admitted with respiratory failure    1.  Acute hypoxic respiratory failure due to pneumonia, COPD and lung cancer  2.  MRSA pneumonia  3.  Lung cancer  4.  COPD exacerbation    Worsening, on 30 L / 100% Airvo today.  CT chest from yesterday with worsening bilateral consolidation, small to moderate left pleural effusion.    -Recommend to change antibiotics to vancomycin and Zosyn -sputum culture  -If no improvement will evaluate for thoracentesis  -Continue bronchial hygiene with incentive spirometry, Acapella, wean O2 as tolerated. Add 3% nebz  -Case discussed with IP at [facility] [place], no need for bronch/intervention at this time    Darlene Araiza MD

## 2024-11-26 NOTE — CARE PLAN
Problem: Nutrition  Goal: Less than 5 days NPO/clear liquids  Outcome: Progressing  Goal: Oral intake greater than 50%  Outcome: Progressing  Goal: Oral intake greater 75%  Outcome: Progressing  Goal: Consume prescribed supplement  Outcome: Progressing  Goal: Adequate PO fluid intake  Outcome: Progressing  Goal: Nutrition support goals are met within 48 hrs  Outcome: Progressing  Goal: Nutrition support is meeting 75% of nutrient needs  Outcome: Progressing  Goal: Tube feed tolerance  Outcome: Progressing  Goal: BG  mg/dL  Outcome: Progressing  Goal: Lab values WNL  Outcome: Progressing  Goal: Electrolytes WNL  Outcome: Progressing  Goal: Promote healing  Outcome: Progressing  Goal: Maintain stable weight  Outcome: Progressing  Goal: Reduce weight from edema/fluid  Outcome: Progressing  Goal: Gradual weight gain  Outcome: Progressing  Goal: Improve ostomy output  Outcome: Progressing     Problem: Pain - Adult  Goal: Verbalizes/displays adequate comfort level or baseline comfort level  Outcome: Progressing     Problem: Safety - Adult  Goal: Free from fall injury  Outcome: Progressing     Problem: Skin  Goal: Decreased wound size/increased tissue granulation at next dressing change  Outcome: Progressing  Goal: Participates in plan/prevention/treatment measures  Outcome: Progressing  Goal: Prevent/manage excess moisture  Outcome: Progressing  Goal: Prevent/minimize sheer/friction injuries  Outcome: Progressing  Goal: Promote/optimize nutrition  Outcome: Progressing  Goal: Promote skin healing  Outcome: Progressing   The patient's goals for the shift include      The clinical goals for the shift include Pt will remain HDS this shift

## 2024-11-26 NOTE — PROGRESS NOTES
Juan Carlos Cr is a 74 y.o. male on day 14 of admission presenting with MRSA pneumonia (Multi).    Subjective   Interval History: no fever, no new complaints        Review of Systems    Objective   Range of Vitals (last 24 hours)  Heart Rate:  [83-85]   Temp:  [35.8 °C (96.4 °F)-36.5 °C (97.7 °F)]   Resp:  [20-24]   BP: (103-110)/(56-65)   Weight:  [64.7 kg (142 lb 11.2 oz)]   SpO2:  [94 %-97 %]   Daily Weight  11/26/24 : 64.7 kg (142 lb 11.2 oz)    Body mass index is 21.07 kg/m².    Physical Exam  Constitutional:       Appearance: Normal appearance.   HENT:      Head: Normocephalic and atraumatic.      Mouth/Throat:      Mouth: Mucous membranes are moist.      Pharynx: Oropharynx is clear.   Eyes:      Pupils: Pupils are equal, round, and reactive to light.   Cardiovascular:      Rate and Rhythm: Normal rate and regular rhythm.      Heart sounds: Normal heart sounds.   Pulmonary:      Effort: Pulmonary effort is normal.      Breath sounds: Normal breath sounds.   Abdominal:      General: Abdomen is flat. Bowel sounds are normal.      Palpations: Abdomen is soft.   Musculoskeletal:      Cervical back: Normal range of motion.         Antibiotics  doxycycline - 100 mg, 100 mg  mupirocin - 2 %    Relevant Results  Labs  Results from last 72 hours   Lab Units 11/26/24  0552 11/25/24  0537 11/24/24  0844   WBC AUTO x10*3/uL 8.5 8.5 8.5   HEMOGLOBIN g/dL 8.4* 8.2* 8.2*   HEMATOCRIT % 27.0* 26.6* 26.6*   PLATELETS AUTO x10*3/uL 59* 75* 87*     Results from last 72 hours   Lab Units 11/26/24  0552 11/25/24  0537 11/24/24  1839   SODIUM mmol/L 128* 127* 126*   POTASSIUM mmol/L 4.6 4.5 4.4   CHLORIDE mmol/L 92* 91* 89*   CO2 mmol/L 30 28 31   BUN mg/dL 26* 22 26*   CREATININE mg/dL 0.79 0.62 0.66   GLUCOSE mg/dL 129* 82 168*   CALCIUM mg/dL 7.8* 7.6* 7.7*   ANION GAP mmol/L 11 13 10   EGFR mL/min/1.73m*2 >90 >90 >90           Estimated Creatinine Clearance: 75.1 mL/min (by C-G formula based on SCr of 0.79  mg/dL).  C-Reactive Protein   Date Value Ref Range Status   11/13/2024 33.57 (H) <1.00 mg/dL Final     Microbiology  Reviewed  Imaging  Reviewed        Assessment/Plan   Respiratory failure / COPD / pneumonia / abscess, MRSA screen is positive, procalcitonin 2.30, sputum with MRSA, the repeat cxr was reviewed  NSCLC / Rt bronchus stent / on Taxol / Carboplatin / radiation     Recommendations :  Plan on oral Doxycycline x 3 weeks with discharge  Bronchial hygiene  Discussed with the medical team     I spent minutes in the professional and overall care of this patient.      Krysten Magallon MD

## 2024-11-26 NOTE — PROGRESS NOTES
Occupational Therapy                 Therapy Communication Note    Patient Name: Juan Carlos Cr  MRN: [id number]  Department: [address]  Room: [address] [place] [address]-A  Today's Date: 11/26/2024     Discipline: Occupational Therapy    Missed Visit Reason:  13:25- Pt declined OT treatment today. Per [facility], palliative services have been reconsulted to revisit GOC due to pt's extended LOS.     Missed Time: Attempt

## 2024-11-27 ENCOUNTER — APPOINTMENT (OUTPATIENT)
Dept: HEMATOLOGY/ONCOLOGY | Facility: HOSPITAL | Age: 74
End: 2024-11-27
Payer: MEDICARE

## 2024-11-27 ENCOUNTER — APPOINTMENT (OUTPATIENT)
Dept: CARDIOLOGY | Facility: HOSPITAL | Age: 74
End: 2024-11-27
Payer: MEDICARE

## 2024-11-27 ENCOUNTER — APPOINTMENT (OUTPATIENT)
Dept: RADIATION ONCOLOGY | Facility: CLINIC | Age: 74
End: 2024-11-27
Payer: MEDICARE

## 2024-11-27 ENCOUNTER — APPOINTMENT (OUTPATIENT)
Dept: RADIOLOGY | Facility: HOSPITAL | Age: 74
End: 2024-11-27
Payer: MEDICARE

## 2024-11-27 VITALS
DIASTOLIC BLOOD PRESSURE: 45 MMHG | HEIGHT: 69 IN | TEMPERATURE: 97 F | RESPIRATION RATE: 16 BRPM | WEIGHT: 142.7 LBS | OXYGEN SATURATION: 99 % | BODY MASS INDEX: 21.14 KG/M2 | SYSTOLIC BLOOD PRESSURE: 90 MMHG | HEART RATE: 65 BPM

## 2024-11-27 LAB
ALBUMIN SERPL BCP-MCNC: 1.6 G/DL (ref 3.4–5)
ALP SERPL-CCNC: 107 U/L (ref 33–136)
ALT SERPL W P-5'-P-CCNC: 11 U/L (ref 10–52)
ANION GAP BLDA CALCULATED.4IONS-SCNC: 5 MMO/L (ref 10–25)
ANION GAP SERPL CALC-SCNC: 12 MMOL/L (ref 10–20)
AORTIC VALVE MEAN GRADIENT: 4 MMHG
AORTIC VALVE PEAK VELOCITY: 1.5 M/S
ARTERIAL PATENCY WRIST A: POSITIVE
ARTERIAL PATENCY WRIST A: POSITIVE
AST SERPL W P-5'-P-CCNC: 16 U/L (ref 9–39)
ATRIAL RATE: 111 BPM
AV PEAK GRADIENT: 9 MMHG
AVA (PEAK VEL): 1.54 CM2
AVA (VTI): 1.45 CM2
BASE EXCESS BLDA CALC-SCNC: 2 MMOL/L (ref -2–3)
BASE EXCESS BLDA CALC-SCNC: 3.9 MMOL/L (ref -2–3)
BILIRUB SERPL-MCNC: 0.3 MG/DL (ref 0–1.2)
BNP SERPL-MCNC: 248 PG/ML (ref 0–99)
BODY TEMPERATURE: ABNORMAL
BODY TEMPERATURE: ABNORMAL
BUN SERPL-MCNC: 29 MG/DL (ref 6–23)
CA-I BLDA-SCNC: 1.18 MMOL/L (ref 1.1–1.33)
CALCIUM SERPL-MCNC: 7.2 MG/DL (ref 8.6–10.3)
CHLORIDE BLDA-SCNC: 92 MMOL/L (ref 98–107)
CHLORIDE SERPL-SCNC: 90 MMOL/L (ref 98–107)
CO2 SERPL-SCNC: 30 MMOL/L (ref 21–32)
CREAT SERPL-MCNC: 0.91 MG/DL (ref 0.5–1.3)
EGFRCR SERPLBLD CKD-EPI 2021: 88 ML/MIN/1.73M*2
EJECTION FRACTION APICAL 4 CHAMBER: 45.3
EJECTION FRACTION: 43 %
EPAP CMH2O: 5 CM H2O
ERYTHROCYTE [DISTWIDTH] IN BLOOD BY AUTOMATED COUNT: 14.9 % (ref 11.5–14.5)
FREQUENCY (BPM): 10 BPM
GLUCOSE BLD MANUAL STRIP-MCNC: 142 MG/DL (ref 74–99)
GLUCOSE BLD MANUAL STRIP-MCNC: 144 MG/DL (ref 74–99)
GLUCOSE BLDA-MCNC: 143 MG/DL (ref 74–99)
GLUCOSE SERPL-MCNC: 132 MG/DL (ref 74–99)
HCO3 BLDA-SCNC: 32 MMOL/L (ref 22–26)
HCO3 BLDA-SCNC: 32.4 MMOL/L (ref 22–26)
HCT VFR BLD AUTO: 25.6 % (ref 41–52)
HCT VFR BLD EST: 24 % (ref 41–52)
HGB BLD-MCNC: 8 G/DL (ref 13.5–17.5)
HGB BLDA-MCNC: 7.9 G/DL (ref 13.5–17.5)
INHALED O2 CONCENTRATION: 100 %
INHALED O2 CONCENTRATION: 100 %
IPAP CMH2O: 18 CM H2O
LACTATE BLDA-SCNC: 0.5 MMOL/L (ref 0.4–2)
LEFT ATRIUM VOLUME AREA LENGTH INDEX BSA: 30.4 ML/M2
LEFT VENTRICLE INTERNAL DIMENSION DIASTOLE: 5.15 CM (ref 3.5–6)
LEFT VENTRICULAR OUTFLOW TRACT DIAMETER: 1.77 CM
MCH RBC QN AUTO: 29.9 PG (ref 26–34)
MCHC RBC AUTO-ENTMCNC: 31.3 G/DL (ref 32–36)
MCV RBC AUTO: 96 FL (ref 80–100)
MITRAL VALVE E/A RATIO: 1.11
NRBC BLD-RTO: 0 /100 WBCS (ref 0–0)
OXYHGB MFR BLDA: 92.1 % (ref 94–98)
OXYHGB MFR BLDA: 97.2 % (ref 94–98)
P AXIS: 23 DEGREES
P OFFSET: 193 MS
P ONSET: 139 MS
PCO2 BLDA: 73 MM HG (ref 38–42)
PCO2 BLDA: 81 MM HG (ref 38–42)
PH BLDA: 7.21 PH (ref 7.38–7.42)
PH BLDA: 7.25 PH (ref 7.38–7.42)
PLATELET # BLD AUTO: 37 X10*3/UL (ref 150–450)
PO2 BLDA: 134 MM HG (ref 85–95)
PO2 BLDA: 71 MM HG (ref 85–95)
POTASSIUM BLDA-SCNC: 4.9 MMOL/L (ref 3.5–5.3)
POTASSIUM SERPL-SCNC: 5 MMOL/L (ref 3.5–5.3)
PR INTERVAL: 156 MS
PROT SERPL-MCNC: 4.5 G/DL (ref 6.4–8.2)
Q ONSET: 217 MS
QRS COUNT: 19 BEATS
QRS DURATION: 96 MS
QT INTERVAL: 310 MS
QTC CALCULATION(BAZETT): 421 MS
QTC FREDERICIA: 380 MS
R AXIS: -6 DEGREES
RBC # BLD AUTO: 2.68 X10*6/UL (ref 4.5–5.9)
RIGHT VENTRICLE FREE WALL PEAK S': 14 CM/S
RIGHT VENTRICLE PEAK SYSTOLIC PRESSURE: 52 MMHG
SAO2 % BLDA: 95 % (ref 94–100)
SAO2 % BLDA: 99 % (ref 94–100)
SODIUM BLDA-SCNC: 124 MMOL/L (ref 136–145)
SODIUM SERPL-SCNC: 127 MMOL/L (ref 136–145)
SPECIMEN DRAWN FROM PATIENT: ABNORMAL
SPECIMEN DRAWN FROM PATIENT: ABNORMAL
T AXIS: 72 DEGREES
T OFFSET: 372 MS
TRICUSPID ANNULAR PLANE SYSTOLIC EXCURSION: 2.4 CM
VANCOMYCIN SERPL-MCNC: 10 UG/ML (ref 5–20)
VENTILATOR MODE: ABNORMAL
VENTRICULAR RATE: 111 BPM
WBC # BLD AUTO: 9.6 X10*3/UL (ref 4.4–11.3)

## 2024-11-27 PROCEDURE — 2500000005 HC RX 250 GENERAL PHARMACY W/O HCPCS: Performed by: STUDENT IN AN ORGANIZED HEALTH CARE EDUCATION/TRAINING PROGRAM

## 2024-11-27 PROCEDURE — 36415 COLL VENOUS BLD VENIPUNCTURE: CPT | Performed by: INTERNAL MEDICINE

## 2024-11-27 PROCEDURE — 2500000005 HC RX 250 GENERAL PHARMACY W/O HCPCS: Performed by: FAMILY MEDICINE

## 2024-11-27 PROCEDURE — 93308 TTE F-UP OR LMTD: CPT

## 2024-11-27 PROCEDURE — 82805 BLOOD GASES W/O2 SATURATION: CPT | Performed by: INTERNAL MEDICINE

## 2024-11-27 PROCEDURE — 2500000002 HC RX 250 W HCPCS SELF ADMINISTERED DRUGS (ALT 637 FOR MEDICARE OP, ALT 636 FOR OP/ED): Performed by: STUDENT IN AN ORGANIZED HEALTH CARE EDUCATION/TRAINING PROGRAM

## 2024-11-27 PROCEDURE — 36600 WITHDRAWAL OF ARTERIAL BLOOD: CPT

## 2024-11-27 PROCEDURE — 99239 HOSP IP/OBS DSCHRG MGMT >30: CPT | Performed by: STUDENT IN AN ORGANIZED HEALTH CARE EDUCATION/TRAINING PROGRAM

## 2024-11-27 PROCEDURE — 71045 X-RAY EXAM CHEST 1 VIEW: CPT | Performed by: RADIOLOGY

## 2024-11-27 PROCEDURE — 94640 AIRWAY INHALATION TREATMENT: CPT

## 2024-11-27 PROCEDURE — 94660 CPAP INITIATION&MGMT: CPT

## 2024-11-27 PROCEDURE — 84520 ASSAY OF UREA NITROGEN: CPT | Performed by: STUDENT IN AN ORGANIZED HEALTH CARE EDUCATION/TRAINING PROGRAM

## 2024-11-27 PROCEDURE — 93308 TTE F-UP OR LMTD: CPT | Performed by: INTERNAL MEDICINE

## 2024-11-27 PROCEDURE — 5A09357 ASSISTANCE WITH RESPIRATORY VENTILATION, LESS THAN 24 CONSECUTIVE HOURS, CONTINUOUS POSITIVE AIRWAY PRESSURE: ICD-10-PCS | Performed by: STUDENT IN AN ORGANIZED HEALTH CARE EDUCATION/TRAINING PROGRAM

## 2024-11-27 PROCEDURE — 2500000004 HC RX 250 GENERAL PHARMACY W/ HCPCS (ALT 636 FOR OP/ED)

## 2024-11-27 PROCEDURE — 2500000002 HC RX 250 W HCPCS SELF ADMINISTERED DRUGS (ALT 637 FOR MEDICARE OP, ALT 636 FOR OP/ED): Performed by: NURSE PRACTITIONER

## 2024-11-27 PROCEDURE — 82947 ASSAY GLUCOSE BLOOD QUANT: CPT

## 2024-11-27 PROCEDURE — 2500000001 HC RX 250 WO HCPCS SELF ADMINISTERED DRUGS (ALT 637 FOR MEDICARE OP): Performed by: INTERNAL MEDICINE

## 2024-11-27 PROCEDURE — 84132 ASSAY OF SERUM POTASSIUM: CPT | Performed by: STUDENT IN AN ORGANIZED HEALTH CARE EDUCATION/TRAINING PROGRAM

## 2024-11-27 PROCEDURE — 85027 COMPLETE CBC AUTOMATED: CPT

## 2024-11-27 PROCEDURE — 83880 ASSAY OF NATRIURETIC PEPTIDE: CPT | Performed by: INTERNAL MEDICINE

## 2024-11-27 PROCEDURE — 71045 X-RAY EXAM CHEST 1 VIEW: CPT

## 2024-11-27 PROCEDURE — 80202 ASSAY OF VANCOMYCIN: CPT

## 2024-11-27 PROCEDURE — 2500000004 HC RX 250 GENERAL PHARMACY W/ HCPCS (ALT 636 FOR OP/ED): Performed by: INTERNAL MEDICINE

## 2024-11-27 PROCEDURE — 94760 N-INVAS EAR/PLS OXIMETRY 1: CPT

## 2024-11-27 PROCEDURE — 99233 SBSQ HOSP IP/OBS HIGH 50: CPT | Performed by: INTERNAL MEDICINE

## 2024-11-27 RX ORDER — MORPHINE SULFATE IN 0.9 % NACL 30 MG/30ML
PATIENT CONTROLLED ANALGESIA SYRINGE INTRAVENOUS CONTINUOUS
Status: DISCONTINUED | OUTPATIENT
Start: 2024-11-27 | End: 2024-11-27 | Stop reason: HOSPADM

## 2024-11-27 RX ORDER — GLYCOPYRROLATE 0.2 MG/ML
0.2 INJECTION INTRAMUSCULAR; INTRAVENOUS EVERY 4 HOURS PRN
Status: DISCONTINUED | OUTPATIENT
Start: 2024-11-27 | End: 2024-11-27 | Stop reason: HOSPADM

## 2024-11-27 RX ORDER — LORAZEPAM 2 MG/ML
0.5 INJECTION INTRAMUSCULAR ONCE
Status: COMPLETED | OUTPATIENT
Start: 2024-11-27 | End: 2024-11-27

## 2024-11-27 RX ORDER — LORAZEPAM 2 MG/ML
0.5 INJECTION INTRAMUSCULAR EVERY 4 HOURS PRN
Status: DISCONTINUED | OUTPATIENT
Start: 2024-11-27 | End: 2024-11-27 | Stop reason: HOSPADM

## 2024-11-27 RX ORDER — SODIUM CHLORIDE FOR INHALATION 3 %
3 VIAL, NEBULIZER (ML) INHALATION
Status: DISCONTINUED | OUTPATIENT
Start: 2024-11-27 | End: 2024-11-27 | Stop reason: HOSPADM

## 2024-11-27 RX ORDER — FUROSEMIDE 10 MG/ML
20 INJECTION INTRAMUSCULAR; INTRAVENOUS ONCE
Status: COMPLETED | OUTPATIENT
Start: 2024-11-27 | End: 2024-11-27

## 2024-11-27 RX ORDER — BISACODYL 10 MG/1
10 SUPPOSITORY RECTAL DAILY PRN
Status: DISCONTINUED | OUTPATIENT
Start: 2024-11-27 | End: 2024-11-27 | Stop reason: HOSPADM

## 2024-11-27 RX ORDER — SODIUM CHLORIDE FOR INHALATION 3 %
3 VIAL, NEBULIZER (ML) INHALATION
Status: DISCONTINUED | OUTPATIENT
Start: 2024-11-27 | End: 2024-11-27

## 2024-11-27 RX ORDER — DILTIAZEM HCL/D5W 125 MG/125
PLASTIC BAG, INJECTION (ML) INTRAVENOUS CONTINUOUS
Status: CANCELLED | OUTPATIENT
Start: 2024-11-27

## 2024-11-27 RX ORDER — PANTOPRAZOLE SODIUM 40 MG/10ML
40 INJECTION, POWDER, LYOPHILIZED, FOR SOLUTION INTRAVENOUS DAILY
Status: DISCONTINUED | OUTPATIENT
Start: 2024-11-27 | End: 2024-11-27 | Stop reason: HOSPADM

## 2024-11-27 RX ORDER — HALOPERIDOL 5 MG/ML
1 INJECTION INTRAMUSCULAR EVERY 4 HOURS PRN
Status: DISCONTINUED | OUTPATIENT
Start: 2024-11-27 | End: 2024-11-27 | Stop reason: HOSPADM

## 2024-11-27 RX ORDER — ACETAMINOPHEN 650 MG/1
650 SUPPOSITORY RECTAL EVERY 6 HOURS PRN
Status: DISCONTINUED | OUTPATIENT
Start: 2024-11-27 | End: 2024-11-27 | Stop reason: HOSPADM

## 2024-11-27 NOTE — PROGRESS NOTES
Vancomycin Dosing by Pharmacy- FOLLOW UP    Juan Carlos Cr is a 74 y.o. year old male who Pharmacy has been consulted for vancomycin dosing for pneumonia. Based on the patient's indication and renal status this patient is being dosed based on a goal AUC of 400-600.     Renal function is currently stable.    Current vancomycin dose: 1750 mg given every 24 hours    Estimated vancomycin AUC on current dose: 508 mg/L.hr     Visit Vitals  BP (!) 96/42   Pulse 69   Temp 35.6 °C (96 °F)   Resp 20        Lab Results   Component Value Date    CREATININE 0.91 2024    CREATININE 0.79 2024    CREATININE 0.62 2024    CREATININE 0.66 2024        Patient weight is as follows:   Vitals:    24 0400   Weight: 64.7 kg (142 lb 11.2 oz)       Cultures:  Susceptibility data for the encounter in last 14 days.  Collected Specimen Info Organism Clindamycin Erythromycin Oxacillin Tetracycline Trimethoprim/Sulfamethoxazole Vancomycin   24 Fluid from SPUTUM Methicillin Resistant Staphylococcus aureus (MRSA)  S  R  R  S  S  S        I/O last 3 completed shifts:  In: 550 (8.5 mL/kg) [IV Piggyback:550]  Out: 1025 (15.8 mL/kg) [Urine:1025 (0.4 mL/kg/hr)]  Weight: 64.7 kg   I/O during current shift:  No intake/output data recorded.    Temp (24hrs), Av.3 °C (97.4 °F), Min:35.6 °C (96 °F), Max:36.9 °C (98.4 °F)      Assessment/Plan    Within goal AUC range. Continue current vancomycin regimen.    This dosing regimen is predicted by [employer] to result in the following pharmacokinetic parameters:  Loading dose: N/A  Regimen: 1750 mg IV every 24 hours.  Start time: 15:55 on 2024  Exposure target: AUC24 (range)400-600 mg/L.hr   MAX70-17: 508 mg/L.hr  AUC24,ss: 568 mg/L.hr  Probability of AUC24 > 400: 97 %  Ctrough,ss: 15.7 mg/L  Probability of Ctrough,ss > 20: 23 %      The next level will be obtained on 11/29 at am labs. May be obtained sooner if clinically indicated.   Will continue to monitor renal  function daily while on vancomycin and order serum creatinine at least every 48 hours if not already ordered.  Follow for continued vancomycin needs, clinical response, and signs/symptoms of toxicity.       Terrence Zavala, PharmD

## 2024-11-27 NOTE — SIGNIFICANT EVENT
Was called by nursing to patient's bedside. There were no heart tones present. There were no breath sounds or spontaneous respirations. There was no palpable carotid pulse. The pupils were fixed and dilated. There was no response to noxious stimli. Family is at bedside. Patient is pronounced on 11/27/24 at 3:42PM.

## 2024-11-27 NOTE — PROGRESS NOTES
Juan Carlos Cr is a 74 y.o. male on day 15 of admission presenting with Acute on chronic hypoxic respiratory failure (Multi).    Subjective   Interval History: no fever, he has developed increasing SOB and hypoxia, lethargic        Review of Systems    Objective   Range of Vitals (last 24 hours)  Heart Rate:  [65-87]   Temp:  [35.6 °C (96 °F)-36.9 °C (98.4 °F)]   Resp:  [19-32]   BP: ()/(42-83)   SpO2:  [87 %-100 %]   Daily Weight  11/26/24 : 64.7 kg (142 lb 11.2 oz)    Body mass index is 21.07 kg/m².    Physical Exam  Constitutional:       Appearance: Normal appearance.   HENT:      Head: Normocephalic and atraumatic.      Mouth/Throat:      Mouth: Mucous membranes are moist.      Pharynx: Oropharynx is clear.   Eyes:      Pupils: Pupils are equal, round, and reactive to light.   Cardiovascular:      Rate and Rhythm: Normal rate and regular rhythm.      Heart sounds: Normal heart sounds.   Pulmonary:      Effort: Pulmonary effort is normal.      Breath sounds: Normal breath sounds.   Abdominal:      General: Abdomen is flat. Bowel sounds are normal.      Palpations: Abdomen is soft.   Musculoskeletal:      Cervical back: Normal range of motion.         Antibiotics  doxycycline - 100 mg, 100 mg  mupirocin - 2 %  piperacillin-tazobactam - 3.375 gram/50 mL  vancomycin - 1750 mg/500 mL    Relevant Results  Labs  Results from last 72 hours   Lab Units 11/27/24 0622 11/26/24 0552 11/25/24  0537   WBC AUTO x10*3/uL 9.6 8.5 8.5   HEMOGLOBIN g/dL 8.0* 8.4* 8.2*   HEMATOCRIT % 25.6* 27.0* 26.6*   PLATELETS AUTO x10*3/uL 37* 59* 75*     Results from last 72 hours   Lab Units 11/27/24 0622 11/26/24  0552 11/25/24  0537   SODIUM mmol/L 127* 128* 127*   POTASSIUM mmol/L 5.0 4.6 4.5   CHLORIDE mmol/L 90* 92* 91*   CO2 mmol/L 30 30 28   BUN mg/dL 29* 26* 22   CREATININE mg/dL 0.91 0.79 0.62   GLUCOSE mg/dL 132* 129* 82   CALCIUM mg/dL 7.2* 7.8* 7.6*   ANION GAP mmol/L 12 11 13   EGFR mL/min/1.73m*2 88 >90 >90      Results from last 72 hours   Lab Units 11/27/24  0622   ALK PHOS U/L 107   BILIRUBIN TOTAL mg/dL 0.3   PROTEIN TOTAL g/dL 4.5*   ALT U/L 11   AST U/L 16   ALBUMIN g/dL 1.6*       Estimated Creatinine Clearance: 65.2 mL/min (by C-G formula based on SCr of 0.91 mg/dL).  C-Reactive Protein   Date Value Ref Range Status   11/13/2024 33.57 (H) <1.00 mg/dL Final     Microbiology  Reviewed  Imaging  Reviewed        Assessment/Plan   Respiratory failure / COPD / pneumonia / abscess, MRSA screen is positive, procalcitonin 2.30, sputum with MRSA, the repeat cxr was reviewed, worsening hypoxia  NSCLC / Rt bronchus stent / on Taxol / Carboplatin / radiation     Recommendations :  Changed to Zosyn and Vancomycin  Bronchial hygiene  Discussed with the medical team     I spent minutes in the professional and overall care of this patient.      Krysten Magallon MD

## 2024-11-27 NOTE — PROGRESS NOTES
Juan Carlos Cr is a 74 y.o. male on day 15 of admission presenting with Acute on chronic hypoxic respiratory failure (Multi).    Subjective   - Overnight, at approximately 4am, the pt's SpO2 dropped to 30%. Pt was having labored breathing and was placed on BiPAP.   - At bedside, pt is currently on BiPAP, with biphasic breathing. Pt opened his eyes, followed my commands. Resting in bed.        Objective     Physical Exam  Constitutional:       General: He is not in acute distress.     Appearance: He is ill-appearing. He is not toxic-appearing or diaphoretic.      Comments: Thin and frail appearing man, on BiPAP. Laying in bed. Opens eyes, follows my commands. Buchanan bag noted, yellow urine.    Cardiovascular:      Rate and Rhythm: Normal rate and regular rhythm.      Pulses: Normal pulses.      Heart sounds: Normal heart sounds. No murmur heard.  Pulmonary:      Effort: Respiratory distress present.      Breath sounds: No stridor. Wheezing present.      Comments: BiPAP in place.   Increased work of breathing, in respiratory distress. Biphasic breathing, with diffuse crackles on exam.   Chest:      Chest wall: No tenderness.   Abdominal:      General: Abdomen is flat. Bowel sounds are normal. There is no distension.      Palpations: Abdomen is soft.      Tenderness: There is no guarding.   Musculoskeletal:      Right lower leg: No edema.      Left lower leg: No edema.      Comments: Loss of muscle mass in the neck, chest, abdomen.    Skin:     General: Skin is warm and dry.      Findings: No rash.      Comments: Right port, no surrounding erythema or drainage.   2x2cm raised nodule noted on the back, overlying the thoracic spine, between bilateral scapula. No tenderness with palpation. Mildly erythematous. Not warm to touch. No drainage.   Bruising noted on bilateral arms. No bleeding, drainage.    Neurological:      General: No focal deficit present.      Mental Status: He is alert.      Motor: Weakness present.  "        Last Recorded Vitals  Blood pressure (!) 96/42, pulse 69, temperature 35.6 °C (96 °F), resp. rate 20, height 1.753 m (5' 9\"), weight 64.7 kg (142 lb 11.2 oz), SpO2 98%.  Intake/Output last 3 Shifts:  I/O last 3 completed shifts:  In: 550 (8.5 mL/kg) [IV Piggyback:550]  Out: 1025 (15.8 mL/kg) [Urine:1025 (0.4 mL/kg/hr)]  Weight: 64.7 kg     Relevant Results  Scheduled medications  amiodarone, 200 mg, oral, Daily  ascorbic acid, 500 mg, oral, Daily  aspirin, 81 mg, oral, Daily  cholecalciferol, 800 Units, oral, Daily  [Held by provider] doxycycline, 100 mg, oral, q12h MASSIEL  fluticasone furoate-vilanteroL, 1 puff, inhalation, Daily  insulin lispro, 0-10 Units, subcutaneous, Before meals & nightly  ipratropium-albuteroL, 3 mL, nebulization, TID  [Held by provider] isosorbide mononitrate ER, 30 mg, oral, Daily  [Held by provider] losartan, 25 mg, oral, Daily  [Held by provider] metFORMIN, 500 mg, oral, BID  [Held by provider] metoprolol succinate XL, 50 mg, oral, Daily  mupirocin, , Each Nostril, TID  nicotine, 1 patch, transdermal, Daily  oxygen, , inhalation, Continuous - Inhalation  oxygen, , inhalation, Continuous - Inhalation  pantoprazole, 40 mg, oral, Daily  piperacillin-tazobactam, 3.375 g, intravenous, q6h  [Held by provider] potassium chloride CR, 10 mEq, oral, Daily  rivaroxaban, 20 mg, oral, Daily with evening meal  rosuvastatin, 20 mg, oral, Daily  sodium chloride, 3 mL, nebulization, TID  tamsulosin, 0.4 mg, oral, Daily  tiotropium, 2 puff, inhalation, Daily  vancomycin, 1,750 mg, intravenous, q24h  zinc oxide, 1 Application, Topical, TID      Continuous medications       PRN medications  PRN medications: acetaminophen, benzonatate, dextromethorphan-guaifenesin, dextrose, dextrose, glucagon, glucagon, ipratropium-albuteroL, oxygen, prochlorperazine, vancomycin    Results for orders placed or performed during the hospital encounter of 11/12/24 (from the past 24 hours)   POCT GLUCOSE   Result Value Ref " Range    POCT Glucose 135 (H) 74 - 99 mg/dL   POCT GLUCOSE   Result Value Ref Range    POCT Glucose 149 (H) 74 - 99 mg/dL   POCT GLUCOSE   Result Value Ref Range    POCT Glucose 175 (H) 74 - 99 mg/dL   Respiratory Culture/Smear    Specimen: SPUTUM; Fluid   Result Value Ref Range    Gram Stain       Gram stain indicates specimen consists of lower respiratory tract secretions.    Gram Stain No predominant organism    CBC   Result Value Ref Range    WBC 9.6 4.4 - 11.3 x10*3/uL    nRBC 0.0 0.0 - 0.0 /100 WBCs    RBC 2.68 (L) 4.50 - 5.90 x10*6/uL    Hemoglobin 8.0 (L) 13.5 - 17.5 g/dL    Hematocrit 25.6 (L) 41.0 - 52.0 %    MCV 96 80 - 100 fL    MCH 29.9 26.0 - 34.0 pg    MCHC 31.3 (L) 32.0 - 36.0 g/dL    RDW 14.9 (H) 11.5 - 14.5 %    Platelets 37 (LL) 150 - 450 x10*3/uL   Vancomycin   Result Value Ref Range    Vancomycin 10.0 5.0 - 20.0 ug/mL   Comprehensive Metabolic Panel   Result Value Ref Range    Glucose 132 (H) 74 - 99 mg/dL    Sodium 127 (L) 136 - 145 mmol/L    Potassium 5.0 3.5 - 5.3 mmol/L    Chloride 90 (L) 98 - 107 mmol/L    Bicarbonate 30 21 - 32 mmol/L    Anion Gap 12 10 - 20 mmol/L    Urea Nitrogen 29 (H) 6 - 23 mg/dL    Creatinine 0.91 0.50 - 1.30 mg/dL    eGFR 88 >60 mL/min/1.73m*2    Calcium 7.2 (L) 8.6 - 10.3 mg/dL    Albumin 1.6 (L) 3.4 - 5.0 g/dL    Alkaline Phosphatase 107 33 - 136 U/L    Total Protein 4.5 (L) 6.4 - 8.2 g/dL    AST 16 9 - 39 U/L    Bilirubin, Total 0.3 0.0 - 1.2 mg/dL    ALT 11 10 - 52 U/L   B-type natriuretic peptide   Result Value Ref Range     (H) 0 - 99 pg/mL   Blood Gas Arterial   Result Value Ref Range    POCT pH, Arterial 7.21 (LL) 7.38 - 7.42 pH    POCT pCO2, Arterial 81 (HH) 38 - 42 mm Hg    POCT pO2, Arterial 71 (L) 85 - 95 mm Hg    POCT SO2, Arterial 95 94 - 100 %    POCT Oxy Hemoglobin, Arterial 92.1 (L) 94.0 - 98.0 %    POCT Base Excess, Arterial 2.0 -2.0 - 3.0 mmol/L    POCT HCO3 Calculated, Arterial 32.4 (H) 22.0 - 26.0 mmol/L    Patient Temperature      FiO2  100 %    Site of Arterial Puncture Radial Left     Bishnu's Test Positive    POCT GLUCOSE   Result Value Ref Range    POCT Glucose 144 (H) 74 - 99 mg/dL   Blood Gas Arterial Full Panel   Result Value Ref Range    POCT pH, Arterial 7.25 (LL) 7.38 - 7.42 pH    POCT pCO2, Arterial 73 (HH) 38 - 42 mm Hg    POCT pO2, Arterial 134 (H) 85 - 95 mm Hg    POCT SO2, Arterial 99 94 - 100 %    POCT Oxy Hemoglobin, Arterial 97.2 94.0 - 98.0 %    POCT Hematocrit Calculated, Arterial 24.0 (L) 41.0 - 52.0 %    POCT Sodium, Arterial 124 (L) 136 - 145 mmol/L    POCT Potassium, Arterial 4.9 3.5 - 5.3 mmol/L    POCT Chloride, Arterial 92 (L) 98 - 107 mmol/L    POCT Ionized Calcium, Arterial 1.18 1.10 - 1.33 mmol/L    POCT Glucose, Arterial 143 (H) 74 - 99 mg/dL    POCT Lactate, Arterial 0.5 0.4 - 2.0 mmol/L    POCT Base Excess, Arterial 3.9 (H) -2.0 - 3.0 mmol/L    POCT HCO3 Calculated, Arterial 32.0 (H) 22.0 - 26.0 mmol/L    POCT Hemoglobin, Arterial 7.9 (L) 13.5 - 17.5 g/dL    POCT Anion Gap, Arterial 5 (L) 10 - 25 mmo/L    Patient Temperature      FiO2 100 %    Ventilator Mode BiPAP     Frequency (BPM) 10 bpm    Ipap CMH2O 18.0 cm H2O    Epap CMH2O 5.0 cm H2O    Site of Arterial Puncture Radial Right     Bishnu's Test Positive        ECG 12 lead    Result Date: 11/16/2024  Sinus tachycardia Otherwise normal ECG When compared with ECG of 12-NOV-2024 14:23, (unconfirmed) No significant change was found See ED provider note for full interpretation and clinical correlation Confirmed by Leila Luke ([id number]) on 11/16/2024 2:15:23 PM    ECG 12 lead    Result Date: 11/16/2024  Sinus tachycardia Otherwise normal ECG When compared with ECG of 01-NOV-2024 08:10, Vent. rate has increased BY  42 BPM Criteria for Septal infarct are no longer Present T wave amplitude has decreased in Lateral leads See ED provider note for full interpretation and clinical correlation Confirmed by Leila Luke ([id number]) on 11/16/2024 2:15:13 PM    Electrocardiogram,  12-lead PRN ACS symptoms    Result Date: 11/15/2024  Atrial fibrillation with rapid ventricular response with premature ventricular or aberrantly conducted complexes Nonspecific ST and T wave abnormality Abnormal ECG When compared with ECG of 12-NOV-2024 15:52, (unconfirmed) Atrial fibrillation has replaced Sinus rhythm ST now depressed in Lateral leads Nonspecific T wave abnormality now evident in Inferior leads Nonspecific T wave abnormality now evident in Lateral leads    CT angio chest for pulmonary embolism    Result Date: 11/12/2024  Interpreted By:  Bryce Estrada, STUDY: CT ANGIO CHEST FOR PULMONARY EMBOLISM;  11/12/2024 3:08 pm   INDICATION: Signs/Symptoms:History of lung cancer shortness of breath COPD hypoxemic significant weight loss.   COMPARISON: 10/16/2024   ACCESSION NUMBER(S): [id number]   ORDERING CLINICIAN: LINUS BERNAL   TECHNIQUE: Helical data acquisition of the chest was obtained with  75 mL Omnipaque 350. Images were reformatted in axial, coronal, and sagittal planes.MIP reformatted images were also generated.   FINDINGS: LUNGS and AIRWAYS: Right upper lobe mass, difficult to differentiate from adjacent consolidation, grossly appears decreased in size from 9.1 cm 8.1 cm. Central necrosis has increased.   Previously, the right lung was collapsed. There is now aeration of the previously occluded right mainstem bronchus as well as the lobar branches of the middle and lower lobes. There is persistent occlusion of the upper lobe bronchus. The previously collapsed upper, middle, and lower lobes are now aerated, although there are diffusely scattered areas of predominantly peripheral consolidation. There are also peripheral areas of new consolidation scattered throughout the left lung. A new nodule in the left lung base measuring 23 mm is centrally cavitated.   Underlying mild pulmonary fibrosis. Trace right pleural effusion.   MEDIASTINUM and SUDHAKAR, LOWER NECK AND AXILLA: The visualized thyroid  gland is grossly unremarkable.   A left paratracheal node is enlarged from 8 mm to 10 mm short axis. The right upper lobe mass infiltrates the mediastinum and is contiguous with adjacent lymphadenopathy which is difficult to measure discretely. Pretracheal node is roughly unchanged at 18 mm short axis. A 13 mm subcarinal node appears new.   Esophagus is not dilated.   HEART and VESSELS: The heart is normal in gross morphology and size.   No significant pericardial effusion.   Severe coronary atherosclerosis.   Thoracic aorta is severely atherosclerotic but otherwise patent without aneurysm. The great vessels are patent. Right IJ chest port catheter is still present. There is persistent narrowing of the SVC relating to compression by adjacent tumor.   No pulmonary embolism is identified.   UPPER ABDOMEN: The visualized subdiaphragmatic structures demonstrate no acute findings on limited images.   CHEST WALL and OSSEOUS STRUCTURES: No suspicious osseous lesions. Multilevel degenerative changes of the thoracic spine.         1.  No pulmonary embolism identified. 2. Grossly right upper lobe mass is decreased in size with increased central necrosis. 3. Previous collapse of the right lung has resolved. There are however extensive new areas of bilateral airspace consolidation suggestive of multifocal pneumonia. A new cavitated nodule in the left lung base may be infectious or inflammatory although metastasis is not excluded. 4. Trace right pleural effusion. 5. Mediastinal lymphadenopathy has slightly progressed.     Signed by: Bryce Estrada 11/12/2024 3:45 PM Dictation workstation:   [id number]    ECG 12 Lead    Result Date: 11/1/2024  Normal sinus rhythm Possible Left atrial enlargement Septal infarct , age undetermined Abnormal ECG When compared with ECG of 23-SEP-2024 01:03, Incomplete left bundle branch block is no longer Present Confirmed by Lisa Ko ([id number]) on 11/1/2024 12:18:47 PM    Rad Onc CT Sim  Images    Result Date: 10/30/2024  These images are not reportable by radiology and will not be interpreted by  Radiologists.           Assessment/Plan   Principal Problem:    Acute on chronic hypoxic respiratory failure (Multi)  Active Problems:    CAD (coronary artery disease)    Hypertension    Atrial fibrillation (Multi)    Malignant neoplasm of upper lobe of right lung (Multi)    Hypokalemia    MRSA pneumonia (Multi)    Chronic obstructive pulmonary disease, unspecified    Hyponatremia    Normocytic anemia, not due to blood loss    Malnutrition (Multi)    Urinary retention    DM (diabetes mellitus) (Multi)    Gastroesophageal reflux disease without esophagitis    History of tobacco use    Juan Carlos Cr is a 74 yr old male with PMH of squamous cell lung cancer diagnosed 8/2024 currently on chemoradiation, right lung collapse s/p endobronchial ultrasound, bronchial stent 8/2024, COPD on home O2 4L, paroxysmal A-fib on Rivaroxaban (diagnosed 8/2024), history of MI, CAD with remote PCI 1997, HFrEF (EF 35-40% 8/2024), HTN, and DM, who was admitted on 11/12 for 3-4 day history of dyspnea on minimal exertion, productive cough, wheezing.     #Acute on chronic respiratory failure on BiPAP 2/2  #MRSA positive multifocal pneumonia  #COPD  #Squamous cell lung cancer, on chemoradiation   - Pt's oxygen status has worsened overnight, now requiring BiPAP. Pt's wife informed by nurse over phone call, then again via phone call by resident and attending. Family discussed with palliative care yesterday and pt is DNR-CCA/DNI. Recommended family to visit pt today as his condition is worsening. Palliative care has called pt's son Royce's wife, who the resident spoke with yesterday afternoon in person.  - Imaging reviewed, including CXR from this morning, which has worsened compared to prior CXR and CT.   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  - Pt's sodium low, stable  - Nephro consulted    #Normocytic anemia, stable   - Hemoglobin continues to remain stable   - S/p 1 unit RBC transfusion 11/23    - Pt is blood type A, Rh pos, antibody neg    #Hypokalemia, resolved   - Cardio recommends keeping K > 4  - Hold Klor-Con 10 mEqs as K+ WNL    #Severe malnutrition 2/2 chronic lung disease   - Nutrition consulted - Glucerna, weights 2-3x weekly  - Regular diet  - Pt cannot eat while on BiPAP    #Atrial fibrillation, resolved RVR   - Hold Rivaroxaban 20mg daily oral  - Amiodarone 200mg PO   - Cardiology outpatient follow up with Leslie LARKIN.     - Pt has apt with Dr. Ko on 1/10/2025    #Urinary retention, likely 2/2 immobility   - Failed 2x voiding trial on 11/17 and 11/20  - Urology consulted, rec discharge with monserrat and follow up outpatient for voiding trial.    - Tamsulosin 0.4 mg daily oral    #Cyst on back, stable   - Evidenced by CT    #HLD   - Rosuvastatin 20mg daily oral    #CAD with history of cardiac stent   - Hold Aspirin 81mg daily    #DM   - Hold Metformin    - ISS    #GERD   - Pantoprazole 40mg daily IV    #History of tobacco use   - Nicotine patch    PT/OT consulted    Code status: DNR-CCA/DNI    Dispo: Currently on BiPAP, family informed.     Ez Whitehead, DO  PGY-1, Family Medicine  [facility]

## 2024-11-27 NOTE — DISCHARGE SUMMARY
Discharge Diagnosis  Acute on chronic hypoxic respiratory failure (Multi)    Hospital Course   Juan Carlos Cr is a 74 yr old male with PMH of squamous cell lung cancer diagnosed 2024 currently on chemoradiation, right lung collapse s/p endobronchial ultrasound, bronchial stent 2024, COPD on home O2 4L, paroxysmal A-fib on Rivaroxaban (diagnosed 2024), history of MI, CAD with remote PCI , HFrEF (EF 35-40% 2024), HTN, and DM, who was admitted on  for 3-4 day history of dyspnea on minimal exertion, productive cough, wheezing. He is treated acute on chronic hypoxic respiratory failure 2/2 MRSA pneumonia, HFrEF exacerbation. ID/pulm were consulted. He is started on broad spectrum abx. His respiratory status worsened that palliative was consulted. He had a fib rvr that cardio was consulted and he was started on amio/xarelto. He did have thrombocytopenia. Ultimately he worsened that family decided to make pt dnr cc with consult to hospice. Pt  on 24 at 3:42PM    More than 30 minutes were spent in coordinating patient discharge       Pertinent Physical Exam At Time of Discharge  Physical Exam  There were no heart tones present. There were no breath sounds or spontaneous respirations. There was no palpable carotid pulse. The pupils were fixed and dilated. There was no response to noxious stimli. Family is updated. Patient is pronounced on 24 at  3:42PM    Tiffanie Viera MD  Hospitalist

## 2024-11-27 NOTE — SIGNIFICANT EVENT
At shift change patient presented with labored breathing, and low pulse ox. RT and dr. Lira at bedside. Patient was placed on bipap after obtaining ABG results

## 2024-11-27 NOTE — PROGRESS NOTES
Department of Medicine  Division of Pulmonary, Critical Care, and Sleep Medicine    Juan Carlos Cr is a 74 y.o. male on day 15 of admission presenting with Acute on chronic hypoxic respiratory failure (Multi).    Subjective   Worsening, requiring BiPAP today.       Objective     Vital Signs      11/26/2024     2:59 PM 11/26/2024     7:00 PM 11/27/2024     5:00 AM 11/27/2024     6:36 AM 11/27/2024     7:49 AM 11/27/2024     8:03 AM 11/27/2024    11:00 AM   Vitals   Systolic 103 97 98 99  96 90   Diastolic 49 58 83 47  42 45   BP Location Left arm      Left arm   Heart Rate 87 85 72 69   65   Temp 36.9 °C (98.4 °F) 36.5 °C (97.7 °F) 35.6 °C (96 °F)    36.1 °C (97 °F)   Resp 32 27 20 23 20  19        Physical exam  Constitutional: Acutely ill looking, lethargic, on BiPAP  HEENT: Normocephalic and atraumatic.  Cardiovascular: Normal rate and regular rhythm.  Pulmonary: Coarse breath sounds bilaterally, no wheezing.  Musculoskeletal: No edema, no cyanosis.  Neurological: Awake, answering questions but lethargic      Labs:  Lab Results   Component Value Date    WBC 9.6 11/27/2024    HGB 8.0 (L) 11/27/2024    HCT 25.6 (L) 11/27/2024    MCV 96 11/27/2024    PLT 37 (LL) 11/27/2024      Lab Results   Component Value Date    GLUCOSE 132 (H) 11/27/2024    CALCIUM 7.2 (L) 11/27/2024     (L) 11/27/2024    K 5.0 11/27/2024    CO2 30 11/27/2024    CL 90 (L) 11/27/2024    BUN 29 (H) 11/27/2024    CREATININE 0.91 11/27/2024      Lab Results   Component Value Date    ALT 11 11/27/2024    AST 16 11/27/2024    ALKPHOS 107 11/27/2024    BILITOT 0.3 11/27/2024        Oxygen Therapy  SpO2: 93 %  Medical Gas Therapy: Supplemental oxygen  Medical Gas Delivery Method: CPAP/Bi-PAP mask  FiO2 (%):  [90 %-100 %] 100 %  S RR:  [10] 10    Intake/Output last 3 Shifts:  I/O last 3 completed shifts:  In: 550 (8.5 mL/kg) [IV Piggyback:550]  Out: 1025 (15.8 mL/kg) [Urine:1025 (0.4 mL/kg/hr)]  Weight: 64.7 kg       Medications   Scheduled  medications  amiodarone, 200 mg, oral, Daily  ascorbic acid, 500 mg, oral, Daily  aspirin, 81 mg, oral, Daily  cholecalciferol, 800 Units, oral, Daily  [Held by provider] doxycycline, 100 mg, oral, q12h MASSIEL  fluticasone furoate-vilanteroL, 1 puff, inhalation, Daily  insulin lispro, 0-10 Units, subcutaneous, Before meals & nightly  ipratropium-albuteroL, 3 mL, nebulization, TID  [Held by provider] isosorbide mononitrate ER, 30 mg, oral, Daily  [Held by provider] losartan, 25 mg, oral, Daily  [Held by provider] metFORMIN, 500 mg, oral, BID  [Held by provider] metoprolol succinate XL, 50 mg, oral, Daily  mupirocin, , Each Nostril, TID  nicotine, 1 patch, transdermal, Daily  oxygen, , inhalation, Continuous - Inhalation  oxygen, , inhalation, Continuous - Inhalation  pantoprazole, 40 mg, intravenous, Daily  piperacillin-tazobactam, 3.375 g, intravenous, q6h  [Held by provider] potassium chloride CR, 10 mEq, oral, Daily  rivaroxaban, 20 mg, oral, Daily with evening meal  rosuvastatin, 20 mg, oral, Daily  sodium chloride, 3 mL, nebulization, TID  tamsulosin, 0.4 mg, oral, Daily  tiotropium, 2 puff, inhalation, Daily  vancomycin, 1,750 mg, intravenous, q24h  zinc oxide, 1 Application, Topical, TID      Continuous medications     PRN medications  PRN medications: acetaminophen, benzonatate, dextromethorphan-guaifenesin, dextrose, dextrose, glucagon, glucagon, ipratropium-albuteroL, oxygen, prochlorperazine, vancomycin     Allergies  Patient has no known allergies.    Chest Radiograph   No results found for this or any previous visit from the past 10 days.       XR chest 2 views 11/21/2024    Narrative  STUDY:  Chest Radiographs;  11/21/2024 11:33 AM.  INDICATION:  Worsening hypoxia.  COMPARISON:  Chest radiograph and CTA chest 8/26/2024.  ACCESSION NUMBER(S):  [id number]  ORDERING CLINICIAN:  MU HUGGINS  TECHNIQUE:  Frontal and lateral chest.  FINDINGS:  CARDIOMEDIASTINAL SILHOUETTE:  Cardiomediastinal silhouette  is normal in size and configuration.  Right IJ infusion catheter remains in place within the mid SVC.    LUNGS:  Consolidation with central cavitation involving the right upper lobe  again noted.  Patchy airspace opacification throughout the remainder  of both lungs again seen.  With mild interval improvement.  Persistent  small right pleural effusion.    ABDOMEN:  No remarkable upper abdominal findings.    BONES:  No acute osseous changes.    Impression  1.Mild improvement in the bilateral pneumonia.  2.Stable right upper lobe lung mass.  Signed by Dominguez Sellers MD      XR chest 1 view 11/18/2024    Narrative  Interpreted By:  Tanya Caballero,  STUDY:  XR CHEST 1 VIEW; ;  11/18/2024 2:02 pm    INDICATION:  Signs/Symptoms:dyspnea.      COMPARISON:  Chest radiograph dated 10/18/2024    ACCESSION NUMBER(S):  [id number]    ORDERING CLINICIAN:  LUIS HORTA    FINDINGS:  Right chest wall [id number] is noted with its tip projecting over  the expected region of distal SVC and is similar compared to prior  radiograph. Cardiac silhouette is similar compared to immediate prior  radiograph. There is persistent evidence of near complete  opacification of right upper lung zone. There is interval improvement  in airspace opacities in the right mid to lower lung zones compared  to immediate prior radiograph with improved aeration in the right  lower lung zone. However there is interval increase in airspace  infiltrates in the left mid to lower lung zone, new compared to prior  radiograph dated 10/18/2024 and appears to be slightly increased  compared to immediate prior CT examination dated 11/12/2024 (within  limitation of differences in technique). There is small right  thoracic pleural effusion. No large pneumothorax within limits of  portable technique. No acute osseous findings.    Impression  1. Findings concerning for interval worsening of airspace opacities  in the left mid to lower lung zone compared to prior  radiograph dated  10/18/2024 and immediate prior CT chest dated 11/12/2024 (within  limitation of differences in technique).  2. Redemonstration of right lung mass with interval improved aeration  in right lung base compared to prior radiograph as described above.        MACRO:  None    Signed by: Tanya Caballero 11/20/2024 8:45 AM  Dictation workstation:   [id number]         Assessment and Plan / Recommendations   Assessment/Plan     74-year-old man with history of squamous cell lung cancer post right mainstem stent, COPD admitted with respiratory failure    1.  Acute hypoxic respiratory failure due to pneumonia, COPD and lung cancer  2.  MRSA pneumonia  3.  Lung cancer  4.  COPD exacerbation    Worsening requiring BiPAP, WITH hypercapnic acidosis, chest x-ray with progressive bilateral infiltrates.   Bedside ultrasound showed small left pleural effusion/no benefit of thoracentesis.    -On vancomycin and Zosyn    -Continue bronchial hygiene with incentive spirometry, Acapella, wean O2 as tolerated. 3% nebz    Family met with palliative and decided to transition to comfort care.    Darlene Araiza MD

## 2024-11-27 NOTE — PROGRESS NOTES
Juan Carlos Cr is a 74 y.o. male on day 15 of admission presenting with Acute on chronic hypoxic respiratory failure (Multi).    Advance Care Planning     Subjective   Author received update from primary team that patient desaturated to 30% overnight and has remained on BiPAP since. Requesting author set up another goals of care discussion in setting of worsening acute on chronic hypoxic hypercapnic respiratory failure now requiring BiPAP. Author spoke to patient and his wife Cindy yesterday on 11/26/24 and patient was able to express wishes that he would not want to be intubated if his respiratory status were to worsen. He and Cindy were also open to re-discussing goals and a transition to comfort care if patient worsened at any point during his hospitalization going forward. (Kindly refer to progress note from 11/26/24 for details).     Author attempted to have conversation with patient at bedside this morning, too lethargic to participate. Author called patient's wife and surrogate decision maker Cindy and a voluntary advance care planning meeting was set for this afternoon. Author met with patient, Cindy, patient's son Royce, patient's sister's Estella and eVlia, brother Ed, and Ray's wife Fransisco at bedside. Patient's son Shawn (lives in [place]) was called and placed on speaker phone for portion of meeting. Patient's other son Maynor was called and updated by Royce, in agreement with plan per Royce's report, and is coming into the hospital shortly.    Disease Specific Counseling/Prognosis Discussed: Author provided counseling on patient's current clinical condition, including diagnoses, management plan, and poor prognosis. Author discussed patient's worsening acute on chronic hypoxic and hypercapnic respiratory failure now requiring BiPAP secondary to MRSA pneumonia, COPD, and non small cell lung cancer despite 15 days in the hospital and aggressive hospital care. Patient's imaging has revealed progressively  worsening diffuse edema/pneumonia in the left lung and continued collapse of the right lung. Of note, patient underwent a tumor debulking and stenting of right mainstem at [facility] on 8/29/2024 and then again on 10/18/2024. The stent was removed on 9/25/2024 after it had occluded. Per chart review, IP at [facility] was contacted by pulmonology to review patient case and could not offer any bronch or beneficial intervention at this time. US was performed by Dr. Araiza today and pleural effusion on the left was too small to benefit from thoracentesis at this time. Patient cannot eat or drink on BiPAP and already has severe protein calorie malnutrition in setting of severe respiratory failure. Patient's son Royce expressing that he has been looking at his father's my chart daily and saw the notes and lab results including blood gases from overnight when patient desaturated to 30% and was placed on BiPAP. He stated that he and Cindy are aware that patient is declining, that he no longer has any quality of life, and that as much as it hurts they do not want to see him suffer any longer. Patient's sisters and brother in agreement, Estella stating that her brother has already been through so much. Family collectively expressing distress in seeing patient lethargic, restless, and uncomfortable on BiPAP. Cindy and Royce expressing wish to focus on comfort going forward.     End of Life Counseling/Discussion: In setting of goals for comfort and presence of distressing symptoms, plan for author to transition patient to comfort care at this time per Cindy and Royce's wishes. Patient's son Shawn (via speaker phone) was in agreement to support decision to transition to comfort based plan of care as he does not want patient to suffer any longer. Patient's other son Maynor was called and updated by Royce, in agreement with plan per Royce's report, and is coming into the hospital shortly. Patient's siblings also in agreement with plan. Author provided  "counseling on end of life symptoms and what to expect going forward. Counseling was also provided on comfort measures and medications that will be in place to manage any pain, shortness of breath, anxiety, agitation, and respiratory secretions to allow patient to pass with comfort and dignity. Cindy and Royce in agreement to start comfort medications and to stop all labs, test, procedures, and medications not in line with comfort at this time. Will remove BiPAP and place patient on a NC for comfort once his other son Maynor arrives at the hospital.     Resuscitation Assessment: Cindy and Royce in agreement for code status change to DNR Comfort Measures Only at this time.     Hospice Discussion: Family familiar with hospice services. Aware that primary team may consult hospice at some point if patient remains stable off BiPAP to take over patient's care. Discussed different hospice care locations including admission GIP in hospital or Hospice House (if patient qualifies) versus home or nursing facility with hospice.     All questions and concerns were addressed during encounter.   ----------------------------------------------------------------------------------------------    Objective     Physical Exam  Constitutional:       General: He is in acute distress.      Appearance: He is ill-appearing.      Comments: Frail, cachectic    HENT:      Head:      Comments: Temporal wasting      Mouth/Throat:      Mouth: Mucous membranes are dry.   Eyes:      Conjunctiva/sclera: Conjunctivae normal.   Pulmonary:      Effort: Tachypnea and accessory muscle usage present.      Comments: On BiPAP  Neurological:      Mental Status: He is lethargic.       Last Recorded Vitals  Blood pressure (!) 90/45, pulse 65, temperature 36.1 °C (97 °F), temperature source Temporal, resp. rate 19, height 1.753 m (5' 9\"), weight 64.7 kg (142 lb 11.2 oz), SpO2 93%.  Intake/Output last 3 Shifts:  I/O last 3 completed shifts:  In: 550 (8.5 mL/kg) [IV " Piggyback:550]  Out: 1025 (15.8 mL/kg) [Urine:1025 (0.4 mL/kg/hr)]  Weight: 64.7 kg     Relevant Results  Scheduled medications  amiodarone, 200 mg, oral, Daily  aspirin, 81 mg, oral, Daily  [Held by provider] doxycycline, 100 mg, oral, q12h MASSIEL  fluticasone furoate-vilanteroL, 1 puff, inhalation, Daily  ipratropium-albuteroL, 3 mL, nebulization, TID  [Held by provider] isosorbide mononitrate ER, 30 mg, oral, Daily  [Held by provider] losartan, 25 mg, oral, Daily  [Held by provider] metFORMIN, 500 mg, oral, BID  [Held by provider] metoprolol succinate XL, 50 mg, oral, Daily  mupirocin, , Each Nostril, TID  nicotine, 1 patch, transdermal, Daily  oxygen, , inhalation, Continuous - Inhalation  oxygen, , inhalation, Continuous - Inhalation  pantoprazole, 40 mg, intravenous, Daily  piperacillin-tazobactam, 3.375 g, intravenous, q6h  [Held by provider] potassium chloride CR, 10 mEq, oral, Daily  rivaroxaban, 20 mg, oral, Daily with evening meal  rosuvastatin, 20 mg, oral, Daily  sodium chloride, 3 mL, nebulization, TID  tamsulosin, 0.4 mg, oral, Daily  tiotropium, 2 puff, inhalation, Daily  vancomycin, 1,750 mg, intravenous, q24h  zinc oxide, 1 Application, Topical, TID      Continuous medications  morphine,       PRN medications  PRN medications: acetaminophen, acetaminophen, benzonatate, bisacodyl, dextromethorphan-guaifenesin, glycopyrrolate, haloperidol lactate, ipratropium-albuteroL, LORazepam, LORazepam, oxygen, prochlorperazine, vancomycin     Results for orders placed or performed during the hospital encounter of 11/12/24 (from the past 24 hours)   POCT GLUCOSE   Result Value Ref Range    POCT Glucose 149 (H) 74 - 99 mg/dL   POCT GLUCOSE   Result Value Ref Range    POCT Glucose 175 (H) 74 - 99 mg/dL   Respiratory Culture/Smear    Specimen: SPUTUM; Fluid   Result Value Ref Range    Gram Stain       Gram stain indicates specimen consists of lower respiratory tract secretions.    Gram Stain No predominant organism     CBC   Result Value Ref Range    WBC 9.6 4.4 - 11.3 x10*3/uL    nRBC 0.0 0.0 - 0.0 /100 WBCs    RBC 2.68 (L) 4.50 - 5.90 x10*6/uL    Hemoglobin 8.0 (L) 13.5 - 17.5 g/dL    Hematocrit 25.6 (L) 41.0 - 52.0 %    MCV 96 80 - 100 fL    MCH 29.9 26.0 - 34.0 pg    MCHC 31.3 (L) 32.0 - 36.0 g/dL    RDW 14.9 (H) 11.5 - 14.5 %    Platelets 37 (LL) 150 - 450 x10*3/uL   Vancomycin   Result Value Ref Range    Vancomycin 10.0 5.0 - 20.0 ug/mL   Comprehensive Metabolic Panel   Result Value Ref Range    Glucose 132 (H) 74 - 99 mg/dL    Sodium 127 (L) 136 - 145 mmol/L    Potassium 5.0 3.5 - 5.3 mmol/L    Chloride 90 (L) 98 - 107 mmol/L    Bicarbonate 30 21 - 32 mmol/L    Anion Gap 12 10 - 20 mmol/L    Urea Nitrogen 29 (H) 6 - 23 mg/dL    Creatinine 0.91 0.50 - 1.30 mg/dL    eGFR 88 >60 mL/min/1.73m*2    Calcium 7.2 (L) 8.6 - 10.3 mg/dL    Albumin 1.6 (L) 3.4 - 5.0 g/dL    Alkaline Phosphatase 107 33 - 136 U/L    Total Protein 4.5 (L) 6.4 - 8.2 g/dL    AST 16 9 - 39 U/L    Bilirubin, Total 0.3 0.0 - 1.2 mg/dL    ALT 11 10 - 52 U/L   B-type natriuretic peptide   Result Value Ref Range     (H) 0 - 99 pg/mL   Blood Gas Arterial   Result Value Ref Range    POCT pH, Arterial 7.21 (LL) 7.38 - 7.42 pH    POCT pCO2, Arterial 81 (HH) 38 - 42 mm Hg    POCT pO2, Arterial 71 (L) 85 - 95 mm Hg    POCT SO2, Arterial 95 94 - 100 %    POCT Oxy Hemoglobin, Arterial 92.1 (L) 94.0 - 98.0 %    POCT Base Excess, Arterial 2.0 -2.0 - 3.0 mmol/L    POCT HCO3 Calculated, Arterial 32.4 (H) 22.0 - 26.0 mmol/L    Patient Temperature      FiO2 100 %    Site of Arterial Puncture Radial Left     Bishnu's Test Positive    POCT GLUCOSE   Result Value Ref Range    POCT Glucose 144 (H) 74 - 99 mg/dL   Blood Gas Arterial Full Panel   Result Value Ref Range    POCT pH, Arterial 7.25 (LL) 7.38 - 7.42 pH    POCT pCO2, Arterial 73 (HH) 38 - 42 mm Hg    POCT pO2, Arterial 134 (H) 85 - 95 mm Hg    POCT SO2, Arterial 99 94 - 100 %    POCT Oxy Hemoglobin, Arterial 97.2  94.0 - 98.0 %    POCT Hematocrit Calculated, Arterial 24.0 (L) 41.0 - 52.0 %    POCT Sodium, Arterial 124 (L) 136 - 145 mmol/L    POCT Potassium, Arterial 4.9 3.5 - 5.3 mmol/L    POCT Chloride, Arterial 92 (L) 98 - 107 mmol/L    POCT Ionized Calcium, Arterial 1.18 1.10 - 1.33 mmol/L    POCT Glucose, Arterial 143 (H) 74 - 99 mg/dL    POCT Lactate, Arterial 0.5 0.4 - 2.0 mmol/L    POCT Base Excess, Arterial 3.9 (H) -2.0 - 3.0 mmol/L    POCT HCO3 Calculated, Arterial 32.0 (H) 22.0 - 26.0 mmol/L    POCT Hemoglobin, Arterial 7.9 (L) 13.5 - 17.5 g/dL    POCT Anion Gap, Arterial 5 (L) 10 - 25 mmo/L    Patient Temperature      FiO2 100 %    Ventilator Mode BiPAP     Frequency (BPM) 10 bpm    Ipap CMH2O 18.0 cm H2O    Epap CMH2O 5.0 cm H2O    Site of Arterial Puncture Radial Right     Bishnu's Test Positive    Transthoracic Echo (TTE) Limited   Result Value Ref Range    AV pk cj 1.50 m/s    AV mn grad 4 mmHg    LVOT diam 1.77 cm    MV E/A ratio 1.11     LA vol index A/L 30.4 ml/m2    Tricuspid annular plane systolic excursion 2.4 cm    LV EF 43 %    RV free wall pk S' 14.00 cm/s    LVIDd 5.15 cm    RVSP 52.0 mmHg    Aortic Valve Area by Continuity of VTI 1.45 cm2    Aortic Valve Area by Continuity of Peak Velocity 1.54 cm2    AV pk grad 9 mmHg    LV A4C EF 45.3    POCT GLUCOSE   Result Value Ref Range    POCT Glucose 142 (H) 74 - 99 mg/dL      Transthoracic Echo (TTE) Limited    Result Date: 11/27/2024   [facility], [address] [place] [address]               Tel [phone] and Fax [phone] TRANSTHORACIC ECHOCARDIOGRAM REPORT  Patient Name:       RONALD Corado Physician:    [id number] Mando Stinson MD Study Date:         11/27/2024          Ordering Provider:    [id number] LUIS HORTA MRN/PID:            [id number]            Fellow: Accession#:         [id number]         Nurse: Date of Birth/Age:  1950 / 74      Sonographer:          Radha Sharif                     vidya                                     [facility] Gender assigned at  M                   Additional Staff: Birth: Height:             175.26 cm           Admit Date:           11/12/2024 Weight:             64.41 kg            Admission Status:     Inpatient -                                                               Routine BSA / BMI:          1.79 m2 / 20.97     Encounter#:           [id number]                     kg/m2 Blood Pressure:     107/56 mmHg         Department Location:  [facility] Non                                                               Invasive Study Type:    TRANSTHORACIC ECHO (TTE) LIMITED Diagnosis/ICD: Chronic systolic (congestive) heart failure (CHF)-I50.22 Indication:    Congestive Heart Failure CPT Code:      Echo Limited-24775 Patient History: Pertinent History: A-Fib, CAD, COPD, CHF, Dyspnea, HTN and DM. Study Detail: The following Echo studies were performed: 2D, M-Mode, Doppler and               color flow. Technically challenging study due to patient lying in               supine position and prominent lung artifact. Unable to obtain               suprasternal notch view. The patient was asleep.  PHYSICIAN INTERPRETATION: Left Ventricle: The left ventricular systolic function is mildly decreased, with a Watters's biplane calculated ejection fraction of 43%. There are no regional left ventricular wall motion abnormalities. The left ventricular cavity size was not assessed. There is normal septal thickness. Left ventricular diastolic filling is indeterminate. Left Atrium: The left atrium was not assessed. Right Ventricle: The right ventricle was not assessed. Right ventricular systolic function not assessed. Right Atrium: The right atrium was not assessed. Aortic Valve: The aortic valve is trileaflet. The aortic valve dimensionless index is 0.59. Aortic valve regurgitation  was not assessed. The peak instantaneous gradient of the aortic valve is 9 mmHg. The mean gradient of the aortic valve is 4 mmHg. Mitral Valve: The mitral valve was not assessed. Mitral valve regurgitation was not assessed. Tricuspid Valve: The tricuspid valve is structurally normal. There is trace tricuspid regurgitation. The Doppler estimated RVSP is mildly elevated at 52.0 mmHg. Pulmonic Valve: The pulmonic valve was not assessed. Pulmonic valve regurgitation was not assessed. Pericardium: Pericardial effusion was not assessed. Aorta: The aortic root was not assessed. Systemic Veins: The inferior vena cava appears normal in size, with IVC inspiratory collapse greater than 50%. In comparison to the previous echocardiogram(s): Compared with study dated 8/29/2024, no significant changes.  CONCLUSIONS:  1. The left ventricular systolic function is mildly decreased, with a Watters's biplane calculated ejection fraction of 43%.  2. Left ventricular diastolic filling is indeterminate.  3. Mildly elevated right ventricular systolic pressure. QUANTITATIVE DATA SUMMARY:  2D MEASUREMENTS:          Normal Ranges: IVSd:            0.83 cm  (0.6-1.1cm) LVPWd:           0.58 cm  (0.6-1.1cm) LVIDd:           5.15 cm  (3.9-5.9cm) LVIDs:           3.54 cm LV Mass Index:   68 g/m2 LVEDV Index:     46 ml/m2 LV % FS          31.3 %  LA VOLUME:                    Normal Ranges: LA Vol A4C:        51.0 ml    (22+/-6mL/m2) LA Vol A2C:        53.5 ml LA Vol BP:         54.3 ml LA Vol Index A4C:  28.6 ml/m2 LA Vol Index A2C:  29.9 ml/m2 LA Vol Index BP:   30.4 ml/m2 LA Area A4C:       17.5 cm2 LA Area A2C:       18.6 cm2 LA Major Axis A4C: 5.1 cm LA Major Axis A2C: 5.5 cm LA Volume Index:   30.6 ml/m2 LA Vol A4C:        45.9 ml LA Vol A2C:        51.6 ml LA Vol Index BSA:  27.3 ml/m2  RA VOLUME BY A/L METHOD:         Normal Ranges: RA Area A4C:             9.2 cm2  AORTA MEASUREMENTS:         Normal Ranges: Ao Sinus, d:        2.90 cm  (2.1-3.5cm) Asc Ao, d:          3.40 cm (2.1-3.4cm)  LV SYSTOLIC FUNCTION BY 2D PLANIMETRY (MOD):                      Normal Ranges: EF-A4C View:    45 % (>=55%) EF-A2C View:    39 % EF-Biplane:     43 % LV EF Reported: 43 %  LV DIASTOLIC FUNCTION:             Normal Ranges: MV Peak E:             0.59 m/s    (0.7-1.2 m/s) MV Peak A:             0.53 m/s    (0.42-0.7 m/s) E/A Ratio:             1.11        (1.0-2.2) MV e'                  0.070 m/s   (>8.0) MV lateral e'          0.07 m/s MV medial e'           0.07 m/s MV A Dur:              139.87 msec E/e' Ratio:            8.44        (<8.0)  MITRAL VALVE:          Normal Ranges: MV DT:        182 msec (150-240msec)  AORTIC VALVE:                     Normal Ranges: AoV Vmax:                1.50 m/s (<=1.7m/s) AoV Peak P.0 mmHg (<20mmHg) AoV Mean P.1 mmHg (1.7-11.5mmHg) LVOT Max Jean Marie:            0.94 m/s (<=1.1m/s) AoV VTI:                 34.83 cm (18-25cm) LVOT VTI:                20.61 cm LVOT Diameter:           1.77 cm  (1.8-2.4cm) AoV Area, VTI:           1.45 cm2 (2.5-5.5cm2) AoV Area,Vmax:           1.54 cm2 (2.5-4.5cm2) AoV Dimensionless Index: 0.59  RIGHT VENTRICLE: RV Basal 3.30 cm RV Mid   3.70 cm RV Major 7.7 cm TAPSE:   24.0 mm RV s'    0.14 m/s  TRICUSPID VALVE/RVSP:          Normal Ranges: Peak TR Velocity:     3.32 m/s RV Syst Pressure:     52 mmHg  (< 30mmHg) IVC Diam:             0.90 cm  PULMONIC VALVE:          Normal Ranges: PV Max Jean Marie:     0.8 m/s  (0.6-0.9m/s) PV Max P.7 mmHg  AORTA: Asc Ao Diam 3.38 cm  [id number] Mando Stinson MD Electronically signed on 2024 at 1:59:19 PM  ** Final **     XR chest 1 view    Result Date: 2024  Interpreted By:  Rober Sanchez, STUDY: XR CHEST 1 VIEW; ;  2024 6:01 am   INDICATION: Signs/Symptoms:hypoxia and increased work of breathing.     COMPARISON: 2024 chest radiograph and 2024 chest CT   ACCESSION NUMBER(S): [id number]   ORDERING  CLINICIAN: LUIS VIERA   TECHNIQUE: AP portable upright chest 5:47 a.m.   FINDINGS: Passive occasion in the right lower chest has increased from the prior exams; this may be secondary to increasing pleural effusion or pneumonia in the right lower lobe.   The right upper lung remains opacified with an air cavity medially.   Diffuse edema/pneumonia in the left lung has increased from the prior exams.   Cardiac silhouette size is normal.       Lung opacifications have increased bilaterally with continued complete consolidation in the right upper lobe and a air-filled cavity medially in the right upper lobe.     MACRO: None   Signed by: Rober Sanchez 11/27/2024 8:52 AM Dictation workstation:   [id number]        Assessment/Plan   - Continue supportive care through primary team     #Goals of Care:  #Complex Medical Decision Making:  #Advance Care Planning:  - goals are now comfort based: patient's wife and surrogate decision maker Cindy and patient's sons Royce and Shawn in agreement to transition patient to a comfort based plan of care at this time        -- will wait for patient's son Maynor to arrive at the hospital to remove the BiPAP   - patient's code status changed to DNR Comfort Measures Only per Cindy request     #End of Life Comfort Measure Medications Ordered:  #Pain/Dyspnea:  - Ordered morphine infusion with basal rate of 4mg/hr and nurse driven bolus of 2mg q15 minutes for RDOS >3     #Anxiety/Nausea:  - Ordered 0.5mg lorazepam IV push q4hs PRN    #Agitation:  - Ordered 1mg Haldol IV push q4hs PRN     #Excess Secretions:  - Ordered 0.2mg glycopyrrolate IV push q6hs PRN    #Urinary Retention:  - Maintain Buchanan catheter for comfort     #Constipation:  - Ordered dulcolax suppository PRN daily     Plan of Care discussed with: Updated Dr. Viera, Dr. Araiza, Dr. Rios, Dr. Magallon, TCC, SW, and bedside RN on goals of care discussion and code status change to DNR Comfort Measures ONly via face-to-face encounter and epic  secure chat.      Thank you for asking Palliative Care to assist with care of this patient.  Will continue to follow.   Please contact us for additional questions or concerns.     Signature and billing  Medical complexity was high level due to complexity of problems including acute on chronic hypoxic and hypercapnic respiratory failure now requiring BiPAP secondary to lung cancer, COPD, and MRSA pneumonia posing threat to life or function, extensive data review, interviewing patient/family, discussion with primary team and bedside RN, coordination of care, and high risk of management/treatment including patient's decision to change code status to DNR/DNI. I spent an additional 50 minutes in advance care planning.      SIGNATURE: AMISHA Rivera  PAGER/CONTACT:  Contact information:  Palliative Medicine  Contact Via Epic Secure chat

## 2024-11-27 NOTE — PROGRESS NOTES
24 0900   Discharge Planning   Living Arrangements Spouse/significant other   Support Systems Spouse/significant other   Assistance Needed Patient is A&Ox3, independent in ADL's, uses no assistive devices, O2 at 2-3L (unknown company)-now on BiPap, getting chemo and radiation for cancer   Type of Residence Private residence   Number of Stairs to Enter Residence 2   Number of Stairs Within Residence 13   Do you have animals or pets at home? Yes   Type of Animals or Pets 1 cat   Who is requesting discharge planning? Provider   Home or Post Acute Services Post acute facilities (Rehab/SNF/etc)   Type of Post Acute Facility Services Skilled nursing   Expected Discharge Disposition [facility]  (TBD: Plan had been to go to [facility] for Re[facility] for SNF, precert  . Patient remains not medically ready , on airvo . Palliative care reconsulted for [facility].)   Does the patient need discharge transport arranged? Yes   RoundTrip coordination needed? Yes   Has discharge transport been arranged? No        24 1500   Discharge Planning   Expected Discharge Disposition [facility]  ([facility])

## 2024-11-27 NOTE — PROGRESS NOTES
Vancomycin Dosing by Pharmacy- Cessation of Therapy    Consult to pharmacy for vancomycin dosing has been discontinued by the prescriber, pharmacy will sign off at this time.    Please call pharmacy if there are further questions or re-enter a consult if vancomycin is resumed.     Bhargav Umanzor, PharmD  PGY1 Pharmacy Resident

## 2024-11-27 NOTE — PROGRESS NOTES
Juan Carlos Cr is a 74 y.o. male on day 15 of admission presenting with Acute on chronic hypoxic respiratory failure (Multi).      Subjective   NO new issues. Na stable. Resp status same       Objective   Same       Vitals 24HR  Heart Rate:  [69-87]   Temp:  [35.6 °C (96 °F)-36.9 °C (98.4 °F)]   Resp:  [20-32]   BP: ()/(42-83)   SpO2:  [87 %-100 %]       Intake/Output last 3 Shifts:    Intake/Output Summary (Last 24 hours) at 11/27/2024 1055  Last data filed at 11/27/2024 0500  Gross per 24 hour   Intake 550 ml   Output 500 ml   Net 50 ml       Physical Exam    Relevant Results               Assessment/Plan      Assessment and plan  1.  Hyponatremia.  Minimal response to CHF directed tx suggesting this is mostly SIADH    - No suggestions today  - Cont to monitor volume status and labs  - Cont current FR  Assessment & Plan  MRSA pneumonia (Multi)    CAD (coronary artery disease)    Hypertension    Atrial fibrillation (Multi)    Malignant neoplasm of upper lobe of right lung (Multi)    Acute on chronic hypoxic respiratory failure (Multi)    Hypokalemia    Chronic obstructive pulmonary disease, unspecified    Hyponatremia    Normocytic anemia, not due to blood loss    Malnutrition (Multi)    Urinary retention    DM (diabetes mellitus) (Multi)    Gastroesophageal reflux disease without esophagitis    History of tobacco use                    Gabbi Rios MD

## 2024-11-29 ENCOUNTER — APPOINTMENT (OUTPATIENT)
Dept: RADIATION ONCOLOGY | Facility: CLINIC | Age: 74
End: 2024-11-29
Payer: MEDICARE

## 2024-11-30 LAB
ATRIAL RATE: 182 BPM
BACTERIA SPEC RESP CULT: ABNORMAL
BACTERIA SPEC RESP CULT: ABNORMAL
GRAM STN SPEC: ABNORMAL
GRAM STN SPEC: ABNORMAL
Q ONSET: 219 MS
QRS COUNT: 23 BEATS
QRS DURATION: 94 MS
QT INTERVAL: 306 MS
QTC CALCULATION(BAZETT): 472 MS
QTC FREDERICIA: 409 MS
R AXIS: 60 DEGREES
T AXIS: 231 DEGREES
T OFFSET: 372 MS
VENTRICULAR RATE: 143 BPM

## 2024-12-02 ENCOUNTER — APPOINTMENT (OUTPATIENT)
Dept: RADIATION ONCOLOGY | Facility: CLINIC | Age: 74
End: 2024-12-02
Payer: MEDICARE

## 2024-12-03 ENCOUNTER — APPOINTMENT (OUTPATIENT)
Dept: RADIATION ONCOLOGY | Facility: CLINIC | Age: 74
End: 2024-12-03
Payer: MEDICARE

## 2024-12-03 NOTE — PROGRESS NOTES
Radiation Oncology Treatment Summary    Patient Name:  Juan Carlos Cr  MRN:  [id number]  :  1950    Referring Provider: Janiya Landis MD  Primary Care Provider: No Assigned PCP Generic Provider, MD    Brief History: Please see the radiation oncology consultation note.  Briefly, Juan Carlos Cr is a 74 y.o. 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  The patient completed radiotherapy as outlined below.    The radiation planning and treatment procedures were explained to the patient in advance, and all questions were answered. The benefits and goals of treatment, options and alternatives, limitations, side effects and risks of radiation were also explained. The patient provided informed consent.    Technical Summary:  Region(s) Treated: Right thorax, hilum and mediastinum  Radiation Dose Prescribed: 6000 cGy in 30 fractions (completed 3600 cGy/6000 cGy)  Radiation Technique/Machine/Energy Used: VMAT / Truebeam /6 MV photons  Additional radiation technical details available in our radiation oncology EMR [facility] and our treatment planning system.    Radiation Treatment Summary:    IMRT: Right Lung or bronchus    Treatment Period Technique Fraction Dose Fractions Total Dose   Course 1 10/11/2024-2024  (days elapsed: 32)         R_Thx_Hil_Med 10/11/2024-10/16/2024 VMAT 200 / 200 cGy  800 / 800 cGy         R_Thx_H_M_RP 10/24/2024-2024 VMAT 200 / 200 cGy  1600 / 1,600 cGy         R_Thx_H_M_RP2 2024-2024 VMAT 200 / 200 cGy  1200 / 3,600 cGy       Please see the patient's [facility] chart for further details regarding the radiation plan, including beam energy.    Concurrent Chemotherapy:  Treatment Plans       Name Type Plan Dates Plan Provider         Active    PACLitaxel (Weekly) / CARBOplatin (Weekly) with Concurrent Radiation, 49 Day Cycle Oncology Treatment 2024 - Present Janiya Landis MD                  Clinical Summary:  The patient tolerated this course of radiation therapy relatively well, with no unusual events or unanticipated toxicities. Symptoms during treatment included poor appetite, cough and shortness of breath.  The patient was hospitalized multiple times.  The patient was found to have chronic hypoxic respiratory failure with superimposed MRSA pneumonia and HFrEF exacerbation.  Ultimately, the patient and family pursued hospice and palliative care options.       CTCAE Toxicity Overview:   Toxicity Assessment          10/16/2024    09:03 10/30/2024    09:44 11/6/2024    09:41   Toxicity Assessment   Treatment Site Thoracic Thoracic Thoracic   Anorexia Grade 1 Grade 0 Grade 1   Anxiety Grade 0 Grade 0 Grade 0   Dehydration Grade 0 Grade 0 Grade 0   Depression Grade 1 Grade 0 Grade 0   Dermatitis Radiation Grade 0 Grade 0 Grade 0   Diarrhea Grade 0 Grade 0 Grade 0   Fatigue Grade 0 Grade 0 Grade 0   Fibrosis Deep Connective Tissue Grade 0 Grade 0 Grade 0   Fracture Grade 0 Grade 0 Grade 0   Nausea Grade 0 Grade 0 Grade 0   Pain Grade 0 Grade 0 Grade 0   Treatment Related Secondary Malignancy Grade 0 Grade 0 Grade 0   Tumor Pain Grade 0 Grade 0 Grade 0   Vomiting Grade 0 Grade 0 Grade 0   Constipation Grade 0  Grade 0   Dyspepsia Grade 0  Grade 0   Dysphagia Grade 0  Grade 0   Esophagitis Grade 0  Grade 0   Mucositis Oral Grade 0  Grade 0   Upper Gastrointestinal Hemorrhage Grade 0  Grade 0   Peripheral Sensory Neuropathy Grade 0     Brachial Plexopathy Grade 0     Pneumonitis Grade 0 Grade 0    Aspiration Grade 0 Grade 0 Grade 0   Hoarseness Grade 0 Grade 0 Grade 0   Laryngeal Edema Grade 0 Grade 0 Grade 0   Myocardial Infarction Grade 0 Grade 0 Grade 0   Pericardial Effusion Grade 0 Grade 0 Grade 0   Pericarditis Grade 0 Grade 0 Grade 0   Esophageal Fistula Grade 0 Grade 0 Grade 0   Esophageal Obstruction Grade 0 Grade 0 Grade 0   Esophageal Pain Grade 0 Grade 0 Grade 0   Esophageal Stenosis Grade  0 Grade 0 Grade 0   Esophageal Ulcer Grade 0 Grade 0 Grade 0   Bronchial Obstruction Grade 3 Grade 0 Grade 0   Cough Grade 1 Grade 1 Grade 1       sometimes has sputum(no blood)   Dyspnea Grade 2 Grade 1 Grade 0   Epistaxis Grade 0 Grade 0 Grade 0   Hiccups Grade 0 Grade 0 Grade 0   Hypoxia Grade 0 Grade 0 Grade 0   Pulmonary Fibrosis Grade 0 Grade 0 Grade 0   Lymphedema Grade 0 Grade 0 Grade 0   Thromboembolic Event Grade 0 Grade 0 Grade 0   Hot Flashes Grade 0 Grade 0 Grade 0     Patient Disposition:   The patient elected for hospice/palliative care measures during treatment. The patient completed a partial course of radiation therapy before passing away.    Rojelio Ewing MD  [facility]  [facility]  - Department of Radiation Oncology  Phone: [phone]  Fax: [phone]  Epic secure chat preferred

## 2024-12-04 ENCOUNTER — APPOINTMENT (OUTPATIENT)
Dept: HEMATOLOGY/ONCOLOGY | Facility: HOSPITAL | Age: 74
End: 2024-12-04
Payer: MEDICARE

## 2024-12-04 ENCOUNTER — APPOINTMENT (OUTPATIENT)
Dept: RADIATION ONCOLOGY | Facility: CLINIC | Age: 74
End: 2024-12-04
Payer: MEDICARE

## 2024-12-05 ENCOUNTER — APPOINTMENT (OUTPATIENT)
Dept: RADIATION ONCOLOGY | Facility: CLINIC | Age: 74
End: 2024-12-05
Payer: MEDICARE

## 2024-12-06 ENCOUNTER — APPOINTMENT (OUTPATIENT)
Dept: RADIATION ONCOLOGY | Facility: CLINIC | Age: 74
End: 2024-12-06
Payer: MEDICARE

## 2024-12-09 ENCOUNTER — APPOINTMENT (OUTPATIENT)
Dept: RADIATION ONCOLOGY | Facility: CLINIC | Age: 74
End: 2024-12-09
Payer: MEDICARE

## 2024-12-10 ENCOUNTER — APPOINTMENT (OUTPATIENT)
Dept: RADIATION ONCOLOGY | Facility: CLINIC | Age: 74
End: 2024-12-10
Payer: MEDICARE

## 2024-12-10 ENCOUNTER — APPOINTMENT (OUTPATIENT)
Dept: PULMONOLOGY | Facility: CLINIC | Age: 74
End: 2024-12-10
Payer: MEDICARE

## 2024-12-11 ENCOUNTER — APPOINTMENT (OUTPATIENT)
Dept: RADIATION ONCOLOGY | Facility: CLINIC | Age: 74
End: 2024-12-11
Payer: MEDICARE

## 2024-12-12 ENCOUNTER — APPOINTMENT (OUTPATIENT)
Dept: RADIATION ONCOLOGY | Facility: CLINIC | Age: 74
End: 2024-12-12
Payer: MEDICARE

## 2024-12-13 ENCOUNTER — APPOINTMENT (OUTPATIENT)
Dept: RADIATION ONCOLOGY | Facility: CLINIC | Age: 74
End: 2024-12-13
Payer: MEDICARE

## 2024-12-16 ENCOUNTER — APPOINTMENT (OUTPATIENT)
Dept: RADIATION ONCOLOGY | Facility: CLINIC | Age: 74
End: 2024-12-16
Payer: MEDICARE

## 2024-12-17 ENCOUNTER — APPOINTMENT (OUTPATIENT)
Dept: RADIATION ONCOLOGY | Facility: CLINIC | Age: 74
End: 2024-12-17
Payer: MEDICARE

## 2024-12-21 LAB
ATRIAL RATE: 111 BPM
P AXIS: 23 DEGREES
P OFFSET: 193 MS
P ONSET: 139 MS
PR INTERVAL: 156 MS
Q ONSET: 217 MS
QRS COUNT: 19 BEATS
QRS DURATION: 96 MS
QT INTERVAL: 310 MS
QTC CALCULATION(BAZETT): 421 MS
QTC FREDERICIA: 380 MS
R AXIS: -6 DEGREES
T AXIS: 72 DEGREES
T OFFSET: 372 MS
VENTRICULAR RATE: 111 BPM

## 2025-01-10 ENCOUNTER — APPOINTMENT (OUTPATIENT)
Dept: PULMONOLOGY | Facility: CLINIC | Age: 75
End: 2025-01-10
Payer: MEDICARE

## 2025-02-11 ENCOUNTER — APPOINTMENT (OUTPATIENT)
Dept: CARDIOLOGY | Facility: CLINIC | Age: 75
End: 2025-02-11
Payer: MEDICARE
